# Patient Record
Sex: FEMALE | Race: BLACK OR AFRICAN AMERICAN | NOT HISPANIC OR LATINO | Employment: FULL TIME | URBAN - METROPOLITAN AREA
[De-identification: names, ages, dates, MRNs, and addresses within clinical notes are randomized per-mention and may not be internally consistent; named-entity substitution may affect disease eponyms.]

---

## 2018-03-20 ENCOUNTER — HOSPITAL ENCOUNTER (OUTPATIENT)
Facility: CLINIC | Age: 58
Discharge: HOME | End: 2018-03-20
Attending: FAMILY MEDICINE
Payer: MEDICARE

## 2018-03-20 VITALS
OXYGEN SATURATION: 98 % | DIASTOLIC BLOOD PRESSURE: 76 MMHG | TEMPERATURE: 98.1 F | SYSTOLIC BLOOD PRESSURE: 130 MMHG | RESPIRATION RATE: 18 BRPM | HEART RATE: 85 BPM

## 2018-03-20 DIAGNOSIS — J02.9 ACUTE PHARYNGITIS, UNSPECIFIED ETIOLOGY: Primary | ICD-10-CM

## 2018-03-20 PROCEDURE — 99213 OFFICE O/P EST LOW 20 MIN: CPT | Performed by: FAMILY MEDICINE

## 2018-03-20 RX ORDER — LEVOTHYROXINE SODIUM 100 UG/1
100 TABLET ORAL DAILY
COMMUNITY
End: 2018-03-20

## 2018-03-20 RX ORDER — AZITHROMYCIN 250 MG/1
TABLET, FILM COATED ORAL
Qty: 6 TABLET | Refills: 0 | Status: SHIPPED | OUTPATIENT
Start: 2018-03-20 | End: 2020-02-17

## 2018-03-20 RX ORDER — LEVOTHYROXINE SODIUM 100 UG/1
100 TABLET ORAL DAILY
Qty: 30 TABLET | Refills: 0 | Status: SHIPPED | OUTPATIENT
Start: 2018-03-20 | End: 2019-02-12 | Stop reason: DRUGHIGH

## 2018-03-20 NOTE — ED PROVIDER NOTES
History  Chief Complaint   Patient presents with   • Sore Throat   • Sinusitis     4-5 days of sore throat, sinus congestion, some cough. Having some chills, no known fevers. States has been prone to strep ever since having chemo.             Past Medical History:   Diagnosis Date   • Breast cancer (CMS/HCC) (HCC)    • H/O total hysterectomy 03/2000       No past surgical history on file.    No family history on file.    Social History   Substance Use Topics   • Smoking status: Never Smoker   • Smokeless tobacco: Never Used   • Alcohol use No       Review of Systems    Physical Exam  ED Triage Vitals [03/20/18 1129]   Temp Heart Rate Resp BP SpO2   36.7 °C (98.1 °F) 85 18 130/76 98 %      Temp Source Heart Rate Source Patient Position BP Location FiO2 (%) (Set)   Oral Monitor Sitting Right upper arm --       Physical Exam      Procedures  Procedures    UC Course  ED Course          Clinical Impressions as of Mar 20 1148   Acute pharyngitis, unspecified etiology       [facility]               Clotilde Aranda MD  03/20/18 1148

## 2018-03-20 NOTE — DISCHARGE INSTRUCTIONS
1. Antibiotic as directed.  2. Ibuprofen (motrin or advil) and/or acetaminophen (tylenol) for pain / fever if needed.  3. Salt water gargles, over the counter cepacol or cloraseptic sprays or lozenges, honey in tea or with warm water and lemon juice, can all be soothing for symptoms.  4. Follow up with PCP if persistent or worsening.

## 2018-07-10 ENCOUNTER — HOSPITAL ENCOUNTER (EMERGENCY)
Facility: HOSPITAL | Age: 58
Discharge: HOME | End: 2018-07-11
Attending: EMERGENCY MEDICINE
Payer: MEDICARE

## 2018-07-10 VITALS
RESPIRATION RATE: 16 BRPM | SYSTOLIC BLOOD PRESSURE: 157 MMHG | HEIGHT: 67 IN | DIASTOLIC BLOOD PRESSURE: 80 MMHG | TEMPERATURE: 98.4 F | HEART RATE: 84 BPM | OXYGEN SATURATION: 95 % | WEIGHT: 158 LBS | BODY MASS INDEX: 24.8 KG/M2

## 2018-07-10 DIAGNOSIS — S51.832A PUNCTURE WOUND OF LEFT FOREARM, INITIAL ENCOUNTER: Primary | ICD-10-CM

## 2018-07-10 PROCEDURE — 99283 EMERGENCY DEPT VISIT LOW MDM: CPT

## 2018-07-10 PROCEDURE — 3E0134Z INTRODUCTION OF SERUM, TOXOID AND VACCINE INTO SUBCUTANEOUS TISSUE, PERCUTANEOUS APPROACH: ICD-10-PCS | Performed by: EMERGENCY MEDICINE

## 2018-07-10 RX ORDER — CLARITHROMYCIN 500 MG/1
500 TABLET, FILM COATED ORAL 2 TIMES DAILY
COMMUNITY
End: 2019-01-25 | Stop reason: ALTCHOICE

## 2018-07-11 PROCEDURE — 63600000 HC DRUGS/DETAIL CODE: Performed by: PHYSICIAN ASSISTANT

## 2018-07-11 PROCEDURE — 90715 TDAP VACCINE 7 YRS/> IM: CPT | Performed by: PHYSICIAN ASSISTANT

## 2018-07-11 PROCEDURE — 63700000 HC SELF-ADMINISTRABLE DRUG: Performed by: PHYSICIAN ASSISTANT

## 2018-07-11 PROCEDURE — 99283 EMERGENCY DEPT VISIT LOW MDM: CPT | Mod: 25

## 2018-07-11 PROCEDURE — 90471 IMMUNIZATION ADMIN: CPT | Performed by: PHYSICIAN ASSISTANT

## 2018-07-11 RX ORDER — DOXYCYCLINE HYCLATE 100 MG
100 TABLET ORAL ONCE
Status: COMPLETED | OUTPATIENT
Start: 2018-07-11 | End: 2018-07-11

## 2018-07-11 RX ORDER — DOXYCYCLINE 100 MG/1
100 CAPSULE ORAL 2 TIMES DAILY
Qty: 20 CAPSULE | Refills: 0 | Status: SHIPPED | OUTPATIENT
Start: 2018-07-11 | End: 2020-02-17

## 2018-07-11 RX ADMIN — BACITRACIN, NEOMYCIN, POLYMYXIN B 1 PACKET: 400; 3.5; 5 OINTMENT TOPICAL at 00:33

## 2018-07-11 RX ADMIN — TETANUS TOXOID, REDUCED DIPHTHERIA TOXOID AND ACELLULAR PERTUSSIS VACCINE, ADSORBED 0.5 ML: 5; 2.5; 8; 8; 2.5 SUSPENSION INTRAMUSCULAR at 00:31

## 2018-07-11 RX ADMIN — DOXYCYCLINE HYCLATE 100 MG: 100 TABLET, FILM COATED ORAL at 00:33

## 2018-07-11 ASSESSMENT — ENCOUNTER SYMPTOMS
MYALGIAS: 1
WOUND: 1
NEUROLOGICAL NEGATIVE: 1
CARDIOVASCULAR NEGATIVE: 1
RESPIRATORY NEGATIVE: 1
GASTROINTESTINAL NEGATIVE: 1
CONSTITUTIONAL NEGATIVE: 1
PSYCHIATRIC NEGATIVE: 1

## 2018-07-11 NOTE — ED ATTESTATION NOTE
The patient was evaluated and managed by the physician assistant.    Case discussed with PA, I supervised care and agree with plan.     Harrison Groves MD  07/11/18 0039

## 2018-07-11 NOTE — ED PROVIDER NOTES
"HPI     Chief Complaint   Patient presents with   • Upper Extremity Issue         History provided by:  Patient   used: No    Upper Extremity Issue   Location:  Arm  Arm location:  L forearm  Injury: yes    Time since incident:  2 hours  Mechanism of injury comment:  PW from nail, trip into it, nail came out whole  Foreign body present:  No foreign bodies  Tetanus status:  Out of date  Prior injury to area:  No  Ineffective treatments: cleaned with soap and water.  Risk factors comment:  L arm lymphedema       Patient History     Past Medical History:   Diagnosis Date   • Breast cancer (CMS/HCC) (HCC)    • H/O total hysterectomy 03/2000       Past Surgical History:   Procedure Laterality Date   • BREAST LUMPECTOMY         History reviewed. No pertinent family history.    Social History   Substance Use Topics   • Smoking status: Never Smoker   • Smokeless tobacco: Never Used   • Alcohol use No       Systems Reviewed from Nursing Triage:          Review of Systems     Review of Systems   Constitutional: Negative.    HENT: Negative.    Respiratory: Negative.    Cardiovascular: Negative.    Gastrointestinal: Negative.    Genitourinary: Negative.    Musculoskeletal: Positive for myalgias.   Skin: Positive for wound.   Neurological: Negative.    Psychiatric/Behavioral: Negative.         Physical Exam     ED Triage Vitals [07/10/18 2349]   Temp Heart Rate Resp BP SpO2   36.9 °C (98.4 °F) 84 16 (!) 157/80 95 %      Temp Source Heart Rate Source Patient Position BP Location FiO2 (%) (Set)   Oral Monitor Sitting Right upper arm --                     Patient Vitals for the past 24 hrs:   BP Temp Temp src Pulse Resp SpO2 Height Weight   07/10/18 2349 (!) 157/80 36.9 °C (98.4 °F) Oral 84 16 95 % 1.702 m (5' 7\") 71.7 kg (158 lb)           Physical Exam   Constitutional: She appears well-developed and well-nourished.   HENT:   Head: Atraumatic.   Eyes: Conjunctivae are normal.   Neck: Normal range of motion. "   Cardiovascular: Normal rate and regular rhythm.    Musculoskeletal:        Arms:  Skin: Skin is warm and dry.   Psychiatric: She has a normal mood and affect. Her behavior is normal.   Nursing note and vitals reviewed.           Procedures    ED Course & MDM     Labs Reviewed - No data to display    No orders to display           MDM  Number of Diagnoses or Management Options  Puncture wound of left forearm, initial encounter:   Diagnosis management comments: D/w Dr. Groves, will update tetanus and start PO abx, pt to see PCP in 2 days for wound check           ED Course          Clinical Impressions as of Jul 11 0005   Puncture wound of left forearm, initial encounter        MYKE Wolf  07/11/18 0023

## 2019-01-25 ENCOUNTER — OFFICE VISIT (OUTPATIENT)
Dept: PRIMARY CARE | Facility: CLINIC | Age: 59
End: 2019-01-25
Payer: MEDICARE

## 2019-01-25 ENCOUNTER — HOSPITAL ENCOUNTER (OUTPATIENT)
Dept: RADIOLOGY | Facility: HOSPITAL | Age: 59
Discharge: HOME | End: 2019-01-25
Attending: NURSE PRACTITIONER
Payer: MEDICARE

## 2019-01-25 ENCOUNTER — TELEPHONE (OUTPATIENT)
Dept: PRIMARY CARE | Facility: CLINIC | Age: 59
End: 2019-01-25

## 2019-01-25 VITALS
SYSTOLIC BLOOD PRESSURE: 130 MMHG | WEIGHT: 163 LBS | BODY MASS INDEX: 25.58 KG/M2 | HEIGHT: 67 IN | HEART RATE: 112 BPM | DIASTOLIC BLOOD PRESSURE: 70 MMHG | RESPIRATION RATE: 18 BRPM | OXYGEN SATURATION: 93 % | TEMPERATURE: 99.2 F

## 2019-01-25 DIAGNOSIS — F41.1 ANXIETY STATE: ICD-10-CM

## 2019-01-25 DIAGNOSIS — J06.9 UPPER RESPIRATORY TRACT INFECTION, UNSPECIFIED TYPE: ICD-10-CM

## 2019-01-25 DIAGNOSIS — E03.9 ACQUIRED HYPOTHYROIDISM: ICD-10-CM

## 2019-01-25 DIAGNOSIS — R06.02 SOB (SHORTNESS OF BREATH): ICD-10-CM

## 2019-01-25 DIAGNOSIS — C50.912 MALIGNANT NEOPLASM OF LEFT FEMALE BREAST, UNSPECIFIED ESTROGEN RECEPTOR STATUS, UNSPECIFIED SITE OF BREAST (CMS/HCC): ICD-10-CM

## 2019-01-25 DIAGNOSIS — J06.9 UPPER RESPIRATORY TRACT INFECTION, UNSPECIFIED TYPE: Primary | ICD-10-CM

## 2019-01-25 PROCEDURE — 71046 X-RAY EXAM CHEST 2 VIEWS: CPT

## 2019-01-25 PROCEDURE — 99214 OFFICE O/P EST MOD 30 MIN: CPT | Performed by: NURSE PRACTITIONER

## 2019-01-25 RX ORDER — CYCLOBENZAPRINE HCL 5 MG
TABLET ORAL
COMMUNITY
Start: 2018-12-19 | End: 2020-01-26

## 2019-01-25 RX ORDER — FLUTICASONE PROPIONATE 50 MCG
SPRAY, SUSPENSION (ML) NASAL
COMMUNITY
Start: 2018-12-19 | End: 2020-01-28 | Stop reason: SDUPTHER

## 2019-01-25 RX ORDER — AZITHROMYCIN 250 MG/1
TABLET, FILM COATED ORAL
Qty: 6 TABLET | Refills: 0 | Status: SHIPPED | OUTPATIENT
Start: 2019-01-25 | End: 2020-02-17

## 2019-01-25 RX ORDER — IBUPROFEN 800 MG/1
TABLET ORAL
COMMUNITY
Start: 2018-12-19 | End: 2020-01-26

## 2019-01-25 RX ORDER — ALBUTEROL SULFATE 90 UG/1
2 INHALANT RESPIRATORY (INHALATION) EVERY 6 HOURS PRN
Qty: 1 INHALER | Refills: 1 | Status: SHIPPED | OUTPATIENT
Start: 2019-01-25 | End: 2019-02-08

## 2019-01-25 RX ORDER — BENZONATATE 100 MG/1
100 CAPSULE ORAL 3 TIMES DAILY PRN
Qty: 30 CAPSULE | Refills: 0 | Status: SHIPPED | OUTPATIENT
Start: 2019-01-25 | End: 2020-02-17

## 2019-01-25 ASSESSMENT — ENCOUNTER SYMPTOMS
COUGH: 1
SHORTNESS OF BREATH: 0
CARDIOVASCULAR NEGATIVE: 1
SINUS PAIN: 1
CHILLS: 0
HEADACHES: 1
SORE THROAT: 1
SINUS PRESSURE: 1
PSYCHIATRIC NEGATIVE: 1
FEVER: 0

## 2019-01-25 NOTE — ASSESSMENT & PLAN NOTE
Pt prescribed azithromycin and albuterol inhaler. Advised to continue taking mucinex/ tessalon perles prescribed for cough/ chest congestion. Use flonase nasal spray/ nasal saline for nasal congestion. Gargle w/ warm salt water/ drink hot tea w/ lemon for sore throat.   CXR ordered for further evaluation due to chest congestion/cough/SOB.

## 2019-01-25 NOTE — ASSESSMENT & PLAN NOTE
Uncontrolled, mostly leading to sleep disturbance. Pt seeking therapy. Pt provided referral to [facility]. Discussed relaxation techniques.

## 2019-01-25 NOTE — PROGRESS NOTES
"Subjective      Patient ID: Alecia Cruz is a 58 y.o. female.  1960      Pt presents w/ c/o URI symptoms x 4-5 days. Temp today was 102, productive cough, sinus pain/ pressure, sore throat, left earache, PND. Pt has taken ibuprofen, and mucinex-DM, w/ some relief.   Pt states she took doxycycline earlier this week, took 3 pills. Had some relief, then symptoms worsened.   Pt is moving from [place] and wants to reestablish care, was a former pt of Dr. Zapata.           The following have been reviewed and updated as appropriate in this visit:  Tobacco  Allergies  Meds  Med Hx  Surg Hx  Fam Hx  Soc Hx      Review of Systems   Constitutional: Negative for chills and fever.   HENT: Positive for congestion, ear discharge, postnasal drip, sinus pain, sinus pressure and sore throat.    Respiratory: Positive for cough. Negative for shortness of breath.    Cardiovascular: Negative.    Neurological: Positive for headaches.   Psychiatric/Behavioral: Negative.        Objective     Vitals:    01/25/19 1443 01/25/19 1532   BP: 130/70    BP Location: Right upper arm    Patient Position: Sitting    Pulse: (!) 117 (!) 112   Resp: 18    Temp: 37.3 °C (99.2 °F)    SpO2: 93%    Weight: 73.9 kg (163 lb)    Height: 1.702 m (5' 7\")      Body mass index is 25.53 kg/m².    Physical Exam   Constitutional: She is oriented to person, place, and time. She appears well-developed and well-nourished.   HR elevated    HENT:   Head: Normocephalic and atraumatic.   Right Ear: Hearing, tympanic membrane, external ear and ear canal normal.   Left Ear: Hearing, tympanic membrane, external ear and ear canal normal.   Nose: Mucosal edema present. Right sinus exhibits frontal sinus tenderness. Right sinus exhibits no maxillary sinus tenderness. Left sinus exhibits frontal sinus tenderness. Left sinus exhibits no maxillary sinus tenderness.   Mouth/Throat: Uvula is midline, oropharynx is clear and moist and mucous membranes are normal. "   Cardiovascular: Normal rate, S1 normal, S2 normal and normal heart sounds.    Pulmonary/Chest: Effort normal and breath sounds normal.   Neurological: She is alert and oriented to person, place, and time.   Skin: Skin is warm and dry.   Left arm-lymphedema    Psychiatric: She has a normal mood and affect. Her behavior is normal.   Vitals reviewed.      Assessment/Plan   Problem List Items Addressed This Visit     Anxiety state     Uncontrolled, mostly leading to sleep disturbance. Pt seeking therapy. Pt provided referral to [facility]. Discussed relaxation techniques.          Malignant neoplasm of breast (CMS/HCC) (HCC)     Pt referred to Dr. Merino to reestablish care. Last received chemo in 2009.         Relevant Orders    Ambulatory referral to Breast Surgery    Acquired hypothyroidism     Stable, continue taking Synthroid. Will provide medical records.          Upper respiratory tract infection - Primary     Pt prescribed azithromycin and albuterol inhaler. Advised to continue taking mucinex/ tessalon perles prescribed for cough/ chest congestion. Use flonase nasal spray/ nasal saline for nasal congestion. Gargle w/ warm salt water/ drink hot tea w/ lemon for sore throat.   CXR ordered for further evaluation due to chest congestion/cough/SOB.          Relevant Medications    azithromycin (ZITHROMAX) 250 mg tablet    Other Relevant Orders    X-RAY CHEST 2 VIEWS      Other Visit Diagnoses     SOB (shortness of breath)        Relevant Orders    X-RAY CHEST 2 VIEWS

## 2019-01-28 ENCOUNTER — TELEPHONE (OUTPATIENT)
Dept: PRIMARY CARE | Facility: CLINIC | Age: 59
End: 2019-01-28

## 2019-01-28 NOTE — TELEPHONE ENCOUNTER
I spoke with Alecia informing her of normal CXR.  She verbalized understanding.    ----- Message from MEE Soto sent at 1/28/2019  8:35 AM EST -----  CXR: negative. Impression: No acute cardiopulmonary process.

## 2019-02-08 ENCOUNTER — OFFICE VISIT (OUTPATIENT)
Dept: PRIMARY CARE | Facility: CLINIC | Age: 59
End: 2019-02-08
Payer: MEDICARE

## 2019-02-08 VITALS
TEMPERATURE: 97.9 F | RESPIRATION RATE: 17 BRPM | BODY MASS INDEX: 25.58 KG/M2 | DIASTOLIC BLOOD PRESSURE: 80 MMHG | SYSTOLIC BLOOD PRESSURE: 140 MMHG | WEIGHT: 163 LBS | HEART RATE: 75 BPM | HEIGHT: 67 IN | OXYGEN SATURATION: 98 %

## 2019-02-08 DIAGNOSIS — E55.9 VITAMIN D DEFICIENCY: ICD-10-CM

## 2019-02-08 DIAGNOSIS — F51.01 PRIMARY INSOMNIA: ICD-10-CM

## 2019-02-08 DIAGNOSIS — C50.012 MALIGNANT NEOPLASM OF NIPPLE OR AREOLA OF LEFT FEMALE BREAST: ICD-10-CM

## 2019-02-08 DIAGNOSIS — E03.9 ACQUIRED HYPOTHYROIDISM: ICD-10-CM

## 2019-02-08 DIAGNOSIS — J06.9 UPPER RESPIRATORY TRACT INFECTION, UNSPECIFIED TYPE: Primary | ICD-10-CM

## 2019-02-08 DIAGNOSIS — R79.89 LOW VITAMIN D LEVEL: ICD-10-CM

## 2019-02-08 DIAGNOSIS — I97.2 POSTMASTECTOMY LYMPHEDEMA SYNDROME: ICD-10-CM

## 2019-02-08 PROCEDURE — 99214 OFFICE O/P EST MOD 30 MIN: CPT | Performed by: INTERNAL MEDICINE

## 2019-02-08 RX ORDER — LORATADINE 10 MG/1
10 TABLET ORAL DAILY
COMMUNITY
End: 2020-01-28 | Stop reason: SDUPTHER

## 2019-02-08 RX ORDER — ZOLPIDEM TARTRATE 5 MG/1
5 TABLET ORAL NIGHTLY PRN
Qty: 30 TABLET | Refills: 5 | Status: SHIPPED | OUTPATIENT
Start: 2019-02-08 | End: 2020-04-29

## 2019-02-08 RX ORDER — OMEPRAZOLE 20 MG/1
20 CAPSULE, DELAYED RELEASE ORAL
COMMUNITY
End: 2020-01-28 | Stop reason: SDUPTHER

## 2019-02-08 RX ORDER — METOPROLOL SUCCINATE 25 MG/1
25 TABLET, EXTENDED RELEASE ORAL DAILY
COMMUNITY
End: 2020-01-28 | Stop reason: SDUPTHER

## 2019-02-08 ASSESSMENT — ENCOUNTER SYMPTOMS
LIGHT-HEADEDNESS: 0
CONSTIPATION: 0
DYSURIA: 0
SHORTNESS OF BREATH: 0
EYE PAIN: 0
VOMITING: 0
FEVER: 0
DIZZINESS: 0
TREMORS: 0
ABDOMINAL PAIN: 0
WEAKNESS: 0
HEADACHES: 0
NAUSEA: 0
BACK PAIN: 0
FATIGUE: 0
NUMBNESS: 0
CONFUSION: 0
COUGH: 1
DIARRHEA: 0
SORE THROAT: 0
CHILLS: 0

## 2019-02-08 NOTE — PROGRESS NOTES
"  Subjective     Patient ID: Alecia Cruz is a 58 y.o. female.    URI: for weeks. Nasal congestion.   Rx: OTC  Mild.   Getting better.  Lingering cough. Occas mucus.         Review of Systems   Constitutional: Negative for chills, fatigue and fever.   HENT: Negative for sore throat.    Eyes: Negative for pain.   Respiratory: Positive for cough. Negative for shortness of breath.    Cardiovascular: Negative for chest pain.   Gastrointestinal: Negative for abdominal pain, constipation, diarrhea, nausea and vomiting.   Genitourinary: Negative for dysuria.   Musculoskeletal: Negative for back pain.   Skin: Negative for rash.   Neurological: Negative for dizziness, tremors, weakness, light-headedness, numbness and headaches.   Psychiatric/Behavioral: Negative for confusion.       Objective     Vitals:    02/08/19 1143   BP: 140/80   BP Location: Right upper arm   Patient Position: Sitting   Pulse: 75   Resp: 17   Temp: 36.6 °C (97.9 °F)   SpO2: 98%   Weight: 73.9 kg (163 lb)   Height: 1.702 m (5' 7\")     Body mass index is 25.53 kg/m².    Physical Exam   Constitutional: She is oriented to person, place, and time. She appears well-developed and well-nourished. She is active.   HENT:   Head: Normocephalic and atraumatic.   Right Ear: Tympanic membrane normal.   Left Ear: Tympanic membrane normal.   Nose: Nose normal.   Mouth/Throat: Oropharynx is clear and moist and mucous membranes are normal.   Eyes: EOM are normal. Pupils are equal, round, and reactive to light.   Neck: Trachea normal and normal range of motion. Neck supple. Carotid bruit is not present. No thyroid mass present.   Cardiovascular: Normal rate, regular rhythm, normal heart sounds and intact distal pulses.    Pulmonary/Chest: Effort normal and breath sounds normal. She has no wheezes. She has no rales.   Abdominal: Soft. Bowel sounds are normal. She exhibits no distension. There is no tenderness.   Musculoskeletal: Normal range of motion. She " exhibits no edema, tenderness or deformity.   Neurological: She is alert and oriented to person, place, and time. She displays normal reflexes. No cranial nerve deficit.   Skin: Skin is warm and dry. No rash noted. No erythema.   Psychiatric: She has a normal mood and affect. Her behavior is normal. Judgment and thought content normal.   Nursing note and vitals reviewed.      Assessment/Plan   Problem List Items Addressed This Visit     Malignant neoplasm of nipple or areola of female breast (CMS/HCC) (HCC)     Stable. Cont f/u breast surgeon. Pt notes she just called them to schedule appt.          Postmastectomy lymphedema syndrome     Moderate. Cont lymphedema pump at home. And wrap.          Acquired hypothyroidism     Stable.  Continue current medications.         Relevant Medications    metoprolol succinate XL (TOPROL-XL) 25 mg 24 hr tablet    Other Relevant Orders    TSH w reflex FT4    Upper respiratory tract infection - Primary     Likely viral.  OTCs prn.  Fluids.  Rest.  Call/return to office prn.         Relevant Orders    CBC    Comprehensive metabolic panel    Lipid panel    Hepatitis C antibody    Low vitamin D level     Noted. Check labs. Was on vit D weekly up to last week. Sees a doctor in [place] for same.          Relevant Orders    Vitamin D 1,25-Dihydroxy    Primary insomnia     Noted. Tried melatonin with little relief. Then ambien which worked. Wants refill.           Other Visit Diagnoses     Vitamin D deficiency         Relevant Orders    Vitamin D 1,25-Dihydroxy

## 2019-02-09 ENCOUNTER — APPOINTMENT (OUTPATIENT)
Dept: LAB | Facility: HOSPITAL | Age: 59
End: 2019-02-09
Attending: INTERNAL MEDICINE
Payer: MEDICARE

## 2019-02-09 DIAGNOSIS — J06.9 UPPER RESPIRATORY TRACT INFECTION, UNSPECIFIED TYPE: ICD-10-CM

## 2019-02-09 DIAGNOSIS — E55.9 VITAMIN D DEFICIENCY: ICD-10-CM

## 2019-02-09 DIAGNOSIS — R79.89 LOW VITAMIN D LEVEL: ICD-10-CM

## 2019-02-09 DIAGNOSIS — E03.9 ACQUIRED HYPOTHYROIDISM: ICD-10-CM

## 2019-02-09 LAB
ALBUMIN SERPL-MCNC: 3.6 G/DL (ref 3.4–5)
ALP SERPL-CCNC: 57 IU/L (ref 35–126)
ALT SERPL-CCNC: 30 IU/L (ref 11–54)
ANION GAP SERPL CALC-SCNC: 10 MEQ/L (ref 3–15)
AST SERPL-CCNC: 31 IU/L (ref 15–41)
BILIRUB SERPL-MCNC: 0.5 MG/DL (ref 0.3–1.2)
BUN SERPL-MCNC: 9 MG/DL (ref 8–20)
CALCIUM SERPL-MCNC: 9.2 MG/DL (ref 8.9–10.3)
CHLORIDE SERPL-SCNC: 105 MEQ/L (ref 98–109)
CHOLEST SERPL-MCNC: 204 MG/DL
CO2 SERPL-SCNC: 26 MEQ/L (ref 22–32)
CREAT SERPL-MCNC: 0.9 MG/DL
ERYTHROCYTE [DISTWIDTH] IN BLOOD BY AUTOMATED COUNT: 12.8 % (ref 11.7–14.4)
GFR SERPL CREATININE-BSD FRML MDRD: >60 ML/MIN/1.73M*2
GLUCOSE SERPL-MCNC: 103 MG/DL (ref 70–99)
HCT VFR BLDCO AUTO: 37.3 %
HCV AB SER QL: NONREACTIVE
HCV AB SER-ACNC: NEGATIVE
HDLC SERPL-MCNC: 51 MG/DL
HDLC SERPL: 4 {RATIO}
HGB BLD-MCNC: 12.8 G/DL
LDLC SERPL CALC-MCNC: 140 MG/DL
MCH RBC QN AUTO: 31.4 PG (ref 28–33.2)
MCHC RBC AUTO-ENTMCNC: 34.3 G/DL (ref 32.2–35.5)
MCV RBC AUTO: 91.4 FL (ref 83–98)
NONHDLC SERPL-MCNC: 153 MG/DL
PDW BLD AUTO: 10.3 FL (ref 9.4–12.3)
PLATELET # BLD AUTO: 234 K/UL
POTASSIUM SERPL-SCNC: 4.2 MEQ/L (ref 3.6–5.1)
PROT SERPL-MCNC: 6.4 G/DL (ref 6–8.2)
RBC # BLD AUTO: 4.08 M/UL (ref 3.93–5.22)
SODIUM SERPL-SCNC: 141 MEQ/L (ref 136–144)
T4 FREE SERPL-MCNC: 0.89 NG/DL (ref 0.58–1.64)
TRIGL SERPL-MCNC: 66 MG/DL (ref 30–149)
TSH SERPL DL<=0.05 MIU/L-ACNC: 7.45 MIU/L (ref 0.34–5.6)
WBC # BLD AUTO: 5.3 K/UL

## 2019-02-09 PROCEDURE — 84443 ASSAY THYROID STIM HORMONE: CPT

## 2019-02-09 PROCEDURE — 84439 ASSAY OF FREE THYROXINE: CPT

## 2019-02-09 PROCEDURE — 86803 HEPATITIS C AB TEST: CPT

## 2019-02-09 PROCEDURE — 85027 COMPLETE CBC AUTOMATED: CPT

## 2019-02-09 PROCEDURE — 82652 VIT D 1 25-DIHYDROXY: CPT

## 2019-02-09 PROCEDURE — 36415 COLL VENOUS BLD VENIPUNCTURE: CPT

## 2019-02-09 PROCEDURE — 80053 COMPREHEN METABOLIC PANEL: CPT

## 2019-02-09 PROCEDURE — 80061 LIPID PANEL: CPT

## 2019-02-12 ENCOUNTER — TELEPHONE (OUTPATIENT)
Dept: PRIMARY CARE | Facility: CLINIC | Age: 59
End: 2019-02-12

## 2019-02-12 DIAGNOSIS — E07.9 DISORDER OF THYROID: ICD-10-CM

## 2019-02-12 DIAGNOSIS — R79.89 ELEVATED TSH: Primary | ICD-10-CM

## 2019-02-12 RX ORDER — LEVOTHYROXINE SODIUM 112 UG/1
112 TABLET ORAL
Qty: 90 TABLET | Refills: 3 | Status: SHIPPED | OUTPATIENT
Start: 2019-02-12 | End: 2020-01-28 | Stop reason: SDUPTHER

## 2019-02-12 NOTE — TELEPHONE ENCOUNTER
----- Message from Asher Lee MD sent at 2/11/2019  9:12 AM EST -----  TSH is high. Would increase synthroid a little bit to 112 qday. Recheck TSH/free T4 6-8 weeks.

## 2019-02-12 NOTE — TELEPHONE ENCOUNTER
Left message for pt advising her of elevated TSH and change to medication. Script for levothyroxine 112 mcg sent to pharmacy, lab slip sent to pt.

## 2019-02-14 LAB
1,25(OH)2D SERPL-MCNC: 49 PG/ML (ref 18–72)
1,25(OH)2D2 SERPL-MCNC: <8 PG/ML
1,25(OH)2D3 SERPL-MCNC: 49 PG/ML

## 2019-02-27 NOTE — LETTER
February 27, 2019     Alecia Cruz  [address]  [address]  [place] [address]      Dear Ms. Alexi Cruz:      The test results show that they are within normal limits.     If you have any questions or concerns, please do not hesitate to call.         Sincerely,        Asher Lee MD

## 2019-10-09 ENCOUNTER — TELEPHONE (OUTPATIENT)
Dept: PRIMARY CARE | Facility: CLINIC | Age: 59
End: 2019-10-09

## 2020-01-23 ENCOUNTER — OFFICE VISIT (OUTPATIENT)
Dept: PRIMARY CARE | Facility: CLINIC | Age: 60
End: 2020-01-23
Payer: COMMERCIAL

## 2020-01-23 VITALS
TEMPERATURE: 98.1 F | RESPIRATION RATE: 18 BRPM | BODY MASS INDEX: 25.11 KG/M2 | DIASTOLIC BLOOD PRESSURE: 70 MMHG | HEIGHT: 67 IN | WEIGHT: 160 LBS | OXYGEN SATURATION: 97 % | HEART RATE: 91 BPM | SYSTOLIC BLOOD PRESSURE: 110 MMHG

## 2020-01-23 DIAGNOSIS — C50.012 MALIGNANT NEOPLASM OF NIPPLE OR AREOLA OF LEFT FEMALE BREAST: ICD-10-CM

## 2020-01-23 DIAGNOSIS — I48.0 PAROXYSMAL ATRIAL FIBRILLATION (CMS/HCC): ICD-10-CM

## 2020-01-23 DIAGNOSIS — J06.9 UPPER RESPIRATORY TRACT INFECTION, UNSPECIFIED TYPE: Primary | ICD-10-CM

## 2020-01-23 PROCEDURE — 99214 OFFICE O/P EST MOD 30 MIN: CPT | Performed by: INTERNAL MEDICINE

## 2020-01-23 RX ORDER — FLECAINIDE ACETATE 50 MG/1
50 TABLET ORAL 2 TIMES DAILY
COMMUNITY
End: 2020-01-28 | Stop reason: SDUPTHER

## 2020-01-23 ASSESSMENT — ENCOUNTER SYMPTOMS
RHINORRHEA: 1
SINUS PRESSURE: 1

## 2020-01-23 NOTE — PROGRESS NOTES
"  Subjective     Patient ID: Alecia Cruz is a 59 y.o. female.    Achy. Back is sore.  Grandchild (5 y.o.) has been sick.     Staying with son. He \"has every animal under the sun\".   Went to friends house and didn't have any problems.     URI    This is a recurrent problem. Episode onset: on and off for the past 3 months. The problem has been waxing and waning. There has been no fever. Associated symptoms include congestion and rhinorrhea (sometimes yellow/greenish). She has tried antihistamine and decongestant for the symptoms. The treatment provided moderate relief.       Review of Systems   HENT: Positive for congestion, rhinorrhea (sometimes yellow/greenish) and sinus pressure.        Objective     Vitals:    01/23/20 1105   BP: 110/70   BP Location: Right upper arm   Patient Position: Sitting   Pulse: 91   Resp: 18   Temp: 36.7 °C (98.1 °F)   TempSrc: Oral   SpO2: 97%   Weight: 72.6 kg (160 lb)   Height: 1.702 m (5' 7\")     Body mass index is 25.06 kg/m².    Physical Exam   Constitutional: She is oriented to person, place, and time. She appears well-developed and well-nourished. She is active.   HENT:   Head: Normocephalic and atraumatic.   Nose: Nose normal.   Mouth/Throat: No oropharyngeal exudate.   Eyes: Pupils are equal, round, and reactive to light. EOM are normal. Right eye exhibits no discharge. Left eye exhibits no discharge.   Neck: Neck supple. No neck rigidity. No erythema and normal range of motion present.   Cardiovascular: Normal rate, regular rhythm, normal heart sounds and intact distal pulses.   Pulmonary/Chest: Effort normal and breath sounds normal. No respiratory distress. She has no wheezes. She has no rales.   Abdominal: Soft. Bowel sounds are normal. She exhibits no distension. There is no tenderness.   Lymphadenopathy:     She has no cervical adenopathy.   Neurological: She is alert and oriented to person, place, and time.   Skin: Skin is warm.       Assessment/Plan   Diagnoses " "and all orders for this visit:    Upper respiratory tract infection, unspecified type (Primary)  Assessment & Plan:  URI vs allergies vs other. Seems more likely allergy related. Would avoid allergens. Pt notes she's \"working on it (moving out of living with son)\".  Would take flonase qday.   Can take claritin daily.   T/c CT sinuses if needed.   OTCs prn  Fluids. Rest.   Call/rto prn.       Paroxysmal atrial fibrillation (CMS/HCC)  Assessment & Plan:  Noted. In NSR. Cont meds. Cont f/u cardio, as doing.       Malignant neoplasm of nipple or areola of left female breast (CMS/HCC)  Assessment & Plan:  Noted. Cont rx plan.           "

## 2020-01-23 NOTE — ASSESSMENT & PLAN NOTE
"URI vs allergies vs other. Seems more likely allergy related. Would avoid allergens. Pt notes she's \"working on it (moving out of living with son)\".  Would take flonase qday.   Can take claritin daily.   T/c CT sinuses if needed.   OTCs prn  Fluids. Rest.   Call/rto prn.   "

## 2020-01-26 ENCOUNTER — HOSPITAL ENCOUNTER (EMERGENCY)
Facility: HOSPITAL | Age: 60
Discharge: HOME | End: 2020-01-26
Attending: EMERGENCY MEDICINE
Payer: COMMERCIAL

## 2020-01-26 ENCOUNTER — APPOINTMENT (EMERGENCY)
Dept: RADIOLOGY | Facility: HOSPITAL | Age: 60
End: 2020-01-26
Attending: EMERGENCY MEDICINE
Payer: COMMERCIAL

## 2020-01-26 VITALS
TEMPERATURE: 97.7 F | WEIGHT: 160 LBS | SYSTOLIC BLOOD PRESSURE: 128 MMHG | OXYGEN SATURATION: 100 % | RESPIRATION RATE: 16 BRPM | DIASTOLIC BLOOD PRESSURE: 77 MMHG | BODY MASS INDEX: 25.11 KG/M2 | HEART RATE: 87 BPM | HEIGHT: 67 IN

## 2020-01-26 DIAGNOSIS — R51.9 NONINTRACTABLE HEADACHE, UNSPECIFIED CHRONICITY PATTERN, UNSPECIFIED HEADACHE TYPE: Primary | ICD-10-CM

## 2020-01-26 DIAGNOSIS — R91.1 LUNG NODULE SEEN ON IMAGING STUDY: ICD-10-CM

## 2020-01-26 LAB
ALBUMIN SERPL-MCNC: 3.6 G/DL (ref 3.4–5)
ALP SERPL-CCNC: 50 IU/L (ref 35–126)
ALT SERPL-CCNC: 23 IU/L (ref 11–54)
ANION GAP SERPL CALC-SCNC: 5 MEQ/L (ref 3–15)
AST SERPL-CCNC: 19 IU/L (ref 15–41)
BASOPHILS # BLD: 0.05 K/UL (ref 0.01–0.1)
BASOPHILS NFR BLD: 0.7 %
BILIRUB SERPL-MCNC: 0.3 MG/DL (ref 0.3–1.2)
BUN SERPL-MCNC: 11 MG/DL (ref 8–20)
CALCIUM SERPL-MCNC: 8.6 MG/DL (ref 8.9–10.3)
CHLORIDE SERPL-SCNC: 107 MEQ/L (ref 98–109)
CO2 SERPL-SCNC: 24 MEQ/L (ref 22–32)
CREAT SERPL-MCNC: 0.8 MG/DL
DIFFERENTIAL METHOD BLD: ABNORMAL
EOSINOPHIL # BLD: 0.13 K/UL (ref 0.04–0.36)
EOSINOPHIL NFR BLD: 1.8 %
ERYTHROCYTE [DISTWIDTH] IN BLOOD BY AUTOMATED COUNT: 12.7 % (ref 11.7–14.4)
GFR SERPL CREATININE-BSD FRML MDRD: >60 ML/MIN/1.73M*2
GLUCOSE SERPL-MCNC: 104 MG/DL (ref 70–99)
HCT VFR BLDCO AUTO: 37.3 %
HGB BLD-MCNC: 12.5 G/DL (ref 11.8–15.7)
IMM GRANULOCYTES # BLD AUTO: 0.03 K/UL (ref 0–0.08)
IMM GRANULOCYTES NFR BLD AUTO: 0.4 %
LYMPHOCYTES # BLD: 1.57 K/UL (ref 1.2–3.5)
LYMPHOCYTES NFR BLD: 21.6 %
MCH RBC QN AUTO: 32 PG (ref 28–33.2)
MCHC RBC AUTO-ENTMCNC: 33.5 G/DL (ref 32.2–35.5)
MCV RBC AUTO: 95.4 FL (ref 83–98)
MONOCYTES # BLD: 0.37 K/UL (ref 0.28–0.8)
MONOCYTES NFR BLD: 5.1 %
NEUTROPHILS # BLD: 5.12 K/UL (ref 1.7–7)
NEUTS SEG NFR BLD: 70.4 %
NRBC BLD-RTO: 0 %
PDW BLD AUTO: 10.1 FL (ref 9.4–12.3)
PLATELET # BLD AUTO: 241 K/UL
POTASSIUM SERPL-SCNC: 3.8 MEQ/L (ref 3.6–5.1)
PROT SERPL-MCNC: 6.9 G/DL (ref 6–8.2)
RBC # BLD AUTO: 3.91 M/UL (ref 3.93–5.22)
SODIUM SERPL-SCNC: 136 MEQ/L (ref 136–144)
WBC # BLD AUTO: 7.27 K/UL

## 2020-01-26 PROCEDURE — 70496 CT ANGIOGRAPHY HEAD: CPT

## 2020-01-26 PROCEDURE — 36415 COLL VENOUS BLD VENIPUNCTURE: CPT

## 2020-01-26 PROCEDURE — 3E033GC INTRODUCTION OF OTHER THERAPEUTIC SUBSTANCE INTO PERIPHERAL VEIN, PERCUTANEOUS APPROACH: ICD-10-PCS | Performed by: EMERGENCY MEDICINE

## 2020-01-26 PROCEDURE — 70498 CT ANGIOGRAPHY NECK: CPT

## 2020-01-26 PROCEDURE — 96375 TX/PRO/DX INJ NEW DRUG ADDON: CPT | Mod: 59

## 2020-01-26 PROCEDURE — 96361 HYDRATE IV INFUSION ADD-ON: CPT

## 2020-01-26 PROCEDURE — 63600105 HC IODINE BASED CONTRAST: Performed by: EMERGENCY MEDICINE

## 2020-01-26 PROCEDURE — 63600000 HC DRUGS/DETAIL CODE: Performed by: EMERGENCY MEDICINE

## 2020-01-26 PROCEDURE — 85025 COMPLETE CBC W/AUTO DIFF WBC: CPT | Performed by: EMERGENCY MEDICINE

## 2020-01-26 PROCEDURE — 80053 COMPREHEN METABOLIC PANEL: CPT | Performed by: EMERGENCY MEDICINE

## 2020-01-26 PROCEDURE — 99285 EMERGENCY DEPT VISIT HI MDM: CPT | Mod: 25

## 2020-01-26 PROCEDURE — 96374 THER/PROPH/DIAG INJ IV PUSH: CPT | Mod: 59

## 2020-01-26 PROCEDURE — 3E0337Z INTRODUCTION OF ELECTROLYTIC AND WATER BALANCE SUBSTANCE INTO PERIPHERAL VEIN, PERCUTANEOUS APPROACH: ICD-10-PCS | Performed by: EMERGENCY MEDICINE

## 2020-01-26 PROCEDURE — 25800000 HC PHARMACY IV SOLUTIONS: Performed by: EMERGENCY MEDICINE

## 2020-01-26 RX ORDER — ASPIRIN 81 MG/1
81 TABLET ORAL DAILY
COMMUNITY

## 2020-01-26 RX ORDER — METOCLOPRAMIDE HYDROCHLORIDE 5 MG/ML
10 INJECTION INTRAMUSCULAR; INTRAVENOUS ONCE
Status: COMPLETED | OUTPATIENT
Start: 2020-01-26 | End: 2020-01-26

## 2020-01-26 RX ORDER — DIPHENHYDRAMINE HCL 50 MG/ML
25 VIAL (ML) INJECTION ONCE
Status: COMPLETED | OUTPATIENT
Start: 2020-01-26 | End: 2020-01-26

## 2020-01-26 RX ADMIN — METOCLOPRAMIDE 10 MG: 5 INJECTION, SOLUTION INTRAMUSCULAR; INTRAVENOUS at 02:51

## 2020-01-26 RX ADMIN — DIPHENHYDRAMINE HYDROCHLORIDE 25 MG: 50 INJECTION INTRAMUSCULAR; INTRAVENOUS at 02:51

## 2020-01-26 RX ADMIN — IOHEXOL 75 ML: 350 INJECTION, SOLUTION INTRAVENOUS at 04:13

## 2020-01-26 RX ADMIN — SODIUM CHLORIDE 1000 ML: 900 INJECTION, SOLUTION INTRAVENOUS at 02:49

## 2020-01-26 ASSESSMENT — ENCOUNTER SYMPTOMS
SORE THROAT: 0
NAUSEA: 1
TINGLING: 1
FACIAL ASYMMETRY: 0
SEIZURES: 0
NECK PAIN: 0
FOCAL WEAKNESS: 0
ABDOMINAL PAIN: 0
DIFFICULTY URINATING: 0
NEAR-SYNCOPE: 0
VOMITING: 0
NUMBNESS: 0
BLURRED VISION: 0
LOSS OF BALANCE: 0
DIZZINESS: 0
FEVER: 0
SYNCOPE: 0
PHOTOPHOBIA: 1
VISUAL CHANGE: 1
WEAKNESS: 0
BACK PAIN: 0
HEADACHES: 1
CONFUSION: 0
NECK STIFFNESS: 0
COUGH: 0
EYE PAIN: 0
SINUS PRESSURE: 1
SPEECH DIFFICULTY: 0
PARESTHESIAS: 0

## 2020-01-26 NOTE — DISCHARGE INSTRUCTIONS
Your CT scan showed a nodule in the upper lung.  Have your doctor follow up with the official CT scan results to see if you need to follow up for repeat imaging of the lung to follow the nodule.  It is important that you follow up with this.

## 2020-01-26 NOTE — ED PROVIDER NOTES
HPI     Chief Complaint   Patient presents with   • Headache         History provided by:  Patient  Headache   Pain location:  R temporal  Quality:  Sharp  Radiates to:  Does not radiate  Pain severity now: severe.  Pain scale at highest: severe.  Onset quality: Patient noticed headache all of a sudden around 9pm but pain got gradually worse.  Timing:  Constant  Progression:  Unchanged  Chronicity:  New  Similar to prior headaches: Patient had severe migraines when she was younger and hasn't had a migraine since she had children years ago.  Not sure if it is similar as it was so many years ago.      Context: bright light    Relieved by:  Nothing  Worsened by:  Light  Ineffective treatments:  None tried  Associated symptoms: nausea, photophobia, sinus pressure (  for weeks - may be allergy related), tingling (  In her right nostril area briefly - now resolved) and visual change (  had some squigly lines in her vision earlier which have resolved.  no flashes or floaters.  no eye pain.  No loss of vision.  )    Associated symptoms: no abdominal pain, no back pain, no blurred vision, no cough, no dizziness, no eye pain, no facial pain, no fever, no focal weakness, no loss of balance, no near-syncope, no neck pain, no neck stiffness, no numbness, no paresthesias, no seizures, no sore throat, no syncope, no vomiting and no weakness         Patient History     Past Medical History:   Diagnosis Date   • A-fib (CMS/[facility])    • Breast cancer (CMS/[facility])    • Breast cancer (CMS/[facility])    • H/O total hysterectomy 03/2000   • Hypothyroid        Past Surgical History:   Procedure Laterality Date   • BREAST LUMPECTOMY     • MANDIBLE FRACTURE SURGERY     • PARTIAL HYSTERECTOMY     • ROTATOR CUFF REPAIR Left        Family History   Problem Relation Age of Onset   • Alzheimer's disease Biological Mother    • Arrhythmia Biological Mother    • Diabetes Biological Father    • Hypertension Biological Sister    • COPD Biological Brother    •  "Stomach cancer Paternal Grandmother    • Diabetes Paternal Grandfather    • Prostate cancer Paternal Grandfather    • Sarcoidosis Biological Sister    • Diabetes Biological Sister    • Heart disease Biological Sister    • Hypertension Biological Brother        Social History     Tobacco Use   • Smoking status: Never Smoker   • Smokeless tobacco: Never Used   Substance Use Topics   • Alcohol use: No   • Drug use: No       Systems Reviewed from Nursing Triage:          Review of Systems     Review of Systems   Constitutional: Negative for fever.   HENT: Positive for sinus pressure (  for weeks - may be allergy related). Negative for sore throat.    Eyes: Positive for photophobia. Negative for blurred vision and pain.   Respiratory: Negative for cough.    Cardiovascular: Negative for chest pain, syncope and near-syncope.   Gastrointestinal: Positive for nausea. Negative for abdominal pain and vomiting.   Genitourinary: Negative for difficulty urinating.   Musculoskeletal: Negative for back pain, neck pain and neck stiffness.   Skin: Negative for rash.   Neurological: Positive for headaches. Negative for dizziness, focal weakness, seizures, syncope, facial asymmetry, speech difficulty, weakness, numbness, paresthesias and loss of balance.   Psychiatric/Behavioral: Negative for confusion.   All other systems reviewed and are negative.       Physical Exam     ED Triage Vitals [01/26/20 0105]   Temp Heart Rate Resp BP SpO2   36.5 °C (97.7 °F) 69 16 (!) 146/98 97 %      Temp Source Heart Rate Source Patient Position BP Location FiO2 (%) (Set)   Oral -- Sitting Left upper arm --                     Patient Vitals for the past 24 hrs:   BP Temp Temp src Pulse Resp SpO2 Height Weight   01/26/20 0211 (!) 145/81 -- -- 71 16 100 % -- --   01/26/20 0149 -- -- -- -- -- -- 1.702 m (5' 7\") 72.6 kg (160 lb)   01/26/20 0105 (!) 146/98 36.5 °C (97.7 °F) Oral 69 16 97 % -- --                                       Physical Exam "   Constitutional: She is oriented to person, place, and time. She appears well-developed and well-nourished. No distress.   HENT:   Head: Normocephalic and atraumatic.   Nose: Nose normal.   Mouth/Throat: Oropharynx is clear and moist.   No temporal artery tenderness   Eyes: Pupils are equal, round, and reactive to light. Conjunctivae and EOM are normal.   Neck: Neck supple.   Cardiovascular: Normal rate, regular rhythm and normal heart sounds.   No murmur heard.  Pulmonary/Chest: Effort normal and breath sounds normal.   Abdominal: Soft. There is no tenderness.   Musculoskeletal: She exhibits edema (  LUE edema - chronic from lymphedema post mastectomy per patient.). She exhibits no tenderness.   Neurological: She is alert and oriented to person, place, and time. She has normal strength. No cranial nerve deficit or sensory deficit. Coordination normal.   Skin: Skin is warm and dry. No rash noted. She is not diaphoretic. No erythema. No pallor.   Psychiatric: She has a normal mood and affect.   Nursing note and vitals reviewed.           Procedures    ED Course & MDM     Labs Reviewed   CBC AND DIFF   COMPREHENSIVE METABOLIC PANEL       No orders to display               MDM         ED Course as of Jan 26 0800   Sun Jan 26, 2020   0630 No acute on ct head or CTA head/neck.  No bleed/aneurysm.  I don't suspect SAH, stroke, meningitis, or life threatening process at this time.  Patient's headache went away with reglan/benadryl.  History of severe migraines when she was younger but no recent migraines in many years.  With negative studies I suspect his is likely migraine related.  Will have follow up with pcp and neurology.     [DUNCAN]      ED Course User Index  [DUNCAN] Vidal Delgado MD         Clinical Impressions as of Jan 26 0800   Nonintractable headache, unspecified chronicity pattern, unspecified headache type   Lung nodule seen on imaging study        Vidal Delgado MD  01/26/20 0800

## 2020-01-27 ENCOUNTER — TELEPHONE (OUTPATIENT)
Dept: PRIMARY CARE | Facility: CLINIC | Age: 60
End: 2020-01-27

## 2020-01-27 DIAGNOSIS — R93.0 ABNORMAL CT OF THE HEAD: Primary | ICD-10-CM

## 2020-01-27 NOTE — TELEPHONE ENCOUNTER
Spoke with pt who reports severe headache for which she went to the hospital yeaterday. Advised f/u with PCP at d/c. She is very concerned and upset about report  of increased white matter that showed on CT scan. Is scheduled to see  tomorrow. Per Dr Lee referred pt to neurology and he will discuss further at appt.

## 2020-01-28 ENCOUNTER — OFFICE VISIT (OUTPATIENT)
Dept: PRIMARY CARE | Facility: CLINIC | Age: 60
End: 2020-01-28
Payer: COMMERCIAL

## 2020-01-28 VITALS
WEIGHT: 156.6 LBS | TEMPERATURE: 97.6 F | HEART RATE: 86 BPM | SYSTOLIC BLOOD PRESSURE: 134 MMHG | RESPIRATION RATE: 16 BRPM | DIASTOLIC BLOOD PRESSURE: 82 MMHG | HEIGHT: 67 IN | OXYGEN SATURATION: 96 % | BODY MASS INDEX: 24.58 KG/M2

## 2020-01-28 DIAGNOSIS — E03.9 ACQUIRED HYPOTHYROIDISM: ICD-10-CM

## 2020-01-28 DIAGNOSIS — J98.4 DISEASE OF LUNG: ICD-10-CM

## 2020-01-28 DIAGNOSIS — R51.9 ACUTE NONINTRACTABLE HEADACHE, UNSPECIFIED HEADACHE TYPE: Primary | ICD-10-CM

## 2020-01-28 PROCEDURE — 99214 OFFICE O/P EST MOD 30 MIN: CPT | Performed by: INTERNAL MEDICINE

## 2020-01-28 RX ORDER — LEVOTHYROXINE SODIUM 100 UG/1
100 TABLET ORAL
Qty: 90 TABLET | Refills: 3 | Status: SHIPPED | OUTPATIENT
Start: 2020-01-28 | End: 2020-01-31 | Stop reason: DRUGHIGH

## 2020-01-28 RX ORDER — METOPROLOL SUCCINATE 25 MG/1
25 TABLET, EXTENDED RELEASE ORAL DAILY
Qty: 90 TABLET | Refills: 0 | Status: SHIPPED | OUTPATIENT
Start: 2020-01-28 | End: 2021-09-24 | Stop reason: SDUPTHER

## 2020-01-28 RX ORDER — FLECAINIDE ACETATE 50 MG/1
50 TABLET ORAL 2 TIMES DAILY
Qty: 60 TABLET | Refills: 0 | Status: SHIPPED | OUTPATIENT
Start: 2020-01-28 | End: 2020-12-07

## 2020-01-28 RX ORDER — OMEPRAZOLE 20 MG/1
20 CAPSULE, DELAYED RELEASE ORAL
Qty: 30 CAPSULE | Refills: 0 | Status: SHIPPED | OUTPATIENT
Start: 2020-01-28 | End: 2021-08-03 | Stop reason: ALTCHOICE

## 2020-01-28 RX ORDER — LORATADINE 10 MG/1
10 TABLET ORAL DAILY
Qty: 30 TABLET | Refills: 0 | Status: SHIPPED | OUTPATIENT
Start: 2020-01-28 | End: 2021-08-03

## 2020-01-28 RX ORDER — FLUTICASONE PROPIONATE 50 MCG
2 SPRAY, SUSPENSION (ML) NASAL DAILY PRN
Qty: 1 BOTTLE | Refills: 0 | Status: SHIPPED | OUTPATIENT
Start: 2020-01-28 | End: 2023-03-07 | Stop reason: ALTCHOICE

## 2020-01-28 ASSESSMENT — ENCOUNTER SYMPTOMS
DIZZINESS: 0
CHILLS: 0
NAUSEA: 0
FEVER: 0
DIARRHEA: 0
VOMITING: 0
LIGHT-HEADEDNESS: 0
HEADACHES: 1
FATIGUE: 0
CONSTIPATION: 0
SHORTNESS OF BREATH: 0
ABDOMINAL PAIN: 0

## 2020-01-28 NOTE — PROGRESS NOTES
"  Subjective     Patient ID: Alecia Cruz is a 59 y.o. female.    Headache. Pt went to ER 1/26/20 and stayed in ER until 1/27/20 and sent home. CT head showed: Progressive extensive subcortical white matter change which is nonspecific. Rec'd MRI.  Right sided.   Went away, per pt.   In ER, treated with medication that resolved the HA.     Hypothyroid. Pt had labs done in 8/2019 and had synthroid decreased to 100 (from 112). Done in [place] with doctor there (moved back there). (Now lives here).       Review of Systems   Constitutional: Negative for chills, fatigue and fever.   Respiratory: Negative for shortness of breath.    Cardiovascular: Negative for chest pain.   Gastrointestinal: Negative for abdominal pain, constipation, diarrhea, nausea and vomiting.   Neurological: Positive for headaches (see hpi). Negative for dizziness and light-headedness.       Objective     Vitals:    01/28/20 1148   BP: 134/82   BP Location: Right upper arm   Patient Position: Sitting   Pulse: 86   Resp: 16   Temp: 36.4 °C (97.6 °F)   TempSrc: Oral   SpO2: 96%   Weight: 71 kg (156 lb 9.6 oz)   Height: 1.702 m (5' 7\")     Body mass index is 24.53 kg/m².    Physical Exam   Constitutional: She is oriented to person, place, and time. She appears well-developed and well-nourished. No distress.   HENT:   Head: Normocephalic and atraumatic.   Eyes: Pupils are equal, round, and reactive to light. EOM are normal.   Neck: Normal range of motion. Neck supple.   Cardiovascular: Normal rate, regular rhythm, normal heart sounds and intact distal pulses.   No murmur heard.  Pulmonary/Chest: Effort normal and breath sounds normal. No respiratory distress. She has no wheezes. She has no rales.   Abdominal: Soft. Bowel sounds are normal. She exhibits no distension. There is no tenderness.   Neurological: She is alert and oriented to person, place, and time.   Skin: Skin is warm and dry. She is not diaphoretic.       Assessment/Plan   Diagnoses " and all orders for this visit:    Acute nonintractable headache, unspecified headache type (Primary)  Assessment & Plan:  Noted. Better.  OTCs prn  CT showed: Progressive extensive subcortical white matter change which is nonspecific. Rec'd MRI/MRA. Will send to neuro first for evaluation.     Orders:  -     TSH w reflex FT4; Future  -     Ambulatory referral to Neurology; Future    Disease of lung  Assessment & Plan:  CT showed stable 9mm nodule. Follow.       Acquired hypothyroidism  Assessment & Plan:  On synthroid 100 mcg. Check labs and proceed accordingly.       Other orders  -     levothyroxine (SYNTHROID) 100 mcg tablet; Take 1 tablet (100 mcg total) by mouth daily.  -     flecainide (TAMBOCOR) 50 mg tablet; Take 1 tablet (50 mg total) by mouth 2 (two) times a day.  -     fluticasone propionate (FLONASE) 50 mcg/actuation nasal spray; Administer 2 sprays into each nostril daily as needed for rhinitis.  -     metoprolol succinate XL (TOPROL-XL) 25 mg 24 hr tablet; Take 1 tablet (25 mg total) by mouth daily.  -     loratadine (CLARITIN) 10 mg tablet; Take 1 tablet (10 mg total) by mouth daily.  -     omeprazole (PriLOSEC) 20 mg capsule; Take 1 capsule (20 mg total) by mouth daily before breakfast.

## 2020-01-28 NOTE — ASSESSMENT & PLAN NOTE
Noted. Better.  OTCs prn  CT showed: Progressive extensive subcortical white matter change which is nonspecific. Rec'd MRI/MRA. Will send to neuro first for evaluation.

## 2020-01-29 ENCOUNTER — TELEPHONE (OUTPATIENT)
Dept: PRIMARY CARE | Facility: CLINIC | Age: 60
End: 2020-01-29

## 2020-01-29 DIAGNOSIS — R79.89 ELEVATED TSH: Primary | ICD-10-CM

## 2020-01-31 ENCOUNTER — APPOINTMENT (OUTPATIENT)
Dept: LAB | Facility: HOSPITAL | Age: 60
End: 2020-01-31
Attending: INTERNAL MEDICINE
Payer: COMMERCIAL

## 2020-01-31 DIAGNOSIS — R51.9 ACUTE NONINTRACTABLE HEADACHE, UNSPECIFIED HEADACHE TYPE: ICD-10-CM

## 2020-01-31 LAB
T4 FREE SERPL-MCNC: 0.99 NG/DL (ref 0.58–1.64)
TSH SERPL DL<=0.05 MIU/L-ACNC: 9.15 MIU/L (ref 0.34–5.6)

## 2020-01-31 PROCEDURE — 36415 COLL VENOUS BLD VENIPUNCTURE: CPT

## 2020-01-31 PROCEDURE — 84443 ASSAY THYROID STIM HORMONE: CPT

## 2020-01-31 PROCEDURE — 84439 ASSAY OF FREE THYROXINE: CPT

## 2020-01-31 RX ORDER — LEVOTHYROXINE SODIUM 112 UG/1
112 TABLET ORAL
Qty: 90 TABLET | Refills: 3 | Status: SHIPPED | OUTPATIENT
Start: 2020-01-31 | End: 2020-04-30 | Stop reason: SDUPTHER

## 2020-01-31 NOTE — TELEPHONE ENCOUNTER
Called pt and advised of elevated TSH and dr recommending dose adjustment of levothyroxine to 112mcg daily, recheck lab in 8 weeks. Lab slip sent to pt as reminder. Pt reports she continues to get sharp, intermittent pains to head, similar to when she went to ER. Has f/u appt with neuro on 2/17.

## 2020-01-31 NOTE — TELEPHONE ENCOUNTER
----- Message from Asher Lee MD sent at 1/31/2020 11:08 AM EST -----  TSH is high. Would increase synthroid to 112 mcg daily and recheck 8 weeks.

## 2020-02-13 ENCOUNTER — OFFICE VISIT (OUTPATIENT)
Dept: PRIMARY CARE | Facility: CLINIC | Age: 60
End: 2020-02-13
Payer: COMMERCIAL

## 2020-02-13 ENCOUNTER — TELEPHONE (OUTPATIENT)
Dept: PRIMARY CARE | Facility: CLINIC | Age: 60
End: 2020-02-13

## 2020-02-13 VITALS
HEIGHT: 67 IN | SYSTOLIC BLOOD PRESSURE: 110 MMHG | WEIGHT: 156 LBS | RESPIRATION RATE: 17 BRPM | TEMPERATURE: 98 F | HEART RATE: 96 BPM | OXYGEN SATURATION: 99 % | DIASTOLIC BLOOD PRESSURE: 70 MMHG | BODY MASS INDEX: 24.48 KG/M2

## 2020-02-13 DIAGNOSIS — J06.9 URI, ACUTE: Primary | ICD-10-CM

## 2020-02-13 DIAGNOSIS — M54.6 ACUTE RIGHT-SIDED THORACIC BACK PAIN: ICD-10-CM

## 2020-02-13 DIAGNOSIS — R90.82 WHITE MATTER DISEASE, UNSPECIFIED: ICD-10-CM

## 2020-02-13 PROCEDURE — 99214 OFFICE O/P EST MOD 30 MIN: CPT | Performed by: INTERNAL MEDICINE

## 2020-02-13 ASSESSMENT — ENCOUNTER SYMPTOMS
SORE THROAT: 1
RHINORRHEA: 0
SINUS PRESSURE: 1
COUGH: 1
DIARRHEA: 0
ABDOMINAL PAIN: 0
ADENOPATHY: 0
CONSTIPATION: 0
SHORTNESS OF BREATH: 0
NAUSEA: 0
HEADACHES: 1
DIZZINESS: 0
VOMITING: 0
BACK PAIN: 1
TROUBLE SWALLOWING: 0
FATIGUE: 1
FEVER: 1
CHILLS: 0
UNEXPECTED WEIGHT CHANGE: 0
LIGHT-HEADEDNESS: 0

## 2020-02-13 NOTE — PROGRESS NOTES
"  Subjective     Patient ID: Alecia Cruz is a 59 y.o. female.    URI    This is a new problem. Episode onset: 3 days ago. Maximum temperature: T101.0 yesterday. Associated symptoms include congestion (head), coughing, headaches (for 3 days, tightness, comes and goes) and a sore throat. Pertinent negatives include no abdominal pain, chest pain, diarrhea, ear pain, nausea, rash, rhinorrhea, sneezing or vomiting. She has tried acetaminophen for the symptoms. The treatment provided moderate relief.       Review of Systems   Constitutional: Positive for fatigue and fever. Negative for chills and unexpected weight change.   HENT: Positive for congestion (head), sinus pressure and sore throat. Negative for ear pain, postnasal drip, rhinorrhea, sneezing and trouble swallowing.    Eyes: Negative for visual disturbance.   Respiratory: Positive for cough. Negative for shortness of breath.    Cardiovascular: Negative for chest pain.   Gastrointestinal: Negative for abdominal pain, constipation, diarrhea, nausea and vomiting.   Musculoskeletal: Positive for back pain (a/w coughing. worse with coughing. hurts to touch it. right sided, below scapula).   Skin: Negative for rash.   Neurological: Positive for headaches (for 3 days, tightness, comes and goes). Negative for dizziness and light-headedness.   Hematological: Negative for adenopathy.       Objective     Vitals:    02/13/20 1254   BP: 110/70   Pulse: 96   Resp: 17   Temp: 36.7 °C (98 °F)   TempSrc: Oral   SpO2: 99%   Weight: 70.8 kg (156 lb)   Height: 1.702 m (5' 7\")     Body mass index is 24.43 kg/m².    Physical Exam   Constitutional: She is oriented to person, place, and time. She appears well-developed and well-nourished. She is active.   HENT:   Head: Normocephalic and atraumatic.   Nose: Nose normal.   Mouth/Throat: No oropharyngeal exudate.   Eyes: Pupils are equal, round, and reactive to light. EOM are normal. Right eye exhibits no discharge. Left eye " exhibits no discharge.   Neck: Neck supple. No neck rigidity. No erythema and normal range of motion present.   Cardiovascular: Normal rate, regular rhythm, normal heart sounds and intact distal pulses.   Pulmonary/Chest: Effort normal and breath sounds normal. No respiratory distress. She has no wheezes. She has no rales.   Abdominal: Soft. Bowel sounds are normal. She exhibits no distension. There is no tenderness.   Musculoskeletal: Normal range of motion. She exhibits tenderness (right upper back with palpation. no mass. o/w u/r).   Lymphadenopathy:     She has no cervical adenopathy.   Neurological: She is alert and oriented to person, place, and time.   Skin: Skin is warm.       Assessment/Plan   Diagnoses and all orders for this visit:    URI, acute (Primary)  Assessment & Plan:  Likely viral.  OTCs prn.  Fluids.  Rest.  Call/return to office prn.      White matter disease, unspecified  Assessment & Plan:  CT head noted. Sx a/w this and the ER visit of 1/26/20 have resolved. (see also o.v. 1/28/20 here).   Discussed again at length with pt.   Seeing neurology 2/17/20.  Will likely get MRI of the brain with and without contrast and MRA of the head. Defer to neuro who she is seeing in 4 days. Pt agreeable to same.       Acute right-sided thoracic back pain  Assessment & Plan:  Likely MSK.  Tylenol/NSAIDs prn.  Warmth.  Rest.  Careful with certain exercises. Discussed with patient.  Call/RTO prn.

## 2020-02-13 NOTE — TELEPHONE ENCOUNTER
Pt requested a call to have tier changed for insurance to lower cost of prescriptions.    Insurance wants Dr to call to do a Tier exception for Flecainide 50 mg.  Contact number for insurance is [phone].    Pt would like back as well. Contact number for pt [phone].

## 2020-02-13 NOTE — ASSESSMENT & PLAN NOTE
Likely MSK.  Tylenol/NSAIDs prn.  Warmth.  Rest.  Careful with certain exercises. Discussed with patient.  Call/RTO prn.

## 2020-02-13 NOTE — ASSESSMENT & PLAN NOTE
CT head noted. Sx a/w this and the ER visit of 1/26/20 have resolved. (see also o.v. 1/28/20 here).   Discussed again at length with pt.   Seeing neurology 2/17/20.  Will likely get MRI of the brain with and without contrast and MRA of the head. Defer to neuro who she is seeing in 4 days. Pt agreeable to same.

## 2020-02-14 NOTE — TELEPHONE ENCOUNTER
Called pt and left message advising pt of Dr response for her to f/u with cardiology regarding issue with flecinide

## 2020-02-17 ENCOUNTER — OFFICE VISIT (OUTPATIENT)
Dept: NEUROLOGY | Facility: CLINIC | Age: 60
End: 2020-02-17
Payer: COMMERCIAL

## 2020-02-17 VITALS — HEART RATE: 82 BPM | RESPIRATION RATE: 14 BRPM | DIASTOLIC BLOOD PRESSURE: 78 MMHG | SYSTOLIC BLOOD PRESSURE: 138 MMHG

## 2020-02-17 DIAGNOSIS — R90.89 ABNORMAL CT OF BRAIN: ICD-10-CM

## 2020-02-17 DIAGNOSIS — R51.9 HEADACHE DISORDER: Primary | ICD-10-CM

## 2020-02-17 PROCEDURE — 99205 OFFICE O/P NEW HI 60 MIN: CPT | Performed by: PSYCHIATRY & NEUROLOGY

## 2020-02-17 RX ORDER — MULTIVITAMIN
1 TABLET ORAL DAILY
COMMUNITY

## 2020-02-17 RX ORDER — ASCORBIC ACID 500 MG
500 TABLET ORAL DAILY
COMMUNITY

## 2020-02-17 NOTE — ASSESSMENT & PLAN NOTE
The patient has a history of migraine.  More recently she has had a severe headache with the below noted abnormal CAT scan however a normal CTA of the head and neck.  We will check blood work and an MRI.  I recommended that the patient keep a headache calendar; in the future we may consider preventative and abortive headache medications.

## 2020-02-17 NOTE — PROGRESS NOTES
Alecia Cruz is a 59 y.o. female  2/17/2020  Asher Lee MD    Neurology Consult Note    Subjective     Alecia Cruz is a 59 y.o. female who is being evaluated for an abnormal CAT scan.  The patient reported that she previously suffered from migraines from when she was a child to approximately age 17.  She would have holocephalic headaches with associated nausea, photophobia, phonophobia and motion sickness.  In the past, she has experienced numbness and paresthesias in her face which would come and go.  Back in 2012 she went to the hospital with headache and numbness and was told that she had a TIA.  She was evaluated by a neurologist who however who told her she did not in fact have a TIA.  Perhaps imaging of her brain in the past was abnormal.  She also reported a history of concussion.    The patient reported that towards the end of January she was visiting a friend in [place].  She developed the acute onset of pain in the right side of her forehead which radiated back over the temple and into the occiput.  She reported a sharp stabbing pain with associated wiggly lines in her vision and blurry vision.  She developed photophobia.  She drove home and went to sleep however when she woke up she continued to have similar symptoms however now she had numbness in her face.  She went to the emergency room where imaging was abnormal and she was told to follow-up with a neurologist.  She was given medications which aborted her headaches.    Since her visit the emergency room, the patient has had no severe headaches.  She has had mild headaches which come and go.  She continues to experience numbness and paresthesias in her face which comes and goes.  She has musculoskeletal pain especially in her lower back.  She has chronic tinnitus.  She suffers from insomnia and anxiety.  There were no symptoms to suggest increased intracranial pressure, meningitis or systemic illness.    Review of  Systems  Constitutional: negative  Eyes: negative  Ears, nose, mouth, throat, and face: negative  Respiratory: negative  Cardiovascular: negative  Gastrointestinal: negative  Genitourinary:negative  Integument/breast: negative  Hematologic/lymphatic: negative  Musculoskeletal:negative  Neurological: negative  Behavioral/Psych: negative  Endocrine: negative  Allergic/Immunologic: negative    Allergy:  Allergies   Allergen Reactions   • Penicillin G    • Trazodone-Dietary Supp #8 Other (see comments)   • Amoxicillin Trihydrate Palpitations     AUGMENTIN   • Cephalexin Palpitations   • Ciprofloxacin Palpitations   • Codeine Other (see comments) and Palpitations   • Potassium Clavulanate Palpitations     AUGMENTIN   • Sulfa (Sulfonamide Antibiotics) Palpitations       Current Outpatient Medications   Medication Sig Dispense Refill   • ascorbic acid (VITAMIN C) 500 mg tablet Take 500 mg by mouth daily.     • multivitamin (THERAGRAN) tablet Take 1 tablet by mouth daily.     • aspirin 81 mg enteric coated tablet Take 81 mg by mouth daily.     • flecainide (TAMBOCOR) 50 mg tablet Take 1 tablet (50 mg total) by mouth 2 (two) times a day. 60 tablet 0   • fluticasone propionate (FLONASE) 50 mcg/actuation nasal spray Administer 2 sprays into each nostril daily as needed for rhinitis. 1 Bottle 0   • levothyroxine (SYNTHROID) 112 mcg tablet Take 1 tablet (112 mcg total) by mouth daily. 90 tablet 3   • loratadine (CLARITIN) 10 mg tablet Take 1 tablet (10 mg total) by mouth daily. 30 tablet 0   • metoprolol succinate XL (TOPROL-XL) 25 mg 24 hr tablet Take 1 tablet (25 mg total) by mouth daily. 90 tablet 0   • omeprazole (PriLOSEC) 20 mg capsule Take 1 capsule (20 mg total) by mouth daily before breakfast. 30 capsule 0   • zolpidem (AMBIEN) 5 mg tablet Take 1 tablet (5 mg total) by mouth nightly as needed for sleep. 30 tablet 5     No current facility-administered medications for this visit.        Past Medical History:  Past  Medical History:   Diagnosis Date   • A-fib (CMS/[facility])    • H/O total hysterectomy 03/2000       Past Surgical History:  Past Surgical History:   Procedure Laterality Date   • BREAST LUMPECTOMY     • MANDIBLE FRACTURE SURGERY     • PARTIAL HYSTERECTOMY     • ROTATOR CUFF REPAIR Left        Social History:  Social History     Socioeconomic History   • Marital status:      Spouse name: None   • Number of children: None   • Years of education: None   • Highest education level: None   Occupational History   • None   Social Needs   • Financial resource strain: None   • Food insecurity:     Worry: None     Inability: None   • Transportation needs:     Medical: None     Non-medical: None   Tobacco Use   • Smoking status: Never Smoker   • Smokeless tobacco: Never Used   Substance and Sexual Activity   • Alcohol use: No   • Drug use: No   • Sexual activity: None   Lifestyle   • Physical activity:     Days per week: None     Minutes per session: None   • Stress: None   Relationships   • Social connections:     Talks on phone: None     Gets together: None     Attends Hinduism service: None     Active member of club or organization: None     Attends meetings of clubs or organizations: None     Relationship status: None   • Intimate partner violence:     Fear of current or ex partner: None     Emotionally abused: None     Physically abused: None     Forced sexual activity: None   Other Topics Concern   • None   Social History Narrative    Pt is .        Family History:  Family History   Problem Relation Age of Onset   • Alzheimer's disease Biological Mother    • Arrhythmia Biological Mother    • Diabetes Biological Father    • Hypertension Biological Sister    • COPD Biological Brother    • Stomach cancer Paternal Grandmother    • Diabetes Paternal Grandfather    • Prostate cancer Paternal Grandfather    • Sarcoidosis Biological Sister    • Diabetes Biological Sister    • Heart disease Biological Sister    •  Hypertension Biological Brother        Objective     Physical Exam  Visit Vitals  /78   Pulse 82   Resp 14       General Appearance:  Alert, no distress, appears stated age   Head:  Normocephalic, without obvious abnormality, atraumatic   Eyes:  PERRL, conjunctiva/corneas clear, EOM's intact   Throat:  The tongue and uvula were midline   Neck: Supple, symmetrical, trachea midline, no carotid bruit or JVD   Lungs:  Clear to auscultation bilaterally, respirations unlabored   Chest Wall: No tenderness or deformity   Heart Regular rate and rhythm, S1 and S2 normal, no murmur, rub or gallop   Extremities: Extremities normal, atraumatic, no cyanosis.  There was lymphedema in the left arm   Musculoskeletal: No injury or deformity   Pulses: 2+ and symmetric all extremities   Skin: Skin color, texture, turgor normal, no rashes or lesions   Behavior/Emotional: Appropriate, cooperative   Neurologic Exam:  Alert and oriented. Attention, concentration, memory, language, visual spatial orientation, executive function is normal.    Pupils equal round and reactive to light. Extraocular movement full with normal pursuit + saccades. No nystagmus noted.   Facial strength and sensation is normal. Hearing normal.  The tongue and uvula were midline. No dysarthria or dysphagia.   Strength was 5/5 in bulbar, axial + extremity muscles.There was normal bulk and tone with no abnormal movements.   The sensory examination was normal to touch, temperature and pain, vibration and proprioception. There was no dysmetria or cerebellar signs.   The gait was narrow based. Patient was able to tandem walk. Negative Romberg sign. Reflexes were absent in the left arm and ankles, trace in the right arm and plus at the knees. Meng sign was negative. Plantar responses were flexor.     Data Reviewed:  The patient had a CT angiogram of the head and neck which were normal.  A CT scan of the head showed what appears to be white matter disease.  Blood  work was notable for an elevated TSH.  An MRI of the brain from 2012 was performed for facial numbness which also showed small vessel disease.    Problem List Items Addressed This Visit        Nervous    Headache disorder - Primary    Current Assessment & Plan     The patient has a history of migraine.  More recently she has had a severe headache with the below noted abnormal CAT scan however a normal CTA of the head and neck.  We will check blood work and an MRI.  I recommended that the patient keep a headache calendar; in the future we may consider preventative and abortive headache medications.           Relevant Orders    Angiotensin converting enzyme    Folate    Vitamin B12    ELAINE Screen (Automated)    ANCA-Myeloperoxidase IgG Antibody    HIV 1,2 AB P24 AG    Lyme EIA reflex WB    Rheumatoid factor    RPR    Sedimentation rate, automated    MRI BRAIN WITH AND WITHOUT CONTRAST       Other    Abnormal CT of brain    Current Assessment & Plan     The patient had a CAT scan of the head which shows what appears to be small vessel disease.  This may be chronic given that an MRI of the brain from 2012 showed similar changes.  I recommended that the patient have a repeat MRI of the brain with gadolinium.  She believes that she can secure her MRI from 2012 for comparison.    The MRI of the brain will help differentiate white matter versus gray matter versus both changes.  It sounds as though these findings may be chronic and could be related to her history of migraine, concussion, atrial fibrillation etc.  I did order blood work ruling out systemic causes of her condition as well.  In the future additional diagnostics and treatments will depend on her clinical course and the results of her pending studies.         Relevant Orders    Angiotensin converting enzyme    Folate    Vitamin B12    ELAINE Screen (Automated)    ANCA-Myeloperoxidase IgG Antibody    HIV 1,2 AB P24 AG    Lyme EIA reflex WB    Rheumatoid factor    RPR     Sedimentation rate, automated    MRI BRAIN WITH AND WITHOUT CONTRAST            It was a real pleasure meeting Alecia Cruz today, thank you for allowing me to participate in the medical care. If you have any questions, please call me at any time. Alecia Cruz will follow up with me in the coming weeks to months and keep me updated by telephone. Alecia Cruz knows to notify me immediately if there is any change in the condition or if there are any new symptoms of transient or static neurologic dysfunction.    Cesar Juares MD

## 2020-02-17 NOTE — ASSESSMENT & PLAN NOTE
The patient had a CAT scan of the head which shows what appears to be small vessel disease.  This may be chronic given that an MRI of the brain from 2012 showed similar changes.  I recommended that the patient have a repeat MRI of the brain with gadolinium.  She believes that she can secure her MRI from 2012 for comparison.    The MRI of the brain will help differentiate white matter versus gray matter versus both changes.  It sounds as though these findings may be chronic and could be related to her history of migraine, concussion, atrial fibrillation etc.  I did order blood work ruling out systemic causes of her condition as well.  In the future additional diagnostics and treatments will depend on her clinical course and the results of her pending studies.

## 2020-02-17 NOTE — LETTER
February 17, 2020     Asher Lee MD  [address]  [address], [address]  [facility] [address]    Patient: Alecia Cruz  YOB: 1960  Date of Visit: 2/17/2020      Dear Dr. Lee:    Thank you for referring Alecia Cruz to me for evaluation. Below are my notes for this consultation.    If you have questions, please do not hesitate to call me. I look forward to following your patient along with you.         Sincerely,        Cesar Juares MD        CC: No Recipients  Cesar Juares MD  2/17/2020  8:34 AM  Signed  Alecia Cruz is a 59 y.o. female  2/17/2020  Asher Lee MD    Neurology Consult Note    Subjective     Alecia Cruz is a 59 y.o. female who is being evaluated for an abnormal CAT scan.  The patient reported that she previously suffered from migraines from when she was a child to approximately age 17.  She would have holocephalic headaches with associated nausea, photophobia, phonophobia and motion sickness.  In the past, she has experienced numbness and paresthesias in her face which would come and go.  Back in 2012 she went to the hospital with headache and numbness and was told that she had a TIA.  She was evaluated by a neurologist who however who told her she did not in fact have a TIA.  Perhaps imaging of her brain in the past was abnormal.  She also reported a history of concussion.    The patient reported that towards the end of January she was visiting a friend in [place].  She developed the acute onset of pain in the right side of her forehead which radiated back over the temple and into the occiput.  She reported a sharp stabbing pain with associated wiggly lines in her vision and blurry vision.  She developed photophobia.  She drove home and went to sleep however when she woke up she continued to have similar symptoms however now she had numbness in her face.  She went to the emergency room where imaging was abnormal and she was told to  follow-up with a neurologist.  She was given medications which aborted her headaches.    Since her visit the emergency room, the patient has had no severe headaches.  She has had mild headaches which come and go.  She continues to experience numbness and paresthesias in her face which comes and goes.  She has musculoskeletal pain especially in her lower back.  She has chronic tinnitus.  She suffers from insomnia and anxiety.  There were no symptoms to suggest increased intracranial pressure, meningitis or systemic illness.    Review of Systems  Constitutional: negative  Eyes: negative  Ears, nose, mouth, throat, and face: negative  Respiratory: negative  Cardiovascular: negative  Gastrointestinal: negative  Genitourinary:negative  Integument/breast: negative  Hematologic/lymphatic: negative  Musculoskeletal:negative  Neurological: negative  Behavioral/Psych: negative  Endocrine: negative  Allergic/Immunologic: negative    Allergy:  Allergies   Allergen Reactions   • Penicillin G    • Trazodone-Dietary Supp #8 Other (see comments)   • Amoxicillin Trihydrate Palpitations     AUGMENTIN   • Cephalexin Palpitations   • Ciprofloxacin Palpitations   • Codeine Other (see comments) and Palpitations   • Potassium Clavulanate Palpitations     AUGMENTIN   • Sulfa (Sulfonamide Antibiotics) Palpitations       Current Outpatient Medications   Medication Sig Dispense Refill   • ascorbic acid (VITAMIN C) 500 mg tablet Take 500 mg by mouth daily.     • multivitamin (THERAGRAN) tablet Take 1 tablet by mouth daily.     • aspirin 81 mg enteric coated tablet Take 81 mg by mouth daily.     • flecainide (TAMBOCOR) 50 mg tablet Take 1 tablet (50 mg total) by mouth 2 (two) times a day. 60 tablet 0   • fluticasone propionate (FLONASE) 50 mcg/actuation nasal spray Administer 2 sprays into each nostril daily as needed for rhinitis. 1 Bottle 0   • levothyroxine (SYNTHROID) 112 mcg tablet Take 1 tablet (112 mcg total) by mouth daily. 90 tablet 3    • loratadine (CLARITIN) 10 mg tablet Take 1 tablet (10 mg total) by mouth daily. 30 tablet 0   • metoprolol succinate XL (TOPROL-XL) 25 mg 24 hr tablet Take 1 tablet (25 mg total) by mouth daily. 90 tablet 0   • omeprazole (PriLOSEC) 20 mg capsule Take 1 capsule (20 mg total) by mouth daily before breakfast. 30 capsule 0   • zolpidem (AMBIEN) 5 mg tablet Take 1 tablet (5 mg total) by mouth nightly as needed for sleep. 30 tablet 5     No current facility-administered medications for this visit.        Past Medical History:  Past Medical History:   Diagnosis Date   • A-fib (CMS/[facility])    • H/O total hysterectomy 03/2000       Past Surgical History:  Past Surgical History:   Procedure Laterality Date   • BREAST LUMPECTOMY     • MANDIBLE FRACTURE SURGERY     • PARTIAL HYSTERECTOMY     • ROTATOR CUFF REPAIR Left        Social History:  Social History     Socioeconomic History   • Marital status:      Spouse name: None   • Number of children: None   • Years of education: None   • Highest education level: None   Occupational History   • None   Social Needs   • Financial resource strain: None   • Food insecurity:     Worry: None     Inability: None   • Transportation needs:     Medical: None     Non-medical: None   Tobacco Use   • Smoking status: Never Smoker   • Smokeless tobacco: Never Used   Substance and Sexual Activity   • Alcohol use: No   • Drug use: No   • Sexual activity: None   Lifestyle   • Physical activity:     Days per week: None     Minutes per session: None   • Stress: None   Relationships   • Social connections:     Talks on phone: None     Gets together: None     Attends [employer] service: None     Active member of club or organization: None     Attends meetings of clubs or organizations: None     Relationship status: None   • Intimate partner violence:     Fear of current or ex partner: None     Emotionally abused: None     Physically abused: None     Forced sexual activity: None   Other Topics  Concern   • None   Social History Narrative    Pt is .        Family History:  Family History   Problem Relation Age of Onset   • Alzheimer's disease Biological Mother    • Arrhythmia Biological Mother    • Diabetes Biological Father    • Hypertension Biological Sister    • COPD Biological Brother    • Stomach cancer Paternal Grandmother    • Diabetes Paternal Grandfather    • Prostate cancer Paternal Grandfather    • Sarcoidosis Biological Sister    • Diabetes Biological Sister    • Heart disease Biological Sister    • Hypertension Biological Brother        Objective     Physical Exam  Visit Vitals  /78   Pulse 82   Resp 14       General Appearance:  Alert, no distress, appears stated age   Head:  Normocephalic, without obvious abnormality, atraumatic   Eyes:  PERRL, conjunctiva/corneas clear, EOM's intact   Throat:  The tongue and uvula were midline   Neck: Supple, symmetrical, trachea midline, no carotid bruit or JVD   Lungs:  Clear to auscultation bilaterally, respirations unlabored   Chest Wall: No tenderness or deformity   Heart Regular rate and rhythm, S1 and S2 normal, no murmur, rub or gallop   Extremities: Extremities normal, atraumatic, no cyanosis.  There was lymphedema in the left arm   Musculoskeletal: No injury or deformity   Pulses: 2+ and symmetric all extremities   Skin: Skin color, texture, turgor normal, no rashes or lesions   Behavior/Emotional: Appropriate, cooperative   Neurologic Exam:  Alert and oriented. Attention, concentration, memory, language, visual spatial orientation, executive function is normal.    Pupils equal round and reactive to light. Extraocular movement full with normal pursuit + saccades. No nystagmus noted.   Facial strength and sensation is normal. Hearing normal.  The tongue and uvula were midline. No dysarthria or dysphagia.   Strength was 5/5 in bulbar, axial + extremity muscles.There was normal bulk and tone with no abnormal movements.   The sensory  examination was normal to touch, temperature and pain, vibration and proprioception. There was no dysmetria or cerebellar signs.   The gait was narrow based. Patient was able to tandem walk. Negative Romberg sign. Reflexes were absent in the left arm and ankles, trace in the right arm and plus at the knees. Meng sign was negative. Plantar responses were flexor.     Data Reviewed:  The patient had a CT angiogram of the head and neck which were normal.  A CT scan of the head showed what appears to be white matter disease.  Blood work was notable for an elevated TSH.  An MRI of the brain from 2012 was performed for facial numbness which also showed small vessel disease.    Problem List Items Addressed This Visit        Nervous    Headache disorder - Primary    Current Assessment & Plan     The patient has a history of migraine.  More recently she has had a severe headache with the below noted abnormal CAT scan however a normal CTA of the head and neck.  We will check blood work and an MRI.  I recommended that the patient keep a headache calendar; in the future we may consider preventative and abortive headache medications.           Relevant Orders    Angiotensin converting enzyme    Folate    Vitamin B12    ELAINE Screen (Automated)    ANCA-Myeloperoxidase IgG Antibody    HIV 1,2 AB P24 AG    Lyme EIA reflex WB    Rheumatoid factor    RPR    Sedimentation rate, automated    MRI BRAIN WITH AND WITHOUT CONTRAST       Other    Abnormal CT of brain    Current Assessment & Plan     The patient had a CAT scan of the head which shows what appears to be small vessel disease.  This may be chronic given that an MRI of the brain from 2012 showed similar changes.  I recommended that the patient have a repeat MRI of the brain with gadolinium.  She believes that she can secure her MRI from 2012 for comparison.    The MRI of the brain will help differentiate white matter versus gray matter versus both changes.  It sounds as though  these findings may be chronic and could be related to her history of migraine, concussion, atrial fibrillation etc.  I did order blood work ruling out systemic causes of her condition as well.  In the future additional diagnostics and treatments will depend on her clinical course and the results of her pending studies.         Relevant Orders    Angiotensin converting enzyme    Folate    Vitamin B12    ELAINE Screen (Automated)    ANCA-Myeloperoxidase IgG Antibody    HIV 1,2 AB P24 AG    Lyme EIA reflex WB    Rheumatoid factor    RPR    Sedimentation rate, automated    MRI BRAIN WITH AND WITHOUT CONTRAST            It was a real pleasure meeting Alecia Cruz today, thank you for allowing me to participate in the medical care. If you have any questions, please call me at any time. Alecia Cruz will follow up with me in the coming weeks to months and keep me updated by telephone. Alecia Cruz knows to notify me immediately if there is any change in the condition or if there are any new symptoms of transient or static neurologic dysfunction.    Cesar Juares MD

## 2020-02-18 ENCOUNTER — TELEPHONE (OUTPATIENT)
Dept: NEUROLOGY | Facility: CLINIC | Age: 60
End: 2020-02-18

## 2020-02-18 LAB
FOLATE SERPL-MCNC: 12.2 NG/ML
QUEST (ALWAYS MESSAGE): NORMAL
QUEST QUESTION/PROBLEM:: NORMAL
QUEST RESOLUTION:: NORMAL
QUEST SPECIMEN 1: NORMAL
QUEST: NORMAL
VIT B12 SERPL-MCNC: 919 PG/ML (ref 200–1100)

## 2020-02-20 LAB
ACE SERPL-CCNC: 23 U/L (ref 9–67)
ANA SER QL IF: NEGATIVE
B BURGDOR AB SER IA-ACNC: <0.9 INDEX
ERYTHROCYTE [SEDIMENTATION RATE] IN BLOOD BY WESTERGREN METHOD: 11 MM/H
FOLATE SERPL-MCNC: 13.6 NG/ML
HIV 1+2 AB+HIV1 P24 AG SERPL QL IA: NORMAL
MYELOPEROXIDASE AB SER IA-ACNC: <1 AI
QUEST COMMENT: NORMAL
QUEST CONTACT:: NORMAL
QUEST REPORT ALWAYS MESSAGE DEMOGRAPHICS IN QUESTION: NORMAL
REF LAB TEST NAME: NORMAL
REF LAB TEST: NORMAL
RHEUMATOID FACT SERPL-ACNC: <14 IU/ML
RPR SER QL: NORMAL
VIT B12 SERPL-MCNC: 986 PG/ML (ref 200–1100)

## 2020-02-22 ENCOUNTER — HOSPITAL ENCOUNTER (OUTPATIENT)
Dept: RADIOLOGY | Facility: HOSPITAL | Age: 60
Discharge: HOME | End: 2020-02-22
Attending: PSYCHIATRY & NEUROLOGY
Payer: COMMERCIAL

## 2020-02-22 VITALS — BODY MASS INDEX: 24.28 KG/M2 | WEIGHT: 155 LBS

## 2020-02-22 DIAGNOSIS — R51.9 HEADACHE DISORDER: ICD-10-CM

## 2020-02-22 DIAGNOSIS — R90.89 ABNORMAL CT OF BRAIN: ICD-10-CM

## 2020-02-22 PROCEDURE — A9579 GAD-BASE MR CONTRAST NOS,1ML: HCPCS | Performed by: PSYCHIATRY & NEUROLOGY

## 2020-02-22 PROCEDURE — 70553 MRI BRAIN STEM W/O & W/DYE: CPT

## 2020-02-22 PROCEDURE — A9585 GADOBUTROL INJECTION: HCPCS | Performed by: PSYCHIATRY & NEUROLOGY

## 2020-02-22 RX ORDER — GADOBUTROL 604.72 MG/ML
0.1 INJECTION INTRAVENOUS
Status: COMPLETED | OUTPATIENT
Start: 2020-02-22 | End: 2020-02-22

## 2020-02-22 RX ADMIN — GADOBUTROL 7 ML: 604.72 INJECTION INTRAVENOUS at 10:16

## 2020-02-25 ENCOUNTER — TELEPHONE (OUTPATIENT)
Dept: PRIMARY CARE | Facility: CLINIC | Age: 60
End: 2020-02-25

## 2020-02-25 RX ORDER — AZITHROMYCIN 250 MG/1
TABLET, FILM COATED ORAL
Qty: 6 TABLET | Refills: 0 | Status: CANCELLED | OUTPATIENT
Start: 2020-02-25 | End: 2020-03-01

## 2020-02-25 RX ORDER — AMOXICILLIN 875 MG/1
875 TABLET, FILM COATED ORAL 2 TIMES DAILY
Qty: 14 TABLET | Refills: 0 | Status: SHIPPED | OUTPATIENT
Start: 2020-02-25 | End: 2020-05-19

## 2020-02-25 NOTE — TELEPHONE ENCOUNTER
WHO: PT called    WHAT: Results and sinus congestion     WHEN: N/A    WHERE: N/A    WHY: PT would like to discuss recent results and sinus congestion, pls call and advise

## 2020-02-25 NOTE — TELEPHONE ENCOUNTER
Spoke w/ pt alert w/ Zpak and noemi.   Pt states she is able to take amoxicillin but not Augmentin.   Amox rx sent.

## 2020-02-25 NOTE — TELEPHONE ENCOUNTER
Returned pt call, was seen 2/13 for URI,  felt it was viral; sx have progressed and now with sinus pressure and pain, swollen lymph nodes in neck- left > right; Per Esperanza Meza sent to pharmacy.

## 2020-02-27 ENCOUNTER — APPOINTMENT (RX ONLY)
Dept: URBAN - METROPOLITAN AREA CLINIC 28 | Facility: CLINIC | Age: 60
Setting detail: DERMATOLOGY
End: 2020-02-27

## 2020-02-27 ENCOUNTER — TELEPHONE (OUTPATIENT)
Dept: NEUROLOGY | Facility: CLINIC | Age: 60
End: 2020-02-27

## 2020-02-27 DIAGNOSIS — L91.0 HYPERTROPHIC SCAR: ICD-10-CM

## 2020-02-27 DIAGNOSIS — L82.0 INFLAMED SEBORRHEIC KERATOSIS: ICD-10-CM

## 2020-02-27 DIAGNOSIS — L64.8 OTHER ANDROGENIC ALOPECIA: ICD-10-CM

## 2020-02-27 DIAGNOSIS — D22 MELANOCYTIC NEVI: ICD-10-CM

## 2020-02-27 PROBLEM — D22.112 MELANOCYTIC NEVI OF RIGHT LOWER EYELID, INCLUDING CANTHUS: Status: ACTIVE | Noted: 2020-02-27

## 2020-02-27 PROCEDURE — ? DEFER

## 2020-02-27 PROCEDURE — ? PRESCRIPTION MEDICATION MANAGEMENT

## 2020-02-27 PROCEDURE — 11900 INJECT SKIN LESIONS </W 7: CPT | Mod: 59

## 2020-02-27 PROCEDURE — ? COUNSELING

## 2020-02-27 PROCEDURE — ? SHAVE REMOVAL (NO PATHOLOGY)

## 2020-02-27 PROCEDURE — 11310 SHAVE SKIN LESION 0.5 CM/<: CPT

## 2020-02-27 PROCEDURE — 99213 OFFICE O/P EST LOW 20 MIN: CPT | Mod: 25

## 2020-02-27 PROCEDURE — ? INTRALESIONAL KENALOG

## 2020-02-27 ASSESSMENT — LOCATION SIMPLE DESCRIPTION DERM
LOCATION SIMPLE: RIGHT UPPER BACK
LOCATION SIMPLE: RIGHT CLAVICULAR SKIN
LOCATION SIMPLE: RIGHT SCALP
LOCATION SIMPLE: POSTERIOR SCALP
LOCATION SIMPLE: CHEST
LOCATION SIMPLE: LEFT ANTERIOR NECK
LOCATION SIMPLE: ABDOMEN
LOCATION SIMPLE: RIGHT EYELID

## 2020-02-27 ASSESSMENT — LOCATION ZONE DERM
LOCATION ZONE: TRUNK
LOCATION ZONE: EYELID
LOCATION ZONE: NECK
LOCATION ZONE: SCALP

## 2020-02-27 ASSESSMENT — LOCATION DETAILED DESCRIPTION DERM
LOCATION DETAILED: RIGHT LATERAL SUPERIOR CHEST
LOCATION DETAILED: RIGHT SUPERIOR MEDIAL UPPER BACK
LOCATION DETAILED: UPPER STERNUM
LOCATION DETAILED: RIGHT LATERAL CANTHUS
LOCATION DETAILED: RIGHT CLAVICULAR SKIN
LOCATION DETAILED: LEFT INFERIOR ANTERIOR NECK
LOCATION DETAILED: RIGHT LATERAL ABDOMEN
LOCATION DETAILED: RIGHT MEDIAL FRONTAL SCALP
LOCATION DETAILED: POSTERIOR MID-PARIETAL SCALP

## 2020-02-27 NOTE — PROCEDURE: SHAVE REMOVAL (NO PATHOLOGY)
Medical Necessity Information: It is in your best interest to select a reason for this procedure from the list below. All of these items fulfill various CMS LCD requirements except the new and changing color options.
Include Z78.9 (Other Specified Conditions Influencing Health Status) As An Associated Diagnosis?: No
Medical Necessity Clause: This procedure was medically necessary because the lesion that was treated was:
Detail Level: Detailed
Size Of Lesion In Cm: 0.4
X Size Of Lesion In Cm (Optional): 0
Anesthesia Type: 1% lidocaine with epinephrine
Hemostasis: Drysol
Wound Care: Petrolatum
Path Notes (To The Dermatopathologist): Please check margins.
Consent was obtained from the patient. The risks and benefits to therapy were discussed in detail. Specifically, the risks of infection, scarring, bleeding, prolonged wound healing, incomplete removal, allergy to anesthesia, nerve injury and recurrence were addressed. Prior to the procedure, the treatment site was clearly identified and confirmed by the patient. All components of Universal Protocol/PAUSE Rule completed.
Post-Care Instructions: I reviewed with the patient in detail post-care instructions. Patient is to keep the biopsy site dry overnight, and then apply bacitracin twice daily until healed. Patient may apply hydrogen peroxide soaks to remove any crusting.

## 2020-02-27 NOTE — PROCEDURE: DEFER
Instructions (Optional): Will do ILK if ok with pt’s neurologist
Procedure To Be Performed At Next Visit: Intralesional Kenalog
Detail Level: Detailed
Introduction Text (Please End With A Colon): The following procedure was deferred:

## 2020-02-27 NOTE — PROCEDURE: INTRALESIONAL KENALOG
Include Z78.9 (Other Specified Conditions Influencing Health Status) As An Associated Diagnosis?: No
Treatment Number (Optional): 1
Consent: The risks of atrophy were reviewed with the patient.
Concentration Of Solution Injected (Mg/Ml): 5.0
Kenalog Preparation: Kenalog
Detail Level: Detailed
Administered By (Optional): jaspal
Medical Necessity Clause: This procedure was medically necessary because the lesions that were treated were:
X Size Of Lesion In Cm (Optional): 0

## 2020-02-27 NOTE — PROCEDURE: PRESCRIPTION MEDICATION MANAGEMENT
Render In Strict Bullet Format?: No
Initiate Treatment: betamethasone dipropionate 0.05 % lotion: Apply to AA of scalp QHS (if ok with neurologist)
Detail Level: Zone

## 2020-03-16 ENCOUNTER — TELEPHONE (OUTPATIENT)
Dept: NEUROLOGY | Facility: CLINIC | Age: 60
End: 2020-03-16

## 2020-03-16 NOTE — TELEPHONE ENCOUNTER
Alecia called to discuss the MRI results with you. She did get your voicemail, but would like to speak with you. Also, she was prescribed 2 medications by her dermatologist, but asked her to run them by you before starting them to make sure it it ok with you. They are 2 topicals to stimulate hair growth after chemo: betamthasone 0.05% solution and clobetasol 0.05%.

## 2020-03-23 ENCOUNTER — TELEPHONE (OUTPATIENT)
Dept: PRIMARY CARE | Facility: CLINIC | Age: 60
End: 2020-03-23

## 2020-03-23 RX ORDER — SIMETHICONE 125 MG
125 CAPSULE ORAL 4 TIMES DAILY PRN
Qty: 28 EACH | Refills: 3 | Status: SHIPPED | OUTPATIENT
Start: 2020-03-23 | End: 2020-04-29

## 2020-03-23 NOTE — TELEPHONE ENCOUNTER
Called pt- states she has a low temp 99.1, sinus congestion, PND, occasional dry cough, hot flashes.   Denies SOB.   Advised to use flonase and nasal saline.     Also dealing w/ a lot of gas/ bloating. Irregular BM's- relief w/ fleet enemas. States BM's are not hard.     Family Hx of colon cancer/ last colonoscopy 16/17- + polyps, screening every 3 years.   Advised to call GI for telephone consult.   Prescribed simethicone and discussed diet.

## 2020-03-23 NOTE — TELEPHONE ENCOUNTER
WHO: PT called    WHAT: Report of sx    WHEN: N/A    WHERE: N/A    WHY: PT has a report of a low fever, sinus infection in which she received a antibiotic for but the sinus infection is returning    Excess gas and stomach turning

## 2020-04-02 ENCOUNTER — TELEMEDICINE (OUTPATIENT)
Dept: PRIMARY CARE | Facility: CLINIC | Age: 60
End: 2020-04-02
Payer: COMMERCIAL

## 2020-04-02 DIAGNOSIS — J06.9 URI, ACUTE: Primary | ICD-10-CM

## 2020-04-02 PROCEDURE — 99442 PR PHYS/QHP TELEPHONE EVALUATION 11-20 MIN: CPT | Performed by: INTERNAL MEDICINE

## 2020-04-02 RX ORDER — AMOXICILLIN 500 MG/1
500 TABLET, FILM COATED ORAL 3 TIMES DAILY
Qty: 21 TABLET | Refills: 0 | Status: SHIPPED | OUTPATIENT
Start: 2020-04-02 | End: 2020-05-19

## 2020-04-02 NOTE — PROGRESS NOTES
"    Request for Consent:  You and I are about to have a telemedicine check-in or visit. This is allowed because you are already my patient, and you have requested it.  This telemedicine visit will be billed to your health insurance or you, if you are self-insured.  You understand you will be responsible for any copayments or coinsurances that apply to your telemedicine visit.  Before starting our telemedicine visit, I am required to get your consent for this virtual check-in or visit by telemedicine. Do you consent?      Patient Response to Request for Consent: Yes    The following have been reviewed and updated as appropriate in this visit:         Visit Documentation:  Pt c/o sore throat and HA and right ear pain.   For one day.   Denies fevers/chills. Took temp 99.1.  Yellow mucus from throat. No cough.   Stuffy nose.  Rx: nothing.    A/P: URI.   OTCs prn.  Fluids.  Rest.   Pt wants Abx. Amox 500 TID x 7 days. Pt notes she CAN take Amox. She notes she doesn't get palps with Amoxicillin. \"No. Not at all\". R/b/o.         Time Spent in Medical Discussion During This Encounter:  15 minutes  "

## 2020-04-20 ENCOUNTER — TELEMEDICINE (OUTPATIENT)
Dept: PRIMARY CARE | Facility: CLINIC | Age: 60
End: 2020-04-20
Payer: COMMERCIAL

## 2020-04-20 ENCOUNTER — TELEPHONE (OUTPATIENT)
Dept: PRIMARY CARE | Facility: CLINIC | Age: 60
End: 2020-04-20

## 2020-04-20 DIAGNOSIS — B37.31 YEAST INFECTION OF THE VAGINA: Primary | ICD-10-CM

## 2020-04-20 PROCEDURE — 99442 PR PHYS/QHP TELEPHONE EVALUATION 11-20 MIN: CPT | Performed by: INTERNAL MEDICINE

## 2020-04-20 RX ORDER — FLUCONAZOLE 150 MG/1
150 TABLET ORAL ONCE
Qty: 1 TABLET | Refills: 0 | Status: SHIPPED | OUTPATIENT
Start: 2020-04-20 | End: 2020-04-20

## 2020-04-20 NOTE — TELEPHONE ENCOUNTER
Yina from [facility] called regarding script  they just received for Fluconazole. Their records show patient is allergic to this class of drugs. Please call Many back at [phone].

## 2020-04-20 NOTE — PROGRESS NOTES
Request for Consent:  You and I are about to have a telemedicine check-in or visit. This is allowed because you are already my patient, and you have requested it.  This telemedicine visit will be billed to your health insurance or you, if you are self-insured.  You understand you will be responsible for any copayments or coinsurances that apply to your telemedicine visit.  Before starting our telemedicine visit, I am required to get your consent for this virtual check-in or visit by telemedicine. Do you consent?      Patient Response to Request for Consent: Yes    The following have been reviewed and updated as appropriate in this visit:         Visit Documentation:    Pt has vaginal sx.   Finished ABx a week ago.   Nocturia x 3-4.   No real dysuria.   Denies hematuria.   Has rash, discomfort when vipes vagina/urethra.    No f/c.   Pt notes h/o UTI's and fungal infections.   Has taken fluconazole in past for same.     Likely yeast infection  Diflucan. R/b/o.   Soap and water.   Avoid abx in future if not needed.  Call/rto prn.          Time Spent in Medical Discussion During This Encounter:  15 minutes

## 2020-04-29 ENCOUNTER — APPOINTMENT (EMERGENCY)
Dept: RADIOLOGY | Facility: HOSPITAL | Age: 60
End: 2020-04-29
Payer: COMMERCIAL

## 2020-04-29 ENCOUNTER — HOSPITAL ENCOUNTER (EMERGENCY)
Facility: HOSPITAL | Age: 60
Discharge: HOME | End: 2020-04-30
Attending: EMERGENCY MEDICINE
Payer: COMMERCIAL

## 2020-04-29 DIAGNOSIS — R00.2 PALPITATIONS: ICD-10-CM

## 2020-04-29 DIAGNOSIS — R07.9 CHEST PAIN, UNSPECIFIED TYPE: Primary | ICD-10-CM

## 2020-04-29 DIAGNOSIS — R06.02 SHORTNESS OF BREATH: ICD-10-CM

## 2020-04-29 LAB
ALBUMIN SERPL-MCNC: 3.7 G/DL (ref 3.4–5)
ALP SERPL-CCNC: 62 IU/L (ref 35–126)
ALT SERPL-CCNC: 51 IU/L (ref 11–54)
ANION GAP SERPL CALC-SCNC: 10 MEQ/L (ref 3–15)
AST SERPL-CCNC: 30 IU/L (ref 15–41)
BASOPHILS # BLD: 0.04 K/UL (ref 0.01–0.1)
BASOPHILS NFR BLD: 0.5 %
BILIRUB SERPL-MCNC: 0.3 MG/DL (ref 0.3–1.2)
BUN SERPL-MCNC: 11 MG/DL (ref 8–20)
CALCIUM SERPL-MCNC: 9.6 MG/DL (ref 8.9–10.3)
CHLORIDE SERPL-SCNC: 104 MEQ/L (ref 98–109)
CO2 SERPL-SCNC: 24 MEQ/L (ref 22–32)
CREAT SERPL-MCNC: 0.7 MG/DL (ref 0.6–1.1)
DIFFERENTIAL METHOD BLD: NORMAL
EOSINOPHIL # BLD: 0.12 K/UL (ref 0.04–0.36)
EOSINOPHIL NFR BLD: 1.6 %
ERYTHROCYTE [DISTWIDTH] IN BLOOD BY AUTOMATED COUNT: 12.2 % (ref 11.7–14.4)
GFR SERPL CREATININE-BSD FRML MDRD: >60 ML/MIN/1.73M*2
GLUCOSE SERPL-MCNC: 88 MG/DL (ref 70–99)
HCT VFR BLDCO AUTO: 39.6 % (ref 35–45)
HGB BLD-MCNC: 13.3 G/DL (ref 11.8–15.7)
IMM GRANULOCYTES # BLD AUTO: 0.02 K/UL (ref 0–0.08)
IMM GRANULOCYTES NFR BLD AUTO: 0.3 %
LYMPHOCYTES # BLD: 2.62 K/UL (ref 1.2–3.5)
LYMPHOCYTES NFR BLD: 34.1 %
MCH RBC QN AUTO: 30.9 PG (ref 28–33.2)
MCHC RBC AUTO-ENTMCNC: 33.6 G/DL (ref 32.2–35.5)
MCV RBC AUTO: 91.9 FL (ref 83–98)
MONOCYTES # BLD: 0.54 K/UL (ref 0.28–0.8)
MONOCYTES NFR BLD: 7 %
NEUTROPHILS # BLD: 4.34 K/UL (ref 1.7–7)
NEUTS SEG NFR BLD: 56.5 %
NRBC BLD-RTO: 0 %
PDW BLD AUTO: 10.1 FL (ref 9.4–12.3)
PLATELET # BLD AUTO: 243 K/UL (ref 150–369)
POTASSIUM SERPL-SCNC: 4.1 MEQ/L (ref 3.6–5.1)
PROT SERPL-MCNC: 7.3 G/DL (ref 6–8.2)
RBC # BLD AUTO: 4.31 M/UL (ref 3.93–5.22)
SODIUM SERPL-SCNC: 138 MEQ/L (ref 136–144)
T4 FREE SERPL-MCNC: 1.34 NG/DL (ref 0.58–1.64)
TROPONIN I SERPL-MCNC: <0.03 NG/ML
TSH SERPL DL<=0.05 MIU/L-ACNC: 0.01 MIU/L (ref 0.34–5.6)
WBC # BLD AUTO: 7.68 K/UL (ref 3.8–10.5)

## 2020-04-29 PROCEDURE — 99283 EMERGENCY DEPT VISIT LOW MDM: CPT | Mod: 25

## 2020-04-29 PROCEDURE — 84439 ASSAY OF FREE THYROXINE: CPT | Performed by: PHYSICIAN ASSISTANT

## 2020-04-29 PROCEDURE — 84443 ASSAY THYROID STIM HORMONE: CPT | Performed by: PHYSICIAN ASSISTANT

## 2020-04-29 PROCEDURE — 80053 COMPREHEN METABOLIC PANEL: CPT | Performed by: EMERGENCY MEDICINE

## 2020-04-29 PROCEDURE — 36415 COLL VENOUS BLD VENIPUNCTURE: CPT

## 2020-04-29 PROCEDURE — 85025 COMPLETE CBC W/AUTO DIFF WBC: CPT

## 2020-04-29 PROCEDURE — 71045 X-RAY EXAM CHEST 1 VIEW: CPT

## 2020-04-29 PROCEDURE — 93005 ELECTROCARDIOGRAM TRACING: CPT | Performed by: EMERGENCY MEDICINE

## 2020-04-29 PROCEDURE — 84484 ASSAY OF TROPONIN QUANT: CPT

## 2020-04-29 PROCEDURE — 93005 ELECTROCARDIOGRAM TRACING: CPT

## 2020-04-29 PROCEDURE — 93005 ELECTROCARDIOGRAM TRACING: CPT | Performed by: PHYSICIAN ASSISTANT

## 2020-04-29 PROCEDURE — 85025 COMPLETE CBC W/AUTO DIFF WBC: CPT | Performed by: EMERGENCY MEDICINE

## 2020-04-29 PROCEDURE — 84484 ASSAY OF TROPONIN QUANT: CPT | Performed by: EMERGENCY MEDICINE

## 2020-04-29 ASSESSMENT — ENCOUNTER SYMPTOMS
RHINORRHEA: 0
COUGH: 0
ABDOMINAL PAIN: 0
TROUBLE SWALLOWING: 0
WHEEZING: 0
SHORTNESS OF BREATH: 1
MYALGIAS: 0
AGITATION: 0
NAUSEA: 0
DIZZINESS: 0
VOMITING: 0
CONSTIPATION: 0
FEVER: 0
PALPITATIONS: 1
DIFFICULTY URINATING: 0
CHILLS: 0
LIGHT-HEADEDNESS: 1
DIAPHORESIS: 0
SINUS PRESSURE: 0
BACK PAIN: 0
SORE THROAT: 0
DIARRHEA: 0
CHEST TIGHTNESS: 1
WEAKNESS: 0

## 2020-04-29 ASSESSMENT — HEART SCORE
ECG: NORMAL
AGE: 45-64
HISTORY: MODERATELY SUSPICIOUS
HEART SCORE: 2
TROPONIN: LESS THAN OR EQUAL TO NORMAL LIMIT
RISK FACTORS: NO KNOWN RISK FACTORS

## 2020-04-30 ENCOUNTER — TELEMEDICINE (OUTPATIENT)
Dept: PRIMARY CARE | Facility: CLINIC | Age: 60
End: 2020-04-30
Payer: COMMERCIAL

## 2020-04-30 ENCOUNTER — TELEPHONE (OUTPATIENT)
Dept: PRIMARY CARE | Facility: CLINIC | Age: 60
End: 2020-04-30

## 2020-04-30 VITALS
BODY MASS INDEX: 23.86 KG/M2 | SYSTOLIC BLOOD PRESSURE: 118 MMHG | HEART RATE: 80 BPM | RESPIRATION RATE: 18 BRPM | OXYGEN SATURATION: 100 % | WEIGHT: 152 LBS | HEIGHT: 67 IN | DIASTOLIC BLOOD PRESSURE: 71 MMHG | TEMPERATURE: 98.3 F

## 2020-04-30 DIAGNOSIS — E03.9 ACQUIRED HYPOTHYROIDISM: Primary | ICD-10-CM

## 2020-04-30 DIAGNOSIS — R00.2 PALPITATIONS: ICD-10-CM

## 2020-04-30 LAB
ATRIAL RATE: 73
ATRIAL RATE: 95
P AXIS: -2
P AXIS: 71
PR INTERVAL: 134
PR INTERVAL: 148
QRS DURATION: 82
QRS DURATION: 84
QT INTERVAL: 380
QT INTERVAL: 384
QTC CALCULATION(BAZETT): 423
QTC CALCULATION(BAZETT): 477
R AXIS: -3
R AXIS: 24
T WAVE AXIS: -64
T WAVE AXIS: 16
VENTRICULAR RATE: 73
VENTRICULAR RATE: 95

## 2020-04-30 PROCEDURE — 93010 ELECTROCARDIOGRAM REPORT: CPT | Performed by: INTERNAL MEDICINE

## 2020-04-30 PROCEDURE — 99442 PR PHYS/QHP TELEPHONE EVALUATION 11-20 MIN: CPT | Performed by: INTERNAL MEDICINE

## 2020-04-30 RX ORDER — LEVOTHYROXINE SODIUM 88 UG/1
88 TABLET ORAL
Qty: 90 TABLET | Refills: 3 | Status: SHIPPED | OUTPATIENT
Start: 2020-04-30 | End: 2020-12-08 | Stop reason: SDUPTHER

## 2020-04-30 NOTE — PROGRESS NOTES
Verification of Patient Location:  The patient affirms they are currently located in the following state:[place]     Request for Consent:  You and I are about to have a telemedicine check-in or visit. This is allowed because you are already my patient, and you have requested it.  This telemedicine visit will be billed to your health insurance or you, if you are self-insured.  You understand you will be responsible for any copayments or coinsurances that apply to your telemedicine visit.  Before starting our telemedicine visit, I am required to get your consent for this virtual check-in or visit by telemedicine. Do you consent?      Patient Response to Request for Consent: Yes    The following have been reviewed and updated as appropriate in this visit:         Visit Documentation:    Palpitations. Increased in the past 3 days. Called cardiologist. Sent to ER. Told TSH is abnormal and to call her PCP. TSH 0.01. Pt on synthroid 112 mcg. Compliant. Not doubling up.     A/P: hypothyroid. TSH is low. Will decrease synthroid to 88mcg qday. Recheck TSH in 6-8 weeks.     Palps: could certainly be due to TSH of 0.01. Plan as above. Follow clinically.         Time Spent in Medical Discussion During This Encounter:  15 minutes

## 2020-04-30 NOTE — ED ATTESTATION NOTE
The patient was evaluated and managed by the physician assistant under my supervision.   I agree with the PA's assessment and plan.      Hx afib, breast ca, hypothyroid with palpitations for 10 days with sob and lightheadedness.  Symptoms constant for the past 2 days.  Feels her heart palpitating and a slight tightness in the chest.  Had symptoms in triage but states her symptoms are gone now.  Similar symptoms with thyroid issues in the past.   No cough or fever.  No syncope.  Not exertional.  No hx CAD.      Exam:  NAD  RRR without murmur  Lungs clear bilaterally  LE - no edema or tenderness    A/p:  Patient was concerned she could be in afib.  No afib on ekgs or monitor.  No hx cad.  Feels mostly palpitation feeling and sob.  Feels a little tight in the chest.  Symptoms for 2 days straight but now resolved while in the room.  Negative troponin.  I don't suspect ACS, MI, PE, life threatening arrhythmia, or life threatening process.  Patient had called her cardiologist who wanted her to come in to get checked out.  Will have her follow up with her pcp and cardiologist.  Given strict return instructions.      Dx:  Palpitations          SOB          Chest tightness         Vidal Delgado MD  04/30/20 0131

## 2020-04-30 NOTE — ED PROVIDER NOTES
HPI     Chief Complaint   Patient presents with   • Chest Pain   • Shortness of Breath   • Dizziness       Patient is a 59-year-old female with past medical history of PAF not on a blood thinner, hypothyroidism status post thyroidectomy, breast cancer and postmastectomy lymphedema syndrome (2009), whom presents to the ED complaining of palpitations x10 days associated with shortness of breath, chest tightness, and lightheadedness during episodes.  She says the palpitations are lasting seconds and then subsiding initially, however over the past 3 days they have been constant.  The patient says she has had this in the past and it was due to her thyroid.  She had a thyroidectomy and as result is hypothyroid and takes Synthroid every day.  She is on flecainide, but says she occasionally misses a dose over this past week.  She mention that she had acute sinusitis a couple weeks ago, but has completed the amoxicillin and is feeling better.  The patient also mentions that she has a tightness on the left side of her neck that is painful to the touch.  The patient denied any fever/chills, diaphoretic episodes, cough/URI symptoms, chest pain, pleuritic pain, leg swelling, calf pain, history of DVT/PE, abdominal pain, nausea/vomiting.           Patient History     Past Medical History:   Diagnosis Date   • A-fib (CMS/HCC)    • H/O total hysterectomy 03/2000       Past Surgical History:   Procedure Laterality Date   • BREAST LUMPECTOMY     • MANDIBLE FRACTURE SURGERY     • PARTIAL HYSTERECTOMY     • ROTATOR CUFF REPAIR Left        Family History   Problem Relation Age of Onset   • Alzheimer's disease Biological Mother    • Arrhythmia Biological Mother    • Diabetes Biological Father    • Hypertension Biological Sister    • COPD Biological Brother    • Stomach cancer Paternal Grandmother    • Diabetes Paternal Grandfather    • Prostate cancer Paternal Grandfather    • Sarcoidosis Biological Sister    • Diabetes Biological Sister  "   • Heart disease Biological Sister    • Hypertension Biological Brother        Social History     Tobacco Use   • Smoking status: Never Smoker   • Smokeless tobacco: Never Used   Substance Use Topics   • Alcohol use: No   • Drug use: No       Systems Reviewed from Nursing Triage:          Review of Systems     Review of Systems   Constitutional: Negative for chills, diaphoresis and fever.   HENT: Negative for congestion, ear pain, rhinorrhea, sinus pressure, sore throat and trouble swallowing.    Respiratory: Positive for chest tightness and shortness of breath. Negative for cough and wheezing.    Cardiovascular: Positive for palpitations. Negative for chest pain and leg swelling.   Gastrointestinal: Negative for abdominal pain, constipation, diarrhea, nausea and vomiting.   Genitourinary: Negative for difficulty urinating.   Musculoskeletal: Negative for back pain and myalgias.   Neurological: Positive for light-headedness. Negative for dizziness, syncope and weakness.   Psychiatric/Behavioral: Negative for agitation and behavioral problems.        Physical Exam     ED Triage Vitals   Temp Heart Rate Resp BP SpO2   04/29/20 1905 04/29/20 1905 04/29/20 1905 04/29/20 1905 04/29/20 1905   36.8 °C (98.3 °F) 95 20 118/85 98 %      Temp Source Heart Rate Source Patient Position BP Location FiO2 (%) (Set)   04/29/20 1905 04/29/20 2030 04/29/20 1905 04/29/20 1905 --   Oral Monitor Sitting Left upper arm        Pulse Ox %: 98 % (04/29/20 2242)  Pulse Ox Interpretation: Normal (04/29/20 2242)  Heart Rate: 73 (04/29/20 2242)  Rhythm Strip Interpretation: Normal Sinus Rhythm (04/29/20 2242)    Patient Vitals for the past 24 hrs:   BP Temp Temp src Pulse Resp SpO2 Height Weight   04/29/20 2127 133/75 -- -- 78 18 98 % 1.702 m (5' 7\") 68.9 kg (152 lb)   04/29/20 2030 137/82 -- -- 82 14 98 % -- --   04/29/20 1905 118/85 36.8 °C (98.3 °F) Oral 95 20 98 % -- --                       HEART Score: 2                    Physical Exam "   Constitutional: She is oriented to person, place, and time. She appears well-developed and well-nourished.   HENT:   Head: Normocephalic and atraumatic.   Eyes: Conjunctivae are normal.   Neck: Normal range of motion.   Muscle tightness appreciated over patient's left neck.  No mass or palpable lymph node appreciated on exam.   Cardiovascular: Normal rate, regular rhythm, normal heart sounds, intact distal pulses and normal pulses.   No murmur heard.  Pulmonary/Chest: Effort normal and breath sounds normal. No accessory muscle usage. No tachypnea. No respiratory distress. She has no decreased breath sounds. She has no wheezes. She has no rhonchi. She has no rales.   Abdominal: Soft. Bowel sounds are normal. She exhibits no distension and no mass. There is no tenderness. There is no guarding.   Musculoskeletal: Normal range of motion. She exhibits no edema or tenderness.        Right lower leg: Normal. She exhibits no tenderness and no edema.        Left lower leg: Normal. She exhibits no tenderness and no edema.   Lymphedema of left arm appreciated, patient says this is consistent with her usual.   Neurological: She is alert and oriented to person, place, and time.   Skin: Skin is warm and dry. She is not diaphoretic.   Psychiatric: She has a normal mood and affect. Her behavior is normal.            Procedures    ED Course & MDM     Labs Reviewed   COMPREHENSIVE METABOLIC PANEL - Normal       Result Value    Sodium 138      Potassium 4.1      Chloride 104      CO2 24      BUN 11      Creatinine 0.7      Glucose 88      Calcium 9.6      AST (SGOT) 30      ALT (SGPT) 51      Alkaline Phosphatase 62      Total Protein 7.3      Albumin 3.7      Bilirubin, Total 0.3      eGFR >60.0      Anion Gap 10     TROPONIN I - Normal    Troponin I <0.03     CBC AND DIFF    WBC 7.68      RBC 4.31      Hemoglobin 13.3      Hematocrit 39.6      MCV 91.9      MCH 30.9      MCHC 33.6      RDW 12.2      Platelets 243      MPV 10.1       Differential Type Auto      nRBC 0.0      Immature Granulocytes 0.3      Neutrophils 56.5      Lymphocytes 34.1      Monocytes 7.0      Eosinophils 1.6      Basophils 0.5      Immature Granulocytes, Absolute 0.02      Neutrophils, Absolute 4.34      Lymphocytes, Absolute 2.62      Monocytes, Absolute 0.54      Eosinophils, Absolute 0.12      Basophils, Absolute 0.04     TSH 3RD GENERATION W/REFLEX FT4   T4, FREE       ECG 12 lead         X-RAY CHEST 1 VIEW   Final Result   IMPRESSION: No acute cardiopulmonary process.      ECG 12 lead                     MDM  Number of Diagnoses or Management Options  Chest pain, unspecified type:   Palpitations:   Shortness of breath:   Diagnosis management comments: Patient is a 59-year-old female with past medical history of PAF not on a blood thinner, hypothyroidism status post thyroidectomy, breast cancer and postmastectomy lymphedema syndrome (2009), whom presents to the ED complaining of palpitations x10 days associated with shortness of breath, chest tightness, and lightheadedness during episodes. DDx includes ACS/MI, PE, PTX, Aneurysm, pericarditis, pneumonia, cardiac arrhythmia, electrolyte abnormality.  Labs within normal limits, pending thyroid function studies.  EKG demonstrates normal sinus rhythm at a rate of 73 bpm, chest x-ray demonstrates no acute disease, troponin negative.  Patient's heart score is 2, low risk.  Patient does not report any palpitations currently.  Signout was provided to Dr. Delgado who will follow thyroid studies with plan to discharge patient for outpatient follow-up with her PCP given low heart score and normal labs.    HEART Score for Major Cardiac Events from [email or web address]  on 4/29/2020  ** All calculations should be rechecked by clinician prior to use **    RESULT SUMMARY:  2 points  Low Score (0-3 points)    Risk of MACE of 0.9-1.7%.  INPUTS:  History -> 1 = Moderately suspicious  EKG -> 0 = Normal  Age -> 1 = 45-64  Risk factors -> 0 = No  known risk factors  Initial troponin -> 0 = =normal limit         Amount and/or Complexity of Data Reviewed  Clinical lab tests: reviewed  Tests in the radiology section of CPT®: reviewed             ED Course as of Apr 29 2309 Wed Apr 29, 2020   2243 EKG NSR 95bpm nonspecific T changes, significant artifact on baseline, normal qrs/axis.  No STEMI.  Will repeat with baseline artifact.      [DUNCAN]   2255 Heart score of 2, low risk.  EKG consistent with normal sinus rhythm at a rate of 73 bpm without evidence of ischemia.    [BF]   2256 Repeat ekg NSR 73bpm nonspecific T changes, normal qrs/axis/st.  No STEMI.  Artifact no longer present.      [DUNCAN]   2306 Signout was provided to Dr. Delgado who will follow thyroid studies with plan to discharge patient for outpatient follow-up with her PCP given low heart score and normal labs.    [BF]      ED Course User Index  [BF] Jia Witt PA C  [DUNCAN] Vidal Delgado MD         Clinical Impressions as of Apr 29 2309   Chest pain, unspecified type   Palpitations   Shortness of breath        Jia Witt PA C  04/29/20 2309

## 2020-05-19 ENCOUNTER — TELEMEDICINE (OUTPATIENT)
Dept: NEUROLOGY | Facility: CLINIC | Age: 60
End: 2020-05-19
Payer: COMMERCIAL

## 2020-05-19 DIAGNOSIS — R52 PAIN: ICD-10-CM

## 2020-05-19 DIAGNOSIS — R90.82 WHITE MATTER DISEASE, UNSPECIFIED: ICD-10-CM

## 2020-05-19 DIAGNOSIS — R51.9 ACUTE NONINTRACTABLE HEADACHE, UNSPECIFIED HEADACHE TYPE: Primary | ICD-10-CM

## 2020-05-19 DIAGNOSIS — G47.9 SLEEP DISORDER: ICD-10-CM

## 2020-05-19 PROCEDURE — 99214 OFFICE O/P EST MOD 30 MIN: CPT | Mod: 95 | Performed by: PSYCHIATRY & NEUROLOGY

## 2020-05-19 NOTE — ASSESSMENT & PLAN NOTE
The patient reported back pain radiating into the right leg with sporadic numbness and weakness.  The patient will return for an EMG looking for objective evidence of a neuromuscular disorder.  In the future we may consider an MRI of the lumbosacral spine depending on the patient's clinical history and EMG results.

## 2020-05-19 NOTE — ASSESSMENT & PLAN NOTE
The patient had a CAT scan of the head as well as an MRI of the brain which shows what appears to be small vessel disease.  This is likely chronic given that an MRI of the brain from 2012 showed similar changes.  Her findings are likely related to a combination of migraine, concussion, atrial fibrillation etc.  In the future additional diagnostics and treatments will depend on her clinical course and the results of surveillance studies.

## 2020-05-19 NOTE — ASSESSMENT & PLAN NOTE
The patient reported severe insomnia possibly related to stress and anxiety.  This may be causing general fatigue, prior headaches, mild cognitive dysfunction etc.  In the future the patient may benefit from evaluation and treatment with a psychiatrist, psychologist, sleep expert etc.

## 2020-05-19 NOTE — ASSESSMENT & PLAN NOTE
Fortunately the patient's headaches have resolved.  Diagnostics were unremarkable.  For now we will take a wait and see approach.

## 2020-05-19 NOTE — PROGRESS NOTES
Verification of Patient Location:  The patient affirms they are currently located in the following state: [place]    Request for Consent:   Video Encounter   Krish, my name is Cesar Juares MD.  Before we proceed, can you please verify your identification by telling me your full name and date of birth?  Can you tell me who is in the room with you?    You and I are about to have a telemedicine check-in or visit because you have requested it.  This is a live video-conference.  I am a real person, speaking to you in real time.  There is no one else with me on the video-conference.  However, when we use ([place], [employer], etc) it is important for you to know that the video-conference may not be secure or private.  I am not recording this conversation and I am asking you not to record it.  This telemedicine visit will be billed to your health insurance or you, if you are self-insured.  You understand you will be responsible for any copayments or coinsurances that apply to your telemedicine visit.  Before starting our telemedicine visit, I am required to get your consent for this virtual check-in or visit by telemedicine. Do you consent?    Patient Response to Request for Consent:  Yes      Visit Documentation:  Subjective    This virtual telemedicine visit was performed through [email or web address]    Patient ID: Alecia Cruz is a 59 y.o. female.  1960      HPI   I previously saw the patient several months ago.  At that time she reported headaches.  A CAT scan was reportedly abnormal and I recommended an MRI of the brain and blood work.    Since her last visit, overall the patient has actually been doing exceptionally well.  After I saw her, her headaches completely resolved.  At this point she is headache free and is not taking any preventative or abortive headache medications.  The patient did report that she is having difficulty sleeping.  She has insomnia and frequently wakes up from sleep being unable to  fall back to sleep.  She estimates that she only sleeps between 2 to 4 hours per night and some nights she does not get any sleep.  She wonders whether this may be related to stress and anxiety.    The patient also reported a history of lower back pain radiating into the right leg.  At times she has numbness and weakness in the leg.  Sometimes when she walks she stumbles or feels off balance.  For the most part her left leg is unaffected.  She has had no bowel or bladder dysfunction.  She has had no autonomic symptoms.  Review of systems was notable for dyspnea on exertion and mild motion sickness.  There were no symptoms to suggest increased intracranial pressure, meningitis or systemic illness.    The following have been reviewed and updated as appropriate in this visit:  Allergies  Meds  Problems       Review of Systems  A comprehensive review of systems was unremarkable    An MRI of the brain showed mild chronic small vessel disease.  Blood work was notable for a normal CBC, CMP, B12, folate, TSH, ESR, hepatitis C, ELAINE, ACE, ANCA, RF, RPR, HIV and Lyme.    Assessment/Plan   Diagnoses and all orders for this visit:    Acute nonintractable headache, unspecified headache type (Primary)  Assessment & Plan:  Fortunately the patient's headaches have resolved.  Diagnostics were unremarkable.  For now we will take a wait and see approach.      White matter disease, unspecified  Assessment & Plan:  The patient had a CAT scan of the head as well as an MRI of the brain which shows what appears to be small vessel disease.  This is likely chronic given that an MRI of the brain from 2012 showed similar changes.  Her findings are likely related to a combination of migraine, concussion, atrial fibrillation etc.  In the future additional diagnostics and treatments will depend on her clinical course and the results of surveillance studies.      Sleep disorder  Assessment & Plan:  The patient reported severe insomnia possibly  related to stress and anxiety.  This may be causing general fatigue, prior headaches, mild cognitive dysfunction etc.  In the future the patient may benefit from evaluation and treatment with a psychiatrist, psychologist, sleep expert etc.      Pain  Assessment & Plan:  The patient reported back pain radiating into the right leg with sporadic numbness and weakness.  The patient will return for an EMG looking for objective evidence of a neuromuscular disorder.  In the future we may consider an MRI of the lumbosacral spine depending on the patient's clinical history and EMG results.    Orders:  -     EMG; Future  -     Nerve conduction test; Future      Time Spent in Medical Discussion During This Encounter:      Total encounter time, with >50 percent spent counseling/coordinatin minutes

## 2020-05-27 ENCOUNTER — OFFICE VISIT (OUTPATIENT)
Dept: NEUROLOGY | Facility: CLINIC | Age: 60
End: 2020-05-27
Payer: COMMERCIAL

## 2020-05-27 ENCOUNTER — TELEPHONE (OUTPATIENT)
Dept: NEUROLOGY | Facility: CLINIC | Age: 60
End: 2020-05-27

## 2020-05-27 VITALS — SYSTOLIC BLOOD PRESSURE: 118 MMHG | HEART RATE: 80 BPM | RESPIRATION RATE: 18 BRPM | DIASTOLIC BLOOD PRESSURE: 71 MMHG

## 2020-05-27 DIAGNOSIS — R53.1 WEAKNESS: ICD-10-CM

## 2020-05-27 DIAGNOSIS — R52 PAIN: ICD-10-CM

## 2020-05-27 DIAGNOSIS — M54.42 BILATERAL LOW BACK PAIN WITH BILATERAL SCIATICA, UNSPECIFIED CHRONICITY: Primary | ICD-10-CM

## 2020-05-27 DIAGNOSIS — R29.898 WEAKNESS OF BOTH LOWER LIMBS: ICD-10-CM

## 2020-05-27 DIAGNOSIS — R20.0 NUMBNESS: ICD-10-CM

## 2020-05-27 DIAGNOSIS — R52 PAIN: Primary | ICD-10-CM

## 2020-05-27 DIAGNOSIS — M54.41 BILATERAL LOW BACK PAIN WITH BILATERAL SCIATICA, UNSPECIFIED CHRONICITY: Primary | ICD-10-CM

## 2020-05-27 PROCEDURE — 95910 NRV CNDJ TEST 7-8 STUDIES: CPT | Performed by: PSYCHIATRY & NEUROLOGY

## 2020-05-27 PROCEDURE — 95886 MUSC TEST DONE W/N TEST COMP: CPT | Performed by: PSYCHIATRY & NEUROLOGY

## 2020-05-27 PROCEDURE — 99999 PR OFFICE/OUTPT VISIT,PROCEDURE ONLY: CPT | Performed by: PSYCHIATRY & NEUROLOGY

## 2020-05-27 NOTE — PROCEDURES
Alecia Cruz  1960  5/27/2020  Asher Lee MD      Clinical History:  Alecia Cruz is a 59-year-old who has had episodic lower back pain for 15 years.  In the past she has had sciatica requiring diagnostics and treatments.  More recently she has developed lower back pain radiating into the right leg.  She reports numbness and weakness in the right leg.  The left leg is unaffected.    Physical Examination:  She was a pleasant appearing woman in no acute distress.  There was normal bulk and tone with no abnormal movements.  Strength was 5/5.  The sensory examination was normal.  Reflexes were plus plus at the knees and absent at the ankles.    Nerve Conduction Studies:  Bilateral sural and superficial peroneal sensory studies were normal.    Bilateral peroneal and tibial motor studies were normal.    Needle EMG:  Needle EMG of selected muscles of the right L2-S1 myotomes was performed.  All muscles studied were normal; there was no evidence of acute or chronic denervation.    Impression:  This was a normal study of the legs.  There was no electrophysiologic evidence of a lumbosacral radiculopathy or plexopathy, focal neuropathy, polyneuropathy or other neuromuscular disorder at this time.    Clinical Note:  This was a normal study and there was no electrophysiologic evidence of a neuromuscular disorder.  The patient likely has mild sciatica.  For now we decided to hold off on an MRI and attempt conservative measures with over-the-counter anti-inflammatory medications, analgesics and physical therapy.  In the future, additional diagnostics and treatments will depend on the patient's clinical course.  Thank you, and please call with any questions.      Sincerely,  Cesar Juares M.D.

## 2020-05-27 NOTE — LETTER
May 27, 2020     Asher Lee MD  [address]  [place]  [facility] [address]    Patient: Alecia Cruz  YOB: 1960  Date of Visit: 5/27/2020      Dear Dr. Lee:    Thank you for referring Alecia Cruz to me for evaluation. Below are my notes for this consultation.    If you have questions, please do not hesitate to call me. I look forward to following your patient along with you.         Sincerely,        Cesar Juares MD        CC: No Recipients        Cesar Juares MD  5/27/2020  8:02 AM  Signed  Alecia Cruz  1960  5/27/2020  Asher Lee MD      Clinical History:  Alecia Cruz is a 59-year-old who has had episodic lower back pain for 15 years.  In the past she has had sciatica requiring diagnostics and treatments.  More recently she has developed lower back pain radiating into the right leg.  She reports numbness and weakness in the right leg.  The left leg is unaffected.    Physical Examination:  She was a pleasant appearing woman in no acute distress.  There was normal bulk and tone with no abnormal movements.  Strength was 5/5.  The sensory examination was normal.  Reflexes were plus plus at the knees and absent at the ankles.    Nerve Conduction Studies:  Bilateral sural and superficial peroneal sensory studies were normal.    Bilateral peroneal and tibial motor studies were normal.    Needle EMG:  Needle EMG of selected muscles of the right L2-S1 myotomes was performed.  All muscles studied were normal; there was no evidence of acute or chronic denervation.    Impression:  This was a normal study of the legs.  There was no electrophysiologic evidence of a lumbosacral radiculopathy or plexopathy, focal neuropathy, polyneuropathy or other neuromuscular disorder at this time.    Clinical Note:  This was a normal study and there was no electrophysiologic evidence of a neuromuscular disorder.  The patient likely has mild sciatica.  For now  we decided to hold off on an MRI and attempt conservative measures with over-the-counter anti-inflammatory medications, analgesics and physical therapy.  In the future, additional diagnostics and treatments will depend on the patient's clinical course.  Thank you, and please call with any questions.      Sincerely,  Cesar Juares M.D.

## 2020-05-27 NOTE — TELEPHONE ENCOUNTER
Alecia was turned away from [facility] when she tried to schedule, because the order was too vague. Is it ok for me to re write a more specific order to send to [facility]?

## 2020-05-27 NOTE — TELEPHONE ENCOUNTER
[facility] called and again stating that the PT RX was still too vague so AYR I resubmitted it again with more specific diagnosis codes.

## 2020-06-02 ENCOUNTER — HOSPITAL ENCOUNTER (OUTPATIENT)
Dept: RADIOLOGY | Facility: HOSPITAL | Age: 60
Discharge: HOME | End: 2020-06-02
Attending: PHYSICAL MEDICINE & REHABILITATION
Payer: COMMERCIAL

## 2020-06-02 ENCOUNTER — TRANSCRIBE ORDERS (OUTPATIENT)
Dept: RADIOLOGY | Facility: HOSPITAL | Age: 60
End: 2020-06-02

## 2020-06-02 DIAGNOSIS — M54.12 RADICULOPATHY, CERVICAL REGION: Primary | ICD-10-CM

## 2020-06-02 DIAGNOSIS — M54.12 RADICULOPATHY, CERVICAL REGION: ICD-10-CM

## 2020-06-02 PROCEDURE — 72040 X-RAY EXAM NECK SPINE 2-3 VW: CPT

## 2020-06-03 ENCOUNTER — TRANSCRIBE ORDERS (OUTPATIENT)
Dept: SCHEDULING | Facility: REHABILITATION | Age: 60
End: 2020-06-03

## 2020-06-03 DIAGNOSIS — M54.12 RADICULOPATHY, CERVICAL REGION: ICD-10-CM
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
## 2020-06-03 DIAGNOSIS — I89.0 LYMPHEDEMA DUE TO AND NOT CONCURRENT WITH IONIZING RADIOTHERAPY: Primary | ICD-10-CM

## 2020-06-08 ENCOUNTER — TRANSCRIBE ORDERS (OUTPATIENT)
Dept: PHYSICAL THERAPY | Facility: HOSPITAL | Age: 60
End: 2020-06-08

## 2020-06-08 ENCOUNTER — HOSPITAL ENCOUNTER (OUTPATIENT)
Dept: PHYSICAL THERAPY | Facility: HOSPITAL | Age: 60
Setting detail: THERAPIES SERIES
Discharge: HOME | End: 2020-06-08
Attending: PHYSICAL MEDICINE & REHABILITATION
Payer: COMMERCIAL

## 2020-06-08 DIAGNOSIS — R29.898 WEAKNESS OF BOTH LOWER LIMBS: Primary | ICD-10-CM

## 2020-06-08 DIAGNOSIS — I89.0 LYMPHEDEMA DUE TO AND NOT CONCURRENT WITH IONIZING RADIOTHERAPY: ICD-10-CM

## 2020-06-08 DIAGNOSIS — M54.9 BACK PAIN: Primary | ICD-10-CM

## 2020-06-08 DIAGNOSIS — M54.2 NECK PAIN: ICD-10-CM

## 2020-06-08 DIAGNOSIS — M54.9 BACK PAIN: ICD-10-CM

## 2020-06-08 DIAGNOSIS — R20.0 NUMBNESS: ICD-10-CM
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
## 2020-06-08 PROCEDURE — 97110 THERAPEUTIC EXERCISES: CPT | Mod: GP

## 2020-06-08 PROCEDURE — 97162 PT EVAL MOD COMPLEX 30 MIN: CPT | Mod: GP

## 2020-06-08 PROCEDURE — 97530 THERAPEUTIC ACTIVITIES: CPT | Mod: GP

## 2020-06-08 NOTE — PROGRESS NOTES
Referring Provider: By co-signing this Plan of Care (POC), you agree with the planned services and interventions recommended by the therapist.       NAME: __________________________________ DATE: ___________________        [facility] Fax: [phone]        PT EVALUATION FOR OUTPATIENT THERAPY    Patient: Alecia Cruz    MRN: [id number]  : 1960 59 y.o.   Referring Physician: Yudi Long MD  Date of Visit: 2020      Certification Dates:   20 through 20    Recommended Frequency & Duration:  1 time/week for up to 8 weeks     Diagnosis:   1. Lymphedema due to and not concurrent with ionizing radiotherapy        Chief Complaints:   Chief Complaint   Patient presents with   • Pain   • Dec ROM   • Dec Strength   • Other     swelling left arm/hand; lateral trunk       Precautions: limb restrictions    Past Medical History:   Past Medical History:   Diagnosis Date   • A-fib (CMS/[facility])    • Breast cancer (CMS/[facility])    • H/O total hysterectomy 2000   • Hypothyroidism        Past Surgical History:   Past Surgical History:   Procedure Laterality Date   • BREAST LUMPECTOMY     • MANDIBLE FRACTURE SURGERY     • PARTIAL HYSTERECTOMY     • ROTATOR CUFF REPAIR Left          LEARNING ASSESSMENT    Assessment completed: Yes    Learner name:  Alecia Cruz    Relationship: Patient    Learning Barriers:  Learning barriers: No Barriers    Preferred Language: English     Needed: No    Learning New Concepts: Listening, Reading, Demonstration and Pictures/Video      CO-LEARNER ASSESSMENT:    Completed: No            OBJECTIVE MEASUREMENTS/DATA:    Eval Assessment    Evaluation Assessment and Plan - 20 6490        Evaluation Assessment and Plan    Plan of Care reviewed and patient/family in agreement  Yes     System Pathology/Pathophysiology Noted  other (see comments);musculoskeletal    lymphatic    Functional Limitations in Following Categories (PT Eval)   self-care;home management;community/leisure     Rehab Potential/Prognosis  good, to achieve stated therapy goals     Problem List  pain;decreased flexibility;decreased ROM;other (see comments)    swelling left arm/hand    Clinical Assessment  Patient is a 59 year old female who presents today with secondary lymphedema LUE as well as chronic neck pain. She is currently not managing her swelling and has a pump at home. She will benefit from PT weekly for approx 6-8 weeks to reduce swelling, apply compression, and educate patient in home program. I am recommending weekly visits 6-8 eeks to attain goals.      Planned Services  CPT 68793 Neuromuscular Reeducation;CPT 27640 Therapeutic exercises;CPT 46723 Manual therapy;CPT 03370 Therapeutic activities         General Info   General Information - 06/08/20 0912        Session Details    Document Type  initial evaluation     Mode of Treatment  physical therapy;individual therapy     OP Specialty  Lymphedema        Time Calculation    Start Time  0912     Stop Time  1012     Time Calculation (min)  60 min        General Information    Patient Profile Reviewed?  yes     Referring Physician  Yudi Stokes MD     Pertinent History of Current Functional Problem  Patient arrived and states she has had lymphedema since 2008 secondary to left breast cancer. She had a lumpectomy with SLND (15 LNN removed with 7 +); she had chemotherapy, herceptin, and XRT. She states she was having treatment through the [facility]. She also had therapy at [place] [facility]. At that time, she used the pump and had bandages. She states when she does too much or when it is humid, her sxs get worse. She has also been seeing a neurologist for low back and right leg pain. She states she is also having some left-sided neck pain. Her histor includes RTC repair in 2010. She is referred to PT for both left upper extremity lymphedema and low back pain.      Existing Precautions/Restrictions  limb  restrictions         Pain and Vitals   Pain/Vitals - 06/08/20 1642        Pain Assessment    Currently in pain  Yes     Preferred Pain Scale  number (Numeric Rating Pain Scale)     Pain: Body location  Neck;Arm     Pain Rating (0-10): Pre Activity  2     Pain Rating (0-10): Activity  2     Pain Rating (0-10): Post Activity  2     Frequency  intermittent     Quality  aching        Pain Intervention    Intervention   eval; skin care/precautions     Post Intervention Comments  no inc pain         Falls Assessment    Falls - 06/08/20 0929        Initial Falls Assessment    One or more falls in the last year  No         Living Environment    Living Environment - 06/08/20 0926        Living Environment    Lives With  child(abhi), adult;grandchild(abhi)     Living Arrangements  house         PLOF:   Prior Level of Function - 06/08/20 0926        OTHER    Previous level of function  Was able to do laundry, lift, walk normally, stairclimb, housecleaning, gardening, writing and sitting at computer, making the bed, and stand for prolonged periods. She currently is unable to perform these activities.          Type and Frequency:   PT - 06/08/20 1644        Physical Therapy    Physical Therapy  Lymphedema        PT Plan    Frequency of treatment  1 time/week     PT Duration  8 weeks     PT Cert From  06/08/20     PT Cert To  08/07/20     Date PT POC was sent to provider  06/08/20     Signed PT Plan of Care received?   No         ROM   Range of Motion - 06/08/20 0900        LEFT: Upper Extremity AROM Assessment    Shoulder Flexion Deficit  130 degrees     Shoulder Abduction Deficit  70 degrees     Shoulder Internal Rotation Deficit  --    PSIS    Shoulder External Rotation Deficit  50 degrees        RIGHT: Upper Extremity AROM Assessment    Shoulder Flexion Deficit  160 degrees     Shoulder Abduction Deficit  160 degrees     Shoulder Internal Rotation Deficit  --    T12    Shoulder External Rotation Deficit  90 degrees         Cervical AROM    Cervical Flexion  23    + pain    Cervical Extension  5    + pain    Cervical Left SB  10     Cervical Right SB  5    + pain    Cervical Left Rotation  35    + pain    Cervical Right Rotation  50    + pain        MMT   Manual Muscle Tests - 06/08/20 0946        RIGHT: Upper Extremity Manual Muscle Test Assessment    Shoulder Flexion gross movement  (3/5) fair     Shoulder Abduction gross movement  (3+/5) fair plus     Elbow Flex gross movement  (3+/5) fair plus     Elbow Extension gross movement  (3+/5) fair plus        LEFT: Upper Extremity Manual Muscle Test Assessment    Shoulder Flexion gross movement  (3/5) fair     Shoulder Abduction gross movement  (3/5) fair     Elbow Flexion gross movement  (3+/5) fair plus     Elbow Extension gross movement  (3+/5) fair plus         Balance/Posture   Balance and Posture - 06/08/20 1648        Postural Deviations    Postural Deviations  shoulder;head and neck;upper back     Head and Neck  forward head     Shoulder  right shoulder medial rotation;left shoulder medial rotation        Posture    Posture  postural deviations         Lymphedema    Lymphedema Assessment - 06/08/20 0948        Palpation    UE Palpation   + stemmer sign Left; moderate induration left anterior forearm/posterior elbow; upper left arm soft and pliable     Trunk Palpation   mild fullness left lateral trunk        Outcome Measures    LLIS results/comments  LLIS = 63% impairment (score = 43)        Extremity Measurements    Volume Measurements  UE        Affected UE Measurments    Affected limb laterality  Left     MCP  19.3 cm     -4 cm  20.5 cm     0 cm  19.3 cm     4 cm  21.2 cm     8 cm  22.6 cm     12 cm  24.8 cm     16 cm  27.3 cm     20 cm  29.5 cm     24 cm  28.5 cm     28 cm  30 cm     32 cm  31.5 cm     36 cm  33 cm     40 cm  32.5 cm     Affected UE Total Volume (ml)  08294.62 ml        Unaffected UE Measurements    Unaffected limb laterality  Right     MCP  19.5 cm     -4 cm   20 cm     0 cm  17 cm     4 cm  18 cm     8 cm  20.3 cm     12 cm  22.9 cm     16 cm  25.3 cm     20 cm  26.5 cm     24 cm  26 cm     28 cm  27 cm     32 cm  29 cm     36 cm  30 cm     40 cm  31 cm     Unaffected UE Total Volume (ml)  11038.95 ml        UE Volume Difference    Difference (ml)  5711.67 ml     Difference (%)  18.37 %        Interventions    Intervention Type  Garments;Manual;Pumps        Garments    Day use  juzo dynamic varin after reduction     Night use  compreflex armsleeve        Pumps    Pumps comments  patient has a pump at home        Manual Interventions    Manual technique #1  manual lymphatic drainage     Manual technique #2  soft tissue mobilization             Goals        Patient Stated    • patient goals (pt-stated)      To reduce swelling in left arm/hand and decrease neck pain      • STG/LTG (pt-stated)       Short term goals   Short Term Goals Time Frame Result Comment/Progress   Pt will increase left shoulder MMT into flexion, extension, abduction >/= 1/2 grade 4 weeks       Pt will increase left shoulder ROM >/= 5 degrees into flexion, extension, abduction  4 weeks       Pt will report decreased pain at worst </=2/10 4 weeks       Pt will demonstrate w/ supervision HEP/compression/self MLD techniques 4 weeks       Pt will decrease volume to </= 10% volume 4 weeks       Pt will verbalize independently proper skin care  4 weeks                       Long term goals   LongTerm Goals Time Frame Result Comment/Progress   Pt will increase left shoulder MMT into flexion, extension, abduction >/= 1 grade 8 weeks       Pt will increase left shoulder ROM >/= 10 degrees into flexion, extension, abduction  8 weeks       Pt will report decreased pain at worst 0/10 8 weeks       Pt will demonstrate w/ (i) HEP/compression/self MLD techniques 8 weeks         Pt will decrease volume to </= 8% volume 8 weeks       By therapist touch, pt will demonstrate dec fibrosis as evidenced by softer  subcutaneous tissues in identified areas 8 weeks     Pt will improve LLIS score to </= 60% impairment  8 weeks                            TREATMENT PLAN:      LYMPHEDEMA PT FLOWSHEET    PT Lymph Exercises Current Session Time   THER ACT TOTAL TIME FOR SESSION 8-22 Minutes   2nd visit Self MLD           Patient Education Risk factors/precautions; use of compression   THER EX TOTAL TIME FOR SESSION Not performed                               NEURO RE-ED TOTAL TIME FOR SESSION Not performed   KINESIOTAPE    MANUAL  TOTAL TIME FOR SESSION Not performed   Stretching    Mobilization    Massage- Deep Tissue, Scar, Transverse Friction    Manual Lymph Drainage    Skin Care- Lotion/Wrapping    Measurement/Fitting    Skin Stretching/Mobilization for Cording                    ASSESSMENT:    This 59 y.o. year old female presents to PT with above stated diagnosis. Physical Therapy evaluation reveals pain, decreased flexibility, decreased ROM, other (see comments)(swelling left arm/hand) resulting in self-care, home management, community/leisure limitations. Alecia Cruz will benefit from skilled PT services to address limitation, work towards rehab and patient goals and maximize PLOF of chosen ADLs.     Planned Services: The patient's treatment will include CPT 15560 Neuromuscular Reeducation, CPT 71791 Therapeutic exercises, CPT 38067 Manual therapy, CPT 30188 Therapeutic activities, .

## 2020-06-08 NOTE — OP PT TREATMENT LOG
LYMPHEDEMA PT FLOWSHEET    PT Lymph Exercises Current Session Time   THER ACT TOTAL TIME FOR SESSION 8-22 Minutes   2nd visit Self MLD           Patient Education Risk factors/precautions; use of compression   THER EX TOTAL TIME FOR SESSION Not performed                               NEURO RE-ED TOTAL TIME FOR SESSION Not performed   KINESIOTAPE    MANUAL  TOTAL TIME FOR SESSION Not performed   Stretching    Mobilization    Massage- Deep Tissue, Scar, Transverse Friction    Manual Lymph Drainage    Skin Care- Lotion/Wrapping    Measurement/Fitting    Skin Stretching/Mobilization for Cording
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
## 2020-06-11 ENCOUNTER — HOSPITAL ENCOUNTER (OUTPATIENT)
Dept: RADIOLOGY | Facility: HOSPITAL | Age: 60
Discharge: HOME | End: 2020-06-11
Attending: PHYSICAL MEDICINE & REHABILITATION
Payer: COMMERCIAL

## 2020-06-11 ENCOUNTER — TELEPHONE (OUTPATIENT)
Dept: SOCIAL WORK | Facility: REHABILITATION | Age: 60
End: 2020-06-11

## 2020-06-11 DIAGNOSIS — M54.12 RADICULOPATHY, CERVICAL REGION: ICD-10-CM

## 2020-06-11 PROCEDURE — 72141 MRI NECK SPINE W/O DYE: CPT

## 2020-06-15 ENCOUNTER — HOSPITAL ENCOUNTER (OUTPATIENT)
Dept: PHYSICAL THERAPY | Facility: HOSPITAL | Age: 60
Setting detail: THERAPIES SERIES
Discharge: HOME | End: 2020-06-15
Attending: PHYSICAL MEDICINE & REHABILITATION
Payer: COMMERCIAL

## 2020-06-15 DIAGNOSIS — I89.0 LYMPHEDEMA DUE TO AND NOT CONCURRENT WITH IONIZING RADIOTHERAPY: Primary | ICD-10-CM
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
## 2020-06-15 PROCEDURE — 97140 MANUAL THERAPY 1/> REGIONS: CPT | Mod: GP

## 2020-06-15 NOTE — PROGRESS NOTES
PT DAILY NOTE FOR OUTPATIENT THERAPY    Patient: Alecia Cruz   MRN: [id number]  : 1960 59 y.o.  Referring Physician: Yudi Stokes MD  Date of Visit: 6/15/2020    Certification Dates: 20 through 20    Diagnosis:   1. Lymphedema due to and not concurrent with ionizing radiotherapy        Chief Complaints:  swelling left arm; neck pain    Precautions:   Existing Precautions/Restrictions: limb restrictions     TODAY'S VISIT    History/Vitals/Pain/Encounter Info - 06/15/20 0811        Injury History/Precautions/Daily Required Info    Primary Therapist  Carolyn Hall, [facility]     Chief Complaint/Reason for Visit   swelling left arm; neck pain     Referring Physician  Yudi Stokes MD     Existing Precautions/Restrictions  limb restrictions     OP Specialty  Lymphedema     Document Type  daily treatment     Patient/Family/Caregiver Comments/Observations  Patient arrived and was somewhat stressed secondary to personal events at home. No pain in left arm reported upon arrival.      Start Time  0811     Stop Time  0905     Time Calculation (min)  54 min     Patient reported fall since last visit  No        Pain Assessment    Currently in pain  Yes     Preferred Pain Scale  number (Numeric Rating Pain Scale)     Pain: Body location  Neck;Arm     Pain Rating (0-10): Pre Activity  2     Pain Rating (0-10): Activity  2     Pain Rating (0-10): Post Activity  2        Pain Intervention    Intervention   MLD; SOR; STM to B UT; gentle manual cervical traction     Post Intervention Comments  no inc pain         Daily Treatment Assessment and Plan - 06/15/20 1102        Daily Treatment Assessment and Plan    Progress toward goals  Progressing     Daily Outcome Summary  Initiated MLD to trunk f/b left arm sequence; discussed use of compression wrap for home to manage left arm swelling; she will be communicating with our  today regarding financial support; SO release today for neck  discomfort and gentle manual traction.      Plan and Recommendations  Educate patient in self MLD; cervical exercises; order juzo compression wrap and handpiece if indicated; compression glove           OBJECTIVE DATA TAKEN TODAY:    None taken    Today's Treatment:      LYMPHEDEMA PT FLOWSHEET    PT Lymph Exercises Current Session Time   THER ACT TOTAL TIME FOR SESSION Not performed   2nd visit Self MLD           Patient Education Risk factors/precautions; use of compression   THER EX TOTAL TIME FOR SESSION Not performed                               NEURO RE-ED TOTAL TIME FOR SESSION Not performed   KINESIOTAPE    MANUAL  TOTAL TIME FOR SESSION 53-60min   Stretching Left arm dynamic stretch   Mobilization SO release cervical area; gentle manual cervical traction; UT stretch (passive)   Massage- Deep Tissue, Scar, Transverse Friction    Manual Lymph Drainage Short neck; trunk sequence; left arm sequence   Skin Care- Lotion/Wrapping    Measurement/Fitting    Skin Stretching/Mobilization for Cording

## 2020-06-24 ENCOUNTER — DOCUMENTATION (OUTPATIENT)
Dept: SOCIAL WORK | Facility: REHABILITATION | Age: 60
End: 2020-06-24

## 2020-06-24 ENCOUNTER — HOSPITAL ENCOUNTER (OUTPATIENT)
Dept: PHYSICAL THERAPY | Facility: HOSPITAL | Age: 60
Setting detail: THERAPIES SERIES
Discharge: HOME | End: 2020-06-24
Attending: PHYSICAL MEDICINE & REHABILITATION
Payer: COMMERCIAL

## 2020-06-24 DIAGNOSIS — I89.0 LYMPHEDEMA DUE TO AND NOT CONCURRENT WITH IONIZING RADIOTHERAPY: Primary | ICD-10-CM
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
## 2020-06-24 PROCEDURE — 97140 MANUAL THERAPY 1/> REGIONS: CPT | Mod: GP

## 2020-06-24 PROCEDURE — 97530 THERAPEUTIC ACTIVITIES: CPT | Mod: GP,59

## 2020-06-24 NOTE — PROGRESS NOTES
PT DAILY NOTE FOR OUTPATIENT THERAPY    Patient: Alecia Cruz   MRN: [id number]  : 1960 59 y.o.  Referring Physician: Yudi Stokes MD  Date of Visit: 2020    Certification Dates: 20 through 20    Diagnosis:   1. Lymphedema due to and not concurrent with ionizing radiotherapy        Chief Complaints:  left arm swelling appears exacerbated    Precautions:   Existing Precautions/Restrictions: limb restrictions     TODAY'S VISIT    History/Vitals/Pain/Encounter Info - 20 1518        Injury History/Precautions/Daily Required Info    Primary Therapist  Carolyn Hall, SALMA     Chief Complaint/Reason for Visit   left arm swelling appears exacerbated     Referring Physician  Yudi Stokes MD     Existing Precautions/Restrictions  limb restrictions     OP Specialty  Lymphedema     Document Type  daily treatment     Patient/Family/Caregiver Comments/Observations  Patient arrived and states her left arm is exacerbated. Discomfort in left hand and in shoulder area. Pain level is 4/10     Start Time  1519     Stop Time  1558     Time Calculation (min)  39 min     Patient reported fall since last visit  No        Pain Assessment    Currently in pain  Yes     Preferred Pain Scale  number (Numeric Rating Pain Scale)     Pain: Body location  Arm;Hand     Pain Rating (0-10): Pre Activity  4     Pain Rating (0-10): Activity  4     Pain Rating (0-10): Post Activity  4        Pain Intervention    Intervention   MLD; application of tubigrip to left arm/hand; decongestive exercise     Post Intervention Comments  no inc pain         Daily Treatment Assessment and Plan - 20 1600        Daily Treatment Assessment and Plan    Progress toward goals  Progressing     Daily Outcome Summary  More swelling noted in left forearm with induration noted anteriorly; patient unable to locate her bandages and her garment at home; application of tubigrip to assist with mild compression; patient will  complete the application for financial assistance to obtain inelastic and elastic garments for lymphedema management.      Plan and Recommendations  Continue with POC; address neck dysfunction; banage if patient finds her supplies at home; order garments when patient hears about requested financial assistance.            OBJECTIVE DATA TAKEN TODAY:    None taken    Today's Treatment:      LYMPHEDEMA PT FLOWSHEET    PT Lymph Exercises Current Session Time   THER ACT TOTAL TIME FOR SESSION 8-22min   3rd visit Self MLD   Decongestive ex Instructed patient and issued written instructions (6/24/20)       Patient Education Risk factors/precautions; use of compression   THER EX TOTAL TIME FOR SESSION Not performed                               NEURO RE-ED TOTAL TIME FOR SESSION Not performed   KINESIOTAPE    MANUAL  TOTAL TIME FOR SESSION 23-37min   Stretching Left arm dynamic stretch   Mobilization    Massage- Deep Tissue, Scar, Transverse Friction    Manual Lymph Drainage Short neck; trunk sequence; left arm sequence   Skin Care- Lotion/Wrapping Application of tubigrip for left arm/hand   Measurement/Fitting    Skin Stretching/Mobilization for Cording

## 2020-06-24 NOTE — PROGRESS NOTES
CM met with patient to provide [facility] paperwork to complete for submission to [facility] [employer] Patient [employer]. The [facility] application is for lymphedema garment coverage.

## 2020-06-24 NOTE — OP PT TREATMENT LOG
LYMPHEDEMA PT FLOWSHEET    PT Lymph Exercises Current Session Time   THER ACT TOTAL TIME FOR SESSION 8-22min   3rd visit Self MLD   Decongestive ex Instructed patient and issued written instructions (6/24/20)       Patient Education Risk factors/precautions; use of compression   THER EX TOTAL TIME FOR SESSION Not performed                               NEURO RE-ED TOTAL TIME FOR SESSION Not performed   KINESIOTAPE    MANUAL  TOTAL TIME FOR SESSION 23-37min   Stretching Left arm dynamic stretch   Mobilization    Massage- Deep Tissue, Scar, Transverse Friction    Manual Lymph Drainage Short neck; trunk sequence; left arm sequence   Skin Care- Lotion/Wrapping Application of tubigrip for left arm/hand   Measurement/Fitting    Skin Stretching/Mobilization for Cording

## 2020-07-06 ENCOUNTER — TELEPHONE (OUTPATIENT)
Dept: SOCIAL WORK | Facility: REHABILITATION | Age: 60
End: 2020-07-06

## 2020-07-06 ENCOUNTER — HOSPITAL ENCOUNTER (OUTPATIENT)
Dept: PHYSICAL THERAPY | Facility: HOSPITAL | Age: 60
Setting detail: THERAPIES SERIES
Discharge: HOME | End: 2020-07-06
Attending: PHYSICAL MEDICINE & REHABILITATION
Payer: COMMERCIAL

## 2020-07-06 DIAGNOSIS — I89.0 LYMPHEDEMA DUE TO AND NOT CONCURRENT WITH IONIZING RADIOTHERAPY: Primary | ICD-10-CM

## 2020-07-06 DIAGNOSIS — R29.898 WEAKNESS OF BOTH LOWER LIMBS: Primary | ICD-10-CM

## 2020-07-06 DIAGNOSIS — M54.50 RIGHT LOW BACK PAIN, UNSPECIFIED CHRONICITY, UNSPECIFIED WHETHER SCIATICA PRESENT: ICD-10-CM
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
## 2020-07-06 DIAGNOSIS — R20.0 NUMBNESS: ICD-10-CM

## 2020-07-06 PROCEDURE — 97110 THERAPEUTIC EXERCISES: CPT | Mod: GP

## 2020-07-06 PROCEDURE — 97140 MANUAL THERAPY 1/> REGIONS: CPT | Mod: GP

## 2020-07-06 PROCEDURE — 97530 THERAPEUTIC ACTIVITIES: CPT | Mod: GP,59

## 2020-07-06 PROCEDURE — 97010 HOT OR COLD PACKS THERAPY: CPT | Mod: GP

## 2020-07-06 NOTE — PROGRESS NOTES
PT PROGRESS NOTE FOR OUTPATIENT THERAPY    Patient: Alecia Cruz   MRN: [id number]  : 1960 59 y.o.  Referring Physician: Yudi Long MD  Date of Visit: 2020      Certification Dates: 20 through 20    Recommended Frequency & Duration:  1 time/week for up to 8 weeks     Diagnosis:   1. Lymphedema due to and not concurrent with ionizing radiotherapy        Chief Complaints:  Chief Complaint   Patient presents with   • Dec ROM   • Other     swelling left arm/hand       Precautions:   Existing Precautions/Restrictions: limb restrictions     TODAY'S VISIT:    General Information - 20 0936        Session Details    Document Type  progress note        General Information    Patient Profile Reviewed?  yes              Daily Treatment Assessment and Plan - 20 1051        Daily Treatment Assessment and Plan    Progress toward goals  Progressing     Daily Outcome Summary  Patient has shown reduction in edema L arm/hand since initiation of treatment. Will benefit from continued treatment to attain appropriate compression and educate  patient in self care.      Plan and Recommendations  Continue with MLD; STM. Fit garments upon arrival.            OBJECTIVE MEASUREMENTS/DATA:    ROM   Range of Motion - 20 0900        LEFT: Upper Extremity AROM Assessment    Shoulder Flexion Deficit  160 degrees     Shoulder Abduction Deficit  115 degrees     Shoulder Internal Rotation Deficit  --    L4    Shoulder External Rotation Deficit  70 degrees         Lymphedema    Lymphedema Assessment - 20 0900        Skin    Skin Condition  Intact        Palpation    UE Palpation   + stemmer sign L hand; neg pitting thoughout L arm/hand; induration noted anterior forearm L      Trunk Palpation   mild fullness left lateral trunk        Outcome Measures    LLIS results/comments  LLIS = 47% impairment (score = 32)        Extremity Measurements    Volume Measurements  UE        Affected UE  Measurments    Affected limb laterality  Left     MCP  18.5 cm     -4 cm  19.5 cm     0 cm  19.2 cm     4 cm  20.8 cm     8 cm  22.6 cm     12 cm  25 cm     16 cm  27 cm     20 cm  28 cm     24 cm  27.6 cm     28 cm  28.7 cm     32 cm  30.8 cm     36 cm  31.3 cm     40 cm  31.5 cm     Affected UE Total Volume (ml)  93948.07 ml        Unaffected UE Measurements    Unaffected limb laterality  Right     MCP  19.3 cm     -4 cm  20.4 cm     0 cm  17 cm     4 cm  17.6 cm     8 cm  20.3 cm     12 cm  23 cm     16 cm  25 cm     20 cm  26 cm     24 cm  25.7 cm     28 cm  26.8 cm     32 cm  29 cm     36 cm  30 cm     40 cm  31.5 cm     Unaffected UE Total Volume (ml)  44540.16 ml        UE Volume Difference    Difference (ml)  3862.91 ml     Difference (%)  12.5 %        Interventions    Intervention Type  Garments;Manual        Garments    Day use  juzo dynamic armsleeve 20-30mmHg size III long black; juzo seamless glove size M 20-30mmHg black     Night use  juzo compression wrap size M long; hand compression wrap size M        Pumps    Pumps comments  pump at  home        Manual Interventions    Manual technique #1  manual lymphatic drainage     Manual technique #2  soft tissue mobilization           Today's Treatment::      LYMPHEDEMA PT FLOWSHEET    PT Lymph Exercises Current Session Time   THER ACT TOTAL TIME FOR SESSION 8-22min   Progress Note 7/6/20     Self MLD   Decongestive ex Instructed patient and issued written instructions (6/24/20)       Patient Education Risk factors/precautions; use of compression   THER EX TOTAL TIME FOR SESSION Not performed                               NEURO RE-ED TOTAL TIME FOR SESSION Not performed   KINESIOTAPE    MANUAL  TOTAL TIME FOR SESSION 38-52min   Stretching Left arm dynamic stretch   Mobilization STM L UT   Massage- Deep Tissue, Scar, Transverse Friction    Manual Lymph Drainage Short neck; trunk sequence; left arm sequence   Skin Care- Lotion/Wrapping    Measurement/Fitting  Measure for garments: juzo dynamic armsleeve: black 20-30mmhg size M long; juzo seamless glove size M black 20-30mmHg; juzo compression wrap size M long; juzo compression hand wrap size M.    Skin Stretching/Mobilization for Cording                  Goals Addressed                 This Visit's Progress       Patient Stated    • patient goals (pt-stated)        To reduce swelling in left arm/hand and decrease neck pain IMPROVING 7/6/20      • STG/LTG (pt-stated)         Short term goals   Short Term Goals Time Frame Result Comment/Progress   Pt will increase left shoulder MMT into flexion, extension, abduction >/= 1/2 grade 4 weeks  IMPROVING  7/6/20     Pt will increase left shoulder ROM >/= 5 degrees into flexion, extension, abduction  4 weeks  MET 7/6/20     Pt will report decreased pain at worst </=2/10 4 weeks MET 7/6/20     Pt will demonstrate w/ supervision HEP/compression/self MLD techniques 4 weeks  IMPROVING 7/6/20     Pt will decrease volume to </= 10% volume 4 weeks  IMPROVING 7/6/20     Pt will verbalize independently proper skin care  4 weeks MET 7/6/20                      Long term goals   LongTerm Goals Time Frame Result Comment/Progress   Pt will increase left shoulder MMT into flexion, extension, abduction >/= 1 grade 8 weeks  IMPROVING 7/6/20     Pt will increase left shoulder ROM >/= 10 degrees into flexion, extension, abduction  8 weeks  MET 7/6/20     Pt will report decreased pain at worst 0/10 8 weeks  IMPROVING 7/6/20     Pt will demonstrate w/ (i) HEP/compression/self MLD techniques 8 weeks    IMPROVING 7/6/20     Pt will decrease volume to </= 8% volume 8 weeks  IMPROVING 7/6/20     By therapist touch, pt will demonstrate dec fibrosis as evidenced by softer subcutaneous tissues in identified areas 8 weeks IMPROVING 7/6/20    Pt will improve LLIS score to </= 60% impairment  8 weeks MET 7/6/20  NEW </= 40% IMPAIRMENT                        Education provided this session. See the Patient  Education summary report for full details.      Clinical Impression: Patient has been receiving PT for lymphedema left arm/hand weekly for the last month and has demonstrated a reduction in pain and swelling. She was measured for day and night compression and will wait for the arrival to fit and educate patient in use. I am recommending she continue weekly visits for another month to attain goals.

## 2020-07-06 NOTE — OP PT TREATMENT LOG
ORTHO PT FLOWSHEET     Exercises Current Session Time   MODALITIES- HEAT/ICE TOTAL TIME FOR SESSION 8-22 min   heat seated MHP Lsp prior to exer 8 min   Ice prn post Rx  - declined   THER EX TOTAL TIME FOR SESSION 23-37 Minutes   pt ed  Pt educated in HEP per written materials  6/8: TAC  repeated standing ext  seated sciatic N glides  7/6: prone pressup, prone femoral nerve slides, clamshell YTB, pallof press YTB        seated pball rollouts x10  seated sciatic N flossers x5 ea    Standing ther exer:  Repeated extension standing 2x10  Lateral shifting to neutral posture x8  standing at ballet bar reaching with LSB x3 ea UE    YTB palloff press x10 ea      MAT ther-ex  SL clamshell YTBx10 ea       mat  Prone lying on  1 pillow  Prone on elbows - repeated modified press up x10     prone knee flex x10 ea  prone hip ext x10 ea       Nustep  8 min level 1 seat 7 arms 9   NEURO RE-ED TOTAL TIME FOR SESSION Not performed   POSTURAL RE-ED         MANUAL TOTAL TIME FOR SESSION 8-22 min   Mobilization  Prone lumbar mobs for mobility L2-5 PA gr I II    - painful  Joint mobs Inf sacral mob   - painful                                   Manual

## 2020-07-06 NOTE — PROGRESS NOTES
PT DAILY NOTE FOR OUTPATIENT THERAPY    Patient: Alecia Cruz   MRN: [id number]  : 1960 59 y.o.  Referring Physician: Yudi Long MD  Date of Visit: 2020    Certification Dates: 20 through 20    Diagnosis:   1. Weakness of both lower limbs    2. Numbness    3. Right low back pain, unspecified chronicity, unspecified whether sciatica present        Chief Complaints:  pain over weekend with incr sx in RLE feeling a cramp or catching feeling    Precautions:   Existing Precautions/Restrictions: limb restrictions     TODAY'S VISIT    History/Vitals/Pain/Encounter Info - 20 0838        Injury History/Precautions/Daily Required Info    Primary Therapist  Sonja Herring PT     Chief Complaint/Reason for Visit   pain over weekend with incr sx in RLE feeling a cramp or catching feeling     Referring Physician  Yudi Long MD     Existing Precautions/Restrictions  limb restrictions     Document Type  daily treatment     Start Time  0835     Stop Time  0930     Time Calculation (min)  55 min     Patient reported fall since last visit  No        Pain Assessment    Currently in pain  Yes     Preferred Pain Scale  number (Numeric Rating Pain Scale)     Pain: Body location  Back;Leg     Pain Rating (0-10): Pre Activity  1    back    Pain Rating (0-10): Activity  1    RLE post LE       Pain Intervention    Intervention   modalities, Manual, TE     Post Intervention Comments  no incr sx         Daily Treatment Assessment and Plan - 20 1414        Daily Treatment Assessment and Plan    Progress toward goals  Progressing     Daily Outcome Summary  see below     pt reports had episodes of RLE sx with pain and feeling like it would not support weight, no falls but felt R leg was buckling  feels a bit better this am but still dull pain in LB and post RLE noted with activity  initiated with MHP Lsp  progressive ther exer instructed as per flow sheet with cues for pacing and positioning  with exercises  notes RLE post sx with sciatic N   decr in sx with repeated ext standing with cues to focus on UE overpressure on bottom of ribcage in standing  pt reports decr in pain and stiffness at end of session  upgraded HEP per written materials, educated pt on gentle progression of activity as able  Pt will benefit from continued skilled Physical Therapy visits to work toward established treatment goals. Focus on sx mgmt, flexibility, core/postural strengthening for improvement of functional mobility.      Plan and Recommendations  Cont POC with progression of exercise program as able           OBJECTIVE DATA TAKEN TODAY:    None taken    Today's Treatment:      ORTHO PT FLOWSHEET     Exercises Current Session Time   MODALITIES- HEAT/ICE TOTAL TIME FOR SESSION 8-22 min   heat seated MHP Lsp prior to exer 8 min   Ice prn post Rx   - declined   THER EX TOTAL TIME FOR SESSION 23-37 Minutes   pt ed  Pt educated in HEP per written materials  6/8: TAC  repeated standing ext  seated sciatic N glides  7/6: prone pressup, prone femoral nerve slides, clamshell YTB, pallof press YTB        seated pball rollouts x10  seated sciatic N flossers x5 ea    Standing ther exer:  Repeated extension standing 2x10  Lateral shifting to neutral posture x8  standing at ballet bar reaching with LSB x3 ea UE    YTB palloff press x10 ea      MAT ther-ex  SL clamshell YTBx10 ea       mat  Prone lying on  1 pillow  Prone on elbows - repeated modified press up x10     prone knee flex x10 ea  prone hip ext x10 ea       Nustep  8 min level 1 seat 7 arms 9   NEURO RE-ED TOTAL TIME FOR SESSION Not performed   POSTURAL RE-ED         MANUAL TOTAL TIME FOR SESSION 8-22 min   Mobilization  Prone lumbar mobs for mobility L2-5 PA gr I II     - painful  Joint mobs Inf sacral mob    - painful                                   Manual

## 2020-07-06 NOTE — OP PT TREATMENT LOG
LYMPHEDEMA PT FLOWSHEET    PT Lymph Exercises Current Session Time   THER ACT TOTAL TIME FOR SESSION 8-22min   Progress Note 7/6/20     Self MLD   Decongestive ex Instructed patient and issued written instructions (6/24/20)       Patient Education Risk factors/precautions; use of compression   THER EX TOTAL TIME FOR SESSION Not performed                               NEURO RE-ED TOTAL TIME FOR SESSION Not performed   KINESIOTAPE    MANUAL  TOTAL TIME FOR SESSION 38-52min   Stretching Left arm dynamic stretch   Mobilization STM L UT   Massage- Deep Tissue, Scar, Transverse Friction    Manual Lymph Drainage Short neck; trunk sequence; left arm sequence   Skin Care- Lotion/Wrapping    Measurement/Fitting Measure for garments: juzo dynamic armsleeve: black 20-30mmhg size M long; juzo seamless glove size M black 20-30mmHg; juzo compression wrap size M long; juzo compression hand wrap size M.    Skin Stretching/Mobilization for Cording

## 2020-07-13 ENCOUNTER — HOSPITAL ENCOUNTER (OUTPATIENT)
Dept: PHYSICAL THERAPY | Facility: HOSPITAL | Age: 60
Setting detail: THERAPIES SERIES
Discharge: HOME | End: 2020-07-13
Attending: PHYSICAL MEDICINE & REHABILITATION
Payer: COMMERCIAL

## 2020-07-13 DIAGNOSIS — I89.0 LYMPHEDEMA DUE TO AND NOT CONCURRENT WITH IONIZING RADIOTHERAPY: Primary | ICD-10-CM

## 2020-07-13 DIAGNOSIS — M54.50 RIGHT LOW BACK PAIN, UNSPECIFIED CHRONICITY, UNSPECIFIED WHETHER SCIATICA PRESENT: ICD-10-CM

## 2020-07-13 DIAGNOSIS — R20.0 NUMBNESS: ICD-10-CM

## 2020-07-13 DIAGNOSIS — R29.898 WEAKNESS OF BOTH LOWER LIMBS: Primary | ICD-10-CM
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
## 2020-07-13 PROCEDURE — 97010 HOT OR COLD PACKS THERAPY: CPT | Mod: GP

## 2020-07-13 PROCEDURE — 97140 MANUAL THERAPY 1/> REGIONS: CPT | Mod: GP

## 2020-07-13 PROCEDURE — 97110 THERAPEUTIC EXERCISES: CPT | Mod: GP

## 2020-07-13 NOTE — PROGRESS NOTES
PT DAILY NOTE FOR OUTPATIENT THERAPY    Patient: Alecia Cruz   MRN: [id number]  : 1960 59 y.o.  Referring Physician: Yudi Long MD  Date of Visit: 2020    Certification Dates: 20 through 20    Diagnosis:   1. Weakness of both lower limbs    2. Numbness    3. Right low back pain, unspecified chronicity, unspecified whether sciatica present        Chief Complaints:  low back pain    Precautions:   Existing Precautions/Restrictions: limb restrictions     TODAY'S VISIT    History/Vitals/Pain/Encounter Info - 20 0842        Injury History/Precautions/Daily Required Info    Primary Therapist  Sonja Herring PT     Chief Complaint/Reason for Visit   low back pain     Referring Physician  Yudi Long MD     Existing Precautions/Restrictions  limb restrictions     OP Specialty  Orthopedics     Document Type  daily treatment     Patient/Family/Caregiver Comments/Observations  pt reports ongoing pain in low back and intermittent pain in the leg; leg pain has improved with lifestyle modifications and HEP     Start Time  0836     Stop Time  0936     Time Calculation (min)  60 min     Patient reported fall since last visit  No        Pain Assessment    Currently in pain  Yes     Preferred Pain Scale  number (Numeric Rating Pain Scale)     Pain: Body location  Back;Leg     Pain Rating (0-10): Pre Activity  2    stiffness       Pain Intervention    Intervention   modalities, manual,  TE     Post Intervention Comments  no incr pain         Daily Treatment Assessment and Plan - 20 0946        Daily Treatment Assessment and Plan    Progress toward goals  Progressing     Daily Outcome Summary  see below     pt reports pain LB with some stiffness, reports intermittent decr in LE pain intensity and frequency  initiated with MHP seated  manual work as noted with incr tolerance to prone lying and PA glide overpressure  performed ther exer as noted with cues for pacing and  positioning  pt demo incr tolerance with exer this session, reports decr stiffness at end of session  Pt will benefit from continued skilled Physical Therapy visits to work toward established treatment goals. Focus on sx mgmt, progressive core strengthening program.      Plan and Recommendations  Cont POC with progression of exercise program as able           OBJECTIVE DATA TAKEN TODAY:    None taken    Today's Treatment:      ORTHO PT FLOWSHEET     Exercises Current Session Time   MODALITIES- HEAT/ICE TOTAL TIME FOR SESSION 8-22 min   heat seated MHP Lsp prior to exer 8 min   Ice prn post Rx  - declined   THER EX TOTAL TIME FOR SESSION 23-37 Minutes   pt ed  Pt educated in HEP per written materials  6/8: TAC  repeated standing ext  seated sciatic N glides  7/6: prone pressup, prone femoral nerve slides, clamshell YTB, pallof press YTB       seated pball rollouts x10  seated sciatic N flossers x5 ea    Standing ther exer:  Repeated extension standing 2x10  Lateral shifting to neutral posture x8  standing at ballet bar reaching with LSB x3 ea UE    YTB palloff press 2x10 ea      MAT ther-ex  SL clamshell YTBx10 ea held       mat Prone lying on  2 pillow  Prone on elbows - repeated modified press up x10     prone knee flex x10 ea  prone hip ext x10 ea       Nustep  11 min level 1 seat 8 arms 11   NEURO RE-ED TOTAL TIME FOR SESSION Not performed   POSTURAL RE-ED         MANUAL TOTAL TIME FOR SESSION 8-22 min   Mobilization  Prone lumbar mobs for mobility L2-5 PA gr I II      Joint mobs Inf sacral mob   - painful                                   Manual

## 2020-07-13 NOTE — PROGRESS NOTES
PT DAILY NOTE FOR OUTPATIENT THERAPY    Patient: Alecia Cruz   MRN: [id number]  : 1960 59 y.o.  Referring Physician: Yudi Stokes MD  Date of Visit: 2020    Certification Dates: 20 through 20    Diagnosis:   1. Lymphedema due to and not concurrent with ionizing radiotherapy        Chief Complaints:  lymphedema left arm    Precautions:   Existing Precautions/Restrictions: limb restrictions     TODAY'S VISIT    History/Vitals/Pain/Encounter Info - 20 0943        Injury History/Precautions/Daily Required Info    Primary Therapist  Carolyn Hall PT     Chief Complaint/Reason for Visit   lymphedema left arm     Referring Physician  Yudi Stokes MD     Existing Precautions/Restrictions  limb restrictions     OP Specialty  Lymphedema     Document Type  daily treatment     Patient/Family/Caregiver Comments/Observations  Patient reports left arm feels better.      Start Time  0943     Stop Time  1031     Time Calculation (min)  48 min     Patient reported fall since last visit  No        Pain Assessment    Currently in pain  Yes     Preferred Pain Scale  number (Numeric Rating Pain Scale)     Pain: Body location  Neck    left    Pain Rating (0-10): Pre Activity  7     Pain Rating (0-10): Activity  7     Pain Rating (0-10): Post Activity  3        Pain Intervention    Intervention   MLD; STM; gentle L shl stretching     Post Intervention Comments  -3/10         Daily Treatment Assessment and Plan - 20 1033        Daily Treatment Assessment and Plan    Progress toward goals  Progressing     Daily Outcome Summary  Patient demonstrate moderate restriction on L UT/upper scap; Patient notes decreased pain post session; emailed vendor to inquire about garment order     Plan and Recommendations  Continue POC; consider strengthening and flexibility exercise for L shoulder and neck           OBJECTIVE DATA TAKEN TODAY:    None taken    Today's Treatment:      LYMPHEDEMA PT  FLOWSHEET    PT Lymph Exercises Current Session Time   THER ACT TOTAL TIME FOR SESSION Not performed   Progress Note 7/6/20     Self MLD   Decongestive ex Instructed patient and issued written instructions (6/24/20)       Patient Education Risk factors/precautions; use of compression   THER EX TOTAL TIME FOR SESSION Not performed                               NEURO RE-ED TOTAL TIME FOR SESSION Not performed   KINESIOTAPE    MANUAL  TOTAL TIME FOR SESSION 38-52min   Stretching Left arm dynamic stretch   Mobilization STM L UT; gr II L scap mobs; SO release; LUT stretch   Massage- Deep Tissue, Scar, Transverse Friction    Manual Lymph Drainage Short neck; trunk sequence; left arm sequence   Skin Care- Lotion/Wrapping    Measurement/Fitting 7/6:Measure for garments: juzo dynamic armsleeve: black 20-30mmhg size M long; juzo seamless glove size M black 20-30mmHg; juzo compression wrap size M long; juzo compression hand wrap size M.    Skin Stretching/Mobilization for Cording

## 2020-07-13 NOTE — OP PT TREATMENT LOG
LYMPHEDEMA PT FLOWSHEET    PT Lymph Exercises Current Session Time   THER ACT TOTAL TIME FOR SESSION Not performed   Progress Note 7/6/20     Self MLD   Decongestive ex Instructed patient and issued written instructions (6/24/20)       Patient Education Risk factors/precautions; use of compression   THER EX TOTAL TIME FOR SESSION Not performed                               NEURO RE-ED TOTAL TIME FOR SESSION Not performed   KINESIOTAPE    MANUAL  TOTAL TIME FOR SESSION 38-52min   Stretching Left arm dynamic stretch   Mobilization STM L UT; gr II L scap mobs; SO release; LUT stretch   Massage- Deep Tissue, Scar, Transverse Friction    Manual Lymph Drainage Short neck; trunk sequence; left arm sequence   Skin Care- Lotion/Wrapping    Measurement/Fitting 7/6:Measure for garments: juzo dynamic armsleeve: black 20-30mmhg size M long; juzo seamless glove size M black 20-30mmHg; juzo compression wrap size M long; juzo compression hand wrap size M.    Skin Stretching/Mobilization for Cording

## 2020-07-13 NOTE — OP PT TREATMENT LOG
ORTHO PT FLOWSHEET     Exercises Current Session Time   MODALITIES- HEAT/ICE TOTAL TIME FOR SESSION 8-22 min   heat seated MHP Lsp prior to exer 8 min   Ice prn post Rx  - declined   THER EX TOTAL TIME FOR SESSION 23-37 Minutes   pt ed  Pt educated in HEP per written materials  6/8: TAC  repeated standing ext  seated sciatic N glides  7/6: prone pressup, prone femoral nerve slides, clamshell YTB, pallof press YTB       seated pball rollouts x10  seated sciatic N flossers x5 ea    Standing ther exer:  Repeated extension standing 2x10  Lateral shifting to neutral posture x8  standing at ballet bar reaching with LSB x3 ea UE    YTB palloff press 2x10 ea      MAT ther-ex  SL clamshell YTBx10 ea held       mat Prone lying on  2 pillow  Prone on elbows - repeated modified press up x10     prone knee flex x10 ea  prone hip ext x10 ea       Nustep  11 min level 1 seat 8 arms 11   NEURO RE-ED TOTAL TIME FOR SESSION Not performed   POSTURAL RE-ED         MANUAL TOTAL TIME FOR SESSION 8-22 min   Mobilization  Prone lumbar mobs for mobility L2-5 PA gr I II      Joint mobs Inf sacral mob   - painful                                   Manual

## 2020-07-22 ENCOUNTER — HOSPITAL ENCOUNTER (OUTPATIENT)
Dept: PHYSICAL THERAPY | Facility: HOSPITAL | Age: 60
Setting detail: THERAPIES SERIES
Discharge: HOME | End: 2020-07-22
Attending: PHYSICAL MEDICINE & REHABILITATION
Payer: COMMERCIAL

## 2020-07-22 ENCOUNTER — TELEPHONE (OUTPATIENT)
Dept: SOCIAL WORK | Facility: REHABILITATION | Age: 60
End: 2020-07-22
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
## 2020-07-22 DIAGNOSIS — R29.898 WEAKNESS OF BOTH LOWER LIMBS: Primary | ICD-10-CM

## 2020-07-22 DIAGNOSIS — R20.0 NUMBNESS: ICD-10-CM

## 2020-07-22 DIAGNOSIS — I89.0 LYMPHEDEMA DUE TO AND NOT CONCURRENT WITH IONIZING RADIOTHERAPY: Primary | ICD-10-CM

## 2020-07-22 DIAGNOSIS — M54.50 RIGHT LOW BACK PAIN, UNSPECIFIED CHRONICITY, UNSPECIFIED WHETHER SCIATICA PRESENT: ICD-10-CM

## 2020-07-22 PROCEDURE — 97010 HOT OR COLD PACKS THERAPY: CPT | Mod: GP

## 2020-07-22 PROCEDURE — 97140 MANUAL THERAPY 1/> REGIONS: CPT | Mod: GP

## 2020-07-22 PROCEDURE — 97110 THERAPEUTIC EXERCISES: CPT | Mod: GP

## 2020-07-22 PROCEDURE — 97530 THERAPEUTIC ACTIVITIES: CPT | Mod: GP,59

## 2020-07-22 NOTE — OP PT TREATMENT LOG
LYMPHEDEMA PT FLOWSHEET    PT Lymph Exercises Current Session Time   THER ACT TOTAL TIME FOR SESSION 8-22min   Re-evaluation 7/22/20     Self MLD   Decongestive ex Instructed patient and issued written instructions (6/24/20)       Patient Education Risk factors/precautions; use of compression   THER EX TOTAL TIME FOR SESSION Not performed                               NEURO RE-ED TOTAL TIME FOR SESSION Not performed   KINESIOTAPE    MANUAL  TOTAL TIME FOR SESSION 38-52min   Stretching Left arm dynamic stretch   Mobilization    Massage- Deep Tissue, Scar, Transverse Friction    Manual Lymph Drainage Short neck; trunk sequence; left arm sequence   Skin Care- Lotion/Wrapping    Measurement/Fitting 7/6:Measure for garments: juzo dynamic armsleeve: black 20-30mmhg size M long; juzo seamless glove size M black 20-30mmHg; juzo compression wrap size M long; juzo compression hand wrap size M.    Skin Stretching/Mobilization for Cording

## 2020-07-22 NOTE — OP PT TREATMENT LOG
ORTHO PT FLOWSHEET     Exercises Current Session Time   MODALITIES- HEAT/ICE TOTAL TIME FOR SESSION 8-22 min   heat seated MHP Lsp prior to exer 8 min   Ice prn post Rx  - declined   THER EX TOTAL TIME FOR SESSION 23-37 Minutes   pt ed  Pt educated in HEP per written materials  6/8: TAC  repeated standing ext  seated sciatic N glides  7/6: prone pressup, prone femoral nerve slides, clamshell YTB, pallof press YTB       seated pball rollouts x10  seated sciatic N flossers x5 ea    Standing ther exer:  Repeated extension standing 2x10  Lateral shifting to neutral posture x8  standing at [employer] bar rockerboard taps and holdsx3 cycles    RTB palloff press 2x10 ea  RTB rows 2x10      MAT ther-ex  SL clamshell YTBx10 ea held       mat Prone lying on  2 pillow  Prone on elbows - repeated modified press up x10     prone knee flex x10 ea  prone hip ext x10 ea       Nustep  11 min level 1 seat 8 arms 11   MANUAL TOTAL TIME FOR SESSION 8-22 min   Mobilization  Prone lumbar mobs for mobility L2-5 PA gr I II      Joint mobs Inf sacral mob   - painful                                   Manual                                                  pt reports decr in pain freq and intensity  RLE post thigh pain does not extend past mid thigh   initiated with MHP LB seated  manual work as noted  pt performed ther exer as noted with cues for pacing and positioning  denies pain and RLE radic sx at end of session  Patient has been instructed to continue with HEP   Pt demo improvement in LBP, decr RLE radic sx intensity and freq, compliance with HEP will benefit from continued skilled Physical Therapy visits to work toward established treatment goals. Focus on sx mgmt, ROM, core strengthen/stabilization, for less pain to improve functional mobility.

## 2020-07-22 NOTE — PROGRESS NOTES
Referring Provider: By co-signing this Plan of Care (POC), you agree with the planned services and interventions recommended by the therapist.       NAME: _____________________________ DATE: _______________      [facility] Fax: [phone]      PT RE-EVALUATION FOR OUTPATIENT THERAPY    Patient: Alecia Cruz   MRN: [id number]  : 1960 59 y.o.  Referring Physician: Yudi Long MD  Date of Visit: 2020      New Certification Dates: 20 through 20    Recommended Frequency & Duration:  1 time/week for up to 6 weeks     Diagnosis:   1. Lymphedema due to and not concurrent with ionizing radiotherapy        Chief Complaints:  No chief complaint on file.      Precautions:   Existing Precautions/Restrictions: limb restrictions    TODAY'S VISIT:          Daily Treatment Assessment and Plan - 20 1030        Daily Treatment Assessment and Plan    Progress toward goals  Progressing     Daily Outcome Summary  Patient has shown a slight increase in her swelling left arm with the humidity. Waiting for the garments to get approval and arrive for fitting. Patient will benefit from continued PT to fit garments and assist patient in managing her condition     Plan and Recommendations  Progress with MLD and garment fitting to assist patient in managing her condition. I am recommending weekly visits for approx 4-6 weeks to attain goals.            OBJECTIVE MEASUREMENTS/DATA:    ROM   Range of Motion - 20 0900        LEFT: Upper Extremity AROM Assessment    Shoulder Flexion Deficit  155 degrees     Shoulder Abduction Deficit  125 degrees     Shoulder Internal Rotation Deficit  --    T12    Shoulder External Rotation Deficit  65 degrees         MMT   Manual Muscle Tests - 20 0941        RIGHT: Upper Extremity Manual Muscle Test Assessment    Shoulder Flexion gross movement  (4+/5) good plus     Shoulder Abduction gross movement  (4+/5) good plus     Elbow Flex gross  movement  (4+/5) good plus     Elbow Extension gross movement  (4+/5) good plus        LEFT: Upper Extremity Manual Muscle Test Assessment    Shoulder Flexion gross movement  (4/5) good     Shoulder Abduction gross movement  (4/5) good     Elbow Flexion gross movement  (4/5) good     Elbow Extension gross movement  (4/5) good         Lymphedema    Lymphedema Assessment - 07/22/20 0900        Skin    Skin Condition  Intact        Palpation    UE Palpation   + stemmer sign left hand; moderate induration left anterior forearm        Outcome Measures    LLIS results/comments  LLIS = 54% impairment (score = 37)        Extremity Measurements    Volume Measurements  UE        Affected UE Measurments    Affected limb laterality  Left     MCP  19 cm     -4 cm  20 cm     0 cm  18.5 cm     4 cm  20.4 cm     8 cm  22 cm     12 cm  24.3 cm     16 cm  26.2 cm     20 cm  28 cm     24 cm  26.8 cm     28 cm  28.3 cm     32 cm  30 cm     36 cm  31 cm     40 cm  31.3 cm     Affected UE Total Volume (ml)  41804.21 ml        Unaffected UE Measurements    Unaffected limb laterality  Right     MCP  19 cm     -4 cm  19.5 cm     0 cm  16.5 cm     4 cm  17.3 cm     8 cm  19.2 cm     12 cm  20.6 cm     16 cm  24 cm     20 cm  25.5 cm     24 cm  25.3 cm     28 cm  25.5 cm     32 cm  28.5 cm     36 cm  29 cm     40 cm  30 cm     Unaffected UE Total Volume (ml)  38153.79 ml        UE Volume Difference    Difference (ml)  5158.42 ml     Difference (%)  18.02 %        Interventions    Intervention Type  Garments;Pumps;Manual        Garments    Day use  standard juzo dynamic sleeve and glove     Night use  juzo compression wrap for arm and hand        Pumps    Pumps comments  pump at home        Manual Interventions    Manual technique #1  manual lymphatic drainage     Manual technique #2  soft tissue mobilization           LEARNING ASSESSMENT    Assessment completed: Yes    Learner name:  Alecia Cruz    Relationship: Patient    Learning  Barriers:  Learning barriers: No Barriers    Preferred Language: English     Needed: No    Learning New Concepts: Listening, Reading, Demonstration and Pictures/Video      CO-LEARNER ASSESSMENT:    Completed: No          Today's Treatment::      LYMPHEDEMA PT FLOWSHEET    PT Lymph Exercises Current Session Time   THER ACT TOTAL TIME FOR SESSION 8-22min   Re-evaluation 7/22/20     Self MLD   Decongestive ex Instructed patient and issued written instructions (6/24/20)       Patient Education Risk factors/precautions; use of compression   THER EX TOTAL TIME FOR SESSION Not performed                               NEURO RE-ED TOTAL TIME FOR SESSION Not performed   KINESIOTAPE    MANUAL  TOTAL TIME FOR SESSION 38-52min   Stretching Left arm dynamic stretch   Mobilization    Massage- Deep Tissue, Scar, Transverse Friction    Manual Lymph Drainage Short neck; trunk sequence; left arm sequence   Skin Care- Lotion/Wrapping    Measurement/Fitting 7/6:Measure for garments: juzo dynamic armsleeve: black 20-30mmhg size M long; juzo seamless glove size M black 20-30mmHg; juzo compression wrap size M long; juzo compression hand wrap size M.    Skin Stretching/Mobilization for Cording                  Goals:  Goals        Patient Stated    • patient goals (pt-stated)      To reduce swelling in left arm/hand and decrease neck pain IMPROVING 7/22/20      • STG/LTG (pt-stated)       Short term goals   Short Term Goals Time Frame Result Comment/Progress   Pt will increase left shoulder MMT into flexion, extension, abduction >/= 1/2 grade 4 weeks  IMPROVING  7/22/20     Pt will increase left shoulder ROM >/= 5 degrees into flexion, extension, abduction  4 weeks  MET 7/6/20     Pt will report decreased pain at worst </=2/10 4 weeks MET 7/6/20     Pt will demonstrate w/ supervision HEP/compression/self MLD techniques 4 weeks  IMPROVING 7/22/20     Pt will decrease volume to </= 10% volume 4 weeks  IMPROVING 7/22/20     Pt will  verbalize independently proper skin care  4 weeks MET 7/6/20                      Long term goals   LongTerm Goals Time Frame Result Comment/Progress   Pt will increase left shoulder MMT into flexion, extension, abduction >/= 1 grade 8 weeks  IMPROVING 7/22/20     Pt will increase left shoulder ROM >/= 10 degrees into flexion, extension, abduction  8 weeks  MET 7/6/20     Pt will report decreased pain at worst 0/10 8 weeks  IMPROVING 7/22/20     Pt will demonstrate w/ (i) HEP/compression/self MLD techniques 8 weeks    IMPROVING 7/22/20     Pt will decrease volume to </= 8% volume 8 weeks  IMPROVING 7/6/20     By therapist touch, pt will demonstrate dec fibrosis as evidenced by softer subcutaneous tissues in identified areas 8 weeks IMPROVING 7/22/20    Pt will improve LLIS score to </= 60% impairment  8 weeks   NEW </= 40% IMPAIRMENT  ONGOING 7/22/20                        This 59 y.o. year old female presents to PT with above stated diagnosis. Physical Therapy evaluation reveals decreased ROM, other (see comments)(swelling left arm/hand) resulting in self-care, home management, work, community/leisure limitations. Alecia Cruz will benefit from skilled PT services to address limitation, work towards rehab and patient goals and maximize PLOF of chosen ADLs.     Planned Services: The patient's treatment will include CPT 50132 Neuromuscular Reeducation, CPT 54473 Therapeutic exercises, CPT 16465 Manual therapy, CPT 74822 Therapeutic activities, .

## 2020-08-10 ENCOUNTER — HOSPITAL ENCOUNTER (OUTPATIENT)
Dept: PHYSICAL THERAPY | Facility: HOSPITAL | Age: 60
Setting detail: THERAPIES SERIES
Discharge: HOME | End: 2020-08-10
Attending: PHYSICAL MEDICINE & REHABILITATION
Payer: COMMERCIAL
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
## 2020-08-10 DIAGNOSIS — I89.0 LYMPHEDEMA DUE TO AND NOT CONCURRENT WITH IONIZING RADIOTHERAPY: Primary | ICD-10-CM

## 2020-08-10 PROCEDURE — 97140 MANUAL THERAPY 1/> REGIONS: CPT | Mod: GP

## 2020-08-10 NOTE — OP PT TREATMENT LOG
PT Lymph Exercises Current Session Time   THER ACT TOTAL TIME FOR SESSION Not performed   Re-evaluation 7/22/20     Self MLD   Decongestive ex Instructed patient and issued written instructions (6/24/20)         Patient Education Risk factors/precautions; use of compression   THER EX TOTAL TIME FOR SESSION Not performed                                             NEURO RE-ED TOTAL TIME FOR SESSION Not performed   KINESIOTAPE     MANUAL  TOTAL TIME FOR SESSION 38-52min   Stretching Left arm dynamic stretch   Mobilization     Massage- Deep Tissue, Scar, Transverse Friction     Manual Lymph Drainage Short neck; trunk sequence; left arm sequence   Skin Care- Lotion/Wrapping     Measurement/Fitting 7/6:Measure for garments: juzo dynamic armsleeve: black 20-30mmhg size M long; juzo seamless glove size M black 20-30mmHg; juzo compression wrap size M long; juzo compression hand wrap size M.    Skin Stretching/Mobilization for Cording

## 2020-08-10 NOTE — PROGRESS NOTES
PT DAILY NOTE FOR OUTPATIENT THERAPY    Patient: Alecia Cruz   MRN: [id number]  : 1960 59 y.o.  Referring Physician: Yudi Long MD  Date of Visit: 8/10/2020    Certification Dates: 20 through 20    Diagnosis:   1. Lymphedema due to and not concurrent with ionizing radiotherapy        Chief Complaints:  LUE lymphedema    Precautions:   Existing Precautions/Restrictions: limb restrictions     TODAY'S VISIT    History/Vitals/Pain/Encounter Info - 08/10/20 0843        Injury History/Precautions/Daily Required Info    Primary Therapist  Carolyn Hall, [facility]     Chief Complaint/Reason for Visit   LUE lymphedema     Referring Physician  Yudi Ewing MD     Existing Precautions/Restrictions  limb restrictions     OP Specialty  Lymphedema     Document Type  daily treatment     Patient/Family/Caregiver Comments/Observations  Patient reports she has used the pump one time since she was last here. She notes her left arm is a little more swollen secondary to humidity. She is also having a greater amount of stressful circumstances in her life which may affect her lymphatic flow. She reports pain in left shoulder/neck. Sitting still no pain, if she touches her upper trap area it is 8-9/10.     Start Time  0843     Stop Time  0929     Time Calculation (min)  46 min     Patient reported fall since last visit  No        Pain Assessment    Currently in pain  Yes     Preferred Pain Scale  number (Numeric Rating Pain Scale)     Pain: Body location  Neck     Pain Rating (0-10): Pre Activity  0     Pain Rating (0-10): Activity  8     Pain Rating (0-10): Post Activity  5        Pain Intervention    Intervention   MLD; stretching; TE     Post Intervention Comments  less discomfort         Daily Treatment Assessment and Plan - 08/10/20 0926        Daily Treatment Assessment and Plan    Progress toward goals  Progressing     Daily Outcome Summary  Patient to call [facility] to discuss payment for garments  that were previously ordered. Continued with MLD and stretching.      Plan and Recommendations  Continue weekly visits to fit garments and assure patient is independent in home program. Consider scapula strengthening with t band.            OBJECTIVE DATA TAKEN TODAY:    None taken    Today's Treatment:      PT Lymph Exercises Current Session Time   THER ACT TOTAL TIME FOR SESSION Not performed   Re-evaluation 7/22/20     Self MLD   Decongestive ex Instructed patient and issued written instructions (6/24/20)         Patient Education Risk factors/precautions; use of compression   THER EX TOTAL TIME FOR SESSION Not performed                                             NEURO RE-ED TOTAL TIME FOR SESSION Not performed   KINESIOTAPE     MANUAL  TOTAL TIME FOR SESSION 38-52min   Stretching Left arm dynamic stretch   Mobilization     Massage- Deep Tissue, Scar, Transverse Friction     Manual Lymph Drainage Short neck; trunk sequence; left arm sequence   Skin Care- Lotion/Wrapping     Measurement/Fitting 7/6:Measure for garments: juzo dynamic armsleeve: black 20-30mmhg size M long; juzo seamless glove size M black 20-30mmHg; juzo compression wrap size M long; juzo compression hand wrap size M.    Skin Stretching/Mobilization for Cording

## 2020-08-17 ENCOUNTER — HOSPITAL ENCOUNTER (OUTPATIENT)
Dept: PHYSICAL THERAPY | Facility: HOSPITAL | Age: 60
Setting detail: THERAPIES SERIES
Discharge: HOME | End: 2020-08-17
Attending: PHYSICAL MEDICINE & REHABILITATION
Payer: COMMERCIAL
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
## 2020-08-17 DIAGNOSIS — I89.0 LYMPHEDEMA DUE TO AND NOT CONCURRENT WITH IONIZING RADIOTHERAPY: Primary | ICD-10-CM

## 2020-08-17 PROCEDURE — 97140 MANUAL THERAPY 1/> REGIONS: CPT | Mod: GP

## 2020-08-17 NOTE — PROGRESS NOTES
PT DAILY NOTE FOR OUTPATIENT THERAPY    Patient: Alecia Cruz   MRN: [id number]  : 1960 59 y.o.  Referring Physician: Yudi Long MD  Date of Visit: 2020    Certification Dates: 20 through 20    Diagnosis:   1. Lymphedema due to and not concurrent with ionizing radiotherapy        Chief Complaints:  LUE lymphedema    Precautions:   Existing Precautions/Restrictions: limb restrictions     TODAY'S VISIT    History/Vitals/Pain/Encounter Info - 20 0943        Injury History/Precautions/Daily Required Info    Primary Therapist  Carolyn Hall PT     Chief Complaint/Reason for Visit   LUE lymphedema     Referring Physician  Yudi Ewing MD     Existing Precautions/Restrictions  limb restrictions     OP Specialty  Lymphedema     Document Type  daily treatment     Patient/Family/Caregiver Comments/Observations  Patient notes she connected with [facility] and ordered her garments. They will be delivered here to the clinic. No pain upon arrival      Start Time  0943     Stop Time  1031     Time Calculation (min)  48 min     Patient reported fall since last visit  No        Pain Assessment    Currently in pain  No/Denies        Pain Intervention    Intervention   MLD; stretching     Post Intervention Comments  na         Daily Treatment Assessment and Plan - 20 1031        Daily Treatment Assessment and Plan    Progress toward goals  Progressing     Daily Outcome Summary  Continued with MLD to trunk supine and S/L f/b L arm sequence. Discussed compression strategies      Plan and Recommendations  Fit garments upon arrival            OBJECTIVE DATA TAKEN TODAY:    None taken    Today's Treatment:      PT Lymph Exercises Current Session Time   THER ACT TOTAL TIME FOR SESSION Not performed   Re-evaluation 20     Self MLD   Decongestive ex Instructed patient and issued written instructions (20)         Patient Education Risk factors/precautions; use of compression    THER EX TOTAL TIME FOR SESSION Not performed                                             NEURO RE-ED TOTAL TIME FOR SESSION Not performed   KINESIOTAPE     MANUAL  TOTAL TIME FOR SESSION 38-52min   Stretching Left arm dynamic stretch   Mobilization     Massage- Deep Tissue, Scar, Transverse Friction     Manual Lymph Drainage Short neck; trunk sequence; left arm sequence   Skin Care- Lotion/Wrapping     Measurement/Fitting 7/6:Measure for garments: juzo dynamic armsleeve: black 20-30mmhg size M long; juzo seamless glove size M black 20-30mmHg; juzo compression wrap size M long; juzo compression hand wrap size M.    Skin Stretching/Mobilization for Cording

## 2020-08-24 ENCOUNTER — HOSPITAL ENCOUNTER (OUTPATIENT)
Dept: PHYSICAL THERAPY | Facility: HOSPITAL | Age: 60
Setting detail: THERAPIES SERIES
Discharge: HOME | End: 2020-08-24
Attending: PHYSICAL MEDICINE & REHABILITATION
Payer: COMMERCIAL

## 2020-08-24 DIAGNOSIS — I89.0 LYMPHEDEMA DUE TO AND NOT CONCURRENT WITH IONIZING RADIOTHERAPY: Primary | ICD-10-CM
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
## 2020-08-24 PROCEDURE — 97140 MANUAL THERAPY 1/> REGIONS: CPT | Mod: GP

## 2020-08-24 NOTE — PROGRESS NOTES
PT DAILY NOTE FOR OUTPATIENT THERAPY    Patient: Alecia Cruz   MRN: [id number]  : 1960 59 y.o.  Referring Physician: Yudi Long MD  Date of Visit: 2020    Certification Dates: 20 through 20    Diagnosis:   1. Lymphedema due to and not concurrent with ionizing radiotherapy        Chief Complaints:  LUE lymphedema    Precautions:   Existing Precautions/Restrictions: limb restrictions     TODAY'S VISIT    History/Vitals/Pain/Encounter Info - 20 0938        Injury History/Precautions/Daily Required Info    Primary Therapist  Carolyn Hall, [facility]     Chief Complaint/Reason for Visit   LUE lymphedema     Referring Physician  Yudi Ewing MD     Existing Precautions/Restrictions  limb restrictions     OP Specialty  Lymphedema     Document Type  daily treatment     Start Time  0938     Stop Time  1025     Time Calculation (min)  47 min     Patient reported fall since last visit  No        Pain Assessment    Currently in pain  No/Denies        Pain Intervention    Intervention   MLD; garment fitting     Post Intervention Comments  na         Daily Treatment Assessment and Plan - 20 1027        Daily Treatment Assessment and Plan    Progress toward goals  Progressing     Daily Outcome Summary  Good fit for compression wrap; juzo seamless glove does not appear to be good containment - sent email to vendor to change order of glove to mediven harmony in higher compression as stated on log sheet. Continue MLD     Plan and Recommendations  Attain new glove to be delivered to patients home.            OBJECTIVE DATA TAKEN TODAY:    None taken    Today's Treatment:      PT Lymph Exercises Current Session Time   THER ACT TOTAL TIME FOR SESSION Not performed   Re-evaluation 20     Self MLD   Decongestive ex Instructed patient and issued written instructions (20)         Patient Education Risk factors/precautions; use of compression   THER EX TOTAL TIME FOR SESSION Not  performed                                             NEURO RE-ED TOTAL TIME FOR SESSION Not performed   KINESIOTAPE     MANUAL  TOTAL TIME FOR SESSION 38-52min   Stretching Left arm dynamic stretch   Mobilization     Massage- Deep Tissue, Scar, Transverse Friction     Manual Lymph Drainage Short neck; trunk sequence; left arm sequence   Skin Care- Lotion/Wrapping     Measurement/Fitting 7/6:Measure for garments: juzo dynamic armsleeve: black 20-30mmhg size M long; juzo seamless glove size M black 20-30mmHg; juzo compression wrap size M long; juzo compression hand wrap size M.   8/24/20: fit garment; glove compression does not seem adequate; return seamless glove; order mediven harmony glove black size 3, 30-40mmHg   Skin Stretching/Mobilization for Cording

## 2020-08-24 NOTE — OP PT TREATMENT LOG
PT Lymph Exercises Current Session Time   THER ACT TOTAL TIME FOR SESSION Not performed   Re-evaluation 7/22/20     Self MLD   Decongestive ex Instructed patient and issued written instructions (6/24/20)         Patient Education Risk factors/precautions; use of compression   THER EX TOTAL TIME FOR SESSION Not performed                                             NEURO RE-ED TOTAL TIME FOR SESSION Not performed   KINESIOTAPE     MANUAL  TOTAL TIME FOR SESSION 38-52min   Stretching Left arm dynamic stretch   Mobilization     Massage- Deep Tissue, Scar, Transverse Friction     Manual Lymph Drainage Short neck; trunk sequence; left arm sequence   Skin Care- Lotion/Wrapping     Measurement/Fitting 7/6:Measure for garments: juzo dynamic armsleeve: black 20-30mmhg size M long; juzo seamless glove size M black 20-30mmHg; juzo compression wrap size M long; juzo compression hand wrap size M.   8/24/20: fit garment; glove compression does not seem adequate; return seamless glove; order mediven harmony glove black size 3, 30-40mmHg   Skin Stretching/Mobilization for Cording

## 2020-08-31 ENCOUNTER — HOSPITAL ENCOUNTER (OUTPATIENT)
Dept: PHYSICAL THERAPY | Facility: HOSPITAL | Age: 60
Setting detail: THERAPIES SERIES
Discharge: HOME | End: 2020-08-31
Attending: PHYSICAL MEDICINE & REHABILITATION
Payer: COMMERCIAL
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
## 2020-08-31 DIAGNOSIS — I89.0 LYMPHEDEMA DUE TO AND NOT CONCURRENT WITH IONIZING RADIOTHERAPY: Primary | ICD-10-CM

## 2020-08-31 PROCEDURE — 97530 THERAPEUTIC ACTIVITIES: CPT | Mod: GP,59

## 2020-08-31 PROCEDURE — 97140 MANUAL THERAPY 1/> REGIONS: CPT | Mod: GP

## 2020-11-18 ENCOUNTER — TELEMEDICINE (OUTPATIENT)
Dept: PRIMARY CARE | Facility: CLINIC | Age: 60
End: 2020-11-18
Payer: COMMERCIAL

## 2020-11-18 DIAGNOSIS — M25.511 ACUTE PAIN OF BOTH SHOULDERS: Primary | ICD-10-CM

## 2020-11-18 DIAGNOSIS — M25.512 ACUTE PAIN OF BOTH SHOULDERS: Primary | ICD-10-CM

## 2020-11-18 PROCEDURE — 99442 PR PHYS/QHP TELEPHONE EVALUATION 11-20 MIN: CPT | Performed by: NURSE PRACTITIONER

## 2020-11-18 RX ORDER — CYCLOBENZAPRINE HCL 5 MG
5 TABLET ORAL 3 TIMES DAILY PRN
Qty: 30 TABLET | Refills: 0 | Status: SHIPPED | OUTPATIENT
Start: 2020-11-18 | End: 2022-12-22

## 2020-11-18 NOTE — PROGRESS NOTES
Verification of Patient Location:  The patient affirms they are currently located in the following state:[place]     Request for Consent:  You and I are about to have a telemedicine check-in or visit. This is allowed because you are already my patient, and you have requested it.  This telemedicine visit will be billed to your health insurance or you, if you are self-insured.  You understand you will be responsible for any copayments or coinsurances that apply to your telemedicine visit.  Before starting our telemedicine visit, I am required to get your consent for this virtual check-in or visit by telemedicine. Do you consent?      Patient Response to Request for Consent: Yes     The following have been reviewed and updated as appropriate in this visit:  Allergies  Meds  Problems         Visit Documentation:  Pt w/ c/o b/l shoulder pain, which radiates to back x 3 days. Affecting b/l shoulder blades. Pain is worse when she takes deep breaths.   Last week lifted heavy bags- and developed pain.  Has taken aleve w/ some relief.  Denies fever, chills, CP, SOB, muscle weakness, parasethesias.    A/P- Shoulder pain-Likely a strain. Prescribed flexeril PRN.Advised to c/w taking aleve PRN. Apply heat. If no relief call the office.               Time Spent in Medical Discussion During This Encounter:    14 minutes

## 2020-12-01 ENCOUNTER — TELEPHONE (OUTPATIENT)
Dept: PRIMARY CARE | Facility: CLINIC | Age: 60
End: 2020-12-01

## 2020-12-02 ENCOUNTER — TELEPHONE (OUTPATIENT)
Dept: PRIMARY CARE | Facility: CLINIC | Age: 60
End: 2020-12-02

## 2020-12-02 DIAGNOSIS — R79.89 LOW VITAMIN D LEVEL: ICD-10-CM

## 2020-12-02 DIAGNOSIS — E03.9 ACQUIRED HYPOTHYROIDISM: Primary | ICD-10-CM

## 2020-12-02 DIAGNOSIS — Z00.00 ROUTINE GENERAL MEDICAL EXAMINATION AT A HEALTH CARE FACILITY: ICD-10-CM

## 2020-12-04 ENCOUNTER — APPOINTMENT (OUTPATIENT)
Dept: LAB | Facility: HOSPITAL | Age: 60
End: 2020-12-04
Attending: INTERNAL MEDICINE
Payer: COMMERCIAL

## 2020-12-04 DIAGNOSIS — R79.89 LOW VITAMIN D LEVEL: ICD-10-CM

## 2020-12-04 DIAGNOSIS — Z00.00 ROUTINE GENERAL MEDICAL EXAMINATION AT A HEALTH CARE FACILITY: ICD-10-CM

## 2020-12-04 DIAGNOSIS — E03.9 ACQUIRED HYPOTHYROIDISM: ICD-10-CM

## 2020-12-04 LAB
25(OH)D3 SERPL-MCNC: 34 NG/ML (ref 30–100)
ALBUMIN SERPL-MCNC: 3.7 G/DL (ref 3.4–5)
ALP SERPL-CCNC: 55 IU/L (ref 35–126)
ALT SERPL-CCNC: 26 IU/L (ref 11–54)
ANION GAP SERPL CALC-SCNC: 10 MEQ/L (ref 3–15)
AST SERPL-CCNC: 20 IU/L (ref 15–41)
BASOPHILS # BLD: 0.06 K/UL (ref 0.01–0.1)
BASOPHILS NFR BLD: 1 %
BILIRUB SERPL-MCNC: 0.5 MG/DL (ref 0.3–1.2)
BUN SERPL-MCNC: 10 MG/DL (ref 8–20)
CALCIUM SERPL-MCNC: 9.5 MG/DL (ref 8.9–10.3)
CHLORIDE SERPL-SCNC: 102 MEQ/L (ref 98–109)
CHOLEST SERPL-MCNC: 215 MG/DL
CO2 SERPL-SCNC: 26 MEQ/L (ref 22–32)
CREAT SERPL-MCNC: 0.8 MG/DL (ref 0.6–1.1)
DIFFERENTIAL METHOD BLD: ABNORMAL
EOSINOPHIL # BLD: 0.17 K/UL (ref 0.04–0.36)
EOSINOPHIL NFR BLD: 2.9 %
ERYTHROCYTE [DISTWIDTH] IN BLOOD BY AUTOMATED COUNT: 12.4 % (ref 11.7–14.4)
GFR SERPL CREATININE-BSD FRML MDRD: >60 ML/MIN/1.73M*2
GLUCOSE SERPL-MCNC: 100 MG/DL (ref 70–99)
HCT VFR BLDCO AUTO: 40.6 % (ref 35–45)
HDLC SERPL-MCNC: 59 MG/DL
HDLC SERPL: 3.6 {RATIO}
HGB BLD-MCNC: 13.4 G/DL (ref 11.8–15.7)
IMM GRANULOCYTES # BLD AUTO: 0.01 K/UL (ref 0–0.08)
IMM GRANULOCYTES NFR BLD AUTO: 0.2 %
LDLC SERPL CALC-MCNC: 145 MG/DL
LYMPHOCYTES # BLD: 2.21 K/UL (ref 1.2–3.5)
LYMPHOCYTES NFR BLD: 38.1 %
MCH RBC QN AUTO: 30.7 PG (ref 28–33.2)
MCHC RBC AUTO-ENTMCNC: 33 G/DL (ref 32.2–35.5)
MCV RBC AUTO: 93.1 FL (ref 83–98)
MONOCYTES # BLD: 0.27 K/UL (ref 0.28–0.8)
MONOCYTES NFR BLD: 4.7 %
NEUTROPHILS # BLD: 3.08 K/UL (ref 1.7–7)
NEUTS SEG NFR BLD: 53.1 %
NONHDLC SERPL-MCNC: 156 MG/DL
NRBC BLD-RTO: 0 %
PDW BLD AUTO: 9.7 FL (ref 9.4–12.3)
PLATELET # BLD AUTO: 238 K/UL (ref 150–369)
POTASSIUM SERPL-SCNC: 4 MEQ/L (ref 3.6–5.1)
PROT SERPL-MCNC: 6.5 G/DL (ref 6–8.2)
RBC # BLD AUTO: 4.36 M/UL (ref 3.93–5.22)
SODIUM SERPL-SCNC: 138 MEQ/L (ref 136–144)
TRIGL SERPL-MCNC: 53 MG/DL (ref 30–149)
TSH SERPL DL<=0.05 MIU/L-ACNC: 1.23 MIU/L (ref 0.34–5.6)
WBC # BLD AUTO: 5.8 K/UL (ref 3.8–10.5)

## 2020-12-04 PROCEDURE — 84443 ASSAY THYROID STIM HORMONE: CPT

## 2020-12-04 PROCEDURE — 36415 COLL VENOUS BLD VENIPUNCTURE: CPT

## 2020-12-04 PROCEDURE — 82306 VITAMIN D 25 HYDROXY: CPT

## 2020-12-04 PROCEDURE — 85025 COMPLETE CBC W/AUTO DIFF WBC: CPT

## 2020-12-04 PROCEDURE — 80061 LIPID PANEL: CPT

## 2020-12-04 PROCEDURE — 80053 COMPREHEN METABOLIC PANEL: CPT

## 2020-12-07 RX ORDER — FLECAINIDE ACETATE 50 MG/1
TABLET ORAL
Qty: 60 TABLET | Refills: 0 | Status: SHIPPED | OUTPATIENT
Start: 2020-12-07 | End: 2020-12-08 | Stop reason: SDUPTHER

## 2020-12-07 RX ORDER — OMEPRAZOLE 20 MG/1
CAPSULE, DELAYED RELEASE ORAL DAILY PRN
COMMUNITY
End: 2021-08-03 | Stop reason: SDUPTHER

## 2020-12-07 RX ORDER — LEVOTHYROXINE SODIUM 88 UG/1
TABLET ORAL
COMMUNITY
End: 2020-12-08

## 2020-12-07 RX ORDER — FLUTICASONE PROPIONATE 50 UG/1
POWDER, METERED RESPIRATORY (INHALATION) DAILY PRN
COMMUNITY
End: 2021-08-03

## 2020-12-07 RX ORDER — BETAMETHASONE DIPROPIONATE 0.5 MG/G
LOTION TOPICAL
COMMUNITY
End: 2021-08-03

## 2020-12-07 NOTE — TELEPHONE ENCOUNTER
Medicine Refill Request    Last Office Visit: 2/13/2020  Last Telemedicine Visit: 11/18/2020 Susana Najera CRNP    Next Office Visit: 12/8/2020  Next Telemedicine Visit: Visit date not found         Current Outpatient Medications:   •  ascorbic acid (VITAMIN C) 500 mg tablet, Take 500 mg by mouth daily., Disp: , Rfl:   •  aspirin 81 mg enteric coated tablet, Take 81 mg by mouth daily., Disp: , Rfl:   •  betamethasone dipropionate (DIPROSONE) 0.05 % lotion, , Disp: , Rfl:   •  clobetasoL (TEMOVATE) 0.05 % external solution, , Disp: , Rfl:   •  cyclobenzaprine (FLEXERIL) 5 mg tablet, Take 1 tablet (5 mg total) by mouth 3 (three) times a day as needed for muscle spasms for up to 14 days., Disp: 30 tablet, Rfl: 0  •  doxycycline hyclate (VIBRAMYCIN) 100 mg capsule, Take 100 mg by mouth as needed., Disp: , Rfl:   •  flecainide (TAMBOCOR) 50 mg tablet, Take 1 tablet (50 mg total) by mouth 2 (two) times a day., Disp: 60 tablet, Rfl: 0  •  fluticasone propionate (FLONASE) 50 mcg/actuation nasal spray, Administer 2 sprays into each nostril daily as needed for rhinitis., Disp: 1 Bottle, Rfl: 0  •  levothyroxine (SYNTHROID) 88 mcg tablet, Take 1 tablet (88 mcg total) by mouth daily., Disp: 90 tablet, Rfl: 3  •  loratadine (CLARITIN) 10 mg tablet, Take 1 tablet (10 mg total) by mouth daily., Disp: 30 tablet, Rfl: 0  •  metoprolol succinate XL (TOPROL-XL) 25 mg 24 hr tablet, Take 1 tablet (25 mg total) by mouth daily., Disp: 90 tablet, Rfl: 0  •  multivitamin (THERAGRAN) tablet, Take 1 tablet by mouth daily., Disp: , Rfl:   •  omeprazole (PriLOSEC) 20 mg capsule, Take 1 capsule (20 mg total) by mouth daily before breakfast., Disp: 30 capsule, Rfl: 0      BP Readings from Last 3 Encounters:   05/27/20 118/71   04/30/20 118/71   02/17/20 138/78       Recent Lab results:  Lab Results   Component Value Date    CHOL 215 (H) 12/04/2020   ,   Lab Results   Component Value Date    HDL 59 12/04/2020   ,   Lab Results    Component Value Date    LDLCALC 145 (H) 12/04/2020   ,   Lab Results   Component Value Date    TRIG 53 12/04/2020        Lab Results   Component Value Date    GLUCOSE 100 (H) 12/04/2020   , No results found for: HGBA1C      Lab Results   Component Value Date    CREATININE 0.8 12/04/2020       Lab Results   Component Value Date    TSH 1.23 12/04/2020

## 2020-12-08 ENCOUNTER — OFFICE VISIT (OUTPATIENT)
Dept: PRIMARY CARE | Facility: CLINIC | Age: 60
End: 2020-12-08
Payer: COMMERCIAL

## 2020-12-08 VITALS
HEIGHT: 67 IN | TEMPERATURE: 97.7 F | WEIGHT: 156 LBS | DIASTOLIC BLOOD PRESSURE: 70 MMHG | RESPIRATION RATE: 18 BRPM | BODY MASS INDEX: 24.48 KG/M2 | OXYGEN SATURATION: 96 % | HEART RATE: 81 BPM | SYSTOLIC BLOOD PRESSURE: 140 MMHG

## 2020-12-08 DIAGNOSIS — M54.2 NECK PAIN ON LEFT SIDE: ICD-10-CM

## 2020-12-08 DIAGNOSIS — M54.6 CHRONIC BILATERAL THORACIC BACK PAIN: Primary | ICD-10-CM

## 2020-12-08 DIAGNOSIS — E03.9 ACQUIRED HYPOTHYROIDISM: ICD-10-CM

## 2020-12-08 DIAGNOSIS — G89.29 CHRONIC BILATERAL THORACIC BACK PAIN: Primary | ICD-10-CM

## 2020-12-08 DIAGNOSIS — I48.0 PAROXYSMAL ATRIAL FIBRILLATION (CMS/HCC): ICD-10-CM

## 2020-12-08 PROBLEM — I89.0 LYMPHEDEMA: Status: ACTIVE | Noted: 2020-12-08

## 2020-12-08 PROBLEM — M76.60 ACHILLES BURSITIS: Status: ACTIVE | Noted: 2020-12-08

## 2020-12-08 PROBLEM — M12.812 ROTATOR CUFF ARTHROPATHY OF LEFT SHOULDER: Status: ACTIVE | Noted: 2020-12-08

## 2020-12-08 PROBLEM — M46.1 SACROILIITIS, NOT ELSEWHERE CLASSIFIED (CMS/HCC): Status: ACTIVE | Noted: 2020-12-08

## 2020-12-08 PROBLEM — M54.12 CERVICAL RADICULOPATHY: Status: ACTIVE | Noted: 2020-12-08

## 2020-12-08 PROBLEM — M72.2 PLANTAR FASCIITIS OF RIGHT FOOT: Status: ACTIVE | Noted: 2020-12-08

## 2020-12-08 PROBLEM — M46.1 SACROILIITIS, NOT ELSEWHERE CLASSIFIED (CMS/HCC): Status: RESOLVED | Noted: 2020-12-08 | Resolved: 2020-12-08

## 2020-12-08 PROCEDURE — 99214 OFFICE O/P EST MOD 30 MIN: CPT | Performed by: INTERNAL MEDICINE

## 2020-12-08 RX ORDER — FLECAINIDE ACETATE 50 MG/1
50 TABLET ORAL
Qty: 60 TABLET | Refills: 0 | Status: SHIPPED | OUTPATIENT
Start: 2020-12-08 | End: 2021-09-24 | Stop reason: SDUPTHER

## 2020-12-08 RX ORDER — LEVOTHYROXINE SODIUM 88 UG/1
88 TABLET ORAL
Qty: 90 TABLET | Refills: 3 | Status: SHIPPED | OUTPATIENT
Start: 2020-12-08 | End: 2022-12-19

## 2020-12-08 ASSESSMENT — PATIENT HEALTH QUESTIONNAIRE - PHQ9: SUM OF ALL RESPONSES TO PHQ9 QUESTIONS 1 & 2: 0

## 2020-12-08 ASSESSMENT — ENCOUNTER SYMPTOMS
BACK PAIN: 1
HEADACHES: 0
ABDOMINAL PAIN: 0
CONSTIPATION: 0
NUMBNESS: 0
DIZZINESS: 0
SHORTNESS OF BREATH: 0
NAUSEA: 0
SORE THROAT: 0
DIARRHEA: 0
CHILLS: 0
LIGHT-HEADEDNESS: 0
DIFFICULTY URINATING: 0
FATIGUE: 0
FEVER: 0
WEAKNESS: 0
VOMITING: 0
NECK PAIN: 0

## 2020-12-08 NOTE — PROGRESS NOTES
"  Subjective     Patient ID: Alecia Cruz is a 60 y.o. female.    Upper middle back pain.   Sharp. Tear-like.  Rads down left arm. \"just pain\". No N/T/Weakness.   First episode was late summer. Thinks she may have picked something up.   Happening more lately.   \"Could be stress\".   Rx: alleve. Little relief.   Comes and goes away.   Lasts minutes \"at the most\" and it goes away on it's own.       \"Nodule\" inferior to left angle of jaw. Painful to touch.   For 6 months.   Unchanged  Only has pain when she touches it.   Rx: none. \"Because it only hurts when I touch it\".       Review of Systems   Constitutional: Negative for chills, fatigue and fever.   HENT: Negative for sore throat.    Respiratory: Negative for shortness of breath.    Cardiovascular: Negative for chest pain.   Gastrointestinal: Negative for abdominal pain, constipation, diarrhea, nausea and vomiting.   Genitourinary: Negative for difficulty urinating.   Musculoskeletal: Positive for back pain. Negative for neck pain.   Skin: Negative for rash.   Neurological: Negative for dizziness, weakness, light-headedness, numbness and headaches.       Objective     Vitals:    12/08/20 0934   BP: 140/70   BP Location: Right upper arm   Patient Position: Sitting   Pulse: 81   Resp: 18   Temp: 36.5 °C (97.7 °F)   SpO2: 96%   Weight: 70.8 kg (156 lb)   Height: 1.702 m (5' 7\")     Body mass index is 24.43 kg/m².    Physical Exam  Constitutional:       General: She is not in acute distress.     Appearance: She is well-developed. She is not diaphoretic.   Neck:      Musculoskeletal: Normal range of motion and neck supple.   Cardiovascular:      Rate and Rhythm: Normal rate and regular rhythm.      Heart sounds: Normal heart sounds. No murmur.   Pulmonary:      Effort: Pulmonary effort is normal. No respiratory distress.      Breath sounds: Normal breath sounds. No wheezing or rales.   Abdominal:      General: Bowel sounds are normal. There is no distension.      " Palpations: Abdomen is soft.      Tenderness: There is no abdominal tenderness.   Musculoskeletal: Normal range of motion.         General: No tenderness.   Skin:     General: Skin is warm and dry.      Findings: No rash.   Neurological:      Mental Status: She is alert and oriented to person, place, and time.      Sensory: No sensory deficit.      Motor: No abnormal muscle tone.      Deep Tendon Reflexes: Reflexes normal.         Assessment/Plan   Diagnoses and all orders for this visit:    Chronic bilateral thoracic back pain (Primary)  Assessment & Plan:  Likely MSK.  Exam wnl.   Tylenol/NSAIDs prn.  Warmth.  Rest.  Careful with certain exercises. Discussed with patient.  Call/RTO prn.      Neck pain on left side  Assessment & Plan:  Exam u/r. Pt concerned b/c h/o breast cancer.   Check CT neck.   Can follow up with breast specialist. Pt understands.     Orders:  -     CT SOFT TISSUE NECK WITH IV CONTRAST; Future    Paroxysmal atrial fibrillation (CMS/HCC)  Assessment & Plan:  Ran out of flecainide. Needs refill. Gets infrequent sx.       Acquired hypothyroidism  Assessment & Plan:  Stable.  Continue current medications.  Labs reviewed with patient.      Other orders  -     flecainide (TAMBOCOR) 50 mg tablet; Take 1 tablet (50 mg total) by mouth 2 (two) times a day.  -     levothyroxine (SYNTHROID) 88 mcg tablet; Take 1 tablet (88 mcg total) by mouth daily.

## 2020-12-08 NOTE — ASSESSMENT & PLAN NOTE
Likely MSK.  Exam wnl.   Tylenol/NSAIDs prn.  Warmth.  Rest.  Careful with certain exercises. Discussed with patient.  Call/RTO prn.

## 2020-12-15 NOTE — TELEPHONE ENCOUNTER
This patient has an upcoming appointment with your office and would like to have their lab work completed prior to the visit.      Patient is requesting thyroid test also be included in her lab slip.       Patient Preferred Laboratory: [facility]    Patient Primary Insurance in Epic:  N/A         or [place] Mail?: e-script  
----- Message from Asher Lee MD sent at 12/7/2020 12:08 PM EST -----  Labs OK.  TSH wnl  LDL a little high. Diet and exercise.  
Call to [facility] ([phone]). Spoke with [facility] nurse care rep    CT Neck approved   Auth # [id number]  12/15/2020-6/13/2021  To be done at [facility]     Please call pt and relay above msg. Thank you         
Pt requesting prior auth for CT Scan    Appt 12/18    
Spoke to pt, discussed pt lab results.  
details…

## 2020-12-28 ENCOUNTER — HOSPITAL ENCOUNTER (OUTPATIENT)
Dept: RADIOLOGY | Facility: HOSPITAL | Age: 60
Discharge: HOME | End: 2020-12-28
Attending: INTERNAL MEDICINE
Payer: COMMERCIAL

## 2020-12-28 DIAGNOSIS — M54.2 NECK PAIN ON LEFT SIDE: ICD-10-CM

## 2020-12-28 PROCEDURE — 63600105 HC IODINE BASED CONTRAST: Mod: JW | Performed by: INTERNAL MEDICINE

## 2020-12-28 PROCEDURE — 70491 CT SOFT TISSUE NECK W/DYE: CPT | Mod: ME

## 2020-12-28 RX ADMIN — IOHEXOL 80 ML: 350 INJECTION, SOLUTION INTRAVENOUS at 15:15

## 2020-12-29 ENCOUNTER — EVALUATION (OUTPATIENT)
Dept: PHYSICAL THERAPY | Facility: CLINIC | Age: 60
End: 2020-12-29
Payer: COMMERCIAL

## 2020-12-29 ENCOUNTER — TRANSCRIBE ORDERS (OUTPATIENT)
Dept: PHYSICAL THERAPY | Facility: CLINIC | Age: 60
End: 2020-12-29

## 2020-12-29 DIAGNOSIS — M54.16 LUMBAR RADICULOPATHY, RIGHT: Primary | ICD-10-CM

## 2020-12-29 DIAGNOSIS — M54.16 RADICULOPATHY, LUMBAR REGION: Primary | ICD-10-CM

## 2020-12-29 DIAGNOSIS — M54.6 CHRONIC MIDLINE THORACIC BACK PAIN: ICD-10-CM

## 2020-12-29 DIAGNOSIS — G89.29 CHRONIC MIDLINE THORACIC BACK PAIN: ICD-10-CM

## 2020-12-29 DIAGNOSIS — M54.12 LEFT CERVICAL RADICULOPATHY: ICD-10-CM

## 2020-12-29 PROCEDURE — 97140 MANUAL THERAPY 1/> REGIONS: CPT | Performed by: PHYSICAL THERAPIST

## 2020-12-29 PROCEDURE — 97535 SELF CARE MNGMENT TRAINING: CPT | Performed by: PHYSICAL THERAPIST

## 2020-12-29 PROCEDURE — 97110 THERAPEUTIC EXERCISES: CPT | Performed by: PHYSICAL THERAPIST

## 2020-12-29 PROCEDURE — 97161 PT EVAL LOW COMPLEX 20 MIN: CPT | Performed by: PHYSICAL THERAPIST

## 2020-12-29 NOTE — RESULT ENCOUNTER NOTE
IMPRESSION:  1.  No evidence of neck mass or adenopathy by size criteria.  2.  Stable 9 mm left apical lung nodule.    Will follow clinically. No new recommendations at this time.

## 2021-01-18 ENCOUNTER — OFFICE VISIT (OUTPATIENT)
Dept: URGENT CARE | Facility: CLINIC | Age: 61
End: 2021-01-18
Payer: COMMERCIAL

## 2021-01-18 ENCOUNTER — NURSE TRIAGE (OUTPATIENT)
Dept: OTHER | Facility: OTHER | Age: 61
End: 2021-01-18

## 2021-01-18 VITALS
RESPIRATION RATE: 20 BRPM | BODY MASS INDEX: 23.54 KG/M2 | HEIGHT: 67 IN | OXYGEN SATURATION: 98 % | TEMPERATURE: 97.8 F | HEART RATE: 84 BPM | WEIGHT: 150 LBS

## 2021-01-18 DIAGNOSIS — R50.9 FEVER, UNSPECIFIED FEVER CAUSE: Primary | ICD-10-CM

## 2021-01-18 DIAGNOSIS — Z20.822 EXPOSURE TO COVID-19 VIRUS: ICD-10-CM

## 2021-01-18 DIAGNOSIS — I48.91 ATRIAL FIBRILLATION, UNSPECIFIED TYPE (HCC): ICD-10-CM

## 2021-01-18 DIAGNOSIS — R43.0 LOSS OF SMELL: ICD-10-CM

## 2021-01-18 PROCEDURE — 99212 OFFICE O/P EST SF 10 MIN: CPT | Performed by: PHYSICIAN ASSISTANT

## 2021-01-18 PROCEDURE — U0003 INFECTIOUS AGENT DETECTION BY NUCLEIC ACID (DNA OR RNA); SEVERE ACUTE RESPIRATORY SYNDROME CORONAVIRUS 2 (SARS-COV-2) (CORONAVIRUS DISEASE [COVID-19]), AMPLIFIED PROBE TECHNIQUE, MAKING USE OF HIGH THROUGHPUT TECHNOLOGIES AS DESCRIBED BY CMS-2020-01-R: HCPCS | Performed by: PHYSICIAN ASSISTANT

## 2021-01-18 PROCEDURE — U0005 INFEC AGEN DETEC AMPLI PROBE: HCPCS | Performed by: PHYSICIAN ASSISTANT

## 2021-01-18 PROCEDURE — S9083 URGENT CARE CENTER GLOBAL: HCPCS | Performed by: PHYSICIAN ASSISTANT

## 2021-01-18 RX ORDER — FLECAINIDE ACETATE 50 MG/1
TABLET ORAL
COMMUNITY
End: 2021-01-18 | Stop reason: SDUPTHER

## 2021-01-18 RX ORDER — FLECAINIDE ACETATE 50 MG/1
50 TABLET ORAL 2 TIMES DAILY
Qty: 60 TABLET | Refills: 0 | Status: SHIPPED | OUTPATIENT
Start: 2021-01-18 | End: 2021-06-15 | Stop reason: SDUPTHER

## 2021-01-18 NOTE — TELEPHONE ENCOUNTER
Regarding: COVID missed call from nurses  ----- Message from [facility] sent at 1/18/2021 10:14 AM EST -----  Missed call from nurses

## 2021-01-18 NOTE — TELEPHONE ENCOUNTER
Regarding: COVID exposure, symptomatic 2 of 2  ----- Message from [facility] sent at 1/18/2021  9:21 AM EST -----  Sore throat, body aches, can't smell anything  Exposed to people that live with her that were postiive

## 2021-01-18 NOTE — TELEPHONE ENCOUNTER
Reason for Disposition   MILD difficulty breathing (e g , minimal/no SOB at rest, SOB with walking, pulse <100)    Additional Information   [1] Symptoms of COVID-19 (e g , cough, fever, SOB, or others) AND [2] within 14 days of EXPOSURE (close contact) with diagnosed or suspected COVID-19 patient    Answer Assessment - Initial Assessment Questions  1  COVID-19 CLOSE CONTACT: "Who is the person with the confirmed or suspected COVID-19 infection that you were exposed to?"      Grandson and son  2  PLACE of CONTACT: "Where were you when you were exposed to COVID-19?" (e g , home, school, medical waiting room; which city?)      home  3  TYPE of CONTACT: "How much contact was there?" (e g , sitting next to, live in same house, work in same office, same building)      Living at home with them  4  DURATION of CONTACT: "How long were you in contact with the COVID-19 patient?" (e g , a few seconds, passed by person, a few minutes, 15 minutes or longer, live with the patient)      Living with them  5  MASK: "Were you wearing a mask?" "Was the other person wearing a mask?" Note: wearing a mask reduces the risk of an otherwise close contact  yes  6  DATE of CONTACT: "When did you have contact with a COVID-19 patient?" (e g , how many days ago)      Current daily contact  7  COMMUNITY SPREAD: "Are there lots of cases of COVID-19 (community spread) where you live?" (See public health department website, if unsure)        widespread  8  SYMPTOMS: "Do you have any symptoms?" (e g , fever, cough, breathing difficulty, loss of taste or smell)      Loss of taste and smell, backache, fatigue, cough, feeling feverish 99 3 temp, SOB when up and moving  10  HIGH RISK: "Do you have any heart or lung problems? Do you have a weak immune system?" (e g , heart failure, COPD, asthma, HIV positive, chemotherapy, renal failure, diabetes mellitus, sickle cell anemia, obesity)        Afib, lymphedema, renal concerns, cancer survivor  11  TRAVEL: "Have you traveled out of the country recently?" If so, "When and where?" Also ask about out-of-state travel, since the CDC has identified some high-risk cities for community spread in the [address]  Note: Travel becomes less relevant if there is widespread community transmission where the patient lives  denies    Protocols used: CORONAVIRUS (COVID-19)  DIAGNOSED OR SUSPECTED-ADULT-[place], CORONAVIRUS (JUELD-33 ) EXPOSURE-ADULT-[place]      Patient spoke to nurse with daughter in law also on the line  Patient confirmed nurse could continue with daughter in law on the line  Patient complaining of SOB more than usual  She has afib as well  Advised that she needs to be seen by a physician  Encouraged urgent care for her SOB  Patient verbalized understanding stated she would go to urgent care and call once arrives for check in

## 2021-01-18 NOTE — PATIENT INSTRUCTIONS

## 2021-01-18 NOTE — PROGRESS NOTES
[address]        NAME: Kisha Garibay is a 56 y o  female  : 1960    MRN: [id number]  DATE: 2021  TIME: 1:33 PM    Assessment and Plan   Fever, unspecified fever cause [R50 9]  1  Fever, unspecified fever cause  Novel Coronavirus (Covid-19),PCR [facility] - Collected at [facility]    Novel Coronavirus (Covid-19),PCR [facility] - Collected at [facility] 8   2  Loss of smell  Novel Coronavirus (Covid-19),PCR [facility] - Collected at [facility]    Novel Coronavirus (Covid-19),PCR [facility] - Collected at [facility] 8   3  Exposure to COVID-19 virus  Novel Coronavirus (Covid-19),PCR [facility] - Collected at [facility]    Novel Coronavirus (Covid-19),PCR [facility] - Collected at [facility] 8   4  Atrial fibrillation, unspecified type (HCC)  flecainide (TAMBOCOR) 50 mg tablet         Patient Instructions       Call and set up PCP appt  Proceed to  ER if symptoms worsen  Chief Complaint     Chief Complaint   Patient presents with    COVID-19     family members  positive for covid     Fever     started yesterday    loss of smell for one week         History of Present Illness       Several family members tested positive for COVID-19 earlier this month  Patient tested negative however now she is having symptoms such as loss of smell low-grade fevers slight cough shortness of breath diarrhea body aches and headaches  Patient recently moved here from [place] and is in need of a refill on her flecainide which treats her atrial fibrillation  She is in the process of finding a PCP, cardiologist and oncologist       Review of Systems   Review of Systems   Constitutional: Positive for chills, fatigue and fever  Respiratory: Positive for cough and shortness of breath  Gastrointestinal: Positive for diarrhea  Musculoskeletal: Positive for myalgias  Neurological: Positive for headaches           Current Medications       Current Outpatient Medications:     flecainide (TAMBOCOR) 50 mg tablet, Take 1 tablet (50 mg total) by mouth 2 (two) times a day, Disp: 60 tablet, Rfl: 0    Levothyroxine Sodium 88 MCG/ML SOLN, , Disp: , Rfl:     Current Allergies     Allergies as of 01/18/2021 - Reviewed 01/18/2021   Allergen Reaction Noted    Ciprofloxacin Tachycardia 01/18/2021            The following portions of the patient's history were reviewed and updated as appropriate: allergies, current medications, past family history, past medical history, past social history, past surgical history and problem list      Past Medical History:   Diagnosis Date    [facility]     Cancer ([facility] )     Disease of thyroid gland        No past surgical history on file  No family history on file  Medications have been verified  Objective   Pulse 84   Temp 97 8 °F (36 6 °C)   Resp 20   Ht 5' 7" (1 702 m)   Wt 68 kg (150 lb)   SpO2 98%   BMI 23 49 kg/m²   No LMP recorded  Physical Exam     Physical Exam  Vitals signs and nursing note reviewed  Constitutional:       Appearance: Normal appearance  HENT:      Head: Normocephalic and atraumatic  Cardiovascular:      Rate and Rhythm: Normal rate and regular rhythm  Heart sounds: Normal heart sounds  Pulmonary:      Effort: Pulmonary effort is normal    Skin:     General: Skin is warm  Neurological:      Mental Status: She is alert

## 2021-01-20 ENCOUNTER — TELEMEDICINE (OUTPATIENT)
Dept: FAMILY MEDICINE CLINIC | Facility: CLINIC | Age: 61
End: 2021-01-20
Payer: COMMERCIAL

## 2021-01-20 VITALS — HEIGHT: 67 IN | WEIGHT: 150 LBS | BODY MASS INDEX: 23.54 KG/M2 | TEMPERATURE: 99.3 F

## 2021-01-20 DIAGNOSIS — U07.1 COVID-19: Primary | ICD-10-CM

## 2021-01-20 LAB — SARS-COV-2 N GENE RESP QL NAA+PROBE: POSITIVE

## 2021-01-20 PROCEDURE — 99214 OFFICE O/P EST MOD 30 MIN: CPT | Performed by: PHYSICIAN ASSISTANT

## 2021-01-20 RX ORDER — ASPIRIN 81 MG/1
81 TABLET ORAL DAILY
COMMUNITY

## 2021-01-20 RX ORDER — OMEPRAZOLE 20 MG/1
CAPSULE, DELAYED RELEASE ORAL
COMMUNITY

## 2021-01-20 RX ORDER — DIPHENOXYLATE HYDROCHLORIDE AND ATROPINE SULFATE 2.5; .025 MG/1; MG/1
1 TABLET ORAL DAILY
COMMUNITY

## 2021-01-20 RX ORDER — CYCLOBENZAPRINE HCL 5 MG
5 TABLET ORAL 3 TIMES DAILY PRN
COMMUNITY
Start: 2020-11-18

## 2021-01-20 RX ORDER — CLINDAMYCIN HYDROCHLORIDE 150 MG/1
150 CAPSULE ORAL
COMMUNITY
End: 2021-01-22 | Stop reason: ALTCHOICE

## 2021-01-20 RX ORDER — PSYLLIUM HUSK 0.4 G
4000 CAPSULE ORAL DAILY
COMMUNITY

## 2021-01-20 RX ORDER — LORATADINE 10 MG/1
10 TABLET ORAL
COMMUNITY

## 2021-01-20 RX ORDER — COVID-19 ANTIGEN TEST
KIT MISCELLANEOUS
COMMUNITY

## 2021-01-20 RX ORDER — DOXYCYCLINE HYCLATE 100 MG/1
100 CAPSULE ORAL
COMMUNITY
End: 2021-03-05 | Stop reason: ALTCHOICE

## 2021-01-20 RX ORDER — METOPROLOL SUCCINATE 50 MG/1
50 TABLET, EXTENDED RELEASE ORAL DAILY
COMMUNITY
Start: 2020-12-09 | End: 2021-05-13

## 2021-01-20 RX ORDER — BETAMETHASONE DIPROPIONATE 0.5 MG/G
LOTION TOPICAL
COMMUNITY

## 2021-01-20 RX ORDER — ZOLPIDEM TARTRATE 5 MG/1
5 TABLET ORAL
COMMUNITY

## 2021-01-20 NOTE — PROGRESS NOTES
COVID-19 Virtual Visit     Assessment/Plan:    Problem List Items Addressed This Visit     None      Visit Diagnoses     COVID-19    -  Primary         Disposition:     I recommended continued isolation until at least 24 hours have passed since recovery defined as resolution of fever without the use of fever-reducing medications AND improvement in COVID symptoms AND 10 days have passed since onset of symptoms (or 10 days have passed since date of first positive viral diagnostic test for asymptomatic patients)  Patient is stable  I recommended that she continue symptomatic care  She should push fluids and let us know of any new or worsening symptoms  I explained if she has any difficulty breathing, she should go to the ER to be evaluated  I explained that after she is finished isolation for COVID, she should come in to our office to have an in-office establish care visit so that we can get all proper referrals placed and review her chronic medical conditions in detail  She was agreeable  I will follow-up with her again on Friday  I have spent 15 minutes directly with the patient  Encounter provider Sergio Wilhelm PA-C    Provider located at [address] [id number]    Recent Visits  No visits were found meeting these conditions  Showing recent visits within past 7 days and meeting all other requirements     Today's Visits  Date Type Provider Dept   01/20/21 Telemedicine [facility]   Showing today's visits and meeting all other requirements     Future Appointments  No visits were found meeting these conditions  Showing future appointments within next 150 days and meeting all other requirements      This virtual check-in was done via [employer] and patient was informed that this is not a secure, HIPAA-compliant platform  She agrees to proceed      Patient agrees to participate in a virtual check in via telephone or video visit instead of presenting to the office to address urgent/immediate medical needs  Patient is aware this is a billable service  After connecting through [facility], the patient was identified by name and date of birth  Marcelo Diop was informed that this was a telemedicine visit and that the exam was being conducted confidentially over secure lines  My office door was closed  No one else was in the room  Marcelo Diop acknowledged consent and understanding of privacy and security of the telemedicine visit  I informed the patient that I have reviewed her record in Epic and presented the opportunity for her to ask any questions regarding the visit today  The patient agreed to participate  Subjective:   Marcelo Diop is a 56 y o  female who has been screened for COVID-19  Patient's symptoms include fever, chills, fatigue, nasal congestion, rhinorrhea, anosmia, loss of taste, cough, shortness of breath, chest tightness, myalgias and headache  Patient denies sore throat, abdominal pain, nausea, vomiting and diarrhea  Luis Nielsen has been staying home and has isolated themselves in her home  She is taking care to not share personal items and is cleaning all surfaces that are touched often, like counters, tabletops, and doorknobs using household cleaning sprays or wipes  She is wearing a mask when she leaves her room  Date of positive COVID-19 PCR: 1/18/2021    Luis Nielsen is a 56year old female who is here today to get established after testing positive for COVID  She has not been feeling well since before [place]  Her two grandchildren that live with her tested positive on 1/2/21  She tested positive on 1/18/21  Over the past 3 days she did have a fever  Yesterday, her temperature peaked at 101 0  Her temperature has returned to normal today   She admits to chills, body aches, runny nose, nasal congestion, shortness of breath, chest tightness, headache, loss of taste, loss of smell, and extreme fatigue  She has been taking Aleve and Tylenol as needed for the headaches and body aches  She is a breast cancer survivor  She suffers from lymphedema  She also has a history of atrial fibrillation  She just moved here and is looking to get established with all of the correct specialists  Lab Results   Component Value Date    SARSCOV2 Positive (A) 01/18/2021    [address] NOT DETECTED 01/06/2021     Past Medical History:   Diagnosis Date    [facility]     [facility]     Disease of thyroid gland      Past Surgical History:   Procedure Laterality Date    BREAST SURGERY      LYMPH NODE DISSECTION      MANDIBLE FRACTURE SURGERY      PARTIAL HYSTERECTOMY      ROTATOR CUFF REPAIR       Current Outpatient Medications   Medication Sig Dispense Refill    Alpha-Lipoic Acid (LIPOIC ACID PO) Take by mouth      Ascorbic Acid (vitamin C) 500 MG/5ML syrup Take 500 mg by mouth daily      aspirin (ECOTRIN LOW STRENGTH) 81 mg EC tablet Take 81 mg by mouth daily      ASTAXANTHIN PO Take by mouth      betamethasone dipropionate 0 05 % lotion       Cholecalciferol (Vitamin D-[address]) 25 MCG (1000 UT) tablet Take 4,000 Units by mouth daily      clindamycin (CLEOCIN) 150 mg capsule Take 150 mg by mouth      Coenzyme Q10 (CO Q 10 PO) Take by mouth      cyclobenzaprine (FLEXERIL) 5 mg tablet Take 5 mg by mouth Three times daily as needed      doxycycline hyclate (VIBRAMYCIN) 100 mg capsule Take 100 mg by mouth      flecainide (TAMBOCOR) 50 mg tablet Take 1 tablet (50 mg total) by mouth 2 (two) times a day 60 tablet 0    fluticasone (FLOVENT DISKUS) 50 MCG/BLIST diskus inhaler daily as needed        Levothyroxine Sodium 88 MCG/ML SOLN       loratadine (CLARITIN) 10 mg tablet Take 10 mg by mouth      metoprolol succinate (TOPROL-XL) 50 mg 24 hr tablet Take 50 mg by mouth daily As needed if bp is over 135      multivitamin (THERAGRAN) TABS Take 1 tablet by mouth daily      Naproxen Sodium 220 MG CAPS Take by mouth      zolpidem (AMBIEN) 5 mg tablet Take 5 mg by mouth      omeprazole (PriLOSEC) 20 mg delayed release capsule daily as needed  No current facility-administered medications for this visit  Allergies   Allergen Reactions    Penicillin G Other (See Comments)    Trazodone Headache     Headache and blurred vision    Amoxicillin Palpitations     AUGMENTIN  AUGMENTIN      Cephalexin Palpitations    Ciprofloxacin Tachycardia and Palpitations     Other reaction(s): Other (see comments)    Clavulanic Acid Palpitations     AUGMENTIN  AUGMENTIN      Codeine Other (See Comments), Palpitations and Tachycardia     unspecified  nausea      Sulfa Antibiotics Other (See Comments), Palpitations and Tachycardia       Review of Systems   Constitutional: Positive for chills, fatigue and fever  Negative for diaphoresis  HENT: Positive for congestion and rhinorrhea  Negative for ear pain, postnasal drip, sneezing, sore throat and trouble swallowing  Eyes: Negative for pain and visual disturbance  Respiratory: Positive for cough, chest tightness and shortness of breath  Negative for apnea and wheezing  Cardiovascular: Negative for chest pain and palpitations  Gastrointestinal: Negative for abdominal pain, constipation, diarrhea, nausea and vomiting  Genitourinary: Negative for dysuria and hematuria  Musculoskeletal: Positive for myalgias  Negative for arthralgias and gait problem  Neurological: Positive for headaches  Negative for dizziness, syncope, weakness, light-headedness and numbness  Psychiatric/Behavioral: Negative for suicidal ideas  The patient is not nervous/anxious  Objective:    Vitals:    01/20/21 1135   Temp: 99 3 °F (37 4 °C)   Weight: 68 kg (150 lb)   Height: 5' 7" (1 702 m)       Physical Exam  Vitals signs and nursing note reviewed  Constitutional:       General: She is not in acute distress  Appearance: She is well-developed   She is not ill-appearing, toxic-appearing or diaphoretic  HENT:      Head: Normocephalic and atraumatic  Pulmonary:      Effort: Pulmonary effort is normal  No respiratory distress (Patient is speaking in full, fluent sentences  She does not appear to be in any acute distress  She does display a dry, hacking cough)  Neurological:      Mental Status: She is alert  Psychiatric:         Behavior: Behavior normal  Behavior is cooperative  Thought Content: Thought content normal          Judgment: Judgment normal        VIRTUAL VISIT DISCLAIMER    Shilpa Stevens acknowledges that she has consented to an online visit or consultation  She understands that the online visit is based solely on information provided by her, and that, in the absence of a face-to-face physical evaluation by the physician, the diagnosis she receives is both limited and provisional in terms of accuracy and completeness  This is not intended to replace a full medical face-to-face evaluation by the physician  Shilpa Stevens understands and accepts these terms

## 2021-01-22 ENCOUNTER — TELEMEDICINE (OUTPATIENT)
Dept: FAMILY MEDICINE CLINIC | Facility: CLINIC | Age: 61
End: 2021-01-22
Payer: COMMERCIAL

## 2021-01-22 VITALS — TEMPERATURE: 98.7 F | BODY MASS INDEX: 23.54 KG/M2 | HEIGHT: 67 IN | WEIGHT: 150 LBS

## 2021-01-22 DIAGNOSIS — U07.1 COVID-19: Primary | ICD-10-CM

## 2021-01-22 PROCEDURE — 99213 OFFICE O/P EST LOW 20 MIN: CPT | Performed by: PHYSICIAN ASSISTANT

## 2021-01-22 NOTE — PROGRESS NOTES
COVID-19 Virtual Visit     Assessment/Plan:    Problem List Items Addressed This Visit     None      Visit Diagnoses     COVID-19    -  Primary         Disposition:     I recommended continued isolation until at least 24 hours have passed since recovery defined as resolution of fever without the use of fever-reducing medications AND improvement in COVID symptoms AND 10 days have passed since onset of symptoms (or 10 days have passed since date of first positive viral diagnostic test for asymptomatic patients)  Patient is doing well  I recommended that she continue symptomatic relief  She will notify us of any new or worsening symptoms  I will check in with her again on Monday  I have spent 10 minutes directly with the patient  Encounter provider Constantine Knox PA-C    Provider located at [address] [address]    Recent Visits  Date Type Provider Dept   01/20/21 [facility]   Showing recent visits within past 7 days and meeting all other requirements     Today's Visits  Date Type Provider Dept   01/22/21 Telemedicine SUSAN Mishra [facility]   Showing today's visits and meeting all other requirements     Future Appointments  No visits were found meeting these conditions  Showing future appointments within next 150 days and meeting all other requirements      This virtual check-in was done via [employer] Du[employer] and patient was informed that this is not a secure, HIPAA-compliant platform  She agrees to proceed  Patient agrees to participate in a virtual check in via telephone or video visit instead of presenting to the office to address urgent/immediate medical needs  Patient is aware this is a billable service  After connecting through [facility], the patient was identified by name and date of birth   Diasharath Livia was informed that this was a telemedicine visit and that the exam was being conducted confidentially over secure lines  My office door was closed  No one else was in the room  Jessica Bowden acknowledged consent and understanding of privacy and security of the telemedicine visit  I informed the patient that I have reviewed her record in Epic and presented the opportunity for her to ask any questions regarding the visit today  The patient agreed to participate  Subjective:   Jessica Bowden is a 56 y o  female who has been screened for COVID-19  Symptom change since last report: improving  Patient's symptoms include fever, fatigue, nasal congestion, anosmia, loss of taste, cough, shortness of breath and chest tightness  Patient denies chills, rhinorrhea, sore throat, abdominal pain, nausea, vomiting, diarrhea, myalgias and headaches  Ronnie Damon has been staying home and has isolated themselves in her home  She is taking care to not share personal items and is cleaning all surfaces that are touched often, like counters, tabletops, and doorknobs using household cleaning sprays or wipes  She is wearing a mask when she leaves her room  Date of positive COVID-19 PCR: 1/18/2021    Ronnie Damon is here today for a follow-up for COVID  She admits that she is starting to feel a little better  She does get winded if she tries doing too much  She admits to a cough, but it seems to be improving  The highest her temperature was 99 last night  She has been taking tylenol and aleve as needed  The headache and body aches have improved  She denies any rhinorrhea, sore throat, diarrhea, nausea, vomiting, or abdominal pain  Her taste and smell have not returned to normal, but she is starting to be able to taste and smell certain things       Lab Results   Component Value Date    SARSCOV2 Positive (A) 01/18/2021    [address] [facility] NOT DETECTED 01/06/2021     Past Medical History:   Diagnosis Date    [facility] [address]     Cancer [facility]     Disease of thyroid gland      Past Surgical History: Procedure Laterality Date    BREAST SURGERY      LYMPH NODE DISSECTION      MANDIBLE FRACTURE SURGERY      PARTIAL HYSTERECTOMY      ROTATOR CUFF REPAIR       Current Outpatient Medications   Medication Sig Dispense Refill    Alpha-Lipoic Acid (LIPOIC ACID PO) Take by mouth      Ascorbic Acid (vitamin C) 500 MG/5ML syrup Take 500 mg by mouth daily      aspirin (ECOTRIN LOW STRENGTH) 81 mg EC tablet Take 81 mg by mouth daily      ASTAXANTHIN PO Take by mouth      betamethasone dipropionate 0 05 % lotion       Cholecalciferol (Vitamin D-1000 Max St) 25 MCG (1000 UT) tablet Take 4,000 Units by mouth daily      Coenzyme Q10 (CO Q 10 PO) Take by mouth      cyclobenzaprine (FLEXERIL) 5 mg tablet Take 5 mg by mouth Three times daily as needed      flecainide (TAMBOCOR) 50 mg tablet Take 1 tablet (50 mg total) by mouth 2 (two) times a day 60 tablet 0    fluticasone (FLOVENT DISKUS) 50 MCG/BLIST diskus inhaler daily as needed   Levothyroxine Sodium 88 MCG/ML SOLN       loratadine (CLARITIN) 10 mg tablet Take 10 mg by mouth      metoprolol succinate (TOPROL-XL) 50 mg 24 hr tablet Take 50 mg by mouth daily As needed if bp is over 135      multivitamin (THERAGRAN) TABS Take 1 tablet by mouth daily      Naproxen Sodium 220 MG CAPS Take by mouth      omeprazole (PriLOSEC) 20 mg delayed release capsule daily as needed   zolpidem (AMBIEN) 5 mg tablet Take 5 mg by mouth      doxycycline hyclate (VIBRAMYCIN) 100 mg capsule Take 100 mg by mouth       No current facility-administered medications for this visit  Allergies   Allergen Reactions    Penicillin G Other (See Comments)    Trazodone Headache     Headache and blurred vision    Amoxicillin Palpitations     AUGMENTIN  AUGMENTIN      Cephalexin Palpitations    Ciprofloxacin Tachycardia and Palpitations     Other reaction(s):  Other (see comments)    Clavulanic Acid Palpitations     AUGMENTIN  AUGMENTIN      Codeine Other (See Comments), Palpitations and Tachycardia     unspecified  nausea      Sulfa Antibiotics Other (See Comments), Palpitations and Tachycardia       Review of Systems   Constitutional: Positive for fatigue and fever  Negative for chills and diaphoresis  HENT: Positive for congestion  Negative for ear pain, postnasal drip, rhinorrhea, sneezing, sore throat and trouble swallowing  Eyes: Negative for pain and visual disturbance  Respiratory: Positive for cough, chest tightness and shortness of breath  Negative for apnea and wheezing  Cardiovascular: Negative for chest pain and palpitations  Gastrointestinal: Negative for abdominal pain, constipation, diarrhea, nausea and vomiting  Genitourinary: Negative for dysuria and hematuria  Musculoskeletal: Negative for arthralgias, gait problem and myalgias  Neurological: Negative for dizziness, syncope, weakness, light-headedness and headaches  Psychiatric/Behavioral: Negative for suicidal ideas  The patient is not nervous/anxious  Objective:    Vitals:    01/22/21 0750   Temp: 98 7 °F (37 1 °C)   Weight: 68 kg (150 lb)   Height: 5' 7" (1 702 m)       Physical Exam  Vitals signs and nursing note reviewed  Constitutional:       General: She is not in acute distress  Appearance: She is well-developed  She is not ill-appearing, toxic-appearing or diaphoretic  HENT:      Head: Normocephalic and atraumatic  Pulmonary:      Effort: Pulmonary effort is normal  No respiratory distress (Patient is speaking in full, fluent sentences  She does display a dry cough  She does not appear in any acute respiratory distress)  Neurological:      Mental Status: She is alert  Psychiatric:         Behavior: Behavior normal  Behavior is cooperative  Thought Content: Thought content normal          Judgment: Judgment normal        VIRTUAL VISIT DISCLAIMER    Alberto Lawton acknowledges that she has consented to an online visit or consultation   She understands that the online visit is based solely on information provided by her, and that, in the absence of a face-to-face physical evaluation by the physician, the diagnosis she receives is both limited and provisional in terms of accuracy and completeness  This is not intended to replace a full medical face-to-face evaluation by the physician  Zana Avalos understands and accepts these terms

## 2021-01-24 ENCOUNTER — NURSE TRIAGE (OUTPATIENT)
Dept: OTHER | Facility: OTHER | Age: 61
End: 2021-01-24

## 2021-01-25 ENCOUNTER — TELEMEDICINE (OUTPATIENT)
Dept: FAMILY MEDICINE CLINIC | Facility: CLINIC | Age: 61
End: 2021-01-25
Payer: COMMERCIAL

## 2021-01-25 VITALS — WEIGHT: 150 LBS | TEMPERATURE: 97.6 F | BODY MASS INDEX: 23.54 KG/M2 | HEIGHT: 67 IN

## 2021-01-25 DIAGNOSIS — U07.1 COVID-19: Primary | ICD-10-CM

## 2021-01-25 PROCEDURE — 99213 OFFICE O/P EST LOW 20 MIN: CPT | Performed by: PHYSICIAN ASSISTANT

## 2021-01-25 PROCEDURE — 3725F SCREEN DEPRESSION PERFORMED: CPT | Performed by: PHYSICIAN ASSISTANT

## 2021-01-25 NOTE — PROGRESS NOTES
COVID-19 Virtual Visit     Assessment/Plan:    Problem List Items Addressed This Visit     None      Visit Diagnoses     COVID-19    -  Primary         Disposition:     I recommended continued isolation until at least 24 hours have passed since recovery defined as resolution of fever without the use of fever-reducing medications AND improvement in COVID symptoms AND 10 days have passed since onset of symptoms (or 10 days have passed since date of first positive viral diagnostic test for asymptomatic patients)  Osiris Ferrer is doing significantly better  I recommended that she push fluids to help with her dark urine, constipation, and headaches  She may continue tylenol or aleve as needed  She should continue to isolate  Wednesday, 1/27/21 will be her last day of isolation  I will check up with her then to ensure complete recovery  Suggested trying a heating pad on her neck and back to help with her chronic neck and back pain  She will notify us sooner of any concerns or problems  I have spent 15 minutes directly with the patient  Encounter provider Issac Pizarro PA-C    Provider located at [address] [address]    Recent Visits  Date Type Provider Dept   01/22/21 [facility]   01/20/21 Telemedicine SUSAN Nunez Pg   Showing recent visits within past 7 days and meeting all other requirements     Today's Visits  Date Type Provider Dept   01/25/21 Telemedicine S[facility]AN Nunez [facility]   Showing today's visits and meeting all other requirements     Future Appointments  No visits were found meeting these conditions  Showing future appointments within next 150 days and meeting all other requirements      This virtual check-in was done via [employer] Duo and patient was informed that this is not a secure, HIPAA-compliant platform  She agrees to proceed      Patient agrees to participate in a virtual check in via telephone or video visit instead of presenting to the office to address urgent/immediate medical needs  Patient is aware this is a billable service  After connecting through [facility], the patient was identified by name and date of birth  Jessica Bowden was informed that this was a telemedicine visit and that the exam was being conducted confidentially over secure lines  My office door was closed  No one else was in the room  Jessica Bowden acknowledged consent and understanding of privacy and security of the telemedicine visit  I informed the patient that I have reviewed her record in Epic and presented the opportunity for her to ask any questions regarding the visit today  The patient agreed to participate  Subjective:   Jessica Bowden is a 56 y o  female who has been screened for COVID-19  Symptom change since last report: improving  Patient's symptoms include chills, fatigue, nasal congestion, loss of taste and headache  Patient denies fever, rhinorrhea, sore throat, anosmia, cough, shortness of breath, chest tightness, abdominal pain, nausea, vomiting, diarrhea and myalgias  Ronnie Damon has been staying home and has isolated themselves in her home  She is taking care to not share personal items and is cleaning all surfaces that are touched often, like counters, tabletops, and doorknobs using household cleaning sprays or wipes  She is wearing a mask when she leaves her room  Date of positive COVID-19 PCR: 1/18/2021    Ronnie Damon is here today for a follow-up for COVID  She admits that she is feeling significantly better  She still has some chills and nasal congestion  Her smell has returned, but her taste is not completely back to normal  She is eating and drinking without difficulty  She did develop a headache yesterday on the right side of her head  She did have some floaters associated with the headache  She also had photophobia and phonophobia   She called the answering service who recommended that she follow-up with us today and take an Aleve  She admits that today she is tired, but the headache has dulled  She is no longer having any visual disturbances  She has a history of migraines, and this felt similar  She used to get floaters prior to her migraines  She admits that she had some dark urine yesterday, but denies any blood or dysuria  She started drinking more fluids, and her urine became clear again  She did have some blood in her stool 2 days ago  She has a history of hemorrhoids  She had a normal bowel movement yesterday  She has been constipated over the past few days  She denies any fevers, shortness of breath, chest tightness, sore throat, diarrhea, nausea, vomiting, abdominal pain, fevers, or generalized body aches  She does have some neck and back pain, but this is her baseline  She has been taking tylenol or aleve as needed       Lab Results   Component Value Date    SARSCOV2 Positive (A) 01/18/2021    [address] [facility] NOT DETECTED 01/06/2021     Past Medical History:   Diagnosis Date    [facility] fibrillation [facility] [address] [facility]     Cancer [facility] [address] [facility]     Disease of thyroid gland      Past Surgical History:   Procedure Laterality Date    BREAST SURGERY      LYMPH NODE DISSECTION      MANDIBLE FRACTURE SURGERY      PARTIAL HYSTERECTOMY      ROTATOR CUFF REPAIR       Current Outpatient Medications   Medication Sig Dispense Refill    Alpha-Lipoic Acid (LIPOIC ACID PO) Take by mouth      Ascorbic Acid (vitamin C) 500 MG/5ML syrup Take 500 mg by mouth daily      aspirin (ECOTRIN LOW STRENGTH) 81 mg EC tablet Take 81 mg by mouth daily      ASTAXANTHIN PO Take by mouth      betamethasone dipropionate 0 05 % lotion       Cholecalciferol (Vitamin D-1000 Max St) 25 MCG (1000 UT) tablet Take 4,000 Units by mouth daily      Coenzyme Q10 (CO Q 10 PO) Take by mouth      cyclobenzaprine (FLEXERIL) 5 mg tablet Take 5 mg by mouth Three times daily as needed      doxycycline hyclate (VIBRAMYCIN) 100 mg capsule Take 100 mg by mouth      flecainide (TAMBOCOR) 50 mg tablet Take 1 tablet (50 mg total) by mouth 2 (two) times a day 60 tablet 0    fluticasone (FLOVENT DISKUS) 50 MCG/BLIST diskus inhaler daily as needed   Levothyroxine Sodium 88 MCG/ML SOLN       loratadine (CLARITIN) 10 mg tablet Take 10 mg by mouth      metoprolol succinate (TOPROL-XL) 50 mg 24 hr tablet Take 50 mg by mouth daily As needed if bp is over 135      multivitamin (THERAGRAN) TABS Take 1 tablet by mouth daily      Naproxen Sodium 220 MG CAPS Take by mouth      omeprazole (PriLOSEC) 20 mg delayed release capsule daily as needed   zolpidem (AMBIEN) 5 mg tablet Take 5 mg by mouth       No current facility-administered medications for this visit  Allergies   Allergen Reactions    Penicillin G Other (See Comments)    Trazodone Headache     Headache and blurred vision    Amoxicillin Palpitations     AUGMENTIN  AUGMENTIN      Cephalexin Palpitations    Ciprofloxacin Tachycardia and Palpitations     Other reaction(s): Other (see comments)    Clavulanic Acid Palpitations     AUGMENTIN  AUGMENTIN      Codeine Other (See Comments), Palpitations and Tachycardia     unspecified  nausea      Sulfa Antibiotics Other (See Comments), Palpitations and Tachycardia       Review of Systems   Constitutional: Positive for chills and fatigue  Negative for diaphoresis and fever  HENT: Positive for congestion  Negative for ear pain, postnasal drip, rhinorrhea, sneezing, sore throat and trouble swallowing  Eyes: Positive for visual disturbance (floaters in right eye- resolved)  Negative for pain  Respiratory: Negative for apnea, cough, chest tightness, shortness of breath and wheezing  Cardiovascular: Negative for chest pain and palpitations  Gastrointestinal: Positive for blood in stool (one episode- resolved)  Negative for abdominal pain, constipation, diarrhea, nausea and vomiting     Genitourinary: Negative for difficulty urinating, dysuria, frequency and hematuria  Musculoskeletal: Positive for back pain (chronic) and neck pain (chronic)  Negative for arthralgias, gait problem and myalgias  Neurological: Positive for headaches  Negative for dizziness, syncope, weakness, light-headedness and numbness  Psychiatric/Behavioral: Negative for suicidal ideas  The patient is not nervous/anxious  Objective:    Vitals:    01/25/21 0755   Temp: 97 6 °F (36 4 °C)   Weight: 68 kg (150 lb)   Height: 5' 7" (1 702 m)       Physical Exam  Vitals signs and nursing note reviewed  Constitutional:       General: She is not in acute distress  Appearance: She is well-developed  She is not ill-appearing, toxic-appearing or diaphoretic  HENT:      Head: Normocephalic and atraumatic  Pulmonary:      Effort: Pulmonary effort is normal  No respiratory distress (Patient is speaking in full, fluent sentences  She does not appear in any acute distress)  Neurological:      Mental Status: She is alert  Psychiatric:         Behavior: Behavior normal  Behavior is cooperative  Thought Content: Thought content normal          Judgment: Judgment normal        VIRTUAL VISIT DISCLAIMER    Zhou Noel acknowledges that she has consented to an online visit or consultation  She understands that the online visit is based solely on information provided by her, and that, in the absence of a face-to-face physical evaluation by the physician, the diagnosis she receives is both limited and provisional in terms of accuracy and completeness  This is not intended to replace a full medical face-to-face evaluation by the physician  Zhou Noel understands and accepts these terms

## 2021-01-25 NOTE — TELEPHONE ENCOUNTER
[id number] y/o - pain above eye/blurry spots  ----- Message from Mik Broussard sent at 2021  7:57 PM EST -----  I tested positive for Covid and I went outside today and now I have pain above my eye and blurry spots

## 2021-01-25 NOTE — TELEPHONE ENCOUNTER
Reason for Disposition   [1] COVID-19 infection suspected by caller or triager AND [2] mild symptoms (cough, fever, or others) AND [9] no complications or SOB   [0] Headache AND [2] part of viral illness AND [3] present < 72 hours    Answer Assessment - Initial Assessment Questions  1  COVID-19 DIAGNOSIS: "Who made your Coronavirus (COVID-19) diagnosis?" "Was it confirmed by a positive lab test?" If not diagnosed by a HCP, ask "Are there lots of cases (community spread) where you live?" (See public health department website, if unsure)      Covid + swab on 1/22    4  WORST SYMPTOM: "What is your worst symptom?" (e g , cough, fever, shortness of breath, muscle aches)      Headache    6  FEVER: "Do you have a fever?" If so, ask: "What is your temperature, how was it measured, and when did it start?"      97 2 (oral)   7  RESPIRATORY STATUS: "Describe your breathing?" (e g , shortness of breath, wheezing, unable to speak)       Unremarkable - able to converse with ease  8  BETTER-SAME-WORSE: "Are you getting better, staying the same or getting worse compared to yesterday?"  If getting worse, ask, "In what way?"      Worse  9  HIGH RISK DISEASE: "Do you have any chronic medical problems?" (e g , asthma, heart or lung disease, weak immune system, obesity, etc )      Denies  11  OTHER SYMPTOMS: "Do you have any other symptoms?"  (e g , chills, fatigue, headache, loss of smell or taste, muscle pain, sore throat; new loss of smell or taste especially support the diagnosis of COVID-19)          Headache causing pain above right eye, floaters present in right eye vision  Pt admits to dehydration, urine is dark yellow in color  Chills earlier today  Hx of migraines when younger      Protocols used: CORONAVIRUS (COVID-19) DIAGNOSED OR SUSPECTED-ADULT-AH, HEADACHE-ADULT-AH

## 2021-01-27 ENCOUNTER — TELEMEDICINE (OUTPATIENT)
Dept: FAMILY MEDICINE CLINIC | Facility: CLINIC | Age: 61
End: 2021-01-27
Payer: COMMERCIAL

## 2021-01-27 VITALS
SYSTOLIC BLOOD PRESSURE: 130 MMHG | HEIGHT: 67 IN | WEIGHT: 150 LBS | DIASTOLIC BLOOD PRESSURE: 81 MMHG | TEMPERATURE: 98.2 F | BODY MASS INDEX: 23.54 KG/M2

## 2021-01-27 DIAGNOSIS — Z12.11 SCREENING FOR COLON CANCER: ICD-10-CM

## 2021-01-27 DIAGNOSIS — Z12.39 ENCOUNTER FOR SCREENING FOR MALIGNANT NEOPLASM OF BREAST, UNSPECIFIED SCREENING MODALITY: Primary | ICD-10-CM

## 2021-01-27 PROCEDURE — 99212 OFFICE O/P EST SF 10 MIN: CPT | Performed by: PHYSICIAN ASSISTANT

## 2021-01-27 PROCEDURE — 1036F TOBACCO NON-USER: CPT | Performed by: PHYSICIAN ASSISTANT

## 2021-01-27 PROCEDURE — 3008F BODY MASS INDEX DOCD: CPT | Performed by: PHYSICIAN ASSISTANT

## 2021-01-27 NOTE — PROGRESS NOTES
COVID-19 Virtual Visit     Assessment/Plan:    Problem List Items Addressed This Visit     None      Visit Diagnoses     Encounter for screening for malignant neoplasm of breast, unspecified screening modality    -  Primary    Relevant Orders    Mammo screening bilateral w 3d & cad    Screening for colon cancer        Relevant Orders    Ambulatory referral to Gastroenterology         Disposition:     I recommended continued isolation until at least 24 hours have passed since recovery defined as resolution of fever without the use of fever-reducing medications AND improvement in COVID symptoms AND 10 days have passed since onset of symptoms (or 10 days have passed since date of first positive viral diagnostic test for asymptomatic patients)  Domonique Morrison is doing well  Recommended that she continue with symptomatic relief  Today is her last day of isolation  With regards to her many chronic conditions, I advised her to schedule an appointment within the next 2-3 weeks  Since she is a new patient, I explained that virtual visits were just for concerns about COVID  She will need an in person appointment to address all other problems  This will allow us to review all of her concerns, get her established with proper specialists in the area, and order any additional studies or tests  She was agreeable and will schedule  I have spent 15 minutes directly with the patient          Encounter provider Steve Tripp PA-C    Provider located at [address] [id number]    Recent Visits  Date Type Provider Dept   01/25/21 Ruby Bolaños PA-C [facility]   01/22/21 Telemedicine Steve Tripp PA-C [facility]   01/20/21 Telemedicine Steve Tripp PA-C [facility]   Showing recent visits within past 7 days and meeting all other requirements     Today's Visits  Date Type Provider Dept   01/27/21 Telemedicine Steve Tripp PA-C [facility] Showing today's visits and meeting all other requirements     Future Appointments  No visits were found meeting these conditions  Showing future appointments within next 150 days and meeting all other requirements      This virtual check-in was done via [employer] and patient was informed that this is not a secure, HIPAA-compliant platform  She agrees to proceed  Patient agrees to participate in a virtual check in via telephone or video visit instead of presenting to the office to address urgent/immediate medical needs  Patient is aware this is a billable service  After connecting through [facility], the patient was identified by name and date of birth  Marcelo Diop was informed that this was a telemedicine visit and that the exam was being conducted confidentially over secure lines  My office door was closed  No one else was in the room  Marcelo Diop acknowledged consent and understanding of privacy and security of the telemedicine visit  I informed the patient that I have reviewed her record in Epic and presented the opportunity for her to ask any questions regarding the visit today  The patient agreed to participate  Subjective:   Marcelo Diop is a 56 y o  female who has been screened for COVID-19  Symptom change since last report: improving  Patient's symptoms include fatigue (improving), nasal congestion, anosmia (improving), loss of taste (improving), cough and headache  Patient denies fever, chills, rhinorrhea, sore throat, shortness of breath, chest tightness, abdominal pain, nausea, vomiting, diarrhea and myalgias  Luis Nielsen has been staying home and has isolated themselves in her home  She is taking care to not share personal items and is cleaning all surfaces that are touched often, like counters, tabletops, and doorknobs using household cleaning sprays or wipes  She is wearing a mask when she leaves her room       Date of positive COVID-19 PCR: 1/18/2021    Luis Nielsen is here today for a follow-up for COVID  She admits that she has had some improvement in her symptoms  Her taste and smell are beginning to return  She still has a slight cough, but denies any chest tightness of shortness of breath  She does have some nasal congestion and headaches  She has a history of migraines and the headaches have been similar to those  She has not taken any OTC medications today  She is still fatigued, but that is also improving  Today is her last day of isolation  She has been eating and drinking without difficulty  Her bowels have returned to normal  She also is concerned because she has a lump under her left jaw  This has been there for a few months  It is tender if she touches it by accident  She denies any change in size or shape  Occasionally it will send a sharp pain into her left ear  She had a CT scan done by her previous doctor in [place], and they never discovered what the cause was       Lab Results   Component Value Date    SARSCOV2 Positive (A) 01/18/2021    [facility] NOT DETECTED 01/06/2021     Past Medical History:   Diagnosis Date    Atrial fibrillation ([facility] [address] )     Cancer ([facility] [address] )     Disease of thyroid gland      Past Surgical History:   Procedure Laterality Date    BREAST SURGERY      LYMPH NODE DISSECTION      MANDIBLE FRACTURE SURGERY      PARTIAL HYSTERECTOMY      ROTATOR CUFF REPAIR       Current Outpatient Medications   Medication Sig Dispense Refill    Alpha-Lipoic Acid (LIPOIC ACID PO) Take by mouth      Ascorbic Acid (vitamin C) 500 MG/5ML syrup Take 500 mg by mouth daily      aspirin (ECOTRIN LOW STRENGTH) 81 mg EC tablet Take 81 mg by mouth daily      ASTAXANTHIN PO Take by mouth      betamethasone dipropionate 0 05 % lotion       Cholecalciferol (Vitamin D-1000 [address]) 25 MCG (1000 UT) tablet Take 4,000 Units by mouth daily      Coenzyme Q10 (CO Q 10 PO) Take by mouth      cyclobenzaprine (FLEXERIL) 5 mg tablet Take 5 mg by mouth Three times daily as needed  doxycycline hyclate (VIBRAMYCIN) 100 mg capsule Take 100 mg by mouth      flecainide (TAMBOCOR) 50 mg tablet Take 1 tablet (50 mg total) by mouth 2 (two) times a day 60 tablet 0    fluticasone (FLOVENT DISKUS) 50 MCG/BLIST diskus inhaler daily as needed   Levothyroxine Sodium 88 MCG/ML SOLN       loratadine (CLARITIN) 10 mg tablet Take 10 mg by mouth      metoprolol succinate (TOPROL-XL) 50 mg 24 hr tablet Take 50 mg by mouth daily As needed if bp is over 135      multivitamin (THERAGRAN) TABS Take 1 tablet by mouth daily      Naproxen Sodium 220 MG CAPS Take by mouth      omeprazole (PriLOSEC) 20 mg delayed release capsule daily as needed   zolpidem (AMBIEN) 5 mg tablet Take 5 mg by mouth       No current facility-administered medications for this visit  Allergies   Allergen Reactions    Penicillin G Other (See Comments)    Trazodone Headache     Headache and blurred vision    Amoxicillin Palpitations     AUGMENTIN  AUGMENTIN      Cephalexin Palpitations    Ciprofloxacin Tachycardia and Palpitations     Other reaction(s): Other (see comments)    Clavulanic Acid Palpitations     AUGMENTIN  AUGMENTIN      Codeine Other (See Comments), Palpitations and Tachycardia     unspecified  nausea      Sulfa Antibiotics Other (See Comments), Palpitations and Tachycardia       Review of Systems   Constitutional: Positive for fatigue (improving)  Negative for chills, diaphoresis and fever  HENT: Positive for congestion  Negative for ear pain, postnasal drip, rhinorrhea, sneezing, sore throat and trouble swallowing  Eyes: Negative for pain and visual disturbance  Respiratory: Positive for cough  Negative for apnea, chest tightness, shortness of breath and wheezing  Cardiovascular: Negative for chest pain and palpitations  Gastrointestinal: Negative for abdominal pain, constipation, diarrhea, nausea and vomiting  Genitourinary: Negative for dysuria     Musculoskeletal: Negative for arthralgias, gait problem and myalgias  Neurological: Positive for headaches  Negative for dizziness, syncope, weakness, light-headedness and numbness  Hematological: Positive for adenopathy (left submandibular)  Psychiatric/Behavioral: Negative for suicidal ideas  The patient is not nervous/anxious  Objective:    Vitals:    01/27/21 1051   BP: 130/81   Temp: 98 2 °F (36 8 °C)   Weight: 68 kg (150 lb)   Height: 5' 7" (1 702 m)       Physical Exam  Vitals signs and nursing note reviewed  Constitutional:       General: She is not in acute distress  Appearance: She is well-developed  She is not ill-appearing, toxic-appearing or diaphoretic  HENT:      Head: Normocephalic and atraumatic  Pulmonary:      Effort: Pulmonary effort is normal  No respiratory distress (Patient is speaking in full, fluent sentences  She does not appear in any acute distress  She does display an occasional cough  )  Lymphadenopathy:      Comments: Unable to properly visualize lymphadenopathy on video  No visible redness or swelling  Neurological:      Mental Status: She is alert  Psychiatric:         Behavior: Behavior normal  Behavior is cooperative  Thought Content: Thought content normal          Judgment: Judgment normal        VIRTUAL VISIT DISCLAIMER    Kanwal Curry acknowledges that she has consented to an online visit or consultation  She understands that the online visit is based solely on information provided by her, and that, in the absence of a face-to-face physical evaluation by the physician, the diagnosis she receives is both limited and provisional in terms of accuracy and completeness  This is not intended to replace a full medical face-to-face evaluation by the physician  Kanwal Curry understands and accepts these terms

## 2021-02-04 ENCOUNTER — TELEPHONE (OUTPATIENT)
Dept: GASTROENTEROLOGY | Facility: CLINIC | Age: 61
End: 2021-02-04

## 2021-02-04 NOTE — TELEPHONE ENCOUNTER
OA colon scheduled for 03/05/21 with Dr Edu Bains @[facility]   Miralax/dulcolax instructions given verbally/mailed to patient

## 2021-02-05 ENCOUNTER — APPOINTMENT (EMERGENCY)
Dept: CT IMAGING | Facility: HOSPITAL | Age: 61
End: 2021-02-05
Payer: COMMERCIAL

## 2021-02-05 ENCOUNTER — APPOINTMENT (EMERGENCY)
Dept: RADIOLOGY | Facility: HOSPITAL | Age: 61
End: 2021-02-05
Payer: COMMERCIAL

## 2021-02-05 ENCOUNTER — HOSPITAL ENCOUNTER (EMERGENCY)
Facility: HOSPITAL | Age: 61
Discharge: HOME | End: 2021-02-06
Attending: EMERGENCY MEDICINE | Admitting: EMERGENCY MEDICINE
Payer: COMMERCIAL

## 2021-02-05 ENCOUNTER — NURSE TRIAGE (OUTPATIENT)
Dept: OTHER | Facility: OTHER | Age: 61
End: 2021-02-05

## 2021-02-05 DIAGNOSIS — R91.8 PULMONARY NODULES: ICD-10-CM

## 2021-02-05 DIAGNOSIS — R00.2 PALPITATIONS: Primary | ICD-10-CM

## 2021-02-05 DIAGNOSIS — R07.9 INTERMITTENT CHEST PAIN: ICD-10-CM

## 2021-02-05 LAB
ALBUMIN SERPL BCP-MCNC: 3.3 G/DL (ref 3.5–5)
ALP SERPL-CCNC: 64 U/L (ref 46–116)
ALT SERPL W P-5'-P-CCNC: 32 U/L (ref 12–78)
ANION GAP SERPL CALCULATED.3IONS-SCNC: 8 MMOL/L (ref 4–13)
AST SERPL W P-5'-P-CCNC: 11 U/L (ref 5–45)
BASOPHILS # BLD AUTO: 0.04 THOUSANDS/ΜL (ref 0–0.1)
BASOPHILS NFR BLD AUTO: 1 % (ref 0–1)
BILIRUB SERPL-MCNC: 0.2 MG/DL (ref 0.2–1)
BUN SERPL-MCNC: 14 MG/DL (ref 5–25)
CALCIUM ALBUM COR SERPL-MCNC: 9.4 MG/DL (ref 8.3–10.1)
CALCIUM SERPL-MCNC: 8.8 MG/DL (ref 8.3–10.1)
CHLORIDE SERPL-SCNC: 108 MMOL/L (ref 100–108)
CO2 SERPL-SCNC: 26 MMOL/L (ref 21–32)
CREAT SERPL-MCNC: 0.85 MG/DL (ref 0.6–1.3)
D DIMER PPP FEU-MCNC: 0.41 UG/ML FEU
EOSINOPHIL # BLD AUTO: 0.09 THOUSAND/ΜL (ref 0–0.61)
EOSINOPHIL NFR BLD AUTO: 2 % (ref 0–6)
ERYTHROCYTE [DISTWIDTH] IN BLOOD BY AUTOMATED COUNT: 13 % (ref 11.6–15.1)
GFR SERPL CREATININE-BSD FRML MDRD: 85 ML/MIN/1.73SQ M
GLUCOSE SERPL-MCNC: 99 MG/DL (ref 65–140)
HCT VFR BLD AUTO: 37 % (ref 34.8–46.1)
HGB BLD-MCNC: 12.3 G/DL (ref 11.5–15.4)
IMM GRANULOCYTES # BLD AUTO: 0.01 THOUSAND/UL (ref 0–0.2)
IMM GRANULOCYTES NFR BLD AUTO: 0 % (ref 0–2)
LYMPHOCYTES # BLD AUTO: 2.01 THOUSANDS/ΜL (ref 0.6–4.47)
LYMPHOCYTES NFR BLD AUTO: 38 % (ref 14–44)
MAGNESIUM SERPL-MCNC: 1.8 MG/DL (ref 1.6–2.6)
MCH RBC QN AUTO: 30.9 PG (ref 26.8–34.3)
MCHC RBC AUTO-ENTMCNC: 33.2 G/DL (ref 31.4–37.4)
MCV RBC AUTO: 93 FL (ref 82–98)
MONOCYTES # BLD AUTO: 0.47 THOUSAND/ΜL (ref 0.17–1.22)
MONOCYTES NFR BLD AUTO: 9 % (ref 4–12)
NEUTROPHILS # BLD AUTO: 2.72 THOUSANDS/ΜL (ref 1.85–7.62)
NEUTS SEG NFR BLD AUTO: 50 % (ref 43–75)
NRBC BLD AUTO-RTO: 0 /100 WBCS
PLATELET # BLD AUTO: 284 THOUSANDS/UL (ref 149–390)
PMV BLD AUTO: 9.3 FL (ref 8.9–12.7)
POTASSIUM SERPL-SCNC: 3.8 MMOL/L (ref 3.5–5.3)
PROT SERPL-MCNC: 6.9 G/DL (ref 6.4–8.2)
RBC # BLD AUTO: 3.98 MILLION/UL (ref 3.81–5.12)
SODIUM SERPL-SCNC: 142 MMOL/L (ref 136–145)
TROPONIN I SERPL-MCNC: <0.02 NG/ML
TSH SERPL DL<=0.05 MIU/L-ACNC: 0.08 UIU/ML (ref 0.36–3.74)
WBC # BLD AUTO: 5.34 THOUSAND/UL (ref 4.31–10.16)

## 2021-02-05 PROCEDURE — 83735 ASSAY OF MAGNESIUM: CPT | Performed by: EMERGENCY MEDICINE

## 2021-02-05 PROCEDURE — 84439 ASSAY OF FREE THYROXINE: CPT | Performed by: EMERGENCY MEDICINE

## 2021-02-05 PROCEDURE — 80053 COMPREHEN METABOLIC PANEL: CPT | Performed by: EMERGENCY MEDICINE

## 2021-02-05 PROCEDURE — G1004 CDSM NDSC: HCPCS

## 2021-02-05 PROCEDURE — 71250 CT THORAX DX C-: CPT

## 2021-02-05 PROCEDURE — 84484 ASSAY OF TROPONIN QUANT: CPT | Performed by: EMERGENCY MEDICINE

## 2021-02-05 PROCEDURE — 93005 ELECTROCARDIOGRAM TRACING: CPT

## 2021-02-05 PROCEDURE — 99284 EMERGENCY DEPT VISIT MOD MDM: CPT | Performed by: EMERGENCY MEDICINE

## 2021-02-05 PROCEDURE — 84443 ASSAY THYROID STIM HORMONE: CPT | Performed by: EMERGENCY MEDICINE

## 2021-02-05 PROCEDURE — 99285 EMERGENCY DEPT VISIT HI MDM: CPT

## 2021-02-05 PROCEDURE — 85025 COMPLETE CBC W/AUTO DIFF WBC: CPT | Performed by: EMERGENCY MEDICINE

## 2021-02-05 PROCEDURE — 85379 FIBRIN DEGRADATION QUANT: CPT | Performed by: EMERGENCY MEDICINE

## 2021-02-05 PROCEDURE — 36415 COLL VENOUS BLD VENIPUNCTURE: CPT | Performed by: EMERGENCY MEDICINE

## 2021-02-05 NOTE — TELEPHONE ENCOUNTER
Regarding: Heart arrhythmia  ----- Message from Vishal Alves, [address] sent at 2/5/2021  5:04 PM EST -----  "After I started to wash dishes I started to feel heart arrhythmia, I'm currently taking flecainide and would like to know if I can increase the dose "

## 2021-02-05 NOTE — TELEPHONE ENCOUNTER
Reason for Disposition   New or worsened shortness of breath with activity (dyspnea on exertion)    Answer Assessment - Initial Assessment Questions  1  DESCRIPTION: "Please describe your heart rate or heart beat that you are having" (e g , fast/slow, regular/irregular, skipped or extra beats, "palpitations")      Irregular, patient has hx of arrhythmia   2  ONSET: "When did it start?" (Minutes, hours or days)       Patient has a hx of arrhythmia, today patient feels that her arrhythmia is worse when she moves  Patient stated that her heart rate is back to normal when she is resting   3  DURATION: "How long does it last" (e g , seconds, minutes, hours)      Patient has arrhythmia when she physically moving    4  PATTERN "Does it come and go, or has it been constant since it started?"  "Does it get worse with exertion?"   "Are you feeling it now?"      On and off   5  TAP: "Using your hand, can you tap out what you are feeling on a chair or table in front of you, so that I can hear?" (Note: not all patients can do this)         Patient stated that her heart rate right now is back to regular rhythm as she is talking to the nurse now  6  HEART RATE: "Can you tell me your heart rate?" "How many beats in 15 seconds?"  (Note: not all patients can do this)        120 beats per minute   7  RECURRENT SYMPTOM: "Have you ever had this before?" If so, ask: "When was the last time?" and "What happened that time?"       Patient is dx with afib   8  CAUSE: "What do you think is causing the palpitations?"      New onset of atrial fibrillation   9  CARDIAC HISTORY: "Do you have any history of heart disease?" (e g , heart attack, angina, bypass surgery, angioplasty, arrhythmia)       Arrhythmia   10  OTHER SYMPTOMS: "Do you have any other symptoms?" (e g , dizziness, chest pain, sweating, difficulty breathing)         Patient stated that she felt discomfort in left side of the chest early in a morning today but it went away  Patient felt episode of hot flashes earlier in afternoon today  Patient stated that she feels some mild pressure in her chest right now     11  PREGNANCY: "Is there any chance you are pregnant?" "When was your last menstrual period?"        No    Protocols used: HEART RATE AND HEARTBEAT QUESTIONS-ADULT-AH

## 2021-02-05 NOTE — TELEPHONE ENCOUNTER
Dr Tea Capone on call was made aware of patient's symptoms  Per Dr Tea Capone, patient to go to ER  Patient agreeable, confirmed that she would go to [facility] ER now

## 2021-02-06 VITALS
TEMPERATURE: 98.6 F | SYSTOLIC BLOOD PRESSURE: 126 MMHG | HEART RATE: 75 BPM | OXYGEN SATURATION: 96 % | HEIGHT: 67 IN | DIASTOLIC BLOOD PRESSURE: 72 MMHG | BODY MASS INDEX: 23.54 KG/M2 | RESPIRATION RATE: 16 BRPM | WEIGHT: 150 LBS

## 2021-02-06 LAB
T4 FREE SERPL-MCNC: 1.33 NG/DL (ref 0.76–1.46)
TROPONIN I SERPL-MCNC: <0.02 NG/ML

## 2021-02-06 PROCEDURE — 36415 COLL VENOUS BLD VENIPUNCTURE: CPT | Performed by: EMERGENCY MEDICINE

## 2021-02-06 PROCEDURE — 84484 ASSAY OF TROPONIN QUANT: CPT | Performed by: EMERGENCY MEDICINE

## 2021-02-06 NOTE — ED NOTES
Patient had a period of chest tightness and pain  Repeat EKG was done  Dr Ramona Damon aware        Kevin Beltran RN  02/05/21 [id number]

## 2021-02-06 NOTE — DISCHARGE INSTRUCTIONS
CT chest without contrast: Atelectasis within the lung bases  , Multiple pulmonary nodules measuring up to 7 mm  Based on current Fleischner Society 2017 Guidelines on incidental pulmonary nodule, followup non-contrast CT is recommended at 6-12 months from the initial examination and, if stable at that time, an , additional followup is recommended for 18-24 months from the initial examination  ,

## 2021-02-06 NOTE — ED PROVIDER NOTES
History  Chief Complaint   Patient presents with    Palpitations     pt reports having irregular heart beat and palpitations that occur when she moves around, does have hx of afib     This 78-year-old woman with the noted past medical history presents to the emergency dept stating that she has been having palpitations intermittently throughout the day today concerning to her for recurrence of her atrial fibrillation  States that she is having intermittent palpitations worse when she is performing physical activity  When these are present, she reports some degree of dyspnea as well as a "tight" sensation in her head  She states that she was having intermittent pain in the left chest when she was turning her head to the right side  She did contact the on-call nursing triage for her primary physician today regarding these sx: she measured her HR at 120 bpm at that point, which did decrease to 96 bpm     She has been treated for paroxysmal atrial fibrillation since age 32; she was treated for a long period of time with metoprolol which was ineffective in establishing rhythm control--had multiple breakthrough PAF episodes  Three years ago, she was placed on flecainide by her cardiologist and has had excellent control of her atrial fibrillation since that time  She was diagnosed with COVID-19 around 17 January 21; she was treated by her primary physician and released from quarantine for this on 27 January  She states that the symptoms variously included cough/fever/loose stools/dysgeusia/migraine headache  She has had resolution of all of these sx currently  Prior history of left-sided breast cancer 2009 treated with lumpectomy and extensive left upper extremity lymph node dissection  She has no history of VTE  She does not take any AC/AP medications  A/p:  Paroxysmal atrial fibrillation by hx with recurrent palpitations  Patient is in normal sinus rhythm now  Check CBC/electrolytes/troponin   Recent covid-19 diagnosis: consideration for VTE d/t pro-inflammatory state  However low risk for PE by Wells score  Chest imaging depending upon D-dimer result  History provided by:  Patient and medical records  Palpitations  Associated symptoms: shortness of breath    Associated symptoms: no chest pain, no cough, no dizziness, no numbness and no weakness        Prior to Admission Medications   Prescriptions Last Dose Informant Patient Reported? Taking? ASTAXANTHIN PO 2/5/2021 at Unknown time Self Yes Yes   Sig: Take by mouth   Alpha-Lipoic Acid (LIPOIC ACID PO) 2/5/2021 at Unknown time Self Yes Yes   Sig: Take by mouth   Ascorbic Acid (vitamin C) 500 MG/5ML syrup 2/5/2021 at Unknown time  Yes Yes   Sig: Take 500 mg by mouth daily   Cholecalciferol (Vitamin D-1000 Max St) 25 MCG (1000 UT) tablet 2/5/2021 at Unknown time Self Yes Yes   Sig: Take 4,000 Units by mouth daily   Coenzyme Q10 (CO Q 10 PO) 2/5/2021 at Unknown time Self Yes Yes   Sig: Take by mouth   Levothyroxine Sodium 88 MCG/ML SOLN 2/5/2021 at Unknown time  Yes Yes   Naproxen Sodium 220 MG CAPS Past Week at Unknown time  Yes Yes   Sig: Take by mouth   aspirin (ECOTRIN LOW STRENGTH) 81 mg EC tablet 2/4/2021 at Unknown time  Yes Yes   Sig: Take 81 mg by mouth daily   betamethasone dipropionate 0 05 % lotion   Yes No   cyclobenzaprine (FLEXERIL) 5 mg tablet Past Week at Unknown time  Yes Yes   Sig: Take 5 mg by mouth Three times daily as needed   doxycycline hyclate (VIBRAMYCIN) 100 mg capsule Not Taking at Unknown time  Yes No   Sig: Take 100 mg by mouth   flecainide (TAMBOCOR) 50 mg tablet 2/5/2021 at Unknown time  No Yes   Sig: Take 1 tablet (50 mg total) by mouth 2 (two) times a day   fluticasone (FLOVENT DISKUS) 50 MCG/BLIST diskus inhaler More than a month at Unknown time  Yes No   Sig: daily as needed     loratadine (CLARITIN) 10 mg tablet More than a month at Unknown time  Yes No   Sig: Take 10 mg by mouth   metoprolol succinate (TOPROL-XL) 50 mg 24 hr tablet Unknown at Unknown time  Yes No   Sig: Take 50 mg by mouth daily As needed if bp is over 135   multivitamin (THERAGRAN) TABS 2/5/2021 at Unknown time  Yes Yes   Sig: Take 1 tablet by mouth daily   omeprazole (PriLOSEC) 20 mg delayed release capsule Not Taking at Unknown time  Yes No   Sig: daily as needed  zolpidem (AMBIEN) 5 mg tablet Unknown at Unknown time  Yes No   Sig: Take 5 mg by mouth      Facility-Administered Medications: None       Past Medical History:   Diagnosis Date    Atrial fibrillation (HCC)     Cancer (HCC)     Disease of thyroid gland        Past Surgical History:   Procedure Laterality Date    BREAST SURGERY      LYMPH NODE DISSECTION      MANDIBLE FRACTURE SURGERY      PARTIAL HYSTERECTOMY      ROTATOR CUFF REPAIR         Family History   Problem Relation Age of Onset    Alzheimer's disease Mother     Angina Mother     Diabetes Father     Heart disease Sister     Heart disease Brother     Prostate cancer Brother      I have reviewed and agree with the history as documented  E-Cigarette/Vaping     E-Cigarette/Vaping Substances     Social History     Tobacco Use    Smoking status: Never Smoker    Smokeless tobacco: Never Used   Substance Use Topics    Alcohol use: Never     Frequency: Never    Drug use: Not on file       Review of Systems   Constitutional: Negative for chills and fever  Respiratory: Positive for shortness of breath  Negative for cough and chest tightness  Cardiovascular: Positive for palpitations  Negative for chest pain and leg swelling  Gastrointestinal: Negative for diarrhea  Neurological: Positive for headaches  Negative for dizziness, weakness, light-headedness and numbness  All other systems reviewed and are negative  Physical Exam  Physical Exam  Vitals signs and nursing note reviewed  Constitutional:       General: She is awake  She is not in acute distress  Appearance: She is well-developed     HENT:      Head: Normocephalic and atraumatic  Right Ear: Hearing and external ear normal       Left Ear: Hearing and external ear normal    Neck:      Trachea: Trachea and phonation normal    Cardiovascular:      Rate and Rhythm: Normal rate and regular rhythm  Pulses:           Radial pulses are 2+ on the right side and 2+ on the left side  Dorsalis pedis pulses are 2+ on the right side and 2+ on the left side  Posterior tibial pulses are 2+ on the right side and 2+ on the left side  Heart sounds: Normal heart sounds, S1 normal and S2 normal  No murmur  No friction rub  No gallop  Pulmonary:      Effort: Pulmonary effort is normal  No respiratory distress  Breath sounds: Normal breath sounds  No stridor  No decreased breath sounds, wheezing, rhonchi or rales  Chest:      Chest wall: No tenderness  Abdominal:      General: There is no distension  Palpations: Abdomen is soft  Abdomen is not rigid  There is no mass  Tenderness: There is no abdominal tenderness  There is no guarding or rebound  Musculoskeletal:         General: No tenderness or deformity  Skin:     General: Skin is warm and dry  Comments: Lymphedema of LUE   Neurological:      Mental Status: She is alert and oriented to person, place, and time  GCS: GCS eye subscore is 4  GCS verbal subscore is 5  GCS motor subscore is 6  Cranial Nerves: No cranial nerve deficit  Sensory: No sensory deficit  Motor: No abnormal muscle tone  Comments: PERRLA; EOMI  Sensation intact to light touch over face in V1-V3 distribution bilaterally  Facial expressions symmetric  Tongue/uvula midline  Shoulder shrug equal bilaterally  Strength 5/5 in UE/LE bilaterally  Sensation intact to light touch in UE/LE bilaterally           Vital Signs  ED Triage Vitals   Temperature Pulse Respirations Blood Pressure SpO2   02/05/21 2011 02/05/21 2011 02/05/21 2011 02/05/21 2013 02/05/21 2011   98 6 °F (37 °C) 83 18 135/87 98 %      Temp Source Heart Rate Source Patient Position - Orthostatic VS BP Location FiO2 (%)   02/05/21 2011 02/05/21 2011 02/05/21 2011 02/05/21 2011 --   Temporal Monitor Sitting Right arm       Pain Score       --                  Vitals:    02/05/21 2315 02/05/21 2345 02/06/21 0015 02/06/21 0045   BP: 139/70 128/68 117/77 126/72   Pulse: 58 64 77 75   Patient Position - Orthostatic VS: Sitting Sitting Sitting Sitting         Visual Acuity      ED Medications  Medications - No data to display    Diagnostic Studies  Results Reviewed     Procedure Component Value Units Date/Time    Troponin I [[id number]]  (Normal) Collected: 02/06/21 0015    Lab Status: Final result Specimen: Blood from Arm, Right Updated: 02/06/21 0038     Troponin I <0 02 ng/mL     Magnesium [[id number]]  (Normal) Collected: 02/05/21 2047    Lab Status: Final result Specimen: Blood Updated: 02/05/21 2224     Magnesium 1 8 mg/dL     T4, free [[id number]] Collected: 02/05/21 2047    Lab Status: In process Specimen: Blood Updated: 02/05/21 2216    TSH [[id number]]  (Abnormal) Collected: 02/05/21 2047    Lab Status: Final result Specimen: Blood from Hand, Right Updated: 02/05/21 2154     TSH 3RD GENERATON 0 083 uIU/mL     Narrative:      Patients undergoing fluorescein dye angiography may retain small amounts of fluorescein in the body for 48-72 hours post procedure  Samples containing fluorescein can produce falsely depressed TSH values  If the patient had this procedure,a specimen should be resubmitted post fluorescein clearance        D-dimer, quantitative [[id number]]  (Normal) Collected: 02/05/21 2047    Lab Status: Final result Specimen: Blood from Hand, Right Updated: 02/05/21 2141     D-Dimer, Quant 0 41 ug/ml FEU     Troponin I [[id number]]  (Normal) Collected: 02/05/21 2047    Lab Status: Final result Specimen: Blood from Hand, Right Updated: 02/05/21 2110     Troponin I <0 02 ng/mL     Comprehensive metabolic panel [[id number]]  (Abnormal) Collected: 02/05/21 2047    Lab Status: Final result Specimen: Blood from Hand, Right Updated: 02/05/21 2108     Sodium 142 mmol/L      Potassium 3 8 mmol/L      Chloride 108 mmol/L      CO2 26 mmol/L      ANION GAP 8 mmol/L      BUN 14 mg/dL      Creatinine 0 85 mg/dL      Glucose 99 mg/dL      Calcium 8 8 mg/dL      Corrected Calcium 9 4 mg/dL      AST 11 U/L      ALT 32 U/L      Alkaline Phosphatase 64 U/L      Total Protein 6 9 g/dL      Albumin 3 3 g/dL      Total Bilirubin 0 20 mg/dL      eGFR 85 ml/min/1 73sq m     Narrative:      [place] guidelines for Chronic Kidney Disease (CKD):     Stage 1 with normal or high GFR (GFR > 90 mL/min/1 73 square meters)    Stage 2 Mild CKD (GFR = 60-89 mL/min/1 73 square meters)    Stage 3A Moderate CKD (GFR = 45-59 mL/min/1 73 square meters)    Stage 3B Moderate CKD (GFR = 30-44 mL/min/1 73 square meters)    Stage 4 Severe CKD (GFR = 15-29 mL/min/1 73 square meters)    Stage 5 End Stage CKD (GFR <15 mL/min/1 73 square meters)  Note: GFR calculation is accurate only with a steady state creatinine    CBC and differential [[id number]] Collected: 02/05/21 2047    Lab Status: Final result Specimen: Blood from Hand, Right Updated: 02/05/21 2053     WBC 5 34 Thousand/uL      RBC 3 98 Million/uL      Hemoglobin 12 3 g/dL      Hematocrit 37 0 %      MCV 93 fL      MCH 30 9 pg      MCHC 33 2 g/dL      RDW 13 0 %      MPV 9 3 fL      Platelets 263 Thousands/uL      nRBC 0 /100 WBCs      Neutrophils Relative 50 %      Immat GRANS % 0 %      Lymphocytes Relative 38 %      Monocytes Relative 9 %      Eosinophils Relative 2 %      Basophils Relative 1 %      Neutrophils Absolute 2 72 Thousands/µL      Immature Grans Absolute 0 01 Thousand/uL      Lymphocytes Absolute 2 01 Thousands/µL      Monocytes Absolute 0 47 Thousand/µL      Eosinophils Absolute 0 09 Thousand/µL      Basophils Absolute 0 04 Thousands/µL                  CT chest without contrast Final Result by Piper Connolly DO (02/05 2312)      Atelectasis within the lung bases  Multiple pulmonary nodules measuring up to 7 mm  Based on current Fleischner Society 2017 Guidelines on incidental pulmonary nodule, followup non-contrast CT is recommended at 6-12 months from the initial examination and, if stable at that time, an    additional followup is recommended for 18-24 months from the initial examination  The study was marked in [facility] for immediate notification  Workstation performed: [id number]                    Procedures  Procedures         ED Course  ED Course as of Feb 06 0146   Fri Feb 05, 2021 2021 ECG NSR 72 bpm  Normal axis  No ectopy  ST depression III/aVF; no FRANCISCO   QRS 80 Qtc 396  Interpreted by me  No prior for comparison  2023 ECG NSR 70 bpm  Normal axis   QRS 86 Qtc 416  No ectopy  ST depression III/AVF similar to prior  Interpreted by me  2150 1  WBC wnl   2  Hg/Hct wnl   3  Plt wnl   4  Electrolytes wnl   5  Troponin negative  6  D-dimer wnl  7  Transaminases wnl  8  TSH is below reference range; no prior for comparison  Will send free T4        2200 ECG NSR 79 bpm  Normal axis  ST depression III/AVF   QRS 86 Qtc 44  Interpreted by me  No changes from prior  12 CT completed and awaiting interpretation      2302 Patient reported episode of retrosternal chest tightness and palpitations that resolved spontaneously  States that it began when she turned her head--felt onset of palpitations and pain at that point, both of which resolved quickly and were absent at time of my reevaluation  Repeat ECG was obtained as below  Repeat troponin ordered for 2345  ECG 80 bpm  Normal axis   QRS 92 Qtc 449  ST depression II/III  No FRANCISCO  No ectopy  No changes from prior  2322 IMPRESSION:     Atelectasis within the lung bases      Multiple pulmonary nodules measuring up to 7 mm  Based on current Fleischner Society 2017 Guidelines on incidental pulmonary nodule, followup non-contrast CT is recommended at 6-12 months from the initial examination and, if stable at that time, an   additional followup is recommended for 18-24 months from the initial examination      The study was marked in [facility] for immediate notification  CT chest without contrast   Sat Feb 06, 2021   0039 Repeat troponin is negative  ECGs showed no progression  Troponin negative x2  No cardiac arrhythmias while in the emergency department  No evidence of VTE  CT chest demonstrated pulmonary nodules and atelectasis but no other abnormalities  Patient does have an appointment scheduled with her primary physician on Wednesday 17 February which I encouraged her to keep  She will need follow-up with a local cardiologist which her primary physician can arrange  She will also need repeat CT of the chest in 6-12 months to assess for any changes in pulmonary nodules which can also be arranged by her primary physician  Would not recommend any changes in her medications currently  No indication for any additional doses of or changes in her flecainide dosing currently  All questions were answered prior to discharge  The patient expressed understanding and agreed to plan  SBIRT 20yo+      Most Recent Value   SBIRT (22 yo +)   In order to provide better care to our patients, we are screening all of our patients for alcohol and drug use  Would it be okay to ask you these screening questions? Yes Filed at: 02/05/2021 2013   Initial Alcohol Screen: US AUDIT-C    1  How often do you have a drink containing alcohol?  0 Filed at: 02/05/2021 2013   2  How many drinks containing alcohol do you have on a typical day you are drinking? 0 Filed at: 02/05/2021 2013   3a  Male UNDER 65: How often do you have five or more drinks on one occasion? 0 Filed at: 02/05/2021 2013   3b  FEMALE Any Age, or MALE 65+:  How often do you have 4 or more drinks on one occassion?  0 Filed at: 02/05/2021 2013   Audit-C Score  0 Filed at: 02/05/2021 2013   MARICARMEN: How many times in the past year have you    Used an illegal drug or used a prescription medication for non-medical reasons? Never Filed at: 02/05/2021 2013          Wells' Criteria for PE      Most Recent Value   Wells' Criteria for PE   Clinical signs and symptoms of DVT  0 Filed at: 02/05/2021 2229   PE is primary diagnosis or equally likely  0 Filed at: 02/05/2021 2229   HR >100  0 Filed at: 02/05/2021 2229   Immobilization at least 3 days or Surgery in the previous 4 weeks  0 Filed at: 02/05/2021 2229   Previous, objectively diagnosed PE or DVT  0 Filed at: 02/05/2021 2229   Hemoptysis  0 Filed at: 02/05/2021 2229   Malignancy with treatment within 6 months or palliative  0 Filed at: 02/05/2021 2229   Fred' Criteria Total  0 Filed at: 02/05/2021 2229                MDM    Disposition  Final diagnoses:   Palpitations   Intermittent chest pain   Pulmonary nodules     Time reflects when diagnosis was documented in both MDM as applicable and the Disposition within this note     Time User Action Codes Description Comment    2/6/2021 12:54 AM Burlene Landaverde Add [R00 2] Palpitations     2/6/2021 12:54 AM Burlene Landaverde Add [R07 9] Intermittent chest pain     2/6/2021 12:56 AM Burlene Landaverde Add [R91 8] Pulmonary nodules       ED Disposition     ED Disposition Condition Date/Time Comment    Discharge Stable Sat Feb 6, 2021 12:55 AM Nora Cee discharge to home/self care  Follow-up Information     Follow up With Specialties Details Why Contact Info Additional [address], PA-C Family Medicine, Physician Assistant Go to  On February 17th as scheduled for your appointment  You will need follow-up with a local cardiologist, which your primary doctor can arrange, and a repeat CT scan of your chest in 6-12 months   [address] [facility] Emergency Medicine Go to  If you become very short of breath, pass out, or develop continuous chest pain that spreads to your shoulders/back or is worse with physical activity Lizzy  [id number] [facility] Emergency Department, [address], [address]          Discharge Medication List as of 2/6/2021 12:57 AM      CONTINUE these medications which have NOT CHANGED    Details   Alpha-Lipoic Acid (LIPOIC ACID PO) Take by mouth, Historical Med      Ascorbic Acid (vitamin C) 500 MG/5ML syrup Take 500 mg by mouth daily, Historical Med      aspirin (ECOTRIN LOW STRENGTH) 81 mg EC tablet Take 81 mg by mouth daily, Historical Med      ASTAXANTHIN PO Take by mouth, Historical Med      Cholecalciferol (Vitamin D-1000 Max St) 25 MCG (1000 UT) tablet Take 4,000 Units by mouth daily, Historical Med      Coenzyme Q10 (CO Q 10 PO) Take by mouth, Historical Med      cyclobenzaprine (FLEXERIL) 5 mg tablet Take 5 mg by mouth Three times daily as needed, Starting Wed 11/18/2020, Historical Med      flecainide (TAMBOCOR) 50 mg tablet Take 1 tablet (50 mg total) by mouth 2 (two) times a day, Starting Mon 1/18/2021, Normal      Levothyroxine Sodium 88 MCG/ML SOLN Historical Med      multivitamin (THERAGRAN) TABS Take 1 tablet by mouth daily, Historical Med      Naproxen Sodium 220 MG CAPS Take by mouth, Historical Med      betamethasone dipropionate 0 05 % lotion Historical Med      doxycycline hyclate (VIBRAMYCIN) 100 mg capsule Take 100 mg by mouth, Historical Med      fluticasone (FLOVENT DISKUS) 50 MCG/BLIST diskus inhaler daily as needed , Historical Med      loratadine (CLARITIN) 10 mg tablet Take 10 mg by mouth, Historical Med      metoprolol succinate (TOPROL-XL) 50 mg 24 hr tablet Take 50 mg by mouth daily As needed if bp is over 135, Starting Wed 12/9/2020, Historical Med      omeprazole (PriLOSEC) 20 mg delayed release capsule daily as needed , Historical Med      zolpidem (AMBIEN) 5 mg tablet Take 5 mg by mouth, Historical Med           No discharge procedures on file      PDMP Review     None          ED Provider  Electronically Signed by           Archana Milton DO  02/06/21 0147

## 2021-02-08 LAB
ATRIAL RATE: 70 BPM
ATRIAL RATE: 79 BPM
ATRIAL RATE: 80 BPM
P AXIS: 68 DEGREES
P AXIS: 73 DEGREES
P AXIS: 73 DEGREES
PR INTERVAL: 146 MS
PR INTERVAL: 148 MS
PR INTERVAL: 152 MS
QRS AXIS: 15 DEGREES
QRS AXIS: 24 DEGREES
QRS AXIS: 9 DEGREES
QRSD INTERVAL: 86 MS
QRSD INTERVAL: 86 MS
QRSD INTERVAL: 92 MS
QT INTERVAL: 386 MS
QT INTERVAL: 388 MS
QT INTERVAL: 390 MS
QTC INTERVAL: 416 MS
QTC INTERVAL: 444 MS
QTC INTERVAL: 449 MS
T WAVE AXIS: -7 DEGREES
T WAVE AXIS: 10 DEGREES
T WAVE AXIS: 14 DEGREES
VENTRICULAR RATE: 70 BPM
VENTRICULAR RATE: 79 BPM
VENTRICULAR RATE: 80 BPM

## 2021-02-08 PROCEDURE — 93010 ELECTROCARDIOGRAM REPORT: CPT | Performed by: INTERNAL MEDICINE

## 2021-02-12 ENCOUNTER — APPOINTMENT (OUTPATIENT)
Dept: LAB | Facility: MEDICAL CENTER | Age: 61
End: 2021-02-12
Payer: COMMERCIAL

## 2021-02-12 ENCOUNTER — OFFICE VISIT (OUTPATIENT)
Dept: FAMILY MEDICINE CLINIC | Facility: CLINIC | Age: 61
End: 2021-02-12
Payer: COMMERCIAL

## 2021-02-12 VITALS
SYSTOLIC BLOOD PRESSURE: 134 MMHG | HEART RATE: 82 BPM | HEIGHT: 67 IN | DIASTOLIC BLOOD PRESSURE: 84 MMHG | WEIGHT: 150.2 LBS | OXYGEN SATURATION: 97 % | TEMPERATURE: 97.1 F | BODY MASS INDEX: 23.57 KG/M2

## 2021-02-12 DIAGNOSIS — E03.9 HYPOTHYROIDISM, UNSPECIFIED TYPE: ICD-10-CM

## 2021-02-12 DIAGNOSIS — I97.2 POSTMASTECTOMY LYMPHEDEMA SYNDROME: ICD-10-CM

## 2021-02-12 DIAGNOSIS — E07.9 THYROID DISORDER: ICD-10-CM

## 2021-02-12 DIAGNOSIS — Z85.3 HX: BREAST CANCER: ICD-10-CM

## 2021-02-12 DIAGNOSIS — I48.0 PAROXYSMAL ATRIAL FIBRILLATION (HCC): Primary | ICD-10-CM

## 2021-02-12 DIAGNOSIS — M54.6 ACUTE BILATERAL THORACIC BACK PAIN: ICD-10-CM

## 2021-02-12 DIAGNOSIS — M54.2 NECK PAIN: ICD-10-CM

## 2021-02-12 DIAGNOSIS — R91.8 PULMONARY NODULES/LESIONS, MULTIPLE: ICD-10-CM

## 2021-02-12 DIAGNOSIS — M54.41 ACUTE BILATERAL LOW BACK PAIN WITH RIGHT-SIDED SCIATICA: ICD-10-CM

## 2021-02-12 PROBLEM — R51.9 HEADACHE DISORDER: Status: ACTIVE | Noted: 2020-02-17

## 2021-02-12 PROBLEM — F51.01 PRIMARY INSOMNIA: Status: ACTIVE | Noted: 2019-02-08
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
## 2021-02-12 PROBLEM — G89.29 CHRONIC BILATERAL THORACIC BACK PAIN: Status: ACTIVE | Noted: 2020-12-08

## 2021-02-12 PROBLEM — I89.0: Status: ACTIVE | Noted: 2020-06-15

## 2021-02-12 LAB
T4 FREE SERPL-MCNC: 1.39 NG/DL (ref 0.76–1.46)
TSH SERPL DL<=0.05 MIU/L-ACNC: 0.04 UIU/ML (ref 0.36–3.74)

## 2021-02-12 PROCEDURE — 36415 COLL VENOUS BLD VENIPUNCTURE: CPT

## 2021-02-12 PROCEDURE — 84439 ASSAY OF FREE THYROXINE: CPT

## 2021-02-12 PROCEDURE — 99214 OFFICE O/P EST MOD 30 MIN: CPT | Performed by: PHYSICIAN ASSISTANT

## 2021-02-12 PROCEDURE — 84443 ASSAY THYROID STIM HORMONE: CPT

## 2021-02-12 NOTE — PROGRESS NOTES
Assessment/Plan:    Problem List Items Addressed This Visit        Endocrine    Acquired hypothyroidism       Cardiovascular and Mediastinum    Paroxysmal atrial fibrillation ([facility] ) - Primary    Relevant Orders    Ambulatory referral to Cardiology       Other    HX: breast cancer    Relevant Orders    Ambulatory referral to Hematology / Oncology    Postmastectomy lymphedema syndrome      Other Visit Diagnoses     Pulmonary nodules/lesions, multiple        Relevant Orders    CT chest wo contrast    Thyroid disorder        Relevant Orders    TSH, 3rd generation with Free T4 reflex    Ambulatory referral to Endocrinology    Neck pain        Relevant Orders    Ambulatory referral to Physical Therapy    Acute bilateral thoracic back pain        Relevant Orders    Ambulatory referral to Physical Therapy    Acute bilateral low back pain with right-sided sciatica        Relevant Orders    Ambulatory referral to Physical Therapy           Diagnoses and all orders for this visit:    Paroxysmal atrial fibrillation ([facility] )  -     Ambulatory referral to Cardiology; Future    HX: breast cancer  -     Ambulatory referral to Hematology / Oncology; Future    Postmastectomy lymphedema syndrome    Hypothyroidism, unspecified type  -     TSH, 3rd generation with Free T4 reflex; Future  -     Ambulatory referral to Endocrinology; Future    Pulmonary nodules/lesions, multiple  -     CT chest wo contrast; Future    Thyroid disorder  -     TSH, 3rd generation with Free T4 reflex; Future  -     Ambulatory referral to Endocrinology; Future    Neck pain  -     Ambulatory referral to Physical Therapy; Future    Acute bilateral thoracic back pain  -     Ambulatory referral to Physical Therapy; Future    Acute bilateral low back pain with right-sided sciatica  -     Ambulatory referral to Physical Therapy;  Future        Referrals placed to cardiology, endocrinology, oncology, & physical therapy  Mammogram ordered  Will recheck a TSH level  Will repeat a CT scan in 6 months to recheck pulmonary nodules  She will follow-up in 2-3 months or sooner if needed    Subjective:      Patient ID: Chrissie Woodall is a 56 y o  female  Marlene Lawton is a 56year old female who is here today to establish care in person after being treated for COVID  She recently moved here and needs to get her care transferred  She has a history of paroxysmal atrial fibrillation  She has been on flecainide for about 2 years  She was seen in the ER last week because she felt like she was in an arrhythmia  She had a workup, which was negative for a-fib or any acute conditions  It was found that she had 3 pulmonary nodules  She admits that she was told she had a pulmonary nodule in the past  She has a history of breast cancer  She would like to get established with a local oncologist  She also has a history of hyperthyroidism that was treated with radioactive iodine  She now has hypothyroidism  She is concerned that her dose may be too high  She would like to see a local endocrinologist as well  She has also been having neck and back pain  She was going to physical therapy in [place], but it got put on hold when she developed COVID  She would like to restart because she was getting relief  She denies any other concerns at this time  The following portions of the patient's history were reviewed and updated as appropriate:   She has a past medical history of Atrial fibrillation ([facility] [address] ), Cancer ([facility] [place] [facility] ), and Disease of thyroid gland  ,  does not have any pertinent problems on file  ,   has a past surgical history that includes Breast surgery; Lymph node dissection; Mandible fracture surgery; Rotator cuff repair; and Partial hysterectomy  ,  family history includes Alzheimer's disease in her mother; Angina in her mother; Diabetes in her father; Heart disease in her brother and sister; Prostate cancer in her brother  ,   reports that she has never smoked   She has never used smokeless tobacco  She reports that she does not drink alcohol  No history on file for drug ,  is allergic to penicillin g; trazodone; augmentin [amoxicillin-pot clavulanate]; cephalexin; ciprofloxacin; codeine; and sulfa antibiotics     Current Outpatient Medications   Medication Sig Dispense Refill    Alpha-Lipoic Acid (LIPOIC ACID PO) Take by mouth      Ascorbic Acid (vitamin C) 500 MG/5ML syrup Take 500 mg by mouth daily      aspirin (ECOTRIN LOW STRENGTH) 81 mg EC tablet Take 81 mg by mouth daily      betamethasone dipropionate 0 05 % lotion       Cholecalciferol (Vitamin D-1000 Max St) 25 MCG (1000 UT) tablet Take 4,000 Units by mouth daily      Coenzyme Q10 (CO Q 10 PO) Take by mouth      cyclobenzaprine (FLEXERIL) 5 mg tablet Take 5 mg by mouth Three times daily as needed      flecainide (TAMBOCOR) 50 mg tablet Take 1 tablet (50 mg total) by mouth 2 (two) times a day 60 tablet 0    fluticasone (FLOVENT DISKUS) 50 MCG/BLIST diskus inhaler daily as needed   Levothyroxine Sodium 88 MCG/ML SOLN       loratadine (CLARITIN) 10 mg tablet Take 10 mg by mouth      metoprolol succinate (TOPROL-XL) 50 mg 24 hr tablet Take 50 mg by mouth daily As needed if bp is over 135      multivitamin (THERAGRAN) TABS Take 1 tablet by mouth daily      Naproxen Sodium 220 MG CAPS Take by mouth      omeprazole (PriLOSEC) 20 mg delayed release capsule daily as needed   zolpidem (AMBIEN) 5 mg tablet Take 5 mg by mouth      ASTAXANTHIN PO Take by mouth      doxycycline hyclate (VIBRAMYCIN) 100 mg capsule Take 100 mg by mouth       No current facility-administered medications for this visit  Review of Systems   Constitutional: Negative for chills, diaphoresis, fatigue and fever  HENT: Negative for congestion, ear pain, postnasal drip, rhinorrhea, sneezing, sore throat and trouble swallowing  Eyes: Negative for pain and visual disturbance  Respiratory: Negative for apnea, cough, shortness of breath and wheezing  Cardiovascular: Positive for palpitations (intermittent)  Negative for chest pain  Gastrointestinal: Negative for abdominal pain, constipation, diarrhea, nausea and vomiting  Genitourinary: Negative for dysuria and hematuria  Musculoskeletal: Positive for back pain, myalgias and neck pain  Negative for arthralgias and gait problem  Neurological: Negative for dizziness, syncope, weakness, light-headedness, numbness and headaches  Psychiatric/Behavioral: Negative for suicidal ideas  The patient is not nervous/anxious  Objective:  Vitals:    02/12/21 0937   BP: 134/84   Pulse: 82   Temp: (!) 97 1 °F (36 2 °C)   SpO2: 97%   Weight: 68 1 kg (150 lb 3 2 oz)   Height: 5' 7" (1 702 m)     Body mass index is 23 52 kg/m²  Physical Exam  Vitals signs and nursing note reviewed  Constitutional:       Appearance: She is well-developed  HENT:      Head: Normocephalic and atraumatic  Right Ear: Tympanic membrane, ear canal and external ear normal       Left Ear: Tympanic membrane, ear canal and external ear normal       Nose: Nose normal       Mouth/Throat:      Pharynx: No oropharyngeal exudate or posterior oropharyngeal erythema  Eyes:      Pupils: Pupils are equal, round, and reactive to light  Neck:      Musculoskeletal: Normal range of motion and neck supple  Cardiovascular:      Rate and Rhythm: Normal rate and regular rhythm  Heart sounds: Normal heart sounds  No murmur  No friction rub  No gallop  Pulmonary:      Effort: Pulmonary effort is normal  No respiratory distress  Breath sounds: Normal breath sounds  No wheezing or rales  Musculoskeletal: Normal range of motion  Lymphadenopathy:      Cervical: No cervical adenopathy  Skin:     General: Skin is warm and dry  Neurological:      Mental Status: She is alert and oriented to person, place, and time  Psychiatric:         Behavior: Behavior normal          Thought Content:  Thought content normal  Judgment: Judgment normal

## 2021-02-15 ENCOUNTER — TELEPHONE (OUTPATIENT)
Dept: SURGICAL ONCOLOGY | Facility: CLINIC | Age: 61
End: 2021-02-15

## 2021-02-15 ENCOUNTER — TELEPHONE (OUTPATIENT)
Dept: HEMATOLOGY ONCOLOGY | Facility: CLINIC | Age: 61
End: 2021-02-15

## 2021-02-15 DIAGNOSIS — E03.9 HYPOTHYROIDISM, UNSPECIFIED TYPE: Primary | ICD-10-CM

## 2021-02-15 RX ORDER — LEVOTHYROXINE SODIUM 0.07 MG/1
75 TABLET ORAL
Qty: 30 TABLET | Refills: 2 | Status: SHIPPED | OUTPATIENT
Start: 2021-02-15

## 2021-02-15 NOTE — TELEPHONE ENCOUNTER
New Patient Breast Form   Patient Details:     Shawn Scott     1960     [id number]     Background Information:   [address] starts by opening a telephone encounter and gathering the following information   Who is calling to schedule and relationship?  self   Referring Provider na   To which speciality is the referral? Medical Oncology   Reason for Visit?  history of   Tumor Type?  breast- lumpectomy   2009   Is there a confimed diagnosis from biopsy/tissue reviewed by Pathology? Yes   Date of Tissue Diagnosis (If done outside of [facility] please obtain report and slides) na   Is patient aware of diagnosis, and who made them aware? na   (If no tissue diagnosis, please stop and discuss with Navigator prior to scheduling)  na   When was the diagnosis made? 2008   Were outside slides requested (If biopsy done eternally, obtain reports and slides for internal review)  No   Have you had any imaging or labs done? No   If YES, when and  where was the blood work done? na    (If outside of [facility] obtain records)  na   Was the patient told to bring a disk? No   Are records in EPIC? NA   Is there a personal history of cancer? No    (If YES please list type and YR diagnosed)  No   If patient has a prior history of breast cancer were old records obtained? NA   Is there a family history of cancer? No    (If YES please list type)  No   Does the patient smoke or Vape? no    If yes, how many packs or cartridges per day? na   Scheduling Information:   Preferred [facility]  Any   Are there any days the patient cannot be seen? na   Miscellaneous: [facility] cancer center pt was diagnosised  Call was lost, unable to continue with scheduling  After completing the above information, please route to finance, nurse navigation and clinical trials for review

## 2021-02-15 NOTE — TELEPHONE ENCOUNTER
New Patient Breast Form   Patient Details:     Tiff Pitts     1960     [id number]     Background Information:   [address] starts by opening a telephone encounter and gathering the following information   Who is calling to schedule and relationship? self   Referring Provider Steve Tripp PA-C   To which speciality is the referral? Medical Oncology   Reason for Visit? History of    Tumor Type? Breast Ca   Is there a confimed diagnosis from biopsy/tissue reviewed by Pathology? Yes   Date of Tissue Diagnosis (If done outside of [facility] please obtain report and slides) 2/2008   Is patient aware of diagnosis, and who made them aware? yes   (If no tissue diagnosis, please stop and discuss with Navigator prior to scheduling)     When was the diagnosis made? 2/2008   Were outside slides requested (If biopsy done eternally, obtain reports and slides for internal review)  Yes - will request once bx faxed   Have you had any imaging or labs done? Yes   If YES, when and  where was the blood work done? [facility]    (If outside of [facility] obtain records)     Was the patient told to bring a disk? Yes   Are records in EPIC? NA -- records being faxed   Is there a personal history of cancer? Yes    (If YES please list type and YR diagnosed)  2008   If patient has a prior history of breast cancer were old records obtained? Yes-- being faxed   Is there a family history of cancer? No    (If YES please list type)  na   Does the patient smoke or Vape? no    If yes, how many packs or cartridges per day? Scheduling Information:   [facility]   Are there any days the patient cannot be seen? Miscellaneous: Lumpectomy 2008   After completing the above information, please route to finance, nurse navigation and clinical trials for review

## 2021-02-17 ENCOUNTER — OFFICE VISIT (OUTPATIENT)
Dept: FAMILY MEDICINE CLINIC | Facility: CLINIC | Age: 61
End: 2021-02-17
Payer: COMMERCIAL

## 2021-02-17 VITALS
SYSTOLIC BLOOD PRESSURE: 148 MMHG | HEART RATE: 85 BPM | DIASTOLIC BLOOD PRESSURE: 82 MMHG | HEIGHT: 67 IN | OXYGEN SATURATION: 98 % | WEIGHT: 152 LBS | TEMPERATURE: 97.5 F | BODY MASS INDEX: 23.86 KG/M2

## 2021-02-17 DIAGNOSIS — S76.011A HIP STRAIN, RIGHT, INITIAL ENCOUNTER: Primary | ICD-10-CM

## 2021-02-17 DIAGNOSIS — R68.84 JAW PAIN: ICD-10-CM

## 2021-02-17 PROCEDURE — 99214 OFFICE O/P EST MOD 30 MIN: CPT | Performed by: PHYSICIAN ASSISTANT

## 2021-02-17 NOTE — PROGRESS NOTES
Assessment/Plan:    Problem List Items Addressed This Visit     None      Visit Diagnoses     Hip strain, right, initial encounter    -  Primary    Jaw pain               Diagnoses and all orders for this visit:    Hip strain, right, initial encounter    Jaw pain        No palpable lumps or lymphadenopathy on left side of neck  I provided reassurance  She will continue to monitor    Recommended that she use ice, rest, stretching, and Aleve as needed to help with hip strain  She will notify us if symptoms do not improve or worsen  Subjective:      Patient ID: Chrissie Woodall is a 56 y o  female  Marlene Lawton is here today complaining of a lump on the left side of her jaw  She noticed it in August  It has not changed is shape or size  It is painful if you touch it  It does not get worse with chewing  It only hurts if she presses on it  She does grind her teeth at night  She is due to see the dentist  She denies any ear pain, sore throat, sinus pain, sinus pressure, fevers, or chills  She is also complaining of right hip pain  She feel on the ice a few weeks ago and landed on her right side  She is not having any trouble ambulating  She denies any pain radiating into her groin, numbness, or tingling  She has been massaging, stretching, icing, and taking Aleve as needed  It seems to be getting better everyday, but she wanted to get it checked out  The following portions of the patient's history were reviewed and updated as appropriate:   She has a past medical history of Atrial fibrillation ([facility] ), Cancer ([facility] ), and Disease of thyroid gland  ,  does not have any pertinent problems on file  ,   has a past surgical history that includes Breast surgery; Lymph node dissection; Mandible fracture surgery; Rotator cuff repair; and Partial hysterectomy  ,  family history includes Alzheimer's disease in her mother;  Angina in her mother; Diabetes in her father; Heart disease in her brother and sister; Prostate cancer in her brother  ,   reports that she has never smoked  She has never used smokeless tobacco  She reports that she does not drink alcohol  No history on file for drug ,  is allergic to penicillin g; trazodone; augmentin [amoxicillin-pot clavulanate]; cephalexin; ciprofloxacin; codeine; and sulfa antibiotics     Current Outpatient Medications   Medication Sig Dispense Refill    Alpha-Lipoic Acid (LIPOIC ACID PO) Take by mouth      Ascorbic Acid (vitamin C) 500 MG/5ML syrup Take 500 mg by mouth daily      aspirin (ECOTRIN LOW STRENGTH) 81 mg EC tablet Take 81 mg by mouth daily      ASTAXANTHIN PO Take by mouth      betamethasone dipropionate 0 05 % lotion       Cholecalciferol (Vitamin D-1000 Max St) 25 MCG (1000 UT) tablet Take 4,000 Units by mouth daily      Coenzyme Q10 (CO Q 10 PO) Take by mouth      cyclobenzaprine (FLEXERIL) 5 mg tablet Take 5 mg by mouth Three times daily as needed      doxycycline hyclate (VIBRAMYCIN) 100 mg capsule Take 100 mg by mouth      flecainide (TAMBOCOR) 50 mg tablet Take 1 tablet (50 mg total) by mouth 2 (two) times a day 60 tablet 0    fluticasone (FLOVENT DISKUS) 50 MCG/BLIST diskus inhaler daily as needed   levothyroxine 75 mcg tablet Take 1 tablet (75 mcg total) by mouth daily in the early morning 30 tablet 2    loratadine (CLARITIN) 10 mg tablet Take 10 mg by mouth      metoprolol succinate (TOPROL-XL) 50 mg 24 hr tablet Take 50 mg by mouth daily As needed if bp is over 135      multivitamin (THERAGRAN) TABS Take 1 tablet by mouth daily      Naproxen Sodium 220 MG CAPS Take by mouth      omeprazole (PriLOSEC) 20 mg delayed release capsule daily as needed   zolpidem (AMBIEN) 5 mg tablet Take 5 mg by mouth       No current facility-administered medications for this visit  Review of Systems   Constitutional: Negative for chills, diaphoresis, fatigue and fever     HENT: Negative for congestion, ear pain, postnasal drip, rhinorrhea, sneezing, sore throat and trouble swallowing  Palpable lump behind left jaw   Eyes: Negative for pain and visual disturbance  Respiratory: Negative for apnea, cough, shortness of breath and wheezing  Cardiovascular: Negative for chest pain  Gastrointestinal: Negative for abdominal pain, constipation, diarrhea, nausea and vomiting  Genitourinary: Negative for dysuria and hematuria  Musculoskeletal: Positive for arthralgias (right hip pain)  Negative for gait problem and myalgias  Neurological: Negative for dizziness, syncope, weakness, light-headedness, numbness and headaches  Psychiatric/Behavioral: Negative for suicidal ideas  The patient is not nervous/anxious  Objective:  Vitals:    02/17/21 1430   BP: 148/82   Pulse: 85   Temp: 97 5 °F (36 4 °C)   SpO2: 98%   Weight: 68 9 kg (152 lb)   Height: 5' 7" (1 702 m)     Body mass index is 23 81 kg/m²  Physical Exam  Vitals signs and nursing note reviewed  Constitutional:       Appearance: She is well-developed  HENT:      Head: Normocephalic and atraumatic  No masses  Comments: No palpable lump or lymphadenopathy in area of concern     Right Ear: Tympanic membrane, ear canal and external ear normal       Left Ear: Tympanic membrane, ear canal and external ear normal       Nose: Nose normal       Mouth/Throat:      Pharynx: No oropharyngeal exudate or posterior oropharyngeal erythema  Eyes:      Pupils: Pupils are equal, round, and reactive to light  Neck:      Musculoskeletal: Normal range of motion and neck supple  Cardiovascular:      Rate and Rhythm: Normal rate and regular rhythm  Heart sounds: Normal heart sounds  No murmur  No friction rub  No gallop  Pulmonary:      Effort: Pulmonary effort is normal  No respiratory distress  Breath sounds: Normal breath sounds  No wheezing or rales  Musculoskeletal: Normal range of motion  Right hip: She exhibits tenderness (lateral hip)   She exhibits normal range of motion, normal strength, no bony tenderness, no swelling, no crepitus, no deformity and no laceration  Lymphadenopathy:      Head:      Right side of head: No submandibular, tonsillar, preauricular or posterior auricular adenopathy  Left side of head: No submandibular, tonsillar, preauricular or posterior auricular adenopathy  Cervical: No cervical adenopathy  Right cervical: No superficial, deep or posterior cervical adenopathy  Left cervical: No superficial, deep or posterior cervical adenopathy  Skin:     General: Skin is warm and dry  Neurological:      Mental Status: She is alert and oriented to person, place, and time  Psychiatric:         Behavior: Behavior normal          Thought Content:  Thought content normal          Judgment: Judgment normal

## 2021-02-18 ENCOUNTER — CONSULT (OUTPATIENT)
Dept: CARDIOLOGY CLINIC | Facility: CLINIC | Age: 61
End: 2021-02-18
Payer: COMMERCIAL

## 2021-02-18 VITALS
DIASTOLIC BLOOD PRESSURE: 89 MMHG | HEART RATE: 80 BPM | BODY MASS INDEX: 23.86 KG/M2 | HEIGHT: 67 IN | WEIGHT: 152 LBS | SYSTOLIC BLOOD PRESSURE: 129 MMHG

## 2021-02-18 DIAGNOSIS — I48.0 PAROXYSMAL ATRIAL FIBRILLATION (HCC): ICD-10-CM

## 2021-02-18 DIAGNOSIS — R07.9 CHEST PAIN, UNSPECIFIED TYPE: Primary | ICD-10-CM

## 2021-02-18 PROCEDURE — 99204 OFFICE O/P NEW MOD 45 MIN: CPT | Performed by: PHYSICIAN ASSISTANT

## 2021-02-18 NOTE — PATIENT INSTRUCTIONS
Metoprolol 25 mg as need for racing heart     Monitor will be mailed out to be worn for two weeks   Nuclear stress test     And return in one month

## 2021-02-18 NOTE — ASSESSMENT & PLAN NOTE
Patient has a long standing history fo PAF    On Flecainide and is mostly in sinus rhythm    Has been having breakthrough   Advised to take metoprolol 25 mg for breakthrough      TSH is low and levothyroxine has recently been adjusted   She also is in a Post-COVID state which could also contribute to having breakthroughs of afib with RVR  Will assess rate control with 2 week zio patch

## 2021-02-18 NOTE — ASSESSMENT & PLAN NOTE
Likely due to demand mismatch from rapid heart rate  Will assess for ischemia with nuclear non-walking stress test

## 2021-02-18 NOTE — PROGRESS NOTES
Virtual Regular Visit      Assessment/Plan:  Paroxysmal atrial fibrillation (HCC)  Patient has a long standing history fo PAF    On Flecainide and is mostly in sinus rhythm    Has been having breakthrough   Advised to take metoprolol 25 mg for breakthrough      TSH is low and levothyroxine has recently been adjusted   Will assess rate control with 2 week zio patch  Acquired hypothyroidism  Recent medication adjustment   Will continue to monitor rate and PAF breakthrough   See comments     Chest pain  Likely due to demand mismatch from rapid heart rate  Will assess for ischemia with nuclear non-walking stress test      Reason for visit is   Chief Complaint   Patient presents with    Atrial Fibrillation    Virtual Regular Visit        Encounter provider Yamilet Herrera PA-C    Provider located at [address] [address]      Recent Visits  Date Type Provider Dept   02/17/21 Office Visit [facility]   02/12/21 Office Visit [facility]   Showing recent visits within past 7 days and meeting all other requirements     Future Appointments  No visits were found meeting these conditions  Showing future appointments within next 150 days and meeting all other requirements        The patient was identified by name and date of birth  Rosanna Peres was informed that this is a telemedicine visit and that the visit is being conducted through [facility] and patient was informed that this is a secure, HIPAA-compliant platform  She agrees to proceed     My office door was closed  No one else was in the room  She acknowledged consent and understanding of privacy and security of the video platform  The patient has agreed to participate and understands they can discontinue the visit at any time  Patient is aware this is a billable service       Kacey Ramirez Georgette Gtz is a 56 y o  female with PAF   The patient is seen regarding a recent trip to the ED due to palpations with rapid rate  That patient has a longstanding history of PAF and has been on flecainide  She sensed that she "went back into afib with a fast and irregular rate  She became SOB and had some chest tightness  She went to the ED, by the time an EKG was obtained she was back in sinus rhythm   She was recently diagnosed with COVID and was off quarantine on the 27th of January  In addition she recently had blood work that showed she was hyperthyroid  Both conditions could contribute to her having breakthroughs in afib with RVR  Her chest tightness only occurs when she has breakthroughs and does not occur with other times of exertion  She is otherwise healthy and very active  She has had no syncope  She has had no TIA or CVA symptoms  She has had no edema orhtopnea or PND            Past Medical History:   Diagnosis Date    [facility] [facility]     Cancer [facility] [address] [facility]     Disease of thyroid gland        Past Surgical History:   Procedure Laterality Date    BREAST SURGERY      LYMPH NODE DISSECTION      MANDIBLE FRACTURE SURGERY      PARTIAL HYSTERECTOMY      ROTATOR CUFF REPAIR         Current Outpatient Medications   Medication Sig Dispense Refill    Alpha-Lipoic Acid (LIPOIC ACID PO) Take by mouth      Ascorbic Acid (vitamin C) 500 MG/5ML syrup Take 500 mg by mouth daily      aspirin (ECOTRIN LOW STRENGTH) 81 mg EC tablet Take 81 mg by mouth daily      ASTAXANTHIN PO Take by mouth      betamethasone dipropionate 0 05 % lotion       Cholecalciferol (Vitamin D-1000 Max St) 25 MCG (1000 UT) tablet Take 4,000 Units by mouth daily      Coenzyme Q10 (CO Q 10 PO) Take by mouth      cyclobenzaprine (FLEXERIL) 5 mg tablet Take 5 mg by mouth Three times daily as needed      flecainide (TAMBOCOR) 50 mg tablet Take 1 tablet (50 mg total) by mouth 2 (two) times a day 60 tablet 0    fluticasone (FLOVENT DISKUS) 50 MCG/BLIST diskus inhaler daily as needed   levothyroxine 75 mcg tablet Take 1 tablet (75 mcg total) by mouth daily in the early morning 30 tablet 2    loratadine (CLARITIN) 10 mg tablet Take 10 mg by mouth      metoprolol succinate (TOPROL-XL) 50 mg 24 hr tablet Take 50 mg by mouth daily As needed if bp is over 135      multivitamin (THERAGRAN) TABS Take 1 tablet by mouth daily      zolpidem (AMBIEN) 5 mg tablet Take 5 mg by mouth daily at bedtime as needed       doxycycline hyclate (VIBRAMYCIN) 100 mg capsule Take 100 mg by mouth      metoprolol tartrate (LOPRESSOR) 25 mg tablet Take 1 tablet (25 mg total) by mouth as needed (PAF) 30 tablet 3    Naproxen Sodium 220 MG CAPS Take by mouth      omeprazole (PriLOSEC) 20 mg delayed release capsule daily as needed  No current facility-administered medications for this visit  Allergies   Allergen Reactions    Penicillin G Other (See Comments)    Trazodone Headache     Headache and blurred vision    Augmentin [Amoxicillin-Pot Clavulanate] Palpitations    Cephalexin Palpitations    Ciprofloxacin Tachycardia and Palpitations     Other reaction(s): Other (see comments)    Codeine Other (See Comments), Palpitations and Tachycardia     unspecified  nausea      Sulfa Antibiotics Other (See Comments), Palpitations and Tachycardia       Review of Systems   Constitutional: Negative  Negative for fatigue and fever  HENT: Negative  Negative for nosebleeds  Eyes: Negative  Negative for visual disturbance  Respiratory: Positive for shortness of breath  Negative for chest tightness and wheezing  Cardiovascular: Positive for chest pain and palpitations  Negative for leg swelling  Gastrointestinal: Negative  Negative for abdominal pain, nausea and vomiting  Genitourinary: Negative  Negative for hematuria  Musculoskeletal: Negative  Skin: Negative  Negative for pallor and rash     Neurological: Negative  Negative for dizziness, syncope and light-headedness  Hematological: Negative  Does not bruise/bleed easily  All other systems reviewed and are negative  Video Exam    Vitals:    02/18/21 1041   BP: 129/89   Pulse: 80   Weight: 68 9 kg (152 lb)   Height: 5' 7" (1 702 m)       Physical Exam  Constitutional:       General: She is not in acute distress  Appearance: She is well-developed  She is not diaphoretic  HENT:      Head: Normocephalic and atraumatic  Right Ear: External ear normal       Left Ear: External ear normal       Nose: Nose normal    Eyes:      General: No scleral icterus  Right eye: No discharge  Left eye: No discharge  Conjunctiva/sclera: Conjunctivae normal       Pupils: Pupils are equal, round, and reactive to light  Neck:      Musculoskeletal: Normal range of motion and neck supple  Thyroid: No thyromegaly  Vascular: No JVD  Trachea: No tracheal deviation  Cardiovascular:      Rate and Rhythm: Normal rate  Pulmonary:      Effort: No respiratory distress  Chest:      Chest wall: No tenderness  Abdominal:      Palpations: Abdomen is soft  Musculoskeletal: Normal range of motion  General: No tenderness or deformity  Lymphadenopathy:      Cervical: No cervical adenopathy  Skin:     General: Skin is warm and dry  Coloration: Skin is not pale  Findings: No erythema or rash  Neurological:      Mental Status: She is alert and oriented to person, place, and time  Psychiatric:         Behavior: Behavior normal          Thought Content: Thought content normal          Judgment: Judgment normal           I spent 25 minutes directly with the patient during this visit      VIRTUAL VISIT DISCLAIMER    Cher Thomson acknowledges that she has consented to an online visit or consultation   She understands that the online visit is based solely on information provided by her, and that, in the absence of a face-to-face physical evaluation by the physician, the diagnosis she receives is both limited and provisional in terms of accuracy and completeness  This is not intended to replace a full medical face-to-face evaluation by the physician  Meri Plata understands and accepts these terms

## 2021-02-22 ENCOUNTER — TELEMEDICINE (OUTPATIENT)
Dept: FAMILY MEDICINE CLINIC | Facility: CLINIC | Age: 61
End: 2021-02-22
Payer: COMMERCIAL

## 2021-02-22 VITALS
SYSTOLIC BLOOD PRESSURE: 130 MMHG | HEIGHT: 67 IN | DIASTOLIC BLOOD PRESSURE: 87 MMHG | BODY MASS INDEX: 23.86 KG/M2 | TEMPERATURE: 97.2 F | WEIGHT: 152 LBS

## 2021-02-22 DIAGNOSIS — J06.9 UPPER RESPIRATORY TRACT INFECTION, UNSPECIFIED TYPE: Primary | ICD-10-CM

## 2021-02-22 PROCEDURE — 1036F TOBACCO NON-USER: CPT | Performed by: PHYSICIAN ASSISTANT

## 2021-02-22 PROCEDURE — 3008F BODY MASS INDEX DOCD: CPT | Performed by: PHYSICIAN ASSISTANT

## 2021-02-22 PROCEDURE — 99213 OFFICE O/P EST LOW 20 MIN: CPT | Performed by: PHYSICIAN ASSISTANT

## 2021-02-22 RX ORDER — AZITHROMYCIN 250 MG/1
TABLET, FILM COATED ORAL
Qty: 6 TABLET | Refills: 0 | Status: SHIPPED | OUTPATIENT
Start: 2021-02-22 | End: 2021-02-26

## 2021-02-22 NOTE — PROGRESS NOTES
Virtual Regular Visit      Assessment/Plan:    Problem List Items Addressed This Visit     None      Visit Diagnoses     Upper respiratory tract infection, unspecified type    -  Primary    Relevant Medications    azithromycin (ZITHROMAX) 250 mg tablet        Script for zithromax  Recommended that she continue symptomatic relief  She will notify us of any new or worsening symptoms  Reason for visit is   Chief Complaint   Patient presents with    Cough     Started Thursday with bad cough     Virtual Regular Visit        Encounter provider Fortunato Jewell PA-C    Provider located at [address] [address]      Recent Visits  Date Type Provider Dept   02/17/21 Office Visit Fortun[facility] Jewell [facility]   Showing recent visits within past 7 days and meeting all other requirements     Today's Visits  Date Type Provider Dept   02/22/21 Telemedicine Fortunato Jewell PA-C [facility]   Showing today's visits and meeting all other requirements     Future Appointments  No visits were found meeting these conditions  Showing future appointments within next 150 days and meeting all other requirements        The patient was identified by name and date of birth  Ernestina Whelan was informed that this is a telemedicine visit and that the visit is being conducted through [facility] and patient was informed that this is not a secure, HIPAA-compliant platform  She agrees to proceed     My office door was closed  No one else was in the room  She acknowledged consent and understanding of privacy and security of the video platform  The patient has agreed to participate and understands they can discontinue the visit at any time  Patient is aware this is a billable service  Subjective  Ernestina Whelan is a 56 y o  female  Sofie Morel is here today complaining of dry cough, sinus pain, sinus pressure, headache, and post nasal drainage since Thursday   She has not tried anything OTC to help with her symptoms  She denies fevers, chills, runny nose, sore throat, sneezing, body aches, nausea, vomiting, or diarrhea  She recently had COVID approximately one month ago and made a full recovery  Past Medical History:   Diagnosis Date    [facility]     Cancer [facility]     Disease of thyroid gland        Past Surgical History:   Procedure Laterality Date    BREAST SURGERY      LYMPH NODE DISSECTION      MANDIBLE FRACTURE SURGERY      PARTIAL HYSTERECTOMY      ROTATOR CUFF REPAIR         Current Outpatient Medications   Medication Sig Dispense Refill    Alpha-Lipoic Acid (LIPOIC ACID PO) Take by mouth      Ascorbic Acid (vitamin C) 500 MG/5ML syrup Take 500 mg by mouth daily      aspirin (ECOTRIN LOW STRENGTH) 81 mg EC tablet Take 81 mg by mouth daily      ASTAXANTHIN PO Take by mouth      betamethasone dipropionate 0 05 % lotion       Cholecalciferol (Vitamin D-1000 Max St) 25 MCG (1000 UT) tablet Take 4,000 Units by mouth daily      Coenzyme Q10 (CO Q 10 PO) Take by mouth      cyclobenzaprine (FLEXERIL) 5 mg tablet Take 5 mg by mouth Three times daily as needed      doxycycline hyclate (VIBRAMYCIN) 100 mg capsule Take 100 mg by mouth      flecainide (TAMBOCOR) 50 mg tablet Take 1 tablet (50 mg total) by mouth 2 (two) times a day 60 tablet 0    fluticasone (FLOVENT DISKUS) 50 MCG/BLIST diskus inhaler daily as needed        levothyroxine 75 mcg tablet Take 1 tablet (75 mcg total) by mouth daily in the early morning 30 tablet 2    loratadine (CLARITIN) 10 mg tablet Take 10 mg by mouth      metoprolol succinate (TOPROL-XL) 50 mg 24 hr tablet Take 50 mg by mouth daily As needed if bp is over 135      metoprolol tartrate (LOPRESSOR) 25 mg tablet Take 1 tablet (25 mg total) by mouth as needed (PAF) 30 tablet 3    multivitamin (THERAGRAN) TABS Take 1 tablet by mouth daily      Naproxen Sodium 220 MG CAPS Take by mouth      omeprazole (PriLOSEC) 20 mg delayed release capsule daily as needed   zolpidem (AMBIEN) 5 mg tablet Take 5 mg by mouth daily at bedtime as needed       azithromycin (ZITHROMAX) 250 mg tablet Take 2 tablets today then 1 tablet daily x 4 days 6 tablet 0     No current facility-administered medications for this visit  Allergies   Allergen Reactions    Penicillin G Other (See Comments)    Trazodone Headache     Headache and blurred vision    Augmentin [Amoxicillin-Pot Clavulanate] Palpitations    Cephalexin Palpitations    Ciprofloxacin Tachycardia and Palpitations     Other reaction(s): Other (see comments)    Codeine Other (See Comments), Palpitations and Tachycardia     unspecified  nausea      Sulfa Antibiotics Other (See Comments), Palpitations and Tachycardia       Review of Systems   Constitutional: Negative for chills, diaphoresis, fatigue and fever  HENT: Positive for congestion, postnasal drip, sinus pressure and sinus pain  Negative for ear pain, rhinorrhea, sneezing, sore throat and trouble swallowing  Eyes: Negative for pain and visual disturbance  Respiratory: Positive for cough  Negative for apnea, shortness of breath and wheezing  Cardiovascular: Negative for chest pain and palpitations  Gastrointestinal: Negative for abdominal pain, constipation, diarrhea, nausea and vomiting  Genitourinary: Negative for dysuria and hematuria  Musculoskeletal: Negative for arthralgias, gait problem and myalgias  Neurological: Positive for headaches  Negative for dizziness, syncope, weakness, light-headedness and numbness  Psychiatric/Behavioral: Negative for suicidal ideas  The patient is not nervous/anxious  Video Exam    Vitals:    02/22/21 1401   BP: 130/87   Temp: (!) 97 2 °F (36 2 °C)   Weight: 68 9 kg (152 lb)   Height: 5' 7" (1 702 m)       Physical Exam  Vitals signs and nursing note reviewed  Constitutional:       General: She is not in acute distress       Appearance: She is well-developed  She is not ill-appearing, toxic-appearing or diaphoretic  HENT:      Head: Normocephalic and atraumatic  Pulmonary:      Effort: Pulmonary effort is normal  No respiratory distress (Patient is speaking in full, fluent sentences  She does not appear in any acute distress)  Neurological:      Mental Status: She is alert and oriented to person, place, and time  Psychiatric:         Behavior: Behavior normal  Behavior is cooperative  Thought Content: Thought content normal          Judgment: Judgment normal           I spent 10 minutes directly with the patient during this visit      VIRTUAL VISIT DISCLAIMER    Antonio Hooper acknowledges that she has consented to an online visit or consultation  She understands that the online visit is based solely on information provided by her, and that, in the absence of a face-to-face physical evaluation by the physician, the diagnosis she receives is both limited and provisional in terms of accuracy and completeness  This is not intended to replace a full medical face-to-face evaluation by the physician  Antonio Hooper understands and accepts these terms

## 2021-02-25 ENCOUNTER — HOSPITAL ENCOUNTER (OUTPATIENT)
Dept: NUCLEAR MEDICINE | Facility: HOSPITAL | Age: 61
Discharge: HOME | End: 2021-02-25
Payer: COMMERCIAL

## 2021-02-25 DIAGNOSIS — R07.9 CHEST PAIN, UNSPECIFIED TYPE: ICD-10-CM

## 2021-02-25 DIAGNOSIS — I48.0 PAROXYSMAL ATRIAL FIBRILLATION (HCC): ICD-10-CM

## 2021-02-25 PROCEDURE — 93018 CV STRESS TEST I&R ONLY: CPT | Performed by: INTERNAL MEDICINE

## 2021-02-25 PROCEDURE — G1004 CDSM NDSC: HCPCS

## 2021-02-25 PROCEDURE — 78452 HT MUSCLE IMAGE SPECT MULT: CPT

## 2021-02-25 PROCEDURE — A9502 TC99M TETROFOSMIN: HCPCS

## 2021-02-25 PROCEDURE — 78452 HT MUSCLE IMAGE SPECT MULT: CPT | Performed by: INTERNAL MEDICINE

## 2021-02-25 PROCEDURE — 93016 CV STRESS TEST SUPVJ ONLY: CPT | Performed by: INTERNAL MEDICINE

## 2021-02-25 PROCEDURE — 93017 CV STRESS TEST TRACING ONLY: CPT

## 2021-02-25 RX ADMIN — REGADENOSON 0.4 MG: 0.08 INJECTION, SOLUTION INTRAVENOUS at 09:35

## 2021-03-01 ENCOUNTER — EVALUATION (OUTPATIENT)
Dept: PHYSICAL THERAPY | Facility: CLINIC | Age: 61
End: 2021-03-01
Payer: COMMERCIAL

## 2021-03-01 ENCOUNTER — PROCEDURE VISIT (OUTPATIENT)
Dept: CARDIOLOGY CLINIC | Facility: CLINIC | Age: 61
End: 2021-03-01
Payer: COMMERCIAL

## 2021-03-01 DIAGNOSIS — M54.41 ACUTE BILATERAL LOW BACK PAIN WITH RIGHT-SIDED SCIATICA: ICD-10-CM

## 2021-03-01 DIAGNOSIS — I48.0 PAROXYSMAL ATRIAL FIBRILLATION (HCC): ICD-10-CM

## 2021-03-01 DIAGNOSIS — M54.2 NECK PAIN: Primary | ICD-10-CM

## 2021-03-01 DIAGNOSIS — M54.6 ACUTE BILATERAL THORACIC BACK PAIN: ICD-10-CM

## 2021-03-01 PROCEDURE — 97164 PT RE-EVAL EST PLAN CARE: CPT | Performed by: PHYSICAL THERAPIST

## 2021-03-01 PROCEDURE — 97535 SELF CARE MNGMENT TRAINING: CPT | Performed by: PHYSICAL THERAPIST

## 2021-03-01 PROCEDURE — 97110 THERAPEUTIC EXERCISES: CPT | Performed by: PHYSICAL THERAPIST

## 2021-03-01 PROCEDURE — 97140 MANUAL THERAPY 1/> REGIONS: CPT | Performed by: PHYSICAL THERAPIST

## 2021-03-01 PROCEDURE — 93246 EXT ECG>7D<15D RECORDING: CPT | Performed by: INTERNAL MEDICINE

## 2021-03-01 NOTE — PROGRESS NOTES
PT Evaluation     Today's date: 3/1/2021  Patient name: Kyler Saleh  : 1960  MRN: [id number]  Referring provider: Aamir Macdonald  Dx:   Encounter Diagnosis     ICD-10-CM    1  Neck pain  M54 2 Ambulatory referral to Physical Therapy   2  Acute bilateral thoracic back pain  M54 6 Ambulatory referral to Physical Therapy   3  Acute bilateral low back pain with right-sided sciatica  M54 41 Ambulatory referral to Physical Therapy                  Assessment  Understanding of Dx/Px/POC: good   Prognosis: good    Goals  STGs: To be complete within 4 weeks  - Decrease/centralize pain to < 2/10 at worst  - Increase cervical, lumbar, and hip ROM to WNL  - postural, cervical, and core strength to > 4+/5  - Improve postural awareness capacity to > 60min before deficit    LTGs: To be complete within 6 weeks  - Able to sit for any extended amount of time without pain or limitation for increased safety and functional capacity with ADLs and work-related duty  - Able to read in a cervical flexed position for any extended amount of time without pain or limitation for increased safety and functional capacity with ADLs and and work-related duty  - Able to complete all lifting/carrying activity without pain or limitation for increased safety and functional capacity with ADLs and work-related duty  - Able to complete transfers repetitively without pain or limitation for increased safety and functional capacity with ADLs and work-related duty    Plan  Planned therapy interventions: manual therapy, neuromuscular re-education, patient education, self care, therapeutic activities and therapeutic exercise  Frequency: 2x week  Duration in weeks: 4       Pt is a very pleasant 56 y o  female with chronic L side cervical pain and radicular sx, as well as R side Lumbar pain and radicular sx, who presents with functional deficits including decreased capacity with sitting, lifting/carrying, transfers, and reading   Upon completion of today's initial evaluation, Juju's sx remain consistent with L side post/lat cervical derangement and R side post/lat lumbar derangement secondary to postural dysfunction and improper functional movement mechanics  Pt attempted physical therapy 3 months ago but was unable to continue due to familiy obligations  Pt will benefit from skilled physical therapy to address current deficits  Subjective Evaluation    Pain  Current pain ratin  At best pain ratin  At worst pain ratin  Location: Cervical, Thoracic, Lumbar    Patient Goals  Patient goals for therapy: increased strength, decreased pain and increased motion         Pt reports chronic neck and back for many years  Pt reports her sx have worsened over the past year to R LE radicular sx, as well as L UE radicular sx  Pt reports her sx have continued to worsen to point where it is negatively effecting her overall safety and functional capacity with ADLs and work-related duties  Objective Pain level ranges 2-8/10  AROM: Lumbar extension 50%; Bilateral Hip extension and IR 50%;  Cervical Extension and L Rotation 50%  Strength: Cervical and postural strength 3+/5; Core stability and post/lat LE chain strength 3+/5  Postural awareness: Poor (rounded shoulders, Ant pelvic tilt, flexed trunk)  Repeated movement testing: (+) repeated movement testing for L side post/lat cervical derangement; (+) repeated movements testing for R side post/lat Lumbar derangement  FOTO: 53; GOAL 66  Unable sit without pain and limitation  Unable to complete end range neck movements without pain and limitation  Unable to complete lifting/carrying activity without pain and limitation  Unable to look downward to read without pain and limitation  Unable to sit at a computer reading for > 15min             Precautions: Ry principle for L side post/lat Cervical derangement, and R side post/lat lumbar derangement    Daily Treatment Diary    HEP: Handout provided and discussed      Manuals 3/1/21            ART x15'            PROM/Stretch             IASTM             STM/Triggerpoint             JM                          Neuro Re-Ed             CS Retraction 2x10            UT Stretching             Levator Stretch             Pec doorway stretch             AROM No $  Next session           No $ into [employer]             PPU 2x10                         Ther Ex             CS Ext with towel 2x10            C Rot with Towel L Only 2x10            Scap Retract  Next session           Scap Depression             TB No $             TB Row  Next session           T-Band LTP  Next session           Prone R Hip extension 2x10 R Leg only            Prone Hip IR 3x10 L2            Prone Hip ER 3x10                                                   Ther Activity             UBE: Retro                          Gait Training                                       Modalities             CP No cold            MHP x10 Prone lumbar and cervical x10'            US/Stim

## 2021-03-05 ENCOUNTER — ANESTHESIA (OUTPATIENT)
Dept: PERIOP | Facility: HOSPITAL | Age: 61
End: 2021-03-05

## 2021-03-05 ENCOUNTER — HOSPITAL ENCOUNTER (OUTPATIENT)
Dept: PERIOP | Facility: HOSPITAL | Age: 61
Setting detail: OUTPATIENT SURGERY
Discharge: HOME | End: 2021-03-05
Attending: INTERNAL MEDICINE | Admitting: INTERNAL MEDICINE
Payer: COMMERCIAL

## 2021-03-05 ENCOUNTER — ANESTHESIA EVENT (OUTPATIENT)
Dept: PERIOP | Facility: HOSPITAL | Age: 61
End: 2021-03-05

## 2021-03-05 VITALS
HEIGHT: 67 IN | BODY MASS INDEX: 23.54 KG/M2 | HEART RATE: 75 BPM | RESPIRATION RATE: 20 BRPM | OXYGEN SATURATION: 99 % | DIASTOLIC BLOOD PRESSURE: 74 MMHG | SYSTOLIC BLOOD PRESSURE: 134 MMHG | TEMPERATURE: 98.1 F | WEIGHT: 150 LBS

## 2021-03-05 DIAGNOSIS — Z12.11 SCREEN FOR COLON CANCER: ICD-10-CM

## 2021-03-05 PROCEDURE — 45380 COLONOSCOPY AND BIOPSY: CPT | Performed by: INTERNAL MEDICINE

## 2021-03-05 PROCEDURE — 88305 TISSUE EXAM BY PATHOLOGIST: CPT | Performed by: PATHOLOGY

## 2021-03-05 RX ORDER — LIDOCAINE HYDROCHLORIDE 10 MG/ML
0.5 INJECTION, SOLUTION EPIDURAL; INFILTRATION; INTRACAUDAL; PERINEURAL ONCE AS NEEDED
Status: DISCONTINUED | OUTPATIENT
Start: 2021-03-05 | End: 2021-03-09 | Stop reason: HOSPADM

## 2021-03-05 RX ORDER — LIDOCAINE HYDROCHLORIDE 20 MG/ML
INJECTION, SOLUTION EPIDURAL; INFILTRATION; INTRACAUDAL; PERINEURAL AS NEEDED
Status: DISCONTINUED | OUTPATIENT
Start: 2021-03-05 | End: 2021-03-05

## 2021-03-05 RX ORDER — SODIUM CHLORIDE, SODIUM LACTATE, POTASSIUM CHLORIDE, CALCIUM CHLORIDE 600; 310; 30; 20 MG/100ML; MG/100ML; MG/100ML; MG/100ML
50 INJECTION, SOLUTION INTRAVENOUS CONTINUOUS
Status: DISCONTINUED | OUTPATIENT
Start: 2021-03-05 | End: 2021-03-09 | Stop reason: HOSPADM

## 2021-03-05 RX ORDER — PROPOFOL 10 MG/ML
INJECTION, EMULSION INTRAVENOUS CONTINUOUS PRN
Status: DISCONTINUED | OUTPATIENT
Start: 2021-03-05 | End: 2021-03-05

## 2021-03-05 RX ORDER — SODIUM CHLORIDE, SODIUM LACTATE, POTASSIUM CHLORIDE, CALCIUM CHLORIDE 600; 310; 30; 20 MG/100ML; MG/100ML; MG/100ML; MG/100ML
INJECTION, SOLUTION INTRAVENOUS CONTINUOUS PRN
Status: DISCONTINUED | OUTPATIENT
Start: 2021-03-05 | End: 2021-03-05

## 2021-03-05 RX ORDER — PROPOFOL 10 MG/ML
INJECTION, EMULSION INTRAVENOUS AS NEEDED
Status: DISCONTINUED | OUTPATIENT
Start: 2021-03-05 | End: 2021-03-05

## 2021-03-05 RX ADMIN — PROPOFOL 140 MCG/KG/MIN: 10 INJECTION, EMULSION INTRAVENOUS at 09:52

## 2021-03-05 RX ADMIN — PROPOFOL 100 MG: 10 INJECTION, EMULSION INTRAVENOUS at 09:52

## 2021-03-05 RX ADMIN — SODIUM CHLORIDE, SODIUM LACTATE, POTASSIUM CHLORIDE, AND CALCIUM CHLORIDE: .6; .31; .03; .02 INJECTION, SOLUTION INTRAVENOUS at 09:31

## 2021-03-05 RX ADMIN — SODIUM CHLORIDE, SODIUM LACTATE, POTASSIUM CHLORIDE, AND CALCIUM CHLORIDE 50 ML/HR: .6; .31; .03; .02 INJECTION, SOLUTION INTRAVENOUS at 09:23

## 2021-03-05 RX ADMIN — LIDOCAINE HYDROCHLORIDE 50 MG: 20 INJECTION, SOLUTION EPIDURAL; INFILTRATION; INTRACAUDAL; PERINEURAL at 09:52

## 2021-03-05 NOTE — H&P
History and Physical -  [facility]  Laith Shin 56 y o  female MRN: [id number]                  HPI: Laith Shin is a 56y o  year old female who presents for CRC screening  REVIEW OF SYSTEMS: Per the HPI, and otherwise unremarkable  Historical Information   Past Medical History:   Diagnosis Date    [facility] [address] [facility]     Cancer [facility] [address] [facility]     Disease of thyroid gland      Past Surgical History:   Procedure Laterality Date    BREAST SURGERY      HYSTERECTOMY      LYMPH NODE DISSECTION      MANDIBLE FRACTURE SURGERY      PARTIAL HYSTERECTOMY      ROTATOR CUFF REPAIR       Social History   Social History     Substance and Sexual Activity   Alcohol Use Never    Frequency: Never     Social History     Substance and Sexual Activity   Drug Use Not on file     Social History     Tobacco Use   Smoking Status Never Smoker   Smokeless Tobacco Never Used     Family History   Problem Relation Age of Onset    Alzheimer's disease Mother     Angina Mother     Diabetes Father     Heart disease Sister     Heart disease Brother     Prostate cancer Brother        Meds/Allergies     (Not in a hospital admission)      Allergies   Allergen Reactions    Penicillin G Other (See Comments)    Trazodone Headache     Headache and blurred vision    Augmentin [Amoxicillin-Pot Clavulanate] Palpitations    Cephalexin Palpitations    Ciprofloxacin Tachycardia and Palpitations     Other reaction(s): Other (see comments)    Codeine Other (See Comments), Palpitations and Tachycardia     unspecified  nausea      Sulfa Antibiotics Other (See Comments), Palpitations and Tachycardia       Objective     There were no vitals taken for this visit  PHYSICAL EXAMINATION:    General Appearance:   Alert, cooperative, no distress   HEENT:  Normocephalic, atraumatic, anicteric  Neck supple, symmetrical, trachea midline     Lungs:   Equal chest rise and unlabored breathing, normal effort, no coughing  Cardiovascular:   No visualized JVD  Abdomen:   No abdominal distension  Skin:   No jaundice, rashes, or lesions  Musculoskeletal:   Normal range of motion visualized  Psych:  Normal affect and normal insight  Neuro:  Alert and appropriate  ASSESSMENT/PLAN:  This is a 56y o  year old female here for colonoscopy, and she is stable and optimized for her procedure

## 2021-03-05 NOTE — ANESTHESIA PREPROCEDURE EVALUATION
Procedure:  COLONOSCOPY    Relevant Problems   ANESTHESIA (within normal limits)      CARDIO   (+) Paroxysmal atrial fibrillation (HCC)      ENDO   (+) Acquired hypothyroidism      MUSCULOSKELETAL   (+) Chronic bilateral thoracic back pain      NEURO/PSYCH   (+) Chronic bilateral thoracic back pain   (+) HX: breast cancer      Other   (+) Headache disorder   (+) Postmastectomy lymphedema syndrome        Physical Exam    Airway    Mallampati score: II  TM Distance: >3 FB  Neck ROM: full     Dental   lower dentures,     Cardiovascular  Cardiovascular exam normal    Pulmonary  Pulmonary exam normal     Other Findings        Anesthesia Plan  ASA Score- 3     Anesthesia Type- IV sedation with anesthesia with ASA Monitors  Additional Monitors:   Airway Plan:     Comment: I, Dami Fermin MD, discussed risks (reviewed with patient on the anesthesia consent form), benefits and alternatives of monitored sedation including the possibility under sedation to have recall or mild discomfort          Plan Factors-    Chart reviewed  EKG reviewed  Patient summary reviewed  Induction- intravenous  Postoperative Plan-     Informed Consent- Anesthetic plan and risks discussed with patient  I personally reviewed this patient with the CRNA  Discussed and agreed on the Anesthesia Plan with the CRNA  Demond Pineda

## 2021-03-05 NOTE — ANESTHESIA POSTPROCEDURE EVALUATION
Post-Op Assessment Note    CV Status:  Stable  Pain Score: 0    Pain management: adequate     Mental Status:  Arousable   Hydration Status:  Stable   PONV Controlled:  None   Airway Patency:  Patent      Post Op Vitals Reviewed: Yes      Staff: CRNA         No complications documented      BP   103/59   Temp  98 5   Pulse  79   Resp   16   SpO2   100

## 2021-03-08 ENCOUNTER — APPOINTMENT (OUTPATIENT)
Dept: PHYSICAL THERAPY | Facility: CLINIC | Age: 61
End: 2021-03-08
Payer: COMMERCIAL

## 2021-03-12 ENCOUNTER — DOCUMENTATION (OUTPATIENT)
Dept: HEMATOLOGY ONCOLOGY | Facility: CLINIC | Age: 61
End: 2021-03-12

## 2021-03-12 NOTE — PROGRESS NOTES
saw that the pts consult was cancelled I bob pt to find out is she is still planing on comeing to [facility] & she sd taht she is ut she has to get records before she can come so as soon as she gets them she ill call to make an appt  she did say that she in transitioning

## 2021-03-15 ENCOUNTER — APPOINTMENT (OUTPATIENT)
Dept: PHYSICAL THERAPY | Facility: CLINIC | Age: 61
End: 2021-03-15
Payer: COMMERCIAL

## 2021-03-18 ENCOUNTER — APPOINTMENT (OUTPATIENT)
Dept: PHYSICAL THERAPY | Facility: CLINIC | Age: 61
End: 2021-03-18
Payer: COMMERCIAL

## 2021-03-18 DIAGNOSIS — I48.91 ATRIAL FIBRILLATION, UNSPECIFIED TYPE (HCC): ICD-10-CM

## 2021-03-19 ENCOUNTER — APPOINTMENT (OUTPATIENT)
Dept: PHYSICAL THERAPY | Facility: CLINIC | Age: 61
End: 2021-03-19
Payer: COMMERCIAL

## 2021-03-19 RX ORDER — FLECAINIDE ACETATE 50 MG/1
TABLET ORAL
Qty: 60 TABLET | Refills: 0 | OUTPATIENT
Start: 2021-03-19

## 2021-03-22 ENCOUNTER — TELEPHONE (OUTPATIENT)
Dept: FAMILY MEDICINE CLINIC | Facility: CLINIC | Age: 61
End: 2021-03-22

## 2021-03-22 DIAGNOSIS — J06.9 UPPER RESPIRATORY TRACT INFECTION, UNSPECIFIED TYPE: Primary | ICD-10-CM

## 2021-03-22 RX ORDER — AZITHROMYCIN 250 MG/1
TABLET, FILM COATED ORAL
Qty: 6 TABLET | Refills: 0 | Status: SHIPPED | OUTPATIENT
Start: 2021-03-22 | End: 2021-03-26

## 2021-03-22 NOTE — TELEPHONE ENCOUNTER
Patient has sinus infection and losing voice, ears, congestion and headache  Asking for Z-lety to be called in since that worked last time

## 2021-03-22 NOTE — TELEPHONE ENCOUNTER
Script for zithromax sent to pharmacy  Make sure that she is taking her allergy medication  It appears Claritin is on her list  She can also start Flonase  Drink plenty of fluids

## 2021-04-06 ENCOUNTER — RA CDI HCC (OUTPATIENT)
Dept: OTHER | Facility: HOSPITAL | Age: 61
End: 2021-04-06

## 2021-04-06 NOTE — PROGRESS NOTES
[facility]  coding oppertunities          Chart reviewed, no opportunity found: CHART REVIEWED, NO OPPORTUNITY FOUND

## 2021-04-07 NOTE — PROGRESS NOTES
PT Discharge      Today's date: 2021  Patient name: Kanwal Curry  : 1960  MRN: [id number]  Referring provider: Mandeep Garcia  Dx:         Encounter Diagnosis       ICD-10-CM     1  Neck pain  M54 2 Ambulatory referral to Physical Therapy   2  Acute bilateral thoracic back pain  M54 6 Ambulatory referral to Physical Therapy   3  Acute bilateral low back pain with right-sided sciatica  M54 41 Ambulatory referral to Physical Therapy            Assessment  Understanding of Dx/Px/POC: good   Prognosis: good   Goals  STGs: To be complete within 4 weeks (Not Met)  - Decrease/centralize pain to < 2/10 at worst  - Increase cervical, lumbar, and hip ROM to WNL  - postural, cervical, and core strength to > 4+/5  - Improve postural awareness capacity to > 60min before deficit    LTGs: To be complete within 6 weeks (Not Met)  - Able to sit for any extended amount of time without pain or limitation for increased safety and functional capacity with ADLs and work-related duty  - Able to read in a cervical flexed position for any extended amount of time without pain or limitation for increased safety and functional capacity with ADLs and and work-related duty  - Able to complete all lifting/carrying activity without pain or limitation for increased safety and functional capacity with ADLs and work-related duty  - Able to complete transfers repetitively without pain or limitation for increased safety and functional capacity with ADLs and work-related duty       Plan     Pt will be D/C from physical therapy, as she has not reached out to schedule since 3/1/21  Goals not met          Subjective Evaluation     Pain  At best pain ratin  At worst pain ratin  Location: Cervical, Thoracic, Lumbar          Objective Pain level ranges 2-8/10  AROM: Lumbar extension 50%; Bilateral Hip extension and IR 50%;  Cervical Extension and L Rotation 50%  Strength: Cervical and postural strength 3+/5; Core stability and post/lat LE chain strength 3+/5  Postural awareness: Poor (rounded shoulders, Ant pelvic tilt, flexed trunk)  Repeated movement testing: (+) repeated movement testing for L side post/lat cervical derangement; (+) repeated movements testing for R side post/lat Lumbar derangement  FOTO: 53; GOAL 66  Unable sit without pain and limitation  Unable to complete end range neck movements without pain and limitation  Unable to complete lifting/carrying activity without pain and limitation  Unable to look downward to read without pain and limitation  Unable to sit at a computer reading for > 15min

## 2021-04-13 ENCOUNTER — IMMUNIZATIONS (OUTPATIENT)
Dept: FAMILY MEDICINE CLINIC | Facility: HOSPITAL | Age: 61
End: 2021-04-13

## 2021-04-13 ENCOUNTER — TELEPHONE (OUTPATIENT)
Dept: FAMILY MEDICINE CLINIC | Facility: CLINIC | Age: 61
End: 2021-04-13

## 2021-04-13 DIAGNOSIS — Z23 ENCOUNTER FOR IMMUNIZATION: Primary | ICD-10-CM

## 2021-04-13 PROCEDURE — 91301 SARS-COV-2 / COVID-19 MRNA VACCINE (MODERNA) 100 MCG: CPT

## 2021-04-13 PROCEDURE — 0011A SARS-COV-2 / COVID-19 MRNA VACCINE (MODERNA) 100 MCG: CPT

## 2021-04-26 ENCOUNTER — CLINICAL SUPPORT (OUTPATIENT)
Dept: CARDIOLOGY CLINIC | Facility: CLINIC | Age: 61
End: 2021-04-26
Payer: COMMERCIAL

## 2021-04-26 DIAGNOSIS — I48.0 PAROXYSMAL ATRIAL FIBRILLATION (HCC): ICD-10-CM

## 2021-04-26 PROCEDURE — 93244 EXT ECG>48HR<7D REV&INTERPJ: CPT | Performed by: INTERNAL MEDICINE

## 2021-04-27 NOTE — RESULT ENCOUNTER NOTE
Sinus rhythm throughout with a min HR of 48 bpm, max HR of 141 bpm, and avg HR of 81  bpm     Supraventricular as well as ventricular ectopic beats were uncommon and there were no runs  No pauses were present      Wayne Merino MD

## 2021-05-03 ENCOUNTER — TELEPHONE (OUTPATIENT)
Dept: PRIMARY CARE | Facility: CLINIC | Age: 61
End: 2021-05-03

## 2021-05-03 RX ORDER — AZITHROMYCIN 250 MG/1
TABLET, FILM COATED ORAL
Qty: 6 TABLET | Refills: 0 | Status: SHIPPED | OUTPATIENT
Start: 2021-05-03 | End: 2021-05-08

## 2021-05-03 NOTE — TELEPHONE ENCOUNTER
"Spoke with Mrs. Cruz today she informed me that she lives with Brother who recently has strep throat  and now she sounds very hoarse and stated feeling warm at night although did not take tempeture, she states \"my throat feels raw, very red and soar I have white specks in the back of my throat\".  She is asking if Dr. Lee will send a prescription  to her pharmacy or does she need to make an appointment. I said maybe both. Additionally, I informed her I will send Dr. Lee for instructions and get back to her.      14:10 Spoke with Mrs Cruz and let her know  wants her to start a z-pack. Alecia said she has taken this antibiotic before and asked if I would send it (just this one med) to the [facility] on the corner of  [address]. I stated yes.  "

## 2021-05-03 NOTE — TELEPHONE ENCOUNTER
Pt called stating that she was in contact with her brother who lives with her, and he has strep throat, pt is now experiencing some of the symptoms, pt said that her throat is soar and hard to swallow,this started yesterday.    Please contact pt and advise [phone]

## 2021-05-13 ENCOUNTER — OFFICE VISIT (OUTPATIENT)
Dept: CARDIOLOGY CLINIC | Facility: CLINIC | Age: 61
End: 2021-05-13
Payer: COMMERCIAL

## 2021-05-13 ENCOUNTER — IMMUNIZATIONS (OUTPATIENT)
Dept: FAMILY MEDICINE CLINIC | Facility: HOSPITAL | Age: 61
End: 2021-05-13

## 2021-05-13 VITALS
DIASTOLIC BLOOD PRESSURE: 80 MMHG | HEART RATE: 84 BPM | HEIGHT: 67 IN | WEIGHT: 145 LBS | BODY MASS INDEX: 22.76 KG/M2 | SYSTOLIC BLOOD PRESSURE: 130 MMHG

## 2021-05-13 DIAGNOSIS — G89.29 CHRONIC BILATERAL THORACIC BACK PAIN: ICD-10-CM

## 2021-05-13 DIAGNOSIS — M54.6 CHRONIC BILATERAL THORACIC BACK PAIN: ICD-10-CM

## 2021-05-13 DIAGNOSIS — I48.0 PAROXYSMAL ATRIAL FIBRILLATION (HCC): Primary | ICD-10-CM

## 2021-05-13 DIAGNOSIS — Z23 ENCOUNTER FOR IMMUNIZATION: Primary | ICD-10-CM

## 2021-05-13 PROCEDURE — 91301 SARS-COV-2 / COVID-19 MRNA VACCINE (MODERNA) 100 MCG: CPT

## 2021-05-13 PROCEDURE — 0012A SARS-COV-2 / COVID-19 MRNA VACCINE (MODERNA) 100 MCG: CPT

## 2021-05-13 PROCEDURE — 1036F TOBACCO NON-USER: CPT | Performed by: INTERNAL MEDICINE

## 2021-05-13 PROCEDURE — 3008F BODY MASS INDEX DOCD: CPT | Performed by: INTERNAL MEDICINE

## 2021-05-13 PROCEDURE — 99213 OFFICE O/P EST LOW 20 MIN: CPT | Performed by: INTERNAL MEDICINE

## 2021-05-13 NOTE — PATIENT INSTRUCTIONS
A-fib (Atrial Fibrillation)   AMBULATORY CARE:   Atrial fibrillation (A-fib)  is an irregular heartbeat  It reduces your heart's ability to pump blood through your body  A-fib may come and go, or it may be a long-term condition  A-fib can cause life-threatening blood clots, stroke, or heart failure  It is important to treat and manage A-fib to help prevent these problems  Common signs and symptoms include the following:   · A heartbeat that races, pounds, or flutters    · Weakness, severe tiredness, or confusion    · Feeling lightheaded, sweaty, dizzy, or faint    · Shortness of breath or anxiety    · Chest pain or pressure    Call your local emergency number (911 in the [address] [facility]) or have someone call if:   · You have any of the following signs of a heart attack:      ? Squeezing, pressure, or pain in your chest    ? You may  also have any of the following:     § Discomfort or pain in your back, neck, jaw, stomach, or arm    § Shortness of breath    § Nausea or vomiting    § Lightheadedness or a sudden cold sweat    · You have any of the following signs of a stroke:      ? Numbness or drooping on one side of your face     ? Weakness in an arm or leg    ? Confusion or difficulty speaking    ? Dizziness, a severe headache, or vision loss    Call your doctor or cardiologist if:   · Your arm or leg feels warm, tender, and painful  It may look swollen and red  · Your heart rate is more than 110 beats per minute  · You have new or worsening swelling in your legs, feet, ankles, or abdomen  · You are short of breath, even at rest     · You have questions or concerns about your condition or care  Treatment for A-fib:  Conditions that cause A-fib, such as thyroid disease, will be treated  You may also need any of the following:  · Heart medicines  help control your heart rate or rhythm  You may need more than one medicine to treat your symptoms      · Antiplatelet or blood thinner medicines  help prevent blood clots and stroke  · Cardioversion  is a procedure to return your heart rate and rhythm to normal  It can be done using medicines or electric shock  · A-fib ablation  is a procedure that uses energy to burn a small area of heart tissue  This creates scar tissue and prevents electrical signals that cause A-fib  You may need this procedure more than once  Ask for more information on A-fib ablation  · A pacemaker  may be inserted into your heart  A pacemaker is a device that controls your heartbeat  A pacemaker may be inserted during an ablation procedure or surgery  Ask your healthcare provider for more information on pacemakers  · Surgery  may be needed if other procedures do not work  During surgery your healthcare provider will make cuts in the upper part of your heart  The provider will stitch the cuts together to create scar tissue  The scar tissue will prevent electrical signals that cause A-fib  Manage A-fib:   · Know your target heart rate  Learn how to check your pulse and monitor your heart rate  · Know the risks if you choose to drink alcohol  Alcohol can increase your risk for A-fib or make A-fib harder to manage  Ask your healthcare provider if it is okay for you to drink any alcohol  He or she can help you set limits for the number of drinks you have in 24 hours and in a week  A drink of alcohol is 12 ounces of beer, 5 ounces of wine, or 1½ ounces of liquor  · Do not smoke  Nicotine can cause heart damage and make it more difficult to manage your A-fib  Do not use e-cigarettes or smokeless tobacco in place of cigarettes or to help you quit  They still contain nicotine  Ask your healthcare provider for information if you currently smoke and need help quitting  · Eat heart-healthy foods  Heart healthy foods will help keep your cholesterol low  These include fruits, vegetables, whole-grain breads, low-fat dairy products, beans, lean meats, and fish   Replace butter and margarine with heart-healthy oils such as olive oil and canola oil  · Maintain a healthy weight  Ask your healthcare provider what a healthy weight is for you  Ask him or her to help you create a safe weight loss plan if you are overweight  Even a small goal of a 10% weight loss can improve your heart health  · Get regular physical activity  Physical activity helps improve your heart health  Get at least 150 minutes of moderate aerobic physical activity each week  Your healthcare provider can help you create an activity plan  · Manage other health conditions  This includes high blood pressure or cholesterol, sleep apnea, diabetes, and other heart conditions  Take medicine as directed and follow your treatment plan  Your healthcare provider may need to change a medicine you are taking if it is causing your A-fib  Do not  stop taking any medicine unless directed by your provider  Follow up with your doctor or cardiologist as directed: You will need regular blood tests and monitoring  Write down your questions so you remember to ask them during your visits  © Copyright [employer] 2021 Information is for End User's use only and may not be sold, redistributed or otherwise used for commercial purposes  All illustrations and images included in CareNotes® are the copyrighted property of A D A M , Inc  or [address] [place], [place] [address]   The above information is an  only  It is not intended as medical advice for individual conditions or treatments  Talk to your doctor, nurse or pharmacist before following any medical regimen to see if it is safe and effective for you

## 2021-05-13 NOTE — PROGRESS NOTES
Subjective:        Patient ID: Yuliya Nuñez is a 56 y o  female  Chief Complaint:  Alberto Scott is here for atrial fibrillation follow-up  Reviewed her nuclear stress test revealed no ischemia, showed normal LV function  Looked at her February CT that also showed no evidence for aortic aneurysm or pericardial effusion  Reviewed her extended ambulatory Holter that revealed no significant bradycardia nor tachycardia in just a few PACs and PVCs nothing sustained  No AFib  She only feels occasional palpitations with stress, they go away quickly with Valsalva maneuver, we also discussed how coughing could help as well  No presyncope or syncope  No exertionally mediated chest pains that she lives on the 3rd floor and goes up 3 flights of stairs without anginal symptoms  No edema orthopnea  She has never smoked  She occasionally gets, with emotional not physical distress, shoulder blade her back between the shoulder blade discomfort-discussed how this can occasionally be a Hallmark of gallbladder disease  If this worsens she knows to bring it to your my attention    The following portions of the patient's history were reviewed and updated as appropriate: allergies, current medications, past family history, past medical history, past social history, past surgical history and problem list   Review of Systems   Constitution: Negative for chills, diaphoresis, malaise/fatigue and weight gain  HENT: Negative for nosebleeds and stridor  Eyes: Negative for double vision, vision loss in left eye, vision loss in right eye and visual disturbance  Cardiovascular: Positive for palpitations (Rare emotionally mediated skips or misses no sustained tachy palpitations)  Negative for chest pain, claudication, cyanosis, dyspnea on exertion, irregular heartbeat, leg swelling, near-syncope, orthopnea, paroxysmal nocturnal dyspnea and syncope     Respiratory: Negative for cough, shortness of breath, snoring and wheezing  Endocrine: Negative for polydipsia, polyphagia and polyuria  Hematologic/Lymphatic: Negative for bleeding problem  Does not bruise/bleed easily  Skin: Negative for flushing and rash  Musculoskeletal: Positive for back pain  Negative for falls and myalgias  Gastrointestinal: Negative for abdominal pain, heartburn, hematemesis, hematochezia, melena and nausea  Genitourinary: Negative for hematuria  Neurological: Negative for brief paralysis, dizziness, focal weakness, headaches, light-headedness, loss of balance and vertigo  Psychiatric/Behavioral: Negative for altered mental status and substance abuse  Allergic/Immunologic: Negative for hives  Objective:      /80   Pulse 84   Ht 5' 7" (1 702 m)   Wt 65 8 kg (145 lb)   BMI 22 71 kg/m²   Physical Exam   Constitutional: She is oriented to person, place, and time  She appears well-developed and well-nourished  HENT:   Head: Normocephalic and atraumatic  Eyes: Pupils are equal, round, and reactive to light  EOM are normal    Neck: Normal range of motion  Neck supple  No JVD present  No thyromegaly present  Cardiovascular: Normal rate, regular rhythm, normal heart sounds and intact distal pulses  Exam reveals no gallop and no friction rub  No murmur heard  Pulmonary/Chest: Effort normal and breath sounds normal  No stridor  No respiratory distress  She has no wheezes  She has no rales  Abdominal: Soft  Bowel sounds are normal  She exhibits no mass  There is no abdominal tenderness  Musculoskeletal: Normal range of motion  General: No edema  Lymphadenopathy:     She has no cervical adenopathy  Neurological: She is alert and oriented to person, place, and time  Skin: Skin is warm and dry  No rash noted  No pallor  Psychiatric: She has a normal mood and affect  Her behavior is normal  Judgment and thought content normal    Nursing note and vitals reviewed        Lab Review:   No visits with results within 2 Month(s) from this visit  Latest known visit with results is:   Hospital Outpatient Visit on 03/05/2021   Component Date Value    Case Report 03/05/2021                      Value:Surgical Pathology Report                         Case: [id number]                                   Authorizing Provider:  Pierre Smart MD    Collected:           03/05/2021 0955              Ordering Location:     [facility] Received:            03/05/2021 1040                                     Operating Room                                                               Pathologist:           Kusum Jones MD                                                         Specimens:   A) - Colon, random colon biopsy indication diarrhea                                                 B) - Large Intestine, Right/Ascending Colon, ascending colon polyp cold forcep             Final Diagnosis 03/05/2021                      Value: This result contains rich text formatting which cannot be displayed here   Additional Information 03/05/2021                      Value: This result contains rich text formatting which cannot be displayed here   Synoptic Checklist 03/05/2021                      Value:  (COLON/RECTUM POLYP FORM - GI - B)                                                                                                                 : Adenoma(s)     Sachi Stephani Description 03/05/2021                      Value: This result contains rich text formatting which cannot be displayed here  No results found  Assessment:       1  Paroxysmal atrial fibrillation (HCC)     2  Chronic bilateral thoracic back pain          Plan:       Maintaining sinus rhythm, continue low-dose flecainide, ischemic heart disease is ruled out with reasonable certainty with recent nonischemic stress testing  Okay to take metoprolol tartrate 25 mg p o  P r n  Breakthrough palpitations daily      Low CHADS2 stroke risk score, continue baby dose aspirin therapy  See HPI, follow-up with you regarding more than likely noncardiac back pain, there is in reviewing her history some chronicity here  Recent stress test nonischemic and CT scanning also unremarkable, her pain hears also nonexertional so not likely cardiac in etiology      I will see her back in 6 months, asked her to give me a call sooner with any concerning palpitations, progressive palpitations or of course should she develop any exertionally mediated chest pains or progressive dyspnea

## 2021-06-15 DIAGNOSIS — I48.91 ATRIAL FIBRILLATION, UNSPECIFIED TYPE (HCC): ICD-10-CM

## 2021-06-15 RX ORDER — FLECAINIDE ACETATE 50 MG/1
50 TABLET ORAL 2 TIMES DAILY
Qty: 60 TABLET | Refills: 5 | Status: SHIPPED | OUTPATIENT
Start: 2021-06-15

## 2021-06-15 NOTE — TELEPHONE ENCOUNTER
Sherron PATRICIO  [facility]  Flecainide   50 mg  1 tab BID  60 with 5 refills     Raegan Basurto     [facility] in [place]

## 2021-07-13 ENCOUNTER — TELEPHONE (OUTPATIENT)
Dept: FAMILY MEDICINE CLINIC | Facility: CLINIC | Age: 61
End: 2021-07-13

## 2021-07-13 DIAGNOSIS — B37.3 VAGINAL YEAST INFECTION: Primary | ICD-10-CM

## 2021-07-13 RX ORDER — FLUCONAZOLE 150 MG/1
150 TABLET ORAL ONCE
Qty: 1 TABLET | Refills: 0 | Status: SHIPPED | OUTPATIENT
Start: 2021-07-13 | End: 2021-07-13

## 2021-07-13 NOTE — TELEPHONE ENCOUNTER
Patient on vacation and having vaginal itching  Asking if you can call something in for her since she is allergic to OTC products  Thinks its due to swimming

## 2021-07-13 NOTE — TELEPHONE ENCOUNTER
I updated the pharmacy to the [address] on [facility]

## 2021-07-13 NOTE — TELEPHONE ENCOUNTER
I will send diflucan to pharmacy   If not better after taking medication she needs to make an appointment with her gynecologist  What pharmacy should I send it to?

## 2021-07-27 ENCOUNTER — TELEPHONE (OUTPATIENT)
Dept: PRIMARY CARE | Facility: CLINIC | Age: 61
End: 2021-07-27

## 2021-07-27 NOTE — TELEPHONE ENCOUNTER
Audio Only [phone] Patient stated she would like to speak to the . Patient expressed disatisfaction with pcp. Patient is requesting a call back. Please return call to patient.

## 2021-08-03 ENCOUNTER — OFFICE VISIT (OUTPATIENT)
Dept: PRIMARY CARE | Facility: CLINIC | Age: 61
End: 2021-08-03
Payer: COMMERCIAL

## 2021-08-03 VITALS
OXYGEN SATURATION: 97 % | DIASTOLIC BLOOD PRESSURE: 70 MMHG | HEIGHT: 67 IN | SYSTOLIC BLOOD PRESSURE: 130 MMHG | TEMPERATURE: 98 F | BODY MASS INDEX: 23.07 KG/M2 | RESPIRATION RATE: 17 BRPM | HEART RATE: 79 BPM | WEIGHT: 147 LBS

## 2021-08-03 DIAGNOSIS — Z85.3 HISTORY OF LEFT BREAST CANCER: ICD-10-CM

## 2021-08-03 DIAGNOSIS — R22.0 LEFT FACIAL SWELLING: ICD-10-CM

## 2021-08-03 DIAGNOSIS — I48.0 PAROXYSMAL ATRIAL FIBRILLATION (CMS/HCC): ICD-10-CM

## 2021-08-03 DIAGNOSIS — N30.00 ACUTE CYSTITIS WITHOUT HEMATURIA: Primary | ICD-10-CM

## 2021-08-03 PROCEDURE — 3008F BODY MASS INDEX DOCD: CPT | Performed by: STUDENT IN AN ORGANIZED HEALTH CARE EDUCATION/TRAINING PROGRAM

## 2021-08-03 PROCEDURE — 99214 OFFICE O/P EST MOD 30 MIN: CPT | Performed by: STUDENT IN AN ORGANIZED HEALTH CARE EDUCATION/TRAINING PROGRAM

## 2021-08-03 RX ORDER — UBIDECARENONE 60 MG
60 CAPSULE ORAL DAILY
COMMUNITY
End: 2023-03-11 | Stop reason: HOSPADM

## 2021-08-03 RX ORDER — CHOLECALCIFEROL (VITAMIN D3) 25 MCG
1000 TABLET ORAL DAILY
COMMUNITY

## 2021-08-03 RX ORDER — FLUCONAZOLE 150 MG/1
TABLET ORAL
COMMUNITY
Start: 2021-07-14 | End: 2021-08-03

## 2021-08-03 RX ORDER — NITROFURANTOIN 25; 75 MG/1; MG/1
100 CAPSULE ORAL 2 TIMES DAILY
Qty: 10 CAPSULE | Refills: 0 | Status: SHIPPED | OUTPATIENT
Start: 2021-08-03 | End: 2021-08-08

## 2021-08-03 RX ORDER — UBIDECARENONE 60 MG
1 CAPSULE ORAL DAILY
COMMUNITY
End: 2021-08-03

## 2021-08-03 RX ORDER — ALPHA LIPOIC ACID
1 POWDER (GRAM) MISCELLANEOUS DAILY
COMMUNITY
End: 2023-03-11 | Stop reason: HOSPADM

## 2021-08-03 RX ORDER — METOPROLOL TARTRATE 25 MG/1
25 TABLET, FILM COATED ORAL
COMMUNITY
Start: 2021-02-18 | End: 2021-08-03 | Stop reason: SDUPTHER

## 2021-08-03 RX ORDER — CETIRIZINE HYDROCHLORIDE 10 MG/1
10 TABLET ORAL AS NEEDED
COMMUNITY

## 2021-08-03 NOTE — PROGRESS NOTES
[facility]   Progress Note       ASSESSMENT AND PLAN     Paroxysmal atrial fibrillation (CMS/HCC)  -She would like to follow-up with an electrophysiologist here at [facility].  She previously was seeing someone at [facility].  -Getting frequent symptoms.  -Takes metoprolol succinate 25 mg daily.  Also is on flecainide.  -[id number] of 1 for female. On daily aspirin.     Acute cystitis without hematuria  -Provided prescription for nitrofurantoin for 5 days.  As she does not get very many of these I will not do a urine culture.  -Deferred urinalysis is her symptoms are standard for her.    History of left breast cancer  -She is due for her mammogram.  Prescription written.    Left facial swelling  -There is further wide differential diagnosis of this.  She previously had CT scan and I do not want to expose her to further radiation and iodine dye unless needed.  -my #1 suspicion at this point is trigeminal neuralgia given the sharp stabbing nature in the intermittent onset.  Also in the differential is a neuroma of one of the peripheral pain.  Nurse given the palpable collection that is very very painful.  I do not think that this is a parotid infection nor do I think that this is a tumor.  This could be related to a stone within the parotid gland though does not seem characteristic.  -I would like ENT to weigh in on this and if they agree that this is most likely trigeminal neuralgia we can start carbamazepine as a trial to see if it helps.    Reviewed her care everywhere notes extensively as well as notes from family practice, cardiology, gastroenterology.  Extensively updated her healthcare maintenance based on the results of these note.  Reviewed outside record of her colonoscopy results and pathology.  Reviewed her CT scan and MRI results from outside hospital.    Health care maintenance: She is due at this time for the Prevnar and pneumococcal vaccines.  She is also due for the Shingrix vaccine.   She will need an influenza vaccine in the fall.    Return in about 1 month (around 9/3/2021) for physical..  At next visit: Will need to give her the prescription for screening of her lung nodule which has been noted previously.    Mahendra Begum D.O.  Office: [phone]  Pager: [phone]     SUBJECTIVE     Alecia Cruz is a 60 y.o. year-old female, primary patient of Dr. Begum with a past medical history of paroxysmal atrial fibrillation, hypothyroidism, allergic rhinitis, vitamin D deficiency, and urinary tract infection who presents for pain and swelling of the left external cheek.    Interval History:     Feels that the left parotid gland is painful and swollen. It hurts to touch now. First noticed about 1 year ago and was last seen in November 2020. She qualifies this as a sharp pain that comes on intermittently. No eliciting factors other than palpation. No relieving factors. Doesn't wake her from sleep. Rates this a 8/10 and 0/10 at best. It startles her when it occurs.  He does not appear to be triggered by self palpation of the skin or by waiting blood on her face.  It appears that it comes out of nowhere or with deeper palpation of the subcutaneous tissue.  Is not elicited by opening or closing her jaw nor by turning in one direction on the other.  This was seen while she was in the office and appears to be a stabbing sensation.    UTI: she has been prone to urinary tract infection. She went swimming this past summer and she developed vaginal yeast infection which was successfully treated with a single dose of fluconazole.  At the current time, she has had pain for about the last 1 week with urination without flank or suprapubic tenderness.  Does not have fever or chills.  This is consistent with her previous urinary tract infections..She has had uti in the past and had nitrofurantoin at home to take prn for pain. Last uti was probably 2020. She had a urinalysis in past and does not have history of  "multiple drug-resistant organisms.     Sinusitis:she is having a sharp left sided pain, lots of mucous, and headache. Sinus congestion is present but has not been taking intranasal corticosteroids.     Breast cancer left 2009: chemotherapy, radiation, and lumpectomy.  Previously followed with Dr. Hernandez.  Does not appear to have been on hormonal therapy.  She is due for her screening mammogram.    Atrial fibrillation: She has not had any issues recently with this.  She used to follow with a cardiologist at [facility].  She would like a referral to a cardiologist that is closer to home here at [facility].      PHYSICAL EXAMINATION     Visit Vitals  /70 (BP Location: Right upper arm, Patient Position: Sitting)   Pulse 79   Temp 36.7 °C (98 °F)   Resp 17   Ht 1.702 m (5' 7\")   Wt 66.7 kg (147 lb)   SpO2 97%   BMI 23.02 kg/m²       Physical Exam  Vitals reviewed.   Constitutional:       Appearance: Normal appearance. She is normal weight.   HENT:      Head: Normocephalic and atraumatic.      Nose: Nose normal.      Mouth/Throat:      Mouth: Mucous membranes are moist.      Pharynx: No oropharyngeal exudate or posterior oropharyngeal erythema.      Comments: She indeed does have a lesion over the left external cheek around the area of the parotid gland.  The parotid gland is overall not enlarged and is not firm nor edematous.  She has a small approximately 1.5 to 2 cm area that is rubbery and palpable.  It is very painful on palpation.  She has to turn her head and she can the left and down to allow the skin to be relaxed so that it can be palpated.  It is very painful on palpation.  There is no fluctuance.  There is no overlying skin changes.  It is seemingly not fixed in the subcutaneous tissue.  Eyes:      General: No scleral icterus.     Conjunctiva/sclera: Conjunctivae normal.      Pupils: Pupils are equal, round, and reactive to light.   Cardiovascular:      Rate and Rhythm: Normal rate and regular rhythm.      " Pulses: Normal pulses.      Heart sounds: Normal heart sounds. No murmur heard.   No friction rub. No gallop.    Pulmonary:      Effort: Pulmonary effort is normal. No respiratory distress.      Breath sounds: Normal breath sounds. No stridor. No wheezing, rhonchi or rales.   Abdominal:      General: Abdomen is flat. Bowel sounds are normal. There is no distension.      Palpations: Abdomen is soft. There is no mass.      Tenderness: There is no abdominal tenderness.      Hernia: No hernia is present.   Musculoskeletal:         General: Normal range of motion.      Cervical back: No rigidity or tenderness.      Right lower leg: No edema.      Left lower leg: No edema.      Comments: Chronic left upper extremity swelling due to lymphedema   Lymphadenopathy:      Cervical: No cervical adenopathy.   Skin:     General: Skin is warm and dry.      Capillary Refill: Capillary refill takes less than 2 seconds.      Coloration: Skin is not jaundiced or pale.      Findings: No erythema.   Neurological:      General: No focal deficit present.      Mental Status: She is alert and oriented to person, place, and time. Mental status is at baseline.   Psychiatric:         Mood and Affect: Mood normal.         Behavior: Behavior normal.         Thought Content: Thought content normal.         Judgment: Judgment normal.             LABS / IMAGING / STUDIES        Labs Reviewed: Reviewed the previous CBC and comprehensive metabolic panel from [facility].  No acute concerns are revealed there.  Her TSH was low though her T4 was within the normal range.    Studies/Imaging Reviewed: Reviewed the CT soft tissue scan of the neck from last year Which does not show any mass or collection.      MEDICATIONS      Current Outpatient Medications   Medication Instructions   • alpha lipoic acid, bulk, powder oral   • ascorbic acid (VITAMIN C) 500 mg, oral, Daily   • aspirin 81 mg, oral, Daily   • betamethasone dipropionate (DIPROSONE) 0.05  % lotion No dose, route, or frequency recorded.   • cetirizine (ZYRTEC) 10 mg, oral, Daily   • cholecalciferol (vitamin D3) 1,000 Units, oral, Daily   • clobetasoL (TEMOVATE) 0.05 % external solution No dose, route, or frequency recorded.   • coenzyme Q10 (COQ10) 60 mg capsule oral   • cyclobenzaprine (FLEXERIL) 5 mg, oral, 3 times daily PRN   • flecainide (TAMBOCOR) 50 mg, oral, 2 times daily (6a, 6p)   • fluticasone propionate (FLONASE) 50 mcg/actuation nasal spray 2 sprays, Each Nostril, Daily PRN   • levothyroxine (SYNTHROID) 88 mcg, oral, Daily (6:30a)   • metoprolol succinate 25 mg capsule,sprinkle,ER 24hr No dose, route, or frequency recorded.   • metoprolol succinate XL (TOPROL-XL) 25 mg, oral, Daily   • multivitamin (THERAGRAN) tablet 1 tablet, oral, Daily   • naproxen sodium 220 mg capsule oral, Daily PRN   • nitrofurantoin, macrocrystal-monohydrate, (MACROBID) 100 mg capsule 100 mg, oral, 2 times daily

## 2021-08-03 NOTE — PATIENT INSTRUCTIONS
-Please make an appointment to see us for a physical in one month.    -Please make an appointment with the ear nose and throat doctor.     -Please get your mammogram done.

## 2021-08-04 ENCOUNTER — TELEPHONE (OUTPATIENT)
Dept: SCHEDULING | Facility: CLINIC | Age: 61
End: 2021-08-04

## 2021-08-04 NOTE — TELEPHONE ENCOUNTER
New Patient Appointment Made    Name of caller: Alecia    Diagnosis: AFib    Referred by: PCP-Dr Mahendra Begum    Previous Cardiologist name and phone number: Dr Goldberg [phone](had full wrk-up)  [facility]  P# [phone]  Dr Femi Rosales -[facility] [phone]  Main # P# [phone]      Best contact number: [phone]  Additional notes:   Pt has fax # and will have Dr Romero records faxed over

## 2021-08-06 PROBLEM — R90.89 ABNORMAL CT OF BRAIN: Status: RESOLVED | Noted: 2020-02-17 | Resolved: 2021-08-06

## 2021-08-06 PROBLEM — R22.0 LEFT FACIAL SWELLING: Status: ACTIVE | Noted: 2021-08-06

## 2021-08-06 PROBLEM — M12.812 ROTATOR CUFF ARTHROPATHY OF LEFT SHOULDER: Status: RESOLVED | Noted: 2020-12-08 | Resolved: 2021-08-06

## 2021-08-06 PROBLEM — Z85.3 HISTORY OF LEFT BREAST CANCER: Status: ACTIVE | Noted: 2021-08-06

## 2021-08-06 PROBLEM — M76.60 ACHILLES BURSITIS: Status: RESOLVED | Noted: 2020-12-08 | Resolved: 2021-08-06

## 2021-08-06 PROBLEM — R20.0 NUMBNESS: Status: RESOLVED | Noted: 2020-05-27 | Resolved: 2021-08-06

## 2021-08-06 PROBLEM — R51.9 HEADACHE: Status: RESOLVED | Noted: 2020-01-28 | Resolved: 2021-08-06

## 2021-08-06 PROBLEM — R51.9 HEADACHE DISORDER: Status: RESOLVED | Noted: 2020-02-17 | Resolved: 2021-08-06

## 2021-08-06 PROBLEM — R53.1 WEAKNESS: Status: RESOLVED | Noted: 2020-05-27 | Resolved: 2021-08-06

## 2021-08-06 PROBLEM — I89.0 LYMPHEDEMA: Status: RESOLVED | Noted: 2020-12-08 | Resolved: 2021-08-06

## 2021-08-06 PROBLEM — M72.2 PLANTAR FASCIITIS OF RIGHT FOOT: Status: RESOLVED | Noted: 2020-12-08 | Resolved: 2021-08-06

## 2021-08-06 PROBLEM — N30.00 ACUTE CYSTITIS WITHOUT HEMATURIA: Status: ACTIVE | Noted: 2021-08-06

## 2021-08-06 PROBLEM — R52 PAIN: Status: RESOLVED | Noted: 2020-05-19 | Resolved: 2021-08-06

## 2021-08-06 PROBLEM — J06.9 UPPER RESPIRATORY TRACT INFECTION: Status: RESOLVED | Noted: 2019-01-25 | Resolved: 2021-08-06

## 2021-08-06 PROBLEM — M54.6 ACUTE RIGHT-SIDED THORACIC BACK PAIN: Status: RESOLVED | Noted: 2020-02-13 | Resolved: 2021-08-06

## 2021-08-06 PROBLEM — R91.1 INCIDENTAL LUNG NODULE, GREATER THAN OR EQUAL TO 8MM: Status: ACTIVE | Noted: 2021-08-06

## 2021-08-06 PROBLEM — J06.9 URI, ACUTE: Status: RESOLVED | Noted: 2020-02-13 | Resolved: 2021-08-06

## 2021-08-06 PROBLEM — M54.2 NECK PAIN ON LEFT SIDE: Status: RESOLVED | Noted: 2020-12-08 | Resolved: 2021-08-06

## 2021-08-06 NOTE — ASSESSMENT & PLAN NOTE
-Provided prescription for nitrofurantoin for 5 days.  As she does not get very many of these I will not do a urine culture.  -Deferred urinalysis is her symptoms are standard for her.

## 2021-08-06 NOTE — ASSESSMENT & PLAN NOTE
-She would like to follow-up with an electrophysiologist here at [facility].  She previously was seeing someone at [facility].  -Getting frequent symptoms.  -Takes metoprolol succinate 25 mg daily.  Also is on flecainide.  -[id number] of 1 for female. On daily aspirin.

## 2021-08-06 NOTE — ASSESSMENT & PLAN NOTE
-There is further wide differential diagnosis of this.  She previously had CT scan and I do not want to expose her to further radiation and iodine dye unless needed.  -my #1 suspicion at this point is trigeminal neuralgia given the sharp stabbing nature in the intermittent onset.  Also in the differential is a neuroma of one of the peripheral pain.  Nurse given the palpable collection that is very very painful.  I do not think that this is a parotid infection nor do I think that this is a tumor.  This could be related to a stone within the parotid gland though does not seem characteristic.  -I would like ENT to weigh in on this and if they agree that this is most likely trigeminal neuralgia we can start carbamazepine as a trial to see if it helps.

## 2021-08-18 ENCOUNTER — TELEPHONE (OUTPATIENT)
Dept: PRIMARY CARE | Facility: CLINIC | Age: 61
End: 2021-08-18

## 2021-08-19 ENCOUNTER — APPOINTMENT (OUTPATIENT)
Dept: LAB | Facility: HOSPITAL | Age: 61
End: 2021-08-19
Attending: INTERNAL MEDICINE
Payer: COMMERCIAL

## 2021-08-19 DIAGNOSIS — E89.0 POSTPROCEDURAL HYPOTHYROIDISM: ICD-10-CM

## 2021-08-19 PROCEDURE — 30099997 SPECIMEN PROCESSING

## 2021-08-20 ENCOUNTER — TELEPHONE (OUTPATIENT)
Dept: PRIMARY CARE | Facility: CLINIC | Age: 61
End: 2021-08-20

## 2021-08-20 DIAGNOSIS — Z85.3 HISTORY OF LEFT BREAST CANCER: Primary | ICD-10-CM

## 2021-08-20 NOTE — TELEPHONE ENCOUNTER
Pt requesting new script for Diagnostic mammogram. Pt would like to have it completed before 8/31/21.    Please call pt back when it is ordered.

## 2021-08-23 ENCOUNTER — TRANSCRIBE ORDERS (OUTPATIENT)
Dept: SCHEDULING | Age: 61
End: 2021-08-23

## 2021-08-23 DIAGNOSIS — Z85.3 HISTORY OF LEFT BREAST CANCER: Primary | ICD-10-CM

## 2021-08-23 DIAGNOSIS — H93.13 TINNITUS, BILATERAL: Primary | ICD-10-CM

## 2021-09-01 ENCOUNTER — HOSPITAL ENCOUNTER (OUTPATIENT)
Dept: RADIOLOGY | Facility: HOSPITAL | Age: 61
Discharge: HOME | End: 2021-09-01
Attending: OTOLARYNGOLOGY
Payer: COMMERCIAL

## 2021-09-01 DIAGNOSIS — H93.13 TINNITUS, BILATERAL: ICD-10-CM

## 2021-09-01 PROCEDURE — 70543 MRI ORBT/FAC/NCK W/O &W/DYE: CPT

## 2021-09-01 PROCEDURE — A9585 GADOBUTROL INJECTION: HCPCS | Mod: JW | Performed by: OTOLARYNGOLOGY

## 2021-09-01 RX ORDER — GADOBUTROL 604.72 MG/ML
6 INJECTION INTRAVENOUS ONCE
Status: COMPLETED | OUTPATIENT
Start: 2021-09-01 | End: 2021-09-01

## 2021-09-01 RX ADMIN — GADOBUTROL 6 MMOL: 604.72 INJECTION INTRAVENOUS at 17:32

## 2021-09-02 ENCOUNTER — TELEPHONE (OUTPATIENT)
Dept: ADMINISTRATIVE | Facility: OTHER | Age: 61
End: 2021-09-02

## 2021-09-02 NOTE — TELEPHONE ENCOUNTER
Upon review of the In Basket request we were able to locate, review, and update the patient chart as requested for Hepatitis C   Any additional questions or concerns should be emailed to the Practice Liaisons via [email or web address] email, please do not reply via In Basket      Thank you  Linda Calvillo

## 2021-09-02 NOTE — TELEPHONE ENCOUNTER
----- Message from Yulia Manuel MA sent at 9/1/2021 12:10 PM EDT -----  Regarding: hep c-[facility]  09/01/21 12:11 PM    Hello, our patient Latoya Miles has had Hepatitis C completed/performed  Please assist in updating the patient chart by pulling the Care Everywhere (CE) document  The date of service is 02/19/2019       Thank you,  Yulia Manuel MA   [facility]

## 2021-09-07 ENCOUNTER — HOSPITAL ENCOUNTER (OUTPATIENT)
Dept: RADIOLOGY | Facility: HOSPITAL | Age: 61
Discharge: HOME | End: 2021-09-07
Attending: STUDENT IN AN ORGANIZED HEALTH CARE EDUCATION/TRAINING PROGRAM
Payer: COMMERCIAL

## 2021-09-07 DIAGNOSIS — Z85.3 HISTORY OF LEFT BREAST CANCER: ICD-10-CM

## 2021-09-07 PROCEDURE — 77066 DX MAMMO INCL CAD BI: CPT

## 2021-09-08 ENCOUNTER — PATIENT OUTREACH (OUTPATIENT)
Dept: RADIOLOGY | Facility: HOSPITAL | Age: 61
End: 2021-09-08

## 2021-09-08 NOTE — PROGRESS NOTES
Received request from Dr Merino to assist in scheduling R breast stereo after seeing Dr Merino in consult on Friday morning. Scheduled pt for 9/10 at 0900. Spoke with pt. Reviewed meds and allergies. Pt verbalized understanding of plans.

## 2021-09-10 ENCOUNTER — HOSPITAL ENCOUNTER (OUTPATIENT)
Dept: RADIOLOGY | Facility: HOSPITAL | Age: 61
Discharge: HOME | End: 2021-09-10
Attending: PHYSICIAN ASSISTANT
Payer: COMMERCIAL

## 2021-09-10 ENCOUNTER — HOSPITAL ENCOUNTER (OUTPATIENT)
Dept: RADIOLOGY | Facility: HOSPITAL | Age: 61
Discharge: HOME | End: 2021-09-10
Attending: SURGERY
Payer: COMMERCIAL

## 2021-09-10 ENCOUNTER — TRANSCRIBE ORDERS (OUTPATIENT)
Dept: SCHEDULING | Age: 61
End: 2021-09-10
Payer: COMMERCIAL

## 2021-09-10 ENCOUNTER — OFFICE VISIT (OUTPATIENT)
Dept: SURGERY | Facility: CLINIC | Age: 61
End: 2021-09-10
Payer: COMMERCIAL

## 2021-09-10 ENCOUNTER — DOCUMENTATION (OUTPATIENT)
Dept: RADIOLOGY | Facility: HOSPITAL | Age: 61
End: 2021-09-10

## 2021-09-10 VITALS
SYSTOLIC BLOOD PRESSURE: 146 MMHG | DIASTOLIC BLOOD PRESSURE: 78 MMHG | HEIGHT: 67 IN | BODY MASS INDEX: 23.54 KG/M2 | WEIGHT: 150 LBS | HEART RATE: 82 BPM

## 2021-09-10 DIAGNOSIS — Z13.71 BRCA NEGATIVE: ICD-10-CM

## 2021-09-10 DIAGNOSIS — R92.8 OTHER ABNORMAL AND INCONCLUSIVE FINDINGS ON DIAGNOSTIC IMAGING OF BREAST: Primary | ICD-10-CM

## 2021-09-10 DIAGNOSIS — R92.1 BREAST CALCIFICATION SEEN ON MAMMOGRAM: ICD-10-CM

## 2021-09-10 DIAGNOSIS — C50.412 MALIGNANT NEOPLASM OF UPPER-OUTER QUADRANT OF LEFT BREAST IN FEMALE, ESTROGEN RECEPTOR NEGATIVE (CMS/HCC): ICD-10-CM

## 2021-09-10 DIAGNOSIS — Z17.1 MALIGNANT NEOPLASM OF UPPER-OUTER QUADRANT OF LEFT BREAST IN FEMALE, ESTROGEN RECEPTOR NEGATIVE (CMS/HCC): ICD-10-CM

## 2021-09-10 DIAGNOSIS — R92.1 BREAST CALCIFICATION SEEN ON MAMMOGRAM: Primary | ICD-10-CM

## 2021-09-10 DIAGNOSIS — I97.2 POSTMASTECTOMY LYMPHEDEMA SYNDROME: ICD-10-CM

## 2021-09-10 DIAGNOSIS — I89.0 LYMPHEDEMA OF LEFT ARM: ICD-10-CM

## 2021-09-10 PROBLEM — N30.00 ACUTE CYSTITIS WITHOUT HEMATURIA: Status: RESOLVED | Noted: 2021-08-06 | Resolved: 2021-09-10

## 2021-09-10 PROBLEM — R22.0 LEFT FACIAL SWELLING: Status: RESOLVED | Noted: 2021-08-06 | Resolved: 2021-09-10

## 2021-09-10 PROCEDURE — 3008F BODY MASS INDEX DOCD: CPT | Performed by: SURGERY

## 2021-09-10 PROCEDURE — 88305 TISSUE EXAM BY PATHOLOGIST: CPT | Performed by: PHYSICIAN ASSISTANT

## 2021-09-10 PROCEDURE — 77065 DX MAMMO INCL CAD UNI: CPT | Mod: RT

## 2021-09-10 PROCEDURE — 99204 OFFICE O/P NEW MOD 45 MIN: CPT | Performed by: SURGERY

## 2021-09-10 ASSESSMENT — ENCOUNTER SYMPTOMS
GASTROINTESTINAL NEGATIVE: 1
HEMATOLOGIC/LYMPHATIC NEGATIVE: 1
CONSTITUTIONAL NEGATIVE: 1
NEUROLOGICAL NEGATIVE: 1
EYES NEGATIVE: 1
PSYCHIATRIC NEGATIVE: 1
RESPIRATORY NEGATIVE: 1
ALLERGIC/IMMUNOLOGIC NEGATIVE: 1
CARDIOVASCULAR NEGATIVE: 1
ENDOCRINE NEGATIVE: 1
MUSCULOSKELETAL NEGATIVE: 1

## 2021-09-10 NOTE — PROGRESS NOTES
Met with patient pre and post biopsy /75 , dressing complete, written and verbal post biopsy care given and patient verbalized an understanding of.

## 2021-09-10 NOTE — PROGRESS NOTES
Breast Surgical Consultation        Patient: Alecia Cruz                                : 1960    Date of consultation: 10/12/2021    Referring Provider: Mahendra Begum DO  PCP: Mahendra Begum DO  GYN: No care team member to display      Chief Complaint: Right breast microcalcifications     Alecia Cruz is a 60 y.o. old female who has had past history of left partial mastectomy axillary dissection, whole breast radiation and chemotherapy in  for a 2-1/2 cm invasive ductal carcinoma that was ER/WI negative and HER-2/alyson positive with 7-15 positive lymph nodes.  Patient is doing well and recently presented for routine mammogram which revealed a new subcentimeter focus of microcalcifications in the right lower inner breast.There were no other abnormal mammographic findings and she presents today to consider a breast biopsy. .     Breast Imaging: See above.  All films were reviewed in the office.    Breast History: Left breast carcinoma T2 N2 aM0 ER/WI negative and HER-2/alyson positive treated with partial mastectomy and axillary dissection as well as whole breast radiation and chemotherapy..    OB/GYN Status    Currently Pregnant:  N/A   Last Menstrual Period:  N/A   Menstrual Status:   Hysterectomy   LMP Comment:   N/A   Menarche Age:     Breastfeeding?  N/A   Lactation Comment:   N/A     OB History    No obstetric history on file.              Medications: has a current medication list which includes the following prescription(s): alpha lipoic acid (bulk), ascorbic acid, aspirin, betamethasone dipropionate, cetirizine, cholecalciferol (vitamin d3), coenzyme q10, cyclobenzaprine, diphenhydramine, levothyroxine, multivitamin, naproxen sodium, omega-3-dha-epa-dpa-fish oil, vitamin e, zolpidem, apixaban, flecainide, fluticasone propionate, and metoprolol succinate xl.    Family History: family history includes Alzheimer's disease in her biological mother; Arrhythmia in her biological mother;  "COPD in her biological brother; Diabetes in her biological father, biological sister, and paternal grandfather; Heart disease in her biological sister; Hypertension in her biological brother and biological sister; Prostate cancer in her paternal grandfather; Sarcoidosis in her biological sister; Stomach cancer in her paternal grandmother.  Past Medical History:  has a past medical history of A-fib (CMS/[facility]), Breast cancer (CMS/[facility]), H/O total hysterectomy (03/2000), Hypothyroidism, PAF (paroxysmal atrial fibrillation) (CMS/[facility]), and PVC (premature ventricular contraction).  Past Surgical History:  has a past surgical history that includes Breast lumpectomy; Partial hysterectomy; Rotator cuff repair (Left); and Mandible fracture surgery.  Social History:  reports that she has never smoked. She has never used smokeless tobacco. She reports that she does not drink alcohol and does not use drugs.   Sexual History:  has no history on file for sexual activity.  Allergies: is allergic to trazodone-dietary supp no.8, amoxicillin trihydrate, cephalexin, ciprofloxacin, codeine, potassium clavulanate, and sulfa (sulfonamide antibiotics).     Review of Systems   Constitutional: Negative.    HENT: Negative.    Eyes: Negative.    Respiratory: Negative.    Cardiovascular: Negative.    Gastrointestinal: Negative.    Endocrine: Negative.    Genitourinary: Negative.    Musculoskeletal: Negative.    Allergic/Immunologic: Negative.    Neurological: Negative.    Hematological: Negative.    Psychiatric/Behavioral: Negative.        Allergies  Meds  Problems         Objective   Vitals:   Visit Vitals  BP (!) 146/78   Pulse 82   Ht 1.702 m (5' 7\")   Wt 68 kg (150 lb)   BMI 23.49 kg/m²     Physical Exam  Chest:      Breasts:         Right: Normal.            Comments: The left breast is approximately 20% smaller than the right due to radiation fibrosis.           Assessment/Plan   Problem List Items Addressed This Visit        " Musculoskeletal    Lymphedema of left arm    RESOLVED: Postmastectomy lymphedema syndrome       Other    Breast calcification seen on mammogram - Primary     There is an indeterminate cluster of microcalcifications in the right lower inner quadrant.  We will proceed with stereotactic biopsy.         Relevant Orders    BI STEREOTACTIC BREAST BIOPSY RIGHT    BI DIAGNOSTIC MAMMOGRAM RIGHT (TOMOSYNTHESIS)    Malignant neoplasm of upper-outer quadrant of left breast in female, estrogen receptor negative (CMS/HCC)     She is now 13 years status post left partial mastectomy, S axillary dissection, whole breast radiation and chemotherapy for a T2 N2 aM0 carcinoma that.  She has no evidence of any recurrent disease and will continue annual mammographic surveillance.         BRCA negative     She cannot remember the last time she had any genetic testing done but I suspect this was 2008 at the time of her cancer.  We can certainly update this and I will have the genetics department contact her.               Leslie Merino MD

## 2021-09-10 NOTE — ASSESSMENT & PLAN NOTE
There is an indeterminate cluster of microcalcifications in the right lower inner quadrant.  We will proceed with stereotactic biopsy.

## 2021-09-10 NOTE — Clinical Note
Lorenza-can you please check her previous genetic testing?  I think this was last done in 2008.  I suspect her testing could be updated.  If so, can you please give her a call?

## 2021-09-10 NOTE — ASSESSMENT & PLAN NOTE
She cannot remember the last time she had any genetic testing done but I suspect this was 2008 at the time of her cancer.  We can certainly update this and I will have the genetics department contact her.

## 2021-09-10 NOTE — ASSESSMENT & PLAN NOTE
She is now 13 years status post left partial mastectomy, S axillary dissection, whole breast radiation and chemotherapy for a T2 N2 aM0 carcinoma that.  She has no evidence of any recurrent disease and will continue annual mammographic surveillance.

## 2021-09-13 ENCOUNTER — TELEPHONE (OUTPATIENT)
Dept: GENETICS | Facility: HOSPITAL | Age: 61
End: 2021-09-13

## 2021-09-13 NOTE — TELEPHONE ENCOUNTER
10/28/2021  I called Alecia Cruz to remind her 9:00 appointment tomorrow at [facility]. I provided directions and Alecia Cruz confirmed that she would be present.    10/1/2021  I called Alecia Cruz back  regarding her message. She was wanting to set up an appointment. Alecia Cruz accepted an appointment on 10/29 at 9:00 at [facility].    9/16/2021  I answered a call from Alecia Cruz regarding the referral. Alecia Cruz was unsure if she ever had genetic testing done and we do not have a record with [facility]. Alecia Cruz does not think that she would have had it done elsewhere. Alecia Cruz was interested in having testing done and was transferred to general scheduling with a preference for [facility].    9/13/2021  I left a message for Alecia Cruz per a referral from Dr. Merino for updated genetic testing. Per her note,  Alecia Cruz had testing done in 2008. I did not find records of testing with [facility]. I am inquiring if patient has a copy of records and to set up an appointment if needed. I provided the number to my direct line.

## 2021-09-15 LAB
CASE RPRT: NORMAL
CLINICAL INFO: NORMAL
PATH REPORT.FINAL DX SPEC: NORMAL
PATH REPORT.FINAL DX SPEC: NORMAL
PATH REPORT.GROSS SPEC: NORMAL

## 2021-09-17 ENCOUNTER — TELEPHONE (OUTPATIENT)
Dept: SURGERY | Facility: CLINIC | Age: 61
End: 2021-09-17

## 2021-09-17 NOTE — TELEPHONE ENCOUNTER
Alecia is aware that the pathology from her biopsy is benign.  She is also going to follow-up with the genetic testing.

## 2021-09-21 ENCOUNTER — CLINICAL SUPPORT (OUTPATIENT)
Dept: PRIMARY CARE | Facility: CLINIC | Age: 61
End: 2021-09-21
Payer: COMMERCIAL

## 2021-09-21 DIAGNOSIS — Z23 NEED FOR VACCINATION: Primary | ICD-10-CM

## 2021-09-21 DIAGNOSIS — Z23 NEED FOR INFLUENZA VACCINATION: ICD-10-CM

## 2021-09-21 PROCEDURE — 90471 IMMUNIZATION ADMIN: CPT | Performed by: STUDENT IN AN ORGANIZED HEALTH CARE EDUCATION/TRAINING PROGRAM

## 2021-09-21 PROCEDURE — 90732 PPSV23 VACC 2 YRS+ SUBQ/IM: CPT | Performed by: STUDENT IN AN ORGANIZED HEALTH CARE EDUCATION/TRAINING PROGRAM

## 2021-09-24 ENCOUNTER — OFFICE VISIT (OUTPATIENT)
Dept: CARDIOLOGY | Facility: CLINIC | Age: 61
End: 2021-09-24
Payer: COMMERCIAL

## 2021-09-24 VITALS
DIASTOLIC BLOOD PRESSURE: 76 MMHG | HEIGHT: 67 IN | RESPIRATION RATE: 16 BRPM | SYSTOLIC BLOOD PRESSURE: 114 MMHG | BODY MASS INDEX: 23.23 KG/M2 | WEIGHT: 148 LBS | HEART RATE: 80 BPM

## 2021-09-24 DIAGNOSIS — E03.9 HYPOTHYROIDISM, UNSPECIFIED TYPE: ICD-10-CM

## 2021-09-24 DIAGNOSIS — I48.0 PAROXYSMAL ATRIAL FIBRILLATION (CMS/HCC): Primary | ICD-10-CM

## 2021-09-24 DIAGNOSIS — I49.3 PVC (PREMATURE VENTRICULAR CONTRACTION): ICD-10-CM

## 2021-09-24 PROCEDURE — 93000 ELECTROCARDIOGRAM COMPLETE: CPT | Performed by: INTERNAL MEDICINE

## 2021-09-24 PROCEDURE — 3008F BODY MASS INDEX DOCD: CPT | Performed by: INTERNAL MEDICINE

## 2021-09-24 PROCEDURE — 99204 OFFICE O/P NEW MOD 45 MIN: CPT | Performed by: INTERNAL MEDICINE

## 2021-09-24 RX ORDER — FLECAINIDE ACETATE 50 MG/1
50 TABLET ORAL
Qty: 180 TABLET | Refills: 1 | Status: SHIPPED | OUTPATIENT
Start: 2021-09-24 | End: 2022-05-31

## 2021-09-24 RX ORDER — METOPROLOL SUCCINATE 25 MG/1
25 TABLET, EXTENDED RELEASE ORAL NIGHTLY
Qty: 90 TABLET | Refills: 3 | Status: SHIPPED | OUTPATIENT
Start: 2021-09-24 | End: 2022-11-07 | Stop reason: SDUPTHER

## 2021-09-24 ASSESSMENT — CHADS2 SCORE
CHF: NO
HYPERTENSION: NO
DIABETES: NO
SEX: FEMALE (+1PT.)
CHADS2 SCORE: 1
PRIOR STROKE OR TIA OR THROMBOEMBOLISM: NO
VASCULAR DISEASE: NO
AGE: <65

## 2021-09-24 NOTE — PROGRESS NOTES
Electrophysiology Office  Consultation       Reason for visit:   Chief Complaint   Patient presents with   • Paroxysmal atrial fibrillation      HPI   Alecia Cruz is a 60 y.o. female who presents for evaluation of atrial fibrillation.  She reports she was diagnosed with atrial fibrillation in the 90s.  She was treated with verapamil, digoxin and Coumadin.  She subsequently moved to [place] and followed with cardiology there.  She had no episodes of atrial fibrillation for over 5 years and was weaned off her medications.  [place] and was [place] to [facility] in 2019 with atrial fibrillation.  She underwent SANDRA in anticipation of cardioversion but converted to sinus rhythm during transesophageal echocardiogram.  She was subsequently started on flecainide 50 mg twice daily.  Given her [id number] score of 1 (female), she is not chronically anticoagulated.  More recently, she began experiencing breakthrough palpitations.  She was seen in [facility], underwent 24-hour Holter monitor which showed PVCs but no recurrent atrial fibrillation.  She was prescribed metoprolol succinate to be taken as needed for breakthrough episodes or for symptomatic PVCs.  She denies chest pain, dyspnea or syncope.  She is under a lot of stress with her family and has difficulty sleeping at night.  She does not consume caffeine, alcohol or use tobacco products.  She has been exercising less frequently due to back pain.      Past Medical History:   Diagnosis Date   • A-fib (CMS/HCC)    • Breast cancer (CMS/HCC)     left partial mastectomy axillary dissection, whole breast radiation and chemotherapy in 2008 for a 2-1/2 cm invasive ductal carcinoma that was ER/LA negative and HER-2/alyson positive with 7-15 positive lymph nodes   • H/O total hysterectomy 03/2000   • Hypothyroidism    • PAF (paroxysmal atrial fibrillation) (CMS/HCC)    • PVC (premature ventricular contraction)        Past Surgical  History:   Procedure Laterality Date   • BREAST LUMPECTOMY     • MANDIBLE FRACTURE SURGERY     • PARTIAL HYSTERECTOMY     • ROTATOR CUFF REPAIR Left          Social history, family history and allergies were reviewed and updated in EMR.      Current Outpatient Medications   Medication Sig Dispense Refill   • ascorbic acid (VITAMIN C) 500 mg tablet Take 500 mg by mouth daily.     • aspirin 81 mg enteric coated tablet Take 81 mg by mouth daily.     • betamethasone dipropionate (DIPROSONE) 0.05 % lotion once as needed.       • cetirizine (ZyrTEC) 10 mg tablet Take 10 mg by mouth daily.     • cholecalciferol, vitamin D3, 1,000 unit (25 mcg) tablet Take 1,000 Units by mouth daily.     • coenzyme Q10 (COQ10) 60 mg capsule Take by mouth.     • cyclobenzaprine (FLEXERIL) 5 mg tablet Take 1 tablet (5 mg total) by mouth 3 (three) times a day as needed for muscle spasms for up to 14 days. 30 tablet 0   • diphenhydrAMINE (BENADRYL ALLERGY) 25 mg tablet daily.     • flecainide (TAMBOCOR) 50 mg tablet Take 1 tablet (50 mg total) by mouth 2 (two) times a day. 180 tablet 1   • fluticasone propionate (FLONASE) 50 mcg/actuation nasal spray Administer 2 sprays into each nostril daily as needed for rhinitis. 1 Bottle 0   • levothyroxine (SYNTHROID) 88 mcg tablet Take 1 tablet (88 mcg total) by mouth daily. 90 tablet 3   • multivitamin (THERAGRAN) tablet Take 1 tablet by mouth daily.     • naproxen sodium 220 mg capsule Take by mouth daily as needed.     • omega-3-dha-epa-dpa-fish oil 1,050 mg(300 mg -675 mg-75 mg) capsule      • vitamin E 100 unit capsule      • zolpidem (AMBIEN) 10 mg tablet      • alpha lipoic acid, bulk, powder Take by mouth.     • apixaban (ELIQUIS) 5 mg tablet Take 1 tablet (5 mg total) by mouth 2 (two) times a day. 60 tablet 2   • metoprolol succinate XL (TOPROL-XL) 25 mg 24 hr tablet Take 1 tablet (25 mg total) by mouth nightly. 90 tablet 3     No current facility-administered medications for this visit.           ROS  As per the HPI.  Otherwise comprehensive 10 point review of systems was reviewed and is negative.        Vitals:    09/24/21 1405   BP: 114/76   Pulse: 80   Resp: 16     Body mass index is 23.18 kg/m².  Physical Exam  General: No acute distress.  HEENT: Anicteric.  Moist mucous membranes.  Neck: Supple, no JVD.  Lungs: Clear to auscultation bilaterally.  Cardiac: Regular.  No murmurs, rubs or gallops.  Abdomen: Soft, nontender.  Extremities: No lower extremity edema.  Skin: Warm, dry.  Neurologic: Grossly intact.  Psychiatric: Behavior is appropriate and cooperative.       Lab Results   Component Value Date    WBC 5.80 12/04/2020    HGB 13.4 12/04/2020     12/04/2020    CHOL 215 (H) 12/04/2020    TRIG 53 12/04/2020    HDL 59 12/04/2020    ALT 26 12/04/2020    AST 20 12/04/2020     12/04/2020    K 4.0 12/04/2020    CREATININE 0.8 12/04/2020    TSH 1.23 12/04/2020   Labs 8/20/2021 are reviewed    EKG   Sinus rhythm, nonspecific ST-T wave abnormality    Regadenoson nuclear stress test 2/25/2021 ([facility])  No chest pain during stress.  EKG nondiagnostic due to resting ST-T wave abnormality.  No perfusion defects.  EF 68%.  No regional abnormalities.    Zio patch 3/15/2021 ([facility])  Sinus rhythm, average heart rate 81 bpm, minimum heart rate 48 bpm, maximum heart rate 141 bpm.  Isolated SVE (less than 1%) couplets less than 1%, no SVE triplets.  Isolated VE (less than 1%) couplets (less than 1%) and no VE triplets were present.  Ventricular bigeminy and trigeminy were present.    Transesophageal echocardiogram 4/5/2019 ([facility])  Normal LVEF, 55%.  No regional wall motion abnormalities.  No thrombus in the left atrial appendage.  Limited study as the patient converted to sinus rhythm following probe insertion.    Transthoracic echocardiogram 4/5/2019  Normal LVEF, 55-60%.  No regional wall motion abnormalities.  Normal RV size with mild  systolic dysfunction.  Mild mitral regurgitation.  Trace tricuspid regurgitation with normal RVSP.    EKG 4/4/2019  Atrial fibrillation with rapid ventricular response, nonspecific ST-T wave abnormality             Assessment and Plan:    Paroxysmal atrial fibrillation (CMS/HCC)  She has a history of paroxysmal atrial fibrillation dating back to the 90s per her report.  She was treated with verapamil, digoxin and Coumadin back then.  These medications were discontinued sometime over the last few decades.  She had recurrent atrial fibrillation in 2019, underwent SANDRA in anticipation of cardioversion but converted to sinus rhythm spontaneously during the echocardiogram.  She was subsequently started on flecainide 50 mg twice daily.  Since then, she has had had rate through episodes lasting minutes to hours but has not required a cardioversion.  She takes metoprolol only as needed but given more frequent breakthrough episodes, she will start taking Toprol-XL 25 mg nightly.  If symptoms persist, we will uptitrate flecainide.  Her CQY1YT2-VUTf score is 1 (female) and she is on aspirin 81 mg daily.  I did provide samples of Eliquis to be taken as needed in place of aspirin for episodes of atrial fibrillation lasting greater than 24 hours.    PVC (premature ventricular contraction)  She has normal LV function and a negative nuclear stress test earlier this year.  She is occasionally symptomatic with PVCs.  She will continue flecainide and start low-dose Toprol as above.    Hypothyroidism  TSH was normal on most recent labs.  She will continue Synthroid and follow with her PCP.    History of left breast cancer  She underwent left partial mastectomy with whole breast radiation and chemotherapy in 2008.  She recently had a biopsy and pathology was benign.  She will continue to follow with Dr. Merino.      Thank you for allowing me to participate in the care of your patient, please feel free to contact me with any questions or  concerns.     Yris Alonso MD  9/24/2021

## 2021-09-24 NOTE — ASSESSMENT & PLAN NOTE
She underwent left partial mastectomy with whole breast radiation and chemotherapy in 2008.  She recently had a biopsy and pathology was benign.  She will continue to follow with Dr. Merino.

## 2021-09-24 NOTE — ASSESSMENT & PLAN NOTE
She has a history of paroxysmal atrial fibrillation dating back to the 90s per her report.  She was treated with verapamil, digoxin and Coumadin back then.  These medications were discontinued sometime over the last few decades.  She had recurrent atrial fibrillation in 2019, underwent SANDRA in anticipation of cardioversion but converted to sinus rhythm spontaneously during the echocardiogram.  She was subsequently started on flecainide 50 mg twice daily.  Since then, she has had had rate through episodes lasting minutes to hours but has not required a cardioversion.  She takes metoprolol only as needed but given more frequent breakthrough episodes, she will start taking Toprol-XL 25 mg nightly.  If symptoms persist, we will uptitrate flecainide.  Her [id number] score is 1 (female) and she is on aspirin 81 mg daily.  I did provide samples of Eliquis to be taken as needed in place of aspirin for episodes of atrial fibrillation lasting greater than 24 hours.

## 2021-09-24 NOTE — PATIENT INSTRUCTIONS
Keep Eliquis (blood thinner) samples to take AS NEEDED in place of aspirin for episodes of atrial fibrillation lasting >24 hours    --> 5 mg twice a day

## 2021-09-24 NOTE — ASSESSMENT & PLAN NOTE
She has normal LV function and a negative nuclear stress test earlier this year.  She is occasionally symptomatic with PVCs.  She will continue flecainide and start low-dose Toprol as above.

## 2021-09-28 ENCOUNTER — DOCUMENTATION (OUTPATIENT)
Dept: CARDIOLOGY | Facility: CLINIC | Age: 61
End: 2021-09-28

## 2021-10-21 ENCOUNTER — TELEPHONE (OUTPATIENT)
Dept: SURGERY | Facility: CLINIC | Age: 61
End: 2021-10-21

## 2021-10-21 NOTE — TELEPHONE ENCOUNTER
Pt had a stereotactic bx1 month ago, she now says she is experiencing sharp pains and lump in the same area . Pt is very concerned. Please call and advise

## 2021-10-25 ENCOUNTER — TELEPHONE (OUTPATIENT)
Dept: SURGERY | Facility: CLINIC | Age: 61
End: 2021-10-25

## 2021-10-25 NOTE — TELEPHONE ENCOUNTER
I spoke with Alecia regarding the intermittent sharp sensation she feels when moving, status post stereotactic breast biopsy.  She also reports a hard lump in that area.  She denies fever, chills, redness or warmth at the site.  She is going to observe this area for the next 2 weeks to see if this area worsens or improves.  I have asked her to give the office a call in 2 weeks so that we can reevaluate and possibly bring her into the office for clinical exam if she is not getting relief in the interim.  She is agreeable to this plan and will also use ice intermittently to the site of discomfort as needed.

## 2021-10-29 ENCOUNTER — TELEPHONE (OUTPATIENT)
Dept: GENETICS | Facility: HOSPITAL | Age: 61
End: 2021-10-29

## 2021-10-29 NOTE — TELEPHONE ENCOUNTER
03/21/2022  I spoke with Alecia Cruz and detailed how to find our genetic reports in [employer]. I stated that they are available under the letters tab.    11/1/2021  I called Alecia Cruz to verify if she is coming to the appointment. Alecia Cruz confirmed that she is on her way and I provided directions.    10/29/2021  Alecia Cruz opted to reschedule for 11/1 Monday at 8:45 at [facility]. Alecia Cruz chose 8:45 as she will be able to make the appointment and still be able to  her sister after her treatment. Alecia Cruz will call to confirm.    10/29/2021  I spoke to Alecia Cruz who was calling that she would be late for her 9:00 appointment. Alecia Cruz was dropping off her niece in [place] which is not usually a problem but her car got stuck in a jam for 30 minutes. Alecia Cruz was wondering if she could either come late or switch to telemed. I confirmed that if she was in the [facility] we could have the appointment through zoom. Alecia Cruz had her nieces phone but her phone was located 10 minutes away and she thought that would be easier to use her own phone. I sent an email with the [employer] link for her to join. Alecia Cruz has the number to my direct line if there is any difficulty accessing the meeting.

## 2021-11-01 ENCOUNTER — CLINICAL SUPPORT (OUTPATIENT)
Dept: GENETICS | Facility: HOSPITAL | Age: 61
End: 2021-11-01
Attending: INTERNAL MEDICINE
Payer: COMMERCIAL

## 2021-11-01 DIAGNOSIS — Z71.83 ENCOUNTER FOR NONPROCREATIVE GENETIC COUNSELING: Primary | ICD-10-CM

## 2021-11-01 DIAGNOSIS — Z85.3 HISTORY OF BREAST CANCER: ICD-10-CM

## 2021-11-01 DIAGNOSIS — Z80.3 FHX: BREAST CANCER: ICD-10-CM

## 2021-11-01 DIAGNOSIS — Z80.0 FHX: CANCER OF GI TRACT: ICD-10-CM

## 2021-11-01 DIAGNOSIS — Z80.41 FHX: OVARIAN CANCER: ICD-10-CM

## 2021-11-01 PROCEDURE — 96040 HC GENETICS COUNSELING SESSIONS: CPT | Performed by: GENETIC COUNSELOR, MS

## 2021-11-01 PROCEDURE — 36415 COLL VENOUS BLD VENIPUNCTURE: CPT | Performed by: GENETIC COUNSELOR, MS

## 2021-11-01 NOTE — PROGRESS NOTES
Patient Name:  Alecia Cruz  : 1960       Indication for Appointment:  Alecia Cruz presented for genetic counseling and cancer risk assessment at [facility] due to a personal history of breast cancer and family history of breast, ovarian, prostate, melanoma, gastric, and lung cancer. Alecia Cruz was referred by Leslie Merino MD, and presented to the session alone.    Personal History:   Alecia Cruz is 60 y.o. female of  and  descent with primary visit diagnosis of Encounter for nonprocreative genetic counseling [Z71.83].    Alecia Cruz reports that in , at age 47, she was diagnosed with estrogen and progesterone receptor negative, and Xrd3gee positive (+2) invasive ductal carcinoma in her left breast (pathology from biopsy unavailable). Alecia Cruz is status post left lumpectomy and sentinel lymph node dissection which identified metastatic carcinoma in 7/15 lymph nodes ([facility] specimen #[id number]). Alecia Cruz also underwent adjuvant chemotherapy and radiation treatments.     Alecia Cruz reports that she is not sure if she had genetic testing in 2008 for BRCA1 and BRCA2. Alecia Cruz cannot remember if she went to her genetics appointment or not and does not remember receiving test results.    Past Medical History:   Diagnosis Date   • A-fib (CMS/HCC)    • Breast cancer (CMS/HCC)     left partial mastectomy axillary dissection, whole breast radiation and chemotherapy in  for a 2-1/2 cm invasive ductal carcinoma that was ER/WI negative and HER-2/alyson positive with 7-15 positive lymph nodes   • Colon polyp     couple on each colonoscopy per patient report   • Fibrocystic breast    • Fibroid    • History of partial hysterectomy 2000    c/o fibroids   • History of radiation therapy    • Hx antineoplastic chemo    • Hx of lipoma     resected from shoulder   • Hypothyroidism    •  "PAF (paroxysmal atrial fibrillation) (CMS/HCC)    • PVC (premature ventricular contraction)    • Screening for breast cancer       Past Medical History Pertinent Negatives:   Denies History Of: Date Noted   • Hormone replacement therapy 11/01/2021   • Melanoma (CMS/HCC) 11/01/2021     Past Surgical History:   Procedure Laterality Date   • Breast biopsy Left 2008   • Breast biopsy Right 2021    benign ([facility] specimen [id number])   • Breast lumpectomy Left 2008    [facility] specimen [id number]   • Dental surgery     • Hysterectomy      partial   • Lumbar puncture     • Mandible fracture surgery     • Partial hysterectomy     • Rotator cuff repair Left    • Sinus surgery       Past Surgical History Pertinent Negatives:   Denies History Of: Date Noted   • Oophorectomy 11/01/2021        Height/Weight: (previously recorded in physician's office)  Height: 1.702 m (5' 7\")  Weight: 67.1 kg (148 lb)    Gynecologic History:  Menarche Age: 12 years  Age at first live birth: 16  Menopause Status: Post-Menopause  Age at menopause: unknown  Use of hormonal contraceptives: Yes (3yrs)  Use of fertility medications: No  Use of hormone replacement therapy: No  Use of Tamoxifen/Evista: No    Social History     Tobacco Use   • Smoking status: Never Smoker   • Smokeless tobacco: Never Used   Substance Use Topics   • Alcohol use: No   • Drug use: No       Family History:  See completed family history in pedigree below.    Genetic Education/Risk Assessment/Counseling:  Information was provided about the relationship between genes and cancer.  The concept of hereditary cancer was defined.  Natural history, risks and inheritance patterns of  cancer-associated genes were reviewed, as related to Alecia Cruz’s personal and/or family history.  Related psychosocial aspects were discussed.    Discussion of Genetic Testing:  The pros, cons, and limitations of testing for genetic susceptibility were discussed, including but not limited to test " options, possible results, potential impact on management, and psychosocial aspects.  There may be limited data on the degree of cancer risk and/or no defined management guidelines associated with some genes.  If applicable, risk assessment models and/or published tables were used to provide a mutation probability estimate. Limitations of assessment were reviewed.    Given the reported personal and/or family history, genetic testing was offered and accepted. The following testing was ordered:    [facility]  [facility] Hereditary Cancer Panel      Plan:  Alecia Cruz was confirmed to have understood the aforementioned information and was assisted with decision making as needed.  Informational and supportive resources were provided. Consent was obtained to share chart note(s) with physicians. Alecia Cruz is encouraged to contact the program with personal/family history updates. Alecia Cruz will be contacted via telephone when genetic test results are available.     A total of 30 minutes was spent providing genetic counseling to Alecia Cruz.

## 2021-11-01 NOTE — LETTER
11/01/21    Alecia Cruz  [address]  [address]  [place] [address]    Dear Ms. Alexi Cruz,    Thank you for participating in the [facility].  It was a pleasure working with you. Attached is documentation from our discussion(s). It will also be sent to any physicians you indicated.      You may also choose to share this documentation with your family members. Because cancer risk is based on both personal and both maternal and paternal family history factors, as well as mutation status, your relatives are recommended to review their risks and genetic testing options (if applicable) with their own healthcare providers to derive a risk-appropriate, individualized plan.      If you have any questions, concerns, or updates to your personal/family history, please contact the [facility] at [phone] to further review your case.    Please see below for your encounter report.    Sincerely,         Earl Fuller, [facility]        Encounter Note    Indication for Appointment:  Alecia Cruz presented for genetic counseling and cancer risk assessment at [facility] due to a personal history of breast cancer and family history of breast, ovarian, prostate, melanoma, gastric, and lung cancer. Alecia Cruz was referred by Leslie Merino MD, and presented to the session alone.    Personal History:   Alecia Cruz is 60 y.o. female of  and  descent with primary visit diagnosis of Encounter for nonprocreative genetic counseling [Z71.83].    Alecia Cruz reports that in 2008, at age 47, she was diagnosed with estrogen and progesterone receptor negative, and Ujh3dvx positive (+2) invasive ductal carcinoma in her left breast (pathology from biopsy unavailable). Alecia Cruz is status post left lumpectomy and sentinel lymph node dissection which identified  "metastatic carcinoma in 7/15 lymph nodes ([facility] specimen #[id number]). Alecia Cruz also underwent adjuvant chemotherapy and radiation treatments.     Alecia Cruz reports that she is not sure if she had genetic testing in 2008 for BRCA1 and BRCA2. Alecia Cruz cannot remember if she went to her genetics appointment or not and does not remember receiving test results.    Past Medical History:   Diagnosis Date   • A-fib (CMS/HCC)    • Breast cancer (CMS/[facility]) 2008    left partial mastectomy axillary dissection, whole breast radiation and chemotherapy in 2008 for a 2-1/2 cm invasive ductal carcinoma that was ER/GA negative and HER-2/alyson positive with 7-15 positive lymph nodes   • Colon polyp     couple on each colonoscopy per patient report   • Fibrocystic breast    • Fibroid    • History of partial hysterectomy 03/2000    c/o fibroids   • History of radiation therapy    • Hx antineoplastic chemo    • Hx of lipoma 2008    resected from shoulder   • Hypothyroidism    • PAF (paroxysmal atrial fibrillation) (CMS/HCC)    • PVC (premature ventricular contraction)    • Screening for breast cancer       Past Medical History Pertinent Negatives:   Denies History Of: Date Noted   • Hormone replacement therapy 11/01/2021   • Melanoma (CMS/HCC) 11/01/2021     Past Surgical History:   Procedure Laterality Date   • Breast biopsy Left 2008   • Breast biopsy Right 2021    benign ([facility] specimen [id number])   • Breast lumpectomy Left 2008    [facility] specimen [id number]   • Dental surgery     • Hysterectomy      partial   • Lumbar puncture     • Mandible fracture surgery     • Partial hysterectomy     • Rotator cuff repair Left    • Sinus surgery       Past Surgical History Pertinent Negatives:   Denies History Of: Date Noted   • Oophorectomy 11/01/2021        Height/Weight: (previously recorded in physician's office)  Height: 1.702 m (5' 7\")  Weight: 67.1 kg (148 lb)    Gynecologic History:  Menarche Age: 12 years  Age at " first live birth: 16  Menopause Status: Post-Menopause  Age at menopause: unknown  Use of hormonal contraceptives: Yes (3yrs)  Use of fertility medications: No  Use of hormone replacement therapy: No  Use of Tamoxifen/Evista: No    Social History     Tobacco Use   • Smoking status: Never Smoker   • Smokeless tobacco: Never Used   Substance Use Topics   • Alcohol use: No   • Drug use: No       Family History:  See completed family history in pedigree below.    Genetic Education/Risk Assessment/Counseling:  Information was provided about the relationship between genes and cancer.  The concept of hereditary cancer was defined.  Natural history, risks and inheritance patterns of  cancer-associated genes were reviewed, as related to Alecia Cruz’s personal and/or family history.  Related psychosocial aspects were discussed.    Discussion of Genetic Testing:  The pros, cons, and limitations of testing for genetic susceptibility were discussed, including but not limited to test options, possible results, potential impact on management, and psychosocial aspects.  There may be limited data on the degree of cancer risk and/or no defined management guidelines associated with some genes.  If applicable, risk assessment models and/or published tables were used to provide a mutation probability estimate. Limitations of assessment were reviewed.    Given the reported personal and/or family history, genetic testing was offered and accepted. The following testing was ordered:    [facility]  [facility] Hereditary Cancer Panel      Plan:  Alecia Cruz was confirmed to have understood the aforementioned information and was assisted with decision making as needed.  Informational and supportive resources were provided. Consent was obtained to share chart note(s) with physicians. Alecia Cruz is encouraged to contact the program with personal/family history updates. Alecia Cruz will be contacted  via telephone when genetic test results are available.     A total of 30 minutes was spent providing genetic counseling to Alecia Cruz.

## 2021-11-01 NOTE — LETTER
11/01/21    Mahendra Begum, DO  [address]  [address]  [place] [address]      Dear Dr. Begum,    I am writing to confirm that your patient, Alecia Cruz, received care through my office on 11/01/21. I have enclosed a summary of the care provided to Alecia for your reference.    Please contact me with any questions you may have regarding the visit.    Sincerely,           HERBER Hodges      CC: No Recipients

## 2021-11-01 NOTE — LETTER
11/01/21    Leslie Merino MD  [address]  [address]  [place] [address]      Dear Dr. Merino,    Thank you for referring your patient, Alecia Cruz, to receive care through my office. I have enclosed a summary of the care provided to Alecia on 11/01/21.    Please contact me with any questions you may have regarding the visit.    Sincerely,             Earl Fuller, [facility]  [address]  [address] [place] [place] [address]  [phone]    CC: No Recipients

## 2021-11-02 ENCOUNTER — OFFICE VISIT (OUTPATIENT)
Dept: PRIMARY CARE | Facility: CLINIC | Age: 61
End: 2021-11-02
Payer: COMMERCIAL

## 2021-11-02 VITALS
HEART RATE: 87 BPM | TEMPERATURE: 97.9 F | BODY MASS INDEX: 24.01 KG/M2 | WEIGHT: 153 LBS | OXYGEN SATURATION: 98 % | SYSTOLIC BLOOD PRESSURE: 112 MMHG | RESPIRATION RATE: 17 BRPM | HEIGHT: 67 IN | DIASTOLIC BLOOD PRESSURE: 60 MMHG

## 2021-11-02 DIAGNOSIS — M54.81 OCCIPITAL NEURALGIA OF LEFT SIDE: ICD-10-CM

## 2021-11-02 DIAGNOSIS — G47.9 SLEEP DISORDER: ICD-10-CM

## 2021-11-02 DIAGNOSIS — G50.0 TRIGEMINAL NEURALGIA OF LEFT SIDE OF FACE: Primary | ICD-10-CM

## 2021-11-02 DIAGNOSIS — E03.9 HYPOTHYROIDISM, UNSPECIFIED TYPE: ICD-10-CM

## 2021-11-02 PROCEDURE — 3008F BODY MASS INDEX DOCD: CPT | Performed by: STUDENT IN AN ORGANIZED HEALTH CARE EDUCATION/TRAINING PROGRAM

## 2021-11-02 PROCEDURE — 99213 OFFICE O/P EST LOW 20 MIN: CPT | Performed by: STUDENT IN AN ORGANIZED HEALTH CARE EDUCATION/TRAINING PROGRAM

## 2021-11-02 RX ORDER — ZOSTER VACCINE RECOMBINANT, ADJUVANTED 50 MCG/0.5
50 KIT INTRAMUSCULAR ONCE
Qty: 0.5 ML | Refills: 0 | Status: SHIPPED | OUTPATIENT
Start: 2021-11-02 | End: 2021-11-02

## 2021-11-02 RX ORDER — AMOXICILLIN 500 MG/1
CAPSULE ORAL EVERY 8 HOURS
COMMUNITY
Start: 2021-09-27 | End: 2021-11-02

## 2021-11-02 NOTE — PATIENT INSTRUCTIONS
-For your neurology, you can see Dr. Sparkle Reynoso or Dr. Georgette Rowe.   -Please come back to see me in 3 months and we will do the CT scan of your lungs.

## 2021-11-02 NOTE — PROGRESS NOTES
[facility]   Progress Note       ASSESSMENT AND PLAN     Occipital neuralgia of left side  -I suspect that this is the diagnosis for the shooting pains that she is having up into her head however I am not entirely sure and given that there may be an alternate diagnosis/workup I would like her to see a neurologist for further evaluation  -this added to the trigeminal neuralgia makes me think that she would benefit from a specialist and she may be a candidate for botox injections.     Trigeminal neuralgia of left side of face  -she gives a pretty classic description and with the negative mri of the face, i'm contemplating giving her oxcarbazepine but I would like to have her diagnosis confirmed before starting the medication.   -referral made to neurology.     Hypothyroidism  -she continues to follow with an endocrinologist who is following her syntrhoid it looks like.     Sleep disorder  -she is having a lot of issues with sleep after she had a financial/business issue with her son and there is a lot of stress on her now regarding financial issues  -discussed methods of non-pharmacologic sleep therapy and the value of talk therapy in the future if needed.       Health care maintenance: needs the shingles vaccine    Return in about 3 months (around 2/2/2022) for Recheck.  At next visit: follow-up input from the neurologist.     Mahendra Begum D.O.  Office: [phone]  Pager: [phone]     SUBJECTIVE     Alecia Cruz is a 60 y.o. year-old female, primary patient of Dr. Begum with a past medical history of pAF, hypothyroidism, breast ca, and headaches presents for follow-up of left sided sharp headaches.     Interval History: We had referred her to see an ent for further evaluation of these sharp shooting pain of the face. MRI was done and negative for large mass lesion or nerve entrapment based on imaging. A small adenoma looking lesion was identified which will need to be followed up on.     She  "continues to have these painful lesions and no other diagnosis was suggested. These sharp shooting pains of the face and the occiptal region happen unilaterally and last only a few moments. She is open to seeing a neurologist and I had previously suggested that perhaps occiptal or trigeminal neuralgia was to blame.    We discussed her lung nodule lesion which was first seen in 2009 and has apparently been stable. She is open to getting a complete chest CT at her next visit to fully evaluate her lung parenchyma and confirm stability given that she is a high risk individual.     PHYSICAL EXAMINATION     Visit Vitals  /60 (BP Location: Right upper arm, Patient Position: Sitting)   Pulse 87   Temp 36.6 °C (97.9 °F)   Resp 17   Ht 1.702 m (5' 7\")   Wt 69.4 kg (153 lb)   SpO2 98%   BMI 23.96 kg/m²       Physical Exam  Vitals reviewed.   Constitutional:       General: She is not in acute distress.     Appearance: Normal appearance. She is normal weight. She is not diaphoretic.   Cardiovascular:      Rate and Rhythm: Normal rate and regular rhythm.      Pulses: Normal pulses.      Heart sounds: Normal heart sounds. No murmur heard.  No friction rub.   Pulmonary:      Effort: Pulmonary effort is normal. No respiratory distress.      Breath sounds: Normal breath sounds. No stridor. No wheezing or rhonchi.   Neurological:      General: No focal deficit present.      Mental Status: She is alert and oriented to person, place, and time.      Gait: Gait normal.   Psychiatric:         Mood and Affect: Mood normal.         Behavior: Behavior normal.         Thought Content: Thought content normal.         Judgment: Judgment normal.             LABS / IMAGING / STUDIES        Labs Reviewed: no new labs to review    Studies/Imaging Reviewed: reviewed the results of her breast biopsy and mri.       MEDICATIONS      Current Outpatient Medications   Medication Instructions   • alpha lipoic acid, bulk, powder oral   • apixaban " (ELIQUIS) 5 mg, oral, 2 times daily   • ascorbic acid (VITAMIN C) 500 mg, oral, Daily   • aspirin 81 mg, oral, Daily   • betamethasone dipropionate (DIPROSONE) 0.05 % lotion Once as needed   • cetirizine (ZYRTEC) 10 mg, oral, Daily   • cholecalciferol (vitamin D3) 1,000 Units, oral, Daily   • coenzyme Q10 (COQ10) 60 mg capsule oral   • cyclobenzaprine (FLEXERIL) 5 mg, oral, 3 times daily PRN   • diphenhydrAMINE (BENADRYL ALLERGY) 25 mg tablet Every 24 hours interval   • flecainide (TAMBOCOR) 50 mg, oral, 2 times daily (6a, 6p)   • fluticasone propionate (FLONASE) 50 mcg/actuation nasal spray 2 sprays, Each Nostril, Daily PRN   • levothyroxine (SYNTHROID) 88 mcg, oral, Daily (6:30a)   • metoprolol succinate XL (TOPROL-XL) 25 mg, oral, Nightly   • multivitamin (THERAGRAN) tablet 1 tablet, oral, Daily   • naproxen sodium 220 mg capsule oral, Daily PRN   • omega-3-dha-epa-dpa-fish oil 1,050 mg(300 mg -675 mg-75 mg) capsule No dose, route, or frequency recorded.   • vitamin E 100 unit capsule No dose, route, or frequency recorded.   • zolpidem (AMBIEN) 10 mg tablet No dose, route, or frequency recorded.

## 2021-11-04 PROBLEM — C50.412 MALIGNANT NEOPLASM OF UPPER-OUTER QUADRANT OF LEFT BREAST IN FEMALE, ESTROGEN RECEPTOR NEGATIVE (CMS/HCC): Status: RESOLVED | Noted: 2021-09-10 | Resolved: 2021-11-04

## 2021-11-04 PROBLEM — Z17.1 MALIGNANT NEOPLASM OF UPPER-OUTER QUADRANT OF LEFT BREAST IN FEMALE, ESTROGEN RECEPTOR NEGATIVE (CMS/HCC): Status: RESOLVED | Noted: 2021-09-10 | Resolved: 2021-11-04

## 2021-11-04 PROBLEM — M54.81 OCCIPITAL NEURALGIA OF LEFT SIDE: Status: ACTIVE | Noted: 2021-11-04

## 2021-11-04 PROBLEM — F51.01 PRIMARY INSOMNIA: Status: RESOLVED | Noted: 2019-02-08 | Resolved: 2021-11-04

## 2021-11-04 PROBLEM — G50.0 TRIGEMINAL NEURALGIA OF LEFT SIDE OF FACE: Status: ACTIVE | Noted: 2021-11-04

## 2021-11-04 NOTE — ASSESSMENT & PLAN NOTE
-I suspect that this is the diagnosis for the shooting pains that she is having up into her head however I am not entirely sure and given that there may be an alternate diagnosis/workup I would like her to see a neurologist for further evaluation  -this added to the trigeminal neuralgia makes me think that she would benefit from a specialist and she may be a candidate for botox injections.

## 2021-11-04 NOTE — ASSESSMENT & PLAN NOTE
-she is having a lot of issues with sleep after she had a financial/business issue with her son and there is a lot of stress on her now regarding financial issues  -discussed methods of non-pharmacologic sleep therapy and the value of talk therapy in the future if needed.

## 2021-11-04 NOTE — ASSESSMENT & PLAN NOTE
-she gives a pretty classic description and with the negative mri of the face, i'm contemplating giving her oxcarbazepine but I would like to have her diagnosis confirmed before starting the medication.   -referral made to neurology.

## 2021-11-08 ENCOUNTER — TELEPHONE (OUTPATIENT)
Dept: PRIMARY CARE | Facility: CLINIC | Age: 61
End: 2021-11-08

## 2021-11-08 NOTE — TELEPHONE ENCOUNTER
Pt called stating that she test positive Strep throat at , and they prescribed zpak for her, she finished her zpak, and she now feels like her sx are returning    Pt would like to know if another zpak can re prescribed for her    Any question please contact pt [phone]

## 2021-11-09 ENCOUNTER — TELEPHONE (OUTPATIENT)
Dept: PRIMARY CARE | Facility: CLINIC | Age: 61
End: 2021-11-09

## 2021-11-09 NOTE — TELEPHONE ENCOUNTER
I would want to see her for a visit before giving a zpak. Strep throat doesn't come with cough so I suspect viral source. Actually having a cough makes strep throat less likely.      Can we confirm if she was tested for covid? There are a lot of other respiratory illnesses out there the last 3-4 weeks so I suspect that this is viral.    Abhi

## 2021-11-09 NOTE — TELEPHONE ENCOUNTER
Called to f/u on zpak request pt has a very bad cough, didn't want to bring her into the office, no telemed available,     Please contact pt and advise if zpak can be called in for her    [phone]

## 2021-11-10 ENCOUNTER — OFFICE VISIT (OUTPATIENT)
Dept: PRIMARY CARE | Facility: CLINIC | Age: 61
End: 2021-11-10
Payer: COMMERCIAL

## 2021-11-10 VITALS
OXYGEN SATURATION: 96 % | TEMPERATURE: 98.5 F | BODY MASS INDEX: 23.86 KG/M2 | SYSTOLIC BLOOD PRESSURE: 122 MMHG | HEIGHT: 67 IN | RESPIRATION RATE: 17 BRPM | DIASTOLIC BLOOD PRESSURE: 78 MMHG | HEART RATE: 87 BPM | WEIGHT: 152 LBS

## 2021-11-10 DIAGNOSIS — J06.9 VIRAL UPPER RESPIRATORY TRACT INFECTION: Primary | ICD-10-CM

## 2021-11-10 PROCEDURE — 99213 OFFICE O/P EST LOW 20 MIN: CPT | Performed by: STUDENT IN AN ORGANIZED HEALTH CARE EDUCATION/TRAINING PROGRAM

## 2021-11-10 PROCEDURE — 3008F BODY MASS INDEX DOCD: CPT | Performed by: STUDENT IN AN ORGANIZED HEALTH CARE EDUCATION/TRAINING PROGRAM

## 2021-11-10 RX ORDER — BENZONATATE 100 MG/1
100 CAPSULE ORAL 3 TIMES DAILY PRN
Qty: 30 CAPSULE | Refills: 0 | Status: SHIPPED | OUTPATIENT
Start: 2021-11-10 | End: 2021-11-20

## 2021-11-10 RX ORDER — AZITHROMYCIN 250 MG/1
TABLET, FILM COATED ORAL
COMMUNITY
Start: 2021-11-04 | End: 2021-11-16

## 2021-11-10 RX ORDER — GUAIFENESIN 600 MG/1
1200 TABLET, EXTENDED RELEASE ORAL 2 TIMES DAILY
Qty: 40 TABLET | Refills: 0 | Status: SHIPPED | OUTPATIENT
Start: 2021-11-10 | End: 2021-11-20

## 2021-11-10 NOTE — PATIENT INSTRUCTIONS
You had an appointment at [facility]. Today we discussed the followin) Start mucinex and use tessalon perles for cough  - If symptoms no better in next 7-10 days or improve then worsen again, notify office       It was a pleasure taking care of you. If you have any questions, please call us at [phone].    Sincerely,    [facility]

## 2021-11-10 NOTE — PROGRESS NOTES
"     [facility]   Progress Note       ASSESSMENT AND PLAN     Viral upper respiratory tract infection  Sinus congestion with cough for past week. S/p 5 days of azithromycin    - Suspect this is viral +/- allergy related and do not think she requires another course of antibiotics. Supportive care with mucinex, tessalon perles, antihistamines and nasal spray          Return in about 3 months (around 2/10/2022).         SUBJECTIVE     Alecia Cruz is a 60 y.o. year-old female with a past medical history of pAF, hypothyroidism, breast ca, and headaches who presents for sick visit.     Had sore throat starting 11/3. Her niece also had similar symptoms and tested postive for strep throat. The patient was subsequently tested as well and was also positive so was given 5 day course of azithromycin. COVID test was negative. Sore throat has improved but now has sinus congestion and cough. No fevers, chills, SOB.     PHYSICAL EXAMINATION     Visit Vitals  /78   Pulse 87   Temp 36.9 °C (98.5 °F) (Oral)   Resp 17   Ht 1.702 m (5' 7\")   Wt 68.9 kg (152 lb)   SpO2 96%   Breastfeeding No   BMI 23.81 kg/m²       Physical Exam  Constitutional:       Appearance: She is well-developed.   HENT:      Head: Normocephalic and atraumatic.      Nose: Congestion present.      Mouth/Throat:      Mouth: Mucous membranes are moist.      Pharynx: No oropharyngeal exudate or posterior oropharyngeal erythema.   Eyes:      Conjunctiva/sclera: Conjunctivae normal.      Pupils: Pupils are equal, round, and reactive to light.   Cardiovascular:      Rate and Rhythm: Normal rate and regular rhythm.      Heart sounds: Normal heart sounds. No murmur heard.  No friction rub.   Pulmonary:      Effort: Pulmonary effort is normal. No respiratory distress.      Breath sounds: Normal breath sounds.   Abdominal:      General: Bowel sounds are normal. There is no distension.      Palpations: Abdomen is soft.      Tenderness: There is no " abdominal tenderness.   Lymphadenopathy:      Cervical: No cervical adenopathy.   Skin:     General: Skin is warm and dry.   Neurological:      Mental Status: She is alert and oriented to person, place, and time.   Psychiatric:         Behavior: Behavior normal.             LABS / IMAGING / STUDIES        Labs Reviewed.    Studies/Imaging Reviewed.       MEDICATIONS      Current Outpatient Medications   Medication Instructions   • alpha lipoic acid, bulk, powder oral   • apixaban (ELIQUIS) 5 mg, oral, 2 times daily   • ascorbic acid (VITAMIN C) 500 mg, oral, Daily   • aspirin 81 mg, oral, Daily   • azithromycin 250 mg tablet No dose, route, or frequency recorded.   • benzonatate (TESSALON PERLES) 100 mg, oral, 3 times daily PRN   • betamethasone dipropionate (DIPROSONE) 0.05 % lotion Once as needed   • cetirizine (ZYRTEC) 10 mg, oral, Daily   • cholecalciferol (vitamin D3) 1,000 Units, oral, Daily   • coenzyme Q10 (COQ10) 60 mg capsule oral   • cyclobenzaprine (FLEXERIL) 5 mg, oral, 3 times daily PRN   • diphenhydrAMINE (BENADRYL ALLERGY) 25 mg tablet Every 24 hours interval   • flecainide (TAMBOCOR) 50 mg, oral, 2 times daily (6a, 6p)   • fluticasone propionate (FLONASE) 50 mcg/actuation nasal spray 2 sprays, Each Nostril, Daily PRN   • guaiFENesin (MUCINEX) 1,200 mg, oral, 2 times daily   • levothyroxine (SYNTHROID) 88 mcg, oral, Daily (6:30a)   • metoprolol succinate XL (TOPROL-XL) 25 mg, oral, Nightly   • multivitamin (THERAGRAN) tablet 1 tablet, oral, Daily   • naproxen sodium 220 mg capsule oral, Daily PRN   • omega-3-dha-epa-dpa-fish oil 1,050 mg(300 mg -675 mg-75 mg) capsule No dose, route, or frequency recorded.   • vitamin E 100 unit capsule No dose, route, or frequency recorded.   • zolpidem (AMBIEN) 10 mg tablet No dose, route, or frequency recorded.

## 2021-11-11 NOTE — ASSESSMENT & PLAN NOTE
Sinus congestion with cough for past week. S/p 5 days of azithromycin    - Suspect this is viral +/- allergy related and do not think she requires another course of antibiotics. Supportive care with mucinex, tessalon perles, antihistamines and nasal spray

## 2021-11-16 ENCOUNTER — TELEPHONE (OUTPATIENT)
Dept: PRIMARY CARE | Facility: CLINIC | Age: 61
End: 2021-11-16

## 2021-11-16 RX ORDER — AMOXICILLIN AND CLAVULANATE POTASSIUM 875; 125 MG/1; MG/1
1 TABLET, FILM COATED ORAL 2 TIMES DAILY
Qty: 14 TABLET | Refills: 0 | Status: SHIPPED | OUTPATIENT
Start: 2021-11-16 | End: 2021-11-23

## 2021-11-16 NOTE — TELEPHONE ENCOUNTER
Spoke to patient. Having worsening sinus congestion with ongoing cough, headaches and sinus pain. Continues to take mucinex, allergy medications and nasal sprays without improvement. Given symptoms have been going on for 2 weeks will treat with course of Augmentin. Palpitations reported in past but doesn't have actual allergy.    Update: spoke to pharmacist regarding allergy. Patient reportedly can't tolerate Augmetin but has tolerated amoxicillin as recently as last year so adjusted prescription to amoxicillin alone.

## 2021-12-03 ENCOUNTER — TELEPHONE (OUTPATIENT)
Dept: PRIMARY CARE | Facility: CLINIC | Age: 61
End: 2021-12-03
Payer: COMMERCIAL

## 2021-12-03 NOTE — TELEPHONE ENCOUNTER
Pt called stating that her headaches are worse, not able to see the Neurologist till 2/1,    Pt said that she that she is not sleeping and would something before then    Please contact pt and advise [phone]

## 2021-12-06 NOTE — TELEPHONE ENCOUNTER
Called patient and discussed with her. I think she has occipital neuralgia and also trigeminal neuralgia. We are going to see her Thursday and give script for PT and also discuss medication start.

## 2021-12-06 NOTE — TELEPHONE ENCOUNTER
Pt called stating that she is scheduled for a procedure tomorrow 12/7 pt would like to speak with pcp before procedure    Please contact asap

## 2021-12-06 NOTE — TELEPHONE ENCOUNTER
"spoke with Ms. Cruz today she had numerous concerns about left side facial, forehead, and cervical pain.  Additionally he is having low back pain and is scheduled for \"muscle injections\" tomorrow in her low back\"she says they are not epidurals they were prescribed by her neurologist.  She states the pain is keeping her up at night although when she takes a Tylenol PM she is able to sleep.I recommended she keep using the Tylenol PM.she is going to follow-up with Dr. Begum 12/9/2021.  "

## 2021-12-09 ENCOUNTER — OFFICE VISIT (OUTPATIENT)
Dept: PRIMARY CARE | Facility: CLINIC | Age: 61
End: 2021-12-09
Payer: COMMERCIAL

## 2021-12-09 ENCOUNTER — TELEPHONE (OUTPATIENT)
Dept: GENETICS | Facility: HOSPITAL | Age: 61
End: 2021-12-09
Payer: COMMERCIAL

## 2021-12-09 VITALS
SYSTOLIC BLOOD PRESSURE: 100 MMHG | OXYGEN SATURATION: 98 % | DIASTOLIC BLOOD PRESSURE: 60 MMHG | TEMPERATURE: 98.8 F | HEIGHT: 67 IN | BODY MASS INDEX: 23.86 KG/M2 | HEART RATE: 89 BPM | RESPIRATION RATE: 17 BRPM | WEIGHT: 152 LBS

## 2021-12-09 DIAGNOSIS — M54.81 OCCIPITAL NEURALGIA OF LEFT SIDE: Primary | ICD-10-CM

## 2021-12-09 DIAGNOSIS — G50.0 TRIGEMINAL NEURALGIA OF LEFT SIDE OF FACE: ICD-10-CM

## 2021-12-09 PROCEDURE — 99213 OFFICE O/P EST LOW 20 MIN: CPT | Performed by: STUDENT IN AN ORGANIZED HEALTH CARE EDUCATION/TRAINING PROGRAM

## 2021-12-09 PROCEDURE — 3008F BODY MASS INDEX DOCD: CPT | Performed by: STUDENT IN AN ORGANIZED HEALTH CARE EDUCATION/TRAINING PROGRAM

## 2021-12-09 RX ORDER — AMOXICILLIN 875 MG/1
TABLET, FILM COATED ORAL
COMMUNITY
Start: 2021-11-16 | End: 2021-12-09

## 2021-12-09 NOTE — LETTER
12/15/21    Leslie Merino MD  [address] [facility]  [address]  [place] [address]      Dear Dr. Merino,    Thank you for referring your patient, Alecia Cruz, to receive care through my office. I have enclosed a summary of the care provided to Alecia on 12/15/21.    Please contact me with any questions you may have regarding the visit.    Sincerely,             Earl Fuller, [facility]  [address]  [address] [facility] [[email or web address]] [place] [address]  [phone]    CC: No Recipients

## 2021-12-09 NOTE — PATIENT INSTRUCTIONS
-Please start physical therapy for the neck pain/occipital neuralgia.    -Discuss with Dr. Reynoso if she thinks this could be trigeminal neuralgia causing your symptoms.

## 2021-12-09 NOTE — TELEPHONE ENCOUNTER
Alecia Cruz was contacted regarding the results of her genetic testing. The patient did not answer the phone. I left a message asking to be called back at their earliest convenience.

## 2021-12-09 NOTE — PROGRESS NOTES
[facility]   Progress Note       ASSESSMENT AND PLAN     Occipital neuralgia of left side  -I suspect that this is all related to muscular impingement of the occiptal nerve as well as hypertonicity of the muscles of the left side of the neck that are causing her symptoms,  -I think that she will be well benefited by undergoing physical therapy at this time  -this could also be related to C2/C3 cervical disease but prior to reimaging I think that PT is a good first goal  -she had a mri of the c-spine in 2020 which showed mild disease in the c-spine at the higher levels.     Trigeminal neuralgia of left side of face  -this is my suspected diagnosis  -I agree with the patient to await to see neurology for their input on starting intervention for this.     Health care maintenance: Reasonably up-to-date, needs the zoster vaccine as well as documentation of her Covid booster.    Return in about 3 months (around 3/9/2022) for Next scheduled follow-up.  At next visit: Follow-up on input from neurology as well as improvement with physical therapy.    Mahendra Begum D.O.  Office: [phone]  Pager: [phone] [id number]     Alecia Cruz is a 61 y.o. year-old female, primary patient of Dr. Begum with a past medical history of atrial fibrillation, neck pain, vitamin D deficiency, and breast cancer who presents for follow-up regarding her neck pain and shooting facial pain.    Interval History: Since last time that she was seen by me she had contacted the office as she had continued neck pain which she reached out to us concerned about because she also has known back pain for which she is undergoing injections.  She is concerned that the 2 could be related.  Additionally, she became concerned that she could have a brain mass that was a source of her problems.  I previously sent her to ear nose and throat for evaluation of a shooting facial pain for which she underwent MRI of the face.  No diagnosis was found  "and she was referred back to me.  My concern most recently has been that she is experiencing trigeminal neuralgia symptoms and I discussed with neurology about the potential of starting lamotrigine for her symptoms given the relatively beneficial side effect profile.  At the current time, she has a follow-up visit set up to see a migraine specialist which I have agreed with her.  She would like to defer addition of medication and treatment until she sees the thyroid specialist.  She has not had any focal neurologic differences, seizures, or other concerning neurologic findings to make me think that I need to complete an MRI of the brain at this moment.  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  She has been having occipital neuralgia symptoms which shooting painsfrom the left side occipital base of her on the skin of the head.  She has not tried physical therapy for this.    She is otherwise doing well.    PHYSICAL EXAMINATION     Visit Vitals  /60 (BP Location: Right upper arm, Patient Position: Sitting)   Pulse 89   Temp 37.1 °C (98.8 °F)   Resp 17   Ht 1.702 m (5' 7\")   Wt 68.9 kg (152 lb)   SpO2 98%   BMI 23.81 kg/m²       Physical Exam  Vitals reviewed.   Constitutional:       General: She is not in acute distress.     Appearance: Normal appearance. She is normal weight. She is not toxic-appearing or diaphoretic.   Cardiovascular:      Rate and Rhythm: Normal rate and regular rhythm.      Pulses: Normal pulses.      Heart sounds: Normal heart sounds. No murmur heard.    No friction rub. No gallop.   Pulmonary:      Effort: Pulmonary effort is normal. No respiratory distress.      Breath sounds: Normal breath sounds. No stridor.   Abdominal:      General: Abdomen is flat. Bowel sounds are normal. There is no distension.      Palpations: Abdomen is soft. There is no mass.      Tenderness: There is no abdominal tenderness.      Hernia: No " hernia is present.   Neurological:      General: No focal deficit present.      Mental Status: She is alert and oriented to person, place, and time. Mental status is at baseline.      Cranial Nerves: No cranial nerve deficit.      Motor: No weakness.      Gait: Gait normal.   Psychiatric:         Mood and Affect: Mood normal.         Behavior: Behavior normal.             LABS / IMAGING / STUDIES        Labs Reviewed: No new labs to review.     Studies/Imaging Reviewed: no new imaging to review.       MEDICATIONS      Current Outpatient Medications   Medication Instructions   • alpha lipoic acid, bulk, powder oral   • apixaban (ELIQUIS) 5 mg, oral, 2 times daily   • ascorbic acid (VITAMIN C) 500 mg, oral, Daily   • aspirin 81 mg, oral, Daily   • betamethasone dipropionate (DIPROSONE) 0.05 % lotion Once as needed   • cetirizine (ZYRTEC) 10 mg, oral, Daily   • cholecalciferol (vitamin D3) 1,000 Units, oral, Daily   • coenzyme Q10 (COQ10) 60 mg capsule oral   • cyclobenzaprine (FLEXERIL) 5 mg, oral, 3 times daily PRN   • diphenhydrAMINE (BENADRYL ALLERGY) 25 mg tablet Every 24 hours interval   • flecainide (TAMBOCOR) 50 mg, oral, 2 times daily (6a, 6p)   • fluticasone propionate (FLONASE) 50 mcg/actuation nasal spray 2 sprays, Each Nostril, Daily PRN   • levothyroxine (SYNTHROID) 88 mcg, oral, Daily (6:30a)   • metoprolol succinate XL (TOPROL-XL) 25 mg, oral, Nightly   • multivitamin (THERAGRAN) tablet 1 tablet, oral, Daily   • naproxen sodium 220 mg capsule oral, Daily PRN   • omega-3-dha-epa-dpa-fish oil 1,050 mg(300 mg -675 mg-75 mg) capsule No dose, route, or frequency recorded.   • vitamin E 100 unit capsule No dose, route, or frequency recorded.   • zolpidem (AMBIEN) 10 mg tablet No dose, route, or frequency recorded.

## 2021-12-09 NOTE — LETTER
12/15/21    Alecia Cruz  [address]  [address]  [facility] [address]    Dear Ms. Alexi Cruz,    Thank you for participating in the [facility].  It was a pleasure working with you. Attached is documentation from our discussion(s). It will also be sent to any physicians you indicated.      You may also choose to share this documentation with your family members. Because cancer risk is based on both personal and both maternal and paternal family history factors, as well as mutation status, your relatives are recommended to review their risks and genetic testing options (if applicable) with their own healthcare providers to derive a risk-appropriate, individualized plan.      If you have any questions, concerns, or updates to your personal/family history, please contact the [facility] at [phone] to further review your case.    Please see below for your encounter report.    Sincerely,         Earl Fuller formerly [facility]        Encounter Note       Indication for Appointment:  Alecia Cruz was seen by the Cancer Risk Assessment and Genetics Program at [facility] due to a personal history of breast cancer and family history of breast, ovarian, prostate, melanoma, gastric, and lung cancer. Genetic testing was performed.    Alecia Cruz was contacted by telephone today to discuss genetic test results, risk-based management guidelines and any potential additional test options. Follow up appointments to discuss the results in more detail with our medical director may be scheduled by contacting the Cancer Risk Assessment and Genetics Program.    Genetic Test Results:    RESULT:    Negative - No Clinically Significant Mutation Identified  Variant of Unknown Significance in the GALNT12 Gene Called c.212C>T ([id number])      LAB/TEST:  [facility]  [facility] [[email or web address]] Hereditary Cancer Panel    Genes  Analyzed: Unless otherwise noted sequencing and large rearrangement analyses were performed on the following genes: APC, MARY, AXIN2, BARD1, BMPR1A, BRCA1, BRCA2, BRIP1, CDH1, CDK4, CDKN2A, CHEK2, EPCAM (large rearrangement only), HOXB13 (sequencing only), GALNT12, MLH1, MSH2, MSH3 (excluding repetitive portions of exon 1), MSH6, MUTYH, NBN, NTHL1, PALB2, PMS2, PTEN, RAD51C, RAD51D, RNF43, RPS20, SMAD4, STK11, TP53. Sequencing was performed for select regions of POLE and POLD1, and large rearrangement analysis was performed for select regions of GREM1 (see technical specifications).    After receiving consent, the following result(s) were disclosed to Alecia Cruz:   - A genetic variant of uncertain significance (as classified by this laboratory) was identified as noted above. Of note, there may be discordance among laboratories regarding variant classification.  The variant(s) identified were confirmed in the ClinVar database when possible.    All individuals have variations in their DNA and most of these variants do not increase risks for cancer or other conditions.  A variant of uncertain significance means that, at the time of testing, the laboratory is not able to determine if the variant identified impacts health.  The laboratory studies variants in an effort to confirm if they are pathogenic or benign.  Should the Cancer Risk Assessment and Genetics Program be notified by the testing laboratory of a reclassification of the variant(s) identified, Alecia Cruz will be notified, if possible.  It is not recommended to test unaffected relatives for variants of uncertain significance outside of the research setting.    No reportable variants were identified in the other gene(s) tested.  It was explained to Alecia Cruz that with respect to the variant identified, this result is considered inconclusive.  Furthermore, a pathogenic mutation could be present in the gene(s) tested that cannot be  detected by the current tests performed or in a gene not tested.  Additionally, biological family members may have a mutation in any of the genes tested Alecia Cruz does not given the negative result.  Alecia Cruz may have the option of participating in laboratory studies, registries and/or clinical trials, if available, regarding genetic variants and is encouraged to contact the program and/or testing laboratory for more information if interested.      Personal History:   Alecia Cruz is 60 y.o. female of  and  descent with primary visit diagnosis of Encounter for nonprocreative genetic counseling [Z71.83].     Alecia Cruz reports that in 2008, at age 47, she was diagnosed with estrogen and progesterone receptor negative, and Kde7qcg positive (+2) invasive ductal carcinoma in her left breast (pathology from biopsy unavailable). Alecia Cruz is status post left lumpectomy and sentinel lymph node dissection which identified metastatic carcinoma in 7/15 lymph nodes ([facility] specimen #[id number]). Alecia Cruz also underwent adjuvant chemotherapy and radiation treatments.      Alecia Cruz reports that she is not sure if she had genetic testing in 2008 for BRCA1 and BRCA2. Alecia Cruz cannot remember if she went to her genetics appointment or not and does not remember receiving test results.    Past Medical History:   Diagnosis Date   • A-fib (CMS/HCC)    • Breast cancer (CMS/[facility]) 2008    left partial mastectomy axillary dissection, whole breast radiation and chemotherapy in 2008 for a 2-1/2 cm invasive ductal carcinoma that was ER/NE negative and HER-2/alyson positive with 7-15 positive lymph nodes   • Colon polyp     couple on each colonoscopy per patient report   • Fibrocystic breast    • Fibroid    • History of partial hysterectomy 03/2000    c/o fibroids   • History of radiation therapy    • Hx antineoplastic chemo    • Hx of lipoma  2008    resected from shoulder   • Hypothyroidism    • Malignant neoplasm of upper-outer quadrant of left breast in female, estrogen receptor negative (CMS/HCC) 9/10/2021    2008-2.5 cm cancer with 7 of 15+ nodes treated with partial mastectomy, axillary dissection, chemotherapy and whole breast radiation.   • PAF (paroxysmal atrial fibrillation) (CMS/HCC)    • PVC (premature ventricular contraction)    • Screening for breast cancer      Past Medical History Pertinent Negatives:   Denies History Of: Date Noted   • Hormone replacement therapy 11/01/2021   • Melanoma (CMS/HCC) 11/01/2021     Past Surgical History:   Procedure Laterality Date   • Breast biopsy Left 2008   • Breast biopsy Right 2021    benign ([facility] specimen [id number])   • Breast lumpectomy Left 2008    [facility] specimen [id number]   • Dental surgery     • Hysterectomy      partial   • Lumbar puncture     • Mandible fracture surgery     • Partial hysterectomy     • Rotator cuff repair Left    • Sinus surgery       Past Surgical History Pertinent Negatives:   Denies History Of: Date Noted   • Oophorectomy 11/01/2021       Gynecologic History:  Menarche Age: 12 years  Age at first live birth: 16  Menopause Status: Post-Menopause  Age at menopause: unknown  Use of hormonal contraceptives: Yes (3yrs)  Use of fertility medications: No  Use of hormone replacement therapy: No  Use of Tamoxifen/Evista: No      Social History     Tobacco Use   • Smoking status: Never Smoker   • Smokeless tobacco: Never Used   Substance Use Topics   • Alcohol use: No   • Drug use: No       Family History:  See completed family history in pedigree below.        Risk Assessment and Management:     As a clinically actionable mutation was not identified by the current test method(s), the cancer risks and guidelines reviewed are based on the personal and/or family history provided. As guidelines continually change and the efficacy of screening for some cancers remains under investigation,  Alecia Cruz is encouraged to review personal and family history with managing physician(s) regularly.    Site of Surveillence Coordinating Provider Recommendation Status   Breast Oncology, Surgery, Primary Care or Gynecology · Alecia Cruz is encouraged to continue with breast cancer treatment and/or surveillance as directed by treating physicians.    · Breast cancer surveillance post lumpectomy typically includes:  · Monthly self breast examination  · Clinical breast examination by a physician every 3 to 12 months  · Annual mammogram (if applicable) and may be modified on an individual basis  . Alecia Cruz is status post lumpectomy.   Gynecologic Gynecology · Given the family history of ovarian cancer, Alecia Cruz’s lifetime risk to develop ovarian cancer may be increased over that of the general population. Since there is a paucity of guidelines for ovarian cancer prevention for females with a family history of ovarian cancer the following are recommended:  · Pelvic exam and pap test annually or as directed by physician  · Review family history of ovarian cancer with managing gynecologist.      Gastrointestinal PCP  Gastroenterology · Alecia Cruz is advised to continue with colorectal cancer screening as directed by physician, or sooner if symptoms or changes in history warrant sooner evaluation.   · Based on the reported family history of colorectal cancer, Alecia Cruz’s lifetime risk of developing colorectal cancer is increased 1.5 fold over that of the general population.  General population risk is approximately 5-6%; thus, Alecia Cruz’s risk is 7.5-9%. This may be an underestimation of risk as it does not take into account the Alecia Cruz's personal history of colorectal polyps. The patient was encouraged to review this history and risk with managing gastroenterologist to determine frequency of colonoscopy.    · Alecia Cruz is also  encouraged to review the family history of gastric/small bowel cancer with treating gastroenterologist.   Alecia Cruz reports her most recent colonoscopy was in 2019.    Skin PCP  Dermatology · Alecia Cruz is encouraged to practice skin protective behaviors, such as:  · Limit direct sun exposure  · Use sunscreen  · Pursue annual skin screening by a physician         General Management PCP · Annual physical examination is encouraged.    · Adherence to a healthy lifestyle, including body mass index (BMI) <25 obtained through balanced diet and exercise,   · Limit intake of alcoholic beverages to less than 1 drink per day (serving equals 1 ounce of liquor, 6 ounces of wine or 8 ounces of beer)  · Not smoking.  · Of note, Alecia Cruz is encouraged to discuss the potential side effects of the treatment rendered for curative intent of cancer including but not limited to psychosocial and physical effects, second cancer risk, other health concerns.        Plan:  Cancer risks are based on personal history, as well as on maternal and paternal family histories, mutation status, and other factors.  Relatives are encouraged to consider risk assessment and/or genetic evaluation to derive a risk-appropriate, individualized plan.  Should family member(s) be interested in genetic evaluation, the Cancer Risk Assessment and Genetics Program can provide consultation or help to find a genetic counselor in their area.    The information provided reflects current practice guidelines and may change with new medical discoveries/technology/updated personal or family history information.  Alecia Cruz was confirmed to have understood the aforementioned information and was assisted with decision making as needed.  Informational and supportive resources were provided.  Potential psychosocial ramifications related to test results were reviewed.  Consent was obtained to share chart note(s) with physicians.  Alecia  Alexi Cruz plans to discuss the above information with physicians to determine an optimal risk management plan.    Alecia Cruz should contact the program with personal/family history updates as this could alter the guidelines provided and/or available test options and/or to inquire about new information specific to this case.   As stated, there may be other genes associated with cancer risk for which Alecia Cruz was not tested.  Alecia Cruz was encouraged to contact the genetics program at [phone] with any questions or concerns and/or to periodically review test options and related insurance coverage.

## 2021-12-09 NOTE — LETTER
12/15/21    Mahendra Begum, DO  [address]  [place]  [facility] [address]      Dear Dr. Begum,    I am writing to confirm that your patient, Alecia Cruz, received care through my office on 12/15/21. I have enclosed a summary of the care provided to Alecia for your reference.    Please contact me with any questions you may have regarding the visit.    Sincerely,           HERBER Hodges      CC: No Recipients

## 2021-12-14 NOTE — ASSESSMENT & PLAN NOTE
-this is my suspected diagnosis  -I agree with the patient to await to see neurology for their input on starting intervention for this.

## 2021-12-14 NOTE — ASSESSMENT & PLAN NOTE
-I suspect that this is all related to muscular impingement of the occiptal nerve as well as hypertonicity of the muscles of the left side of the neck that are causing her symptoms,  -I think that she will be well benefited by undergoing physical therapy at this time  -this could also be related to C2/C3 cervical disease but prior to reimaging I think that PT is a good first goal  -she had a mri of the c-spine in 2020 which showed mild disease in the c-spine at the higher levels.

## 2021-12-15 NOTE — TELEPHONE ENCOUNTER
Patient Name:  Alecia Cruz  : 1960       Indication for Appointment:  Alecia Cruz was seen by the Cancer Risk Assessment and Genetics Program at [facility] due to a personal history of breast cancer and family history of breast, ovarian, prostate, melanoma, gastric, and lung cancer. Genetic testing was performed.    Alecia Cruz was contacted by telephone today to discuss genetic test results, risk-based management guidelines and any potential additional test options. Follow up appointments to discuss the results in more detail with our medical director may be scheduled by contacting the Cancer Risk Assessment and Genetics Program.    Genetic Test Results:    RESULT:    Negative - No Clinically Significant Mutation Identified  Variant of Unknown Significance in the GALNT12 Gene Called c.212C>T ([id number])      LAB/TEST:  [facility]  [facility] Hereditary Cancer Panel    Genes Analyzed: Unless otherwise noted sequencing and large rearrangement analyses were performed on the following genes: APC, MARY, AXIN2, BARD1, BMPR1A, BRCA1, BRCA2, BRIP1, CDH1, CDK4, CDKN2A, CHEK2, EPCAM (large rearrangement only), HOXB13 (sequencing only), GALNT12, MLH1, MSH2, MSH3 (excluding repetitive portions of exon 1), MSH6, MUTYH, NBN, NTHL1, PALB2, PMS2, PTEN, RAD51C, RAD51D, RNF43, RPS20, SMAD4, STK11, TP53. Sequencing was performed for select regions of POLE and POLD1, and large rearrangement analysis was performed for select regions of GREM1 (see technical specifications).    After receiving consent, the following result(s) were disclosed to Alecia Cruz:   - A genetic variant of uncertain significance (as classified by this laboratory) was identified as noted above. Of note, there may be discordance among laboratories regarding variant classification.  The variant(s) identified were confirmed in the ClinVar database when possible.    All individuals have variations in  their DNA and most of these variants do not increase risks for cancer or other conditions.  A variant of uncertain significance means that, at the time of testing, the laboratory is not able to determine if the variant identified impacts health.  The laboratory studies variants in an effort to confirm if they are pathogenic or benign.  Should the Cancer Risk Assessment and Genetics Program be notified by the testing laboratory of a reclassification of the variant(s) identified, Alecia Cruz will be notified, if possible.  It is not recommended to test unaffected relatives for variants of uncertain significance outside of the research setting.    No reportable variants were identified in the other gene(s) tested.  It was explained to Alecia Cruz that with respect to the variant identified, this result is considered inconclusive.  Furthermore, a pathogenic mutation could be present in the gene(s) tested that cannot be detected by the current tests performed or in a gene not tested.  Additionally, biological family members may have a mutation in any of the genes tested Alecia Cruz does not given the negative result.  Alecia Cruz may have the option of participating in laboratory studies, registries and/or clinical trials, if available, regarding genetic variants and is encouraged to contact the program and/or testing laboratory for more information if interested.      Personal History:   Alecia Cruz is 60 y.o. female of  and  descent with primary visit diagnosis of Encounter for nonprocreative genetic counseling [Z71.83].     Alecia Cruz reports that in 2008, at age 47, she was diagnosed with estrogen and progesterone receptor negative, and Eih0qev positive (+2) invasive ductal carcinoma in her left breast (pathology from biopsy unavailable). Alecia Cruz is status post left lumpectomy and sentinel lymph node dissection which  identified metastatic carcinoma in 7/15 lymph nodes ([facility] specimen #[id number]). Alecia Cruz also underwent adjuvant chemotherapy and radiation treatments.      Alecia Cruz reports that she is not sure if she had genetic testing in 2008 for BRCA1 and BRCA2. Alecia Cruz cannot remember if she went to her genetics appointment or not and does not remember receiving test results.    Past Medical History:   Diagnosis Date   • A-fib (CMS/HCC)    • Breast cancer (CMS/HCC) 2008    left partial mastectomy axillary dissection, whole breast radiation and chemotherapy in 2008 for a 2-1/2 cm invasive ductal carcinoma that was ER/ND negative and HER-2/alyson positive with 7-15 positive lymph nodes   • Colon polyp     couple on each colonoscopy per patient report   • Fibrocystic breast    • Fibroid    • History of partial hysterectomy 03/2000    c/o fibroids   • History of radiation therapy    • Hx antineoplastic chemo    • Hx of lipoma 2008    resected from shoulder   • Hypothyroidism    • Malignant neoplasm of upper-outer quadrant of left breast in female, estrogen receptor negative (CMS/HCC) 9/10/2021    2008-2.5 cm cancer with 7 of 15+ nodes treated with partial mastectomy, axillary dissection, chemotherapy and whole breast radiation.   • PAF (paroxysmal atrial fibrillation) (CMS/HCC)    • PVC (premature ventricular contraction)    • Screening for breast cancer      Past Medical History Pertinent Negatives:   Denies History Of: Date Noted   • Hormone replacement therapy 11/01/2021   • Melanoma (CMS/HCC) 11/01/2021     Past Surgical History:   Procedure Laterality Date   • Breast biopsy Left 2008   • Breast biopsy Right 2021    benign ([facility] specimen [id number])   • Breast lumpectomy Left 2008    [facility] specimen [id number]   • Dental surgery     • Hysterectomy      partial   • Lumbar puncture     • Mandible fracture surgery     • Partial hysterectomy     • Rotator cuff repair Left    • Sinus surgery       Past  Surgical History Pertinent Negatives:   Denies History Of: Date Noted   • Oophorectomy 11/01/2021       Gynecologic History:  Menarche Age: 12 years  Age at first live birth: 16  Menopause Status: Post-Menopause  Age at menopause: unknown  Use of hormonal contraceptives: Yes (3yrs)  Use of fertility medications: No  Use of hormone replacement therapy: No  Use of Tamoxifen/Evista: No      Social History     Tobacco Use   • Smoking status: Never Smoker   • Smokeless tobacco: Never Used   Substance Use Topics   • Alcohol use: No   • Drug use: No       Family History:  See completed family history in pedigree below.        Risk Assessment and Management:     As a clinically actionable mutation was not identified by the current test method(s), the cancer risks and guidelines reviewed are based on the personal and/or family history provided. As guidelines continually change and the efficacy of screening for some cancers remains under investigation, Alecia Cruz is encouraged to review personal and family history with managing physician(s) regularly.    Site of Surveillence Coordinating Provider Recommendation Status   Breast Oncology, Surgery, Primary Care or Gynecology · Alecia Cruz is encouraged to continue with breast cancer treatment and/or surveillance as directed by treating physicians.    · Breast cancer surveillance post lumpectomy typically includes:  · Monthly self breast examination  · Clinical breast examination by a physician every 3 to 12 months  · Annual mammogram (if applicable) and may be modified on an individual basis  . Alecia Cruz is status post lumpectomy.   Gynecologic Gynecology · Given the family history of ovarian cancer, Alecia Cruz’s lifetime risk to develop ovarian cancer may be increased over that of the general population. Since there is a paucity of guidelines for ovarian cancer prevention for females with a family history of ovarian cancer the following are  recommended:  · Pelvic exam and pap test annually or as directed by physician  · Review family history of ovarian cancer with managing gynecologist.      Gastrointestinal PCP  Gastroenterology · Alecia Cruz is advised to continue with colorectal cancer screening as directed by physician, or sooner if symptoms or changes in history warrant sooner evaluation.   · Based on the reported family history of colorectal cancer, Alecia Cruz’s lifetime risk of developing colorectal cancer is increased 1.5 fold over that of the general population.  General population risk is approximately 5-6%; thus, Alecia Cruz’s risk is 7.5-9%. This may be an underestimation of risk as it does not take into account the Alecia Cruz's personal history of colorectal polyps. The patient was encouraged to review this history and risk with managing gastroenterologist to determine frequency of colonoscopy.    · Alecia Cruz is also encouraged to review the family history of gastric/small bowel cancer with treating gastroenterologist.   Alecia Cruz reports her most recent colonoscopy was in 2019.    Skin PCP  Dermatology · Alecia Cruz is encouraged to practice skin protective behaviors, such as:  · Limit direct sun exposure  · Use sunscreen  · Pursue annual skin screening by a physician         General Management PCP · Annual physical examination is encouraged.    · Adherence to a healthy lifestyle, including body mass index (BMI) <25 obtained through balanced diet and exercise,   · Limit intake of alcoholic beverages to less than 1 drink per day (serving equals 1 ounce of liquor, 6 ounces of wine or 8 ounces of beer)  · Not smoking.  · Of note, Alecia Cruz is encouraged to discuss the potential side effects of the treatment rendered for curative intent of cancer including but not limited to psychosocial and physical effects, second cancer risk, other health concerns.        Plan:  Cancer  risks are based on personal history, as well as on maternal and paternal family histories, mutation status, and other factors.  Relatives are encouraged to consider risk assessment and/or genetic evaluation to derive a risk-appropriate, individualized plan.  Should family member(s) be interested in genetic evaluation, the Cancer Risk Assessment and Genetics Program can provide consultation or help to find a genetic counselor in their area.    The information provided reflects current practice guidelines and may change with new medical discoveries/technology/updated personal or family history information.  Alecia Cruz was confirmed to have understood the aforementioned information and was assisted with decision making as needed.  Informational and supportive resources were provided.  Potential psychosocial ramifications related to test results were reviewed.  Consent was obtained to share chart note(s) with physicians.  Alecia Cruz plans to discuss the above information with physicians to determine an optimal risk management plan.    Alecia Cruz should contact the program with personal/family history updates as this could alter the guidelines provided and/or available test options and/or to inquire about new information specific to this case.   As stated, there may be other genes associated with cancer risk for which Alecia Cruz was not tested.  Alecia Cruz was encouraged to contact the genetics program at [phone] with any questions or concerns and/or to periodically review test options and related insurance coverage.

## 2022-01-06 ENCOUNTER — TELEMEDICINE (OUTPATIENT)
Dept: PRIMARY CARE | Facility: CLINIC | Age: 62
End: 2022-01-06
Payer: COMMERCIAL

## 2022-01-06 DIAGNOSIS — K11.9 LESION OF PAROTID GLAND: ICD-10-CM

## 2022-01-06 DIAGNOSIS — T30.0 BURN OF SKIN: Primary | ICD-10-CM

## 2022-01-06 PROBLEM — N39.0 URINARY TRACT INFECTION: Status: ACTIVE | Noted: 2022-01-06

## 2022-01-06 PROCEDURE — 99213 OFFICE O/P EST LOW 20 MIN: CPT | Mod: 95 | Performed by: STUDENT IN AN ORGANIZED HEALTH CARE EDUCATION/TRAINING PROGRAM

## 2022-01-06 RX ORDER — NITROFURANTOIN 25; 75 MG/1; MG/1
100 CAPSULE ORAL 2 TIMES DAILY
Qty: 10 CAPSULE | Refills: 0 | Status: SHIPPED | OUTPATIENT
Start: 2022-01-06 | End: 2022-01-11

## 2022-01-06 NOTE — ASSESSMENT & PLAN NOTE
-She is providing it local wound care and she is keeping the skin clean and dry.  I cautioned to watch out for erythema or worsening of the pain which could be an indication of infection.  She will notify me if anything changes.

## 2022-01-06 NOTE — ASSESSMENT & PLAN NOTE
-She is concerned that this lesion is getting more painful and also growing.  I am not sure if it truly is or it could be related to the pain that we have been following her for a long versus anxiety.  -She was not thrilled with Dr. Virk in his opinion and so I recommended a second opinion perhaps at [facility] be helpful as they have a number of ENT doctors and they can discuss further.  -I did caution her that removal of lesions from the parotid gland can be quite invasive as well as there are a lot of side effects that can occur.  I really would like her to get treated for her headaches first and foremost we can go from there.

## 2022-01-06 NOTE — ASSESSMENT & PLAN NOTE
-Send a prescription for nitrofurantoin to the patient's pharmacy.  We will complete 5 days of antibiotic therapy.  -We will treat her empirically without a urinalysis as she is giving me pretty classic symptoms.  -If her symptoms do not improve we will need to get a urinalysis with urine culture as the organism could be resistant to Macrobid.

## 2022-01-06 NOTE — PROGRESS NOTES
Verification of Patient Location:  The patient affirms they are currently located in the following state: [place]    Request for Consent:    Audio and Video Encounter   Krish, my name is Mahendra Begum DO.  Before we proceed, can you please verify your identification by telling me your full name and date of birth?  Can you tell me who is in the room with you?    You and I are about to have a telemedicine check-in or visit because you have requested it.  This is a live video-conference.  I am a real person, speaking to you in real time.  There is no one else with me on the video-conference.  However, when we use ([employer], [employer], etc) it is important for you to know that the video-conference may not be secure or private.  I am not recording this conversation and I am asking you not to record it.  This telemedicine visit will be billed to your health insurance or you, if you are self-insured.  You understand you will be responsible for any copayments or coinsurances that apply to your telemedicine visit.  Communication platform used for this encounter:  [employer]     Before starting our telemedicine visit, I am required to get your consent for this virtual check-in or visit by telemedicine. Do you consent?      Patient Response to Request for Consent:  Yes      Visit Documentation:  Subjective     Patient ID: Alecia Cruz is a 61 y.o. female.  1960      HPI  Six 28-year-old female with a past medical history of breast cancer and neck pain and headaches presents today for concern of slight increase in the swelling of the left parotid gland lesion.  She has been very concerned about this lesion and the fact that the MRI showed that she had a parotid lesion that could perhaps be an adenoma.  It was never biopsied.  She is concerned that it could be infected or that it is growing.  She had spoken to a ear nose and throat doctor who had told her that it was going to be a very invasive thing to remove and she  was quite concerned by it.  She has been reading about other doctors online and perhaps could do it less invasively.  She wondered if a another opinion would be beneficial.    She also reports that she has been having some burning at end of urination.  She does not have any hematuria.  She does note that she has been having cloudy urine.  She is also having some suprapubic tenderness with some radiation up to the flank.  She not having any fevers or chills.  No nausea or vomiting reported.  She is wondering if she has a urinary tract infection because she has had this in the past and was treated with antibiotics.    She also notes that she has been having neck pain and headaches related to her neck pain.  She was going to be getting a injection in her cervical spine but instead it has been recommended that she try physical therapy instead.  I recommended previously that she try physical therapy for her neck I think that she likely is getting tension headaches related to muscle spasms in her neck which are causing occipital neuralgia.  I have also recommended her to see a migraine specialist who I think she would benefit from seeing for her migraines as I suspected that she has both occipital neuralgia as well as trigeminal neuralgia.  She may benefit from Botox injections in her neck.  The following have been reviewed and updated as appropriate in this visit:   Allergies  Meds  Problems       Review of Systems  A 10 point review of systems was asked and negative unless otherwise stated.    Assessment/Plan   Diagnoses and all orders for this visit:    Burn of skin (Primary)  Assessment & Plan:  -She is providing it local wound care and she is keeping the skin clean and dry.  I cautioned to watch out for erythema or worsening of the pain which could be an indication of infection.  She will notify me if anything changes.      Lesion of parotid gland  Assessment & Plan:  -She is concerned that this lesion is getting  more painful and also growing.  I am not sure if it truly is or it could be related to the pain that we have been following her for a long versus anxiety.  -She was not thrilled with Dr. Virk in his opinion and so I recommended a second opinion perhaps at [facility] be helpful as they have a number of ENT doctors and they can discuss further.  -I did caution her that removal of lesions from the parotid gland can be quite invasive as well as there are a lot of side effects that can occur.  I really would like her to get treated for her headaches first and foremost we can go from there.      Other orders  -     nitrofurantoin, macrocrystal-monohydrate, 100 mg capsule; Take 1 capsule (100 mg total) by mouth 2 (two) times a day for 5 days.      Time Spent:  I spent 30 minutes on this date of service performing the following activities: obtaining history, documenting, obtaining / reviewing records, providing counseling and education, independently reviewing study/studies, communicating results and coordinating care.

## 2022-01-14 ENCOUNTER — TELEPHONE (OUTPATIENT)
Dept: PRIMARY CARE | Facility: CLINIC | Age: 62
End: 2022-01-14
Payer: COMMERCIAL

## 2022-01-14 ENCOUNTER — HOSPITAL ENCOUNTER (OUTPATIENT)
Dept: PHYSICAL THERAPY | Facility: HOSPITAL | Age: 62
Setting detail: THERAPIES SERIES
Discharge: HOME | End: 2022-01-14
Attending: STUDENT IN AN ORGANIZED HEALTH CARE EDUCATION/TRAINING PROGRAM
Payer: COMMERCIAL

## 2022-01-14 DIAGNOSIS — M54.9 BACK PAIN, UNSPECIFIED BACK LOCATION, UNSPECIFIED BACK PAIN LATERALITY, UNSPECIFIED CHRONICITY: Primary | ICD-10-CM

## 2022-01-14 DIAGNOSIS — I89.0 LYMPHEDEMA OF LEFT ARM: Primary | ICD-10-CM

## 2022-01-14 DIAGNOSIS — M54.50 RIGHT LOW BACK PAIN, UNSPECIFIED CHRONICITY, UNSPECIFIED WHETHER SCIATICA PRESENT: ICD-10-CM

## 2022-01-14 DIAGNOSIS — I89.0 LYMPHEDEMA DUE TO AND NOT CONCURRENT WITH IONIZING RADIOTHERAPY: ICD-10-CM
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
## 2022-01-14 DIAGNOSIS — M54.81 OCCIPITAL NEURALGIA OF LEFT SIDE: Primary | ICD-10-CM

## 2022-01-14 PROCEDURE — 97162 PT EVAL MOD COMPLEX 30 MIN: CPT | Mod: GP

## 2022-01-14 PROCEDURE — 97530 THERAPEUTIC ACTIVITIES: CPT | Mod: GP

## 2022-01-14 NOTE — PROGRESS NOTES
Referring Provider: By co-signing this Plan of Care (POC) either electronically or physically you agree to the following:    I have reviewed the the Plan of Care established by the therapist within this document and certify that the services are skilled and medically necessary. I have reviewed the plan and recommend that these services continue to meet the goals stated in this document.       EXTERNAL PROVIDER FAXING BACK:    PHYSICIAN SIGNATURE: __________________________________     DATE: ___________________  TIME: _____________    IMPORTANT:  If returning this Plan of Care by fax, please fax back ONLY the signature page.   _________________________________________________________________________      [facility] Fax: [phone]        PT EVALUATION FOR OUTPATIENT THERAPY    Patient: Alecia Cruz    MRN: [id number]  : 1960 61 y.o.   Referring Physician: Mahendra Begum DO  Date of Visit: 2022      Certification Dates:  22 through 22         Recommended Frequency & Duration:  2 times/week for up to 3 months     Diagnosis:   1. Occipital neuralgia of left side    2. Right low back pain, unspecified chronicity, unspecified whether sciatica present    3. Lymphedema due to and not concurrent with ionizing radiotherapy        Chief Complaints:   Chief Complaint   Patient presents with   • Dizziness   • Pain   • Dec ROM   • Dec Strength   • Decreased Endurance   • Decreased recreational/play activity       Precautions: limb restrictions (Hx L breast CA)    Past Medical History:   Past Medical History:   Diagnosis Date   • A-fib (CMS/[facility])    • Breast cancer (CMS/[facility])     left partial mastectomy axillary dissection, whole breast radiation and chemotherapy in  for a 2-1/2 cm invasive ductal carcinoma that was ER/MS negative and HER-2/alyson positive with 7-15 positive lymph nodes   • Colon polyp     couple on each colonoscopy per patient report   • Fibrocystic breast    •  Fibroid    • History of partial hysterectomy 03/2000    c/o fibroids   • History of radiation therapy    • Hx antineoplastic chemo    • Hx of lipoma 2008    resected from shoulder   • Hypothyroidism    • Malignant neoplasm of upper-outer quadrant of left breast in female, estrogen receptor negative (CMS/HCC) 9/10/2021    2008-2.5 cm cancer with 7 of 15+ nodes treated with partial mastectomy, axillary dissection, chemotherapy and whole breast radiation.   • PAF (paroxysmal atrial fibrillation) (CMS/HCC)    • PVC (premature ventricular contraction)    • Screening for breast cancer        Past Surgical History:   Past Surgical History:   Procedure Laterality Date   • BREAST BIOPSY Left 2008   • BREAST BIOPSY Right 2021    benign ([facility] specimen [id number])   • BREAST LUMPECTOMY Left 2008    [facility] specimen [id number]   • DENTAL SURGERY     • HYSTERECTOMY      partial   • LUMBAR PUNCTURE     • MANDIBLE FRACTURE SURGERY     • PARTIAL HYSTERECTOMY     • ROTATOR CUFF REPAIR Left    • SINUS SURGERY           LEARNING ASSESSMENT    Assessment completed:  Yes    Learner name:  Alecia    Relationship: Patient    Learning Barriers:  Learning barriers: No Barriers    Preferred Language: English     Needed: No    Learning New Concepts: Listening, Reading, Demonstration and Pictures/Video    Education Provided: Role of PT, plan of care, cervical spine anatomy      CO-LEARNER ASSESSMENT:    Completed: No            OBJECTIVE MEASUREMENTS/DATA:     Assessment and Plan - 01/14/22 8918        Assessment    Plan of Care reviewed and patient/family in agreement Yes     System Pathology/Pathophysiology Noted musculoskeletal;other (see comments)   lymphatic    Functional Limitations in Following Categories (PT Eval) self-care;home management;community/leisure     Rehab Potential/Prognosis re-evaluate goals as necessary     Problem List decreased endurance;decreased flexibility;decreased ROM;decreased strength;pain;impaired sensation      Demonstrates Need for Referral to Another Service other (see comments)   Lymphedema management    Clinical Assessment Alecia presents with worsening migraine, mid to low back pain spasms that have been worsening over the last three months which has been impacting sleep and iADL performance. Neck Disability index is 54% and Oswestry low back pain disability questionnaire is 64%. Objective exam is limited due to spasm and pain increase with all range of motion assessments, which peripheralizes symptoms in the upper extremity. Through observation and discussion of patient's past medical history, patient has observed increased LUE lymphedema, for which she had been receiving treatment. With noted sensation impairments and swelling, patient is agreeable to sessions with CLT as well as orthopedic PT to address impairments. Additional sessions will include lumbar assessment. She will benefit from skilled PT services to address impairments.     Plan and Recommendations Assess lumbar spine including neural tension. Initiate central desensitization techniques including gentle nerve glides and STM to decrease muscle guarding.     Planned Services CPT 44491 Gait training;CPT 55214 Manual therapy;CPT 88045 Neuromuscular Reeducation;CPT 10411 Self-care/Home management training;CPT 35830 Therapeutic activities;CPT 49826 Therapeutic exercises;CPT 36574 Therapeutic Massage;CPT 18007 Hot/Cold Packs therapy                General Information - 01/14/22 0829        Session Details    Document Type initial evaluation     Mode of Treatment physical therapy     [facility] Orthoped[facility]        Time Calculation    Start Time 0840     Stop Time 0940     Time Calculation (min) 60 min        General Information    Referring Physician Dr. Mahendra Begum     Pertinent History of Current Functional Problem Patient arrives with rx for occipital neuralgia from her PCP. She also notes that she has been having neck pain and headaches related to her neck  pain.  She was going to be getting a injection in her cervical spine but instead it has been recommended that she try physical therapy instead.  It was recommended previously that she try physical therapy for her neck as her PCP thinks that she likely is getting tension headaches related to muscle spasms in her neck which are causing occipital neuralgia. Patient has also been experiencing left sided facial numbness and it is unclear if related to parotid gland mass. Migraines are reported every night once she lays down to bed but alleviate in sitting position. She is scheduled to see a neurologist 2/4/2022 for cluster headaches and migraine.  In addition, patient has been experiencing low back and mid back pain/spasms worsening since a fall 2 years ago during which she injured her shoulder. After this incident she has been experiencing debilitating spasms across midback. Alecia takes tylenol PM and aleve that does not help with pain levels. Patient also reports worsening constipation with low back pain as well as right > left flank pain. Symptoms in her low back are worse generally on the right side and occasionally leads to shooting pains and LE buckling. Patient with history of LUE lymphedema and normally improved symptoms following pumping but over the last three months has noted a steady volume gain that never fully abolishes.     Existing Precautions/Restrictions limb restrictions   Hx L breast CA               Pain/Vitals - 01/14/22 0829        Pain Assessment    Currently in pain Yes     Preferred Pain Scale number (Numeric Rating Pain Scale)     Pain Side/Orientation bilateral     Pain: Body location Neck;Back;Head     Pain Rating (0-10): Pre Activity 6     Pain Radiation to jaw, left;arm, left;neck, left;leg, right;leg, left     Pain Description constant;sharp;cramping;woke from sleep        Pain Intervention    Intervention  IE     Post Intervention Comments IE                Falls - 01/14/22 0829         Initial Falls Assessment    One or more falls in the last year No                PT - 01/14/22 0829        Physical Therapy    Physical Therapy Ortho;Lymphedema        PT Plan    Frequency of treatment 2 times/week     PT Duration 3 months     PT Cert From 01/14/22     PT Cert To 04/14/22     Date PT POC was sent to provider 01/14/22     Signed PT Plan of Care received?  No                   Living Environment    Living Environment - 01/14/22 0829        Living Environment    People in Home child(abhi), adult     Living Arrangements apartment     Transportation Concerns car, none               PLOF:    Prior Level of Function - 01/14/22 0829        OTHER    Previous level of function Independent without device; Used to model, enjoys reading, writing               Outcome Measures     Special Tests - 01/14/22 0829        Outcome measures tracking    Outcome measure used: PHILLIP: 64%, NDI: 54%               Myotomes and Dermatomes    Myotomes/Dermatomes - 01/14/22 0829        Myotome/Dermatome    Myotome/Dermatome Testing Dermatome Testing (Group)        Dermatome Testing    Left C3 Dermatome impaired light touch sensation     Right C3 Dermatome intact light touch sensation     Left C4 Dermatome impaired light touch sensation     Right C4 Dermatome intact light touch sensation     Left C5 Dermatome impaired light touch sensation     Right C5 Dermatome intact light touch sensation     Left C6 Dermatome impaired light touch sensation     Right C6 Dermatome intact light touch sensation     Left C7 Dermatome impaired light touch sensation     Right C7 Dermatome intact light touch sensation     Left C8 Dermatome impaired light touch sensation     Right C8 Dermatome intact light touch sensation     Left T1 Dermatome intact light touch sensation     Right T1 Dermatome intact light touch sensation               Skin    Skin Assessment - 01/14/22 0829        Skin    Skin Condition Intact     Wound Care Follow-Up Needed? No         Girth Measurements    Other: Girth Measurement/Comments Observable LUE lymphedema with decreased sensation. To be further assessed by CLT in coming sessions.               ROM     MMT    Manual Muscle Tests - 01/14/22 0829        RIGHT: Upper Extremity Manual Muscle Test Assessment    Shoulder Flexion gross movement (4/5) good     Shoulder Extension gross movement (4-/5) good minus     Shoulder Adduction gross movement (4-/5) good minus     Shoulder Abduction gross movement (3+/5) fair plus     Shoulder Internal Rotation gross movement (3+/5) fair plus     Shoulder External Rotation gross movement (3+/5) fair plus     Elbow Flex gross movement (4/5) good     Elbow Extension gross movement (4/5) good        LEFT: Upper Extremity Manual Muscle Test Assessment    Shoulder Flexion gross movement (3+/5) fair plus     Shoulder Extension gross movement (3/5) fair     Shoulder Adduction gross movement (3/5) fair     Shoulder Abduction gross movement (3+/5) fair plus     Shoulder Internal Rotation gross movement (3/5) fair   painful    Shoulder External Rotation gross movement (3/5) fair   painful    Elbow Flexion gross movement (3+/5) fair plus     Elbow Extension gross movement (3+/5) fair plus     Wrist Flexion gross movement --     Wrist Extension gross movement --               Palpation    Palpation - 01/14/22 0829        Palpation    Neck Palpation  TTP cervical paraspinals C3-7, thoracic paraspinals bilaterally and bilateral inferior angles     Back Palpation  TTP PSIS, QL               Ortho Special Tests    Orthopedic Special Tests - 01/14/22 0900        Hip/Pelvis Special Tests    NACHO Right - Positive;Left - Positive     SI Gap Right - Positive;Left - Positive     SI Approx Right - Positive;Left - Positive     Thigh Thrust Right - Positive;Left - Positive        Cervical/Thoracic    Cervical Compression Right - Positive;Left - Positive     Cervical Distraction Right - Positive;Left - Positive     Spurling's  Right - Positive;Left - Positive     Bakody Right - Positive;Left - Positive        Shoulder    Apply Scratch Right - Negative;Left - Negative     Empty Can Left - Positive;Right - Negative     Painful Arc Left - Positive;Right - Negative        Neurodynamic     ULTT Median Right - Positive;Left - Positive     Slump Test Right - Positive;Left - Positive                 Goals        Patient Stated    •  patient goals (pt-stated)       Short Term Goals: 6 weeks  Increase Cervical ROM 10degrees  in all directions  NDI improve by 12%   Pain at worst 5/10  BUE Strength 4/5   Independent HEP  Decrease reported migraine incidence to 4days/week  Sitting Tolerance >30 minutes     Long Term Goals: 12 weeks  Pain free cervical ROM WFL in all directions  NDI improve by 20%   Pain at worst 2/10  -Bakody, Maximino  Sitting tolerance > 60 minutes        •  STG/LTG (pt-stated)        Short term goals   Short Term Goals Time Frame Result Comment/Progress   Pt will increase left shoulder MMT into flexion, extension, abduction >/= 1/2 grade 4 weeks      Pt will increase left shoulder ROM >/= 5 degrees into flexion, extension, abduction  4 weeks      Pt will report decreased pain at worst </=2/10 4 weeks      Pt will demonstrate w/ supervision HEP/compression/self MLD techniques 4 weeks      Pt will decrease volume to </= 10% volume 4 weeks      Pt will verbalize independently proper skin care  4 weeks                       Long term goals   LongTerm Goals Time Frame Result Comment/Progress   Pt will increase left shoulder MMT into flexion, extension, abduction >/= 1 grade 8 weeks     Pt will increase left shoulder ROM >/= 10 degrees into flexion, extension, abduction  8 weeks     Pt will report decreased pain at worst 0/10 8 weeks     Pt will demonstrate w/ (i) HEP/compression/self MLD techniques 8 weeks     Pt will decrease volume to </= 8% volume 8 weeks     By therapist touch, pt will demonstrate dec fibrosis as evidenced by softer  subcutaneous tissues in identified areas 8 weeks     Pt will improve LLIS score to </= 60% impairment  8 weeks                            TREATMENT PLAN:    DOS    IE 1/14/22   30 Day 2/14/22   60 Day 3/14/22   90 Day 4/14/22   Precautions LUE lymphedema s/p lumpectomy and radiation   Insurance Auth    Treatment  Current Session Time   Modalities Total Time for Session 8-22 Minutes   Heat/Ice  CPT 39793 To cervical, lumbar spine x 10 minutes       Manual   CPT 28074 Total Time for Session Not performed   STM/MFR/TPR To bilateral paraspinals, R>L periscapular area    Instrument Assisted STM     Mobilizations SOR, gentle distraction   ROM/Flexibility Upper trap, LS stretching   Myofascial Decompression    Ther. Exercise  CPT 81433                   Group Total Time for Session Not performed     Sets Reps Load Comment            LTR        Sciatic nn. glides                PPT        PPT hip abd        PPT hip aDd                Cervical retraction        scap retraction        Median, ulnar nn. jondes                       Neuro Re-Ed  CPT 45980   Group Total Time for Session Not performed     Sets Reps Load Comment                                     Gait  CPT 20971 Group Total Time for Session Not performed                  Ther. Activity  CPT 60644 Group Total Time for Session Not performed             Group  CPT 98373 Total Time for Session Not performed                 ASSESSMENT:    This 61 y.o. year old female presents to PT with above stated diagnosis. Physical Therapy evaluation reveals decreased endurance,decreased flexibility,decreased ROM,decreased strength,pain,impaired sensation resulting in self-care,home management,community/leisure limitations. Alecia Cruz will benefit from skilled PT services to address limitation, work towards rehab and patient goals and maximize PLOF of chosen ADLs.     Planned Services: The patient's treatment will include CPT 73586 Gait training,CPT 10389 Manual  therapy,CPT 98726 Neuromuscular Reeducation,CPT 02370 Self-care/Home management training,CPT 90784 Therapeutic activities,CPT 81404 Therapeutic exercises,CPT 01390 Therapeutic Massage,CPT 41309 Hot/Cold Packs therapy, .           Andra Arnold, PT  1/14/2022  10:26 AM

## 2022-01-14 NOTE — TELEPHONE ENCOUNTER
Pt called to update PCP. She went to [facility] today and an assessment was done. Pt needs to be treated for lympodema and will need a referral / script for lymphatic therapy. There is blockage in the back area of her arm which causes the swelling.

## 2022-01-14 NOTE — OP PT TREATMENT LOG
DOS    IE 1/14/22   30 Day 2/14/22   60 Day 3/14/22   90 Day 4/14/22   Precautions LUE lymphedema s/p lumpectomy and radiation   Insurance Auth    Treatment  Current Session Time   Modalities Total Time for Session 8-22 Minutes   Heat/Ice  CPT 71206 To cervical, lumbar spine x 10 minutes       Manual   CPT 34250 Total Time for Session Not performed   STM/MFR/TPR To bilateral paraspinals, R>L periscapular area    Instrument Assisted STM     Mobilizations SOR, gentle distraction   ROM/Flexibility Upper trap, LS stretching   Myofascial Decompression    Ther. Exercise  CPT 80554                   Group Total Time for Session Not performed     Sets Reps Load Comment            LTR        Sciatic nn. jondes                PPT        PPT hip abd        PPT hip aDd                Cervical retraction        scap retraction        Median, ulnar nn. harshad                       Neuro Re-Ed  CPT 69908   Group Total Time for Session Not performed     Sets Reps Load Comment                                     Gait  CPT 96644 Group Total Time for Session Not performed                  Ther. Activity  CPT 49078 Group Total Time for Session Not performed             Group  CPT 02642 Total Time for Session Not performed

## 2022-01-17 NOTE — TELEPHONE ENCOUNTER
Spoke to patient. She needs a PT referral for lymphedema treatment of her LUE. Script faxed as requested to [facility] outpatient [facility] in [place].

## 2022-01-17 NOTE — TELEPHONE ENCOUNTER
Pt called to see if there was an updated P/T referral done and faxed to P/T facility/  Contact number [phone]

## 2022-01-18 ENCOUNTER — HOSPITAL ENCOUNTER (OUTPATIENT)
Dept: PHYSICAL THERAPY | Facility: HOSPITAL | Age: 62
Setting detail: THERAPIES SERIES
Discharge: HOME | End: 2022-01-18
Attending: STUDENT IN AN ORGANIZED HEALTH CARE EDUCATION/TRAINING PROGRAM
Payer: COMMERCIAL

## 2022-01-18 DIAGNOSIS — I89.0 LYMPHEDEMA DUE TO AND NOT CONCURRENT WITH IONIZING RADIOTHERAPY: ICD-10-CM

## 2022-01-18 DIAGNOSIS — I89.0 LYMPHEDEMA DUE TO AND NOT CONCURRENT WITH IONIZING RADIOTHERAPY: Primary | ICD-10-CM
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
## 2022-01-18 DIAGNOSIS — M54.81 OCCIPITAL NEURALGIA OF LEFT SIDE: Primary | ICD-10-CM

## 2022-01-18 DIAGNOSIS — M54.9 BACK PAIN, UNSPECIFIED BACK LOCATION, UNSPECIFIED BACK PAIN LATERALITY, UNSPECIFIED CHRONICITY: ICD-10-CM
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
## 2022-01-18 DIAGNOSIS — M54.50 RIGHT LOW BACK PAIN, UNSPECIFIED CHRONICITY, UNSPECIFIED WHETHER SCIATICA PRESENT: ICD-10-CM

## 2022-01-18 PROCEDURE — 97140 MANUAL THERAPY 1/> REGIONS: CPT | Mod: GP

## 2022-01-18 PROCEDURE — 97110 THERAPEUTIC EXERCISES: CPT | Mod: GP

## 2022-01-18 PROCEDURE — 97163 PT EVAL HIGH COMPLEX 45 MIN: CPT

## 2022-01-18 PROCEDURE — 97010 HOT OR COLD PACKS THERAPY: CPT | Mod: GP

## 2022-01-18 NOTE — OP PT TREATMENT LOG
DOS    IE 1/14/22   30 Day 2/14/22   60 Day 3/14/22   90 Day 4/14/22   Precautions LUE lymphedema s/p lumpectomy and radiation   Insurance Auth    Treatment  Current Session Time   Modalities Total Time for Session 8-22 Minutes   Heat/Ice  CPT 97249 To cervical, lumbar spine x 10 minutes       Manual   CPT 69073 Total Time for Session 8-22 Minutes   STM/MFR/TPR To bilateral paraspinals, R>L periscapular area    Instrument Assisted STM     Mobilizations SOR, gentle distraction   ROM/Flexibility Upper trap, LS stretching   Myofascial Decompression    Ther. Exercise  CPT 58477                   Group Total Time for Session 23-37 Minutes     Sets Reps Load Comment            LTR     seated   Sciatic nn. glides NV               PPT        PPT hip abd        PPT hip aDd                Cervical retraction        scap retraction        Median, ulnar nn. glides  3 10  m--Finger aBduction in scap retraction/dep                    Neuro Re-Ed  CPT 53304   Group Total Time for Session Not performed     Sets Reps Load Comment                                     Gait  CPT 78167 Group Total Time for Session Not performed                  Ther. Activity  CPT 81388 Group Total Time for Session Not performed             Group  CPT 80039 Total Time for Session Not performed

## 2022-01-18 NOTE — OP PT TREATMENT LOG
Exercises Current Session Performed   Yes/No Time   NEURO RE-ED  CPT 42408     Not performed   Postural re-ed         Diaphragmatic Breathing         KTaping                  THER ACT  CPT 15265     0-7 Minutes   Anatomy and physiology of lymphatic system, POC and Goal Review Initial examination 1/18/22  B/L UE Girth measurements Yes  NV    HEP/Education Handouts:  What is Lymphedema  Healthy Tips  Self-MLD    NV  NV  NV    Postural education                  ADL Simulation                   THER EX  CPT 89316     Not performed   HEP      SUPINE THER-EX AAROM Flex/Abd/ER  Serratus Punches  TAC x3 sec hold   TAC + chin tuck x 3 sec hold  TAC + chin tuck + PA x 3 sec hold  TAC + chin tuck + Diag PA x 3 sec hold  SL ER  ER iso with flexion      Prone Y's, T's, I's, Rows    Push-pull  90/90 hold  TAC + alt UE ext  Dying bug   TAC + AB with rev fly  TAC + AB with OH flexion                  SEATED THER-EX Shoulder rolls  Scap squeezes  No monies  Pulleys           STANDING THER-EX AAROM Ext  Wall slides  Shoulder Isos    Scaptions  Wall ABC  KB Wall Slide  Rows, Ext, IR, ER  Pallof press           STRETCHING B UT  B Levator  Pec stretch  Open books  Pball Roll out                GAIT TRAINING  CPT 91528     Not performed             MANUAL  CPT 10086     23-37 Minutes   Joint mobilization         DTM/MFR/TrP release         Strain-counterstrain         Scar/cord mobilization         SSCB 1. Lotion L UE  2. Tubigrip Size D  3. Finger bandages digits 1-4 Yes to all    MLD Positioning: supine, elevated head, x3 pillows, bolster under knees    1. Full neck sequence  2. Diaphragmatic breathing (abdominal sequence superficial next visit.  3. Anterior IAA, and L AIA  4. Full breast and L UE sequence Yes      Yes to all    MODALITIES     Not performed   Lymphatouch                           PRODUCTS  PROGRESS

## 2022-01-18 NOTE — PROGRESS NOTES
PT DAILY NOTE FOR OUTPATIENT THERAPY    Patient: Alecia Cruz   MRN: [id number]  : 1960 61 y.o.  Referring Physician: Mahendra Begum DO  Date of Visit: 2022    Certification Dates: 22 through 22    Diagnosis:   1. Occipital neuralgia of left side    2. Right low back pain, unspecified chronicity, unspecified whether sciatica present        Chief Complaints:       Precautions:   Existing Precautions/Restrictions: limb restrictions (Hx L breast CA)     TODAY'S VISIT     History/Vitals/Pain/Encounter Info - 22 1039        Injury History/Precautions/Daily Required Info    Referring Physician Dr. Mahendra Begum     Existing Precautions/Restrictions limb restrictions   Hx L breast CA    Pertinent History of Current Functional Problem Patient arrives with rx for occipital neuralgia from her PCP. She also notes that she has been having neck pain and headaches related to her neck pain.  She was going to be getting a injection in her cervical spine but instead it has been recommended that she try physical therapy instead.  It was recommended previously that she try physical therapy for her neck as her PCP thinks that she likely is getting tension headaches related to muscle spasms in her neck which are causing occipital neuralgia. Patient has also been experiencing left sided facial numbness and it is unclear if related to parotid gland mass. Migraines are reported every night once she lays down to bed but alleviate in sitting position. She is scheduled to see a neurologist 2022 for cluster headaches and migraine.  In addition, patient has been experiencing low back and mid back pain/spasms worsening since a fall 2 years ago during which she injured her shoulder. After this incident she has been experiencing debilitating spasms across midback. Alecia takes tylenol PM and aleve that does not help with pain levels. Patient also reports worsening constipation with low back pain as well as  right > left flank pain. Symptoms in her low back are worse generally on the right side and occasionally leads to shooting pains and LE buckling. Patient with history of LUE lymphedema and normally improved symptoms following pumping but over the last three months has noted a steady volume gain that never fully abolishes.     [facility] Orth[facility]     Document Type daily treatment     Patient/Family/Caregiver Comments/Observations Patient arrives with reports of worsening headache and neck pain this date     Start Time 1035     Stop Time 1130     Time Calculation (min) 55 min     Patient reported fall since last visit No        Pain Assessment    Currently in pain Yes     Preferred Pain Scale number (Numeric Rating Pain Scale)     Pain: Body location Neck;Back     Pain Rating (0-10): Pre Activity 9        Pain Intervention    Intervention  MHP, seated STM pre session     Post Intervention Comments see assessment                Daily Treatment Assessment and Plan - 01/18/22 1039        Daily Treatment Assessment and Plan    Progress toward goals Slower than expected     Daily Outcome Summary Patient begins with central desensitization with use of MHP and STM to cervical spine with limitations to left shoulder and paraspinals per CLT request. Patient completes seated nerve glides with limitations in L shoulder this date reporting heaviness but noted increase in cervical spine rotation R>L. Unable to tolerate seated lumbar flexion with physioball due to thoracic paraspinal spasm as well as increase in reported scapular discomfort. Patient tolerates trunk rotation and median nerve glides at distal end with finger abduction that resolves neck strain.     Plan and Recommendations Assess lumbar spine including neural tension. Initiate central desensitization techniques including gentle nerve glides and STM to decrease muscle guarding.                     OBJECTIVE DATA TAKEN TODAY:    None taken    Today's  Treatment:    DOS    IE 1/14/22   30 Day 2/14/22   60 Day 3/14/22   90 Day 4/14/22   Precautions LUE lymphedema s/p lumpectomy and radiation   Insurance Auth    Treatment  Current Session Time   Modalities Total Time for Session 8-22 Minutes   Heat/Ice  CPT 00818 To cervical, lumbar spine x 10 minutes       Manual   CPT 60982 Total Time for Session 8-22 Minutes   STM/MFR/TPR To bilateral paraspinals, R>L periscapular area    Instrument Assisted STM     Mobilizations SOR, gentle distraction   ROM/Flexibility Upper trap, LS stretching   Myofascial Decompression    Ther. Exercise  CPT 73911                   Group Total Time for Session 23-37 Minutes     Sets Reps Load Comment            LTR     seated   Sciatic nn. glides NV               PPT        PPT hip abd        PPT hip aDd                Cervical retraction        scap retraction        Median, ulnar nn. glides  3 10  m--Finger aBduction in scap retraction/dep                    Neuro Re-Ed  CPT 09529   Group Total Time for Session Not performed     Sets Reps Load Comment                                     Gait  CPT 35402 Group Total Time for Session Not performed                  Ther. Activity  CPT 62989 Group Total Time for Session Not performed             Group  CPT 26536 Total Time for Session Not performed
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
## 2022-01-25 ENCOUNTER — TELEPHONE (OUTPATIENT)
Dept: PRIMARY CARE | Facility: CLINIC | Age: 62
End: 2022-01-25
Payer: COMMERCIAL

## 2022-01-25 ENCOUNTER — HOSPITAL ENCOUNTER (OUTPATIENT)
Dept: PHYSICAL THERAPY | Facility: HOSPITAL | Age: 62
Setting detail: THERAPIES SERIES
Discharge: HOME | End: 2022-01-25
Attending: STUDENT IN AN ORGANIZED HEALTH CARE EDUCATION/TRAINING PROGRAM
Payer: COMMERCIAL

## 2022-01-25 DIAGNOSIS — I89.0 LYMPHEDEMA DUE TO AND NOT CONCURRENT WITH IONIZING RADIOTHERAPY: Primary | ICD-10-CM
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
## 2022-01-25 PROCEDURE — 97140 MANUAL THERAPY 1/> REGIONS: CPT | Mod: GP

## 2022-01-25 PROCEDURE — 97530 THERAPEUTIC ACTIVITIES: CPT | Mod: GP

## 2022-01-25 NOTE — TELEPHONE ENCOUNTER
Pt said that she was given medication for UTI however she is still experiencing some lower pain in the area.  Please advise

## 2022-01-25 NOTE — OP PT TREATMENT LOG
Exercises Current Session Performed   Yes/No Time   NEURO RE-ED  CPT 21253     Not performed   Postural re-ed         Diaphragmatic Breathing         KTaping                  THER ACT  CPT 94121     23-37 Minutes   Anatomy and physiology of lymphatic system, POC and Goal Review Initial examination 1/18/22 1/25/22: B/L UE Girth measurements  LUE: 51,414.56 ml   RUE: 44,333.64 ml   Limb volume difference: 15.97%    Yes     HEP/Education Handouts:  What is Lymphedema  Healthy Tips  Self-MLD        NV     Postural education         LLIS   1/25/22: 38/68= 55.88% impairment   Yes      ADL Simulation                   THER EX  CPT 88304     Not performed   HEP      SUPINE THER-EX AAROM Flex/Abd/ER  Serratus Punches  TAC x3 sec hold   TAC + chin tuck x 3 sec hold  TAC + chin tuck + PA x 3 sec hold  TAC + chin tuck + Diag PA x 3 sec hold  SL ER  ER iso with flexion      Prone Y's, T's, I's, Rows    Push-pull  90/90 hold  TAC + alt UE ext  Dying bug   TAC + AB with rev fly  TAC + AB with OH flexion                  SEATED THER-EX Shoulder rolls  Scap squeezes  No monies  Pulleys           STANDING THER-EX AAROM Ext  Wall slides  Shoulder Isos    Scaptions  Wall ABC  KB Wall Slide  Rows, Ext, IR, ER  Pallof press           STRETCHING B UT  B Levator  Pec stretch  Open books  Pball Roll out                GAIT TRAINING  CPT 92659     Not performed             MANUAL  CPT 65665     23-37 Minutes   Joint mobilization         DTM/MFR/TrP release         Strain-counterstrain         Scar/cord mobilization         SSCB 1. Lotion L UE  2. Tubigrip Size D  3. Finger bandages digits 1-5 Yes to all    MLD Positioning: supine, elevated head, x3 pillows, bolster under knees    1. Full neck sequence  2. Diaphragmatic breathing (abdominal sequence superficial next visit.  3. Anterior and Posterior IAA  4. Full breast and L UE sequence Yes      Yes to all    MODALITIES     Not performed   Lymphatouch                           PRODUCTS   PROGRESS

## 2022-01-26 NOTE — TELEPHONE ENCOUNTER
Spoke with Ms. Anthony she states having some discomfort after urinating describes it as a slight burning and itching irritation.  She completed her Macrobid although she did state this discomfort is different. will send to Dr. Begum on how to proceed moving forward.
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
## 2022-01-26 NOTE — TELEPHONE ENCOUNTER
Spoke with Ms. Cruz she verbalized understanding of Dr. Begum latest note and recommendations to have her gynecologist evaluate her.  She stated she does not have a gynecologist and she is not sexually active.  I recommended she choose one with the [facility] and get the first available appointment she said thank you

## 2022-01-26 NOTE — TELEPHONE ENCOUNTER
This sounds like she may not be now having a urinary tract infection she could have developed a yeast infection.  Does she have a gynecologist that she sees?  I would recommend that she go see her gynecologist because this could the other be a local irritation versus the yeast infection.  Let her know that I would treat it myself necessarily as much as I would like her to call her gynecologist.

## 2022-01-28 ENCOUNTER — HOSPITAL ENCOUNTER (OUTPATIENT)
Dept: PHYSICAL THERAPY | Facility: HOSPITAL | Age: 62
Setting detail: THERAPIES SERIES
Discharge: HOME | End: 2022-01-28
Attending: STUDENT IN AN ORGANIZED HEALTH CARE EDUCATION/TRAINING PROGRAM
Payer: COMMERCIAL

## 2022-01-28 DIAGNOSIS — M54.50 RIGHT LOW BACK PAIN, UNSPECIFIED CHRONICITY, UNSPECIFIED WHETHER SCIATICA PRESENT: ICD-10-CM

## 2022-01-28 DIAGNOSIS — M54.9 BACK PAIN, UNSPECIFIED BACK LOCATION, UNSPECIFIED BACK PAIN LATERALITY, UNSPECIFIED CHRONICITY: Primary | ICD-10-CM

## 2022-01-28 DIAGNOSIS — M54.81 OCCIPITAL NEURALGIA OF LEFT SIDE: ICD-10-CM

## 2022-01-28 PROCEDURE — 97140 MANUAL THERAPY 1/> REGIONS: CPT | Mod: GP | Performed by: PHYSICAL THERAPIST

## 2022-01-28 PROCEDURE — 97010 HOT OR COLD PACKS THERAPY: CPT | Mod: GP | Performed by: PHYSICAL THERAPIST

## 2022-01-28 PROCEDURE — 97110 THERAPEUTIC EXERCISES: CPT | Mod: GP | Performed by: PHYSICAL THERAPIST

## 2022-01-28 NOTE — OP PT TREATMENT LOG
DOS    IE 1/14/22   30 Day 2/14/22   60 Day 3/14/22   90 Day 4/14/22   Precautions LUE lymphedema s/p lumpectomy and radiation   Insurance Auth    Treatment  Current Session Time   Modalities Total Time for Session 8-22 Minutes   Heat/Ice  CPT 08689 Heat To cervical, lumbar spine x 10 minutes       Manual   CPT 79229 Total Time for Session 23-37 Minutes   STM/MFR/TPR To bilateral paraspinals, R>L periscapular area  -yes   Instrument Assisted STM     Mobilizations SOR, gentle distraction yes   ROM/Flexibility Upper trap, LS stretching -yes   Myofascial Decompression    Ther. Exercise  CPT 03147                   Group Total Time for Session 8-22 Minutes     Sets Reps Load Comment            LTR     seated   Sciatic nn. glides NV               PPT        PPT hip abd        PPT hip aDd                Cervical retraction y 1 10     scap retraction y       Median, ulnar nn. glides  3 10  m--Finger aBduction in scap retraction/dep     Thoracic rotation y 1 10             Neuro Re-Ed  CPT 44800   Group Total Time for Session Not performed     Sets Reps Load Comment                                     Gait  CPT 42602 Group Total Time for Session Not performed                  Ther. Activity  CPT 02217 Group Total Time for Session Not performed             Group  CPT 05677 Total Time for Session Not performed

## 2022-01-28 NOTE — PROGRESS NOTES
PT DAILY NOTE FOR OUTPATIENT THERAPY    Patient: Alecia Cruz   MRN: [id number]  : 1960 61 y.o.  Referring Physician: Mahendra Begum DO  Date of Visit: 2022    Certification Dates: 22 through 22    Diagnosis:   1. Lymphedema due to and not concurrent with ionizing radiotherapy    2. Back pain, unspecified back location, unspecified back pain laterality, unspecified chronicity    3. Occipital neuralgia of left side    4. Right low back pain, unspecified chronicity, unspecified whether sciatica present        Chief Complaints:  LUE lymphedema    Precautions:   Precautions comments: L UE and Breast Lymphedema, Hx of XRT  Existing Precautions/Restrictions: limb restrictions     TODAY'S VISIT    Time In Session:  Start Time: 1505  Stop Time: 1558  Time Calculation (min): 53 min   History/Vitals/Pain/Encounter Info - 22 1503        Injury History/Precautions/Daily Required Info    Document Type daily treatment     Chief Complaint/Reason for Visit  LUE lymphedema     Referring Physician Dr. Mahendra Begum DO     Existing Precautions/Restrictions limb restrictions     Precautions comments L UE and Breast Lymphedema, Hx of XRT     Pertinent History of Current Functional Problem L UE and Breast Lymphedema, Hx of XRT     OP Specialty Orthopedics     Patient/Family/Caregiver Comments/Observations Pt states she has decreased headaches able to lie down. Will be out of town next week for a      Patient reported fall since last visit No     Start Time 1505     Stop Time 1558     Time Calculation (min) 53 min        Pain Assessment    Currently in pain Yes     Preferred Pain Scale number (Numeric Rating Pain Scale)     Pain Side/Orientation left     Pain Rating (0-10): Pre Activity 6     Pain Rating (0-10): Activity 6        Pain Intervention    Intervention  MT, TE     Post Intervention Comments no pain post interventions                       OBJECTIVE DATA TAKEN TODAY:    None  taken    Today's Treatment:    DOS    IE 1/14/22   30 Day 2/14/22   60 Day 3/14/22   90 Day 4/14/22   Precautions LUE lymphedema s/p lumpectomy and radiation   Insurance Auth    Treatment  Current Session Time   Modalities Total Time for Session 8-22 Minutes   Heat/Ice  CPT 79521 Heat To cervical, lumbar spine x 10 minutes       Manual   CPT 07897 Total Time for Session 23-37 Minutes   STM/MFR/TPR To bilateral paraspinals, R>L periscapular area  -yes   Instrument Assisted STM     Mobilizations SOR, gentle distraction yes   ROM/Flexibility Upper trap, LS stretching -yes   Myofascial Decompression    Ther. Exercise  CPT 21178                   Group Total Time for Session 8-22 Minutes     Sets Reps Load Comment            LTR     seated   Sciatic nn. glides NV               PPT        PPT hip abd        PPT hip aDd                Cervical retraction y 1 10     scap retraction y       Median, ulnar nn. glides  3 10  m--Finger aBduction in scap retraction/dep     Thoracic rotation y 1 10             Neuro Re-Ed  CPT 42062   Group Total Time for Session Not performed     Sets Reps Load Comment                                     Gait  CPT 58186 Group Total Time for Session Not performed                  Ther. Activity  CPT 22718 Group Total Time for Session Not performed             Group  CPT 64674 Total Time for Session Not performed

## 2022-01-31 ENCOUNTER — HOSPITAL ENCOUNTER (OUTPATIENT)
Dept: PHYSICAL THERAPY | Facility: HOSPITAL | Age: 62
Setting detail: THERAPIES SERIES
Discharge: HOME | End: 2022-01-31
Attending: STUDENT IN AN ORGANIZED HEALTH CARE EDUCATION/TRAINING PROGRAM
Payer: COMMERCIAL

## 2022-01-31 DIAGNOSIS — M54.81 OCCIPITAL NEURALGIA OF LEFT SIDE: ICD-10-CM

## 2022-01-31 DIAGNOSIS — M54.9 BACK PAIN, UNSPECIFIED BACK LOCATION, UNSPECIFIED BACK PAIN LATERALITY, UNSPECIFIED CHRONICITY: Primary | ICD-10-CM

## 2022-01-31 DIAGNOSIS — M54.50 RIGHT LOW BACK PAIN, UNSPECIFIED CHRONICITY, UNSPECIFIED WHETHER SCIATICA PRESENT: ICD-10-CM

## 2022-01-31 PROCEDURE — 97140 MANUAL THERAPY 1/> REGIONS: CPT | Mod: GP | Performed by: PHYSICAL THERAPIST

## 2022-01-31 PROCEDURE — 97110 THERAPEUTIC EXERCISES: CPT | Mod: GP | Performed by: PHYSICAL THERAPIST

## 2022-01-31 PROCEDURE — 97010 HOT OR COLD PACKS THERAPY: CPT | Mod: GP | Performed by: PHYSICAL THERAPIST

## 2022-01-31 NOTE — OP PT TREATMENT LOG
DOS    IE 1/14/22   30 Day 2/14/22   60 Day 3/14/22   90 Day 4/14/22   Precautions LUE lymphedema s/p lumpectomy and radiation   Insurance Auth    Treatment  Current Session Time   Modalities Total Time for Session 8-22 Minutes   Heat/Ice  CPT 64788 Heat To cervical, lumbar spine x 10 minutes       Manual   CPT 43523 Total Time for Session 8-22 Minutes   STM/MFR/TPR To bilateral paraspinals, R>L periscapular area  -yes   Instrument Assisted STM     Mobilizations SOR, gentle distraction yes   ROM/Flexibility Upper trap, LS stretching -yes   Myofascial Decompression    Ther. Exercise  CPT 60866                   Group Total Time for Session 8-22 Minutes     Sets Reps Load Comment            LTR     seated   Sciatic nn. glides     -yes supine HEP   Hamstring stretch  3 30  -yes HEP   Calf stretch  3 30  -yes HEP   Pirformis stretch  3 30  -yes HEP                   PPT        PPT hip abd        PPT hip aDd                Cervical retraction  1 10  Mid rang -yes   scap retraction        Median, ulnar nn. glides  3 10  m--Finger aBduction in scap retraction/dep     Thoracic rotation  1 10             Neuro Re-Ed  CPT 95560   Group Total Time for Session Not performed     Sets Reps Load Comment                                     Gait  CPT 25632 Group Total Time for Session Not performed                  Ther. Activity  CPT 01899 Group Total Time for Session Not performed             Group  CPT 44776 Total Time for Session Not performed

## 2022-01-31 NOTE — PROGRESS NOTES
PT DAILY NOTE FOR OUTPATIENT THERAPY    Patient: Alecia Cruz   MRN: [id number]  : 1960 61 y.o.  Referring Physician: Mahendra Begum DO  Date of Visit: 2022    Certification Dates: 22 through 22    Diagnosis:   1. Lymphedema due to and not concurrent with ionizing radiotherapy    2. Back pain, unspecified back location, unspecified back pain laterality, unspecified chronicity    3. Occipital neuralgia of left side    4. Right low back pain, unspecified chronicity, unspecified whether sciatica present        Chief Complaints:  LUE lymphedema    Precautions:   Precautions comments: L UE and Breast Lymphedema, Hx of XRT  Existing Precautions/Restrictions: limb restrictions     TODAY'S VISIT    Time In Session:  Start Time: 808  Stop Time: 900  Time Calculation (min): 52 min   History/Vitals/Pain/Encounter Info - 22 0815        Injury History/Precautions/Daily Required Info    Document Type daily treatment     Chief Complaint/Reason for Visit  LUE lymphedema     Referring Physician Dr. Mahendra Begum DO     Existing Precautions/Restrictions limb restrictions     Precautions comments L UE and Breast Lymphedema, Hx of XRT     Pertinent History of Current Functional Problem L UE and Breast Lymphedema, Hx of XRT     OP Specialty Orthopedics     Patient/Family/Caregiver Comments/Observations Pt states she felt she was doing too much on  was able to do some stretches and it decreased the pain     Patient reported fall since last visit No     Start Time 0808        Pain Assessment    Currently in pain Yes     Preferred Pain Scale number (Numeric Rating Pain Scale)     Pain Side/Orientation bilateral     Pain: Body location Back     Pain Rating (0-10): Pre Activity 8        Pain Intervention    Intervention  MT, TE     Post Intervention Comments no pain post intervention                Daily Treatment Assessment and Plan - 22 0815        Daily Treatment Assessment and Plan     Progress toward goals Progressing     Daily Outcome Summary Pt c/o mostly lower thoracic and lumbar spine. Radiating to the bilateral hips R>L. Pt states that three months ago she did something and felt a sharp pain in her thoracic spine and since then she has consistent pain there. Educated her that due to cancer history would benefit from further imaging as she says flexion is her only relieving activity. Pt tolerated posterior chain stretching today. Will trial flexion based low back stretching next session.     Plan and Recommendations Progress flexion seated stretching                     OBJECTIVE DATA TAKEN TODAY:    None taken    Today's Treatment:    DOS    IE 1/14/22   30 Day 2/14/22   60 Day 3/14/22   90 Day 4/14/22   Precautions LUE lymphedema s/p lumpectomy and radiation   Insurance Auth    Treatment  Current Session Time   Modalities Total Time for Session 8-22 Minutes   Heat/Ice  CPT 56128 Heat To cervical, lumbar spine x 10 minutes       Manual   CPT 76606 Total Time for Session 8-22 Minutes   STM/MFR/TPR To bilateral paraspinals, R>L periscapular area  -yes   Instrument Assisted STM     Mobilizations SOR, gentle distraction yes   ROM/Flexibility Upper trap, LS stretching -yes   Myofascial Decompression    Ther. Exercise  CPT 10344                   Group Total Time for Session 8-22 Minutes     Sets Reps Load Comment            LTR     seated   Sciatic nn. glides     -yes supine HEP   Hamstring stretch  3 30  -yes HEP   Calf stretch  3 30  -yes HEP   Pirformis stretch  3 30  -yes HEP                   PPT        PPT hip abd        PPT hip aDd                Cervical retraction  1 10  Mid rang -yes   scap retraction        Median, ulnar nn. glides  3 10  m--Finger aBduction in scap retraction/dep     Thoracic rotation  1 10             Neuro Re-Ed  CPT 24657   Group Total Time for Session Not performed     Sets Reps Load Comment                                     Gait  CPT 79675 Group Total Time for  Session Not performed                  Ther. Activity  CPT 48355 Group Total Time for Session Not performed             Group  CPT 10292 Total Time for Session Not performed

## 2022-02-04 ENCOUNTER — TELEPHONE (OUTPATIENT)
Dept: PHYSICAL THERAPY | Facility: HOSPITAL | Age: 62
End: 2022-02-04
Payer: COMMERCIAL

## 2022-02-04 NOTE — TELEPHONE ENCOUNTER
Pt reports being out of town because of a death in the family. Pt confirms next appointment date and time.

## 2022-02-10 ENCOUNTER — OFFICE VISIT (OUTPATIENT)
Dept: PRIMARY CARE | Facility: CLINIC | Age: 62
End: 2022-02-10
Payer: COMMERCIAL

## 2022-02-10 VITALS
WEIGHT: 162 LBS | DIASTOLIC BLOOD PRESSURE: 80 MMHG | BODY MASS INDEX: 25.43 KG/M2 | HEIGHT: 67 IN | SYSTOLIC BLOOD PRESSURE: 120 MMHG | TEMPERATURE: 99.2 F | RESPIRATION RATE: 17 BRPM | OXYGEN SATURATION: 98 % | HEART RATE: 91 BPM

## 2022-02-10 DIAGNOSIS — E03.9 HYPOTHYROIDISM, UNSPECIFIED TYPE: ICD-10-CM

## 2022-02-10 DIAGNOSIS — R30.0 DYSURIA: ICD-10-CM

## 2022-02-10 DIAGNOSIS — E78.00 HYPERCHOLESTEROLEMIA: ICD-10-CM

## 2022-02-10 DIAGNOSIS — Z12.11 ENCOUNTER FOR SCREENING COLONOSCOPY: ICD-10-CM

## 2022-02-10 DIAGNOSIS — N28.1 RENAL CYST: Primary | ICD-10-CM

## 2022-02-10 DIAGNOSIS — I48.0 PAROXYSMAL ATRIAL FIBRILLATION (CMS/HCC): ICD-10-CM

## 2022-02-10 PROCEDURE — 99213 OFFICE O/P EST LOW 20 MIN: CPT | Performed by: STUDENT IN AN ORGANIZED HEALTH CARE EDUCATION/TRAINING PROGRAM

## 2022-02-10 PROCEDURE — 3008F BODY MASS INDEX DOCD: CPT | Performed by: STUDENT IN AN ORGANIZED HEALTH CARE EDUCATION/TRAINING PROGRAM

## 2022-02-11 ENCOUNTER — HOSPITAL ENCOUNTER (OUTPATIENT)
Dept: PHYSICAL THERAPY | Facility: HOSPITAL | Age: 62
Setting detail: THERAPIES SERIES
Discharge: HOME | End: 2022-02-11
Attending: STUDENT IN AN ORGANIZED HEALTH CARE EDUCATION/TRAINING PROGRAM
Payer: COMMERCIAL

## 2022-02-11 ENCOUNTER — TELEPHONE (OUTPATIENT)
Dept: PHYSICAL THERAPY | Facility: HOSPITAL | Age: 62
End: 2022-02-11
Payer: COMMERCIAL

## 2022-02-11 DIAGNOSIS — I89.0 LYMPHEDEMA DUE TO AND NOT CONCURRENT WITH IONIZING RADIOTHERAPY: Primary | ICD-10-CM
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
## 2022-02-11 PROCEDURE — 97530 THERAPEUTIC ACTIVITIES: CPT | Mod: GP

## 2022-02-11 PROCEDURE — 97140 MANUAL THERAPY 1/> REGIONS: CPT | Mod: GP

## 2022-02-11 NOTE — OP PT TREATMENT LOG
Exercises Current Session Performed   Yes/No Time   NEURO RE-ED  CPT 49485     Not performed   Postural re-ed         Diaphragmatic Breathing         KTaping                             THER ACT  CPT 49879     8-22 minutes    Anatomy and physiology of lymphatic system, POC and Goal Review Initial examination 1/18/22 1/25/22: B/L UE Girth measurements  LUE: 51,414.56 ml   RUE: 44,333.64 ml   Limb volume difference: 15.97%          HEP/Education Handouts:  What is Lymphedema  Healthy Tips  Self-MLD- 2/11/22 2/11/22: Pt instructed to be more consistent with wearing LUE compression garments in order to continue to reduce swelling and avoid refill. Pt verbalizes understanding          Yes     Yes      Postural education         LLIS   1/25/22: 38/68= 55.88% impairment       ADL Simulation          BUE AROM assessment   see treatment note   Yes      THER EX  CPT 09374     Not performed   HEP         SUPINE THER-EX AAROM Flex/Abd/ER  Serratus Punches  TAC x3 sec hold   TAC + chin tuck x 3 sec hold  TAC + chin tuck + PA x 3 sec hold  TAC + chin tuck + Diag PA x 3 sec hold  SL ER  ER iso with flexion        Prone Y's, T's, I's, Rows     Push-pull  90/90 hold  TAC + alt UE ext  Dying bug   TAC + AB with rev fly  TAC + AB with OH flexion                       SEATED THER-EX Shoulder rolls  Scap squeezes  No monies  Pulleys                 STANDING THER-EX AAROM Ext  Wall slides  Shoulder Isos     Scaptions  Wall ABC  KB Wall Slide  Rows, Ext, IR, ER  Pallof press                 STRETCHING B UT  B Levator  Pec stretch  Open books  Pball Roll out                 GAIT TRAINING  CPT 39386     Not performed             MANUAL  CPT 06537     38-52 minutes    Joint mobilization         DTM/MFR/TrP release         Strain-counterstrain         Scar/cord mobilization         SSCB 1. Lotion L UE  2. Tubigrip Size D  3. Finger bandages digits 1-5    2/11/22: New Tubigrip size D administered and donned this date. Pt did not bring  finger bandages to session and reports she will daphne finger bandages when she gets home.          Yes     MLD Positioning: supine, elevated head, x3 pillows, bolster under knees     1. Full neck sequence  2. Diaphragmatic breathing (abdominal sequence superficial next visit.  3. Anterior and Posterior IAA  4. Full breast and L UE sequence Yes        Yes to all     MODALITIES     Not performed   Lymphatouch                             PRODUCTS   PROGRESS

## 2022-02-11 NOTE — TELEPHONE ENCOUNTER
Patient was phoned regarding missed PT appointment and states that there was a miscommunication regarding her appointment time. She thought her appointment was scheduled for 9:30 when it in fact was scheduled for 8:30. She was informed of her 9:30 appointment with lymphedema therapist for which she acknowledged understanding. Next scheduled date/time were discussed.
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
## 2022-02-15 ENCOUNTER — HOSPITAL ENCOUNTER (OUTPATIENT)
Dept: PHYSICAL THERAPY | Facility: HOSPITAL | Age: 62
Setting detail: THERAPIES SERIES
Discharge: HOME | End: 2022-02-15
Attending: STUDENT IN AN ORGANIZED HEALTH CARE EDUCATION/TRAINING PROGRAM
Payer: COMMERCIAL

## 2022-02-15 DIAGNOSIS — M54.81 OCCIPITAL NEURALGIA OF LEFT SIDE: Primary | ICD-10-CM

## 2022-02-15 DIAGNOSIS — M54.12 CERVICAL RADICULOPATHY: ICD-10-CM

## 2022-02-15 PROCEDURE — 97140 MANUAL THERAPY 1/> REGIONS: CPT | Mod: GP | Performed by: PHYSICAL THERAPIST

## 2022-02-15 PROCEDURE — 97010 HOT OR COLD PACKS THERAPY: CPT | Mod: GP | Performed by: PHYSICAL THERAPIST

## 2022-02-15 PROCEDURE — 97110 THERAPEUTIC EXERCISES: CPT | Mod: GP | Performed by: PHYSICAL THERAPIST

## 2022-02-15 NOTE — ADDENDUM NOTE
Encounter addended by: Elizabeth Moran, PT on: 2/15/2022 2:52 PM   Actions taken: Visit diagnoses modified

## 2022-02-15 NOTE — OP PT TREATMENT LOG
DOS     IE  1/14/22   30 Day  2/14/22   60 Day  3/14/22   90 Day  4/14/22   Precautions  LUE lymphedema s/p lumpectomy and radiation   Insurance Auth     Treatment   Current Session Time   Modalities  Total Time for Session 8-22 Minutes   Heat/Ice  CPT 29341 y MHP to the c/s in sitting  x 10 minutes        Manual   CPT 90638 Y/N Total Time for Session 23-37 Minutes   STM/MFR/TPR y To bilateral paraspinals, R>L periscapular area     Instrument Assisted STM      Mobilizations y SOR, gentle distraction    ROM/Flexibility y B UT/LS and pec stretch    Myofascial Decompression y Gentle R arm pull   Ther. Exercise  CPT 42666                   Group  Total Time for Session 8-22 Minutes     Y/N Sets Reps Load Comment             LTR      seated   Sciatic nn. glides  n     supine HEP   Hamstring stretch  n 3 30  HEP   Calf stretch  n 3 30  HEP   Pirformis stretch  n 3 30  HEP                     PPT         PPT hip abd         PPT hip aDd                  Cervical retraction  n 1 10  Mid rang -yes   scap retraction         Median, ulnar nn. glides  y 3 10  supine     Thoracic rotation  n 1 10   Trunk stretch  y Small ROM seated with blue PB 2x5         Neuro Re-Ed  CPT 52506   Group  Total Time for Session Not performed      Sets Reps Load Comment                                          Gait  CPT 92879 Group  Total Time for Session Not performed                     Ther. Activity  CPT 02305 Group  Total Time for Session Not performed               Group  CPT 15965  Total Time for Session Not performed

## 2022-02-15 NOTE — ADDENDUM NOTE
Encounter addended by: Elizabeth Moran, PT on: 2/15/2022 2:50 PM   Actions taken: Visit diagnoses modified

## 2022-02-15 NOTE — PROGRESS NOTES
PT DAILY NOTE FOR OUTPATIENT THERAPY    Patient: Alecia Cruz   MRN: [id number]  : 1960 61 y.o.  Referring Physician: Mahendra Begum DO  Date of Visit: 2/15/2022    Certification Dates: 22 through 22    Diagnosis:   1. Occipital neuralgia of left side    2. Cervical radiculopathy        Chief Complaints:  Neck pain    Precautions:   Precautions comments: L UE and Breast Lymphedema, Hx of XRT  Existing Precautions/Restrictions: limb restrictions     TODAY'S VISIT    Time In Session:  Start Time: 937 (Pt arrived late to session)  Stop Time: 1030  Time Calculation (min): 53 min   History/Vitals/Pain/Encounter Info - 02/15/22 0938        Injury History/Precautions/Daily Required Info    Document Type daily treatment     Primary Therapist Andra Arnold, PT     Chief Complaint/Reason for Visit  Neck pain     Referring Physician Dr. Mahendra Begum,      Existing Precautions/Restrictions limb restrictions     Precautions comments L UE and Breast Lymphedema, Hx of XRT     Pertinent History of Current Functional Problem Patient arrives with rx for occipital neuralgia from her PCP. She also notes that she has been having neck pain and headaches related to her neck pain.  She was going to be getting a injection in her cervical spine but instead it has been recommended that she try physical therapy instead.  It was recommended previously that she try physical therapy for her neck as her PCP thinks that she likely is getting tension headaches related to muscle spasms in her neck which are causing occipital neuralgia. Patient has also been experiencing left sided facial numbness and it is unclear if related to parotid gland mass. Migraines are reported every night once she lays down to bed but alleviate in sitting position. She is scheduled to see a neurologist 2022 for cluster headaches and migraine.  In addition, patient has been experiencing low back and mid back pain/spasms worsening since a  fall 2 years ago during which she injured her shoulder. After this incident she has been experiencing debilitating spasms across midback. Alecia takes tylenol PM and aleve that does not help with pain levels. Patient also reports worsening constipation with low back pain as well as right > left flank pain. Symptoms in her low back are worse generally on the right side and occasionally leads to shooting pains and LE buckling. Patient with history of LUE lymphedema and normally improved symptoms following pumping but over the last three months has noted a steady volume gain that never fully abolishes.     OP Specialty Orthopedics     Patient/Family/Caregiver Comments/Observations Pt reports since fallingon black ice around Feb 5, she has noticed more neck spams and migraines.     Patient reported fall since last visit No     Start Time 0937   Pt arrived late to session    Stop Time 1030     Time Calculation (min) 53 min        Pain Assessment    Currently in pain Yes     Preferred Pain Scale number (Numeric Rating Pain Scale)     Pain Side/Orientation left     Pain: Body location Neck     Pain Rating (0-10): Pre Activity 10     Pain Rating (0-10): Activity 10     Pain Rating (0-10): Post Activity 10        Pain Intervention    Intervention  MHP, MT, TE     Post Intervention Comments No change                Daily Treatment Assessment and Plan - 02/15/22 0938        Daily Treatment Assessment and Plan    Progress toward goals Slower than expected     Daily Outcome Summary Pt arrived with 10/10 neck and mid to low back pain, requesting MHP to the neck and low back.  Given that lumbar spine has not been fully assessed, MHP to the lumbar spine was held today. Pt with decreased tolerance for manual therapy, noting spasms in the c/s paraspinals, and pinpoint pain in the mid thoracic spine.  Pt notes she was supposed to get an MRI of the spine, however with going out of town, she was unable to attend her appointment.  PT  encouraged pt to reschedule MRI and to discuss with PCP regarding new pains from recent fall. Pt. reports no change to symptoms post session.     Plan and Recommendations Re-assess C/s, assess L/s, and update POC                     OBJECTIVE DATA TAKEN TODAY:    None taken    Today's Treatment:    DOS     IE  1/14/22   30 Day  2/14/22   60 Day  3/14/22   90 Day  4/14/22   Precautions  LUE lymphedema s/p lumpectomy and radiation   Insurance Auth     Treatment   Current Session Time   Modalities  Total Time for Session 8-22 Minutes   Heat/Ice  CPT 11495 y MHP to the c/s in sitting  x 10 minutes        Manual   CPT 25021 Y/N Total Time for Session 23-37 Minutes   STM/MFR/TPR y To bilateral paraspinals, R>L periscapular area     Instrument Assisted STM      Mobilizations y SOR, gentle distraction    ROM/Flexibility y B UT/LS and pec stretch    Myofascial Decompression y Gentle R arm pull   Ther. Exercise  CPT 49163                   Group  Total Time for Session 8-22 Minutes     Y/N Sets Reps Load Comment             LTR      seated   Sciatic nn. glides  n     supine HEP   Hamstring stretch  n 3 30  HEP   Calf stretch  n 3 30  HEP   Pirformis stretch  n 3 30  HEP                     PPT         PPT hip abd         PPT hip aDd                  Cervical retraction  n 1 10  Mid rang -yes   scap retraction         Median, ulnar nn. glides  y 3 10  supine     Thoracic rotation  n 1 10   Trunk stretch  y Small ROM seated with blue PB 2x5         Neuro Re-Ed  CPT 78749   Group  Total Time for Session Not performed      Sets Reps Load Comment                                          Gait  CPT 83923 Group  Total Time for Session Not performed                     Ther. Activity  CPT 02235 Group  Total Time for Session Not performed               Group  CPT 45490  Total Time for Session Not performed

## 2022-02-17 ENCOUNTER — HOSPITAL ENCOUNTER (OUTPATIENT)
Dept: PHYSICAL THERAPY | Facility: HOSPITAL | Age: 62
Setting detail: THERAPIES SERIES
Discharge: HOME | End: 2022-02-17
Attending: STUDENT IN AN ORGANIZED HEALTH CARE EDUCATION/TRAINING PROGRAM
Payer: COMMERCIAL

## 2022-02-17 DIAGNOSIS — M54.9 BACK PAIN, UNSPECIFIED BACK LOCATION, UNSPECIFIED BACK PAIN LATERALITY, UNSPECIFIED CHRONICITY: ICD-10-CM

## 2022-02-17 DIAGNOSIS — M54.12 CERVICAL RADICULOPATHY: ICD-10-CM

## 2022-02-17 DIAGNOSIS — M54.50 RIGHT LOW BACK PAIN, UNSPECIFIED CHRONICITY, UNSPECIFIED WHETHER SCIATICA PRESENT: ICD-10-CM

## 2022-02-17 DIAGNOSIS — M54.81 OCCIPITAL NEURALGIA OF LEFT SIDE: Primary | ICD-10-CM

## 2022-02-17 PROBLEM — N28.1 RENAL CYST: Status: ACTIVE | Noted: 2022-02-17

## 2022-02-17 PROBLEM — Z12.11 ENCOUNTER FOR SCREENING COLONOSCOPY: Status: ACTIVE | Noted: 2022-02-17

## 2022-02-17 PROBLEM — N39.0 URINARY TRACT INFECTION: Status: RESOLVED | Noted: 2022-01-06 | Resolved: 2022-02-17

## 2022-02-17 PROBLEM — E78.00 HYPERCHOLESTEROLEMIA: Status: ACTIVE | Noted: 2022-02-17

## 2022-02-17 PROBLEM — G47.9 SLEEP DISORDER: Status: RESOLVED | Noted: 2020-05-19 | Resolved: 2022-02-17

## 2022-02-17 PROBLEM — R30.0 DYSURIA: Status: ACTIVE | Noted: 2022-02-17

## 2022-02-17 PROBLEM — T30.0 BURN OF SKIN: Status: RESOLVED | Noted: 2022-01-06 | Resolved: 2022-02-17

## 2022-02-17 PROCEDURE — 97140 MANUAL THERAPY 1/> REGIONS: CPT | Mod: GP

## 2022-02-17 PROCEDURE — 97530 THERAPEUTIC ACTIVITIES: CPT | Mod: GP

## 2022-02-17 PROCEDURE — 97110 THERAPEUTIC EXERCISES: CPT | Mod: GP

## 2022-02-17 NOTE — OP PT TREATMENT LOG
DOS     IE  1/14/22   30 Day  2/14/22   60 Day  3/14/22   90 Day  4/14/22   Precautions  LUE lymphedema s/p lumpectomy and radiation   Insurance Auth     Treatment   Current Session Time   Modalities  Total Time for Session 8-22 Minutes   Heat/Ice  CPT 02784 y MHP to the c/s in sitting  x 10 minutes        Manual   CPT 55238 Y/N Total Time for Session 23-37 Minutes   STM/MFR/TPR yes To bilateral paraspinals, R>L periscapular area    Agonist/antagonist with gaze   Instrument Assisted STM      Mobilizations  SOR, gentle distraction    ROM/Flexibility yes B UT/LS and pec stretch    Myofascial Decompression  Gentle R arm pull   Ther. Exercise  CPT 46914                   Group  Total Time for Session 8-22 Minutes     Y/N Sets Reps Load Comment             LTR      seated   Sciatic nn. glides  n     supine HEP   Hamstring stretch  n 3 30  HEP   Calf stretch  n 3 30  HEP   Pirformis stretch  n 3 30  HEP                     PPT         PPT hip abd         PPT hip aDd                  Cervical retraction  n 1 10  Mid rang -yes   scap retraction         Median, ulnar nn. glides  y 3 10       Thoracic rotation  n 1 10   Trunk stretch  y Small ROM seated with blue PB 2x5   GH ER      Neuro Re-Ed  CPT 16185   Group  Total Time for Session Not performed      Sets Reps Load Comment                                          Gait  CPT 04507 Group  Total Time for Session Not performed                     Ther. Activity  CPT 10402 Group  Total Time for Session Not performed               Group  CPT 82869  Total Time for Session Not performed

## 2022-02-17 NOTE — ASSESSMENT & PLAN NOTE
-Repeat TSH and T4 ordered  -Patient would like to continue following with endocrinology for her hypothyroidism.  She is on a stable dose of levothyroxine.  She previously was following with an endocrinologist at her previous hospital.

## 2022-02-17 NOTE — ASSESSMENT & PLAN NOTE
-Following with electrophysiology.  She is on anticoagulation and compliant.  She is in normal sinus rhythm.

## 2022-02-17 NOTE — PROGRESS NOTES
[facility]   Progress Note       ASSESSMENT AND PLAN     Dysuria  -dysuria improved but now having different symptoms which I think best addressed by a gynecologist.     Occipital neuralgia of left side  -Improved with physical therapy.    Renal cyst  -Ordered renal ultrasound to follow-up.    Hypercholesterolemia  -Repeat lipid panel ordered.    Hypothyroidism  -Repeat TSH and T4 ordered    Paroxysmal atrial fibrillation (CMS/HCC)  -Following with electrophysiology.  She is on anticoagulation and compliant.  She is in normal sinus rhythm.    Encounter for screening colonoscopy  -Referral made to local gastroenterologist for screening colonoscopy.      Health care maintenance: Patient is due for colonoscopy as well as her Shingrix vaccine.    No follow-ups on file.  At next visit: Follow-up on colonoscopy and lab work.    Mahendra Begum D.O.  Office: [phone]  Pager: [phone] [id number]     Alecia Cruz is a 61 y.o. year-old female, primary patient of Dr. Begum with a past medical history of headaches, insomnia, atrial fibrillation, hypothyroidism, and neck/back pain who presents for routine follow-up of her chronic medical problems.    Interval History: She is starting physical therapy and she has had a dramatic improvement in her neck pain as well as her headaches.  She is also had a dramatic improvement in her facial pain.  She has been doing well with regard to her chronic medical issues.  She is compliant with her medications.    Since last time she saw me she also was treated for dysuria with Macrobid but subsequently developed burning of the urethra on the outside.  I had recommended that she see a GYN doctor for a pelvic examination as this could be a yeast infection or vaginal dryness.    She is due at this time to get some repeat blood work done for her chronic medical issues including her hypercholesterolemia and her hypothyroidism.  She also has a history of renal cyst that she  "was told that she needed to follow-up on and she would like to get an ultrasound done to confirm that the renal cyst are stable.  She is also due for colonoscopy.    PHYSICAL EXAMINATION     Visit Vitals  /80 (BP Location: Right upper arm, Patient Position: Sitting)   Pulse 91   Temp 37.3 °C (99.2 °F)   Resp 17   Ht 1.702 m (5' 7\")   Wt 73.5 kg (162 lb)   SpO2 98%   BMI 25.37 kg/m²       Physical Exam  Constitutional:       General: She is not in acute distress.     Appearance: She is well-developed. She is not ill-appearing, toxic-appearing or diaphoretic.   HENT:      Head: Normocephalic.      Mouth/Throat:      Mouth: Mucous membranes are moist.   Eyes:      Conjunctiva/sclera: Conjunctivae normal.      Pupils: Pupils are equal, round, and reactive to light.   Cardiovascular:      Rate and Rhythm: Normal rate and regular rhythm.      Heart sounds: Normal heart sounds. No murmur heard.    No friction rub. No gallop.   Pulmonary:      Effort: Pulmonary effort is normal.      Breath sounds: Normal breath sounds. No wheezing or rales.   Abdominal:      General: Bowel sounds are normal. There is no distension.      Palpations: Abdomen is soft. There is no mass.      Tenderness: There is no abdominal tenderness. There is no guarding.      Hernia: No hernia is present.   Musculoskeletal:      Comments: Chronic lymphedema of the left arm noted   Skin:     General: Skin is warm and dry.   Neurological:      General: No focal deficit present.      Mental Status: She is alert and oriented to person, place, and time. Mental status is at baseline.   Psychiatric:         Mood and Affect: Mood normal.         Behavior: Behavior normal.         Thought Content: Thought content normal.         Judgment: Judgment normal.           LABS / IMAGING / STUDIES        Labs Reviewed: No new labs to review    Studies/Imaging Reviewed: No new studies or imaging to review.      MEDICATIONS      Current Outpatient Medications "   Medication Instructions   • alpha lipoic acid, bulk, powder oral   • apixaban (ELIQUIS) 5 mg, oral, 2 times daily   • ascorbic acid (VITAMIN C) 500 mg, oral, Daily   • aspirin 81 mg, oral, Daily   • betamethasone dipropionate (DIPROSONE) 0.05 % lotion Once as needed   • cetirizine (ZYRTEC) 10 mg, oral, Daily   • cholecalciferol (vitamin D3) 1,000 Units, oral, Daily   • coenzyme Q10 (COQ10) 60 mg capsule oral   • cyclobenzaprine (FLEXERIL) 5 mg, oral, 3 times daily PRN   • diphenhydrAMINE (BENADRYL ALLERGY) 25 mg tablet Every 24 hours interval   • flecainide (TAMBOCOR) 50 mg, oral, 2 times daily (6a, 6p)   • fluticasone propionate (FLONASE) 50 mcg/actuation nasal spray 2 sprays, Each Nostril, Daily PRN   • levothyroxine (SYNTHROID) 88 mcg, oral, Daily (6:30a)   • metoprolol succinate XL (TOPROL-XL) 25 mg, oral, Nightly   • multivitamin (THERAGRAN) tablet 1 tablet, oral, Daily   • naproxen sodium 220 mg capsule oral, Daily PRN   • omega-3-dha-epa-dpa-fish oil 1,050 mg(300 mg -675 mg-75 mg) capsule No dose, route, or frequency recorded.   • vitamin E 100 unit capsule No dose, route, or frequency recorded.

## 2022-02-17 NOTE — ASSESSMENT & PLAN NOTE
-dysuria improved but now having different symptoms which I think best addressed by a gynecologist.

## 2022-02-17 NOTE — PROGRESS NOTES
PT PROGRESS NOTE FOR OUTPATIENT THERAPY    Patient: Alecia Cruz   MRN: [id number]  : 1960 61 y.o.  Referring Physician: Mahendra Begum DO  Date of Visit: 2022      Certification Dates: 22 through 22    Recommended Frequency & Duration:  2 times/week for up to 3 months          Diagnosis:   1. Occipital neuralgia of left side    2. Cervical radiculopathy    3. Back pain, unspecified back location, unspecified back pain laterality, unspecified chronicity    4. Right low back pain, unspecified chronicity, unspecified whether sciatica present        Chief Complaints:  Chief Complaint   Patient presents with   • Pain   • Dec ROM       Precautions:   Precautions comments: L UE and Breast Lymphedema, Hx of XRT  Existing Precautions/Restrictions: limb restrictions     TODAY'S VISIT:    Time In Session:  Start Time:   Stop Time: 183  Time Calculation (min): 60 min     22   Session Details   Document Type progress note   Mode of Treatment physical therapy   Patient/Family/Caregiver Comments/Observations Patient arrives with reports of mildly improving headaches and neck pain however she did experience a fall in [place] and the symptoms have returned. Has additional imaging to get scheduled for her mid/low back spot.   [facility]   General Information   Referring Physician Dr. Mahendra Begum DO   Pertinent History of Current Functional Problem Patient arrives with rx for occipital neuralgia from her PCP. She also notes that she has been having neck pain and headaches related to her neck pain.  She was going to be getting a injection in her cervical spine but instead it has been recommended that she try physical therapy instead.  It was recommended previously that she try physical therapy for her neck as her PCP thinks that she likely is getting tension headaches related to muscle spasms in her neck which are causing occipital neuralgia. Patient has also been  experiencing left sided facial numbness and it is unclear if related to parotid gland mass. Migraines are reported every night once she lays down to bed but alleviate in sitting position. She is scheduled to see a neurologist 2/4/2022 for cluster headaches and migraine.  In addition, patient has been experiencing low back and mid back pain/spasms worsening since a fall 2 years ago during which she injured her shoulder. After this incident she has been experiencing debilitating spasms across midback. Alecia takes tylenol PM and aleve that does not help with pain levels. Patient also reports worsening constipation with low back pain as well as right > left flank pain. Symptoms in her low back are worse generally on the right side and occasionally leads to shooting pains and LE buckling. Patient with history of LUE lymphedema and normally improved symptoms following pumping but over the last three months has noted a steady volume gain that never fully abolishes.   Existing Precautions/Restrictions limb restrictions   Precautions comments L UE and Breast Lymphedema, Hx of XRT   PT Time Calculation   Start Time 1730   Stop Time 1830   Time Calculation (min) 60 min      Pain/Vitals - 02/17/22 1732        Pain Assessment    Currently in pain Yes     Preferred Pain Scale number (Numeric Rating Pain Scale)     Pain Side/Orientation left     Pain: Body location Neck     Pain Rating (0-10): Pre Activity 0        Pain Intervention    Intervention  denies pain at present     Post Intervention Comments see assessment                PT - 02/17/22 7104        Physical Therapy    Physical Therapy Ortho        PT Plan    Frequency of treatment 2 times/week     PT Duration 3 months     PT Cert From 01/14/22     PT Cert To 04/14/22     Date PT POC was sent to provider 01/14/22     Signed PT Plan of Care received?  Yes                   OBJECTIVE MEASUREMENTS/DATA:    Outcome Measures     Special Tests - 02/17/22 1200        Outcome  measures tracking    Outcome measure used: PHILLIP: 56% NDI: 52%               ROM    Range of Motion - 02/17/22 1800        Lumbar AROM    Lumbar Flexion --   <25    Lumbar Extension --   0    Lumbar Left SB --   <25    Lumbar Right SB --   <25    Lumbar Left Rotation --   not tested 2/2 pain    Lumbar Right Rotation --   not tested 2/2 pain                Today's Treatment::    DOS     IE  1/14/22   30 Day  2/14/22   60 Day  3/14/22   90 Day  4/14/22   Precautions  LUE lymphedema s/p lumpectomy and radiation   Insurance Auth     Treatment   Current Session Time   Modalities  Total Time for Session 8-22 Minutes   Heat/Ice  CPT 06805 y MHP to the c/s in sitting  x 10 minutes        Manual   CPT 23545 Y/N Total Time for Session 23-37 Minutes   STM/MFR/TPR yes To bilateral paraspinals, R>L periscapular area    Agonist/antagonist with gaze   Instrument Assisted STM      Mobilizations  SOR, gentle distraction    ROM/Flexibility yes B UT/LS and pec stretch    Myofascial Decompression  Gentle R arm pull   Ther. Exercise  CPT 39689                   Group  Total Time for Session 8-22 Minutes     Y/N Sets Reps Load Comment             LTR      seated   Sciatic nn. glides  n     supine HEP   Hamstring stretch  n 3 30  HEP   Calf stretch  n 3 30  HEP   Pirformis stretch  n 3 30  HEP                     PPT         PPT hip abd         PPT hip aDd                  Cervical retraction  n 1 10  Mid rang -yes   scap retraction         Median, ulnar nn. glides  y 3 10       Thoracic rotation  n 1 10   Trunk stretch  y Small ROM seated with blue PB 2x5   GH ER      Neuro Re-Ed  CPT 52407   Group  Total Time for Session Not performed      Sets Reps Load Comment                                          Gait  CPT 57999 Group  Total Time for Session Not performed                     Ther. Activity  CPT 93782 Group  Total Time for Session Not performed               Group  CPT 85472  Total Time for Session Not performed           Goals  Addressed                 This Visit's Progress    • PT GOALS        pt stated goals:  no pain for standing to do ADLs ongoing  be able to put on shoes without pain ongoing  Be able to walk > 1 block ongoing      SHORT TERM GOALS:  Pt reports pain levels < 2-3 /10 to perform ADLs. ongoing  Pt will improve  Lumbar Spine  AROM to flex reach to knees, ext 5'  sb>8' bilat rot >25% bilat to improve functional mobility. In 3-4 weeks.ongoing  Pt will demonstrate increase  core TAC and BLE strength > 4 /5 to improve functional mobility. In 3-4 weeks.ongoing  Pt reports centralization of RLE symptoms to buttock. In 3-4 weeks.ongoing  Pt demonstrates  I with HEP as instructed. In 3-4 weeks.ongoing  Pt OSWESTRY  score < 60%. In 3-4 weeks.ongoing    LONG TERM GOALS:  Pt reports pain levels < 1-2 /10 to perform ADLs.  Pt will improve   Lumbar Spine  AROM to WFL to improve functional mobility. In 8 weeks.  Pt will demonstrate increase  core and BLE strength > 4 /5 to improve functional mobility. In 8 weeks.  Pt ambulates  I with normalized gait pattern.  In 8 weeks.  Pt performs stair ascend/ descend  with recip pattern I with handrail/AD. In  8  weeks.  Pt demonstrates  I with HEP as instructed. In 8 weeks.  Pt OSWESTRY score < 40%. In 8 weeks.  Pt reports resolution of R LE radicular sx.  In 8 weeks.   Pt tolerates > 15 min of CV activity. In 8 weeks.                Clinical Impression: Patient has attended 6 visits at outpatient physical therapy to address cervical and lumbar spine range of motion limitations as well as pain levels which  Have been significantly impacting functional mobility tolerance. Patient had been reporting improvements overall however experienced a fall on 2/5 while out of town and states that her symptoms have returned to baseline levels. Upon assessment of subjective measures, Neck and Low back Disability index have improved from initial evaluation from 54% to 52%, and 64% to 56% respectively. Cervical  range of motion has improved and lumbar range of motion was assessed this date. Alecia will continue to benefit from skilled PT services to address impairments.

## 2022-02-18 ENCOUNTER — HOSPITAL ENCOUNTER (OUTPATIENT)
Dept: RADIOLOGY | Facility: HOSPITAL | Age: 62
Discharge: HOME | End: 2022-02-18
Attending: STUDENT IN AN ORGANIZED HEALTH CARE EDUCATION/TRAINING PROGRAM
Payer: COMMERCIAL

## 2022-02-18 ENCOUNTER — HOSPITAL ENCOUNTER (OUTPATIENT)
Dept: PHYSICAL THERAPY | Facility: HOSPITAL | Age: 62
Setting detail: THERAPIES SERIES
Discharge: HOME | End: 2022-02-18
Attending: STUDENT IN AN ORGANIZED HEALTH CARE EDUCATION/TRAINING PROGRAM
Payer: COMMERCIAL
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
## 2022-02-18 DIAGNOSIS — I89.0 LYMPHEDEMA DUE TO AND NOT CONCURRENT WITH IONIZING RADIOTHERAPY: Primary | ICD-10-CM
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
## 2022-02-18 DIAGNOSIS — M54.2 CERVICAL PAIN: ICD-10-CM
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
## 2022-02-18 DIAGNOSIS — M25.522 ELBOW PAIN, LEFT: ICD-10-CM

## 2022-02-18 DIAGNOSIS — M54.6 THORACIC SPINE PAIN: ICD-10-CM

## 2022-02-18 DIAGNOSIS — M25.512 LEFT SHOULDER PAIN, UNSPECIFIED CHRONICITY: ICD-10-CM

## 2022-02-18 PROCEDURE — 73080 X-RAY EXAM OF ELBOW: CPT | Mod: LT

## 2022-02-18 PROCEDURE — 73030 X-RAY EXAM OF SHOULDER: CPT | Mod: LT

## 2022-02-18 PROCEDURE — 97140 MANUAL THERAPY 1/> REGIONS: CPT | Mod: GP

## 2022-02-18 PROCEDURE — 97110 THERAPEUTIC EXERCISES: CPT | Mod: GP

## 2022-02-18 PROCEDURE — 72040 X-RAY EXAM NECK SPINE 2-3 VW: CPT

## 2022-02-18 PROCEDURE — 72074 X-RAY EXAM THORAC SPINE4/>VW: CPT

## 2022-02-18 NOTE — OP PT TREATMENT LOG
Exercises Current Session Performed   Yes/No Time   NEURO RE-ED  CPT 95397     Not performed   Postural re-ed         Diaphragmatic Breathing         KTaping                             THER ACT  CPT 59360     Not performed    Anatomy and physiology of lymphatic system, POC and Goal Review Initial examination 1/18/22 1/25/22: B/L UE Girth measurements  LUE: 51,414.56 ml   RUE: 44,333.64 ml   Limb volume difference: 15.97%          HEP/Education Handouts:  What is Lymphedema  Healthy Tips  Self-MLD- 2/11/22 2/11/22: Pt instructed to be more consistent with wearing LUE compression garments in order to continue to reduce swelling and avoid refill. Pt verbalizes understanding     2/18/22: Same as above                        Yes     Postural education         LLIS   1/25/22: 38/68= 55.88% impairment       ADL Simulation          BUE AROM assessment   see treatment note       THER EX  CPT 56564     8-22 minutes    HEP  Progress remedial TE   NV      SUPINE THER-EX AAROM Flex/Abd/ER  Serratus Punches  TAC x3 sec hold   TAC + chin tuck x 3 sec hold  TAC + chin tuck + PA x 3 sec hold  TAC + chin tuck + Diag PA x 3 sec hold  SL ER  ER iso with flexion        Prone Y's, T's, I's, Rows     Push-pull  90/90 hold  TAC + alt UE ext  Dying bug   TAC + AB with rev fly  TAC + AB with OH flexion                       SEATED THER-EX Shoulder rolls  Scap squeezes  No monies  Pulleys                 STANDING THER-EX with  AAROM Ext  Wall slides  Shoulder Isos      Finger flexion and extension AROM x 15   Wrist flexion and extension AROM x 15   Elbow flexion and extension AROM x 15   Posterior shoulder rolls x 15               Yes  Yes  Yes  Yes                          STRETCHING B UT  B Levator  Pec stretch  Open books  Pball Roll out                 GAIT TRAINING  CPT 92430     Not performed             MANUAL  CPT 55478     38-52 minutes    Joint mobilization         DTM/MFR/TrP release         Strain-counterstrain          Scar/cord mobilization         SSCB 1. Lotion L UE  2. Tubigrip Size D  3. Finger bandages digits 1-5    2/11/22: New Tubigrip size D administered and donned this date. Pt did not bring finger bandages to session and reports she will daphne finger bandages when she gets home.     2/18/22: Finger bandages and Tubigrip size D donned this date of L UE.                     Yes     MLD Positioning: supine, elevated head, x3 pillows, bolster under knees     1. Full neck sequence  2. Diaphragmatic breathing (abdominal sequence superficial next visit.  3. Anterior and Posterior IAA  4. Full breast and L UE sequence Yes        Yes to all     MODALITIES     Not performed   Lymphatouch                             PRODUCTS   PROGRESS

## 2022-02-18 NOTE — PROGRESS NOTES
Ordered x-rays of the cervical, thoracic, left shoulder, and left elbow after her fall.  She fell a few days prior to her last visit but did not seem to be having very much pain during that visit.  Contacted by her physical therapist who felt like her pain is still pretty severe and her physical therapy progression has stalled.  Spoke to patient and I agree with physical therapy we can get x-rays to confirm no fractures.
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
## 2022-02-23 ENCOUNTER — TELEPHONE (OUTPATIENT)
Dept: PRIMARY CARE | Facility: CLINIC | Age: 62
End: 2022-02-23
Payer: COMMERCIAL

## 2022-02-23 NOTE — TELEPHONE ENCOUNTER
Pt wants to know if you want to send labs for blood work.  The other doctor did not submit requests for blood work.  Do you want her to have kidney analysis done since her sister has kidney cancer.  Pt had Xray done and would like to discuss results.  Please call back with advice

## 2022-02-24 ENCOUNTER — TELEPHONE (OUTPATIENT)
Dept: PRIMARY CARE | Facility: CLINIC | Age: 62
End: 2022-02-24
Payer: COMMERCIAL

## 2022-02-24 ENCOUNTER — HOSPITAL ENCOUNTER (OUTPATIENT)
Dept: PHYSICAL THERAPY | Facility: HOSPITAL | Age: 62
Setting detail: THERAPIES SERIES
Discharge: HOME | End: 2022-02-24
Attending: STUDENT IN AN ORGANIZED HEALTH CARE EDUCATION/TRAINING PROGRAM
Payer: COMMERCIAL

## 2022-02-24 ENCOUNTER — TRANSCRIBE ORDERS (OUTPATIENT)
Dept: SCHEDULING | Age: 62
End: 2022-02-24

## 2022-02-24 ENCOUNTER — APPOINTMENT (OUTPATIENT)
Dept: LAB | Facility: HOSPITAL | Age: 62
End: 2022-02-24
Attending: STUDENT IN AN ORGANIZED HEALTH CARE EDUCATION/TRAINING PROGRAM
Payer: COMMERCIAL

## 2022-02-24 DIAGNOSIS — E78.00 PURE HYPERCHOLESTEROLEMIA, UNSPECIFIED: ICD-10-CM

## 2022-02-24 DIAGNOSIS — I89.0 LYMPHEDEMA DUE TO AND NOT CONCURRENT WITH IONIZING RADIOTHERAPY: Primary | ICD-10-CM

## 2022-02-24 DIAGNOSIS — K11.8 OTHER DISEASES OF SALIVARY GLANDS: Primary | ICD-10-CM

## 2022-02-24 DIAGNOSIS — M54.2 CERVICAL PAIN: ICD-10-CM

## 2022-02-24 DIAGNOSIS — E03.9 HYPOTHYROIDISM, UNSPECIFIED: ICD-10-CM

## 2022-02-24 DIAGNOSIS — R30.0 DYSURIA: ICD-10-CM

## 2022-02-24 DIAGNOSIS — M25.522 ELBOW PAIN, LEFT: ICD-10-CM
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
## 2022-02-24 DIAGNOSIS — M25.512 LEFT SHOULDER PAIN, UNSPECIFIED CHRONICITY: Primary | ICD-10-CM

## 2022-02-24 DIAGNOSIS — M54.6 THORACIC SPINE PAIN: ICD-10-CM

## 2022-02-24 DIAGNOSIS — E78.00 HYPERCHOLESTEROLEMIA: ICD-10-CM

## 2022-02-24 DIAGNOSIS — E03.9 HYPOTHYROIDISM, UNSPECIFIED TYPE: Primary | ICD-10-CM

## 2022-02-24 PROCEDURE — 97140 MANUAL THERAPY 1/> REGIONS: CPT | Mod: GP

## 2022-02-24 PROCEDURE — 97530 THERAPEUTIC ACTIVITIES: CPT | Mod: GP

## 2022-02-24 PROCEDURE — 30099997 SPECIMEN PROCESSING
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
## 2022-02-24 NOTE — TELEPHONE ENCOUNTER
Spoke with Zeenat from patient access [facility] I will reenter patient's x-rays with appropriate coding and fax them to [phone] as requested

## 2022-02-24 NOTE — TELEPHONE ENCOUNTER
Dr. Begum.  Pt called stating she spoke with you and wanted to remind you to submit orders for blood work and to include urinalysis.

## 2022-02-24 NOTE — TELEPHONE ENCOUNTER
Orders created for patient. Spoke to her last evening by phone. Her sister is going through a lot right now and was diagnosed apparently with a lesion on her kidney and what may be a brain aneurysm. We are ordering surveillance labs as her primary endocrinologist hadn't orered prior and she hasn't have lab work since 8/19/21.

## 2022-02-24 NOTE — OP PT TREATMENT LOG
Exercises Current Session Performed   Yes/No Time   NEURO RE-ED  CPT 30269     Not performed   Postural re-ed         Diaphragmatic Breathing         KTaping                             THER ACT  CPT 41707     23-37 minutes    Anatomy and physiology of lymphatic system, POC and Goal Review Initial examination 1/18/22 1/25/22: B/L UE Girth measurements  LUE: 51,414.56 ml   RUE: 44,333.64 ml   Limb volume difference: 15.97%     2/24/22:   LUE: 49,639.16 ml   RUE: 44,333.64 ml   Limb volume difference: 11.97%               Yes     HEP/Education Handouts:  What is Lymphedema  Healthy Tips  Self-MLD- 2/11/22 2/11/22: Pt instructed to be more consistent with wearing LUE compression garments in order to continue to reduce swelling and avoid refill. Pt verbalizes understanding     2/18/22: Same as above     2/24/22: Results of progress note discussed. Pt encouraged to continue to be consistent with compression program in order to continue to see results in swelling reduction.                            Yes     Postural education         LLIS   1/25/22: 38/68= 55.88% impairment   3/4/22: TBD       Progress note   Reviewed subjective hx and goals, BUE girth measurements, palpation/skin check   Yes       BUE AROM assessment   see treatment note      THER EX  CPT 19418     Not performed    HEP  Progress remedial TE   NV      SUPINE THER-EX AAROM Flex/Abd/ER  Serratus Punches  TAC x3 sec hold   TAC + chin tuck x 3 sec hold  TAC + chin tuck + PA x 3 sec hold  TAC + chin tuck + Diag PA x 3 sec hold  SL ER  ER iso with flexion        Prone Y's, T's, I's, Rows     Push-pull  90/90 hold  TAC + alt UE ext  Dying bug   TAC + AB with rev fly  TAC + AB with OH flexion                       SEATED THER-EX Shoulder rolls  Scap squeezes  No monies  Pulleys                 STANDING THER-EX with  AAROM Ext  Wall slides  Shoulder Isos      Finger flexion and extension AROM x 15   Wrist flexion and extension AROM x 15   Elbow flexion and  extension AROM x 15   Posterior shoulder rolls x 15                                        STRETCHING B UT  B Levator  Pec stretch  Open books  Pball Roll out                 GAIT TRAINING  CPT 84953     Not performed             MANUAL  CPT 38099     23-37 minutes    Joint mobilization         DTM/MFR/TrP release         Strain-counterstrain         Scar/cord mobilization         SSCB 1. Lotion L UE  2. Tubigrip Size D  3. Finger bandages digits 1-5    2/11/22: New Tubigrip size D administered and donned this date. Pt did not bring finger bandages to session and reports she will daphne finger bandages when she gets home.     2/18/22: Finger bandages and Tubigrip size D donned this date of L UE.   Yes 1-3                      MLD Positioning: supine, elevated head, x3 pillows, bolster under knees     1. Full neck sequence  2. Diaphragmatic breathing (abdominal sequence superficial next visit.  3. Anterior and Posterior IAA  4. Full breast and L UE sequence Yes       Modified this date 1,4      MODALITIES     Not performed   Lymphatouch                             PRODUCTS   PROGRESS

## 2022-02-25 LAB
ALBUMIN SERPL-MCNC: 4.2 G/DL (ref 3.6–5.1)
ALBUMIN/GLOB SERPL: 1.4 (CALC) (ref 1–2.5)
ALP SERPL-CCNC: 59 U/L (ref 37–153)
ALT SERPL-CCNC: 13 U/L (ref 6–29)
APPEARANCE UR: CLEAR
AST SERPL-CCNC: 14 U/L (ref 10–35)
BACTERIA #/AREA URNS HPF: ABNORMAL /HPF
BILIRUB SERPL-MCNC: 0.6 MG/DL (ref 0.2–1.2)
BILIRUB UR QL STRIP: NEGATIVE
BUN SERPL-MCNC: 10 MG/DL (ref 7–25)
BUN/CREAT SERPL: NORMAL (CALC) (ref 6–22)
CALCIUM SERPL-MCNC: 9.4 MG/DL (ref 8.6–10.4)
CHLORIDE SERPL-SCNC: 105 MMOL/L (ref 98–110)
CHOLEST SERPL-MCNC: 208 MG/DL
CHOLEST/HDLC SERPL: 3.9 (CALC)
CO2 SERPL-SCNC: 27 MMOL/L (ref 20–32)
COLOR UR: YELLOW
CREAT SERPL-MCNC: 0.77 MG/DL (ref 0.5–0.99)
GLOBULIN SER CALC-MCNC: 3 G/DL (CALC) (ref 1.9–3.7)
GLUCOSE SERPL-MCNC: 82 MG/DL (ref 65–99)
GLUCOSE UR QL STRIP: NEGATIVE
HDLC SERPL-MCNC: 53 MG/DL
HGB UR QL STRIP: NEGATIVE
HYALINE CASTS #/AREA URNS LPF: ABNORMAL /LPF
KETONES UR QL STRIP: NEGATIVE
LDLC SERPL CALC-MCNC: 133 MG/DL (CALC)
LEUKOCYTE ESTERASE UR QL STRIP: NEGATIVE
NITRITE UR QL STRIP: NEGATIVE
NONHDLC SERPL-MCNC: 155 MG/DL (CALC)
PH UR STRIP: 6.5 [PH] (ref 5–8)
POTASSIUM SERPL-SCNC: 4.2 MMOL/L (ref 3.5–5.3)
PROT SERPL-MCNC: 7.2 G/DL (ref 6.1–8.1)
PROT UR QL STRIP: NEGATIVE
QUEST EGFR AFRICAN AMERICAN: 95 ML/MIN/1.73M2
QUEST EGFR NON-AFR. AMERICAN: 82 ML/MIN/1.73M2
RBC #/AREA URNS HPF: ABNORMAL /HPF
SODIUM SERPL-SCNC: 141 MMOL/L (ref 135–146)
SP GR UR STRIP: 1.02 (ref 1–1.03)
SQUAMOUS #/AREA URNS HPF: ABNORMAL /HPF
TRIGL SERPL-MCNC: 117 MG/DL
TSH SERPL-ACNC: 3.13 MIU/L (ref 0.4–4.5)
WBC #/AREA URNS HPF: ABNORMAL /HPF

## 2022-03-01 ENCOUNTER — HOSPITAL ENCOUNTER (OUTPATIENT)
Dept: PHYSICAL THERAPY | Facility: HOSPITAL | Age: 62
Setting detail: THERAPIES SERIES
Discharge: HOME | End: 2022-03-01
Attending: STUDENT IN AN ORGANIZED HEALTH CARE EDUCATION/TRAINING PROGRAM
Payer: COMMERCIAL

## 2022-03-01 DIAGNOSIS — M54.12 CERVICAL RADICULOPATHY: ICD-10-CM

## 2022-03-01 DIAGNOSIS — M54.50 RIGHT LOW BACK PAIN, UNSPECIFIED CHRONICITY, UNSPECIFIED WHETHER SCIATICA PRESENT: ICD-10-CM

## 2022-03-01 DIAGNOSIS — M54.9 BACK PAIN, UNSPECIFIED BACK LOCATION, UNSPECIFIED BACK PAIN LATERALITY, UNSPECIFIED CHRONICITY: ICD-10-CM

## 2022-03-01 DIAGNOSIS — R29.898 WEAKNESS OF BOTH LOWER LIMBS: ICD-10-CM

## 2022-03-01 DIAGNOSIS — M54.81 OCCIPITAL NEURALGIA OF LEFT SIDE: Primary | ICD-10-CM

## 2022-03-01 DIAGNOSIS — R20.0 NUMBNESS: ICD-10-CM

## 2022-03-01 PROCEDURE — 97010 HOT OR COLD PACKS THERAPY: CPT | Mod: GP

## 2022-03-01 PROCEDURE — 97110 THERAPEUTIC EXERCISES: CPT | Mod: GP

## 2022-03-01 PROCEDURE — 97140 MANUAL THERAPY 1/> REGIONS: CPT | Mod: GP

## 2022-03-01 NOTE — OP PT TREATMENT LOG
DOS     IE  1/14/22   30 Day  2/14/22   60 Day  3/14/22   90 Day  4/14/22   Precautions  LUE lymphedema s/p lumpectomy and radiation   Insurance Auth     Treatment   Current Session Time   Modalities  Total Time for Session 8-22 Minutes   Heat/Ice  CPT 04878 y MHP to the c/s in sitting  x 8 minutes        Manual   CPT 11752 Y/N Total Time for Session 8-22 Minutes   STM/MFR/TPR yes To bilateral paraspinals, R>L periscapular area    Agonist/antagonist with gaze   Instrument Assisted STM      Mobilizations yes SOR, gentle distraction    ROM/Flexibility yes B UT/LS and pec stretch    Myofascial Decompression  Gentle R arm pull   Ther. Exercise  CPT 29684                   Group  Total Time for Session 23-37 Minutes     Y/N Sets Reps Load Comment             LTR      seated   Sciatic nn. glides  n     supine HEP   Hamstring stretch  n 3 30  HEP   Calf stretch  n 3 30  HEP   Pirformis stretch  n 3 30  HEP                     PPT         PPT hip abd         PPT hip aDd                  Cervical retraction  y 2  10  Mid rang -yes   scap retraction  y 2 10   3 sec hold- mid rang    Median, ulnar nn. glides  y 2 10       Thoracic rotation  n 1 10   Trunk stretch  n Small ROM seated with blue PB 2x5   shld ext  y  1 x 10 with YTB    No monies   y  1 x 10 with YTB    Neuro Re-Ed  CPT 45979   Group  Total Time for Session Not performed      Sets Reps Load Comment                                          Gait  CPT 17334 Group  Total Time for Session Not performed                     Ther. Activity  CPT 78750 Group  Total Time for Session 0-7 Minutes   HEP   y 3/1/22: see below treatment log for progressions. H/o and YTB administered          Group  CPT 23837  Total Time for Session Not performed     3/1/22:   Shld ext 2 x 10, hold for 3 sec with YTB  No monies 2 x 10, hold for 3 sec with YTB

## 2022-03-03 NOTE — PROGRESS NOTES
PT DAILY NOTE FOR OUTPATIENT THERAPY    Patient: Alecia Cruz   MRN: [id number]  : 1960 61 y.o.  Referring Physician: Mahendra Begum DO  Date of Visit: 3/1/2022    Certification Dates: 22 through 22    Diagnosis:   1. Occipital neuralgia of left side    2. Cervical radiculopathy    3. Back pain, unspecified back location, unspecified back pain laterality, unspecified chronicity    4. Right low back pain, unspecified chronicity, unspecified whether sciatica present    5. Weakness of both lower limbs    6. Numbness        Chief Complaints:       Precautions:   Precautions comments: L UE and Breast Lymphedema, Hx of XRT  Existing Precautions/Restrictions: limb restrictions     TODAY'S VISIT    Time In Session:  Start Time: 937  Stop Time: 1030  Time Calculation (min): 53 min   History/Vitals/Pain/Encounter Info - 22 0939        Injury History/Precautions/Daily Required Info    Document Type daily treatment     Primary Therapist Andra Arnold, PT     Referring Physician Dr. Mahendra Begum,      Existing Precautions/Restrictions limb restrictions     Precautions comments L UE and Breast Lymphedema, Hx of XRT     History of present illness/functional impairment Patient arrives with rx for occipital neuralgia from her PCP. She also notes that she has been having neck pain and headaches related to her neck pain.  She was going to be getting a injection in her cervical spine but instead it has been recommended that she try physical therapy instead.  It was recommended previously that she try physical therapy for her neck as her PCP thinks that she likely is getting tension headaches related to muscle spasms in her neck which are causing occipital neuralgia. Patient has also been experiencing left sided facial numbness and it is unclear if related to parotid gland mass. Migraines are reported every night once she lays down to bed but alleviate in sitting position. She is scheduled to see a  neurologist 2/4/2022 for cluster headaches and migraine.  In addition, patient has been experiencing low back and mid back pain/spasms worsening since a fall 2 years ago during which she injured her shoulder. After this incident she has been experiencing debilitating spasms across midback. Alecia takes tylenol PM and aleve that does not help with pain levels. Patient also reports worsening constipation with low back pain as well as right > left flank pain. Symptoms in her low back are worse generally on the right side and occasionally leads to shooting pains and LE buckling. Patient with history of LUE lymphedema and normally improved symptoms following pumping but over the last three months has noted a steady volume gain that never fully abolishes.     OP Specialty Orthopedics     Patient/Family/Caregiver Comments/Observations Pt reports improvements in cervical AROM since SOC.  Pt denies N/T into L UE this date; however, notes radicular pain in L shoulder. Pt presents to session with L UE compression wrap donned for lymphedema management     Patient reported fall since last visit No     Start Time 0937     Stop Time 1030     Time Calculation (min) 53 min        Pain Assessment    Currently in pain Yes     Preferred Pain Scale number (Numeric Rating Pain Scale)     Pain Side/Orientation bilateral     Pain: Body location Neck     Pain Rating (0-10): Pre Activity 0     Pain Rating (0-10): Activity 8     Pain Rating (0-10): Post Activity 2        Pain Intervention    Intervention  MT, TE, MHP     Post Intervention Comments see assessment                Daily Treatment Assessment and Plan - 03/02/22 1936        Daily Treatment Assessment and Plan    Progress toward goals Progressing     Daily Outcome Summary Pt returns to orthopedic PT for first session after x-rays performed and no fracture of L UE or spine noted. Pt presents to today's session wearing L UE compression wrap for lymphedema management. Initiated session  with MHP and f/u with periscapular STM and TrP release of B/L UT/LS. Pt demonstrates increased TrP along L UT > R. Continued with gentle cervical PROM and manual stretch of UT/LS. In addition, SOR performed this date and pt notes increased head tension and pain with release. Continued with TE and pt was able to progress postural strengthening exercises this date. Pt required minimal VC t/o exercises to reduce ROM in order to perform exercise in pain free range. Pt able to perform shld ext and no monies with YTB without exacerbation of sx. HEP progressed and h/o given.     Plan and Recommendations Continue to progress manual therapy and TE per pt tolerance. F/u with HEP                     OBJECTIVE DATA TAKEN TODAY:    None taken    Today's Treatment:    DOS     IE  1/14/22   30 Day  2/14/22   60 Day  3/14/22   90 Day  4/14/22   Precautions  LUE lymphedema s/p lumpectomy and radiation   Insurance Auth     Treatment   Current Session Time   Modalities  Total Time for Session 8-22 Minutes   Heat/Ice  CPT 40174 y MHP to the c/s in sitting  x 8 minutes        Manual   CPT 09776 Y/N Total Time for Session 8-22 Minutes   STM/MFR/TPR yes To bilateral paraspinals, R>L periscapular area    Agonist/antagonist with gaze   Instrument Assisted STM      Mobilizations yes SOR, gentle distraction    ROM/Flexibility yes B UT/LS and pec stretch    Myofascial Decompression  Gentle R arm pull   Ther. Exercise  CPT 34019                   Group  Total Time for Session 23-37 Minutes     Y/N Sets Reps Load Comment             LTR      seated   Sciatic nn. glides  n     supine HEP   Hamstring stretch  n 3 30  HEP   Calf stretch  n 3 30  HEP   Pirformis stretch  n 3 30  HEP                     PPT         PPT hip abd         PPT hip aDd                  Cervical retraction  y 2  10  Mid rang -yes   scap retraction  y 2 10   3 sec hold- mid rang    Median, ulnar nn. glides  y 2 10       Thoracic rotation  n 1 10   Trunk stretch  n Small ROM  seated with blue PB 2x5   shld ext  y  1 x 10 with YTB    No monies   y  1 x 10 with YTB    Neuro Re-Ed  CPT 41946   Group  Total Time for Session Not performed      Sets Reps Load Comment                                          Gait  CPT 04006 Group  Total Time for Session Not performed                     Ther. Activity  CPT 30119 Group  Total Time for Session 0-7 Minutes   HEP   y 3/1/22: see below treatment log for progressions. H/o and YTB administered          Group  CPT 68204  Total Time for Session Not performed     3/1/22:   Shld ext 2 x 10, hold for 3 sec with YTB  No monies 2 x 10, hold for 3 sec with YTB

## 2022-03-04 ENCOUNTER — HOSPITAL ENCOUNTER (OUTPATIENT)
Dept: PHYSICAL THERAPY | Facility: HOSPITAL | Age: 62
Setting detail: THERAPIES SERIES
Discharge: HOME | End: 2022-03-04
Attending: STUDENT IN AN ORGANIZED HEALTH CARE EDUCATION/TRAINING PROGRAM
Payer: COMMERCIAL

## 2022-03-04 ENCOUNTER — HOSPITAL ENCOUNTER (OUTPATIENT)
Dept: RADIOLOGY | Age: 62
Discharge: HOME | End: 2022-03-04
Attending: OTOLARYNGOLOGY
Payer: COMMERCIAL
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
## 2022-03-04 DIAGNOSIS — I89.0 LYMPHEDEMA DUE TO AND NOT CONCURRENT WITH IONIZING RADIOTHERAPY: Primary | ICD-10-CM

## 2022-03-04 DIAGNOSIS — K11.8 OTHER DISEASES OF SALIVARY GLANDS: ICD-10-CM

## 2022-03-04 PROCEDURE — 97530 THERAPEUTIC ACTIVITIES: CPT | Mod: GP

## 2022-03-04 PROCEDURE — 97140 MANUAL THERAPY 1/> REGIONS: CPT | Mod: GP

## 2022-03-04 RX ORDER — GADOBUTROL 604.72 MG/ML
7.3 INJECTION INTRAVENOUS ONCE
Status: COMPLETED | OUTPATIENT
Start: 2022-03-04 | End: 2022-03-04

## 2022-03-04 RX ADMIN — GADOBUTROL 7.3 ML: 604.72 INJECTION INTRAVENOUS at 12:59

## 2022-03-04 NOTE — OP PT TREATMENT LOG
Exercises Current Session Performed   Yes/No Time   NEURO RE-ED  CPT 16301     Not performed   Postural re-ed         Diaphragmatic Breathing         KTaping                             THER ACT  CPT 85272     8-22 minutes    Anatomy and physiology of lymphatic system, POC and Goal Review Initial examination 1/18/22 1/25/22: B/L UE Girth measurements  LUE: 51,414.56 ml   RUE: 44,333.64 ml   Limb volume difference: 15.97%     2/24/22:   LUE: 49,639.16 ml   RUE: 44,333.64 ml   Limb volume difference: 11.97%                      HEP/Education Handouts:  What is Lymphedema  Healthy Tips  Self-MLD- 2/11/22 2/11/22: Pt instructed to be more consistent with wearing LUE compression garments in order to continue to reduce swelling and avoid refill. Pt verbalizes understanding     2/18/22: Same as above     2/24/22: Results of progress note discussed. Pt encouraged to continue to be consistent with compression program in order to continue to see results in swelling reduction.     3/4/22: Results of L AROM assessment and LLIS discussed with pt. Pt encouraged to continue current compression program and HEP                                      Yes     Postural education         LLIS   1/25/22: 38/68= 55.88% impairment   3/4/22: 28/68= 36.76% impairment    Yes      Progress note   Reviewed subjective hx and goals, BUE girth measurements, palpation/skin check        BUE AROM assessment   see treatment note   3/4/22: L UE AROM measurements   Yes     THER EX  CPT 84187     Not performed    HEP  Progress remedial TE   NV      SUPINE THER-EX AAROM Flex/Abd/ER  Serratus Punches  TAC x3 sec hold   TAC + chin tuck x 3 sec hold  TAC + chin tuck + PA x 3 sec hold  TAC + chin tuck + Diag PA x 3 sec hold  SL ER  ER iso with flexion        Prone Y's, T's, I's, Rows     Push-pull  90/90 hold  TAC + alt UE ext  Dying bug   TAC + AB with rev fly  TAC + AB with OH flexion                       SEATED THER-EX Shoulder rolls  Scap squeezes  No  velevt [place]                 STANDING THER-EX with  AAROM Ext  Wall slides  Shoulder Isos      Finger flexion and extension AROM x 15   Wrist flexion and extension AROM x 15   Elbow flexion and extension AROM x 15   Posterior shoulder rolls x 15                                        STRETCHING B UT  B Levator  Pec stretch  Open books  Pball Roll out                 GAIT TRAINING  CPT 38542     Not performed             MANUAL  CPT 25118     23-37 minutes    Joint mobilization         DTM/MFR/TrP release         Strain-counterstrain         Scar/cord mobilization         SSCB 1. Lotion L UE  2. Tubigrip Size D  3. Finger bandages digits 1-5    2/11/22: New Tubigrip size D administered and donned this date. Pt did not bring finger bandages to session and reports she will daphne finger bandages when she gets home.     2/18/22: Finger bandages and Tubigrip size D donned this date of L UE.      3/4/22: L glove and compression arm sleeve donned and L UE compression wrap with hand piece donned on top of sleeve and glove.                          Yes      MLD Positioning: supine, elevated head, x3 pillows, bolster under knees     1. Full neck sequence  2. Diaphragmatic breathing (abdominal sequence superficial next visit.  3. Anterior and Posterior IAA  4. Full breast and L UE sequence Yes       Modified this date due to time constraints 1-4.      MODALITIES     Not performed   Lymphatouch                             PRODUCTS   PROGRESS
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
## 2022-03-08 ENCOUNTER — HOSPITAL ENCOUNTER (OUTPATIENT)
Dept: RADIOLOGY | Facility: HOSPITAL | Age: 62
Discharge: HOME | End: 2022-03-08
Attending: STUDENT IN AN ORGANIZED HEALTH CARE EDUCATION/TRAINING PROGRAM
Payer: COMMERCIAL

## 2022-03-08 DIAGNOSIS — N28.1 RENAL CYST: ICD-10-CM

## 2022-03-08 PROCEDURE — 76775 US EXAM ABDO BACK WALL LIM: CPT

## 2022-03-10 ENCOUNTER — HOSPITAL ENCOUNTER (OUTPATIENT)
Dept: PHYSICAL THERAPY | Facility: HOSPITAL | Age: 62
Setting detail: THERAPIES SERIES
Discharge: HOME | End: 2022-03-10
Attending: STUDENT IN AN ORGANIZED HEALTH CARE EDUCATION/TRAINING PROGRAM
Payer: COMMERCIAL

## 2022-03-10 DIAGNOSIS — M54.12 CERVICAL RADICULOPATHY: ICD-10-CM

## 2022-03-10 DIAGNOSIS — M54.81 OCCIPITAL NEURALGIA OF LEFT SIDE: Primary | ICD-10-CM

## 2022-03-10 DIAGNOSIS — M54.50 RIGHT LOW BACK PAIN, UNSPECIFIED CHRONICITY, UNSPECIFIED WHETHER SCIATICA PRESENT: ICD-10-CM

## 2022-03-10 PROCEDURE — 97110 THERAPEUTIC EXERCISES: CPT | Mod: GP

## 2022-03-10 PROCEDURE — 97010 HOT OR COLD PACKS THERAPY: CPT | Mod: GP

## 2022-03-10 PROCEDURE — 97140 MANUAL THERAPY 1/> REGIONS: CPT | Mod: GP

## 2022-03-10 NOTE — PROGRESS NOTES
PT DAILY NOTE FOR OUTPATIENT THERAPY    Patient: Alecia Cruz   MRN: [id number]  : 1960 61 y.o.  Referring Physician: Mahendra Begum DO  Date of Visit: 3/10/2022    Certification Dates: 22 through 22    Diagnosis:   1. Occipital neuralgia of left side    2. Cervical radiculopathy    3. Right low back pain, unspecified chronicity, unspecified whether sciatica present        Chief Complaints:  L UE and breast lymphedema    Precautions:   Precautions comments: L UE and Breast Lymphedema, Hx of XRT  Existing Precautions/Restrictions: limb restrictions     TODAY'S VISIT    Time In Session:  Start Time: 1330  Stop Time: 1430  Time Calculation (min): 60 min   History/Vitals/Pain/Encounter Info - 03/10/22 1331        Injury History/Precautions/Daily Required Info    Document Type daily treatment     Primary Therapist Andra Arnold, PT     Chief Complaint/Reason for Visit  L UE and breast lymphedema     Referring Physician Dr. Mahendra Begum,      Existing Precautions/Restrictions limb restrictions     Precautions comments L UE and Breast Lymphedema, Hx of XRT     History of present illness/functional impairment Patient arrives with rx for occipital neuralgia from her PCP. She also notes that she has been having neck pain and headaches related to her neck pain.  She was going to be getting a injection in her cervical spine but instead it has been recommended that she try physical therapy instead.  It was recommended previously that she try physical therapy for her neck as her PCP thinks that she likely is getting tension headaches related to muscle spasms in her neck which are causing occipital neuralgia. Patient has also been experiencing left sided facial numbness and it is unclear if related to parotid gland mass. Migraines are reported every night once she lays down to bed but alleviate in sitting position. She is scheduled to see a neurologist 2022 for cluster headaches and migraine.  In  addition, patient has been experiencing low back and mid back pain/spasms worsening since a fall 2 years ago during which she injured her shoulder. After this incident she has been experiencing debilitating spasms across midback. Alecia takes tylenol PM and aleve that does not help with pain levels. Patient also reports worsening constipation with low back pain as well as right > left flank pain. Symptoms in her low back are worse generally on the right side and occasionally leads to shooting pains and LE buckling. Patient with history of LUE lymphedema and normally improved symptoms following pumping but over the last three months has noted a steady volume gain that never fully abolishes.     [facility]     Patient/Family/Caregiver Comments/Observations Patient arrives with reports of elevated BP lately as well as GI distress that her medical team hasn't quite figured out.     Patient reported fall since last visit No     Start Time 1330     Stop Time 1430     Time Calculation (min) 60 min        Pain Assessment    Currently in pain Yes     Pain: Body location Back     Pain Rating (0-10): Pre Activity 0     Pain Rating (0-10): Activity 6     Pain Rating (0-10): Post Activity 2        Pain Intervention    Intervention  MHP to low back pre-session     Post Intervention Comments denies pain                Daily Treatment Assessment and Plan - 03/10/22 1331        Daily Treatment Assessment and Plan    Progress toward goals Progressing     Daily Outcome Summary Patient demonstrates improvements in cervical and lumbar range of motion with gentle stabilization techniques and focus on short range isometrics with good outcome. Patient continues to experience spasm with sudden change of position requiring verbal cues throughout. Cervical range of motion observed following manuals. Issued HEP to include TAC and low ROM bridges for which patient acknowledges understanding.     Plan and Recommendations Progress  lumbar and cervical stabilization as tolerated.                     OBJECTIVE DATA TAKEN TODAY:    Living Environment        Today's Treatment:    DOS     IE  1/14/22   30 Day  2/14/22   60 Day  3/14/22   90 Day  4/14/22   Precautions  LUE lymphedema s/p lumpectomy and radiation   Insurance Auth     Treatment   Current Session Time   Modalities  Total Time for Session 8-22 Minutes   Heat/Ice  CPT 30281 y MHP to the c/s in sitting  x 8 minutes        Manual   CPT 77628 Y/N Total Time for Session 8-22 Minutes   STM/MFR/TPR yes To bilateral paraspinals, R>L periscapular area    Agonist/antagonist with gaze   Instrument Assisted STM      Mobilizations yes SOR, gentle distraction    ROM/Flexibility yes B UT/LS and pec stretch    Myofascial Decompression  Gentle R arm pull   Ther. Exercise  CPT 26642                   Group  Total Time for Session 23-37 Minutes     Y/N Sets Reps Load Comment             LTR      seated   Sciatic nn. glides  yes     supine HEP   Hamstring stretch  yes 3 30  HEP   Calf stretch  n 3 30  HEP   Pirformis stretch  n 3 30  HEP                     TAC  Yes 1 10 3 sec    TAC hip abd  yes 1 10 RTB    TAC bridge  yes 2 5              Upper cervical nod  yes 3s 10     Cervical retraction  yes 2  10  Mid rang -yes   scap retraction  yes 2 10   3 sec hold- mid rang    Median, ulnar nn. glides  yes 2 10       Thoracic rotation  n 1 10   Trunk stretch  n Small ROM seated with blue PB 2x5   shld ext  y  1 x 10 with YTB    No monies   y  1 x 10 with YTB    Neuro Re-Ed  CPT 15200   Group  Total Time for Session Not performed      Sets Reps Load Comment                                          Gait  CPT 00783 Group  Total Time for Session Not performed                     Ther. Activity  CPT 85742 Group  Total Time for Session 0-7 Minutes   HEP   y 3/1/22: see below treatment log for progressions. H/o and YTB administered          Group  CPT 99656  Total Time for Session Not performed     3/1/22:   Shld ext  2 x 10, hold for 3 sec with YTB  No monies 2 x 10, hold for 3 sec with YTB

## 2022-03-10 NOTE — OP PT TREATMENT LOG
DOS     IE  1/14/22   30 Day  2/14/22   60 Day  3/14/22   90 Day  4/14/22   Precautions  LUE lymphedema s/p lumpectomy and radiation   Insurance Auth     Treatment   Current Session Time   Modalities  Total Time for Session 8-22 Minutes   Heat/Ice  CPT 78839 y MHP to the c/s in sitting  x 8 minutes        Manual   CPT 42728 Y/N Total Time for Session 8-22 Minutes   STM/MFR/TPR yes To bilateral paraspinals, R>L periscapular area    Agonist/antagonist with gaze   Instrument Assisted STM      Mobilizations yes SOR, gentle distraction    ROM/Flexibility yes B UT/LS and pec stretch    Myofascial Decompression  Gentle R arm pull   Ther. Exercise  CPT 60353                   Group  Total Time for Session 23-37 Minutes     Y/N Sets Reps Load Comment             LTR      seated   Sciatic nn. glides  yes     supine HEP   Hamstring stretch  yes 3 30  HEP   Calf stretch  n 3 30  HEP   Pirformis stretch  n 3 30  HEP                     TAC  Yes 1 10 3 sec    TAC hip abd  yes 1 10 RTB    TAC bridge  yes 2 5              Upper cervical nod  yes 3s 10     Cervical retraction  yes 2  10  Mid rang -yes   scap retraction  yes 2 10   3 sec hold- mid rang    Median, ulnar nn. glides  yes 2 10       Thoracic rotation  n 1 10   Trunk stretch  n Small ROM seated with blue PB 2x5   shld ext  y  1 x 10 with YTB    No monies   y  1 x 10 with YTB    Neuro Re-Ed  CPT 55364   Group  Total Time for Session Not performed      Sets Reps Load Comment                                          Gait  CPT 84823 Group  Total Time for Session Not performed                     Ther. Activity  CPT 05396 Group  Total Time for Session 0-7 Minutes   HEP   y 3/1/22: see below treatment log for progressions. H/o and YTB administered          Group  CPT 77639  Total Time for Session Not performed     3/1/22:   Shld ext 2 x 10, hold for 3 sec with YTB  No monies 2 x 10, hold for 3 sec with YTB

## 2022-03-11 ENCOUNTER — HOSPITAL ENCOUNTER (OUTPATIENT)
Dept: PHYSICAL THERAPY | Facility: HOSPITAL | Age: 62
Setting detail: THERAPIES SERIES
Discharge: HOME | End: 2022-03-11
Attending: STUDENT IN AN ORGANIZED HEALTH CARE EDUCATION/TRAINING PROGRAM
Payer: COMMERCIAL

## 2022-03-11 DIAGNOSIS — I89.0 LYMPHEDEMA DUE TO AND NOT CONCURRENT WITH IONIZING RADIOTHERAPY: Primary | ICD-10-CM
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
## 2022-03-11 PROCEDURE — 97140 MANUAL THERAPY 1/> REGIONS: CPT | Mod: GP

## 2022-03-11 RX ORDER — RIFAXIMIN 550 MG/1
TABLET ORAL
COMMUNITY
Start: 2022-03-04 | End: 2023-03-07 | Stop reason: ALTCHOICE
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
## 2022-03-13 NOTE — OP PT TREATMENT LOG
Exercises Current Session Performed   Yes/No Time   NEURO RE-ED  CPT 08361     Not performed   Postural re-ed         Diaphragmatic Breathing         KTaping                             THER ACT  CPT 89320     Not performed   Anatomy and physiology of lymphatic system, POC and Goal Review Initial examination 1/18/22 1/25/22: B/L UE Girth measurements  LUE: 51,414.56 ml   RUE: 44,333.64 ml   Limb volume difference: 15.97%     2/24/22:   LUE: 49,639.16 ml   RUE: 44,333.64 ml   Limb volume difference: 11.97%                      HEP/Education Handouts:  What is Lymphedema  Healthy Tips  Self-MLD- 2/11/22 2/11/22: Pt instructed to be more consistent with wearing LUE compression garments in order to continue to reduce swelling and avoid refill. Pt verbalizes understanding     2/18/22: Same as above     2/24/22: Results of progress note discussed. Pt encouraged to continue to be consistent with compression program in order to continue to see results in swelling reduction.     3/4/22: Results of L AROM assessment and LLIS discussed with pt. Pt encouraged to continue current compression program and HEP                                      Yes     Postural education         LLIS   1/25/22: 38/68= 55.88% impairment   3/4/22: 28/68= 36.76% impairment    Yes      Progress note   Reviewed subjective hx and goals, BUE girth measurements, palpation/skin check        BUE AROM assessment   see treatment note   3/4/22: L UE AROM measurements   Yes     THER EX  CPT 06674     Not performed    HEP  Progress remedial TE   NV      SUPINE THER-EX AAROM Flex/Abd/ER  Serratus Punches  TAC x3 sec hold   TAC + chin tuck x 3 sec hold  TAC + chin tuck + PA x 3 sec hold  TAC + chin tuck + Diag PA x 3 sec hold  SL ER  ER iso with flexion        Prone Y's, T's, I's, Rows     Push-pull  90/90 hold  TAC + alt UE ext  Dying bug   TAC + AB with rev fly  TAC + AB with OH flexion                       SEATED THER-EX Shoulder rolls  Scap squeezes  No  velvet  Pulleys                 STANDING THER-EX with  AAROM Ext  Wall slides  Shoulder Isos      Finger flexion and extension AROM x 15   Wrist flexion and extension AROM x 15   Elbow flexion and extension AROM x 15   Posterior shoulder rolls x 15                                        STRETCHING B UT  B Levator  Pec stretch  Open books  Pball Roll out                 GAIT TRAINING  CPT 39047     Not performed             MANUAL  CPT 41567     53-62 Minutes    Joint mobilization 1st rib oscillation Yes     DTM/MFR/TrP release        Strain-counterstrain L scalenes and SCM Yes     Scar/cord mobilization         SSCB 1. Lotion L UE  2. Tubigrip Size D  3. Finger bandages digits 1-5    2/11/22: New Tubigrip size D administered and donned this date. Pt did not bring finger bandages to session and reports she will daphne finger bandages when she gets home.     2/18/22: Finger bandages and Tubigrip size D donned this date of L UE.      3/4/22, 3/11/22: L glove and compression arm sleeve donned and L UE compression wrap with hand piece donned on top of sleeve and glove.                          Yes      MLD Positioning: supine, elevated head, x3 pillows, bolster under knees     1. Full Neck Sequence  2. Held abdominal sequence.  3. Anterior IAA/L AIA  4. Full L UE sequence  5. Full Neck Sequence  Yes      Yes 1-5         MODALITIES     Not performed   Lymphatouch                             PRODUCTS   PROGRESS

## 2022-03-18 ENCOUNTER — HOSPITAL ENCOUNTER (OUTPATIENT)
Dept: PHYSICAL THERAPY | Facility: HOSPITAL | Age: 62
Setting detail: THERAPIES SERIES
Discharge: HOME | End: 2022-03-18
Attending: STUDENT IN AN ORGANIZED HEALTH CARE EDUCATION/TRAINING PROGRAM
Payer: COMMERCIAL

## 2022-03-18 DIAGNOSIS — I89.0 LYMPHEDEMA DUE TO AND NOT CONCURRENT WITH IONIZING RADIOTHERAPY: Primary | ICD-10-CM

## 2022-03-18 DIAGNOSIS — M54.12 CERVICAL RADICULOPATHY: ICD-10-CM
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
## 2022-03-18 DIAGNOSIS — M54.81 OCCIPITAL NEURALGIA OF LEFT SIDE: Primary | ICD-10-CM

## 2022-03-18 PROCEDURE — 97140 MANUAL THERAPY 1/> REGIONS: CPT | Mod: GP

## 2022-03-18 PROCEDURE — 97110 THERAPEUTIC EXERCISES: CPT | Mod: GP

## 2022-03-18 PROCEDURE — 97530 THERAPEUTIC ACTIVITIES: CPT | Mod: GP

## 2022-03-18 PROCEDURE — 97010 HOT OR COLD PACKS THERAPY: CPT | Mod: GP

## 2022-03-18 NOTE — OP PT TREATMENT LOG
DOS     IE  1/14/22   30 Day  2/14/22   60 Day  3/14/22   90 Day  4/14/22   Precautions  LUE lymphedema s/p lumpectomy and radiation   Insurance Auth     Treatment   Current Session Time   Modalities  Total Time for Session 0-7 Minutes   Heat/Ice  CPT 77664 y MHP to the lumbar spine in hooklying   (2 towels)        Manual   CPT 38308 Y/N Total Time for Session 8-22 Minutes   STM/MFR/TPR yes To bilateral paraspinals, R>L periscapular area    Agonist/antagonist with gaze   Instrument Assisted STM      Mobilizations yes SOR, gentle distraction   Agonist/antagonist with gaze   ROM/Flexibility  B UT/LS and pec stretch    Myofascial Decompression     Ther. Exercise  CPT 12629                   Group  Total Time for Session 23-37 Minutes     Y/N Sets Reps Load Comment             LTR      seated   Sciatic nn. glides  yes     supine HEP   Hamstring stretch  yes 3 30  HEP   Calf stretch  n 3 30  HEP   Pirformis stretch  n 3 30  HEP                     TAC  Yes 1 10 3 sec    TAC hip abd  yes 1 10 RTB    TAC bridge  yes 1 10              Upper cervical nod  yes 3s 10     Cervical retraction  yes 2  10  Mid rang -yes   scap retraction  yes 2 10   3 sec hold- mid rang    Median, ulnar nn. glides  yes 2 10       Thoracic rotation  n 1 10   Trunk stretch  n Small ROM seated with blue PB 2x5   shld ext  y  1 x 10 with YTB    No monies   y  1 x 10 with YTB    Neuro Re-Ed  CPT 38295   Group  Total Time for Session Not performed      Sets Reps Load Comment                                          Gait  CPT 07657 Group  Total Time for Session Not performed                     Ther. Activity  CPT 71753 Group  Total Time for Session 0-7 Minutes   HEP   y 3/1/22: see below treatment log for progressions. H/o and YTB administered          Group  CPT 36816  Total Time for Session Not performed     3/1/22:   Shld ext 2 x 10, hold for 3 sec with YTB  No monies 2 x 10, hold for 3 sec with YTB

## 2022-03-18 NOTE — OP PT TREATMENT LOG
Exercises Current Session Performed   Yes/No Time   NEURO RE-ED  CPT 46384     Not performed   Postural re-ed         Diaphragmatic Breathing         KTaping                             THER ACT  CPT 06781     8-22 Minutes   Anatomy and physiology of lymphatic system, POC and Goal Review Initial examination 1/18/22 1/25/22: B/L UE Girth measurements  LUE: 51,414.56 ml   RUE: 44,333.64 ml   Limb volume difference: 15.97%     2/24/22:   LUE: 49,639.16 ml   RUE: 44,333.64 ml   Limb volume difference: 11.97%                      HEP/Education Handouts:  What is Lymphedema  Healthy Tips  Self-MLD- 2/11/22 2/11/22: Pt instructed to be more consistent with wearing LUE compression garments in order to continue to reduce swelling and avoid refill. Pt verbalizes understanding     2/18/22: Same as above     2/24/22: Results of progress note discussed. Pt encouraged to continue to be consistent with compression program in order to continue to see results in swelling reduction.     3/4/22: Results of L AROM assessment and LLIS discussed with pt. Pt encouraged to continue current compression program and HEP.    3/18/22. Pt and PT agree on extending POC for 1x/wk for 5 weeks. Coordinating with ortho PT.                                               Yes     Postural education         LLIS   1/25/22: 38/68= 55.88% impairment   3/4/22: 28/68= 36.76% impairment   3/18/22: Not assessed due to time constraint.     Next visit     Progress note   Reviewed subjective hx and goals, BUE girth measurements, palpation/skin check       Re-evaluation  3/18/22: Reviewed subjective hx and goals, L UE ROM, palpation/skin check Yes     BUE AROM assessment   see treatment note   3/4/22: L UE AROM measurements       THER EX  CPT 96363     Not performed    HEP  Progress remedial TE   NV      SUPINE THER-EX AAROM Flex/Abd/ER  Serratus Punches  TAC x3 sec hold   TAC + chin tuck x 3 sec hold  TAC + chin tuck + PA x 3 sec hold  TAC + chin tuck + Diag  PA x 3 sec hold  SL ER  ER iso with flexion        Prone Y's, T's, I's, Rows     Push-pull  90/90 hold  TAC + alt UE ext  Dying bug   TAC + AB with rev fly  TAC + AB with OH flexion                       SEATED THER-EX Shoulder rolls  Scap squeezes  No monies  Pulleys                 STANDING THER-EX with  AAROM Ext  Wall slides  Shoulder Isos      Finger flexion and extension AROM x 15   Wrist flexion and extension AROM x 15   Elbow flexion and extension AROM x 15   Posterior shoulder rolls x 15                                        STRETCHING B UT  B Levator  Pec stretch  Open books  Pball Roll out                 GAIT TRAINING  CPT 33521     Not performed             MANUAL  CPT 26097     37-52 Minutes    Joint mobilization 1st rib oscillation      DTM/MFR/TrP release        Strain-counterstrain L scalenes and SCM      Scar/cord mobilization         SSCB 1. Lotion L UE  2. Tubigrip Size D  3. Finger bandages digits 1-5    2/11/22: New Tubigrip size D administered and donned this date. Pt did not bring finger bandages to session and reports she will daphne finger bandages when she gets home.     2/18/22: Finger bandages and Tubigrip size D donned this date of L UE.      3/4/22, 3/11/22, 3/18/22: L glove and compression arm sleeve donned and L UE compression wrap with hand piece donned on top of sleeve and glove.  Yes  Yes  Yes                    Yes      MLD Positioning: supine, elevated head, x3 pillows, bolster under knees     1. Full Neck Sequence  2. Held abdominal sequence.  3. Anterior interaxillary anastamoses, L axillary-inguinal anastamosis  4. Full L UE sequence with breast (medial/inferior)  5. Full Neck Sequence  Yes      Yes 1-4         MODALITIES     Not performed   Lymphatouch                             PRODUCTS   PROGRESS

## 2022-03-18 NOTE — PROGRESS NOTES
PT DAILY NOTE FOR OUTPATIENT THERAPY    Patient: Alecia Cruz   MRN: [id number]  : 1960 61 y.o.  Referring Physician: Mahendra Begum DO  Date of Visit: 3/18/2022    Certification Dates: 22 through 22    Diagnosis:   1. Occipital neuralgia of left side    2. Cervical radiculopathy        Chief Complaints:       Precautions:   Precautions comments: L UE and Breast Lymphedema, Hx of XRT  Existing Precautions/Restrictions: limb restrictions     TODAY'S VISIT    Time In Session:  Start Time: 740  Stop Time: 830  Time Calculation (min): 50 min   History/Vitals/Pain/Encounter Info - 22 0743        Injury History/Precautions/Daily Required Info    Document Type daily treatment     Primary Therapist Andra Arnold, PT     Referring Physician Dr. Mahendra Begum,      Existing Precautions/Restrictions limb restrictions     Precautions comments L UE and Breast Lymphedema, Hx of XRT     History of present illness/functional impairment Patient arrives with rx for occipital neuralgia from her PCP. She also notes that she has been having neck pain and headaches related to her neck pain.  She was going to be getting a injection in her cervical spine but instead it has been recommended that she try physical therapy instead.  It was recommended previously that she try physical therapy for her neck as her PCP thinks that she likely is getting tension headaches related to muscle spasms in her neck which are causing occipital neuralgia. Patient has also been experiencing left sided facial numbness and it is unclear if related to parotid gland mass. Migraines are reported every night once she lays down to bed but alleviate in sitting position. She is scheduled to see a neurologist 2022 for cluster headaches and migraine.  In addition, patient has been experiencing low back and mid back pain/spasms worsening since a fall 2 years ago during which she injured her shoulder. After this incident she has  been experiencing debilitating spasms across midback. Alecia takes tylenol PM and aleve that does not help with pain levels. Patient also reports worsening constipation with low back pain as well as right > left flank pain. Symptoms in her low back are worse generally on the right side and occasionally leads to shooting pains and LE buckling. Patient with history of LUE lymphedema and normally improved symptoms following pumping but over the last three months has noted a steady volume gain that never fully abolishes.     OP Specialty Orthopedics     Patient/Family/Caregiver Comments/Observations Patient arrives 10 minutes late to session. States that she has recently started on new medication for GI distress. Reports increased back spasm and pain in the last few days, has been having more trouble sleeping due to back pain vs. neck pain lately.     Patient reported fall since last visit No     Start Time 0740     Stop Time 0830     Time Calculation (min) 50 min        Pain Assessment    Currently in pain Yes     Preferred Pain Scale number (Numeric Rating Pain Scale)     Pain: Body location Back     Pain Rating (0-10): Pre Activity 8        Pain Intervention    Intervention  MHP in hook lying     Post Intervention Comments see assessment                Daily Treatment Assessment and Plan - 03/18/22 0743        Daily Treatment Assessment and Plan    Progress toward goals Progressing     Daily Outcome Summary Patient continues to demonstrate improved tolerance to TE with observed increased cervical and lumbar rotation with LTR that initially requires tactile assistance but then able to complete independently. Patient with improved tolerance to trunk rotations as well as bridges with inclusion of TAC to decrease strain with lumbar rotation/extension. Continues to benefit from agonist/antagonist STM with ocular mobility to increase cervical range of motion L>R Patient tolerates Nustep x 10 minutes post session     Plan and  Recommendations improve tolerance to transitional activities with lumbar stabilization including sit to stand as well as side stepping, bed mobility.                     OBJECTIVE DATA TAKEN TODAY:    None taken    Today's Treatment:    DOS     IE  1/14/22   30 Day  2/14/22   60 Day  3/14/22   90 Day  4/14/22   Precautions  LUE lymphedema s/p lumpectomy and radiation   Insurance Auth     Treatment   Current Session Time   Modalities  Total Time for Session 0-7 Minutes   Heat/Ice  CPT 64706 y MHP to the lumbar spine in hooklying   (2 towels)        Manual   CPT 39694 Y/N Total Time for Session 8-22 Minutes   STM/MFR/TPR yes To bilateral paraspinals, R>L periscapular area    Agonist/antagonist with gaze   Instrument Assisted STM      Mobilizations yes SOR, gentle distraction   Agonist/antagonist with gaze   ROM/Flexibility  B UT/LS and pec stretch    Myofascial Decompression     Ther. Exercise  CPT 07191                   Group  Total Time for Session 23-37 Minutes     Y/N Sets Reps Load Comment             LTR      seated   Sciatic nn. glides  yes     supine HEP   Hamstring stretch  yes 3 30  HEP   Calf stretch  n 3 30  HEP   Pirformis stretch  n 3 30  HEP                     TAC  Yes 1 10 3 sec    TAC hip abd  yes 1 10 RTB    TAC bridge  yes 1 10              Upper cervical nod  yes 3s 10     Cervical retraction  yes 2  10  Mid rang -yes   scap retraction  yes 2 10   3 sec hold- mid rang    Median, ulnar nn. glides  yes 2 10       Thoracic rotation  n 1 10   Trunk stretch  n Small ROM seated with blue PB 2x5   shld ext  y  1 x 10 with YTB    No monies   y  1 x 10 with YTB    Neuro Re-Ed  CPT 95152   Group  Total Time for Session Not performed      Sets Reps Load Comment                                          Gait  CPT 10986 Group  Total Time for Session Not performed                     Ther. Activity  CPT 56666 Group  Total Time for Session 0-7 Minutes   HEP   y 3/1/22: see below treatment log for progressions.  H/o and YTB administered          Group  CPT 86838  Total Time for Session Not performed     3/1/22:   Shld ext 2 x 10, hold for 3 sec with YTB  No monies 2 x 10, hold for 3 sec with YTB
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
## 2022-03-24 ENCOUNTER — TELEPHONE (OUTPATIENT)
Dept: PHYSICAL THERAPY | Facility: HOSPITAL | Age: 62
End: 2022-03-24
Payer: COMMERCIAL

## 2022-03-24 NOTE — TELEPHONE ENCOUNTER
Spoke with patient in regards to 2 sessions tomorrow with coverage for only one therapy per day. Discussed with clinical team who recommended that she attend lymphedema therapy and discuss plan of care moving forwards for remaining 6 sessions.  Patient in agreement with plan and will discuss patient goals tomorrow in session.

## 2022-03-25 ENCOUNTER — HOSPITAL ENCOUNTER (OUTPATIENT)
Dept: PHYSICAL THERAPY | Facility: HOSPITAL | Age: 62
Setting detail: THERAPIES SERIES
Discharge: HOME | End: 2022-03-25
Attending: STUDENT IN AN ORGANIZED HEALTH CARE EDUCATION/TRAINING PROGRAM
Payer: COMMERCIAL
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
## 2022-03-25 DIAGNOSIS — I89.0 LYMPHEDEMA DUE TO AND NOT CONCURRENT WITH IONIZING RADIOTHERAPY: Primary | ICD-10-CM

## 2022-03-25 PROCEDURE — 97140 MANUAL THERAPY 1/> REGIONS: CPT | Mod: GP

## 2022-03-25 PROCEDURE — 97530 THERAPEUTIC ACTIVITIES: CPT | Mod: GP

## 2022-03-25 NOTE — OP PT TREATMENT LOG
Exercises Current Session Performed   Yes/No Time   NEURO RE-ED  CPT 65174     Not performed   Postural re-ed         Diaphragmatic Breathing         KTaping                             THER ACT  CPT 79695     8-22 Minutes   Anatomy and physiology of lymphatic system, POC and Goal Review Initial examination 1/18/22 1/25/22: B/L UE Girth measurements  LUE: 51,414.56 ml   RUE: 44,333.64 ml   Limb volume difference: 15.97%     2/24/22:   LUE: 49,639.16 ml   RUE: 44,333.64 ml   Limb volume difference: 11.97%                      HEP/Education Handouts:  What is Lymphedema  Healthy Tips  Self-MLD- 2/11/22 2/11/22: Pt instructed to be more consistent with wearing LUE compression garments in order to continue to reduce swelling and avoid refill. Pt verbalizes understanding     2/18/22: Same as above     2/24/22: Results of progress note discussed. Pt encouraged to continue to be consistent with compression program in order to continue to see results in swelling reduction.     3/4/22: Results of L AROM assessment and LLIS discussed with pt. Pt encouraged to continue current compression program and HEP.    3/18/22. Pt and PT agree on extending POC for 1x/wk for 5 weeks. Coordinating with ortho PT.    3/25/22: See assessment for details.                                                        Yes     Postural education         LLIS   1/25/22: 38/68= 55.88% impairment   3/4/22: 28/68= 36.76% impairment   3/18/22: Not assessed due to time constraint.  3/25/22: 37/68 = 54% impairment.       Yes     Progress note   Reviewed subjective hx and goals, BUE girth measurements, palpation/skin check       Re-evaluation  3/18/22: Reviewed subjective hx and goals, L UE ROM, palpation/skin check      BUE AROM assessment   see treatment note   3/4/22: L UE AROM measurements       THER EX  CPT 42341     Not performed    HEP  Progress remedial TE   NV      SUPINE THER-EX AAROM Flex/Abd/ER  Serratus Punches  TAC x3 sec hold   TAC + chin  tuck x 3 sec hold  TAC + chin tuck + PA x 3 sec hold  TAC + chin tuck + Diag PA x 3 sec hold  SL ER  ER iso with flexion        Prone Y's, T's, I's, Rows     Push-pull  90/90 hold  TAC + alt UE ext  Dying bug   TAC + AB with rev fly  TAC + AB with OH flexion                       SEATED THER-EX Shoulder rolls  Scap squeezes  No monies  Pulleys                 STANDING THER-EX with  AAROM Ext  Wall slides  Shoulder Isos      Finger flexion and extension AROM x 15   Wrist flexion and extension AROM x 15   Elbow flexion and extension AROM x 15   Posterior shoulder rolls x 15                                        STRETCHING B UT  B Levator  Pec stretch  Open books  Pball Roll out                 GAIT TRAINING  CPT 91430     Not performed             MANUAL  CPT 23476     37-52 Minutes    Joint mobilization 1st rib oscillation      DTM/MFR/TrP release        Strain-counterstrain L scalenes and SCM      Scar/cord mobilization         SSCB 1. Lotion L UE  2. Tubigrip Size D  3. Finger bandages digits 1-5    2/11/22: New Tubigrip size D administered and donned this date. Pt did not bring finger bandages to session and reports she will daphne finger bandages when she gets home.     2/18/22: Finger bandages and Tubigrip size D donned this date of L UE.      3/4/22, 3/11/22, 3/18/22: L glove and compression arm sleeve donned and L UE compression wrap with hand piece donned on top of sleeve and glove.  Yes                        Yes      MLD Positioning: supine, elevated head, x3 pillows, bolster under knees     1. Full Neck Sequence  2. Held abdominal sequence.  3. Anterior interaxillary anastamoses, L axillary-inguinal anastamosis  4. Full L UE sequence without breast (medial/inferior)  5. Full Neck Sequence  Yes      Yes 1-5         MODALITIES     Not performed   Lymphatouch                             PRODUCTS   PROGRESS
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
## 2022-03-28 ENCOUNTER — HOSPITAL ENCOUNTER (OUTPATIENT)
Dept: PHYSICAL THERAPY | Facility: HOSPITAL | Age: 62
Setting detail: THERAPIES SERIES
Discharge: HOME | End: 2022-03-28
Attending: STUDENT IN AN ORGANIZED HEALTH CARE EDUCATION/TRAINING PROGRAM
Payer: COMMERCIAL
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
## 2022-03-28 DIAGNOSIS — I89.0 LYMPHEDEMA DUE TO AND NOT CONCURRENT WITH IONIZING RADIOTHERAPY: Primary | ICD-10-CM

## 2022-03-28 PROCEDURE — 97140 MANUAL THERAPY 1/> REGIONS: CPT | Mod: GP

## 2022-03-28 PROCEDURE — 97530 THERAPEUTIC ACTIVITIES: CPT | Mod: GP

## 2022-03-28 NOTE — OP PT TREATMENT LOG
Exercises Current Session Performed   Yes/No Time   NEURO RE-ED  CPT 32869     Not performed   Postural re-ed         Diaphragmatic Breathing         KTaping                             THER ACT  CPT 60590     8-22 Minutes   Anatomy and physiology of lymphatic system, POC and Goal Review Initial examination 1/18/22 1/25/22: B/L UE Girth measurements  LUE: 51,414.56 ml   RUE: 44,333.64 ml   Limb volume difference: 15.97%     2/24/22:   LUE: 49,639.16 ml   RUE: 44,333.64 ml   Limb volume difference: 11.97%                      HEP/Education Handouts:  What is Lymphedema  Healthy Tips  Self-MLD- 2/11/22 2/11/22: Pt instructed to be more consistent with wearing LUE compression garments in order to continue to reduce swelling and avoid refill. Pt verbalizes understanding     2/18/22: Same as above     2/24/22: Results of progress note discussed. Pt encouraged to continue to be consistent with compression program in order to continue to see results in swelling reduction.     3/4/22: Results of L AROM assessment and LLIS discussed with pt. Pt encouraged to continue current compression program and HEP.    3/18/22. Pt and PT agree on extending POC for 1x/wk for 5 weeks. Coordinating with ortho PT.    3/25/22: See assessment for details.     3/28/22: Reviewed neck and head MLD sequence to assist with swelling reduction in L neck region. PT demonstrated sequence and pt able to repeat sequence. PT scheduled pt for 2 more lymphedema therapy appointments.                                                            Yes      Postural education         LLIS   1/25/22: 38/68= 55.88% impairment   3/4/22: 28/68= 36.76% impairment   3/18/22: Not assessed due to time constraint.  3/25/22: 37/68 = 54% impairment.            Progress note   Reviewed subjective hx and goals, BUE girth measurements, palpation/skin check       Re-evaluation  3/18/22: Reviewed subjective hx and goals, L UE ROM, palpation/skin check      BUE AROM assessment    see treatment note   3/4/22: L UE AROM measurements       THER EX  CPT 92908     Not performed    HEP  Progress remedial TE        SUPINE THER-EX AAROM Flex/Abd/ER  Serratus Punches  TAC x3 sec hold   TAC + chin tuck x 3 sec hold  TAC + chin tuck + PA x 3 sec hold  TAC + chin tuck + Diag PA x 3 sec hold  SL ER  ER iso with flexion        Prone Y's, T's, I's, Rows     Push-pull  90/90 hold  TAC + alt UE ext  Dying bug   TAC + AB with rev fly  TAC + AB with OH flexion                       SEATED THER-EX Shoulder rolls  Scap squeezes  No monies  Pulleys                 STANDING THER-EX with  AAROM Ext  Wall slides  Shoulder Isos      Finger flexion and extension AROM x 15   Wrist flexion and extension AROM x 15   Elbow flexion and extension AROM x 15   Posterior shoulder rolls x 15                                        STRETCHING B UT  B Levator  Pec stretch  Open books  Pball Roll out                 GAIT TRAINING  CPT 29893     Not performed             MANUAL  CPT 69662     38-52 minutes    Joint mobilization 1st rib oscillation      DTM/MFR/TrP release        Strain-counterstrain L scalenes and SCM      Scar/cord mobilization         SSCB 1. Lotion L UE  2. Tubigrip Size D  3. Finger bandages digits 1-5    2/11/22: New Tubigrip size D administered and donned this date. Pt did not bring finger bandages to session and reports she will daphne finger bandages when she gets home.     2/18/22: Finger bandages and Tubigrip size D donned this date of L UE.      3/4/22, 3/11/22, 3/18/22, 3/28/22: Lotion L UE donned  L glove and compression arm sleeve donned and L UE compression wrap with hand piece donned on top of sleeve and glove.                          Yes      MLD Positioning: supine, elevated head, x3 pillows, bolster under knees     1. Full Neck Sequence  2. Held abdominal sequence.  3. Anterior interaxillary anastamoses, L axillary-inguinal anastamosis  4. Full L UE sequence without breast (medial/inferior)  5.  Full Neck Sequence        Yes 1-5         MODALITIES     Not performed   Lymphatouch                             PRODUCTS   PROGRESS
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
## 2022-03-31 NOTE — PROGRESS NOTES
15 lymph nodes, 7 active, mass L shoulder (benign), cording  
Erroneous encounter - disregard  
Erroneous encounter - documentation included with PT Eval encounter on 1/18/22. Disregard   
2-3 times/wk

## 2022-04-01 ENCOUNTER — TELEPHONE (OUTPATIENT)
Dept: PRIMARY CARE | Facility: CLINIC | Age: 62
End: 2022-04-01
Payer: COMMERCIAL

## 2022-04-01 ENCOUNTER — HOSPITAL ENCOUNTER (OUTPATIENT)
Dept: PHYSICAL THERAPY | Facility: HOSPITAL | Age: 62
Setting detail: THERAPIES SERIES
Discharge: HOME | End: 2022-04-01
Attending: STUDENT IN AN ORGANIZED HEALTH CARE EDUCATION/TRAINING PROGRAM
Payer: COMMERCIAL

## 2022-04-01 DIAGNOSIS — M54.12 CERVICAL RADICULOPATHY: ICD-10-CM

## 2022-04-01 DIAGNOSIS — M54.81 OCCIPITAL NEURALGIA OF LEFT SIDE: Primary | ICD-10-CM

## 2022-04-01 DIAGNOSIS — M54.9 BACK PAIN, UNSPECIFIED BACK LOCATION, UNSPECIFIED BACK PAIN LATERALITY, UNSPECIFIED CHRONICITY: ICD-10-CM

## 2022-04-01 PROCEDURE — 97530 THERAPEUTIC ACTIVITIES: CPT | Mod: GP

## 2022-04-01 PROCEDURE — 97140 MANUAL THERAPY 1/> REGIONS: CPT | Mod: GP

## 2022-04-01 NOTE — OP PT TREATMENT LOG
DOS     IE  1/14/22   30 Day  2/14/22   60 Day  3/14/22   90 Day  4/14/22   Precautions  LUE lymphedema s/p lumpectomy and radiation   Insurance Auth     Treatment   Current Session Time   Modalities  Total Time for Session 0-7 Minutes   Heat/Ice  CPT 82535 y MHP to the lumbar spine in hooklying   (2 towels)        Manual   CPT 83443 Y/N Total Time for Session 8-22 Minutes   STM/MFR/TPR yes To bilateral paraspinals, R>L periscapular area    Agonist/antagonist with gaze   Instrument Assisted STM      Mobilizations yes SOR, gentle distraction   Agonist/antagonist with gaze   ROM/Flexibility  B UT/LS and pec stretch    Myofascial Decompression     Ther. Exercise  CPT 29684                   Group  Total Time for Session 8-22 Minutes     Y/N Sets Reps Load Comment    Nustep  yes    8 Minutes   LTR      seated   Sciatic nn. glides       supine HEP   Hamstring stretch   3 30  HEP   Calf stretch   3 30  HEP   Pirformis stretch   3 30  HEP                     TAC   1 10 3 sec    TAC hip abd   1 10 RTB    TAC bridge   1 10              Upper cervical nod   3s 10     Cervical retraction   2  10  Mid rang -yes   scap retraction   2 10   3 sec hold- mid rang    Median, ulnar nn. glides   2 10       Thoracic rotation   1 10   Trunk stretch   Small ROM seated with blue PB 2x5   shld ext   1 x 10 with YTB    No monies    1 x 10 with YTB    Neuro Re-Ed  CPT 81029   Group  Total Time for Session Not performed      Sets Reps Load Comment                                          Gait  CPT 66264 Group  Total Time for Session Not performed                     Ther. Activity  CPT 19622 Group  Total Time for Session 23-37 Minutes   HEP   y 4/1/22:    Re-evaluation  Y ROM, PHILLIP, NDI, education   Group  CPT 68808  Total Time for Session Not performed     3/1/22:   Shld ext 2 x 10, hold for 3 sec with YTB  No monies 2 x 10, hold for 3 sec with YTB

## 2022-04-01 NOTE — PROGRESS NOTES
PT DISCHARGE NOTE FOR OUTPATIENT THERAPY    Patient: Alecia Cruz   MRN: [id number]  : 1960 61 y.o.  Referring Physician: Mahendra Begum DO  Date of Visit: 2022      Certification Dates: 22 through 22    Total Visit Count: 10    Chief Complaints:  Chief Complaint   Patient presents with   • Pain   • Dec ROM       Precautions:  Existing Precautions/Restrictions: limb restrictions     TODAY'S VISIT:    Time In Session:  Start Time: 0740  Stop Time: 0830  Time Calculation (min): 50 min   General Information - 22 0744        Session Details    Document Type discharge evaluation     Mode of Treatment physical therapy     Patient/Family/Caregiver Comments/Observations Patient arrives with reports that her neck pain has continued to improve with LUE lymphedema management. States that she experiences soreness but has noticed significant increases in function and range of motion since beginning PT     OP Specialty Orthopedics        General Information    Referring Physician Dr. Mahendra Begum DO     Existing Precautions/Restrictions limb restrictions        PT Time Calculation    Start Time 0740     Stop Time 0830     Time Calculation (min) 50 min                Daily Falls Screen - 22 0744        Daily Falls Assessment    Patient reported fall since last visit No                Pain/Vitals - 22 0744        Pain Assessment    Currently in pain Yes     Pain Side/Orientation left     Pain: Body location Neck;Back     Pain Rating (0-10): Pre Activity 2     Pain Rating (0-10): Activity 8   worst with activity    Pain Rating (0-10): Post Activity 3        Pain Intervention    Intervention  monitored     Post Intervention Comments see assessment                PT - 22 0744        Physical Therapy    Physical Therapy Ortho        PT Plan    Frequency of treatment 2 times/week     PT Duration 3 months     PT Cert From 22     PT Cert To 22     Date PT POC was sent to  provider 01/14/22     Signed PT Plan of Care received?  Yes                Assessment and Plan - 04/01/22 0744        Assessment    Clinical Assessment Alecia has demonstrated significant improvements since the initiation of orthopedic physical therapy regarding neck and low back pain. In 10 sessions her range of motion has improved markedly with the use of soft tissue mobilization, in particular muscle energy techniques with focus on agonist/antagonist relationship with ocular mobility and levator scapulae inhibition in cervical rotation, leading to increased cervical rotation. Neck Disability Index has improved from 52% to 34% impairment and Oswestry Low Back Disability Questionnaire has improved from 56% to 46%. Lumbar spine range of motion has improved minimally, observed functionally, ghowever pain remains. Insurance has currently limited current course of care to focus on lymphedema management and PT has chosen to discharge current course of orthopedic care, with decision to resume when lymphedema management has been concluded. Alecia will be discharged at this time.                 OBJECTIVE MEASUREMENTS/DATA:    Outcome Measures     Special Tests - 04/01/22 0744        Outcome measures tracking    Outcome measure used: PHILLIP: 46% NDI: 34%               ROM    Range of Motion - 04/01/22 0700        Cervical AROM    Cervical Flexion 40     Cervical Extension 24   increased headache    Cervical Left SB 11   increaed spasm into L spasm    Cervical Right SB 19     Cervical Left Rotation 47     Cervical Right Rotation 49        Lumbar AROM    Lumbar Flexion --   <50    Lumbar Extension --   <25    Lumbar Left SB --   <25    Lumbar Right SB --   <25    Lumbar Left Rotation --   not tested 2/2 pain    Lumbar Right Rotation --   not tested 2/2 pain              Ortho Special Tests    Orthopedic Special Tests - 04/01/22 1200        Hip/Pelvis Special Tests    NACHO Right - Positive;Left - Positive     SI Gap Right -  Negative;Left - Negative     SI Approx Right - Negative;Left - Negative        Cervical/Thoracic    Cervical Compression Right - Positive;Left - Positive     Cervical Distraction Right - Positive;Left - Positive     Spurling's Right - Positive;Left - Positive                 Today's Treatment:    DOS     IE  1/14/22   30 Day  2/14/22   60 Day  3/14/22   90 Day  4/14/22   Precautions  LUE lymphedema s/p lumpectomy and radiation   Insurance Auth     Treatment   Current Session Time   Modalities  Total Time for Session 0-7 Minutes   Heat/Ice  CPT 18830 y MHP to the lumbar spine in hooklying   (2 towels)        Manual   CPT 32635 Y/N Total Time for Session 8-22 Minutes   STM/MFR/TPR yes To bilateral paraspinals, R>L periscapular area    Agonist/antagonist with gaze   Instrument Assisted STM      Mobilizations yes SOR, gentle distraction   Agonist/antagonist with gaze   ROM/Flexibility  B UT/LS and pec stretch    Myofascial Decompression     Ther. Exercise  CPT 51820                   Group  Total Time for Session 8-22 Minutes     Y/N Sets Reps Load Comment    Nustep  yes    8 Minutes   LTR      seated   Sciatic nn. glides       supine HEP   Hamstring stretch   3 30  HEP   Calf stretch   3 30  HEP   Pirformis stretch   3 30  HEP                     TAC   1 10 3 sec    TAC hip abd   1 10 RTB    TAC bridge   1 10              Upper cervical nod   3s 10     Cervical retraction   2  10  Mid rang -yes   scap retraction   2 10   3 sec hold- mid rang    Median, ulnar nn. glides   2 10       Thoracic rotation   1 10   Trunk stretch   Small ROM seated with blue PB 2x5   shld ext   1 x 10 with YTB    No monies    1 x 10 with YTB    Neuro Re-Ed  CPT 42798   Group  Total Time for Session Not performed      Sets Reps Load Comment                                          Gait  CPT 59179 Group  Total Time for Session Not performed                     Ther. Activity  CPT 83703 Group  Total Time for Session 23-37 Minutes   HEP   y  4/1/22:    Re-evaluation  Y ROM, PHILLIP, NDI, education   Group  CPT 18792  Total Time for Session Not performed     3/1/22:   Shld ext 2 x 10, hold for 3 sec with YTB  No monies 2 x 10, hold for 3 sec with YTB        Goals Addressed                    This Visit's Progress       Patient Stated    •  patient goals (pt-stated)         Short Term Goals: 6 weeks  Increase Cervical ROM 10degrees  in all directions Met  NDI improve by 12% Met  Pain at worst 5/10 Not Met  BUE Strength 4/5   Independent HEP  Decrease reported migraine incidence to 4days/week  Sitting Tolerance >30 minutes Not Met     Long Term Goals: 12 weeks  Pain free cervical ROM WFL in all directions  NDI improve by 20%   Pain at worst 2/10 Not Met  -Maximino Daniels Not Met  Sitting tolerance > 60 minutes Not Met             Other    •  COMPLETED: PT GOALS         pt stated goals:  no pain for standing to do ADLs ongoing  be able to put on shoes without pain ongoing  Be able to walk > 1 block ongoing      SHORT TERM GOALS:  Pt reports pain levels < 2-3 /10 to perform ADLs. ongoing  Pt will improve  Lumbar Spine  AROM to flex reach to knees, ext 5'  sb>8' bilat rot >25% bilat to improve functional mobility. In 3-4 weeks.ongoing  Pt will demonstrate increase  core TAC and BLE strength > 4 /5 to improve functional mobility. In 3-4 weeks.ongoing  Pt reports centralization of RLE symptoms to buttock. In 3-4 weeks.ongoing  Pt demonstrates  I with HEP as instructed. In 3-4 weeks.ongoing  Pt OSWESTRY  score < 60%. In 3-4 weeks.ongoing    LONG TERM GOALS:  Pt reports pain levels < 1-2 /10 to perform ADLs.  Pt will improve   Lumbar Spine  AROM to WFL to improve functional mobility. In 8 weeks.  Pt will demonstrate increase  core and BLE strength > 4 /5 to improve functional mobility. In 8 weeks.  Pt ambulates  I with normalized gait pattern.  In 8 weeks.  Pt performs stair ascend/ descend  with recip pattern I with handrail/AD. In  8  weeks.  Pt demonstrates  I with  HEP as instructed. In 8 weeks.  Pt OSWESTRY score < 40%. In 8 weeks.  Pt reports resolution of R LE radicular sx.  In 8 weeks.   Pt tolerates > 15 min of CV activity. In 8 weeks.                Discharge information for [facility]:

## 2022-04-01 NOTE — TELEPHONE ENCOUNTER
There are multiple issues going on with the patient and is requesting her PCP.    Pt needs a blood test that will identify her blood type. Information to be given to the gastroenterologists.    Pt is in discomfort.    Pt states her IBS is out of control from the meds.  She has a sinus infection, and headaches. Requesting to speak to DR. Begum.

## 2022-04-05 ENCOUNTER — TELEMEDICINE (OUTPATIENT)
Dept: PRIMARY CARE | Facility: CLINIC | Age: 62
End: 2022-04-05
Payer: COMMERCIAL

## 2022-04-05 DIAGNOSIS — K63.8219 SMALL INTESTINAL BACTERIAL OVERGROWTH (SIBO): ICD-10-CM

## 2022-04-05 DIAGNOSIS — E03.9 HYPOTHYROIDISM, UNSPECIFIED TYPE: ICD-10-CM

## 2022-04-05 DIAGNOSIS — M54.81 OCCIPITAL NEURALGIA OF LEFT SIDE: ICD-10-CM

## 2022-04-05 DIAGNOSIS — E78.00 HYPERCHOLESTEROLEMIA: ICD-10-CM

## 2022-04-05 DIAGNOSIS — K58.9 IRRITABLE BOWEL SYNDROME, UNSPECIFIED TYPE: Primary | ICD-10-CM

## 2022-04-05 DIAGNOSIS — Z12.11 ENCOUNTER FOR SCREENING COLONOSCOPY: ICD-10-CM

## 2022-04-05 PROBLEM — J06.9 VIRAL UPPER RESPIRATORY TRACT INFECTION: Status: RESOLVED | Noted: 2019-01-25 | Resolved: 2022-04-05

## 2022-04-05 PROBLEM — R30.0 DYSURIA: Status: RESOLVED | Noted: 2022-02-17 | Resolved: 2022-04-05

## 2022-04-05 PROCEDURE — 99213 OFFICE O/P EST LOW 20 MIN: CPT | Mod: 95 | Performed by: STUDENT IN AN ORGANIZED HEALTH CARE EDUCATION/TRAINING PROGRAM

## 2022-04-05 NOTE — ASSESSMENT & PLAN NOTE
-ASCVD score of 6.  Recommended nutrition consult for further recommendations.  -She is well aware that fried foods are her weakness.

## 2022-04-05 NOTE — PROGRESS NOTES
Verification of Patient Location:  The patient affirms they are currently located in the following state: [place]    Request for Consent:    Audio and Video Encounter   Krish, my name is Mahendra Begum DO.  Before we proceed, can you please verify your identification by telling me your full name and date of birth?  Can you tell me who is in the room with you?    You and I are about to have a telemedicine check-in or visit because you have requested it.  This is a live video-conference.  I am a real person, speaking to you in real time.  There is no one else with me on the video-conference.  However, when we use ([employer], [employer], etc) it is important for you to know that the video-conference may not be secure or private.  I am not recording this conversation and I am asking you not to record it.  This telemedicine visit will be billed to your health insurance or you, if you are self-insured.  You understand you will be responsible for any copayments or coinsurances that apply to your telemedicine visit.  Communication platform used for this encounter:  [employer] Video Visit (no Zoom)     Before starting our telemedicine visit, I am required to get your consent for this virtual check-in or visit by telemedicine. Do you consent?      Patient Response to Request for Consent:  Yes      Visit Documentation:  Subjective     Patient ID: Alecia Cruz is a 61 y.o. female.  1960      HPI  She is coming to see us today to discuss that she continues to have headaches. She needs to see her headache doctor. She has been doing physical therapy which has helpful for her neck pain. She has also been having lymphedema treatment of her left arm after she had breast cancer and axillary dissection. She has a headache doctor follow-up this coming week.    She reports that she has a spasming pain to go up over her left ear to her face and to her ear.  She has had these in the past.  When she started physical therapy it  definitely helped her.  She continues to feel the spasm.  The knots in her neck feel better.  She also has seen ENT.  We have been less convinced that the lesion inside of her parotid gland is the source of her pain.    She saw GI previously who diagnosed her with SIBO and she was given xifaxan which the copay was $742 which she actually paid for.  Helped considerably.  She completed her therapy and she was placed on a fodmap diet. She would like to see nutrition for modification of her and counseling on lipids.  I think this is is fair.    She is going to need her blood type for an online nutrition program she found though I'm not clear why they want her blood type specifically as this atypical.     The following have been reviewed and updated as appropriate in this visit:   Meds         Review of Systems  A 10 point review of systems was asked and negative unless otherwise stated above.    Assessment/Plan   Diagnoses and all orders for this visit:    Irritable bowel syndrome, unspecified type (Primary)  -     ABO GROUPING; Future  -     Ambulatory referral to [facility]    Small intestinal bacterial overgrowth (SIBO)  Assessment & Plan:  -Improved with Xifaxan.  -Continue to FODMAP diet.  -She would like to see nutrition which I agree with for additional recommendations of what foods she can take as she feels that she is not getting very much nutrition otherwise.  Dietary changes have helped.    Orders:  -     Ambulatory referral to [facility]    Hypercholesterolemia  Assessment & Plan:  -ASCVD score of 6.  Recommended nutrition consult for further recommendations.  -She is well aware that fried foods are her weakness.    Orders:  -     ABO GROUPING; Future  -     Ambulatory referral to [facility]    Hypothyroidism, unspecified type  Assessment & Plan:  -TSH has been stable.  -Continue current levothyroxine dose.      Occipital neuralgia of left side  Assessment & Plan:  -I have wanted her to  see headache specialist-I think that they will be very helpful to her and I think that also she would benefit from Botox injections      Encounter for screening colonoscopy  Assessment & Plan:  -Was seen by gastroenterology down in [place].        Time Spent:  I spent 17 minutes on this date of service performing the following activities: obtaining history, performing examination, documenting, preparing for visit, providing counseling and education, independently reviewing study/studies and communicating results.

## 2022-04-05 NOTE — ASSESSMENT & PLAN NOTE
-Improved with Xifaxan.  -Continue to FODMAP diet.  -She would like to see nutrition which I agree with for additional recommendations of what foods she can take as she feels that she is not getting very much nutrition otherwise.  Dietary changes have helped.

## 2022-04-05 NOTE — ASSESSMENT & PLAN NOTE
-I have wanted her to see headache specialist-I think that they will be very helpful to her and I think that also she would benefit from Botox injections

## 2022-04-07 ENCOUNTER — TELEPHONE (OUTPATIENT)
Dept: FAMILY MEDICINE CLINIC | Facility: CLINIC | Age: 62
End: 2022-04-07

## 2022-04-08 ENCOUNTER — OFFICE VISIT (OUTPATIENT)
Dept: NEUROLOGY | Facility: CLINIC | Age: 62
End: 2022-04-08
Payer: COMMERCIAL

## 2022-04-08 ENCOUNTER — HOSPITAL ENCOUNTER (OUTPATIENT)
Dept: PHYSICAL THERAPY | Facility: HOSPITAL | Age: 62
Setting detail: THERAPIES SERIES
Discharge: HOME | End: 2022-04-08
Attending: STUDENT IN AN ORGANIZED HEALTH CARE EDUCATION/TRAINING PROGRAM
Payer: COMMERCIAL

## 2022-04-08 VITALS
DIASTOLIC BLOOD PRESSURE: 78 MMHG | HEIGHT: 67 IN | BODY MASS INDEX: 24.55 KG/M2 | SYSTOLIC BLOOD PRESSURE: 132 MMHG | OXYGEN SATURATION: 97 % | TEMPERATURE: 97.7 F | WEIGHT: 156.4 LBS | RESPIRATION RATE: 18 BRPM | HEART RATE: 84 BPM
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
## 2022-04-08 DIAGNOSIS — G50.0 TRIGEMINAL NEURALGIA OF LEFT SIDE OF FACE: Primary | ICD-10-CM

## 2022-04-08 DIAGNOSIS — E53.8 VITAMIN B12 DEFICIENCY: ICD-10-CM

## 2022-04-08 DIAGNOSIS — M54.12 CERVICAL RADICULOPATHY: ICD-10-CM

## 2022-04-08 DIAGNOSIS — I89.0 LYMPHEDEMA DUE TO AND NOT CONCURRENT WITH IONIZING RADIOTHERAPY: Primary | ICD-10-CM

## 2022-04-08 DIAGNOSIS — M54.81 OCCIPITAL NEURALGIA OF LEFT SIDE: ICD-10-CM

## 2022-04-08 DIAGNOSIS — E55.9 VITAMIN D DEFICIENCY: ICD-10-CM

## 2022-04-08 DIAGNOSIS — K11.9 LESION OF PAROTID GLAND: ICD-10-CM

## 2022-04-08 PROCEDURE — 97140 MANUAL THERAPY 1/> REGIONS: CPT | Mod: GP

## 2022-04-08 PROCEDURE — 99215 OFFICE O/P EST HI 40 MIN: CPT | Performed by: PSYCHIATRY & NEUROLOGY

## 2022-04-08 PROCEDURE — 3008F BODY MASS INDEX DOCD: CPT | Performed by: PSYCHIATRY & NEUROLOGY

## 2022-04-08 NOTE — PATIENT INSTRUCTIONS
Cognitive behavioral therapy (CBT):  - Is a common type of talk therapy (psychotherapy) were you work with a psychotherapist or therapist . CBT helps you become aware of inaccurate or negative thinking so you can view challenging situations more clearly and respond to them in a more effective way. CBT can be a very helpful tool ? either alone or in combination with other therapies ? in treating mental health disorders and chronic pain. CBT can be an effective tool to help anyone learn how to better manage stressful life situations and pain. In some cases, CBT is most effective when it's combined with other treatments, such as antidepressants or other medications.  You can start yourself by down loading the darion: Curable      Mindfulness/Meditation:  -Many people believe that stress is a major trigger for their pain. This is where mindfulness and meditation can come into play, as they have been known to help reduce migraine severity, duration and acute pain medication use. It may also help to relieve stress and anxiety while improving feelings of well being.    Biofeedback:   - Involves becoming more aware of the changes that occur in the body and learning how to exert control over generally involuntary functions. Biofeedback allows you to see your vitals in real-time and learn how to stabilize them on your own. There is great evidence that biofeedback can reduce the frequency, intensity, and duration of pain.   When you're stressed, you may notice elevated heart rates, tightened muscles, and sweating.  During biofeedback, you can see these changes on a monitor, then a therapist teaches you exercises to help manage these changes.    Yoga/Kristopher Chi:  - The kind of mind/body therapy that yoga can provide may help create relief from pain. Keeping up with yoga consistently can reduce headache frequency, intensity and duration, so it's important to practice regularly if you plan to use it as a complementary migraine  treatment. However, certain types of yoga such as “ hot yoga”   may be uncomfortable for people with migraine. Others, such as “ restorative yoga,” may be tolerable even for a patient with chronic migraine.  Kristopher Chi can also have a similar benefit for patients with migraine. Specifically, it can help improve balance, which can be very useful for those with vestibular symptoms or vestibular migraine.    Acupuncture:  - A traditional Chinese medicine, acupuncture is reported to increase the release of serotonin, dopamine and other chemicals that may help to treat chronic pain, and can be helpful in preventing episodic migraine. There are, however, conflicting results on studies in acupuncture as a treatment for migraine.    Exercising:    - Regular exercise can reduce the frequency and intensity pain. When one exercises, the body releases endorphins, which are the body's natural painkillers. Exercise reduces stress and helps individuals to sleep at night. Exercising at least 30 to 40 minutes 3 times a week is sufficient for most patients.   When exercising, follow this plan:  - First, stay hydrated before, during, and after exercise.    - Second part of the exercise plan is to eat sufficient food about an hour and a half before you exercise. Exercise causes one's blood sugar level to decrease, and it is important to have a source of energy.   - Final part of the exercise plan is to warm-up. Do not jump into sudden, vigorous exercise if that triggers a headache or migraine.       To read more go to https://americanmigrainefoundation.org/resource-library/effects-of-exercise-on-headaches-and-migraines

## 2022-04-08 NOTE — OP PT TREATMENT LOG
Exercises Current Session Performed   Yes/No Time   NEURO RE-ED  CPT 89332     Not performed   Postural re-ed         Diaphragmatic Breathing         KTaping                             THER ACT  CPT 73275     Not performed    Anatomy and physiology of lymphatic system, POC and Goal Review Initial examination 1/18/22 1/25/22: B/L UE Girth measurements  LUE: 51,414.56 ml   RUE: 44,333.64 ml   Limb volume difference: 15.97%     2/24/22:   LUE: 49,639.16 ml   RUE: 44,333.64 ml   Limb volume difference: 11.97%                      HEP/Education Handouts:  What is Lymphedema  Healthy Tips  Self-MLD- 2/11/22 2/11/22: Pt instructed to be more consistent with wearing LUE compression garments in order to continue to reduce swelling and avoid refill. Pt verbalizes understanding     2/18/22: Same as above     2/24/22: Results of progress note discussed. Pt encouraged to continue to be consistent with compression program in order to continue to see results in swelling reduction.     3/4/22: Results of L AROM assessment and LLIS discussed with pt. Pt encouraged to continue current compression program and HEP.    3/18/22. Pt and PT agree on extending POC for 1x/wk for 5 weeks. Coordinating with ortho PT.    3/25/22: See assessment for details.     3/28/22: Reviewed neck and head MLD sequence to assist with swelling reduction in L neck region. PT demonstrated sequence and pt able to repeat sequence. PT scheduled pt for 2 more lymphedema therapy appointments.                                                                 Postural education         LLIS   1/25/22: 38/68= 55.88% impairment   3/4/22: 28/68= 36.76% impairment   3/18/22: Not assessed due to time constraint.  3/25/22: 37/68 = 54% impairment.            Progress note   Reviewed subjective hx and goals, BUE girth measurements, palpation/skin check       Re-evaluation  3/18/22: Reviewed subjective hx and goals, L UE ROM, palpation/skin check      BUE AROM assessment    see treatment note   3/4/22: L UE AROM measurements       THER EX  CPT 37716     Not performed    HEP  Progress remedial TE        SUPINE THER-EX AAROM Flex/Abd/ER  Serratus Punches  TAC x3 sec hold   TAC + chin tuck x 3 sec hold  TAC + chin tuck + PA x 3 sec hold  TAC + chin tuck + Diag PA x 3 sec hold  SL ER  ER iso with flexion        Prone Y's, T's, I's, Rows     Push-pull  90/90 hold  TAC + alt UE ext  Dying bug   TAC + AB with rev fly  TAC + AB with OH flexion                       SEATED THER-EX Shoulder rolls  Scap squeezes  No monies  Pulleys                 STANDING THER-EX with  AAROM Ext  Wall slides  Shoulder Isos      Finger flexion and extension AROM x 15   Wrist flexion and extension AROM x 15   Elbow flexion and extension AROM x 15   Posterior shoulder rolls x 15                                        STRETCHING B UT  B Levator  Pec stretch  Open books  Pball Roll out                 GAIT TRAINING  CPT 69029     Not performed             MANUAL  CPT 73530     53-67 minutes    Joint mobilization 1st rib oscillation      DTM/MFR/TrP release Gentle FA fibrosis massage   Yes      Strain-counterstrain L scalenes and SCM      Scar/cord mobilization  L axillary pseudocording mobilization performed  Yes      SSCB   2/11/22: New Tubigrip size D administered and donned this date. Pt did not bring finger bandages to session and reports she will daphne finger bandages when she gets home.     2/18/22: Finger bandages and Tubigrip size D donned this date of L UE.      3/4/22, 3/11/22, 3/18/22, 3/28/22: Lotion L UE donned  L glove and compression arm sleeve donned and L UE compression wrap with hand piece donned on top of sleeve and glove.     4/8/22: Lotion L UE, donned L finger bandages,  compression arm sleeve, and L UE compression wrap with hand piece                                Yes      MLD Positioning: supine, elevated head, x3 pillows, bolster under knees     1. Full Neck Sequence  2. Held abdominal  sequence.  3. Anterior interaxillary anastamoses, L axillary-inguinal anastamosis  4. Full L UE sequence without breast (medial/inferior)  5. Full Neck Sequence        Yes 1-5         MODALITIES     Not performed   Lymphatouch                             PRODUCTS   PROGRESS

## 2022-04-08 NOTE — PROGRESS NOTES
[facility]  Sparkle Reynoso MD  [address] ([address])  [place] [address]       Patient ID: Alecia Cruz    : 1960  MRN: [id number]                                            Visit Date: 2022  Encounter Provider: Sparkle Reynoso  Referring Provider: Mahendra Begum DO           Assessment/Plan   Problem List Items Addressed This Visit        Nervous    Cervical radiculopathy    Relevant Orders    MRI CERVICAL SPINE WITHOUT CONTRAST    Occipital neuralgia of left side    Relevant Orders    MRI BRAIN WITH AND WITHOUT CONTRAST    BUN    Creatinine, serum    Trigeminal neuralgia of left side of face - Primary    Relevant Orders    MRI BRAIN WITH AND WITHOUT CONTRAST       Digestive    Lesion of parotid gland    Relevant Orders    Ambulatory referral to ENT    MRI BRAIN WITH AND WITHOUT CONTRAST      Other Visit Diagnoses     Vitamin D deficiency        Relevant Orders    Vitamin D 25 hydroxy    Vitamin B12 deficiency        Relevant Orders    Vitamin B12          Please message us via My Chart with any questions or concerns    Cervicalgia  Basic neck exercises for daily use:    - Neck pathology and poor posture, with straightening of the normal cervical lordosis, can cause headaches.  Tightening of the neck muscles can irritate the nerves in the occipital region of the head and cause or worsen head pain. Thus neck strengthening and relaxation exercises, can help improve this particular pain. It is importance to have good posture for improving shoulder, neck, and head pain.    - Here are some exercises which should take 5 minutes:     1. Standing, drop your head to one side while continuing to look ahead. Hold for 10 seconds then swap sides. Repeat twice more each side. To increase the stretch, drop the opposite shoulder.    2. Standing again, lower your chin to your chest, hold for 10 and then look up to the ceiling and hold for 10. Repeat twice  more.     3. Next, standing straight again, look over your right shoulder and hold firm for 10 seconds, then over your left shoulder for 10. Repeat this 3 times.     4. Finally, while sitting upright, bring your head forward and hold for 10, then all the way back and hold for 10.    If this simple exercise does not help improve the posture, we will consider formal physical therapy in the future.       Left occipital neuralgia-could be secondary to lymphadenopathy with edema.  We will look for any other underlying pathology as well.  MRI of the cervical spine ordered.  Trigeminal neuralgia on the left side suspect could be secondary to the parotid gland mass noted-we will refer to ENT for evaluation.    Return in about 6 weeks (around 5/20/2022).         _________________________________________________________________      Chief Complaint: Trigeminal Neuralgia (Trigeminal neuralgia, occipital neuralgia. PCP Dr. Begum. Few months ago, severe migraines, cant sleep. When she moves it is all over the head, especially the L side. Now its the R side, back of neck as well, front of head area. Did MRI's and other imaging. Parotid glad abnormality. Thinking cluster headaches? PT helped for a little bit. Turning  head is painful, shoots through the ear area.  )      Subjective     We had the pleasure of evaluating Alecia Cruz in neurological consultation today. As you know she  is a 61 y.o.  years old  right handed female.  she is here today for evaluation of trigeminal neuralgia.  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    Personal history: lives with her sister currently.    Medical history review:   QTC: 4/29/2020-   Tobacco use: never used  Vaping: never used  Alcohol use: does not use  Illicit drug use: never used  Daily Caffeine intake? Coffee - none    , Tea -  none    , Soda - none  Daily water intake?  30 Oz  History of breast cancer (BRCA - neg) - 2007 - got chemo/xrt/hormone  therapy until 2009 - left arm lymphedema  7 mm left parotid lesion, likely small pleomorphic adenoma  Acquired hypothyroidism  Paroxysmal atrial fibrillation  Hypercholesterolemia  Chronic back pain neck pain    Psychiatry history review:   Depression: yes  Anxiety: yes  Seeing a psychiatrist/ How often? no  Seeing at therapist/ how often? no    Headache/pain history:  Any family history of migraines? none  Any family history of aneurysms? none  Family history of any other neurological disease?  Mother with Alzheimer and passed away at age 84    Have you seen someone else for headaches or pain? 2020    Headaches started at what age?  17 years of age    What is your current pain level?   Headache: 0/10  Neck pain: 0/10  Facial pain: 0/10 - but if she pushes on the left side of the face it is 9/10    How often do the headaches/pain occur?   Headaches: daily    Neck pain: associated with headaches  Facial pain: nightly    How often do you take abortive medication for headache in a week?  Over-the-counter medication:now once a week but was using it often in the past  Migraine specific medication:none    Are you ever headache free? Yes she is     Aura/Warning and how long does it last?   Clear/gray floater -   Duration: minutes  Frequency: 5 times a month       What time of the day do headaches/pain start?  Headache: night time now  Neck pain: with headaches   Facial pain: at night time with trigger    How long do the headaches/pain last?   Headache: all night as long as she is lying down - was on both sides but with pt now only on the left side  Neck pain: same as above  Facial pain: as long as she is lying on the left side of her face    Describe your usual headache/pain?   Headache: throbbing, pressure and stabbing  Neck pain:tightness, pressure  Facial pain:numbness stabbing    Where is your headache/pain located?   Headaches: left side of her occipital region and radiates up  Neck pain: left side of her neck and  shoulder  Facial pain: left v 2-3 distribution    What is the intensity of pain?   Headache:   9-10/10  Neck pain:9 to 10/10  Facial pain: 10/10    Associated symptoms with headache or neck pain:   - Photophobia, Phonophobia,   - Insomnia   - Stiff or sore neck  - Dizziness  - Light headed  - Off balance   - Problems with concentration   - Prefer to be in a cool, quiet, dark room     Pain is worse are worse if the patient: neck movement or touching the side of her neck and face    Triggers: touching the left occipital region and left side of her face  What time of the year do headaches occur more frequently? none    What treatment have you had in the past or currently using for headaches/pain/mood?   Trigger point injection/Nerve block performed and how often? NO not for her neck  Epidural injections or trans foraminal injections performed? NO   Alternative therapies? physical therapy  CBD or THC for your headaches and how often? NO   Other headache devices? NO   Botulinum toxin injection performed and how often? NO   Headache infusions:NO   Preventive medication therapy:   -Vitamin C, vitamin D3, coq.10, omega-3,  -  -Ambien 10 mg,  -Metoprolol 25 mg capsule sprinkle/Toprol-XL,  -cyclobenzaprine 5 mg,  -Benadryl 25 mg injection,  Abortive medication Therapy:   -Aspirin 81 mg, paroxetine 2020 mg capsule,  -Reglan 10 mg injection,  -      Have you ever had any Brain imaging?   - I personally reviewed old notes over the last few months   - I personally reviewed images on PACS    2/17/2020-MRI of the brain with and without contrast:  STUDY:  MRI of the Brain without and  with contrast   CLINICAL HISTORY: Headache.  IMPRESSION: No acute intracranial abnormality. No acute infarct, mass effect or  acute intracranial hemorrhage.   COMMENT:  Technique:  The brain was scanned in multiple planes with a variety of pulse  sequences without and  with contrast.  7cc of Gadavist IV contrast were given  without adverse contrast  reaction.   Comparison:  CT 1/26/2020   Findings:  Sulci, ventricles and basal cisterns are within normal limits for  patient's stated age.  Scattered foci of hyperintense signal are seen on  T2-weighted and FLAIR sequences in the subcortical, deep and periventricular  white matter; this is a nonspecific finding, most likely from age advanced  microangiopathic changes. No mass-effect, intracranial hemorrhage, acute  segmental infarct or extra-axial fluid collection is seen. Cerebellar tonsils  are normal in position.  Expected signal voids are seen in the intracranial  vessels at the skull base.  No abnormal enhancement is seen.  The orbits and  sella are unremarkable.   The paranasal sinuses and mastoid air cells are clear.    6/3/2020-MRI cervical spine:  CLINICAL HISTORY: Cervical radiculopathy.   COMMENT: MRI examination of the cervical spine was performed without intravenous  contrast following the [facility] standard protocol.   Comparison: Cervical radiographs 6/2/2020.   Cervicothoracic alignment: Straightening and reversal of the normal cervical  lordosis.  No subluxation.  Prevertebral soft tissues: Normal in thickness.  Vertebral bodies: Normal in height, noting endplate irregularity and  degenerative endplate marrow signal changes ordering the C3-C4, C4-C5, and C6-C7  disc spaces.  Intervertebral discs: Multilevel loss of intervertebral disc height and  signal, appearing moderate at C3-C4 and C4-C5.   Cervical and upper thoracic spinal canal: Mild acquired compromise at C4-C5.  Visualized spinal cord: Normal in caliber and signal.  Visualized contents of the posterior fossa: Normal in appearance.   Axial images:  C2-3: Facet hypertrophy.  Mild left neural foraminal stenosis.  No central  stenosis.  C3-4: Uncovertebral and facet hypertrophy.  Moderate bilateral neural  foraminal stenosis.  No central stenosis.  C4-5: Disc bulge eccentric dorsolaterally to the left.  Uncovertebral and  facet  hypertrophy.  Severe left and moderate right neural foraminal stenosis.  Mild central stenosis.  C5-6: Disc osteophyte complex and predominantly uncovertebral hypertrophy.  Severe right and moderate left neural foraminal stenosis.  No central stenosis.  C6-7: Disc osteophyte complex and predominantly uncovertebral hypertrophy.  Moderate to severe left and moderate right neural foraminal stenosis.  No  central stenosis.  C7-T1: No disc herniation.  No central or neural foraminal stenosis.   Visualized soft tissues: Normal.  IMPRESSION: Multilevel cervical spondylosis, most pronounced at C4-C5 where  there is mild central spinal canal stenosis.  No spinal cord compression or  spinal cord signal abnormality at any level.      Medications:   Current Outpatient Medications:   •  alpha lipoic acid, bulk, powder, Take by mouth., Disp: , Rfl:   •  ascorbic acid (VITAMIN C) 500 mg tablet, Take 500 mg by mouth daily., Disp: , Rfl:   •  aspirin 81 mg enteric coated tablet, Take 81 mg by mouth daily., Disp: , Rfl:   •  cetirizine (ZyrTEC) 10 mg tablet, Take 10 mg by mouth daily., Disp: , Rfl:   •  cholecalciferol, vitamin D3, 1,000 unit (25 mcg) tablet, Take 1,000 Units by mouth daily., Disp: , Rfl:   •  coenzyme Q10 (COQ10) 60 mg capsule, Take by mouth., Disp: , Rfl:   •  cyclobenzaprine (FLEXERIL) 5 mg tablet, Take 1 tablet (5 mg total) by mouth 3 (three) times a day as needed for muscle spasms for up to 14 days., Disp: 30 tablet, Rfl: 0  •  diphenhydrAMINE (SOMINEX) 25 mg tablet, daily., Disp: , Rfl:   •  metoprolol succinate XL (TOPROL-XL) 25 mg 24 hr tablet, Take 1 tablet (25 mg total) by mouth nightly., Disp: 90 tablet, Rfl: 3  •  multivitamin (THERAGRAN) tablet, Take 1 tablet by mouth daily., Disp: , Rfl:   •  naproxen sodium 220 mg capsule, Take by mouth daily as needed., Disp: , Rfl:   •  omega-3-dha-epa-dpa-fish oil 1,050 mg(300 mg -675 mg-75 mg) capsule, , Disp: , Rfl:   •  vitamin E 100 unit capsule, , Disp: , Rfl:    •  betamethasone dipropionate (DIPROSONE) 0.05 % lotion, once as needed.  , Disp: , Rfl:   •  flecainide (TAMBOCOR) 50 mg tablet, Take 1 tablet (50 mg total) by mouth 2 (two) times a day., Disp: 180 tablet, Rfl: 1  •  fluticasone propionate (FLONASE) 50 mcg/actuation nasal spray, Administer 2 sprays into each nostril daily as needed for rhinitis., Disp: 1 Bottle, Rfl: 0  •  levothyroxine (SYNTHROID) 88 mcg tablet, Take 1 tablet (88 mcg total) by mouth daily., Disp: 90 tablet, Rfl: 3  •  XIFAXAN 550 mg tablet, , Disp: , Rfl:     Past Medical History:  has a past medical history of A-fib (CMS/HCC), Breast cancer (CMS/HCC) (2008), Colon polyp, Fibrocystic breast, Fibroid, History of partial hysterectomy (03/2000), History of radiation therapy, antineoplastic chemo, lipoma (2008), Hypothyroidism, Malignant neoplasm of upper-outer quadrant of left breast in female, estrogen receptor negative (CMS/HCC) (9/10/2021), PAF (paroxysmal atrial fibrillation) (CMS/[facility]), PVC (premature ventricular contraction), and Screening for breast cancer.    She has no past medical history of Hormone replacement therapy or Melanoma (CMS/[facility]).    Past Surgical History:  has a past surgical history that includes Partial hysterectomy; Rotator cuff repair (Left); Mandible fracture surgery; Hysterectomy; Breast lumpectomy (Left, 2008); Breast biopsy (Left, 2008); Breast biopsy (Right, 2021); Sinus surgery; Lumbar puncture; and Dental surgery.    Social History:   Social History     Tobacco Use   • Smoking status: Never Smoker   • Smokeless tobacco: Never Used   Substance Use Topics   • Alcohol use: No   • Drug use: No       Family History: family history includes Alzheimer's disease in her biological mother; Arrhythmia in her biological mother; COPD in her biological brother; Diabetes in her biological father, biological sister, and paternal grandfather; Heart disease in her biological sister; Hypertension in her biological brother and  "biological sister; Lung cancer in her father's brother, father's sister, father's sister, and paternal cousin; Melanoma in her biological sister; Ovarian cancer in her paternal cousin; Prostate cancer in her biological brother, biological brother, father's brother, father's brother, father's brother, father's brother, and paternal grandfather; Sarcoidosis in her biological sister; Stomach cancer in her paternal grandmother; Thyroid cancer in her paternal cousin.    Allergies: is allergic to trazodone-dietary supp no.8, amoxicillin trihydrate, cephalexin, ciprofloxacin, codeine, potassium clavulanate, and sulfa (sulfonamide antibiotics).     Review of Systems  All other systems reviewed and negative except as noted in the HPI.      The following have been reviewed and updated as appropriate in this visit:   Allergies  Meds  Problems           Objective   Physical Exam:    Visit Vitals  /78   Pulse 84   Temp 36.5 °C (97.7 °F) (Oral)   Resp 18   Ht 1.702 m (5' 7\")   Wt 70.9 kg (156 lb 6.4 oz)   SpO2 97%   BMI 24.50 kg/m²         Musculoskeletal: - No injury or deformity  - Range of motion-limited to movement especially towards the right.     Behavior/Emotional: Appropriate, cooperative     Neurologic Exam:  Alert and oriented.       CN: intact hyperesthesia with touch on the V2 V3 distribution.    Motor: moving all extremities.    Sensory examination: was normal to  light touch    Coordination: No tremor noted finger-to-nose intact    Reflexes:3/4 throughout.       Gait: Was narrow based.             Sparkle Reynoso MD    I spent 70 minutes on this date of service performing the following activities: obtaining history, performing examination, entering orders, documenting, preparing for visit, obtaining / reviewing records, providing counseling and education and independently reviewing study/studies.   "
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
## 2022-04-11 ENCOUNTER — TELEPHONE (OUTPATIENT)
Dept: NEUROLOGY | Facility: CLINIC | Age: 62
End: 2022-04-11
Payer: COMMERCIAL

## 2022-04-11 ENCOUNTER — HOSPITAL ENCOUNTER (OUTPATIENT)
Dept: PHYSICAL THERAPY | Facility: HOSPITAL | Age: 62
Setting detail: THERAPIES SERIES
Discharge: HOME | End: 2022-04-11
Attending: STUDENT IN AN ORGANIZED HEALTH CARE EDUCATION/TRAINING PROGRAM
Payer: COMMERCIAL

## 2022-04-11 DIAGNOSIS — I89.0 LYMPHEDEMA DUE TO AND NOT CONCURRENT WITH IONIZING RADIOTHERAPY: Primary | ICD-10-CM
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
## 2022-04-11 DIAGNOSIS — E55.9 VITAMIN D DEFICIENCY: ICD-10-CM

## 2022-04-11 DIAGNOSIS — M54.81 OCCIPITAL NEURALGIA OF LEFT SIDE: ICD-10-CM

## 2022-04-11 DIAGNOSIS — M54.12 CERVICAL RADICULOPATHY: ICD-10-CM

## 2022-04-11 DIAGNOSIS — E53.8 VITAMIN B12 DEFICIENCY: ICD-10-CM

## 2022-04-11 DIAGNOSIS — M54.81 OCCIPITAL NEURALGIA OF LEFT SIDE: Primary | ICD-10-CM

## 2022-04-11 DIAGNOSIS — G50.0 TRIGEMINAL NEURALGIA OF LEFT SIDE OF FACE: ICD-10-CM

## 2022-04-11 PROCEDURE — 97140 MANUAL THERAPY 1/> REGIONS: CPT | Mod: GP

## 2022-04-11 NOTE — TELEPHONE ENCOUNTER
----- Message from Marianna Champagne LPN sent at 4/11/2022  3:19 PM EDT -----  Regarding: RE: botox for ICD  Patient called, updated on approval. Will be here 4/14/22 @ 11am thanks      ----- Message -----  From: Lauren Clark  Sent: 4/11/2022   1:52 PM EDT  To: Dacia High MA, Sparkle Reynoso MD, #  Subject: RE: botox for ICD                                Dysport 500 units approved for 4 visits  Auth # [id number] / [id number]  Valid 4/11/2022-4/11/2023  I scanned authorization into media. Approved for buy & bill. Patient can be scheduled whenever if she does not already have an appointment. Thank you!  ----- Message -----  From: Lauren Clark  Sent: 4/11/2022  12:59 PM EDT  To: Dacia High MA, Sparkle Reynoso MD, #  Subject: RE: botox for ICD                                Dysport 500 units pending with [employer]  Pending Authorization Number : [id number]  Once approved, buy & bill is ok  Thank you!  ----- Message -----  From: Sparkle Reynoso MD  Sent: 4/11/2022  12:48 PM EDT  To: Laruen Alexander, #  Subject: RE: botox for ICD                                Lets do Dysport for patient's isolated cervical dystonia.  Thank you  ----- Message -----  From: Lauren Clark  Sent: 4/11/2022  10:35 AM EDT  To: Sparkle Reynoso MD, Darby Negron, #  Subject: RE: botox for ICD                                Dr. Reynoso- patient's insurance has a step-therapy request. They prefer she try Dysport or Xeomin before approving the Botox.   Would you like to change the request to Dysport or Xeomin for her ICD?  Thank you!  ----- Message -----  From: Lauren Clark  Sent: 4/11/2022   8:46 AM EDT  To: Sparkle Reynoso MD, Darby Negron, #  Subject: RE: botox for ICD                                Submitted with [employer]  Pending Authorization Number : [id number]  Once approved- Ed [employer] Prabhjot  Thank you!  ----- Message -----  From: Darby Negron  Sent: 4/8/2022   2:30 PM EDT  To: Sparkle  LUCHO Reynoso MD, Lauren Clark, #  Subject: RE: botox for ICD                                I put her on the scheduled for 4.14.2022 at 11:00 am just in the mean time I explained if we don't get her Botox Approved by then I will reschedule her   ----- Message -----  From: Sparkle Reynoso MD  Sent: 4/8/2022   2:11 PM EDT  To: Darby Negron, Lauren Clark, #  Subject: botox for ICD                                    Need Botox for patient's isolated cervical dystonia.  If we can get this as soon as possible that would be really appreciated.  Thank you

## 2022-04-11 NOTE — TELEPHONE ENCOUNTER
2.  Please provide her MRI Pre-certs by Monday 4.18.22 at noon or her apt on 4.19.22 will be cancelled.
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
## 2022-04-11 NOTE — OP PT TREATMENT LOG
Exercises Current Session Performed   Yes/No Time   NEURO RE-ED  CPT 97183     Not performed   Postural re-ed         Diaphragmatic Breathing         KTaping                             THER ACT  CPT 18242     0-7 minutes    Anatomy and physiology of lymphatic system, POC and Goal Review Initial examination 1/18/22 1/25/22: B/L UE Girth measurements  LUE: 51,414.56 ml   RUE: 44,333.64 ml   Limb volume difference: 15.97%     2/24/22:   LUE: 49,639.16 ml   RUE: 44,333.64 ml   Limb volume difference: 11.97%                      HEP/Education Handouts:  What is Lymphedema  Healthy Tips  Self-MLD- 2/11/22 2/11/22: Pt instructed to be more consistent with wearing LUE compression garments in order to continue to reduce swelling and avoid refill. Pt verbalizes understanding     2/18/22: Same as above     2/24/22: Results of progress note discussed. Pt encouraged to continue to be consistent with compression program in order to continue to see results in swelling reduction.     3/4/22: Results of L AROM assessment and LLIS discussed with pt. Pt encouraged to continue current compression program and HEP.    3/18/22. Pt and PT agree on extending POC for 1x/wk for 5 weeks. Coordinating with ortho PT.    3/25/22: See assessment for details.     3/28/22: Reviewed neck and head MLD sequence to assist with swelling reduction in L neck region. PT demonstrated sequence and pt able to repeat sequence. PT scheduled pt for 2 more lymphedema therapy appointments.     4/11/22: Pt educated on d/c evaluation next visit. Pt verbalizes understanding. Pt educated that HEP will be progressed next session for final HEP. At end of session, pt reports she has misplaced her L compression glove and reports she needs to order a new one. PT to follow up with [facility] and primary PT by next session regarding compression glove sizing.                                                                          Yes      Postural education         LLIS    1/25/22: 38/68= 55.88% impairment   3/4/22: 28/68= 36.76% impairment   3/18/22: Not assessed due to time constraint.  3/25/22: 37/68 = 54% impairment.            Progress note   Reviewed subjective hx and goals, BUE girth measurements, palpation/skin check       Re-evaluation  3/18/22: Reviewed subjective hx and goals, L UE ROM, palpation/skin check      BUE AROM assessment   see treatment note   3/4/22: L UE AROM measurements       THER EX  CPT 93021     Not performed    HEP  Progress remedial TE        SUPINE THER-EX AAROM Flex/Abd/ER  Serratus Punches  TAC x3 sec hold   TAC + chin tuck x 3 sec hold  TAC + chin tuck + PA x 3 sec hold  TAC + chin tuck + Diag PA x 3 sec hold  SL ER  ER iso with flexion        Prone Y's, T's, I's, Rows     Push-pull  90/90 hold  TAC + alt UE ext  Dying bug   TAC + AB with rev fly  TAC + AB with OH flexion                       SEATED THER-EX Shoulder rolls  Scap squeezes  No monies  Pulleys                 STANDING THER-EX with  AAROM Ext  Wall slides  Shoulder Isos      Finger flexion and extension AROM x 15   Wrist flexion and extension AROM x 15   Elbow flexion and extension AROM x 15   Posterior shoulder rolls x 15                                        STRETCHING B UT  B Levator  Pec stretch  Open books  Pball Roll out                 GAIT TRAINING  CPT 91105     Not performed             MANUAL  CPT 92727     38-52 minutes    Joint mobilization 1st rib oscillation      DTM/MFR/TrP release Gentle FA fibrosis massage   Yes      Strain-counterstrain L scalenes and SCM      Scar/cord mobilization  L axillary pseudocording mobilization performed      SSCB   2/11/22: New Tubigrip size D administered and donned this date. Pt did not bring finger bandages to session and reports she will daphne finger bandages when she gets home.     2/18/22: Finger bandages and Tubigrip size D donned this date of L UE.      3/4/22, 3/11/22, 3/18/22, 3/28/22: Lotion L UE donned  L glove and compression  arm sleeve donned and L UE compression wrap with hand piece donned on top of sleeve and glove.     4/8/22: Lotion L UE, donned L finger bandages,  compression arm sleeve, and L UE compression wrap with hand piece     4/11/22: Pt donned L finger bandages independently this date. PT donned compression arm sleeve, L UE compression wrap with hand piece.                                         Yes      MLD Positioning: supine, elevated head, x3 pillows, bolster under knees     1. Full Neck Sequence  2. Held abdominal sequence.  3. Anterior interaxillary anastamoses, L axillary-inguinal anastamosis  4. Full L UE sequence without breast (medial/inferior)  5. Full Neck Sequence        Yes 1-5         MODALITIES     Not performed   Lymphatouch                             PRODUCTS   PROGRESS

## 2022-04-11 NOTE — TELEPHONE ENCOUNTER
Ms Alexi Cruz called with the following clinical requests:  1. Please change her lab requisition form to a [facility] script.  2.  Please provide her MRI Pre-certs by Monday 4.18.22 at noon or her apt on 4.19.22 will be cancelled.  Thank you

## 2022-04-12 LAB
25(OH)D3 SERPL-MCNC: 40 NG/ML (ref 30–100)
ABO GROUP BLD: NORMAL
BUN SERPL-MCNC: 11 MG/DL (ref 7–25)
CREAT SERPL-MCNC: 0.77 MG/DL (ref 0.5–0.99)
QUEST EGFR AFRICAN AMERICAN: 95 ML/MIN/1.73M2
QUEST EGFR NON-AFR. AMERICAN: 82 ML/MIN/1.73M2
VIT B12 SERPL-MCNC: 818 PG/ML (ref 200–1100)

## 2022-04-12 NOTE — TELEPHONE ENCOUNTER
[facility] has a team . If patient is already scheduled those are being pre auth first . They will reach out a few days before to let us /patient know if it was denied .

## 2022-04-12 NOTE — TELEPHONE ENCOUNTER
Daniel zelaya I do not do the pre-cert for MRI's anymore they have a special team that handles it now

## 2022-04-14 ENCOUNTER — OFFICE VISIT (OUTPATIENT)
Dept: NEUROLOGY | Facility: CLINIC | Age: 62
End: 2022-04-14
Payer: COMMERCIAL

## 2022-04-14 ENCOUNTER — HOSPITAL ENCOUNTER (OUTPATIENT)
Dept: PHYSICAL THERAPY | Facility: HOSPITAL | Age: 62
Setting detail: THERAPIES SERIES
Discharge: HOME | End: 2022-04-14
Attending: STUDENT IN AN ORGANIZED HEALTH CARE EDUCATION/TRAINING PROGRAM
Payer: COMMERCIAL

## 2022-04-14 ENCOUNTER — APPOINTMENT (OUTPATIENT)
Dept: RADIOLOGY | Age: 62
End: 2022-04-14
Payer: COMMERCIAL

## 2022-04-14 VITALS
HEART RATE: 80 BPM | DIASTOLIC BLOOD PRESSURE: 78 MMHG | OXYGEN SATURATION: 98 % | RESPIRATION RATE: 20 BRPM | TEMPERATURE: 97.7 F | SYSTOLIC BLOOD PRESSURE: 128 MMHG
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
## 2022-04-14 DIAGNOSIS — G24.3 ISOLATED CERVICAL DYSTONIA: Primary | ICD-10-CM

## 2022-04-14 DIAGNOSIS — I89.0 LYMPHEDEMA DUE TO AND NOT CONCURRENT WITH IONIZING RADIOTHERAPY: Primary | ICD-10-CM

## 2022-04-14 PROCEDURE — 97140 MANUAL THERAPY 1/> REGIONS: CPT | Mod: GP

## 2022-04-14 PROCEDURE — 97530 THERAPEUTIC ACTIVITIES: CPT | Mod: GP

## 2022-04-14 PROCEDURE — 99999 PR OFFICE/OUTPT VISIT,PROCEDURE ONLY: CPT | Performed by: PSYCHIATRY & NEUROLOGY

## 2022-04-14 PROCEDURE — 64616 CHEMODENERV MUSC NECK DYSTON: CPT | Mod: 50 | Performed by: PSYCHIATRY & NEUROLOGY

## 2022-04-14 NOTE — PROGRESS NOTES
Procedures   [facility]  Sparkle Reynoso MD  [address] ([address])  [place] [address]         Indication: Isolated cervical dystonia    Procedure details:     Position:  Upright    Botulinum toxin:     Type:  Type A    Brand: Dysport     Procedures:                 Right  injection amount:  6.25 units      Left  injection amount::  6.25 units      Procerus (midline) injection amount: 12.5 units        Left orbicularis oculi (lateral) injection amount: 12.5 units        Right lateral frontalis injection amount:: 12.5 units      Right medial frontalis injection amount: 12.5 units     Left lateral frontalis injection amount: 12.5 units      Left medial frontalis injection amount:12.5 units        Right temporalis injection amount:  50 units      Left temporalis injection amount: 62.5 units        Left masseter muscle injection amount: 12.5 units  Left zygomatic major muscle injection amount: 12.5 units       Right occipitalis injection amount:  37.5 units      Left occipitalis injection amount:  37.5 units        Right cervical paraspinal injection amount:  25 units      Left cervical paraspinal injection amount:  25 units        Right trapezius injection amount:  37.5 units      Left trapezius injection amount:  37.5 units       Right sternocleidomastoid injection amount: 12.5 units    Left sternocleidomastoid injection amount: 37.5 units      Right lower trapezius injection amount: 12.5  units     Left lower trapezius injection amount: 12.5 units       Total Units:     Total units used: 500 units    Total units discarded: 0 units       Post-procedure details:     Chemodenervation: Isolated cervical dystonia    Patient tolerance of procedure:  Tolerated well, no immediate complications
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
## 2022-04-15 NOTE — OP PT TREATMENT LOG
Exercises Current Session Performed   Yes/No Time   NEURO RE-ED  CPT 93406     Not performed   Postural re-ed         Diaphragmatic Breathing         KTaping                             THER ACT  CPT 63537     23-37 minutes    Anatomy and physiology of lymphatic system, POC and Goal Review Initial examination 1/18/22 1/25/22: B/L UE Girth measurements  LUE: 51,414.56 ml   RUE: 44,333.64 ml   Limb volume difference: 15.97%     2/24/22:   LUE: 49,639.16 ml   RUE: 44,333.64 ml   Limb volume difference: 11.97%     4/14/22: Discharge evaluation   LUE: 48,018.50 ml   RUE: 44,333.64 ml   Limb volume difference: 8.31%                            Yes      HEP/Education Handouts:  What is Lymphedema  Healthy Tips  Self-MLD- 2/11/22 2/11/22: Pt instructed to be more consistent with wearing LUE compression garments in order to continue to reduce swelling and avoid refill. Pt verbalizes understanding     2/18/22: Same as above     2/24/22: Results of progress note discussed. Pt encouraged to continue to be consistent with compression program in order to continue to see results in swelling reduction.     3/4/22: Results of L AROM assessment and LLIS discussed with pt. Pt encouraged to continue current compression program and HEP.    3/18/22. Pt and PT agree on extending POC for 1x/wk for 5 weeks. Coordinating with ortho PT.    3/25/22: See assessment for details.     3/28/22: Reviewed neck and head MLD sequence to assist with swelling reduction in L neck region. PT demonstrated sequence and pt able to repeat sequence. PT scheduled pt for 2 more lymphedema therapy appointments.     4/11/22: Pt educated on d/c evaluation next visit. Pt verbalizes understanding. Pt educated that HEP will be progressed next session for final HEP. At end of session, pt reports she has misplaced her L compression glove and reports she needs to order a new one. PT to follow up with [facility] and primary PT by next session regarding compression glove  sizing.   4/14/22: Pt educated on results of discharge evaluation. Pt sized for L Liz compression glove, 20-30 mmHg. Sizing to be sent to [facility] and benefits analysis check. Pt educated on the importance of remaining consistent with compression therapy program to manage swelling. Pt verbalizes understanding.                                                                                              Yes      Postural education         LLIS   1/25/22: 38/68= 55.88% impairment   3/4/22: 28/68= 36.76% impairment   3/18/22: Not assessed due to time constraint.  3/25/22: 37/68 = 54% impairment.  4/14/22: 29/68= 42.65% impairment          Yes      Progress note   Reviewed subjective hx and goals, BUE girth measurements, palpation/skin check       Re-evaluation  3/18/22: Reviewed subjective hx and goals, L UE ROM, palpation/skin check      BUE AROM assessment   see treatment note   3/4/22: L UE AROM measurements       THER EX  CPT 25692     Not performed    HEP  Progress remedial TE        SUPINE THER-EX AAROM Flex/Abd/ER  Serratus Punches  TAC x3 sec hold   TAC + chin tuck x 3 sec hold  TAC + chin tuck + PA x 3 sec hold  TAC + chin tuck + Diag PA x 3 sec hold  SL ER  ER iso with flexion        Prone Y's, T's, I's, Rows     Push-pull  90/90 hold  TAC + alt UE ext  Dying bug   TAC + AB with rev fly  TAC + AB with OH flexion                       SEATED THER-EX Shoulder rolls  Scap squeezes  No monies  Pulleys                 STANDING THER-EX with  AAROM Ext  Wall slides  Shoulder Isos      Finger flexion and extension AROM x 15   Wrist flexion and extension AROM x 15   Elbow flexion and extension AROM x 15   Posterior shoulder rolls x 15                                        STRETCHING B UT  B Levator  Pec stretch  Open books  Pball Roll out                 GAIT TRAINING  CPT 16452     Not performed             MANUAL  CPT 51450     23-37 minutes    Joint mobilization 1st rib oscillation      DTM/MFR/TrP release Gentle FA  fibrosis massage        Strain-counterstrain L scalenes and SCM      Scar/cord mobilization  L axillary pseudocording mobilization performed      SSCB   2/11/22: New Tubigrip size D administered and donned this date. Pt did not bring finger bandages to session and reports she will daphne finger bandages when she gets home.     2/18/22: Finger bandages and Tubigrip size D donned this date of L UE.      3/4/22, 3/11/22, 3/18/22, 3/28/22: Lotion L UE donned  L glove and compression arm sleeve donned and L UE compression wrap with hand piece donned on top of sleeve and glove.     4/8/22: Lotion L UE, donned L finger bandages,  compression arm sleeve, and L UE compression wrap with hand piece     4/11/22, 4/14/22: Pt donned L finger bandages independently this date. PT donned compression arm sleeve, L UE compression wrap with hand piece.                                         Yes      MLD Positioning: supine, elevated head, x3 pillows, bolster under knees     1. Full Neck Sequence  2. Held abdominal sequence.  3. Anterior interaxillary anastamoses, L axillary-inguinal anastamosis  4. Full L UE sequence without breast (medial/inferior)  5. Full Neck Sequence        Yes 1, 3         MODALITIES     Not performed   Lymphatouch                             PRODUCTS   PROGRESS      L Juzo seamless glove   20-30 mmHg   Size Medium   Black  In progress - 4/15/22

## 2022-04-19 ENCOUNTER — HOSPITAL ENCOUNTER (OUTPATIENT)
Dept: RADIOLOGY | Facility: HOSPITAL | Age: 62
Discharge: HOME | End: 2022-04-19
Attending: PSYCHIATRY & NEUROLOGY
Payer: COMMERCIAL

## 2022-04-19 ENCOUNTER — TELEPHONE (OUTPATIENT)
Dept: NEUROLOGY | Facility: CLINIC | Age: 62
End: 2022-04-19
Payer: COMMERCIAL

## 2022-04-19 DIAGNOSIS — K11.9 LESION OF PAROTID GLAND: ICD-10-CM

## 2022-04-19 DIAGNOSIS — M54.81 OCCIPITAL NEURALGIA OF LEFT SIDE: ICD-10-CM

## 2022-04-19 DIAGNOSIS — M54.12 CERVICAL RADICULOPATHY: ICD-10-CM

## 2022-04-19 DIAGNOSIS — G50.0 TRIGEMINAL NEURALGIA OF LEFT SIDE OF FACE: ICD-10-CM

## 2022-04-19 PROCEDURE — G1004 CDSM NDSC: HCPCS

## 2022-04-19 PROCEDURE — 70553 MRI BRAIN STEM W/O & W/DYE: CPT | Mod: ME

## 2022-04-19 PROCEDURE — A9585 GADOBUTROL INJECTION: HCPCS | Mod: JW | Performed by: PSYCHIATRY & NEUROLOGY

## 2022-04-19 RX ORDER — GADOBUTROL 604.72 MG/ML
0.1 INJECTION INTRAVENOUS
Status: COMPLETED | OUTPATIENT
Start: 2022-04-19 | End: 2022-04-19

## 2022-04-19 RX ADMIN — GADOBUTROL 6.9 MMOL: 604.72 INJECTION INTRAVENOUS at 10:10

## 2022-04-19 NOTE — TELEPHONE ENCOUNTER
Called patient regarding answering service message that was left and she stated that she went to see the ENT that  referred her to see and he is unable to see any images in the system and need them to be faxed over

## 2022-04-21 ENCOUNTER — TRANSCRIBE ORDERS (OUTPATIENT)
Dept: SCHEDULING | Facility: REHABILITATION | Age: 62
End: 2022-04-21

## 2022-04-21 DIAGNOSIS — M26.69 OTHER SPECIFIED DISORDERS OF TEMPOROMANDIBULAR JOINT: Primary | ICD-10-CM

## 2022-04-22 DIAGNOSIS — M54.81 OCCIPITAL NEURALGIA OF LEFT SIDE: ICD-10-CM

## 2022-04-22 DIAGNOSIS — G24.3 ISOLATED CERVICAL DYSTONIA: Primary | ICD-10-CM

## 2022-05-12 LAB
LYMPH SUBSET INTERP BLD FC-IMP: NORMAL
SCAN RESULT: NORMAL

## 2022-05-25 ENCOUNTER — HOSPITAL ENCOUNTER (OUTPATIENT)
Dept: PHYSICAL THERAPY | Facility: HOSPITAL | Age: 62
Setting detail: THERAPIES SERIES
Discharge: HOME | End: 2022-05-25
Attending: OTOLARYNGOLOGY
Payer: COMMERCIAL

## 2022-05-25 DIAGNOSIS — M26.69 OTHER SPECIFIED DISORDERS OF TEMPOROMANDIBULAR JOINT: ICD-10-CM

## 2022-05-25 PROCEDURE — 97530 THERAPEUTIC ACTIVITIES: CPT | Mod: GP

## 2022-05-25 PROCEDURE — 97162 PT EVAL MOD COMPLEX 30 MIN: CPT | Mod: GP

## 2022-05-25 RX ORDER — AZELASTINE 1 MG/ML
2 SPRAY, METERED NASAL 2 TIMES DAILY PRN
COMMUNITY
Start: 2022-04-28 | End: 2024-02-08 | Stop reason: SDUPTHER

## 2022-05-25 RX ORDER — AZITHROMYCIN 250 MG/1
TABLET, FILM COATED ORAL
COMMUNITY
Start: 2022-04-28 | End: 2023-01-25 | Stop reason: ALTCHOICE

## 2022-05-25 RX ORDER — LIDOCAINE HYDROCHLORIDE 20 MG/ML
SOLUTION ORAL; TOPICAL
COMMUNITY
Start: 2022-04-24 | End: 2023-01-25 | Stop reason: ALTCHOICE

## 2022-05-25 NOTE — Clinical Note
[address] [place] [address]  [phone]      Dear DR. Davis,      Thank you for this referral. Please review the attached notes and plan of care for your approval.  Please contact our department with any questions.     Sincerely,     Lindsay Smith, PT        Referring Provider: By co-signing this Plan of Care (POC) either electronically or physically you agree to the following:    I have reviewed the the Plan of Care established by the therapist within this document and certify that the services are skilled and medically necessary. I have reviewed the plan and recommend that these services continue to meet the goals stated in this document.       EXTERNAL PROVIDER FAXING BACK:    PHYSICIAN SIGNATURE: __________________________________     DATE: ___________________  TIME: _____________    IMPORTANT:  If returning this Plan of Care by fax, please fax back ONLY the signature page.   _________________________________________________________________________      [facility] Fax: [phone]        PT EVALUATION FOR OUTPATIENT THERAPY    Patient: Alecia Cruz  MRN: [id number]  : 1960 61 y.o.   Referring Physician: Dean Davis DO  Date of Visit: 2022      Certification Dates:  22 through 22         Recommended Frequency & Duration:  2 times/week for up to 8 weeks     Diagnosis:   1. Other specified disorders of temporomandibular joint        Chief Complaints:   Chief Complaint   Patient presents with   • Dec ROM   • Pain   • Decreased Endurance   • Dec Strength       Precautions: other (see comments)    Past Medical History:   Past Medical History:   Diagnosis Date   • A-fib (CMS/[facility])    • Breast cancer (CMS/[facility])     left partial mastectomy axillary dissection, whole breast radiation and chemotherapy in  for a 2-1/2 cm invasive ductal carcinoma that was ER/SD negative and HER-2/alyson positive with 7-15 positive lymph nodes   • Colon polyp      couple on each colonoscopy per patient report   • Fibrocystic breast    • Fibroid    • History of partial hysterectomy 03/2000    c/o fibroids   • History of radiation therapy    • Hx antineoplastic chemo    • Hx of lipoma 2008    resected from shoulder   • Hypothyroidism    • Malignant neoplasm of upper-outer quadrant of left breast in female, estrogen receptor negative (CMS/HCC) 9/10/2021    2008-2.5 cm cancer with 7 of 15+ nodes treated with partial mastectomy, axillary dissection, chemotherapy and whole breast radiation.   • PAF (paroxysmal atrial fibrillation) (CMS/HCC)    • PVC (premature ventricular contraction)    • Screening for breast cancer        Past Surgical History:   Past Surgical History:   Procedure Laterality Date   • BREAST BIOPSY Left 2008   • BREAST BIOPSY Right 2021    benign ([facility] specimen [id number])   • BREAST LUMPECTOMY Left 2008    [facility] specimen [id number]   • DENTAL SURGERY     • HYSTERECTOMY      partial   • LUMBAR PUNCTURE     • MANDIBLE FRACTURE SURGERY     • PARTIAL HYSTERECTOMY     • ROTATOR CUFF REPAIR Left    • SINUS SURGERY           LEARNING ASSESSMENT    Assessment completed:  Yes    Learner name:  Alecia Cruz    Learner: Patient    Learning Barriers:  Learning barriers: No Barriers    Preferred Language: English     Needed: No    Education Provided:   Method: Discussion, Handout and Demonstration  Readiness: acceptance  Response: Demonstrated understanding and Verbalizes understanding      CO-LEARNER ASSESSMENT:    Completed: No            OBJECTIVE MEASUREMENTS/DATA:    Time In Session:  Start Time: 1707 (pt arrived alte)  Stop Time: 1800  Time Calculation (min): 53 min   Assessment and Plan - 05/25/22 1706        Assessment    Plan of Care reviewed and patient/family in agreement Yes     System Pathology/Pathophysiology Noted musculoskeletal     Functional Limitations in Following Categories (PT Eval) community/leisure;home management     Rehab Potential/Prognosis  good, to achieve stated therapy goals     Problem List decreased flexibility;decreased ROM;decreased strength;pain;decreased endurance     Clinical Assessment Pt is a 61 year old female with hx off A fib, breast cancer with chemo/radiation and lymphadema known to facility referred to PT for TMJ disorder. She presents with limited cervical and UE AROM(L>R), limited mandible AROM, severe TTP in left side of neck, postural deficit and functional limitations reported (45% disability on TMD questionnaire). Pt was edu on prognosis, diagnosis and importance of being compliant with managing her lymphadema. Pt will benefit from skilled PT to improve her flexibiltiy/ROM, strength, posture and function with decreased pain.     Planned Services CPT 23492 Manual therapy;CPT 09185 Neuromuscular Reeducation;CPT 18013 Self-care/Home management training;CPT 40522 Therapeutic activities;CPT 42151 Therapeutic exercises;CPT 91983 Electrical stimulation UNATTENDED;CPT 27979 Hot/Cold Packs therapy                General Information - 05/25/22 8276        Session Details    Document Type initial evaluation     Mode of Treatment individual therapy;physical therapy     Patient/Family/Caregiver Comments/Observations Pt arrived alone without brace or AD.     OP Specialty Orthopedics        General Information    Onset of Illness/Injury or Date of Surgery --   Neck pain (2018); TMJ (~2020)    Referring Physician Dean Davis, DO     History of present illness/functional impairment Chronic neck pain and back pain dating back to slip and fall in 2018. Started to notice pain in left posterior ear region, thought it was parotid gland related but was told it's her TMJ and was referred to PT.  Pt has hx of mandible fracture with surgery when she was 17. Pt describes pain as sudden, while she's talking or sitting still, usually resolve on its own. Hasn't noticed any consistent aggravating patterns or difference between morning and evenings except  lying on left side or applying pressure to that region. Denies numbness/tingling in UE but occasional radiating pain to left proximal arm. Pt was never told she has grinds her teeth, hasn't noticed clicking but referring physician was able to reproduce clicking. Pt was also treated at this facility for neck/UE lymphadema in 2020 and 2022 and neck/back pain this year.  Pt hasn't been fully compliant with garment wear because she is still waiting for the glove, will follow up with lymphadema therapist. Cervical MRI 04/2022 shows 2. Interval development of prevertebral fluid of unclear etiology that extends  from C1-2 through C5-6 and measures up to 6 mm in thickness at mid C4.  3. Unchanged multilevel spondylosis with neural foraminal stenoses as described,  worst at C3-4 on the left, C4-5 bilaterally, C5-6 on the right, and C6-7 on the  left where the exiting left C4, bilateral C5, right C6 and left C7 nerve roots,  respectively, remain impinged/compressed.  4. No disc herniation or high-grade spinal stenosis at any level.     Existing Precautions/Restrictions other (see comments)     Precautions comments hx of A fib, breast cancer with chemo/radiation and lymphadema        PT Time Calculation    Start Time 1707   pt arrived alte    Stop Time 1800     Time Calculation (min) 53 min                Pain/Vitals - 05/25/22 1706        Pain Assessment    Currently in pain No/Denies     Preferred Pain Scale number (Numeric Rating Pain Scale)     Pain Side/Orientation left     Pain: Body location Neck;Mouth (non-dental)     Pain Rating (0-10): Activity 10   sudden pain       Pain Intervention    Intervention  IE     Post Intervention Comments IE                  PT - 05/25/22 1706        Physical Therapy    Physical Therapy Ortho        PT Plan    Frequency of treatment 2 times/week     PT Duration 8 weeks     PT Cert From 05/25/22     PT Cert To 07/22/22     Date PT POC was sent to provider 05/25/22     Signed PT Plan of  "Care received?  No                   Living Environment    Living Environment - 05/25/22 1706        Living Environment    People in Home child(abhi), adult     Living Arrangements apartment               PLOF:     ROM    Range of Motion - 05/25/22 1700        LEFT: Upper Extremity AROM Assessment    Shoulder Flexion   135 degrees   pain    Shoulder Abduction   115 degrees   pain    Shoulder Internal Rotation   --   T6 - pain    Shoulder External Rotation   --   left UT - pain       RIGHT: Upper Extremity AROM Assessment    Shoulder Flexion   160 degrees     Shoulder Abduction   170 degrees     Shoulder Internal Rotation   --   T4    Shoulder External Rotation   --   C7       Cervical AROM    Cervical Flexion 50   \"jerking pain\"    Cervical Extension 40   \"jerking pain\"    Cervical Left SB 10   severe left sided pain and radiating pain to proximal shld    Cervical Right SB 15   pulling and pain    Cervical Left Rotation 50   left sided pain    Cervical Right Rotation 60        Mandible    AROM Mandible: LEFT lateral deviation 8mm   WNL    AROM Mandible: RIGHT lateral deviation 8mm   pain in left TMJ eccentrically    AROM Mandible Depression 40mm   pain in left TMJ    AROM Mandible Protrusion 3mm               MMT    Manual Muscle Tests - 05/25/22 1706        RIGHT: Upper Extremity Manual Muscle Test Assessment    Shoulder Flexion gross movement (4/5) good   pain    Shoulder Abduction gross movement (4/5) good   pain    Shoulder Internal Rotation gross movement (5/5) normal     Shoulder External Rotation gross movement (4+/5) good plus     Elbow Flex gross movement (5/5) normal     Elbow Extension gross movement (4+/5) good plus        LEFT: Upper Extremity Manual Muscle Test Assessment    Shoulder Flexion gross movement (4/5) good   pain    Shoulder Abduction gross movement (4/5) good   pain    Shoulder Internal Rotation gross movement (5/5) normal     Shoulder External Rotation gross movement (5/5) normal     Elbow " Flexion gross movement (5/5) normal     Elbow Extension gross movement (4+/5) good plus               Palpation    Palpation - 22        Palpation    Neck Palpation  TTP left UT, LS, periscapular, TMJ region, masseter               Balance/Posture    Balance and Posture - 22        Postural Deviations    Postural Deviations head and neck;upper back     Head and Neck forward head     Upper Back kyphosis     Comment, Postural Deviations Often leans foward onto legs in sitting        Posture    Posture postural deviations               Outcome Measures    PT Outcome Measures - 22        Other Outcome Measures Used/Comments    Other outcome measure used: TMD: 18/40: 45% disability                  Goals     • Mutually agreed upon pain goal      Mutually agreed upon pain goal: 3/10 with talking/sitting        • Pt TMJ goals        ST. Patient will improve cervical left ROT and SB by 5 degrees in 4 weeks  2. Patient will decrease pain level to 6/10 at worst in 4 weeks.  3. Patient will improve TMD score by 10% in 4 weeks. (45% at IE)  4. Demonstrate good sitting posture without cues in 4 weeks.    LTG  1. Patient will achieve pain free symmetrical cervical ROT/SB AROM in 6-8 weeks  2. Pain free mandible AROM in 4 weeks.  3. Improve UE strength by 1 MMT grade or more in 6-8 weeks.  4. 3/10 pain at worst in sitting and/or while talking.                    TREATMENT PLAN:    DOS    IE 22   30 Day 22   60 Day    90 Day    Precautions hx of A fib, breast cancer with chemo/radiation and lymphadema   Insurance Auth    Treatment   Current Session Time   Modalities Y/N Total Time for Session Not performed   Heat/Ice  CPT 27244  PRN   E. Stim Unattended  CPT 06993     Manual   CPT 68703 Y/N Total Time for Session Not performed   STM/MFR/TPR  Gentle STM to left masseter, UT, LS    Instrument Assisted STM      Mobilizations     ROM/Flexibility  Cervical ROT/SB PROM (focus on left)    Myofascial Decompression     Ther. Exercise  CPT 97288                   Group  Total Time for Session Not performed     Y/N Sets Reps Load Comment    UBE       BWD            Cervical AROM      HEP - Left ROT/SB   Scap retraction      HEP   Cervical retract      As satish            Mandible depression      AROM   Mandible protrusion      AROM   Mandible lateral glide      AROM                     Rows         Ext         No money     YTB             Supine shld flex wand         Supine horizontal abd         Openbook stretch                                                                                             Neuro Re-Ed  CPT 94345   Group  Total Time for Session Not performed     Y/N Sets Reps Load Comment                                          Gait  CPT 75353 Group Y/N Total Time for Session Not performed                                       Ther. Activity  CPT 77797 Group Y/N Total Time for Session 8-22 Minutes   Pt Edu  Y IE: Pt was educated on anatomy of diagnosis, POC, prognosis, activity modification, posture and HEP was reviewed (has copy from previous PT)  No show/cancellation policy reviewed with copy provided.                      Group  CPT 04689  Total Time for Session Not performed                 ASSESSMENT:    This 61 y.o. year old female presents to PT with above stated diagnosis. Physical Therapy evaluation reveals decreased flexibility, decreased ROM, decreased strength, pain, decreased endurance resulting in community/leisure, home management limitations. Alecia Cruz will benefit from skilled PT services to address limitation, work towards rehab and patient goals and maximize PLOF of chosen ADLs.     Planned Services: The patient's treatment will include CPT 58900 Manual therapy, CPT 77508 Neuromuscular Reeducation, CPT 74279 Self-care/Home management training, CPT 21032 Therapeutic activities, CPT 80846 Therapeutic exercises, CPT 35544 Electrical stimulation UNATTENDED, CPT  79957 Hot/Cold Packs therapy, .       Lindsay Smith, PT

## 2022-05-25 NOTE — PROGRESS NOTES
Referring Provider: By co-signing this Plan of Care (POC) either electronically or physically you agree to the following:    I have reviewed the the Plan of Care established by the therapist within this document and certify that the services are skilled and medically necessary. I have reviewed the plan and recommend that these services continue to meet the goals stated in this document.       EXTERNAL PROVIDER FAXING BACK:    PHYSICIAN SIGNATURE: __________________________________     DATE: ___________________  TIME: _____________    IMPORTANT:  If returning this Plan of Care by fax, please fax back ONLY the signature page.   _________________________________________________________________________      [facility] Fax: [phone]        PT EVALUATION FOR OUTPATIENT THERAPY    Patient: Alecia Cruz  MRN: [id number]  : 1960 61 y.o.   Referring Physician: Dean Davis DO  Date of Visit: 2022      Certification Dates:  22 through 22         Recommended Frequency & Duration:  2 times/week for up to 8 weeks     Diagnosis:   1. Other specified disorders of temporomandibular joint        Chief Complaints:   Chief Complaint   Patient presents with   • Dec ROM   • Pain   • Decreased Endurance   • Dec Strength       Precautions: other (see comments)    Past Medical History:   Past Medical History:   Diagnosis Date   • A-fib (CMS/HCC)    • Breast cancer (CMS/HCC)     left partial mastectomy axillary dissection, whole breast radiation and chemotherapy in  for a 2-1/2 cm invasive ductal carcinoma that was ER/IA negative and HER-2/alyson positive with 7-15 positive lymph nodes   • Colon polyp     couple on each colonoscopy per patient report   • Fibrocystic breast    • Fibroid    • History of partial hysterectomy 2000    c/o fibroids   • History of radiation therapy    • Hx antineoplastic chemo    • Hx of lipoma     resected from shoulder   • Hypothyroidism    •  Malignant neoplasm of upper-outer quadrant of left breast in female, estrogen receptor negative (CMS/HCC) 9/10/2021    2008-2.5 cm cancer with 7 of 15+ nodes treated with partial mastectomy, axillary dissection, chemotherapy and whole breast radiation.   • PAF (paroxysmal atrial fibrillation) (CMS/HCC)    • PVC (premature ventricular contraction)    • Screening for breast cancer        Past Surgical History:   Past Surgical History:   Procedure Laterality Date   • BREAST BIOPSY Left 2008   • BREAST BIOPSY Right 2021    benign ([facility] specimen [id number])   • BREAST LUMPECTOMY Left 2008    [facility] specimen [id number]   • DENTAL SURGERY     • HYSTERECTOMY      partial   • LUMBAR PUNCTURE     • MANDIBLE FRACTURE SURGERY     • PARTIAL HYSTERECTOMY     • ROTATOR CUFF REPAIR Left    • SINUS SURGERY           LEARNING ASSESSMENT    Assessment completed:  Yes    Learner name:  Alecia Cruz    Learner: Patient    Learning Barriers:  Learning barriers: No Barriers    Preferred Language: English     Needed: No    Education Provided:   Method: Discussion, Handout and Demonstration  Readiness: acceptance  Response: Demonstrated understanding and Verbalizes understanding      CO-LEARNER ASSESSMENT:    Completed: No            OBJECTIVE MEASUREMENTS/DATA:    Time In Session:  Start Time: 1707 (pt arrived alte)  Stop Time: 1800  Time Calculation (min): 53 min   Assessment and Plan - 05/25/22 1706        Assessment    Plan of Care reviewed and patient/family in agreement Yes     System Pathology/Pathophysiology Noted musculoskeletal     Functional Limitations in Following Categories (PT Eval) community/leisure;home management     Rehab Potential/Prognosis good, to achieve stated therapy goals     Problem List decreased flexibility;decreased ROM;decreased strength;pain;decreased endurance     Clinical Assessment Pt is a 61 year old female with hx off A fib, breast cancer with chemo/radiation and lymphadema known to facility referred  to PT for TMJ disorder. She presents with limited cervical and UE AROM(L>R), limited mandible AROM, severe TTP in left side of neck, postural deficit and functional limitations reported (45% disability on TMD questionnaire). Pt was edu on prognosis, diagnosis and importance of being compliant with managing her lymphadema. Pt will benefit from skilled PT to improve her flexibiltiy/ROM, strength, posture and function with decreased pain.     Planned Services CPT 14890 Manual therapy;CPT 30302 Neuromuscular Reeducation;CPT 46199 Self-care/Home management training;CPT 91304 Therapeutic activities;CPT 92186 Therapeutic exercises;CPT 00882 Electrical stimulation UNATTENDED;CPT 78252 Hot/Cold Packs therapy                General Information - 05/25/22 6420        Session Details    Document Type initial evaluation     Mode of Treatment individual therapy;physical therapy     Patient/Family/Caregiver Comments/Observations Pt arrived alone without brace or AD.     [facility]        General Information    Onset of Illness/Injury or Date of Surgery --   Neck pain (2018); TMJ (~2020)    Referring Physician Dean aDvis,      History of present illness/functional impairment Chronic neck pain and back pain dating back to slip and fall in 2018. Started to notice pain in left posterior ear region, thought it was parotid gland related but was told it's her TMJ and was referred to PT.  Pt has hx of mandible fracture with surgery when she was 17. Pt describes pain as sudden, while she's talking or sitting still, usually resolve on its own. Hasn't noticed any consistent aggravating patterns or difference between morning and evenings except lying on left side or applying pressure to that region. Denies numbness/tingling in UE but occasional radiating pain to left proximal arm. Pt was never told she has grinds her teeth, hasn't noticed clicking but referring physician was able to reproduce clicking. Pt was also  treated at this facility for neck/UE lymphadema in 2020 and 2022 and neck/back pain this year.  Pt hasn't been fully compliant with garment wear because she is still waiting for the glove, will follow up with lymphadema therapist. Cervical MRI 04/2022 shows 2. Interval development of prevertebral fluid of unclear etiology that extends  from C1-2 through C5-6 and measures up to 6 mm in thickness at mid C4.  3. Unchanged multilevel spondylosis with neural foraminal stenoses as described,  worst at C3-4 on the left, C4-5 bilaterally, C5-6 on the right, and C6-7 on the  left where the exiting left C4, bilateral C5, right C6 and left C7 nerve roots,  respectively, remain impinged/compressed.  4. No disc herniation or high-grade spinal stenosis at any level.     Existing Precautions/Restrictions other (see comments)     Precautions comments hx of A fib, breast cancer with chemo/radiation and lymphadema        PT Time Calculation    Start Time 1707   pt arrived alte    Stop Time 1800     Time Calculation (min) 53 min                Pain/Vitals - 05/25/22 1706        Pain Assessment    Currently in pain No/Denies     Preferred Pain Scale number (Numeric Rating Pain Scale)     Pain Side/Orientation left     Pain: Body location Neck;Mouth (non-dental)     Pain Rating (0-10): Activity 10   sudden pain       Pain Intervention    Intervention  IE     Post Intervention Comments IE                  PT - 05/25/22 1706        Physical Therapy    Physical Therapy Ortho        PT Plan    Frequency of treatment 2 times/week     PT Duration 8 weeks     PT Cert From 05/25/22     PT Cert To 07/22/22     Date PT POC was sent to provider 05/25/22     Signed PT Plan of Care received?  No                   Living Environment    Living Environment - 05/25/22 1706        Living Environment    People in Home child(abhi), adult     Living Arrangements apartment               PLOF:     ROM    Range of Motion - 05/25/22 1700        LEFT: Upper  "Extremity AROM Assessment    Shoulder Flexion   135 degrees   pain    Shoulder Abduction   115 degrees   pain    Shoulder Internal Rotation   --   T6 - pain    Shoulder External Rotation   --   left UT - pain       RIGHT: Upper Extremity AROM Assessment    Shoulder Flexion   160 degrees     Shoulder Abduction   170 degrees     Shoulder Internal Rotation   --   T4    Shoulder External Rotation   --   C7       Cervical AROM    Cervical Flexion 50   \"jerking pain\"    Cervical Extension 40   \"jerking pain\"    Cervical Left SB 10   severe left sided pain and radiating pain to proximal shld    Cervical Right SB 15   pulling and pain    Cervical Left Rotation 50   left sided pain    Cervical Right Rotation 60        Mandible    AROM Mandible: LEFT lateral deviation 8mm   WNL    AROM Mandible: RIGHT lateral deviation 8mm   pain in left TMJ eccentrically    AROM Mandible Depression 40mm   pain in left TMJ    AROM Mandible Protrusion 3mm               MMT    Manual Muscle Tests - 05/25/22 1706        RIGHT: Upper Extremity Manual Muscle Test Assessment    Shoulder Flexion gross movement (4/5) good   pain    Shoulder Abduction gross movement (4/5) good   pain    Shoulder Internal Rotation gross movement (5/5) normal     Shoulder External Rotation gross movement (4+/5) good plus     Elbow Flex gross movement (5/5) normal     Elbow Extension gross movement (4+/5) good plus        LEFT: Upper Extremity Manual Muscle Test Assessment    Shoulder Flexion gross movement (4/5) good   pain    Shoulder Abduction gross movement (4/5) good   pain    Shoulder Internal Rotation gross movement (5/5) normal     Shoulder External Rotation gross movement (5/5) normal     Elbow Flexion gross movement (5/5) normal     Elbow Extension gross movement (4+/5) good plus               Palpation    Palpation - 05/25/22 1706        Palpation    Neck Palpation  TTP left UT, LS, periscapular, TMJ region, masseter               Balance/Posture    Balance " and Posture - 22        Postural Deviations    Postural Deviations head and neck;upper back     Head and Neck forward head     Upper Back kyphosis     Comment, Postural Deviations Often leans foward onto legs in sitting        Posture    Posture postural deviations               Outcome Measures    PT Outcome Measures - 22        Other Outcome Measures Used/Comments    Other outcome measure used: TMD: 18/40: 45% disability                  Goals     • Mutually agreed upon pain goal      Mutually agreed upon pain goal: 3/10 with talking/sitting        • Pt TMJ goals        ST. Patient will improve cervical left ROT and SB by 5 degrees in 4 weeks  2. Patient will decrease pain level to 6/10 at worst in 4 weeks.  3. Patient will improve TMD score by 10% in 4 weeks. (45% at IE)  4. Demonstrate good sitting posture without cues in 4 weeks.    LTG  1. Patient will achieve pain free symmetrical cervical ROT/SB AROM in 6-8 weeks  2. Pain free mandible AROM in 4 weeks.  3. Improve UE strength by 1 MMT grade or more in 6-8 weeks.  4. 3/10 pain at worst in sitting and/or while talking.                    TREATMENT PLAN:    DOS    IE 22   30 Day 22   60 Day    90 Day    Precautions hx of A fib, breast cancer with chemo/radiation and lymphadema   Insurance Auth    Treatment   Current Session Time   Modalities Y/N Total Time for Session Not performed   Heat/Ice  CPT 10618  PRN   E. Stim Unattended  CPT 61830     Manual   CPT 71666 Y/N Total Time for Session Not performed   STM/MFR/TPR  Gentle STM to left masseter, UT, LS    Instrument Assisted STM      Mobilizations     ROM/Flexibility  Cervical ROT/SB PROM (focus on left)   Myofascial Decompression     Ther. Exercise  CPT 77030                   Group  Total Time for Session Not performed     Y/N Sets Reps Load Comment    UBE       BWD            Cervical AROM      HEP - Left ROT/SB   Scap retraction      HEP   Cervical retract      As satish             Mandible depression      AROM   Mandible protrusion      AROM   Mandible lateral glide      AROM                     Rows         Ext         No money     YTB             Supine shld flex wand         Supine horizontal abd         Openbook stretch                                                                                             Neuro Re-Ed  CPT 65898   Group  Total Time for Session Not performed     Y/N Sets Reps Load Comment                                          Gait  CPT 72766 Group Y/N Total Time for Session Not performed                                       Ther. Activity  CPT 33940 Group Y/N Total Time for Session 8-22 Minutes   Pt Edu  Y IE: Pt was educated on anatomy of diagnosis, POC, prognosis, activity modification, posture and HEP was reviewed (has copy from previous PT)  No show/cancellation policy reviewed with copy provided.                      Group  CPT 20500  Total Time for Session Not performed                 ASSESSMENT:    This 61 y.o. year old female presents to PT with above stated diagnosis. Physical Therapy evaluation reveals decreased flexibility, decreased ROM, decreased strength, pain, decreased endurance resulting in community/leisure, home management limitations. Alecia Cruz will benefit from skilled PT services to address limitation, work towards rehab and patient goals and maximize PLOF of chosen ADLs.     Planned Services: The patient's treatment will include CPT 86821 Manual therapy, CPT 36319 Neuromuscular Reeducation, CPT 37670 Self-care/Home management training, CPT 53598 Therapeutic activities, CPT 72274 Therapeutic exercises, CPT 99667 Electrical stimulation UNATTENDED, CPT 58584 Hot/Cold Packs therapy, .       Lindsay Smith, PT

## 2022-05-25 NOTE — OP PT TREATMENT LOG
DOS    IE 5/25/22   30 Day 6/25/22   60 Day    90 Day    Precautions hx of A fib, breast cancer with chemo/radiation and lymphadema   Insurance Auth    Treatment   Current Session Time   Modalities Y/N Total Time for Session Not performed   Heat/Ice  CPT 79073  PRN   E. Stim Unattended  CPT 22562     Manual   CPT 09614 Y/N Total Time for Session Not performed   STM/MFR/TPR  Gentle STM to left masseter, UT, LS    Instrument Assisted STM      Mobilizations     ROM/Flexibility  Cervical ROT/SB PROM (focus on left)   Myofascial Decompression     Ther. Exercise  CPT 24879                   Group  Total Time for Session Not performed     Y/N Sets Reps Load Comment    UBE       BWD            Cervical AROM      HEP - Left ROT/SB   Scap retraction      HEP   Cervical retract      As satish            Mandible depression      AROM   Mandible protrusion      AROM   Mandible lateral glide      AROM                     Rows         Ext         No money     YTB             Supine shld flex wand         Supine horizontal abd         Openbook stretch                                                                                             Neuro Re-Ed  CPT 03257   Group  Total Time for Session Not performed     Y/N Sets Reps Load Comment                                          Gait  CPT 21691 Group Y/N Total Time for Session Not performed                                       Ther. Activity  CPT 93780 Group Y/N Total Time for Session 8-22 Minutes   Pt Edu  Y IE: Pt was educated on anatomy of diagnosis, POC, prognosis, activity modification, posture and HEP was reviewed (has copy from previous PT)  No show/cancellation policy reviewed with copy provided.                      Group  CPT 68925  Total Time for Session Not performed

## 2022-05-27 ENCOUNTER — DOCUMENTATION (OUTPATIENT)
Dept: SOCIAL WORK | Facility: REHABILITATION | Age: 62
End: 2022-05-27
Payer: COMMERCIAL

## 2022-05-27 NOTE — PROGRESS NOTES
CM called patient to inform her that she was authorized for 16 PT visits. Patient knows number to call to get visits scheduled.

## 2022-06-01 ENCOUNTER — TELEMEDICINE (OUTPATIENT)
Dept: NEUROLOGY | Facility: CLINIC | Age: 62
End: 2022-06-01
Payer: COMMERCIAL

## 2022-06-01 DIAGNOSIS — M54.12 CERVICAL RADICULOPATHY: Primary | ICD-10-CM

## 2022-06-01 DIAGNOSIS — G50.0 TRIGEMINAL NEURALGIA OF LEFT SIDE OF FACE: ICD-10-CM

## 2022-06-01 DIAGNOSIS — M54.81 OCCIPITAL NEURALGIA OF LEFT SIDE: ICD-10-CM

## 2022-06-01 PROCEDURE — 99214 OFFICE O/P EST MOD 30 MIN: CPT | Mod: 95 | Performed by: PSYCHIATRY & NEUROLOGY

## 2022-06-01 NOTE — PROGRESS NOTES
Verification of Patient Location:  The patient affirms they are currently located in the following state: [place]    Request for Consent:    Audio and Video Encounter   Hello, my name is Sparkle Reynoso MD.  Before we proceed, can you please verify your identification by telling me your full name and date of birth?  Can you tell me who is in the room with you?    You and I are about to have a telemedicine check-in or visit because you have requested it.  This is a live video-conference.  I am a real person, speaking to you in real time.  There is no one else with me on the video-conference.  However, when we use ([employer], [employer], etc) it is important for you to know that the video-conference may not be secure or private.  I am not recording this conversation and I am asking you not to record it.  This telemedicine visit will be billed to your health insurance or you, if you are self-insured.  You understand you will be responsible for any copayments or coinsurances that apply to your telemedicine visit.  Communication platform used for this encounter:  [employer]ity     Before starting our telemedicine visit, I am required to get your consent for this virtual check-in or visit by telemedicine. Do you consent?      Patient Response to Request for Consent:  Yes        [facility]   [facility]  Sparkle Reynoso MD  [address] [place] [address] ([address])  [place] [address]       Patient ID: Alecia Cruz    : 1960  MRN: [id number]                                            Visit Date: 2022  Encounter Provider: Sparkle Reynoso  Referring Provider: No ref. provider found           Assessment/Plan   Problem List Items Addressed This Visit        Nervous    Cervical radiculopathy - Primary    Occipital neuralgia of left side    Trigeminal neuralgia of left side of face          Please message us via My Chart with any questions or concerns    Cervicalgia  Isolated cervical  dystonia  New daily persistent headache  Atypical facial pain  - Patient has noted improvement with the use of Dysport.  But wants to hold off on it.  We will see patient back in July for evaluation and discuss if we need to repeat Dysport at that time.  Basic neck exercises for daily use:    - Neck pathology and poor posture, with straightening of the normal cervical lordosis, can cause headaches.  Tightening of the neck muscles can irritate the nerves in the occipital region of the head and cause or worsen head pain. Thus neck strengthening and relaxation exercises, can help improve this particular pain. It is importance to have good posture for improving shoulder, neck, and head pain.    - Here are some exercises which should take 5 minutes:     1. Standing, drop your head to one side while continuing to look ahead. Hold for 10 seconds then swap sides. Repeat twice more each side. To increase the stretch, drop the opposite shoulder.    2. Standing again, lower your chin to your chest, hold for 10 and then look up to the ceiling and hold for 10. Repeat twice more.     3. Next, standing straight again, look over your right shoulder and hold firm for 10 seconds, then over your left shoulder for 10. Repeat this 3 times.     4. Finally, while sitting upright, bring your head forward and hold for 10, then all the way back and hold for 10.    If this simple exercise does not help improve the posture, we will consider formal physical therapy in the future.           No follow-ups on file.         _________________________________________________________________      Chief Complaint: No chief complaint on file.      Subjective     We had the pleasure of evaluating Alecia Cruz in neurological follow up today. As you know she  is a 61 y.o.  years old  right handed female.  she is here today for evaluation of trigeminal neuralgia.  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    Personal history:  lives with her sister currently.    Medical history review:   QTC: 4/29/2020-   Tobacco use: never used  Vaping: never used  Alcohol use: does not use  Illicit drug use: never used  Daily Caffeine intake? Coffee - none    , Tea -  none    , Soda - none  Daily water intake?  30 Oz  History of breast cancer (BRCA - neg) - 2007 - got chemo/xrt/hormone therapy until 2009 - left arm lymphedema  7 mm left parotid lesion, likely small pleomorphic adenoma  Acquired hypothyroidism  Paroxysmal atrial fibrillation  Hypercholesterolemia  Chronic back pain neck pain    Psychiatry history review:   Depression: yes  Anxiety: yes  Seeing a psychiatrist/ How often? no  Seeing at therapist/ how often? no    Headache/pain history:  Headaches started at what age?  17 years of age    What treatment have you had in the past or currently using for headaches/pain/mood?   Trigger point injection/Nerve block performed and how often? NO not for her neck  Epidural injections or trans foraminal injections performed? NO   Alternative therapies? physical therapy  CBD or THC for your headaches and how often? NO   Other headache devices? NO   Botulinum toxin injection performed and how often?  dysport - 4/14/2022 first treatment  Headache infusions:NO   Preventive medication therapy:   -Vitamin C, vitamin D3, coq.10, omega-3,  -  -Ambien 10 mg,  -Metoprolol 25 mg capsule sprinkle/Toprol-XL,  -cyclobenzaprine 5 mg,  -Benadryl 25 mg injection,  Abortive medication Therapy:   -Aspirin 81 mg, paroxetine 2020 mg capsule,  -Reglan 10 mg injection,  -    FOLLOW-UP CLINIC NOTE:  First visit date: 4/8/2022-last seen date: Today is her second visit  Last procedure date: 4/14/2022    Since last seen: Overall patient feels that since use it does report her headaches have completely subsided.  Her neck pain has overall improved as well but she has noted that if she still bends her head backward she may have intense pain.  Left facial pain intensity is  less but still occurs if she touches her face.  Did notice that after Dysport she did have swelling in between her eyebrows and prefers not to her Dysport there again.      What is your current pain level?   Headache: 0/10  Neck pain: 0/10  Facial pain: 0/10 - but if she pushes on the left side of the face it is 9/10    How often do the headaches/pain occur?   Headaches: improved with dysport  Neck pain: once a week and when she looks up it can still get up to 10/10  Facial pain: anytime she puts pressure on her right side of the face    How often do you take abortive medication for headache in a week?  Over-the-counter medication:now once a week but was using it often in the past  Migraine specific medication:none    Are you ever headache free? Yes she is     Aura/Warning and how long does it last?   Clear/gray floater -   Duration: minutes  Frequency: 5 times a month       What time of the day do headaches/pain start?  Headache: night time now  Neck pain: with headaches   Facial pain: at night time with trigger    How long do the headaches/pain last?   Headache: all night as long as she is lying down - was on both sides but with pt now only on the left side  Neck pain: same as above  Facial pain: as long as she is lying on the left side of her face    Describe your usual headache/pain?   Headache: throbbing, pressure and stabbing  Neck pain:tightness, pressure  Facial pain:numbness stabbing    Where is your headache/pain located?   Headaches: left side of her occipital region and radiates up  Neck pain: left side of her neck and shoulder  Facial pain: left v 2-3 distribution    What is the intensity of pain?   Headache:   9/10 - none thus is not sure  Neck pain: 9 to 10/10 - without moving her head up it is 8/10 but not as bad as it was in the past  Facial pain: 10/10 - only with pressure    Associated symptoms with headache or neck pain:   - Photophobia, Phonophobia,   - Insomnia   - Stiff or sore neck  -  Dizziness  - Light headed  - Off balance   - Problems with concentration   - Prefer to be in a cool, quiet, dark room     Pain is worse are worse if the patient: neck movement or touching the side of her neck and face    Triggers: touching the left occipital region and left side of her face  What time of the year do headaches occur more frequently? none    Have you ever had any Brain imaging? yes  - I personally reviewed old notes over the last few months       2/17/2020-MRI of the brain with and without contrast:  STUDY:  MRI of the Brain without and  with contrast   CLINICAL HISTORY: Headache.  IMPRESSION: No acute intracranial abnormality. No acute infarct, mass effect or  acute intracranial hemorrhage.   COMMENT:  Technique:  The brain was scanned in multiple planes with a variety of pulse  sequences without and  with contrast.  7cc of Gadavist IV contrast were given  without adverse contrast reaction.   Comparison:  CT 1/26/2020   Findings:  Sulci, ventricles and basal cisterns are within normal limits for  patient's stated age.  Scattered foci of hyperintense signal are seen on  T2-weighted and FLAIR sequences in the subcortical, deep and periventricular  white matter; this is a nonspecific finding, most likely from age advanced  microangiopathic changes. No mass-effect, intracranial hemorrhage, acute  segmental infarct or extra-axial fluid collection is seen. Cerebellar tonsils  are normal in position.  Expected signal voids are seen in the intracranial  vessels at the skull base.  No abnormal enhancement is seen.  The orbits and  sella are unremarkable.   The paranasal sinuses and mastoid air cells are clear.    6/3/2020-MRI cervical spine:  CLINICAL HISTORY: Cervical radiculopathy.   COMMENT: MRI examination of the cervical spine was performed without intravenous  contrast following the [facility] standard protocol.   Comparison: Cervical radiographs 6/2/2020.   Cervicothoracic alignment:  Straightening and reversal of the normal cervical  lordosis.  No subluxation.  Prevertebral soft tissues: Normal in thickness.  Vertebral bodies: Normal in height, noting endplate irregularity and  degenerative endplate marrow signal changes ordering the C3-C4, C4-C5, and C6-C7  disc spaces.  Intervertebral discs: Multilevel loss of intervertebral disc height and  signal, appearing moderate at C3-C4 and C4-C5.   Cervical and upper thoracic spinal canal: Mild acquired compromise at C4-C5.  Visualized spinal cord: Normal in caliber and signal.  Visualized contents of the posterior fossa: Normal in appearance.   Axial images:  C2-3: Facet hypertrophy.  Mild left neural foraminal stenosis.  No central  stenosis.  C3-4: Uncovertebral and facet hypertrophy.  Moderate bilateral neural  foraminal stenosis.  No central stenosis.  C4-5: Disc bulge eccentric dorsolaterally to the left.  Uncovertebral and  facet hypertrophy.  Severe left and moderate right neural foraminal stenosis.  Mild central stenosis.  C5-6: Disc osteophyte complex and predominantly uncovertebral hypertrophy.  Severe right and moderate left neural foraminal stenosis.  No central stenosis.  C6-7: Disc osteophyte complex and predominantly uncovertebral hypertrophy.  Moderate to severe left and moderate right neural foraminal stenosis.  No  central stenosis.  C7-T1: No disc herniation.  No central or neural foraminal stenosis.   Visualized soft tissues: Normal.  IMPRESSION: Multilevel cervical spondylosis, most pronounced at C4-C5 where  there is mild central spinal canal stenosis.  No spinal cord compression or  spinal cord signal abnormality at any level.      Medications:   Current Outpatient Medications:   •  alpha lipoic acid, bulk, powder, Take by mouth., Disp: , Rfl:   •  ascorbic acid (VITAMIN C) 500 mg tablet, Take 500 mg by mouth daily., Disp: , Rfl:   •  aspirin 81 mg enteric coated tablet, Take 81 mg by mouth daily., Disp: , Rfl:   •  azelastine  (ASTELIN) 137 mcg (0.1 %) nasal spray, USE 2 SPRAYS IN EACH NOSTRIL EVERY DAY AT BEDTIME, Disp: , Rfl:   •  azithromycin (ZITHROMAX) 250 mg tablet, TAKE 2 TABLETS BY MOUTH ON DAY 1, AND THEN TAKE 1 TABLET BY MOUTH ONCE A DAY ON DAY 2 THROUGH DAY 5, Disp: , Rfl:   •  betamethasone dipropionate (DIPROSONE) 0.05 % lotion, once as needed.  , Disp: , Rfl:   •  cetirizine (ZyrTEC) 10 mg tablet, Take 10 mg by mouth daily., Disp: , Rfl:   •  cholecalciferol, vitamin D3, 1,000 unit (25 mcg) tablet, Take 1,000 Units by mouth daily., Disp: , Rfl:   •  coenzyme Q10 (COQ10) 60 mg capsule, Take by mouth., Disp: , Rfl:   •  cyclobenzaprine (FLEXERIL) 5 mg tablet, Take 1 tablet (5 mg total) by mouth 3 (three) times a day as needed for muscle spasms for up to 14 days., Disp: 30 tablet, Rfl: 0  •  diphenhydrAMINE (SOMINEX) 25 mg tablet, daily., Disp: , Rfl:   •  flecainide (TAMBOCOR) 50 mg tablet, Take 1 tablet (50 mg total) by mouth 2 (two) times a day., Disp: 60 tablet, Rfl: 0  •  fluticasone propionate (FLONASE) 50 mcg/actuation nasal spray, Administer 2 sprays into each nostril daily as needed for rhinitis., Disp: 1 Bottle, Rfl: 0  •  levothyroxine (SYNTHROID) 88 mcg tablet, Take 1 tablet (88 mcg total) by mouth daily., Disp: 90 tablet, Rfl: 3  •  lidocaine 2 % solution, GARGLE AND SPIT 10-15ML BY MOUTH EVERY 4 HOURS AS NEEDED FOR SORE THROAT UP TO 3 DAYS. MAX 8X/DAY, Disp: , Rfl:   •  metoprolol succinate XL (TOPROL-XL) 25 mg 24 hr tablet, Take 1 tablet (25 mg total) by mouth nightly., Disp: 90 tablet, Rfl: 3  •  multivitamin (THERAGRAN) tablet, Take 1 tablet by mouth daily., Disp: , Rfl:   •  naproxen sodium 220 mg capsule, Take by mouth daily as needed., Disp: , Rfl:   •  omega-3-dha-epa-dpa-fish oil 1,050 mg(300 mg -675 mg-75 mg) capsule, , Disp: , Rfl:   •  vitamin E 100 unit capsule, , Disp: , Rfl:   •  XIFAXAN 550 mg tablet, , Disp: , Rfl:     Past Medical History:  has a past medical history of A-fib (CMS/HCC), Breast  cancer (CMS/HCC) (2008), Colon polyp, Fibrocystic breast, Fibroid, History of partial hysterectomy (03/2000), History of radiation therapy, antineoplastic chemo, lipoma (2008), Hypothyroidism, Malignant neoplasm of upper-outer quadrant of left breast in female, estrogen receptor negative (CMS/HCC) (9/10/2021), PAF (paroxysmal atrial fibrillation) (CMS/HCC), PVC (premature ventricular contraction), and Screening for breast cancer.    She has no past medical history of Hormone replacement therapy or Melanoma (CMS/[facility]).    Past Surgical History:  has a past surgical history that includes Partial hysterectomy; Rotator cuff repair (Left); Mandible fracture surgery; Hysterectomy; Breast lumpectomy (Left, 2008); Breast biopsy (Left, 2008); Breast biopsy (Right, 2021); Sinus surgery; Lumbar puncture; and Dental surgery.    Social History:   Social History     Tobacco Use   • Smoking status: Never Smoker   • Smokeless tobacco: Never Used   Substance Use Topics   • Alcohol use: No   • Drug use: No       Family History: family history includes Alzheimer's disease in her biological mother; Arrhythmia in her biological mother; COPD in her biological brother; Diabetes in her biological father, biological sister, and paternal grandfather; Heart disease in her biological sister; Hypertension in her biological brother and biological sister; Lung cancer in her father's brother, father's sister, father's sister, and paternal cousin; Melanoma in her biological sister; Ovarian cancer in her paternal cousin; Prostate cancer in her biological brother, biological brother, father's brother, father's brother, father's brother, father's brother, and paternal grandfather; Sarcoidosis in her biological sister; Stomach cancer in her paternal grandmother; Thyroid cancer in her paternal cousin.    Allergies: is allergic to trazodone-dietary supp no.8, amoxicillin trihydrate, cephalexin, ciprofloxacin, codeine, potassium clavulanate, and sulfa  (sulfonamide antibiotics).     Review of Systems  All other systems reviewed and negative except as noted in the HPI.      The following have been reviewed and updated as appropriate in this visit:   Allergies  Meds  Problems               Sparkle Reynoso MD    Time Spent:  I spent 30 minutes on this date of service performing the following activities: obtaining history, entering orders, documenting, preparing for visit, obtaining / reviewing records, providing counseling and education, independently reviewing study/studies, communicating results and coordinating care.

## 2022-06-06 ENCOUNTER — HOSPITAL ENCOUNTER (OUTPATIENT)
Dept: PHYSICAL THERAPY | Facility: HOSPITAL | Age: 62
Setting detail: THERAPIES SERIES
Discharge: HOME | End: 2022-06-06
Attending: OTOLARYNGOLOGY
Payer: COMMERCIAL

## 2022-06-06 DIAGNOSIS — M26.69 OTHER SPECIFIED DISORDERS OF TEMPOROMANDIBULAR JOINT: Primary | ICD-10-CM

## 2022-06-06 PROCEDURE — 97110 THERAPEUTIC EXERCISES: CPT | Mod: GP

## 2022-06-06 PROCEDURE — 97140 MANUAL THERAPY 1/> REGIONS: CPT | Mod: GP

## 2022-06-06 NOTE — PROGRESS NOTES
PT DAILY NOTE FOR OUTPATIENT THERAPY    Patient: Alecia Cruz MRN: [id number]  : 1960 61 y.o.  Referring Physician: Dean Davis DO  Date of Visit: 2022    Certification Dates: 22 through 22    Diagnosis:   1. Other specified disorders of temporomandibular joint        Chief Complaints:  Right TMJ/neck pain    Precautions:   Precautions comments: hx of A fib, breast cancer with chemo/radiation and lymphadema  Existing Precautions/Restrictions: other (see comments)     TODAY'S VISIT    Time In Session:  Start Time: 1720 (pt arrived late)  Stop Time: 1758  Time Calculation (min): 38 min   History/Vitals/Pain/Encounter Info - 22 1720        Injury History/Precautions/Daily Required Info    Document Type daily treatment     Chief Complaint/Reason for Visit  Right TMJ/neck pain     Onset of Illness/Injury or Date of Surgery --   Neck pain (); TMJ (~)    Referring Physician Dean Davis DO     Existing Precautions/Restrictions other (see comments)     Precautions comments hx of A fib, breast cancer with chemo/radiation and lymphadema     History of present illness/functional impairment Chronic neck pain and back pain dating back to slip and fall in 2018. Started to notice pain in left posterior ear region, thought it was parotid gland related but was told it's her TMJ and was referred to PT.  Pt has hx of mandible fracture with surgery when she was 17. Pt describes pain as sudden, while she's talking or sitting still, usually resolve on its own. Hasn't noticed any consistent aggravating patterns or difference between morning and evenings except lying on left side or applying pressure to that region. Denies numbness/tingling in UE but occasional radiating pain to left proximal arm. Pt was never told she has grinds her teeth, hasn't noticed clicking but referring physician was able to reproduce clicking. Pt was also treated at this facility for neck/UE lymphadema in  "2020 and 2022 and neck/back pain this year.  Pt hasn't been fully compliant with garment wear because she is still waiting for the glove, will follow up with lymphadema therapist. Cervical MRI 04/2022 shows 2. Interval development of prevertebral fluid of unclear etiology that extends  from C1-2 through C5-6 and measures up to 6 mm in thickness at mid C4.  3. Unchanged multilevel spondylosis with neural foraminal stenoses as described,  worst at C3-4 on the left, C4-5 bilaterally, C5-6 on the right, and C6-7 on the  left where the exiting left C4, bilateral C5, right C6 and left C7 nerve roots,  respectively, remain impinged/compressed.  4. No disc herniation or high-grade spinal stenosis at any level.     [facility]     Patient/Family/Caregiver Comments/Observations Pt is in the process of moving so a little stress, denies headaches but c/o pain in bilateral shoulders to mid back and left jaw pain. Pt was chewing gum at arrival without pain and notes the ENT doctor told her she shouldn't chew gum but she does it when she's stressed.     Patient reported fall since last visit No        Pain Assessment    Currently in pain Yes     Preferred Pain Scale number (Numeric Rating Pain Scale)     Pain Side/Orientation bilateral     Pain: Body location Back;Shoulder;Neck     Pain Rating (0-10): Pre Activity 8        Pain Intervention    Intervention  manual     Post Intervention Comments decreased pain and \"stress\"                Daily Treatment Assessment and Plan - 06/06/22 1720        Daily Treatment Assessment and Plan    Daily Outcome Summary Pt arrived late so modified session today. TTP persist in left UT and massester region but improved compared to IE. Also demonstrates improvement in R SB and bilateral cervical  and shoulder flex AROM. Tolerated mandible exercises well with good AROM noted but has to perform in slow control manner to avoid sudden pain. Decreased pain and \"stress\" post tx reported.     " "Plan and Recommendations Active warm up with UBE if able.                     OBJECTIVE DATA TAKEN TODAY:    None taken    Today's Treatment:    DOS    IE 5/25/22   30 Day 6/25/22   60 Day    90 Day    Precautions hx of A fib, breast cancer with chemo/radiation and lymphadema   Insurance Auth    Treatment   Current Session Time   Modalities Y/N Total Time for Session Not performed   Heat/Ice  CPT 29712  PRN   E. Stim Unattended  CPT 45825     Manual   CPT 07936 Y/N Total Time for Session 8-22 Minutes   STM/MFR/TPR Y  Y Gentle STM to left masseter supine   Seated UT, LS as satish   Instrument Assisted STM      Mobilizations     ROM/Flexibility Y Cervical R SB with PT pressure on left LS   Myofascial Decompression     Ther. Exercise  CPT 75626                   Group  Total Time for Session 23-37 Minutes     Y/N Sets Reps Load Comment    UBE       BWD - deferred due to time            Cervical AROM      HEP - Left ROT/SB   Scap retraction  Y 1 10 5\" HEP - seated   Cervical retract      As satish            Supine Mandible depression  Y 1 10  AROM   Supine Mandible depression   Y 1 10  isometric   Mandible protrusion      AROM   Supine Mandible lateral glide  Y 1 10  AROM                     Rows         Ext         No money     YTB             Supine shld flex wand  Y 1 10     Supine horizontal abd  Y 2 10     Openbook stretch                                                                                             Neuro Re-Ed  CPT 84080   Group  Total Time for Session Not performed     Y/N Sets Reps Load Comment                                          Gait  CPT 79194 Group Y/N Total Time for Session Not performed                                       Ther. Activity  CPT 02194 Group Y/N Total Time for Session Not performed   Pt Edu   IE: Pt was educated on anatomy of diagnosis, POC, prognosis, activity modification, posture and HEP was reviewed (has copy from previous PT)  No show/cancellation policy reviewed with copy " provided.                      Group  CPT 98854  Total Time for Session Not performed                Lindsay Smith, PT

## 2022-06-06 NOTE — OP PT TREATMENT LOG
"DOS    IE 5/25/22   30 Day 6/25/22   60 Day    90 Day    Precautions hx of A fib, breast cancer with chemo/radiation and lymphadema   Insurance Auth    Treatment   Current Session Time   Modalities Y/N Total Time for Session Not performed   Heat/Ice  CPT 94523  PRN   E. Stim Unattended  CPT 90591     Manual   CPT 08802 Y/N Total Time for Session 8-22 Minutes   STM/MFR/TPR Y  Y Gentle STM to left masseter supine   Seated UT, LS as satish   Instrument Assisted STM      Mobilizations     ROM/Flexibility Y Cervical R SB with PT pressure on left LS   Myofascial Decompression     Ther. Exercise  CPT 30685                   Group  Total Time for Session 23-37 Minutes     Y/N Sets Reps Load Comment    UBE       BWD - deferred due to time            Cervical AROM      HEP - Left ROT/SB   Scap retraction  Y 1 10 5\" HEP - seated   Cervical retract      As satish            Supine Mandible depression  Y 1 10  AROM   Supine Mandible depression   Y 1 10  isometric   Mandible protrusion      AROM   Supine Mandible lateral glide  Y 1 10  AROM                     Rows         Ext         No money     YTB             Supine shld flex wand  Y 1 10     Supine horizontal abd  Y 2 10     Openbook stretch                                                                                             Neuro Re-Ed  CPT 89520   Group  Total Time for Session Not performed     Y/N Sets Reps Load Comment                                          Gait  CPT 27361 Group Y/N Total Time for Session Not performed                                       Ther. Activity  CPT 40158 Group Y/N Total Time for Session Not performed   Pt Edu   IE: Pt was educated on anatomy of diagnosis, POC, prognosis, activity modification, posture and HEP was reviewed (has copy from previous PT)  No show/cancellation policy reviewed with copy provided.                      Group  CPT 19075  Total Time for Session Not performed         "

## 2022-06-08 ENCOUNTER — HOSPITAL ENCOUNTER (OUTPATIENT)
Dept: PHYSICAL THERAPY | Facility: HOSPITAL | Age: 62
Setting detail: THERAPIES SERIES
Discharge: HOME | End: 2022-06-08
Attending: OTOLARYNGOLOGY
Payer: COMMERCIAL

## 2022-06-08 DIAGNOSIS — M26.69 OTHER SPECIFIED DISORDERS OF TEMPOROMANDIBULAR JOINT: Primary | ICD-10-CM

## 2022-06-08 PROCEDURE — 97110 THERAPEUTIC EXERCISES: CPT | Mod: GP

## 2022-06-08 PROCEDURE — 97140 MANUAL THERAPY 1/> REGIONS: CPT | Mod: GP

## 2022-06-08 NOTE — OP PT TREATMENT LOG
"DOS    IE 5/25/22   30 Day 6/25/22   60 Day    90 Day    Precautions hx of A fib, breast cancer with chemo/radiation and lymphadema   Insurance Auth    Treatment   Current Session Time   Modalities Y/N Total Time for Session Not performed   Heat/Ice  CPT 74201  PRN   E. Stim Unattended  CPT 93479     Manual   CPT 18943 Y/N Total Time for Session 8-22 Minutes   STM/MFR/TPR Y  N Gentle STM to left masseter supine   Seated UT, LS as satish   Instrument Assisted STM      Mobilizations     ROM/Flexibility Y Cervical R SB with PT pressure on left LS   Manual resistance Y Lateral deviation B/L and depression- with tongue on roof of mouth   Myofascial Decompression     Ther. Exercise  CPT 25837                   Group  Total Time for Session 8-22 Minutes     Y/N Sets Reps Load Comment    UBE       BWD - deferred due to time            Cervical AROM      HEP - Left ROT/SB   Scap retraction  N 1 10 5\" HEP - seated   Cervical retract      As satish            Supine Mandible depression  Y 1 10  AROM- controlled open (tongue on roof of mouth)   Supine Mandible depression   n 1 10  isometric   Mandible protrusion      AROM   Supine Mandible lateral glide  Y 1 10ea  AROM                     Rows  Y 2 10 YTB    Ext  Y 2 10 YTB    No money  Y 2 10 YTB             Supine shld flex wand  Y 1 10     Supine horizontal abd  Y 1 10     Openbook stretch                                                                                             Neuro Re-Ed  CPT 26660   Group  Total Time for Session Not performed     Y/N Sets Reps Load Comment                                          Gait  CPT 88693 Group Y/N Total Time for Session Not performed                                       Ther. Activity  CPT 53212 Group Y/N Total Time for Session Not performed   Pt Edu   IE: Pt was educated on anatomy of diagnosis, POC, prognosis, activity modification, posture and HEP was reviewed (has copy from previous PT)  No show/cancellation policy reviewed with " copy provided.                      Group  CPT 31632  Total Time for Session Not performed

## 2022-06-20 ENCOUNTER — TELEPHONE (OUTPATIENT)
Dept: PRIMARY CARE | Facility: CLINIC | Age: 62
End: 2022-06-20
Payer: COMMERCIAL

## 2022-06-20 ENCOUNTER — TELEPHONE (OUTPATIENT)
Dept: PHYSICAL THERAPY | Facility: HOSPITAL | Age: 62
End: 2022-06-20
Payer: COMMERCIAL

## 2022-06-20 ENCOUNTER — DOCUMENTATION (OUTPATIENT)
Dept: SOCIAL WORK | Facility: REHABILITATION | Age: 62
End: 2022-06-20
Payer: COMMERCIAL

## 2022-06-20 NOTE — TELEPHONE ENCOUNTER
Called and reached patient at 5:21pm. Reminded patient that she had an appointment today at 5pm and to see if she would be coming in for today's treatment session. Alecia reported she missed her flight and is stuck in [place], [place]. Forgot to call in and cancel. Would like someone to call her back about changing her appointments. Her schedule has changed and she can no longer come to afternoon appointments, can only come before 4pm. I let her know someone from the  will be reaching out to change her appointments.

## 2022-06-23 ENCOUNTER — TELEPHONE (OUTPATIENT)
Dept: PHYSICAL THERAPY | Facility: HOSPITAL | Age: 62
End: 2022-06-23
Payer: COMMERCIAL

## 2022-06-23 NOTE — TELEPHONE ENCOUNTER
Pt was contacted at 8:30 regarding 8am appt. Pt states she was never notified of this appointment (made changes via phone due to new work schedule). Pt was reminded of our no show/same day cancellation and 2 week policy, pt had 2 same day cancellations due to illness and 1 same day cancellation due to missing her flight from [place] last week. Pt confirmed next appt 6/27 at 11am. Questions regarding POC and diagnosis (cervical vs TMJ or both) were also addressed over the phone.

## 2022-06-27 ENCOUNTER — HOSPITAL ENCOUNTER (OUTPATIENT)
Dept: PHYSICAL THERAPY | Facility: HOSPITAL | Age: 62
Setting detail: THERAPIES SERIES
Discharge: HOME | End: 2022-06-27
Attending: OTOLARYNGOLOGY
Payer: COMMERCIAL

## 2022-06-27 DIAGNOSIS — M26.69 OTHER SPECIFIED DISORDERS OF TEMPOROMANDIBULAR JOINT: Primary | ICD-10-CM

## 2022-06-27 PROCEDURE — 97110 THERAPEUTIC EXERCISES: CPT | Mod: GP

## 2022-06-27 PROCEDURE — 97140 MANUAL THERAPY 1/> REGIONS: CPT | Mod: GP

## 2022-06-27 PROCEDURE — 97530 THERAPEUTIC ACTIVITIES: CPT | Mod: GP

## 2022-06-27 NOTE — PROGRESS NOTES
PT PROGRESS NOTE FOR OUTPATIENT THERAPY    Patient: Alecia Cruz MRN: [id number]  : 1960 61 y.o.  Referring Physician: Dean Davis DO  Date of Visit: 2022      Certification Dates: 22 through 22    Recommended Frequency & Duration:  2 times/week for up to 8 weeks          Diagnosis:   1. Other specified disorders of temporomandibular joint        Chief Complaints:  No chief complaint on file.      Precautions:   Existing Precautions/Restrictions: other (see comments)     TODAY'S VISIT:    Time In Session:  Start Time: 1100  Stop Time: 1155  Time Calculation (min): 55 min   General Information - 22 1203        PT Time Calculation    Start Time 1100     Stop Time 1155     Time Calculation (min) 55 min                Daily Falls Screen - 22 1100        Daily Falls Assessment    Patient reported fall since last visit No                Pain/Vitals - 22 1100        Pain Assessment    Currently in pain No/Denies        Pain Intervention    Intervention  MT, TE     Post Intervention Comments Monitored                PT - 22 1100        Physical Therapy    Physical Therapy Ortho        PT Plan    Frequency of treatment 2 times/week     PT Duration 8 weeks     PT Cert From 22     PT Cert To 22     Date PT POC was sent to provider 22     Signed PT Plan of Care received?  Yes                Assessment and Plan - 22 1100        Assessment    Plan of Care reviewed and patient/family in agreement Yes     System Pathology/Pathophysiology Noted musculoskeletal     Functional Limitations in Following Categories (PT Eval) community/leisure;home management     Rehab Potential/Prognosis good, to achieve stated therapy goals     Problem List decreased flexibility;decreased ROM;decreased strength;pain;decreased endurance     Clinical Assessment Pt presents for progress note. Subjectively she feels she has made ~75-80% improvement since starting  physical therapy. This can be further reported by the results of the TMD questionnaire where she reported 45% disability on eval to just 15% disability on progress note. Objectively she has met 4/5 short term goals and demonstrates most improvement in c/s ROM. She will benefit from continued therapy to address her deficits and improve her overall quality of life     Plan and Recommendations Cont with PT POC. Progress scapular and cervical stabilization                 OBJECTIVE MEASUREMENTS/DATA:    ROM    Range of Motion - 06/27/22 1100        Cervical AROM    Cervical Flexion 52     Cervical Extension 46     Cervical Left SB 22     Cervical Right SB 32     Cervical Left Rotation 60     Cervical Right Rotation 60               Outcome Measures    PT Outcome Measures - 06/27/22 1100        Other Outcome Measures Used/Comments    Other outcome measure used: TMD: 6/40 or 15% disability.                 ROM and MMT        Some values may be hidden. Unless noted otherwise, only the newest values recorded on each date are displayed.         PT UE ROM Measurements 3/4/22 3/18/22 4/1/22 4/14/22 5/25/22 6/27/22   AROM: Right Shoulder Flexion     160 degrees    AROM: Left Shoulder Flexion 110 degrees  limited by pain 135 degrees  Limited by pain (posterior > anterior shoulder).  136 degrees  pain at end range 135 degrees  pain    AROM: Left Shoulder Extension 35 degrees  limited by pain   51 degrees  pain at end range     AROM: Right Shoulder ABD     170 degrees    AROM: Left Shoulder ABD 63 degrees  limited by pain 80 degrees  Limited by pain and tightness.  110 degrees  pain at end range 115 degrees  pain    AROM: Right Shoulder IR       T4    AROM: Left Shoulder IR       T6 - pain    AROM: Right Shoulder ER       C7    AROM: Left Shoulder ER 42 degrees  limited by pain 40 degrees  40 degrees at 80 degrees abduction. 90 degrees at neutral abduction.  70 degrees  pain at end range   left UT - pain       PT LE ROM  "Measurements 3/4/22 3/18/22 4/1/22 4/14/22 5/25/22 6/27/22   No data to display.      PT Cervical/Lumbar/Other ROM Measurements 3/4/22 3/18/22 4/1/22 4/14/22 5/25/22 6/27/22   AROM: Cervical Flexion   40  50  \"jerking pain\" 52   AROM: Cervical Extension   24  increased headache  40  \"jerking pain\" 46   AROM: Left Cervical SB   11  increaed spasm into L spasm  10  severe left sided pain and radiating pain to proximal shld 22   AROM: Right Cervical SB   19  15  pulling and pain 32   AROM: Left Cervical Rotation   47  50  left sided pain 60   AROM: Right Cervical Rotation   49  60 60   AROM: Lumbar Flexion     <50      AROM: Lumar Extension     <25      AROM: Left Lumbar SB     <25      AROM: Right Lumbar SB     <25      AROM: Left Lumbar Rotation     not tested 2/2 pain      AROM: Right Lumbar Rotation     not tested 2/2 pain         Hand ROM Measurements 3/4/22 3/18/22 4/1/22 4/14/22 5/25/22 6/27/22   No data to display.      PT UE MMT Measurements 3/4/22 3/18/22 4/1/22 4/14/22 5/25/22 6/27/22   Right Shoulder Flexion     (4/5) good  pain    Left Shoulder Flexion     (4/5) good  pain    Right Shoulder ABD     (4/5) good  pain    Left Shoulder ABD     (4/5) good  pain    Right Shoulder IR     (5/5) normal    Left Shoulder IR     (5/5) normal    Right Shoulder ER     (4+/5) good plus    Left Shoulder ER     (5/5) normal    Right Elbow Flexion     (5/5) normal    Left Elbow Flexion     (5/5) normal    Right Elbow Extension     (4+/5) good plus    Left Elbow Extension     (4+/5) good plus       PT LE MMT 3/4/22 3/18/22 4/1/22 4/14/22 5/25/22 6/27/22   No data to display.           Outcome Measures    PT OBJECTIVE Outcome Measures 1/14/22 2/17/22 4/1/22 5/25/22 6/27/22   No data to display.      PT SUBJECTIVE Outcome Measures 1/14/22 2/17/22 4/1/22 5/25/22 6/27/22   Other PHILLIP: 64%, NDI: 54% PHILLIP: 56% NDI: 52% PHILLIP: 46% NDI: 34% TMD: 18/40: 45% disability TMD: 6/40 or 15% disability.             Today's " "Treatment::    Education provided:  None/NA    DOS    IE 5/25/22   30 Day 6/25/22   60 Day    90 Day    Precautions hx of A fib, breast cancer with chemo/radiation and lymphadema   Insurance Auth    Treatment   Current Session Time   Modalities Y/N Total Time for Session Not performed   Heat/Ice  CPT 12806  PRN   E. Stim Unattended  CPT 21418     Manual   CPT 01807 Y/N Total Time for Session 8-22 Minutes   STM/MFR/TPR Y  Y Gentle STM to left masseter supine   Seated UT, LS as satish   Instrument Assisted STM      Mobilizations     ROM/Flexibility Y Cervical R SB with PT pressure on left LS   Manual resistance N Lateral deviation B/L and depression- with tongue on roof of mouth   Myofascial Decompression     Ther. Exercise  CPT 16945                   Group  Total Time for Session 23-37 Minutes     Y/N Sets Reps Load Comment    UBE   y   5' BWD            Cervical AROM      HEP - Left ROT/SB   Scap retraction  Y 1 10 5\" HEP - seated   Cervical retract      As satish   LS stretch  y 1 1 30\"    Supine Mandible depression  Y 1 10  AROM- controlled open (tongue on roof of mouth)   Supine Mandible depression   n 1 10  isometric   Mandible protrusion      AROM   Supine Mandible lateral glide  Y 1 10ea  AROM                     Rows  Y 2 10 YTB    Ext  Y 2 10 YTB    No money  Y 2 10 YTB             Supine shld flex wand  Y 1 10     Supine horizontal abd  Y 1 10     Openbook stretch                                                                                             Neuro Re-Ed  CPT 20983   Group  Total Time for Session Not performed     Y/N Sets Reps Load Comment                                          Gait  CPT 83016 Group Y/N Total Time for Session Not performed                                       Ther. Activity  CPT 76682 Group Y/N Total Time for Session 8-22 Minutes   Pt Edu   IE: Pt was educated on anatomy of diagnosis, POC, prognosis, activity modification, posture and HEP was reviewed (has copy from previous " PT)  No show/cancellation policy reviewed with copy provided.    Progress note  Y Review of goals, ROM, TMD questionnaire               Group  CPT 96175  Total Time for Session Not performed            Goals Addressed                 This Visit's Progress    • Pt TMJ goals          ST. Patient will improve cervical left ROT and SB by 5 degrees in 4 weeks-MET  2. Patient will decrease pain level to 6/10 at worst in 4 weeks.-not met pt states it can reach a 9/10  3. Patient will improve TMD score by 10% in 4 weeks. (45% at IE) -MET  4. Demonstrate good sitting posture without cues in 4 weeks.-MET    LTG  1. Patient will achieve pain free symmetrical cervical ROT/SB AROM in 6-8 weeks  2. Pain free mandible AROM in 4 weeks.  3. Improve UE strength by 1 MMT grade or more in 6-8 weeks.  4. 3/10 pain at worst in sitting and/or while talking.

## 2022-06-27 NOTE — OP PT TREATMENT LOG
"DOS    IE 5/25/22   30 Day 6/25/22   60 Day    90 Day    Precautions hx of A fib, breast cancer with chemo/radiation and lymphadema   Insurance Auth    Treatment   Current Session Time   Modalities Y/N Total Time for Session Not performed   Heat/Ice  CPT 97464  PRN   E. Stim Unattended  CPT 22295     Manual   CPT 46471 Y/N Total Time for Session 8-22 Minutes   STM/MFR/TPR Y  Y Gentle STM to left masseter supine   Seated UT, LS as satish   Instrument Assisted STM      Mobilizations     ROM/Flexibility Y Cervical R SB with PT pressure on left LS   Manual resistance N Lateral deviation B/L and depression- with tongue on roof of mouth   Myofascial Decompression     Ther. Exercise  CPT 11606                   Group  Total Time for Session 23-37 Minutes     Y/N Sets Reps Load Comment    UBE   y   5' BWD            Cervical AROM      HEP - Left ROT/SB   Scap retraction  Y 1 10 5\" HEP - seated   Cervical retract      As satish   LS stretch  y 1 1 30\"    Supine Mandible depression  Y 1 10  AROM- controlled open (tongue on roof of mouth)   Supine Mandible depression   n 1 10  isometric   Mandible protrusion      AROM   Supine Mandible lateral glide  Y 1 10ea  AROM                     Rows  Y 2 10 YTB    Ext  Y 2 10 YTB    No money  Y 2 10 YTB             Supine shld flex wand  Y 1 10     Supine horizontal abd  Y 1 10     Openbook stretch                                                                                             Neuro Re-Ed  CPT 79047   Group  Total Time for Session Not performed     Y/N Sets Reps Load Comment                                          Gait  CPT 76050 Group Y/N Total Time for Session Not performed                                       Ther. Activity  CPT 82209 Group Y/N Total Time for Session 8-22 Minutes   Pt Edu   IE: Pt was educated on anatomy of diagnosis, POC, prognosis, activity modification, posture and HEP was reviewed (has copy from previous PT)  No show/cancellation policy reviewed with copy " provided.    Progress note  Y Review of goals, ROM, TMD questionnaire               Group  CPT 70806  Total Time for Session Not performed

## 2022-07-12 DIAGNOSIS — M26.69 OTHER SPECIFIED DISORDERS OF TEMPOROMANDIBULAR JOINT: Primary | ICD-10-CM

## 2022-07-12 NOTE — PROGRESS NOTES
PT DISCHARGE NOTE FOR OUTPATIENT THERAPY    Patient: Aelcia Cruz MRN: [id number]  : 1960 61 y.o.  Referring Physician: No ref. provider found  Date of Visit: 2022      Certification Dates: 22 through 22    Total Visit Count: 4    Chief Complaints:  No chief complaint on file.      Precautions:        TODAY'S VISIT:    Time In Session:            PT - 22 1446        Physical Therapy    Physical Therapy [facility] PT        PT Plan    Frequency of treatment 2 times/week     PT Duration 8 weeks     PT Cert From 22     PT Cert To 22     Date PT POC was sent to provider 22     Signed PT Plan of Care received?  Yes                   OBJECTIVE MEASUREMENTS/DATA:    None taken    ROM and MMT        Some values may be hidden. Unless noted otherwise, only the newest values recorded on each date are displayed.         PT UE ROM Measurements 3/4/22 3/18/22 4/1/22 4/14/22 5/25/22 6/27/22   AROM: Right Shoulder Flexion     160 degrees    AROM: Left Shoulder Flexion 110 degrees  limited by pain 135 degrees  Limited by pain (posterior > anterior shoulder).  136 degrees  pain at end range 135 degrees  pain    AROM: Left Shoulder Extension 35 degrees  limited by pain   51 degrees  pain at end range     AROM: Right Shoulder ABD     170 degrees    AROM: Left Shoulder ABD 63 degrees  limited by pain 80 degrees  Limited by pain and tightness.  110 degrees  pain at end range 115 degrees  pain    AROM: Right Shoulder IR       T4    AROM: Left Shoulder IR       T6 - pain    AROM: Right Shoulder ER       C7    AROM: Left Shoulder ER 42 degrees  limited by pain 40 degrees  40 degrees at 80 degrees abduction. 90 degrees at neutral abduction.  70 degrees  pain at end range   left UT - pain       PT LE ROM Measurements 3/4/22 3/18/22 4/1/22 4/14/22 5/25/22 6/27/22   No data to display.      PT Cervical/Lumbar/Other ROM Measurements 3/4/22 3/18/22 4/1/22 4/14/22 5/25/22  "6/27/22   AROM: Cervical Flexion   40  50  \"jerking pain\" 52   AROM: Cervical Extension   24  increased headache  40  \"jerking pain\" 46   AROM: Left Cervical SB   11  increaed spasm into L spasm  10  severe left sided pain and radiating pain to proximal shld 22   AROM: Right Cervical SB   19  15  pulling and pain 32   AROM: Left Cervical Rotation   47  50  left sided pain 60   AROM: Right Cervical Rotation   49  60 60   AROM: Lumbar Flexion     <50      AROM: Lumar Extension     <25      AROM: Left Lumbar SB     <25      AROM: Right Lumbar SB     <25      AROM: Left Lumbar Rotation     not tested 2/2 pain      AROM: Right Lumbar Rotation     not tested 2/2 pain         Hand ROM Measurements 3/4/22 3/18/22 4/1/22 4/14/22 5/25/22 6/27/22   No data to display.      PT UE MMT Measurements 3/4/22 3/18/22 4/1/22 4/14/22 5/25/22 6/27/22   Right Shoulder Flexion     (4/5) good  pain    Left Shoulder Flexion     (4/5) good  pain    Right Shoulder ABD     (4/5) good  pain    Left Shoulder ABD     (4/5) good  pain    Right Shoulder IR     (5/5) normal    Left Shoulder IR     (5/5) normal    Right Shoulder ER     (4+/5) good plus    Left Shoulder ER     (5/5) normal    Right Elbow Flexion     (5/5) normal    Left Elbow Flexion     (5/5) normal    Right Elbow Extension     (4+/5) good plus    Left Elbow Extension     (4+/5) good plus       PT LE MMT 3/4/22 3/18/22 4/1/22 4/14/22 5/25/22 6/27/22   No data to display.           Outcome Measures    PT OBJECTIVE Outcome Measures 1/14/22 2/17/22 4/1/22 5/25/22 6/27/22   No data to display.      PT SUBJECTIVE Outcome Measures 1/14/22 2/17/22 4/1/22 5/25/22 6/27/22   Other PHILLIP: 64%, NDI: 54% PHILLIP: 56% NDI: 52% PHILLIP: 46% NDI: 34% TMD: 18/40: 45% disability TMD: 6/40 or 15% disability.             Today's Treatment:    Education provided:  None/NA         Goals Addressed                 This Visit's Progress    • COMPLETED: Mutually agreed upon pain goal        7/12: pt has not been to PT " since 22, DC per policy.    Mutually agreed upon pain goal: 3/10 with talking/sitting        • COMPLETED: Pt TMJ goals        : pt has not been to PT since 22, DC per policy.    ST. Patient will improve cervical left ROT and SB by 5 degrees in 4 weeks-MET  2. Patient will decrease pain level to 6/10 at worst in 4 weeks.-not met pt states it can reach a 9/10  3. Patient will improve TMD score by 10% in 4 weeks. (45% at IE) -MET  4. Demonstrate good sitting posture without cues in 4 weeks.-MET    LTG  1. Patient will achieve pain free symmetrical cervical ROT/SB AROM in 6-8 weeks  2. Pain free mandible AROM in 4 weeks.  3. Improve UE strength by 1 MMT grade or more in 6-8 weeks.  4. 3/10 pain at worst in sitting and/or while talking.              : Pt has not been to PT since re-evaluation on 22. Multiple attempts made via phone to assist pt with changing her appointments to accommodate her new work schedule.  Pt is being discharged at this time per our attendance policy.  She has >3 no show/same day cancellations and only attended a total of 3 follow up visits since initial evaluation on 22.

## 2022-08-08 NOTE — PROGRESS NOTES
Verification of Patient Location:  The patient affirms they are currently located in the following state: [place]    Request for Consent:    Audio and Video Encounter   Hello, my name is Sparkle Reynoso MD.  Before we proceed, can you please verify your identification by telling me your full name and date of birth?  Can you tell me who is in the room with you?    You and I are about to have a telemedicine check-in or visit because you have requested it.  This is a live video-conference.  I am a real person, speaking to you in real time.  There is no one else with me on the video-conference.  However, when we use ([employer], [employer], etc) it is important for you to know that the video-conference may not be secure or private.  I am not recording this conversation and I am asking you not to record it.  This telemedicine visit will be billed to your health insurance or you, if you are self-insured.  You understand you will be responsible for any copayments or coinsurances that apply to your telemedicine visit.  Communication platform used for this encounter:  My chart video visit    Before starting our telemedicine visit, I am required to get your consent for this virtual check-in or visit by telemedicine. Do you consent?      Patient Response to Request for Consent:  Yes        [facility]   Headache Center  Sparkle Reynoso MD  [address] [place] [address] ([address])  [place] [address]       Patient ID: Alecia Cruz    : 1960  MRN: [id number]                                            Visit Date: 2022  Encounter Provider: Sparkle Reynoso  Referring Provider: No ref. provider found           Assessment/Plan   Problem List Items Addressed This Visit        Nervous    Cervical radiculopathy    Occipital neuralgia of left side    Trigeminal neuralgia of left side of face    White matter disease, unspecified - Primary          Please message us via My Chart with any questions or  concerns    Cervicalgia  Isolated cervical dystonia  New daily persistent headache  Atypical facial pain  -Has been doing well overall with no new concerns.  States that she has very minimal neck pain and facial pain now.    Basic neck exercises for daily use:      1. Standing, drop your head to one side while continuing to look ahead. Hold for 10 seconds then swap sides. Repeat twice more each side. To increase the stretch, drop the opposite shoulder.    2. Standing again, lower your chin to your chest, hold for 10 and then look up to the ceiling and hold for 10. Repeat twice more.     3. Next, standing straight again, look over your right shoulder and hold firm for 10 seconds, then over your left shoulder for 10. Repeat this 3 times.     4. Finally, while sitting upright, bring your head forward and hold for 10, then all the way back and hold for 10.    If this simple exercise does not help improve the posture, we will consider formal physical therapy in the future.           No follow-ups on file.         _________________________________________________________________      Chief Complaint: No chief complaint on file.      Subjective     We had the pleasure of evaluating Alecia Cruz in neurological follow up today. As you know she  is a 61 y.o.  years old  right handed female.  she is here today for evaluation of trigeminal neuralgia.  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    Personal history: lives with her sister currently.    Medical history review:   QTC: 4/29/2020-   Tobacco use: never used  Vaping: never used  Alcohol use: does not use  Illicit drug use: never used  Daily Caffeine intake? Coffee - none    , Tea -  none    , Soda - none  Daily water intake?  30 Oz  History of breast cancer (BRCA - neg) - 2007 - got chemo/xrt/hormone therapy until 2009 - left arm lymphedema  7 mm left parotid lesion, likely small pleomorphic adenoma  Acquired hypothyroidism  Paroxysmal  atrial fibrillation  Hypercholesterolemia  Chronic back pain neck pain    Psychiatry history review:   Depression: yes  Anxiety: yes  Seeing a psychiatrist/ How often? no  Seeing at therapist/ how often? no    Headache/pain history:  Headaches started at what age?  17 years of age    What treatment have you had in the past or currently using for headaches/pain/mood?   Trigger point injection/Nerve block performed and how often? NO not for her neck  Epidural injections or trans foraminal injections performed? NO   Alternative therapies? physical therapy  CBD or THC for your headaches and how often? NO   Other headache devices? NO   Botulinum toxin injection performed and how often?  dysport - 4/14/2022 first treatment  Headache infusions:NO   Preventive medication therapy:   -Vitamin C, vitamin D3, coq.10, omega-3,  -  -Ambien 10 mg,  -Metoprolol 25 mg capsule sprinkle/Toprol-XL,  -cyclobenzaprine 5 mg,  -Benadryl 25 mg injection,  Abortive medication Therapy:   -Aspirin 81 mg, paroxetine 2020 mg capsule,  -Reglan 10 mg injection,  -    FOLLOW-UP CLINIC NOTE:  First visit date: 4/8/2022-last seen date: 6/1/2022  Last procedure date: 4/14/2022-Dysport      Since last seen  States that she is doing significantly well in regards to her headache and neck pain.  No new concerns at this time.  Wants to continue to hold off on the Dysport at this time.    What is your current pain level?   Headache: 0/10  Neck pain: 0/10  Facial pain: 0/10 - but if she pushes on the left side of the face it is 9/10    How often do the headaches/pain occur?   Headaches: improved with dysport  Neck pain: once a week and when she looks up it can still get up to 10/10  Facial pain: anytime she puts pressure on her right side of the face    How often do you take abortive medication for headache in a week?  Over-the-counter medication:now once a week but was using it often in the past  Migraine specific medication:none    Are you ever headache  free? Yes she is     Aura/Warning and how long does it last?   Clear/gray floater -   Duration: minutes  Frequency: 5 times a month       What time of the day do headaches/pain start?  Headache: night time now  Neck pain: with headaches   Facial pain: at night time with trigger    How long do the headaches/pain last?   Headache: all night as long as she is lying down - was on both sides but with pt now only on the left side  Neck pain: same as above  Facial pain: as long as she is lying on the left side of her face    Describe your usual headache/pain?   Headache: throbbing, pressure and stabbing  Neck pain:tightness, pressure  Facial pain:numbness stabbing    Where is your headache/pain located?   Headaches: left side of her occipital region and radiates up  Neck pain: left side of her neck and shoulder  Facial pain: left v 2-3 distribution    What is the intensity of pain?   Headache:   9/10 - none thus is not sure  Neck pain: 9 to 10/10 - without moving her head up it is 8/10 but not as bad as it was in the past  Facial pain: 10/10 - only with pressure    Associated symptoms with headache or neck pain:   - Photophobia, Phonophobia,   - Insomnia   - Stiff or sore neck  - Dizziness  - Light headed  - Off balance   - Problems with concentration   - Prefer to be in a cool, quiet, dark room     Pain is worse are worse if the patient: neck movement or touching the side of her neck and face    Triggers: touching the left occipital region and left side of her face  What time of the year do headaches occur more frequently? none    Have you ever had any Brain imaging? yes  - I personally reviewed old notes over the last few months       2/17/2020-MRI of the brain with and without contrast:  STUDY:  MRI of the Brain without and  with contrast   CLINICAL HISTORY: Headache.  IMPRESSION: No acute intracranial abnormality. No acute infarct, mass effect or  acute intracranial hemorrhage.   COMMENT:  Technique:  The brain was  scanned in multiple planes with a variety of pulse  sequences without and  with contrast.  7cc of Gadavist IV contrast were given  without adverse contrast reaction.   Comparison:  CT 1/26/2020   Findings:  Sulci, ventricles and basal cisterns are within normal limits for  patient's stated age.  Scattered foci of hyperintense signal are seen on  T2-weighted and FLAIR sequences in the subcortical, deep and periventricular  white matter; this is a nonspecific finding, most likely from age advanced  microangiopathic changes. No mass-effect, intracranial hemorrhage, acute  segmental infarct or extra-axial fluid collection is seen. Cerebellar tonsils  are normal in position.  Expected signal voids are seen in the intracranial  vessels at the skull base.  No abnormal enhancement is seen.  The orbits and  sella are unremarkable.   The paranasal sinuses and mastoid air cells are clear.    6/3/2020-MRI cervical spine:  CLINICAL HISTORY: Cervical radiculopathy.   COMMENT: MRI examination of the cervical spine was performed without intravenous  contrast following the [facility] standard protocol.   Comparison: Cervical radiographs 6/2/2020.   Cervicothoracic alignment: Straightening and reversal of the normal cervical  lordosis.  No subluxation.  Prevertebral soft tissues: Normal in thickness.  Vertebral bodies: Normal in height, noting endplate irregularity and  degenerative endplate marrow signal changes ordering the C3-C4, C4-C5, and C6-C7  disc spaces.  Intervertebral discs: Multilevel loss of intervertebral disc height and  signal, appearing moderate at C3-C4 and C4-C5.   Cervical and upper thoracic spinal canal: Mild acquired compromise at C4-C5.  Visualized spinal cord: Normal in caliber and signal.  Visualized contents of the posterior fossa: Normal in appearance.   Axial images:  C2-3: Facet hypertrophy.  Mild left neural foraminal stenosis.  No central  stenosis.  C3-4: Uncovertebral and facet hypertrophy.   Moderate bilateral neural  foraminal stenosis.  No central stenosis.  C4-5: Disc bulge eccentric dorsolaterally to the left.  Uncovertebral and  facet hypertrophy.  Severe left and moderate right neural foraminal stenosis.  Mild central stenosis.  C5-6: Disc osteophyte complex and predominantly uncovertebral hypertrophy.  Severe right and moderate left neural foraminal stenosis.  No central stenosis.  C6-7: Disc osteophyte complex and predominantly uncovertebral hypertrophy.  Moderate to severe left and moderate right neural foraminal stenosis.  No  central stenosis.  C7-T1: No disc herniation.  No central or neural foraminal stenosis.   Visualized soft tissues: Normal.  IMPRESSION: Multilevel cervical spondylosis, most pronounced at C4-C5 where  there is mild central spinal canal stenosis.  No spinal cord compression or  spinal cord signal abnormality at any level.      Medications:   Current Outpatient Medications:   •  alpha lipoic acid, bulk, powder, Take by mouth., Disp: , Rfl:   •  ascorbic acid (VITAMIN C) 500 mg tablet, Take 500 mg by mouth daily., Disp: , Rfl:   •  aspirin 81 mg enteric coated tablet, Take 81 mg by mouth daily., Disp: , Rfl:   •  azelastine (ASTELIN) 137 mcg (0.1 %) nasal spray, USE 2 SPRAYS IN EACH NOSTRIL EVERY DAY AT BEDTIME, Disp: , Rfl:   •  azithromycin (ZITHROMAX) 250 mg tablet, TAKE 2 TABLETS BY MOUTH ON DAY 1, AND THEN TAKE 1 TABLET BY MOUTH ONCE A DAY ON DAY 2 THROUGH DAY 5, Disp: , Rfl:   •  betamethasone dipropionate (DIPROSONE) 0.05 % lotion, once as needed.  , Disp: , Rfl:   •  cetirizine (ZyrTEC) 10 mg tablet, Take 10 mg by mouth daily., Disp: , Rfl:   •  cholecalciferol, vitamin D3, 1,000 unit (25 mcg) tablet, Take 1,000 Units by mouth daily., Disp: , Rfl:   •  coenzyme Q10 (COQ10) 60 mg capsule, Take by mouth., Disp: , Rfl:   •  cyclobenzaprine (FLEXERIL) 5 mg tablet, Take 1 tablet (5 mg total) by mouth 3 (three) times a day as needed for muscle spasms for up to 14 days.,  Disp: 30 tablet, Rfl: 0  •  diphenhydrAMINE (SOMINEX) 25 mg tablet, daily., Disp: , Rfl:   •  flecainide (TAMBOCOR) 50 mg tablet, Take 1 tablet (50 mg total) by mouth 2 (two) times a day., Disp: 60 tablet, Rfl: 0  •  fluticasone propionate (FLONASE) 50 mcg/actuation nasal spray, Administer 2 sprays into each nostril daily as needed for rhinitis., Disp: 1 Bottle, Rfl: 0  •  levothyroxine (SYNTHROID) 88 mcg tablet, Take 1 tablet (88 mcg total) by mouth daily., Disp: 90 tablet, Rfl: 3  •  lidocaine 2 % solution, GARGLE AND SPIT 10-15ML BY MOUTH EVERY 4 HOURS AS NEEDED FOR SORE THROAT UP TO 3 DAYS. MAX 8X/DAY, Disp: , Rfl:   •  metoprolol succinate XL (TOPROL-XL) 25 mg 24 hr tablet, Take 1 tablet (25 mg total) by mouth nightly., Disp: 90 tablet, Rfl: 3  •  multivitamin (THERAGRAN) tablet, Take 1 tablet by mouth daily., Disp: , Rfl:   •  naproxen sodium 220 mg capsule, Take by mouth daily as needed., Disp: , Rfl:   •  omega-3-dha-epa-dpa-fish oil 1,050 mg(300 mg -675 mg-75 mg) capsule, , Disp: , Rfl:   •  vitamin E 100 unit capsule, , Disp: , Rfl:   •  XIFAXAN 550 mg tablet, , Disp: , Rfl:     Past Medical History:  has a past medical history of A-fib (CMS/HCC), Breast cancer (CMS/HCC) (2008), Colon polyp, Fibrocystic breast, Fibroid, History of partial hysterectomy (03/2000), History of radiation therapy, antineoplastic chemo, lipoma (2008), Hypothyroidism, Malignant neoplasm of upper-outer quadrant of left breast in female, estrogen receptor negative (CMS/HCC) (9/10/2021), PAF (paroxysmal atrial fibrillation) (CMS/HCC), PVC (premature ventricular contraction), and Screening for breast cancer.    She has no past medical history of Hormone replacement therapy or Melanoma (CMS/HCC).    Past Surgical History:  has a past surgical history that includes Partial hysterectomy; Rotator cuff repair (Left); Mandible fracture surgery; Hysterectomy; Breast lumpectomy (Left, 2008); Breast biopsy (Left, 2008); Breast biopsy (Right,  2021); Sinus surgery; Lumbar puncture; and Dental surgery.    Social History:   Social History     Tobacco Use   • Smoking status: Never Smoker   • Smokeless tobacco: Never Used   Substance Use Topics   • Alcohol use: No   • Drug use: No       Family History: family history includes Alzheimer's disease in her biological mother; Arrhythmia in her biological mother; COPD in her biological brother; Diabetes in her biological father, biological sister, and paternal grandfather; Heart disease in her biological sister; Hypertension in her biological brother and biological sister; Lung cancer in her father's brother, father's sister, father's sister, and paternal cousin; Melanoma in her biological sister; Ovarian cancer in her paternal cousin; Prostate cancer in her biological brother, biological brother, father's brother, father's brother, father's brother, father's brother, and paternal grandfather; Sarcoidosis in her biological sister; Stomach cancer in her paternal grandmother; Thyroid cancer in her paternal cousin.    Allergies: is allergic to trazodone-dietary supp no.8, amoxicillin trihydrate, cephalexin, ciprofloxacin, codeine, potassium clavulanate, and sulfa (sulfonamide antibiotics).     Review of Systems  All other systems reviewed and negative except as noted in the HPI.      The following have been reviewed and updated as appropriate in this visit:   Allergies  Meds  Problems               Sparkle Reynoso MD    Time Spent:  I spent 30 minutes on this date of service performing the following activities: obtaining history, entering orders, documenting, preparing for visit, obtaining / reviewing records, providing counseling and education, independently reviewing study/studies, communicating results and coordinating care.

## 2022-08-09 ENCOUNTER — TELEMEDICINE (OUTPATIENT)
Dept: NEUROLOGY | Facility: CLINIC | Age: 62
End: 2022-08-09
Payer: COMMERCIAL

## 2022-08-09 VITALS — BODY MASS INDEX: 23.18 KG/M2 | WEIGHT: 148 LBS

## 2022-08-09 DIAGNOSIS — R90.82 WHITE MATTER DISEASE, UNSPECIFIED: Primary | ICD-10-CM

## 2022-08-09 DIAGNOSIS — G50.0 TRIGEMINAL NEURALGIA OF LEFT SIDE OF FACE: ICD-10-CM

## 2022-08-09 DIAGNOSIS — M54.81 OCCIPITAL NEURALGIA OF LEFT SIDE: ICD-10-CM

## 2022-08-09 DIAGNOSIS — M54.12 CERVICAL RADICULOPATHY: ICD-10-CM

## 2022-08-09 PROCEDURE — 99213 OFFICE O/P EST LOW 20 MIN: CPT | Mod: 95 | Performed by: PSYCHIATRY & NEUROLOGY

## 2022-08-09 PROCEDURE — 3008F BODY MASS INDEX DOCD: CPT | Performed by: PSYCHIATRY & NEUROLOGY

## 2022-08-15 ENCOUNTER — TELEPHONE (OUTPATIENT)
Dept: PRIMARY CARE | Facility: CLINIC | Age: 62
End: 2022-08-15
Payer: COMMERCIAL

## 2022-08-15 NOTE — TELEPHONE ENCOUNTER
Patient calling to speak with . Pt has a thorn in her foot. Pt also would like to discuss her having trouble sleeping and anxiety (Dr. Begum only).

## 2022-08-16 NOTE — TELEPHONE ENCOUNTER
Left a voicemail with Ms. Cruz stating it would be best to be seen in an urgent care about the thorn in her foot if she cannot get it out. we do not do this in the office.  Additionally I could discuss her concerns about having trouble sleeping anxiety and would give a message to Dr. Begum. although if she would like to talk directly to Dr. Begum please call back and schedule a MyChart video visit with him.

## 2022-08-23 ENCOUNTER — TELEMEDICINE (OUTPATIENT)
Dept: PRIMARY CARE | Facility: CLINIC | Age: 62
End: 2022-08-23
Payer: COMMERCIAL

## 2022-08-23 DIAGNOSIS — R91.1 INCIDENTAL LUNG NODULE, GREATER THAN OR EQUAL TO 8MM: ICD-10-CM

## 2022-08-23 DIAGNOSIS — Z63.79 STRESSFUL LIFE EVENTS AFFECTING FAMILY AND HOUSEHOLD: ICD-10-CM

## 2022-08-23 DIAGNOSIS — I48.0 PAROXYSMAL ATRIAL FIBRILLATION (CMS/HCC): ICD-10-CM

## 2022-08-23 DIAGNOSIS — U07.1 ACUTE COVID-19: Primary | ICD-10-CM

## 2022-08-23 DIAGNOSIS — F32.9 REACTIVE DEPRESSION: ICD-10-CM

## 2022-08-23 PROCEDURE — 99213 OFFICE O/P EST LOW 20 MIN: CPT | Mod: 95 | Performed by: STUDENT IN AN ORGANIZED HEALTH CARE EDUCATION/TRAINING PROGRAM

## 2022-08-23 NOTE — PROGRESS NOTES
Verification of Patient Location:  The patient affirms they are currently located in the following state: [place]    Request for Consent:    Audio and Video Encounter   Krish, my name is Mahendra Begum DO.  Before we proceed, can you please verify your identification by telling me your full name and date of birth?  Can you tell me who is in the room with you?    You and I are about to have a telemedicine check-in or visit because you have requested it.  This is a live video-conference.  I am a real person, speaking to you in real time.  There is no one else with me on the video-conference.  However, when we use ([employer], etc) it is important for you to know that the video-conference may not be secure or private.  I am not recording this conversation and I am asking you not to record it.  This telemedicine visit will be billed to your health insurance or you, if you are self-insured.  You understand you will be responsible for any copayments or coinsurances that apply to your telemedicine visit.  Communication platform used for this encounter:  [employer] Video Visit (no Zoom)     Before starting our telemedicine visit, I am required to get your consent for this virtual check-in or visit by telemedicine. Do you consent?      Patient Response to Request for Consent:  Yes      Visit Documentation:  Subjective     Patient ID: Alecia Cruz is a 61 y.o. female.  1960      MARTHA Alston is well-known to me at this point.  She reports that she tested positive for COVID and hence why we are doing this visit by telemedicine.      Reports that he tested positive last Thursday.  She has been having some intermittent fevers and they are responsive to acetaminophen.  She is feeling sluggish and fatigued.  She not having any specific chest pain, shortness of breath, or red flag symptoms.  She is having some episodes of her heart racing which feels like her A. fib.  She is on her flecainide and her anticoagulation.   She understands that if she starts to feel dizzy, chest pain, or continue racing heart that she should take her self to emergency room.  She has done this in the past and knows what it feels like.  She is otherwise doing conservative care for her COVID.    She did try to drink plenty of water but she notes that she has been having very concentrated urine.  Advised to increase her cup to is much as she can.    She also acknowledges that she has been having a ton of life stress related to her daughter.  She gave money to her daughter to help buy a house and now her daughter is apparently looking for further money to help pay for her mortgage.  She previously has had some life stress with her son who it sounds like embezzled money from her company.  She is looking to go to mediation therapy with her daughter but also would be interested in individual therapy as she has been dealing with a lot of stress and is having what looks like may be reactive depression versus a little bit of adjustment issues.    No other new active issues.    The following have been reviewed and updated as appropriate in this visit:          Review of Systems  A 10 point review of systems was asked and negative unless otherwise stated.    Assessment/Plan   Diagnoses and all orders for this visit:    Acute COVID-19 (Primary)  Assessment & Plan:  -was diagnosed with covid this past Thursday morning.  -has had covid-19 in the past.   -ended up seeing her by telemedicine instead.   -Conservative care recommended.  She will bring her self in for care if needed.      Paroxysmal atrial fibrillation (CMS/[facility])  Assessment & Plan:  - Continue anticoagulation.  -Instructed her about red flags that she is already aware of if she started having A. fib with RVR she becomes symptomatic and will bring herself in.      Reactive depression  Assessment & Plan:  I think that she would benefit from talking to Dr. Lopez in the office.  Advised that she should  call back and make an appointment to see her.  I think that they will get along well.      Stressful life events affecting family and household  Assessment & Plan:  - She is seeking outside family counseling for stress with her daughter.  Think is a very good decision..        Time Spent:  I spent 17 minutes on this date of service performing the following activities: obtaining history, performing examination, preparing for visit, obtaining / reviewing records, providing counseling and education and communicating results.

## 2022-08-23 NOTE — ASSESSMENT & PLAN NOTE
-was diagnosed with covid this past Thursday morning.  -has had covid-19 in the past.   -ended up seeing her by telemedicine instead.   -Conservative care recommended.  She will bring her self in for care if needed.

## 2022-08-24 PROBLEM — F32.9 REACTIVE DEPRESSION: Status: ACTIVE | Noted: 2022-08-24

## 2022-08-24 PROBLEM — Z63.79 STRESSFUL LIFE EVENTS AFFECTING FAMILY AND HOUSEHOLD: Status: ACTIVE | Noted: 2022-08-24

## 2022-08-24 NOTE — ASSESSMENT & PLAN NOTE
I think that she would benefit from talking to Dr. Lopez in the office.  Advised that she should call back and make an appointment to see her.  I think that they will get along well.

## 2022-08-24 NOTE — ASSESSMENT & PLAN NOTE
- Continue anticoagulation.  -Instructed her about red flags that she is already aware of if she started having A. fib with RVR she becomes symptomatic and will bring herself in.

## 2022-08-24 NOTE — ASSESSMENT & PLAN NOTE
- She is seeking outside family counseling for stress with her daughter.  Think is a very good decision..

## 2022-08-24 NOTE — ASSESSMENT & PLAN NOTE
- Spoke to patient regarding lung nodule.  Ordered follow-up CT scan to look at this.  She is not particularly high risk patient but this has been seen several times and I worried that they could become lung cancer.  - CT scan ordered.  Patient will call central scheduling and get herself set up.

## 2022-09-09 ENCOUNTER — OFFICE VISIT (OUTPATIENT)
Dept: BEHAVIORAL HEALTH | Facility: CLINIC | Age: 62
End: 2022-09-09
Payer: COMMERCIAL

## 2022-09-09 DIAGNOSIS — F33.2 SEVERE EPISODE OF RECURRENT MAJOR DEPRESSIVE DISORDER, WITHOUT PSYCHOTIC FEATURES (CMS/HCC): Primary | ICD-10-CM

## 2022-09-09 ASSESSMENT — COGNITIVE AND FUNCTIONAL STATUS - GENERAL
SPEECH: REGULAR
IMPULSE CONTROL: INTACT
DELUSIONS: NONE OR AGE APPROPRIATE
APPEARANCE: WELL GROOMED
LIBIDO: NO CHANGE
THOUGHT_CONTENT: APPROPRIATE
RECENT MEMORY: WNL
PERCEPTUAL FUNCTION: NORMAL
APPETITE: DECREASED
INSIGHT: INTACT
THOUGHT_PROCESS: WNL
EYE_CONTACT: WNL
AFFECT: FULL RANGE
AROUSAL LEVEL: ALERT
SLEEP_WAKE_CYCLE: DECREASED
MOOD: DEPRESSED
PSYCHOMOTOR FUNCTIONING: WNL
CONCENTRATION: WNL
REMOTE MEMORY: WNL
ORIENTATION: FULLY ORIENTED
ATTENTION: WNL

## 2022-09-09 NOTE — PROGRESS NOTES
Integrated Behavioral Health Outpatient Initial Visit    Visit Type Performed: In-office     Alecia Cruz presented today for a behavioral health visit.    Clinician confirmed identification of patient by name and birthdate.      Informed Consent/Confidentiality:  Pt was explained the model of primary care behavioral health we provide at [facility], including the following:  The doctoral psychology intern/post-doctoral psychology fellow is under the direct supervision of a licensed psychologist (Shasha Wen Psy.D., Rosalina Liao Psy.D., Grace Noguera Psy.D., Jenaro Jim Psy.D., Mary Ann Mc Psy.D., and Ronda Lemos Psy.D.).    The psychology intern/fellow collaborates regularly with the pts PCP regarding the pts treatment. The integrated behavioral health progress notes are visible to the physicians and advanced practitioners in the practice where the pt is being seen. The visit diagnosis and appointment/scheduling information is visible to the patient's EPIC chart, to provide continuity of care across the [facility] system. The pt will also have access to view their notes via [employer] (pt portal).  This is a low-intensity model of care (we provide 8-10 visits of cognitive-behavioral therapy), and the patient has the right to other options of behavioral health care that are indicated for more severe conditions (ie: Traditional Outpatient psychotherapy, Intensive Outpatient Programs, Partial Hospitalization Programs, and Inpatient services).  The intern/fellow is providing this care by participating in a training program, and therefore they can not be involved with any legal issues/forms (including disability, FMLA, etc.).    Also discussed were confidentiality and the limits of confidentiality (ie: if pt is at imminent risk of suicide, homicide, or reports child abuse/neglect, providers may need to break confidentiality to ensure the safety of the pt or to follow mandated reporting requirements).   Pt  expressed understanding and consent: Yes      SUBJECTIVE       History of Behavioral Health Treatment  Previous treatment: Previous therapy: yes. Pt engaged in therapy on and off throughout her life. Pt has not been in treatment in several years, however the last date of participating in therapy was not identified.  Previously experienced symptoms of: anxiety and depression  Other pertinent behavioral health history: childhood trauma.       Substance Use History  ETOH/alcohol use: denied  Other substance or supplement use: drugs: denied.; tobacco: denied; caffeine: denies use       Social History  Important people in pt's life/Support network: good support system, including family and friends.    Lives with: Daughter and grandchildren  Cultural practices/Scientology/Spiritual beliefs pertinent to treatment: Conservative Oriental orthodox/Gnosticist. Spiritual person.   Patient-Identified Strengths: Helpful, loving, caring, and genuine.   Hobbies/Interests: Reading, writing, gardening, and music   Additional pertinent historical information includes: post college graduate work or degree; Working Full-Time, currently employed.       Symptoms  Depression symptoms:PHQ 9:  Little Interest or Pleasure in Doing Things: 2-->more than half the days    Feeling Down, Depressed or Hopeless: 2-->more than half the days    Trouble Falling or Staying Asleep, or Sleeping Too Much: 3-->nearly every day    Feeling Tired or Having Little Energy: 2-->more than half the days    Poor Appetite or Overeating: 3-->nearly every day    Feeling Bad about Yourself - or that You are a Failure or Have Let Yourself or Your Family Down: 3-->nearly every day    Trouble Concentrating on Things, Such as Reading the Newspaper or Watching Television: 2-->more than half the days    Moving or Speaking So Slowly that Other People Could Have Noticed? Or the Opposite - Being So Fidgety: 1-->several days    Thoughts that You Would be Better Off Dead or of Hurting Yourself  in Some Way: 0-->not at all    PHQ-9: Brief Depression Severity Measure Score: 18    If You Checked Off Any Problems, How Difficult Have These Problems Made It For You to Do Your Work, Take Care of Things at Home, or Get Along with Other People?: very difficult      Anxiety symptoms: SUGAR-7  Feeling nervous, anxious or on edge: 2-->More than half the days    Not being able to stop or control worryin-->More than half the days    Worrying too much about different things: 2-->More than half the days    Trouble relaxin-->More than half the days    Being so restless that it is hard to sit still: 1-->Several days    Becoming easily annoyed or irritable: 1-->Several days    Feeling afraid as if something awful might happen: 2-->More than half the days      GAD7 Total Score: : 12      If you checked off any problems, how difficult have these made it for you to do your work, take care of things at home, or get along with other people?: Very difficult        Additional reported symptoms: N/A      OBJECTIVE     Mental Status Exam  Appearance: Well Groomed  Speech: Regular  Psychomotor Functioning: WNL  Eye Contact: WNL  Orientation: Fully oriented  Attention: WNL  Concentration: WNL  Recent Memory: WNL  Remote Memory: WNL  Thought Content: Appropriate  Thought Process: WNL  Insight: Intact  Perceptual Function: Normal  Delusions: None or age appropriate  Sleeping: Decreased  Appetite: Decreased  Libido: No change  Affect: Full Range  Mood: Depressed      ASSESSMENT     Psychotropic medications: Pt is not currently prescribed psychotropic medications.   Current Outpatient Medications   Medication Sig Dispense Refill    alpha lipoic acid, bulk, powder Take by mouth.      ascorbic acid (VITAMIN C) 500 mg tablet Take 500 mg by mouth daily.      aspirin 81 mg enteric coated tablet Take 81 mg by mouth daily.      azelastine (ASTELIN) 137 mcg (0.1 %) nasal spray USE 2 SPRAYS IN EACH NOSTRIL EVERY DAY AT BEDTIME       azithromycin (ZITHROMAX) 250 mg tablet TAKE 2 TABLETS BY MOUTH ON DAY 1, AND THEN TAKE 1 TABLET BY MOUTH ONCE A DAY ON DAY 2 THROUGH DAY 5      betamethasone dipropionate (DIPROSONE) 0.05 % lotion once as needed.        cetirizine (ZyrTEC) 10 mg tablet Take 10 mg by mouth daily.      cholecalciferol, vitamin D3, 1,000 unit (25 mcg) tablet Take 1,000 Units by mouth daily.      coenzyme Q10 (COQ10) 60 mg capsule Take by mouth.      cyclobenzaprine (FLEXERIL) 5 mg tablet Take 1 tablet (5 mg total) by mouth 3 (three) times a day as needed for muscle spasms for up to 14 days. 30 tablet 0    diphenhydrAMINE (SOMINEX) 25 mg tablet daily.      flecainide (TAMBOCOR) 50 mg tablet Take 1 tablet (50 mg total) by mouth 2 (two) times a day. 60 tablet 0    fluticasone propionate (FLONASE) 50 mcg/actuation nasal spray Administer 2 sprays into each nostril daily as needed for rhinitis. 1 Bottle 0    levothyroxine (SYNTHROID) 88 mcg tablet Take 1 tablet (88 mcg total) by mouth daily. 90 tablet 3    lidocaine 2 % solution GARGLE AND SPIT 10-15ML BY MOUTH EVERY 4 HOURS AS NEEDED FOR SORE THROAT UP TO 3 DAYS. MAX 8X/DAY      metoprolol succinate XL (TOPROL-XL) 25 mg 24 hr tablet Take 1 tablet (25 mg total) by mouth nightly. 90 tablet 3    multivitamin (THERAGRAN) tablet Take 1 tablet by mouth daily.      naproxen sodium 220 mg capsule Take by mouth daily as needed.      omega-3-dha-epa-dpa-fish oil 1,050 mg(300 mg -675 mg-75 mg) capsule       vitamin E 100 unit capsule       XIFAXAN 550 mg tablet        No current facility-administered medications for this visit.         Suicidal Ideation/Homicidal Ideation Risk Assessment  Risk Factors: Presence of a Mood Disorder and Loss (relational, social, work, or financial)  Protective factors: Effective and accessible mental health care , Connectedness to individuals, family, community, and social institutions, Problem-solving and conflict resolution skills and Cultural/Rastafari  beliefs that discourage suicide    Suicidal Ideation: Not Present, No intention or plan.  Self Injurious Behavior:  Not Present  Homicidal Ideation: Not Present  Estimate of Current Risk: Minimal risk    Plan for Safety-   N/A:  Risk is assessed to be minimal; therefore, developing a safety plan is not indicated at this time.          Screening measures administered during this visit  PHQ-9: Brief Depression Severity Measure Score: 18  GAD7 Total Score: : 12      ASSESSMENT / IMPRESSIONS  Alecia Cruz seems to be experiencing increased symptoms of anxiety and depression. The pt has been having difficulty within her relationship with her children. She appears to feel taken advantage of by her adult children financially. She is concerned about how her children have been behaving,, which causes her a great deal of worry and stress. Alecia endorsed rumination, poor sleep, poor appetite, and feelings of worthlessness and guilt. The pt appears to have a difficult time setting boundaries with her children, especially her daughter. The pt seems to meet criteria for major depressive disorder, recurrent, severe. It is likely that CBT will be beneficial in reducing symptoms.   Severity: moderate, chronicity: chronic, duration:year(s)  Associated factors include stress related to work.  Prognostic protective factors include, openness to treatment, awareness of thoughts/feelings, and good support system. Prognostic risk factors include, pt's desire to protect her children over herself, especially financially.      PLAN     Goals:  · Skills/tools to cope   · Reduce symptoms of depression/anxiety  · Healthy bounday with kids     Recommendations for treatment: Individual Therapy, 30 minutes 2 times monthly    Recommendations for Interventions: Cognitive Behavior Therapy    Next visit plan  Introduce CBT and anxiety/depressive cycle    I spent 60 minutes on this date of service performing the following activities: obtaining  history, documenting, preparing for visit and providing counseling and education.

## 2022-09-15 ENCOUNTER — TELEPHONE (OUTPATIENT)
Dept: CARDIOLOGY | Facility: CLINIC | Age: 62
End: 2022-09-15
Payer: COMMERCIAL

## 2022-09-15 DIAGNOSIS — I48.0 PAROXYSMAL ATRIAL FIBRILLATION (CMS/HCC): Primary | ICD-10-CM

## 2022-09-15 NOTE — TELEPHONE ENCOUNTER
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    Last OV 9/2021.    Due to return in 12/2021.    PMH notable for PVCs and PAF on Flecainide 50mg BID.    Metoprolol on an as needed basis was changed to 25mg daily in 9/2021 for more frequent breakthrough episodes.    [id number] score=1 (female)- on aspirin 81 mg daily.     Has samples of Eliquis to be taken as needed in place of aspirin for episodes of atrial fibrillation lasting greater than 24 hours.    Ms. Cruz phoned this morning to ask for an OV appointment and to provide you with an update on symptoms.    In mid May, she began to note intermittent palpitations which she described as a fluttering in her chest.    Over the past month symptoms have increased-occurring on a daily basis now.    She states, \" I feel like I'm having the beginning stages of a-fib then it stops.\"    Believes she's had two brief episodes of atrial fibrillation lasting 60 to 90 seconds.    This am awoke with a brief episode of heart racing.    Symptom free at the time of her call.    Stopped taking Metoprolol on a daily basis in Feb-made her feel tired/lethargic and BP readings were low at 80-90/50-60 mm Hg. No HR tracking.    Under a lot of stress.    Would like a return call to discuss and can be reached at [phone].    "

## 2022-09-15 NOTE — TELEPHONE ENCOUNTER
Patient last seen 1 year ago. Having more palpitation symptoms-possible AF. Please call patient to schedule OV with Dr Alonso. Patient overdue for office follow up. TY!

## 2022-09-15 NOTE — TELEPHONE ENCOUNTER
If symptoms are daily, please schedule her for 48 hour holter prior to follow up office visit.  Thanks

## 2022-09-16 NOTE — PROGRESS NOTES
I reviewed the Psychology Resident's/Post-Doctoral Fellow's note and agree with the documented findings and plan of care, except for my comments below or within the additional notes today.    Mike Jim Psy.D.   Integrated Behavioral Health  Staff Psychologist, [facility]  [place] License #[id number]

## 2022-09-23 ENCOUNTER — OFFICE VISIT (OUTPATIENT)
Dept: BEHAVIORAL HEALTH | Facility: CLINIC | Age: 62
End: 2022-09-23
Payer: COMMERCIAL

## 2022-09-23 DIAGNOSIS — F33.2 SEVERE EPISODE OF RECURRENT MAJOR DEPRESSIVE DISORDER, WITHOUT PSYCHOTIC FEATURES (CMS/HCC): Primary | ICD-10-CM

## 2022-09-23 NOTE — PROGRESS NOTES
"Integrated Behavioral Health Follow-up Visit Note  Visit number: 1     Visit Type Performed: In-office     Alecia Cruz presented today for a behavioral health visit.      SUBJECTIVE     Symptoms  Depression symptoms: depressed mood, sadness, fatigue/lack of energy and feelings of worthlessness/guilt  Anxiety symptoms: moderate anxiety/worry, difficulty controlling worry and restless/\"on edge\"  Additional reported symptoms: N/A      OBJECTIVE     Mental Status Evaluation  Patient's mood and affect were consistent with the context, and consistent with their baseline: Yes   Comments:  depressed, awake and alert; oriented to person, place, and time    Additional Assessments completed this visit  N/A    Suicidal Ideation/Homicidal Ideation Risk Assessment: not assessed. If not assessed, reason:  Previous evaluation indicated minimal risk. Pt did not endorse any changes in risk or protective factors and none were observed.     Risk Factors: Presence of a Mood Disorder and Loss (relational, social, work, or financial)  Protective factors: Effective and accessible mental health care , Connectedness to individuals, family, community, and social institutions and Problem-solving and conflict resolution skills    Estimate of Current Risk: Minimal risk    Plan for Safety-   N/A:  Risk is assessed to be minimal; therefore, developing a safety plan is not indicated at this time.      Interventions  Cognitive Behavior Therapy  We discussed the ongoing challenges the pt is experiencing with her daughter. The pt reflected on conflict she has had with her daughter over the last 2 weeks, which has contributed to her increased stress and depression. It seems like when the pt's daughter becomes upset, she will say mean things to the pt regarding herself as a person and as a parent. It appears the pt takes what her daughter tells her to be true, which is impacting the pt's self-worth. The psychology fellow guided the pt to " challenging the unhelpful statements said to (I.e. provide evidence that shows you are a good person or a good mother).    Psychotropic medications: Pt is not currently prescribed psychotropic medications.   Current Outpatient Medications   Medication Sig Dispense Refill    alpha lipoic acid, bulk, powder Take by mouth.      ascorbic acid (VITAMIN C) 500 mg tablet Take 500 mg by mouth daily.      aspirin 81 mg enteric coated tablet Take 81 mg by mouth daily.      azelastine (ASTELIN) 137 mcg (0.1 %) nasal spray USE 2 SPRAYS IN EACH NOSTRIL EVERY DAY AT BEDTIME      azithromycin (ZITHROMAX) 250 mg tablet TAKE 2 TABLETS BY MOUTH ON DAY 1, AND THEN TAKE 1 TABLET BY MOUTH ONCE A DAY ON DAY 2 THROUGH DAY 5      betamethasone dipropionate (DIPROSONE) 0.05 % lotion once as needed.        cetirizine (ZyrTEC) 10 mg tablet Take 10 mg by mouth daily.      cholecalciferol, vitamin D3, 1,000 unit (25 mcg) tablet Take 1,000 Units by mouth daily.      coenzyme Q10 (COQ10) 60 mg capsule Take by mouth.      cyclobenzaprine (FLEXERIL) 5 mg tablet Take 1 tablet (5 mg total) by mouth 3 (three) times a day as needed for muscle spasms for up to 14 days. 30 tablet 0    diphenhydrAMINE (SOMINEX) 25 mg tablet daily.      flecainide (TAMBOCOR) 50 mg tablet Take 1 tablet (50 mg total) by mouth 2 (two) times a day. 60 tablet 0    fluticasone propionate (FLONASE) 50 mcg/actuation nasal spray Administer 2 sprays into each nostril daily as needed for rhinitis. 1 Bottle 0    levothyroxine (SYNTHROID) 88 mcg tablet Take 1 tablet (88 mcg total) by mouth daily. 90 tablet 3    lidocaine 2 % solution GARGLE AND SPIT 10-15ML BY MOUTH EVERY 4 HOURS AS NEEDED FOR SORE THROAT UP TO 3 DAYS. MAX 8X/DAY      metoprolol succinate XL (TOPROL-XL) 25 mg 24 hr tablet Take 1 tablet (25 mg total) by mouth nightly. 90 tablet 3    multivitamin (THERAGRAN) tablet Take 1 tablet by mouth daily.      naproxen sodium 220 mg capsule Take by mouth daily as  needed.      omega-3-dha-epa-dpa-fish oil 1,050 mg(300 mg -675 mg-75 mg) capsule       vitamin E 100 unit capsule       XIFAXAN 550 mg tablet        No current facility-administered medications for this visit.       ASSESSMENT       Progress  Patient's progress toward their goals is generally showing no change (Pt endorsed no changes in mood since the initial evaluation).   Patient's symptomology is unchanged.       PLAN     Goals:  · Skills/tools to cope   · Reduce symptoms of depression/anxiety  · Healthy bounday with kids      Recommendations  Individual Therapy  30 minutes 2 times monthly    Next visit plan:  Review homework  Continue working on creating healthy boundaries     I spent 40 minutes on this date of service performing the following activities: documenting, preparing for visit and providing counseling and education.

## 2022-09-27 ENCOUNTER — TELEMEDICINE (OUTPATIENT)
Dept: PRIMARY CARE | Facility: CLINIC | Age: 62
End: 2022-09-27
Payer: COMMERCIAL

## 2022-09-27 DIAGNOSIS — R19.5 STOOL COLOR ABNORMAL: ICD-10-CM

## 2022-09-27 DIAGNOSIS — Z85.3 HISTORY OF LEFT BREAST CANCER: Primary | ICD-10-CM

## 2022-09-27 DIAGNOSIS — R10.13 EPIGASTRIC PAIN: ICD-10-CM

## 2022-09-27 DIAGNOSIS — R63.4 WEIGHT LOSS: ICD-10-CM

## 2022-09-27 DIAGNOSIS — F41.1 ANXIETY STATE: ICD-10-CM

## 2022-09-27 DIAGNOSIS — R91.1 INCIDENTAL LUNG NODULE, GREATER THAN OR EQUAL TO 8MM: ICD-10-CM

## 2022-09-27 PROCEDURE — 99214 OFFICE O/P EST MOD 30 MIN: CPT | Mod: 95 | Performed by: STUDENT IN AN ORGANIZED HEALTH CARE EDUCATION/TRAINING PROGRAM

## 2022-09-27 ASSESSMENT — ENCOUNTER SYMPTOMS
FEVER: 0
FATIGUE: 1
DIZZINESS: 1
POLYDIPSIA: 0
ARTHRALGIAS: 0
LIGHT-HEADEDNESS: 0
WOUND: 0
WHEEZING: 0
AGITATION: 0
APPETITE CHANGE: 1
ADENOPATHY: 0
DIARRHEA: 0
WEAKNESS: 0
PALPITATIONS: 1
DYSURIA: 0
ACTIVITY CHANGE: 0
NAUSEA: 0
POLYPHAGIA: 0
VOMITING: 0
BLOOD IN STOOL: 1
COUGH: 0
UNEXPECTED WEIGHT CHANGE: 0
SHORTNESS OF BREATH: 0
BACK PAIN: 0
NUMBNESS: 0
ABDOMINAL PAIN: 1
FREQUENCY: 0
CHEST TIGHTNESS: 0
CONSTIPATION: 0
HEMATURIA: 1

## 2022-09-27 NOTE — PROGRESS NOTES
Verification of Patient Location:  The patient affirms they are currently located in the following state: [place]    Request for Consent:    Audio and Video Encounter   Krish, my name is Mahendra Begum DO.  Before we proceed, can you please verify your identification by telling me your full name and date of birth?  Can you tell me who is in the room with you?    You and I are about to have a telemedicine check-in or visit because you have requested it.  This is a live video-conference.  I am a real person, speaking to you in real time.  There is no one else with me on the video-conference.  However, when we use ([employer], etc) it is important for you to know that the video-conference may not be secure or private.  I am not recording this conversation and I am asking you not to record it.  This telemedicine visit will be billed to your health insurance or you, if you are self-insured.  You understand you will be responsible for any copayments or coinsurances that apply to your telemedicine visit.  Communication platform used for this encounter:  [employer] Video Visit (no Zoom)     Before starting our telemedicine visit, I am required to get your consent for this virtual check-in or visit by telemedicine. Do you consent?      Patient Response to Request for Consent:  Yes      Visit Documentation:  Subjective     Patient ID: Alecia Cruz is a 61 y.o. female.  1960      HPI  She reports that she has been losing weight and she has been having a redish color to her urine. She has also seen some bright red blood in her stools. She has hemorrhoids which is what she attributes this blood too. She follows with a GI doctor but hasn't recently seen them. She has had a colonoscopy.     This is her first time talking to me about her weight loss.  She endorses that started about 3 months ago.  She had originally attributed to anxiety and decreased p.o. intake.  This may actually be true but additionally she  reports that she has been having some mid epigastric bloating and gassy sensation.  She also notes that she has seen a change in the color of her stools corresponding to around the same time where her stools have been more of a light tan to almost gray color.  She has started seeing the psychologist in our office for her anxiety.  I do want to make sure that we rule out alternate etiologies of her weight loss and given the change in color of her stools I think it is important that we get some imaging of her upper abdomen.  I asked her to get an ultrasound on as soon as is possible as well as we will order her blood work and urine studies.      She also should follow-up with her GI doctor regarding the blood in the stool.  Last colonoscopy listed 2021.    We previously looked into renal cyst that was identified incidentally as well as a breast calcification that was biopsied by breast surgery.    We are going to bring her cancer screening up-to-date.  I have discussed with her regarding her hysterectomy and it was done not for cancer and she believes that she no longer has a cervix.  But still has ovaries as she did not go into surgical menopause as far as we know.  I discussed with her previously about possibly seeing an OB/GYN to have a pelvic exam to determine definitively if she has a cervix or not.    The following have been reviewed and updated as appropriate in this visit:   Meds  Problems       Review of Systems   Constitutional: Positive for appetite change and fatigue. Negative for activity change, fever and unexpected weight change.   Respiratory: Negative for cough, chest tightness, shortness of breath and wheezing.    Cardiovascular: Positive for palpitations. Negative for chest pain and leg swelling.   Gastrointestinal: Positive for abdominal pain and blood in stool. Negative for constipation, diarrhea, nausea and vomiting.   Endocrine: Negative for polydipsia and polyphagia.   Genitourinary: Positive  for hematuria. Negative for dysuria and frequency.   Musculoskeletal: Negative for arthralgias and back pain.   Skin: Negative for rash and wound.   Neurological: Positive for dizziness. Negative for weakness, light-headedness and numbness.   Hematological: Negative for adenopathy.   Psychiatric/Behavioral: Negative for agitation and behavioral problems.         Assessment/Plan   Diagnoses and all orders for this visit:    History of left breast cancer (Primary)  Assessment & Plan:  - She is due for her next mammogram.    Orders:  -     BI SCREENING MAMMOGRAM BILATERAL(TOMOSYNTHESIS); Future  -     ULTRASOUND ABDOMEN COMPLETE; Future    Weight loss  Assessment & Plan:  - Given her reported weight loss I would like her to increase her p.o. intake and I would like her to come see us in the office so that we can determine if she really is having true weight loss.  - Order for ultrasound of the abdomen to look at the pancreas and gallbladder given the change in stool color  - Ordered broad lab work to assess further if we are able to identify etiology from that  - Hopefully we are not seeing true weight loss and this is anxiety dependent.    Orders:  -     Urinalysis with microscopic; Future  -     CBC and differential; Future  -     Comprehensive metabolic panel; Future    Epigastric pain  Assessment & Plan:  - Her weight loss could be associated with gastritis or perhaps peptic ulcer disease.  Not having an extraordinary amount of NSAIDs in her diet.  - I would like her to see her GI doctor if our work-up is unremarkable as there may be indication to do EGD.    Orders:  -     ULTRASOUND ABDOMEN COMPLETE; Future    Stool color abnormal  -     ULTRASOUND ABDOMEN COMPLETE; Future    Anxiety state  Assessment & Plan:  - She is working with psychology to help form boundaries with her  and to help her with her anxiety        Incidental lung nodule, greater than or equal to 8mm  Assessment & Plan:  - We had previously  ordered CT scan of the chest which I reminded her to get done again today.  No new issues or symptoms.        Time Spent:  I spent 15 minutes on this date of service performing the following activities: obtaining history, entering orders, obtaining / reviewing records, providing counseling and education and independently reviewing study/studies.

## 2022-09-29 NOTE — PROGRESS NOTES
I reviewed the Psychology Resident's/Post-Doctoral Fellow's note and agree with the documented findings and plan of care, except for my comments below or within the additional notes today.    Mike Jim Psy.D.   Integrated Behavioral Health  Staff Psychologist, [facility]  [place] License #[id number]

## 2022-10-07 ENCOUNTER — TELEMEDICINE (OUTPATIENT)
Dept: BEHAVIORAL HEALTH | Facility: CLINIC | Age: 62
End: 2022-10-07
Payer: COMMERCIAL

## 2022-10-07 DIAGNOSIS — F33.2 SEVERE EPISODE OF RECURRENT MAJOR DEPRESSIVE DISORDER, WITHOUT PSYCHOTIC FEATURES (CMS/HCC): Primary | ICD-10-CM

## 2022-10-07 NOTE — PROGRESS NOTES
Integrated Behavioral Health Follow-up Visit Note  Visit number: 2     Visit Type Performed: Video   Communication platform used for this encounter:  [employer] Video Visit (no Zoom)     Alecia Cruz was contacted today for a behavioral health visit.  Clinician confirmed identification of patient by name and birthdate, provider name, location of patient and clinician, and callback number in case disconnected.    Verification of Patient Location:  The patient affirms they are currently located in the following state: [place]  This visit was conducted via telehealth/telephone in lieu of an in-person visit due to precautions related to the COVID-19 pandemic.    Request for Consent:  You and I are about to have a tele-health check-in or visit. This is allowed because you are already a patient of our [facility] practice, and you have requested it.  This tele-health visit will be billed to your health insurance or you, if you are self-insured. You understand you will be responsible for any copayments or coinsurances that apply to your tele-health visit.  Before starting our telemedicine visit, I am required to get your consent for this virtual check-in or visit by tele-health . Do you consent?    Patient Response to Request for Consent: Yes      SUBJECTIVE     Symptoms  Depression symptoms: depressed mood, irritable mood, fatigue/lack of energy and feelings of worthlessness/guilt  Anxiety symptoms: moderate anxiety/worry  Additional reported symptoms: N/A      OBJECTIVE     Mental Status Evaluation  Patient's mood and affect were consistent with the context, and consistent with their baseline: Yes   Comments:  depressed, awake and alert; oriented to person, place, and time    Additional Assessments completed this visit  N/A    Suicidal Ideation/Homicidal Ideation Risk Assessment: not assessed. If not assessed, reason:  Previous evaluation indicated minimal risk. Pt did not endorse any changes in risk or protective  factors and none were observed.      Risk Factors: Presence of a Mood Disorder and Loss (relational, social, work, or financial)  Protective factors: Effective and accessible mental health care , Connectedness to individuals, family, community, and social institutions and Problem-solving and conflict resolution skills     Estimate of Current Risk: Minimal risk     Plan for Safety-   N/A:  Risk is assessed to be minimal; therefore, developing a safety plan is not indicated at this time.      Interventions  Cognitive Behavior Therapy and Empathic Listening and Validation  We discussed the ongoing difficulty the pt is having in her relationship with her daughter. There has been increased tension between her and her daughter related to financial disagreements. The psychology fellow provided empathetic listening and supportive therapy. The psychology fellow guided the pt to think about what would be the most helpful for her to do while she is facing these challenges with her daughter (I.e. move in with a friend, utilize self-care, gratitude journaling). The pt discussed her upcoming plans to move out of her daughters home and what she is looking forward to when she is no longer in the home.     Psychotropic medications: Pt is not currently prescribed psychotropic medications.   Current Outpatient Medications   Medication Sig Dispense Refill    alpha lipoic acid, bulk, powder Take by mouth.      ascorbic acid (VITAMIN C) 500 mg tablet Take 500 mg by mouth daily.      aspirin 81 mg enteric coated tablet Take 81 mg by mouth daily.      azelastine (ASTELIN) 137 mcg (0.1 %) nasal spray USE 2 SPRAYS IN EACH NOSTRIL EVERY DAY AT BEDTIME      azithromycin (ZITHROMAX) 250 mg tablet TAKE 2 TABLETS BY MOUTH ON DAY 1, AND THEN TAKE 1 TABLET BY MOUTH ONCE A DAY ON DAY 2 THROUGH DAY 5      betamethasone dipropionate (DIPROSONE) 0.05 % lotion once as needed.        cetirizine (ZyrTEC) 10 mg tablet Take 10 mg by mouth daily.       cholecalciferol, vitamin D3, 1,000 unit (25 mcg) tablet Take 1,000 Units by mouth daily.      coenzyme Q10 (COQ10) 60 mg capsule Take by mouth.      cyclobenzaprine (FLEXERIL) 5 mg tablet Take 1 tablet (5 mg total) by mouth 3 (three) times a day as needed for muscle spasms for up to 14 days. 30 tablet 0    diphenhydrAMINE (SOMINEX) 25 mg tablet daily.      flecainide (TAMBOCOR) 50 mg tablet Take 1 tablet (50 mg total) by mouth 2 (two) times a day. 60 tablet 0    fluticasone propionate (FLONASE) 50 mcg/actuation nasal spray Administer 2 sprays into each nostril daily as needed for rhinitis. 1 Bottle 0    levothyroxine (SYNTHROID) 88 mcg tablet Take 1 tablet (88 mcg total) by mouth daily. 90 tablet 3    lidocaine 2 % solution GARGLE AND SPIT 10-15ML BY MOUTH EVERY 4 HOURS AS NEEDED FOR SORE THROAT UP TO 3 DAYS. MAX 8X/DAY      metoprolol succinate XL (TOPROL-XL) 25 mg 24 hr tablet Take 1 tablet (25 mg total) by mouth nightly. 90 tablet 3    multivitamin (THERAGRAN) tablet Take 1 tablet by mouth daily.      naproxen sodium 220 mg capsule Take by mouth daily as needed.      omega-3-dha-epa-dpa-fish oil 1,050 mg(300 mg -675 mg-75 mg) capsule       vitamin E 100 unit capsule       XIFAXAN 550 mg tablet        No current facility-administered medications for this visit.       ASSESSMENT       Progress  Patient's progress toward their goals is generally showing no change (Pt endorsed no changes in mood symptoms).  Patient's symptomology is unchanged.        PLAN     Goals:  · Skills/tools to cope   · Reduce symptoms of depression/anxiety  · Healthy bounday with kids      Recommendations  Individual Therapy  30 minutes 2 times monthly    Next visit plan:  Continue with strengths based work.     I spent 30 minutes on this date of service performing the following activities: providing counseling and education.

## 2022-10-09 PROBLEM — R63.4 WEIGHT LOSS: Status: ACTIVE | Noted: 2022-10-09

## 2022-10-09 PROBLEM — U07.1 ACUTE COVID-19: Status: RESOLVED | Noted: 2022-08-23 | Resolved: 2022-10-09

## 2022-10-09 PROBLEM — M54.50 RIGHT LOW BACK PAIN: Status: RESOLVED | Noted: 2022-01-14 | Resolved: 2022-10-09

## 2022-10-09 PROBLEM — R10.13 EPIGASTRIC PAIN: Status: ACTIVE | Noted: 2022-10-09

## 2022-10-09 PROBLEM — Z12.11 ENCOUNTER FOR SCREENING COLONOSCOPY: Status: RESOLVED | Noted: 2022-02-17 | Resolved: 2022-10-09

## 2022-10-09 PROBLEM — R92.1 BREAST CALCIFICATION SEEN ON MAMMOGRAM: Status: RESOLVED | Noted: 2021-09-10 | Resolved: 2022-10-09

## 2022-10-09 NOTE — ASSESSMENT & PLAN NOTE
- We had previously ordered CT scan of the chest which I reminded her to get done again today.  No new issues or symptoms.

## 2022-10-09 NOTE — ASSESSMENT & PLAN NOTE
- Her weight loss could be associated with gastritis or perhaps peptic ulcer disease.  Not having an extraordinary amount of NSAIDs in her diet.  - I would like her to see her GI doctor if our work-up is unremarkable as there may be indication to do EGD.

## 2022-10-09 NOTE — ASSESSMENT & PLAN NOTE
- She is working with psychology to help form boundaries with her  and to help her with her anxiety

## 2022-10-09 NOTE — ASSESSMENT & PLAN NOTE
- Given her reported weight loss I would like her to increase her p.o. intake and I would like her to come see us in the office so that we can determine if she really is having true weight loss.  - Order for ultrasound of the abdomen to look at the pancreas and gallbladder given the change in stool color  - Ordered broad lab work to assess further if we are able to identify etiology from that  - Hopefully we are not seeing true weight loss and this is anxiety dependent.

## 2022-10-10 NOTE — PROGRESS NOTES
I reviewed the Psychology Resident's/Post-Doctoral Fellow's note and agree with the documented findings and plan of care, except for my comments below or within the additional notes today.    Mike Jim Psy.D.   Integrated Behavioral Health  Staff Psychologist, [facility]  [place] License #[id number]

## 2022-10-15 ENCOUNTER — HOSPITAL ENCOUNTER (OUTPATIENT)
Dept: RADIOLOGY | Facility: HOSPITAL | Age: 62
Discharge: HOME | End: 2022-10-15
Attending: STUDENT IN AN ORGANIZED HEALTH CARE EDUCATION/TRAINING PROGRAM
Payer: COMMERCIAL

## 2022-10-15 ENCOUNTER — TRANSCRIBE ORDERS (OUTPATIENT)
Dept: LAB | Facility: HOSPITAL | Age: 62
End: 2022-10-15

## 2022-10-15 ENCOUNTER — APPOINTMENT (OUTPATIENT)
Dept: LAB | Facility: HOSPITAL | Age: 62
End: 2022-10-15
Attending: STUDENT IN AN ORGANIZED HEALTH CARE EDUCATION/TRAINING PROGRAM
Payer: COMMERCIAL

## 2022-10-15 DIAGNOSIS — R63.4 ABNORMAL WEIGHT LOSS: Primary | ICD-10-CM

## 2022-10-15 DIAGNOSIS — R91.1 INCIDENTAL LUNG NODULE, GREATER THAN OR EQUAL TO 8MM: ICD-10-CM

## 2022-10-15 DIAGNOSIS — Z85.3 HISTORY OF LEFT BREAST CANCER: ICD-10-CM

## 2022-10-15 DIAGNOSIS — R10.13 EPIGASTRIC PAIN: ICD-10-CM

## 2022-10-15 DIAGNOSIS — R19.5 STOOL COLOR ABNORMAL: ICD-10-CM

## 2022-10-15 DIAGNOSIS — R63.4 ABNORMAL WEIGHT LOSS: ICD-10-CM

## 2022-10-15 PROCEDURE — G1004 CDSM NDSC: HCPCS

## 2022-10-15 PROCEDURE — 76700 US EXAM ABDOM COMPLETE: CPT

## 2022-10-15 PROCEDURE — 77067 SCR MAMMO BI INCL CAD: CPT

## 2022-10-17 LAB
ALBUMIN SERPL-MCNC: 3.8 G/DL (ref 3.6–5.1)
ALBUMIN/GLOB SERPL: 1.5 (CALC) (ref 1–2.5)
ALP SERPL-CCNC: 59 U/L (ref 37–153)
ALT SERPL-CCNC: 14 U/L (ref 6–29)
APPEARANCE UR: CLEAR
AST SERPL-CCNC: 16 U/L (ref 10–35)
BACTERIA #/AREA URNS HPF: ABNORMAL /HPF
BASOPHILS # BLD AUTO: 49 CELLS/UL (ref 0–200)
BASOPHILS NFR BLD AUTO: 0.9 %
BILIRUB SERPL-MCNC: 0.6 MG/DL (ref 0.2–1.2)
BILIRUB UR QL STRIP: NEGATIVE
BUN SERPL-MCNC: 13 MG/DL (ref 7–25)
BUN/CREAT SERPL: NORMAL (CALC) (ref 6–22)
CALCIUM SERPL-MCNC: 9.1 MG/DL (ref 8.6–10.4)
CAOX CRY #/AREA URNS HPF: ABNORMAL /HPF
CHLORIDE SERPL-SCNC: 107 MMOL/L (ref 98–110)
CO2 SERPL-SCNC: 29 MMOL/L (ref 20–32)
COLOR UR: ABNORMAL
CREAT SERPL-MCNC: 0.75 MG/DL (ref 0.5–1.05)
EGFRCR SERPLBLD CKD-EPI 2021: 89 ML/MIN/1.73M2
EOSINOPHIL # BLD AUTO: 70 CELLS/UL (ref 15–500)
EOSINOPHIL NFR BLD AUTO: 1.3 %
ERYTHROCYTE [DISTWIDTH] IN BLOOD BY AUTOMATED COUNT: 12.2 % (ref 11–15)
GLOBULIN SER CALC-MCNC: 2.6 G/DL (CALC) (ref 1.9–3.7)
GLUCOSE SERPL-MCNC: 85 MG/DL (ref 65–99)
GLUCOSE UR QL STRIP: NEGATIVE
HCT VFR BLD AUTO: 37.2 % (ref 35–45)
HGB BLD-MCNC: 12.2 G/DL (ref 11.7–15.5)
HGB UR QL STRIP: NEGATIVE
HYALINE CASTS #/AREA URNS LPF: ABNORMAL /LPF
KETONES UR QL STRIP: NEGATIVE
LEUKOCYTE ESTERASE UR QL STRIP: NEGATIVE
LYMPHOCYTES # BLD AUTO: 1566 CELLS/UL (ref 850–3900)
LYMPHOCYTES NFR BLD AUTO: 29 %
MCH RBC QN AUTO: 30.7 PG (ref 27–33)
MCHC RBC AUTO-ENTMCNC: 32.8 G/DL (ref 32–36)
MCV RBC AUTO: 93.7 FL (ref 80–100)
MONOCYTES # BLD AUTO: 329 CELLS/UL (ref 200–950)
MONOCYTES NFR BLD AUTO: 6.1 %
NEUTROPHILS # BLD AUTO: 3386 CELLS/UL (ref 1500–7800)
NEUTROPHILS NFR BLD AUTO: 62.7 %
NITRITE UR QL STRIP: NEGATIVE
PH UR STRIP: 8 [PH] (ref 5–8)
PLATELET # BLD AUTO: 244 THOUSAND/UL (ref 140–400)
PMV BLD REES-ECKER: 10.5 FL (ref 7.5–12.5)
POTASSIUM SERPL-SCNC: 4.1 MMOL/L (ref 3.5–5.3)
PROT SERPL-MCNC: 6.4 G/DL (ref 6.1–8.1)
PROT UR QL STRIP: ABNORMAL
RBC # BLD AUTO: 3.97 MILLION/UL (ref 3.8–5.1)
RBC #/AREA URNS HPF: ABNORMAL /HPF
SERVICE CMNT-IMP: ABNORMAL
SODIUM SERPL-SCNC: 144 MMOL/L (ref 135–146)
SP GR UR STRIP: 1.02 (ref 1–1.03)
SQUAMOUS #/AREA URNS HPF: ABNORMAL /HPF
TRI-PHOS CRY #/AREA URNS HPF: ABNORMAL /HPF
WBC # BLD AUTO: 5.4 THOUSAND/UL (ref 3.8–10.8)
WBC #/AREA URNS HPF: ABNORMAL /HPF

## 2022-10-19 ENCOUNTER — TELEMEDICINE (OUTPATIENT)
Dept: PRIMARY CARE | Facility: CLINIC | Age: 62
End: 2022-10-19
Payer: COMMERCIAL

## 2022-10-19 DIAGNOSIS — Z63.79 STRESSFUL LIFE EVENTS AFFECTING FAMILY AND HOUSEHOLD: ICD-10-CM

## 2022-10-19 DIAGNOSIS — R10.13 EPIGASTRIC PAIN: ICD-10-CM

## 2022-10-19 DIAGNOSIS — I48.0 PAROXYSMAL ATRIAL FIBRILLATION (CMS/HCC): ICD-10-CM

## 2022-10-19 DIAGNOSIS — R91.1 INCIDENTAL LUNG NODULE, GREATER THAN OR EQUAL TO 8MM: Primary | ICD-10-CM

## 2022-10-19 PROCEDURE — 99213 OFFICE O/P EST LOW 20 MIN: CPT | Mod: 95 | Performed by: STUDENT IN AN ORGANIZED HEALTH CARE EDUCATION/TRAINING PROGRAM

## 2022-10-19 NOTE — PROGRESS NOTES
Verification of Patient Location:  The patient affirms they are currently located in the following state: [place]    Request for Consent:    Audio and Video Encounter   Krish, my name is Mahendra Begum DO.  Before we proceed, can you please verify your identification by telling me your full name and date of birth?  Can you tell me who is in the room with you?    You and I are about to have a telemedicine check-in or visit because you have requested it.  This is a live video-conference.  I am a real person, speaking to you in real time.  There is no one else with me on the video-conference.  However, when we use ([employer], MyDoc, etc) it is important for you to know that the video-conference may not be secure or private.  I am not recording this conversation and I am asking you not to record it.  This telemedicine visit will be billed to your health insurance or you, if you are self-insured.  You understand you will be responsible for any copayments or coinsurances that apply to your telemedicine visit.  Communication platform used for this encounter:  [employer] Video Visit (Epic Video Client)     Before starting our telemedicine visit, I am required to get your consent for this virtual check-in or visit by telemedicine. Do you consent?      Patient Response to Request for Consent:  Yes      Visit Documentation:  Subjective     Patient ID: Alecia Cruz is a 61 y.o. female.  1960      MARTHA Alston is presenting today for routine f/u after she recently had blood work as well as ultrasound of the abdomen in evaluation of weight loss. I was hoping to see her in the office for a physical examination for this visit.  We had become concerned after her last visit that she was having weight loss as well as tan-colored stool.  Color stools this returned to normal.  Weight loss is attributed to anxiety and stress.  We did basic work-up to evaluate for pathologic weight loss etiology.  No new concerns based on today's  evaluation.  Cancer screenings brought up-to-date.  Mammogram done, CT of the lungs done, and ultrasound of the abdomen done.  I would like to get her into the office to have her weight done so that we can compare and try to assess if there is ongoing pathologic weight loss.      The following have been reviewed and updated as appropriate in this visit:        Review of Systems  A 10 point review systems was asked negative unless otherwise stated above.    Assessment/Plan   Diagnoses and all orders for this visit:    Incidental lung nodule, greater than or equal to 8mm (Primary)  Assessment & Plan:  Lung nodule is only slightly enlarged suggestive that this is more of a benign pathology.  - Repeat CT scan in 1 year.  Discussed with patient.      Epigastric pain  Assessment & Plan:  Improved, work-up unremarkable.  Stools are normal color now.  - No further work-up needed today.      Paroxysmal atrial fibrillation (CMS/HCC)  Assessment & Plan:  - She is due to see cardiology.      Stressful life events affecting family and household  Assessment & Plan:  She has moved out from living with her daughter.  - Continue working with psychology.        Time Spent:  I spent 15 minutes on this date of service performing the following activities: obtaining history, entering orders, obtaining / reviewing records, providing counseling and education and communicating results.

## 2022-10-21 RX ORDER — FLECAINIDE ACETATE 50 MG/1
50 TABLET ORAL
Qty: 60 TABLET | Refills: 0 | OUTPATIENT
Start: 2022-10-21

## 2022-10-21 NOTE — TELEPHONE ENCOUNTER
DR TRIVEDI PT req refill Flecainide    Last OV as 9/24/2021 no appt scheduled    See encounter 9/15/2022 for issues and calls to make OV    Per protocol not escribed    Please advise    Thank you

## 2022-10-21 NOTE — TELEPHONE ENCOUNTER
Medicine Refill Request    Last Office: Visit date not found   Last Consult Visit: Visit date not found  Last Telemedicine Visit: Visit date not found    Next Appointment: Visit date not found      Current Outpatient Medications:     alpha lipoic acid, bulk, powder, Take by mouth., Disp: , Rfl:     ascorbic acid (VITAMIN C) 500 mg tablet, Take 500 mg by mouth daily., Disp: , Rfl:     aspirin 81 mg enteric coated tablet, Take 81 mg by mouth daily., Disp: , Rfl:     azelastine (ASTELIN) 137 mcg (0.1 %) nasal spray, USE 2 SPRAYS IN EACH NOSTRIL EVERY DAY AT BEDTIME, Disp: , Rfl:     azithromycin (ZITHROMAX) 250 mg tablet, TAKE 2 TABLETS BY MOUTH ON DAY 1, AND THEN TAKE 1 TABLET BY MOUTH ONCE A DAY ON DAY 2 THROUGH DAY 5, Disp: , Rfl:     betamethasone dipropionate (DIPROSONE) 0.05 % lotion, once as needed.  , Disp: , Rfl:     cetirizine (ZyrTEC) 10 mg tablet, Take 10 mg by mouth daily., Disp: , Rfl:     cholecalciferol, vitamin D3, 1,000 unit (25 mcg) tablet, Take 1,000 Units by mouth daily., Disp: , Rfl:     coenzyme Q10 (COQ10) 60 mg capsule, Take by mouth., Disp: , Rfl:     cyclobenzaprine (FLEXERIL) 5 mg tablet, Take 1 tablet (5 mg total) by mouth 3 (three) times a day as needed for muscle spasms for up to 14 days., Disp: 30 tablet, Rfl: 0    diphenhydrAMINE (SOMINEX) 25 mg tablet, daily., Disp: , Rfl:     flecainide (TAMBOCOR) 50 mg tablet, Take 1 tablet (50 mg total) by mouth 2 (two) times a day., Disp: 60 tablet, Rfl: 0    fluticasone propionate (FLONASE) 50 mcg/actuation nasal spray, Administer 2 sprays into each nostril daily as needed for rhinitis., Disp: 1 Bottle, Rfl: 0    levothyroxine (SYNTHROID) 88 mcg tablet, Take 1 tablet (88 mcg total) by mouth daily., Disp: 90 tablet, Rfl: 3    lidocaine 2 % solution, GARGLE AND SPIT 10-15ML BY MOUTH EVERY 4 HOURS AS NEEDED FOR SORE THROAT UP TO 3 DAYS. MAX 8X/DAY, Disp: , Rfl:     metoprolol succinate XL (TOPROL-XL) 25 mg 24 hr tablet, Take 1 tablet  (25 mg total) by mouth nightly., Disp: 90 tablet, Rfl: 3    multivitamin (THERAGRAN) tablet, Take 1 tablet by mouth daily., Disp: , Rfl:     naproxen sodium 220 mg capsule, Take by mouth daily as needed., Disp: , Rfl:     omega-3-dha-epa-dpa-fish oil 1,050 mg(300 mg -675 mg-75 mg) capsule, , Disp: , Rfl:     vitamin E 100 unit capsule, , Disp: , Rfl:     XIFAXAN 550 mg tablet, , Disp: , Rfl:       BP Readings from Last 3 Encounters:   04/14/22 128/78   04/08/22 132/78   02/10/22 120/80       Recent Lab results:  Lab Results   Component Value Date    CHOL 208 (H) 02/24/2022   ,   Lab Results   Component Value Date    HDL 53 02/24/2022   ,   Lab Results   Component Value Date    LDLCALC 133 (H) 02/24/2022   ,   Lab Results   Component Value Date    TRIG 117 02/24/2022        Lab Results   Component Value Date    GLUCOSE 85 10/15/2022    GLUCOSE NEGATIVE 10/15/2022   , No results found for: HGBA1C      Lab Results   Component Value Date    CREATININE 0.75 10/15/2022       Lab Results   Component Value Date    TSH 3.13 02/24/2022

## 2022-10-21 NOTE — TELEPHONE ENCOUNTER
Spoke to patient. She just had CT chest, U/S of kidneys and blood work done by her PCP. She is scheduling an appt with GI to undergo colonoscopy. She does admit to being under a great amount of stress lately. She is agreeable to wearing a 48 Holter. She will follow up with Dr. Alonso after monitor is worn.     Jennifer-can you set patient up for a 48-hour monitor?  Thanks     Cecille-please call the patient and schedule follow-up OV with Dr. Alonso after monitor is worn.  Thanks

## 2022-10-21 NOTE — TELEPHONE ENCOUNTER
Pt is returning a call to the office to request earliest appt for evaluation of persistent and worsening sx    Pt is reporting sporadic daily episodes of palpitations w/ associated shooting pain down her LT arm, mild SOB    No avail vitals    Currently asymptomatic    Medications reviewed and are accurate per the pt profile  She denies any recent need for Eliquis    Denies use of ETOH  Denies use of caffeine  Denies recent use of OTC cold/sinus preparations  Hydration is fair, advised to increase daily intake to 48-64oz  Denies any active bleeding on ASA therapy only  Does report to increase in life stressors as currently in transition w/ moving  Has lost 30 lbs in the past 2 months unintentionally     Pt aware to seek immed EMS for any abrupt/unresolved CP/SOB    Please reach pt at [phone]

## 2022-10-23 NOTE — ASSESSMENT & PLAN NOTE
- Felt to be related to anxiety.  Work-up to rule out organic etiology so far negative.  - I would like to see her in the office to get the actual weight done and then repeat weight done 4 weeks later to assess his energy is weight loss.  Also advised her that she should try to eat a diet to avoid further weight loss and so that we can get an assessment as to whether or not we actually have a concern.

## 2022-10-23 NOTE — ASSESSMENT & PLAN NOTE
Lung nodule is only slightly enlarged suggestive that this is more of a benign pathology.  - Repeat CT scan in 1 year.  Discussed with patient.

## 2022-11-03 DIAGNOSIS — I48.0 PAROXYSMAL ATRIAL FIBRILLATION (CMS/HCC): Primary | ICD-10-CM

## 2022-11-04 ENCOUNTER — APPOINTMENT (OUTPATIENT)
Dept: CARDIOLOGY | Facility: CLINIC | Age: 62
End: 2022-11-04
Attending: INTERNAL MEDICINE
Payer: COMMERCIAL

## 2022-11-04 DIAGNOSIS — I48.0 PAROXYSMAL ATRIAL FIBRILLATION (CMS/HCC): ICD-10-CM

## 2022-11-07 RX ORDER — METOPROLOL SUCCINATE 25 MG/1
25 TABLET, EXTENDED RELEASE ORAL NIGHTLY
Qty: 90 TABLET | Refills: 0 | Status: SHIPPED | OUTPATIENT
Start: 2022-11-07 | End: 2023-01-25

## 2022-11-07 RX ORDER — FLECAINIDE ACETATE 50 MG/1
50 TABLET ORAL
Qty: 180 TABLET | Refills: 0 | Status: SHIPPED | OUTPATIENT
Start: 2022-11-07 | End: 2023-01-25 | Stop reason: SDUPTHER

## 2022-11-07 NOTE — TELEPHONE ENCOUNTER
escribed to [facility]  Pharmacy with note Please instruct PT to  complete upcoming appointment for refills

## 2022-11-11 PROCEDURE — 93224 XTRNL ECG REC UP TO 48 HRS: CPT | Performed by: INTERNAL MEDICINE

## 2022-11-22 ENCOUNTER — TELEMEDICINE (OUTPATIENT)
Dept: BEHAVIORAL HEALTH | Facility: CLINIC | Age: 62
End: 2022-11-22
Payer: COMMERCIAL

## 2022-11-22 DIAGNOSIS — F33.2 SEVERE EPISODE OF RECURRENT MAJOR DEPRESSIVE DISORDER, WITHOUT PSYCHOTIC FEATURES (CMS/HCC): Primary | ICD-10-CM
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
## 2022-11-23 NOTE — PROGRESS NOTES
I reviewed the Psychology Resident's/Post-Doctoral Fellow's note and agree with the documented findings and plan of care, except for my comments below or within the additional notes today.    Mike Jim Psy.D.   Integrated Behavioral Health  Staff Psychologist, [facility]  [place] License #[id number]

## 2022-11-28 ENCOUNTER — TELEMEDICINE (OUTPATIENT)
Dept: PRIMARY CARE | Facility: CLINIC | Age: 62
End: 2022-11-28
Payer: COMMERCIAL

## 2022-11-28 DIAGNOSIS — R63.4 WEIGHT LOSS: Primary | ICD-10-CM

## 2022-11-28 DIAGNOSIS — Z01.419 VISIT FOR PELVIC EXAM: ICD-10-CM

## 2022-11-28 DIAGNOSIS — F32.9 REACTIVE DEPRESSION: ICD-10-CM

## 2022-11-28 PROCEDURE — 99213 OFFICE O/P EST LOW 20 MIN: CPT | Mod: 95 | Performed by: STUDENT IN AN ORGANIZED HEALTH CARE EDUCATION/TRAINING PROGRAM

## 2022-11-28 NOTE — PROGRESS NOTES
Verification of Patient Location:  The patient affirms they are currently located in the following state: [place]    Request for Consent:    Audio and Video Encounter   Krish, my name is Mahendra DO Kel.  Before we proceed, can you please verify your identification by telling me your full name and date of birth?  Can you tell me who is in the room with you?    You and I are about to have a telemedicine check-in or visit because you have requested it.  This is a live video-conference.  I am a real person, speaking to you in real time.  There is no one else with me on the video-conference. I am not recording this conversation and I am asking you not to record it.  This telemedicine visit will be billed to your health insurance or you, if you are self-insured.  You understand you will be responsible for any copayments or coinsurances that apply to your telemedicine visit.  Communication platform used for this encounter:  ReplySend Video Visit (Epic Video Client)       Before starting our telemedicine visit, I am required to get your consent for this virtual check-in or visit by telemedicine. Do you consent?      Patient Response to Request for Consent:  Yes      Visit Documentation:  Subjective     Patient ID: Alecia Cruz is a 61 y.o. female.  1960      HPI  60 y/o presents today for repeat evaluation.  I had originally asked for her to come into the office but she was not able to manage it.  She is currently staying at her godson's place.  She is out from her daughter's place.  She has been following with psychology to help set barriers.  Was also provided referrals to ongoing therapy needs.  She notes that her weight is stable to increased by 2 to 3 pounds since last my talk to her.  She was wondering about repeat TSH testing.  She had TSH testing done earlier this year which was normal.  I do not think at this time needs repeat TSH testing.    We had also done additional imaging that had showed that  she had, some gallstones but nothing within the gallbladder pancreas.  She is wondering about the gallstones which apparently run in her family.  She does not to be symptomatic at this time of these.    No new symptoms including weight loss, fevers, chills, or abdominal pain.  Stools had returned to normal color based on her previous discussion and documentation.    No new issues at this time.  I really would like to see her in the office so that we can get a weight recorded on her chart.    She is switching over to see gastroenterology here at [facility] due to inability to get down to [place].  She is going to be seen on December 12.  She notes that she had a very small amount of blood on the day after her visit when I called her.  Blood was on the toilet paper.  Not in the stool.  No blood in the water.  This is second time that it happened.  Does not seem to have any emergent findings that would indicate that she is to be seen by the [facility].    The following have been reviewed and updated as appropriate in this visit:        Review of Systems  A 10 point review of systems was asked and negative unless otherwise stated.    Assessment/Plan   Diagnoses and all orders for this visit:    Weight loss (Primary)  Assessment & Plan:  - She reports that her weight has been stable to increasing.  I encouraged her to continue to keep on eating.  - Her previous work-up seem to be unremarkable.  We had held off on further work-up as her weight seem to be stablize.  The ongoing concern was that this was anxiety related.  Anxiety is better managed now.  She has been referred to psychology and she has been doing well with this.  She has been referred to ongoing psychology.  - We had done a CT scan of her chest and identified very slow growing lesion suggestive of benign lesion.  -Mammogram was done.  -I would like her to come into the office to make sure that she gets a weight done on the chart.  - As noted previously  we believe that she does not have a cervix any longer as we believe that she had a total hysterectomy that was not done for cancer purposes so I do not think that this is the most urgent thing to evaluate though remains on the back of my mind.      Reactive depression  Assessment & Plan:  Continues to follow with psychology which I agree with.      Visit for pelvic exam  Assessment & Plan:  - We are referring her to see gynecology here.  Based on her previous history of total hysterectomy I want to make sure that she does not actually have a cervix that we need to worry about.  - Referral placed to see Dr. Leticia Gonzalez here at [facility].    Orders:  -     Ambulatory referral to Gynecology; Future      Time Spent:  I spent 17 minutes on this date of service performing the following activities: obtaining history, performing examination, preparing for visit, obtaining / reviewing records, providing counseling and education and independently reviewing study/studies.

## 2022-11-29 PROBLEM — Z01.419 VISIT FOR PELVIC EXAM: Status: ACTIVE | Noted: 2022-11-29

## 2022-11-29 NOTE — ASSESSMENT & PLAN NOTE
- We are referring her to see gynecology here.  Based on her previous history of total hysterectomy I want to make sure that she does not actually have a cervix that we need to worry about.  - Referral placed to see Dr. Leticia Gonzalez here at [facility].

## 2022-11-29 NOTE — ASSESSMENT & PLAN NOTE
- She reports that her weight has been stable to increasing.  I encouraged her to continue to keep on eating.  - Her previous work-up seem to be unremarkable.  We had held off on further work-up as her weight seem to be stablize.  The ongoing concern was that this was anxiety related.  Anxiety is better managed now.  She has been referred to psychology and she has been doing well with this.  She has been referred to ongoing psychology.  - We had done a CT scan of her chest and identified very slow growing lesion suggestive of benign lesion.  -Mammogram was done.  -I would like her to come into the office to make sure that she gets a weight done on the chart.  - As noted previously we believe that she does not have a cervix any longer as we believe that she had a total hysterectomy that was not done for cancer purposes so I do not think that this is the most urgent thing to evaluate though remains on the back of my mind.

## 2022-12-08 ENCOUNTER — TELEPHONE (OUTPATIENT)
Dept: PRIMARY CARE | Facility: CLINIC | Age: 62
End: 2022-12-08
Payer: COMMERCIAL

## 2022-12-08 ENCOUNTER — OFFICE VISIT (OUTPATIENT)
Dept: GASTROENTEROLOGY | Facility: MEDICAL CENTER | Age: 62
End: 2022-12-08

## 2022-12-08 VITALS
TEMPERATURE: 98.4 F | BODY MASS INDEX: 20.99 KG/M2 | HEART RATE: 85 BPM | DIASTOLIC BLOOD PRESSURE: 80 MMHG | WEIGHT: 134 LBS | SYSTOLIC BLOOD PRESSURE: 125 MMHG

## 2022-12-08 DIAGNOSIS — R13.19 ESOPHAGEAL DYSPHAGIA: ICD-10-CM

## 2022-12-08 DIAGNOSIS — R63.4 WEIGHT LOSS: Primary | ICD-10-CM

## 2022-12-08 RX ORDER — OMEPRAZOLE 20 MG/1
20 CAPSULE, DELAYED RELEASE ORAL DAILY
Qty: 30 CAPSULE | Refills: 0 | Status: SHIPPED | OUTPATIENT
Start: 2022-12-08

## 2022-12-08 RX ORDER — FLUTICASONE PROPIONATE 50 MCG
SPRAY, SUSPENSION (ML) NASAL EVERY 24 HOURS
COMMUNITY

## 2022-12-08 RX ORDER — UBIDECARENONE 60 MG
CAPSULE ORAL EVERY 24 HOURS
COMMUNITY

## 2022-12-08 RX ORDER — ASPIRIN 81 MG/1
TABLET, CHEWABLE ORAL EVERY 24 HOURS
COMMUNITY

## 2022-12-08 RX ORDER — DIPHENHYDRAMINE HCL 25 MG
CAPSULE ORAL EVERY 24 HOURS
COMMUNITY

## 2022-12-08 NOTE — PATIENT INSTRUCTIONS
Scheduled date of EGD (as of today) 1/18/23  Physician performing: Dr Chad Yoder  Location of procedure:   [facility]   Instructions given to patient: EGD  Clearances: N/A

## 2022-12-08 NOTE — PROGRESS NOTES
[facility] - Outpatient Follow-up Note  Darvin Asencio 57 y o  female MRN: [id number]  Encounter: [id number]          ASSESSMENT AND PLAN:      1  Weight loss: She admits to about a 10 pound weight loss over the past 2 weeks  This is unintentional in nature  She did weigh 145 pounds at her cardiologist in May 2021, that is the last office visit to compare her weight  Today she was 134 pounds  She did have a colonoscopy in March 2021 with 5 polyps removed, melanosis coli and diverticulosis  EGD many years ago  Did have recent abdominal ultrasound showing cholelithiasis and CT of the chest showing longstanding lung nodule  We will plan for CT of the abdomen and pelvis with contrast for further evaluation  - CT abdomen pelvis w contrast; Future  - EGD; Future    2  Esophageal dysphagia: She admits over the past few weeks to dysphagia  She denies difficulty initiating a swallow but feels like it gets stuck on the way down  She has been on Prilosec in the past but is not currently on PPI  Given her dysphagia and her weight loss, will recommend EGD at this time  - EGD; Future  - omeprazole (PriLOSEC) 20 mg delayed release capsule; Take 1 capsule (20 mg total) by mouth daily  Dispense: 30 capsule; Refill: 0    3  Constipation: She admits to having a bowel movement on a daily basis but states that this is very small in nature, with feelings of complete evacuation  She also admits to bloating and occasional bright red blood per rectum on the tissue paper with wiping  Symptoms consistent with constipation  Will evaluate for constipation further on CT scan  Given colonoscopy within the past 2 years, will not plan to schedule at this time  -f/u CT a/p  -start miralax 17g, she would like to hold off until CAT scan results    Risks of EGD were discussed including but not limited to bleeding, infection, perforation  She understands and agrees to proceed with procedure  ______________________________________________________________________    SUBJECTIVE:  Dakotah Jarrett is a 49-year-old female with past medical history of atrial fibrillation on 81 mg aspirin, history of breast cancer status post surgery who is here for follow-up  She was referred for unintentional weight loss  She admits to a 20 pound unintentional weight loss over the past 2 months  It does appear that 5/21 she weighed 145 pounds and today weighed 134 pounds  On further questioning she does admit to dysphagia and is tender in her epigastric region on exam   She denies difficulty initiating a swallow but feels like food gets stuck on the way down  She was previously on Prilosec in the past but is not currently on any antacid medication  She does describe abdominal pain, bloating and change in her bowel habits  She admits to having a bowel movement on a daily basis but states that they are small and difficult to pass  He also admits to bright red blood per rectum that is intermittent and on the tissue paper with wiping  She has had an EGD in the past but this was many years ago  Last colonoscopy was in March 2021 with melanosis coli, diverticulosis and she had 5 polyps removed  Repeat colonoscopy recommended in 5 years due to tubular adenomas  He did recently have imaging done at outside facility including a CT of the chest that revealed known lung nodule  She also had abdominal ultrasound showing cholelithiasis  REVIEW OF SYSTEMS IS OTHERWISE NEGATIVE        Historical Information   Past Medical History:   Diagnosis Date   • Atrial fibrillation ([facility] [address] )    • Cancer ([facility] [address] )    • COVID-19 01/17/2021   • Disease of thyroid gland      Past Surgical History:   Procedure Laterality Date   • BREAST SURGERY     • HYSTERECTOMY     • LYMPH NODE DISSECTION     • MANDIBLE FRACTURE SURGERY     • PARTIAL HYSTERECTOMY     • ROTATOR CUFF REPAIR       Social History   Social History     Substance and Sexual Activity   Alcohol Use Never     Social History     Substance and Sexual Activity   Drug Use Not on file     Social History     Tobacco Use   Smoking Status Never   Smokeless Tobacco Never     Family History   Problem Relation Age of Onset   • Alzheimer's disease Mother    • Angina Mother    • Diabetes Father    • Heart disease Sister    • Heart disease Brother    • Prostate cancer Brother        Meds/Allergies       Current Outpatient Medications:   •  Alpha-Lipoic Acid (LIPOIC ACID PO)  •  Ascorbic Acid (vitamin C) 500 MG/5ML syrup  •  aspirin (ECOTRIN LOW STRENGTH) 81 mg EC tablet  •  aspirin 81 mg chewable tablet  •  ASTAXANTHIN PO  •  betamethasone dipropionate 0 05 % lotion  •  Cholecalciferol (Vitamin D-1000 Max St) 25 MCG (1000 UT) tablet  •  Coenzyme Q10 (CO Q 10 PO)  •  Coenzyme Q10 (Co Q 10) 60 MG CAPS  •  cyclobenzaprine (FLEXERIL) 5 mg tablet  •  diphenhydrAMINE (BENADRYL) 25 mg capsule  •  flecainide (TAMBOCOR) 50 mg tablet  •  fluticasone (FLONASE) 50 mcg/act nasal spray  •  fluticasone (FLOVENT DISKUS) 50 MCG/BLIST diskus inhaler  •  levothyroxine 75 mcg tablet  •  loratadine (CLARITIN) 10 mg tablet  •  metoprolol succinate (KAPSPARGO SPRINKLE) 25 MG extended-release capsule  •  metoprolol tartrate (LOPRESSOR) 25 mg tablet  •  multivitamin (THERAGRAN) TABS  •  Naproxen Sodium 220 MG CAPS  •  omeprazole (PriLOSEC) 20 mg delayed release capsule  •  zolpidem (AMBIEN) 5 mg tablet    Allergies   Allergen Reactions   • Trazodone Headache     Headache and blurred vision   • Augmentin [Amoxicillin-Pot Clavulanate] Palpitations   • Cephalexin Palpitations   • Ciprofloxacin Tachycardia and Palpitations     Other reaction(s):  Other (see comments)   • Codeine Other (See Comments), Palpitations and Tachycardia     unspecified  nausea     • Sulfa Antibiotics Other (See Comments), Palpitations and Tachycardia           Objective     Blood pressure 125/80, pulse 85, temperature 98 4 °F (36 9 °C), weight 60 8 kg (134 lb)  Body mass index is 20 99 kg/m²  PHYSICAL EXAM:      General Appearance:   Alert, cooperative, no distress   HEENT:   Normocephalic, atraumatic, anicteric      Neck:  Supple, symmetrical, trachea midline   Lungs:   Clear to auscultation bilaterally; no rales, rhonchi or wheezing; respirations unlabored    Heart[de-identified]   Regular rate and rhythm; no murmur, rub, or gallop  Abdomen:   Soft, epigastric tenderness, non-distended; normal bowel sounds; no masses, no organomegaly    Genitalia:   Deferred    Rectal:   Deferred    Extremities:  No cyanosis, clubbing or edema        Skin:  No jaundice, rashes, or lesions          Lab Results:   No visits with results within 1 Day(s) from this visit  Latest known visit with results is:   Telephone on 09/02/2021   Component Date Value   • HEP C AB 02/09/2019 Negative          Radiology Results:   No results found

## 2022-12-09 ENCOUNTER — TELEPHONE (OUTPATIENT)
Dept: GASTROENTEROLOGY | Facility: CLINIC | Age: 62
End: 2022-12-09

## 2022-12-09 NOTE — TELEPHONE ENCOUNTER
Patients GI provider: Valeria SONG    Number to return call: [phone]    Reason for call: Pt calling stating she was unable to schedule her CT ABDOMEN PELVIS W CONTRAST due to no authorization  Please put in auth and call pt back       Scheduled procedure/appointment date if applicable: procedure 8/43/07

## 2022-12-09 NOTE — TELEPHONE ENCOUNTER
Cant submit auth for Ct unless it is scheduled called PT and got voicemail left a message back to try and call central scheduling and gave the number to call again and try and schedule it to be able to put the auth in

## 2022-12-16 ENCOUNTER — TELEPHONE (OUTPATIENT)
Dept: PRIMARY CARE | Facility: CLINIC | Age: 62
End: 2022-12-16
Payer: COMMERCIAL

## 2022-12-16 NOTE — TELEPHONE ENCOUNTER
Pt called stating that her Endo dr has retired and she need for pcp to order lab work for her thyroids    Any questions please contact pt

## 2022-12-19 RX ORDER — LEVOTHYROXINE SODIUM 75 UG/1
75 TABLET ORAL
Qty: 90 TABLET | Refills: 3 | Status: SHIPPED | OUTPATIENT
Start: 2022-12-19 | End: 2024-02-08 | Stop reason: SDUPTHER

## 2022-12-19 NOTE — TELEPHONE ENCOUNTER
Spoke with Alecia she informed me her previous endocrinologist (Dr. Rosalie Puckett now retired) had decreased her levothyroxine to 75 mics daily this is what she currently is taking.  She is following up with finding endocrinologist and the [facility]. In regards to her thyroid studies, per Dr. Begum note on 11/20/2022 TSH testing done earlier this year which was normal and he does not think needs repeat TSH testing at this time.Alecia said thank you

## 2022-12-22 ENCOUNTER — TRANSCRIBE ORDERS (OUTPATIENT)
Dept: SCHEDULING | Age: 62
End: 2022-12-22

## 2022-12-22 ENCOUNTER — TELEPHONE (OUTPATIENT)
Dept: PRIMARY CARE | Facility: CLINIC | Age: 62
End: 2022-12-22

## 2022-12-22 ENCOUNTER — APPOINTMENT (OUTPATIENT)
Dept: LAB | Facility: HOSPITAL | Age: 62
End: 2022-12-22
Attending: STUDENT IN AN ORGANIZED HEALTH CARE EDUCATION/TRAINING PROGRAM
Payer: COMMERCIAL

## 2022-12-22 ENCOUNTER — OFFICE VISIT (OUTPATIENT)
Dept: PRIMARY CARE | Facility: CLINIC | Age: 62
End: 2022-12-22
Payer: COMMERCIAL

## 2022-12-22 VITALS
SYSTOLIC BLOOD PRESSURE: 124 MMHG | OXYGEN SATURATION: 97 % | BODY MASS INDEX: 21.75 KG/M2 | TEMPERATURE: 98.8 F | WEIGHT: 138.6 LBS | HEART RATE: 77 BPM | DIASTOLIC BLOOD PRESSURE: 80 MMHG | HEIGHT: 67 IN | RESPIRATION RATE: 14 BRPM

## 2022-12-22 DIAGNOSIS — J06.9 ACUTE URI: ICD-10-CM

## 2022-12-22 DIAGNOSIS — R63.4 ABNORMAL WEIGHT LOSS: Primary | ICD-10-CM

## 2022-12-22 DIAGNOSIS — J06.9 ACUTE URI: Primary | ICD-10-CM

## 2022-12-22 DIAGNOSIS — Z01.812 ENCOUNTER FOR PREPROCEDURAL LABORATORY EXAMINATION: ICD-10-CM

## 2022-12-22 DIAGNOSIS — Z01.812 PRE-PROCEDURAL LABORATORY EXAMINATION: Primary | ICD-10-CM

## 2022-12-22 PROBLEM — R19.4 BOWEL HABIT CHANGES: Status: ACTIVE | Noted: 2022-10-01

## 2022-12-22 PROBLEM — Z01.419 VISIT FOR PELVIC EXAM: Status: RESOLVED | Noted: 2022-11-29 | Resolved: 2022-12-22

## 2022-12-22 LAB
FLUAV RNA SPEC QL NAA+PROBE: NEGATIVE
FLUBV RNA SPEC QL NAA+PROBE: NEGATIVE
RSV RNA SPEC QL NAA+PROBE: NEGATIVE
SARS-COV-2 RNA RESP QL NAA+PROBE: NEGATIVE

## 2022-12-22 PROCEDURE — 3008F BODY MASS INDEX DOCD: CPT | Performed by: STUDENT IN AN ORGANIZED HEALTH CARE EDUCATION/TRAINING PROGRAM

## 2022-12-22 PROCEDURE — 30099997 SPECIMEN PROCESSING

## 2022-12-22 PROCEDURE — 87637 SARSCOV2&INF A&B&RSV AMP PRB: CPT | Performed by: STUDENT IN AN ORGANIZED HEALTH CARE EDUCATION/TRAINING PROGRAM

## 2022-12-22 PROCEDURE — 99213 OFFICE O/P EST LOW 20 MIN: CPT | Performed by: STUDENT IN AN ORGANIZED HEALTH CARE EDUCATION/TRAINING PROGRAM

## 2022-12-22 RX ORDER — OMEPRAZOLE 20 MG/1
CAPSULE, DELAYED RELEASE ORAL
COMMUNITY
Start: 2022-12-08 | End: 2023-01-25 | Stop reason: ALTCHOICE

## 2022-12-22 RX ORDER — DOXYCYCLINE HYCLATE 100 MG
TABLET ORAL
COMMUNITY
Start: 2022-09-17 | End: 2023-01-25 | Stop reason: ALTCHOICE

## 2022-12-22 ASSESSMENT — PAIN SCALES - GENERAL: PAINLEVEL: 0-NO PAIN

## 2022-12-22 NOTE — ASSESSMENT & PLAN NOTE
-likely viral etiology, possibly rsv based on exposure from grandson  -test for covid, flu/rsv  -continue conservative care, has been sick for 24 hours  -vitals stable, hold on abx  -red flags to rtc provided to patient including chest pain, sob, high temp > 100.4F, confusion, or claire sputum/hemoptysis  -rsv/covid will come back overnight.

## 2022-12-22 NOTE — PATIENT INSTRUCTIONS
-Use mucinex for the congestion, tylenol for aches and pain, and continue with hydration  -Sterile nasal saline like arm & hammer or neilmed saline is helpful for congestion  -Flu/covid/rsv sent today and will come back on your mychart.   -Let us know for any shortness of breath or chest pain.

## 2022-12-22 NOTE — PROGRESS NOTES
[facility]   Progress Note       ASSESSMENT AND PLAN     Problem List Items Addressed This Visit        Infectious/Inflammatory    Acute URI - Primary    Current Assessment & Plan     -likely viral etiology, possibly rsv based on exposure from grandson  -test for covid, flu/rsv  -continue conservative care, has been sick for 24 hours  -vitals stable, hold on abx  -red flags to rtc provided to patient including chest pain, sob, high temp > 100.4F, confusion, or claire sputum/hemoptysis  -rsv/covid will come back overnight.          Relevant Orders    COVID- 19 PCR Symptomatic (includes FLU A/B & RSV) - [facility] (Completed)       Health Maintenance Due   Topic Date Due   • Depression Screening  Never done   • Medicare Annual Wellness Visit  Never done   • Zoster Vaccine (1 of 2) Never done   • COVID-19 Vaccine (5 - Booster for Moderna series) 07/25/2022   • Influenza Vaccine (1) 08/01/2022       Return in about 2 weeks (around 1/5/2023), or if symptoms worsen or fail to improve.  At next visit: routine care.     This office note has been dictated using voice recognition software which may introduce inadvertent typographical or language errors.  Please contact me directly for any clarifications.    Mahendra Begum D.O.  Office: [phone]  Pager: [phone]     SUBJECTIVE     Alecia Cruz is a 62 y.o. year-old female, primary patient of Dr. Begum presents for sick visit.     Interval History: URI sxs starting 24 hours prior w/ hoarse voice, sinus congestion, mild cough, left ear congestion w/ pain. No discharge from ear. No fevers measured. Slight ache after coughing on left side of chest. Taking care of her grandson who was exposed to rsv in granddaughter. Daughter has flu. No headache.     Weight has been stable recently. Eating better recently. Stress level is more mitigated. Son paid back 50% of the money that he owed her.     PHYSICAL EXAMINATION     Visit Vitals  /80 (BP Location: Right upper  "arm)   Pulse 77   Temp 37.1 °C (98.8 °F)   Resp 14   Ht 1.702 m (5' 7\")   Wt 62.9 kg (138 lb 9.6 oz)   SpO2 97%   BMI 21.71 kg/m²       Physical Exam  Vitals reviewed.   Constitutional:       Appearance: Normal appearance. She is normal weight.   HENT:      Right Ear: Tympanic membrane, ear canal and external ear normal.      Left Ear: Tympanic membrane, ear canal and external ear normal.   Neck:      Vascular: No carotid bruit.   Cardiovascular:      Rate and Rhythm: Normal rate and regular rhythm.      Pulses: Normal pulses.      Heart sounds: Normal heart sounds. No murmur heard.    No friction rub. No gallop.   Pulmonary:      Effort: Pulmonary effort is normal. No respiratory distress.      Breath sounds: Normal breath sounds. No stridor. No wheezing, rhonchi or rales.   Musculoskeletal:      Cervical back: No tenderness.      Right lower leg: No edema.      Left lower leg: No edema.   Lymphadenopathy:      Cervical: No cervical adenopathy.   Skin:     General: Skin is warm.   Neurological:      General: No focal deficit present.      Mental Status: She is alert and oriented to person, place, and time.   Psychiatric:         Mood and Affect: Mood normal.         Behavior: Behavior normal.         Thought Content: Thought content normal.         Judgment: Judgment normal.             LABS / IMAGING / STUDIES        Labs Reviewed:   CBC Results       10/15/22 12/04/20 04/29/20     0944 1326 2121    WBC 5.4 5.80 7.68    RBC 3.97 4.36 4.31    HGB 12.2 13.4 13.3    HCT 37.2 40.6 39.6    MCV 93.7 93.1 91.9    MCH 30.7 30.7 30.9    MCHC 32.8 33.0 33.6     238 243          CMP Results       10/15/22 04/11/22 02/24/22     0944 1354 1140     -- 141    K 4.1 -- 4.2    Cl 107 -- 105    CO2 29 -- 27    Glucose 85 -- 82     NEGATIVE  NEGATIVE    BUN 13 11 10    Creatinine 0.75 0.77 0.77    Calcium 9.1 -- 9.4    AST 16 -- 14    ALT 14 -- 13    Albumin 3.8 -- 4.2    EGFR 89 82 82      95 95      "       Studies/Imaging Reviewed: no new studies/imaging.       MEDICATIONS      Current Outpatient Medications   Medication Instructions   • alpha lipoic acid, bulk, powder oral   • ascorbic acid (VITAMIN C) 500 mg, oral, Daily   • aspirin 81 mg, oral, Daily   • azelastine (ASTELIN) 137 mcg (0.1 %) nasal spray USE 2 SPRAYS IN EACH NOSTRIL EVERY DAY AT BEDTIME   • azithromycin (ZITHROMAX) 250 mg tablet TAKE 2 TABLETS BY MOUTH ON DAY 1, AND THEN TAKE 1 TABLET BY MOUTH ONCE A DAY ON DAY 2 THROUGH DAY 5   • betamethasone dipropionate (DIPROSONE) 0.05 % lotion Once as needed   • cetirizine (ZYRTEC) 10 mg, oral, Daily   • cholecalciferol (vitamin D3) 1,000 Units, oral, Daily   • coenzyme Q10 (COQ10) 60 mg capsule oral   • diphenhydrAMINE (SOMINEX) 25 mg tablet Every 24 hours interval   • doxycycline hyclate (VIBRA-TABS) 100 mg tablet TAKE 1 TABLET BY MOUTH TWICE DAILY FOR 5 DAYS   • flecainide (TAMBOCOR) 50 mg, oral, 2 times daily (6a, 6p)   • fluticasone propionate (FLONASE) 50 mcg/actuation nasal spray 2 sprays, Each Nostril, Daily PRN   • levothyroxine (SYNTHROID) 75 mcg, oral, Daily (6:30a)   • lidocaine 2 % solution GARGLE AND SPIT 10-15ML BY MOUTH EVERY 4 HOURS AS NEEDED FOR SORE THROAT UP TO 3 DAYS. MAX 8X/DAY   • metoprolol succinate XL (TOPROL-XL) 25 mg, oral, Nightly   • multivitamin (THERAGRAN) tablet 1 tablet, oral, Daily   • naproxen sodium 220 mg capsule oral, Daily PRN   • omega-3-dha-epa-dpa-fish oil 1,050 mg(300 mg -675 mg-75 mg) capsule No dose, route, or frequency recorded.   • omeprazole (PriLOSEC) 20 mg capsule No dose, route, or frequency recorded.   • vitamin E 100 unit capsule No dose, route, or frequency recorded.   • XIFAXAN 550 mg tablet No dose, route, or frequency recorded.

## 2022-12-22 NOTE — TELEPHONE ENCOUNTER
BMP ordered.  Although CAT scan abdomen pelvis with IV contrast was ordered from a physician not in this practice Last Patel MD..Spoke with Dr. Begum he stated was okay to order BMP.

## 2022-12-22 NOTE — TELEPHONE ENCOUNTER
Spoke with Alecia initially I was reaching out in regards to the message I received in regards to a referral for gastro?????  Although this issue has been resolved.  Main concern now is for the past 2 days she has been taking care of her sick grandchild.  She has concerns of left earache progressively getting worse, sinus pressure green snot when she blows her nose.  Headache.  She denies a cough although has a COVID test at home she will take and I will call her back at 3:30 to further evaluate.  She said thank you    Spoke with Alecia she informed me her home COVID swab was negative. therefore we will bring her in to see Dr. Begum today for concerns of left earache and possible swab for flu and RSV.

## 2022-12-24 ENCOUNTER — NURSE TRIAGE (OUTPATIENT)
Dept: PRIMARY CARE | Facility: CLINIC | Age: 62
End: 2022-12-24
Payer: COMMERCIAL

## 2022-12-24 LAB
BUN SERPL-MCNC: 13 MG/DL (ref 7–25)
BUN/CREAT SERPL: NORMAL (CALC) (ref 6–22)
CALCIUM SERPL-MCNC: 8.9 MG/DL (ref 8.6–10.4)
CHLORIDE SERPL-SCNC: 109 MMOL/L (ref 98–110)
CO2 SERPL-SCNC: 27 MMOL/L (ref 20–32)
CREAT SERPL-MCNC: 0.85 MG/DL (ref 0.5–1.05)
EGFRCR SERPLBLD CKD-EPI 2021: 76 ML/MIN/1.73M2
GLUCOSE SERPL-MCNC: 86 MG/DL (ref 65–99)
POTASSIUM SERPL-SCNC: 4.2 MMOL/L (ref 3.5–5.3)
SODIUM SERPL-SCNC: 143 MMOL/L (ref 135–146)

## 2022-12-24 RX ORDER — AZITHROMYCIN 250 MG/1
TABLET, FILM COATED ORAL
Qty: 6 TABLET | Refills: 0 | Status: SHIPPED | OUTPATIENT
Start: 2022-12-24 | End: 2022-12-29

## 2022-12-24 NOTE — TELEPHONE ENCOUNTER
Saw provider on 12/22, told to call if any changes in mucous coloring. Has history of bronchitis and pneumonia. Today her mucous is brownish in moderate amounts. Denies any SOB/BURTON/CP/edema. peyton abraham sent to pharmacy. Cont w OTC medications as directed. If any changes/worsening to call back or go to UC/ED.

## 2022-12-27 ENCOUNTER — TELEPHONE (OUTPATIENT)
Dept: PRIMARY CARE | Facility: CLINIC | Age: 62
End: 2022-12-27

## 2022-12-27 DIAGNOSIS — R63.4 WEIGHT LOSS: Primary | ICD-10-CM

## 2022-12-27 DIAGNOSIS — E03.9 HYPOTHYROIDISM, UNSPECIFIED TYPE: ICD-10-CM

## 2022-12-27 NOTE — TELEPHONE ENCOUNTER
Pt calling to request a call back from Dr. Begum or a nurse because she is having studies done checking for colon cancer. But also has other issues  Please call pt to discuss

## 2022-12-29 ENCOUNTER — HOSPITAL ENCOUNTER (EMERGENCY)
Facility: HOSPITAL | Age: 62
Discharge: HOME | End: 2022-12-29
Attending: EMERGENCY MEDICINE
Payer: COMMERCIAL

## 2022-12-29 ENCOUNTER — HOSPITAL ENCOUNTER (OUTPATIENT)
Dept: RADIOLOGY | Facility: HOSPITAL | Age: 62
Discharge: HOME | End: 2022-12-29
Attending: INTERNAL MEDICINE
Payer: COMMERCIAL

## 2022-12-29 ENCOUNTER — HOSPITAL ENCOUNTER (OUTPATIENT)
Dept: CARDIOLOGY | Facility: HOSPITAL | Age: 62
Discharge: HOME | End: 2022-12-29
Attending: INTERNAL MEDICINE
Payer: COMMERCIAL

## 2022-12-29 ENCOUNTER — TRANSCRIBE ORDERS (OUTPATIENT)
Dept: CARDIOLOGY | Facility: HOSPITAL | Age: 62
End: 2022-12-29

## 2022-12-29 VITALS
BODY MASS INDEX: 20.4 KG/M2 | OXYGEN SATURATION: 99 % | HEIGHT: 67 IN | DIASTOLIC BLOOD PRESSURE: 77 MMHG | RESPIRATION RATE: 20 BRPM | SYSTOLIC BLOOD PRESSURE: 143 MMHG | TEMPERATURE: 98.2 F | HEART RATE: 74 BPM | WEIGHT: 130 LBS

## 2022-12-29 DIAGNOSIS — R10.84 GENERALIZED ABDOMINAL PAIN: Primary | ICD-10-CM

## 2022-12-29 DIAGNOSIS — R63.4 ABNORMAL WEIGHT LOSS: ICD-10-CM

## 2022-12-29 DIAGNOSIS — R63.4 ABNORMAL WEIGHT LOSS: Primary | ICD-10-CM

## 2022-12-29 PROCEDURE — 96372 THER/PROPH/DIAG INJ SC/IM: CPT

## 2022-12-29 PROCEDURE — 93005 ELECTROCARDIOGRAM TRACING: CPT | Performed by: STUDENT IN AN ORGANIZED HEALTH CARE EDUCATION/TRAINING PROGRAM

## 2022-12-29 PROCEDURE — 63600000 HC DRUGS/DETAIL CODE

## 2022-12-29 PROCEDURE — 3E013GC INTRODUCTION OF OTHER THERAPEUTIC SUBSTANCE INTO SUBCUTANEOUS TISSUE, PERCUTANEOUS APPROACH: ICD-10-PCS | Performed by: EMERGENCY MEDICINE

## 2022-12-29 PROCEDURE — 63700000 HC SELF-ADMINISTRABLE DRUG

## 2022-12-29 PROCEDURE — 99283 EMERGENCY DEPT VISIT LOW MDM: CPT | Mod: 25

## 2022-12-29 PROCEDURE — 63600105 HC IODINE BASED CONTRAST

## 2022-12-29 PROCEDURE — 25500000 HC DRUGS/INCIDENT RAD

## 2022-12-29 PROCEDURE — 74177 CT ABD & PELVIS W/CONTRAST: CPT

## 2022-12-29 RX ORDER — DICYCLOMINE HYDROCHLORIDE 10 MG/ML
20 INJECTION INTRAMUSCULAR ONCE
Status: COMPLETED | OUTPATIENT
Start: 2022-12-29 | End: 2022-12-29

## 2022-12-29 RX ORDER — FAMOTIDINE 20 MG/1
20 TABLET, FILM COATED ORAL ONCE
Status: COMPLETED | OUTPATIENT
Start: 2022-12-29 | End: 2022-12-29

## 2022-12-29 RX ORDER — ONDANSETRON 4 MG/1
4 TABLET, ORALLY DISINTEGRATING ORAL ONCE
Status: COMPLETED | OUTPATIENT
Start: 2022-12-29 | End: 2022-12-29

## 2022-12-29 RX ORDER — DICYCLOMINE HYDROCHLORIDE 20 MG/1
20 TABLET ORAL 2 TIMES DAILY
Qty: 20 TABLET | Refills: 0 | Status: SHIPPED | OUTPATIENT
Start: 2022-12-29 | End: 2023-01-25 | Stop reason: ALTCHOICE

## 2022-12-29 RX ORDER — ONDANSETRON 4 MG/1
4 TABLET, ORALLY DISINTEGRATING ORAL EVERY 8 HOURS PRN
Qty: 15 TABLET | Refills: 0 | Status: SHIPPED | OUTPATIENT
Start: 2022-12-29 | End: 2023-01-25 | Stop reason: ALTCHOICE

## 2022-12-29 RX ADMIN — IOHEXOL 100 ML: 350 INJECTION, SOLUTION INTRAVENOUS at 12:42

## 2022-12-29 RX ADMIN — BARIUM SULFATE 900 ML: 21 SUSPENSION ORAL at 12:30

## 2022-12-29 RX ADMIN — DICYCLOMINE HYDROCHLORIDE 20 MG: 20 INJECTION, SOLUTION INTRAMUSCULAR at 21:04

## 2022-12-29 RX ADMIN — FAMOTIDINE 20 MG: 20 TABLET ORAL at 21:05

## 2022-12-29 RX ADMIN — ONDANSETRON 4 MG: 4 TABLET, ORALLY DISINTEGRATING ORAL at 21:18

## 2022-12-29 ASSESSMENT — ENCOUNTER SYMPTOMS
LIGHT-HEADEDNESS: 0
FLANK PAIN: 0
ARTHRALGIAS: 0
COLOR CHANGE: 0
NAUSEA: 0
BACK PAIN: 0
VOMITING: 0
HEADACHES: 0
DIARRHEA: 0
PALPITATIONS: 0
SEIZURES: 0
EYE PAIN: 0
CHILLS: 0
SORE THROAT: 0
HEMATURIA: 0
CONFUSION: 0
COUGH: 0
SHORTNESS OF BREATH: 0
ABDOMINAL PAIN: 1
DIZZINESS: 0
FEVER: 0
DYSURIA: 0

## 2022-12-30 LAB
ATRIAL RATE: 61
P AXIS: 73
PR INTERVAL: 144
QRS DURATION: 88
QT INTERVAL: 362
QTC CALCULATION(BAZETT): 364
R AXIS: 8
T WAVE AXIS: -32
VENTRICULAR RATE: 61

## 2022-12-30 PROCEDURE — 93010 ELECTROCARDIOGRAM REPORT: CPT | Performed by: INTERNAL MEDICINE

## 2022-12-30 NOTE — ED PROVIDER NOTES
Emergency Medicine Note  HPI   HISTORY OF PRESENT ILLNESS     Patient is a 62-year-old female with history of A. fib, breast cancer, hypothyroidism who presents to the emergency department for abdominal pain.  Patient states she got a CAT scan earlier today where she needed to drink contrast.  Patient states this upset her stomach.  Patient states she had intermittent episodes of excruciating sharp pain diffuse throughout her abdomen.  Patient states that this worsened with drinking or eating.  Improved with rest.  Denies fevers, chills, nausea, vomiting, diarrhea, constipation.      History provided by:  Patient   used: No    Abdominal Pain  Associated symptoms: no chest pain, no chills, no cough, no diarrhea, no dysuria, no fever, no hematuria, no nausea, no shortness of breath, no sore throat and no vomiting          Patient History   PAST HISTORY     Reviewed from Nursing Triage:       Past Medical History:   Diagnosis Date   • A-fib (CMS/HCC)    • Breast cancer (CMS/HCC) 2008    left partial mastectomy axillary dissection, whole breast radiation and chemotherapy in 2008 for a 2-1/2 cm invasive ductal carcinoma that was ER/CT negative and HER-2/alyson positive with 7-15 positive lymph nodes   • Colon polyp     couple on each colonoscopy per patient report   • Fibrocystic breast    • Fibroid    • History of partial hysterectomy 03/2000    c/o fibroids   • History of radiation therapy    • Hx antineoplastic chemo    • Hx of lipoma 2008    resected from shoulder   • Hypothyroidism    • Malignant neoplasm of upper-outer quadrant of left breast in female, estrogen receptor negative (CMS/HCC) 9/10/2021    2008-2.5 cm cancer with 7 of 15+ nodes treated with partial mastectomy, axillary dissection, chemotherapy and whole breast radiation.   • PAF (paroxysmal atrial fibrillation) (CMS/HCC)    • PVC (premature ventricular contraction)    • Screening for breast cancer        Past Surgical History:    Procedure Laterality Date   • BREAST BIOPSY Left 2008   • BREAST BIOPSY Right 2021    benign ([facility] specimen [id number])   • BREAST LUMPECTOMY Left 2008    [facility] specimen [id number]   • DENTAL SURGERY     • HYSTERECTOMY      partial   • LUMBAR PUNCTURE     • MANDIBLE FRACTURE SURGERY     • PARTIAL HYSTERECTOMY     • ROTATOR CUFF REPAIR Left    • SINUS SURGERY         Family History   Problem Relation Age of Onset   • Alzheimer's disease Biological Mother    • Arrhythmia Biological Mother    • Diabetes Biological Father    • Hypertension Biological Sister    • Melanoma Biological Sister    • COPD Biological Brother    • Prostate cancer Biological Brother    • Stomach cancer Paternal Grandmother    • Diabetes Paternal Grandfather    • Prostate cancer Paternal Grandfather    • Sarcoidosis Biological Sister    • Diabetes Biological Sister    • Heart disease Biological Sister    • Hypertension Biological Brother    • Prostate cancer Biological Brother    • Lung cancer Father's Sister    • Prostate cancer Father's Brother    • Prostate cancer Father's Brother    • Prostate cancer Father's Brother    • Prostate cancer Father's Brother    • Lung cancer Father's Brother    • Lung cancer Father's Sister    • Ovarian cancer Paternal Cousin    • Lung cancer Paternal Cousin    • Thyroid cancer Paternal Cousin        Social History     Tobacco Use   • Smoking status: Never   • Smokeless tobacco: Never   Substance Use Topics   • Alcohol use: No   • Drug use: No         Review of Systems   REVIEW OF SYSTEMS     Review of Systems   Constitutional: Negative for chills and fever.   HENT: Negative for ear pain and sore throat.    Eyes: Negative for pain and visual disturbance.   Respiratory: Negative for cough and shortness of breath.    Cardiovascular: Negative for chest pain and palpitations.   Gastrointestinal: Positive for abdominal pain. Negative for diarrhea, nausea and vomiting.   Genitourinary: Negative for dysuria, flank pain and  hematuria.   Musculoskeletal: Negative for arthralgias and back pain.   Skin: Negative for color change and rash.   Neurological: Negative for dizziness, seizures, syncope, light-headedness and headaches.   Psychiatric/Behavioral: Negative for confusion.   All other systems reviewed and are negative.        VITALS     ED Vitals    Date/Time Temp Pulse Resp BP SpO2 [facility]   12/29/22 2103 -- 74 20 143/77 99 % [facility]   12/29/22 1659 36.8 °C (98.2 °F) 79 18 161/89 98 % MB        Pulse Ox %: 98 % (12/29/22 2055)  Pulse Ox Interpretation: Normal (12/29/22 2055)  Heart Rate: 79 (12/29/22 2055)  Rhythm Strip Interpretation: Normal Sinus Rhythm (12/29/22 2055)     Physical Exam   PHYSICAL EXAM     Physical Exam  Vitals and nursing note reviewed.   Constitutional:       General: She is not in acute distress.     Appearance: She is well-developed. She is not ill-appearing, toxic-appearing or diaphoretic.   HENT:      Head: Normocephalic and atraumatic.      Nose: No congestion or rhinorrhea.      Mouth/Throat:      Mouth: Mucous membranes are moist.   Eyes:      Extraocular Movements: Extraocular movements intact.      Conjunctiva/sclera: Conjunctivae normal.      Pupils: Pupils are equal, round, and reactive to light.   Cardiovascular:      Rate and Rhythm: Normal rate and regular rhythm.      Heart sounds: Normal heart sounds.   Pulmonary:      Effort: Pulmonary effort is normal. No respiratory distress.      Breath sounds: Normal breath sounds.   Abdominal:      Palpations: Abdomen is soft.      Tenderness: There is no abdominal tenderness. There is no right CVA tenderness or left CVA tenderness.   Musculoskeletal:         General: No tenderness.      Right lower leg: No edema.      Left lower leg: No edema.   Skin:     General: Skin is warm and dry.      Capillary Refill: Capillary refill takes less than 2 seconds.   Neurological:      Mental Status: She is alert and oriented to person, place, and time.   Psychiatric:          Mood and Affect: Mood normal.           PROCEDURES     Procedures     DATA     Results     None          Imaging Results    None         No orders to display       Scoring tools                                  ED Course & MDM   MDM / ED COURSE / CLINICAL IMPRESSION / DISPO     MDM  Number of Diagnoses or Management Options  Generalized abdominal pain  Diagnosis management comments: Differential is broad.  Suspect this was likely secondary to p.o. contrast of sending patient stomach.  Patient's CAT scan from a few hours prior reviewed, no acute findings.  Patient had symptomatic improvement with Pepcid and Zofran.  Discussed that this was likely not allergic reaction with patient.  Discussed strict return precautions, discussed importance of follow-up with PCP, discussed new medications prescribed, discussed all results, patient verbalized understanding.  Case discussed with attending.  Patient discharged in stable condition.        Amount and/or Complexity of Data Reviewed  Tests in the radiology section of CPT®: reviewed  Discuss the patient with other providers: yes  Independent visualization of images, tracings, or specimens: yes        ED Course as of 12/29/22 2144   u Dec 29, 2022   2131 Patient had symptomatic improvement after Pepcid Bentyl Zofran [JS]      ED Course User Index  [JS] Adriana Fall PA C     Clinical Impression      Generalized abdominal pain     _________________     ED Disposition   Discharge                   Adriana Fall PA C  12/29/22 2144

## 2022-12-30 NOTE — ED ATTESTATION NOTE
The patient was evaluated and managed by the physician assistant.  I agree with the PA/NP’s history, physical, assessment, and plan of care, with the following exceptions: None       Ida Dallas MD  12/29/22 6223

## 2022-12-30 NOTE — DISCHARGE INSTRUCTIONS
As we discussed your stomach was likely just upset from all the contrast that you have to swallow.  Please continue eating a bland diet and slowly advance.  If you develop any new or worsening symptoms please return to the emergency department.    Please follow up as directed to obtain your results and review your plan of care. CALL your primary doctor's office today to schedule a follow up appointment within the next 48 hours.     REVIEW AND DISCUSS ALL OF YOUR MEDICATIONS WITH YOUR PRIMARY CARE TEAM WITHIN THE NEXT 2 DAYS, even ones that you may have been on for a long time.    Radiology review of your studies (XRAYs, CT Scans, Ultrasounds, MRI scans) may still be pending. Preliminary results may be available today but final written reports may not be available until tomorrow. Important findings may be included in the final radiology review.     Please CALL the Emergency Department at [phone] to obtain the final results within the next 24 hours. Review the final results of all of your tests with your primary care doctor within the next 2 days.     Cultures and uncommon labs may take 2 days or more before results are available. Please ask about all pending tests until the final results are known. You may not be notified of important test results unless you ask for these when you call or when you follow up.    Please call or visit your primary doctor or return to this Emergency Department (or the closest Emergency Department) if you have new concerns, if you are not getting better, or if  you feel that you are getting worse.    Thank you and good luck in your recovery!

## 2023-01-05 ENCOUNTER — TELEPHONE (OUTPATIENT)
Dept: PRIMARY CARE | Facility: CLINIC | Age: 63
End: 2023-01-05
Payer: COMMERCIAL

## 2023-01-05 ENCOUNTER — TELEPHONE (OUTPATIENT)
Dept: GASTROENTEROLOGY | Facility: MEDICAL CENTER | Age: 63
End: 2023-01-05

## 2023-01-05 NOTE — TELEPHONE ENCOUNTER
Pt calling because her B/P is up high 210/150 is the highest and it fluctuates , she had a death In the family recently and she has been AfIb . She would like something to help get  B/P back down    Please call to discuss

## 2023-01-05 NOTE — TELEPHONE ENCOUNTER
Spoke with Alecia she stated having episodes of irregular heartbeat (hx. fib) she takes Tambocor.  Tightness in her chest, shortness of breath which radiates down her left arm she gets diaphoretic.Additionally severe headache blurry vision blood pressure 210/150  Denies nauseousness vomiting or diarrhea.  Denies temperature.  Positive stressors death in her family.Recommended she go to the ED for an evaluation.  She stated thank you

## 2023-01-05 NOTE — TELEPHONE ENCOUNTER
Pt called in about the results of her CT scan  She wanted to know if she could not do the EGD based on the CT scan results  She would like have a Colonoscopy  She doesn't want to wait the recommended recall time frame  Since she is a cancer survivor and her sister passed away from colon cancer after her 5 year recommendation

## 2023-01-06 DIAGNOSIS — Z80.0 FAMILY HX OF COLON CANCER: Primary | ICD-10-CM

## 2023-01-06 RX ORDER — BISACODYL 5 MG/1
TABLET, DELAYED RELEASE ORAL
Qty: 2 TABLET | Refills: 0 | Status: SHIPPED | OUTPATIENT
Start: 2023-01-06

## 2023-01-06 NOTE — TELEPHONE ENCOUNTER
Called pt and we talked about her ct scan she had it done at a [address] [facility] and she is going to try and send them to our fax or email the results to me to uploaded

## 2023-01-17 RX ORDER — SODIUM CHLORIDE 9 MG/ML
125 INJECTION, SOLUTION INTRAVENOUS CONTINUOUS
Status: CANCELLED | OUTPATIENT
Start: 2023-01-17

## 2023-01-18 ENCOUNTER — ANESTHESIA EVENT (OUTPATIENT)
Dept: GASTROENTEROLOGY | Facility: MEDICAL CENTER | Age: 63
End: 2023-01-18

## 2023-01-18 ENCOUNTER — HOSPITAL ENCOUNTER (OUTPATIENT)
Dept: GASTROENTEROLOGY | Facility: MEDICAL CENTER | Age: 63
Setting detail: OUTPATIENT SURGERY
Discharge: HOME | End: 2023-01-18
Admitting: PHYSICIAN ASSISTANT

## 2023-01-18 ENCOUNTER — ANESTHESIA (OUTPATIENT)
Dept: GASTROENTEROLOGY | Facility: MEDICAL CENTER | Age: 63
End: 2023-01-18

## 2023-01-18 VITALS
HEART RATE: 69 BPM | OXYGEN SATURATION: 100 % | BODY MASS INDEX: 20.56 KG/M2 | WEIGHT: 131 LBS | SYSTOLIC BLOOD PRESSURE: 137 MMHG | DIASTOLIC BLOOD PRESSURE: 71 MMHG | HEIGHT: 67 IN | TEMPERATURE: 97.7 F | RESPIRATION RATE: 20 BRPM

## 2023-01-18 DIAGNOSIS — R63.4 WEIGHT LOSS: ICD-10-CM

## 2023-01-18 DIAGNOSIS — Z12.11 COLON CANCER SCREENING: ICD-10-CM

## 2023-01-18 DIAGNOSIS — R13.19 ESOPHAGEAL DYSPHAGIA: ICD-10-CM

## 2023-01-18 RX ORDER — PROPOFOL 10 MG/ML
INJECTION, EMULSION INTRAVENOUS CONTINUOUS PRN
Status: DISCONTINUED | OUTPATIENT
Start: 2023-01-18 | End: 2023-01-18

## 2023-01-18 RX ORDER — PROPOFOL 10 MG/ML
INJECTION, EMULSION INTRAVENOUS AS NEEDED
Status: DISCONTINUED | OUTPATIENT
Start: 2023-01-18 | End: 2023-01-18

## 2023-01-18 RX ORDER — LIDOCAINE HYDROCHLORIDE 20 MG/ML
INJECTION, SOLUTION EPIDURAL; INFILTRATION; INTRACAUDAL; PERINEURAL AS NEEDED
Status: DISCONTINUED | OUTPATIENT
Start: 2023-01-18 | End: 2023-01-18

## 2023-01-18 RX ORDER — SODIUM CHLORIDE 9 MG/ML
125 INJECTION, SOLUTION INTRAVENOUS CONTINUOUS
Status: DISCONTINUED | OUTPATIENT
Start: 2023-01-18 | End: 2023-01-22 | Stop reason: HOSPADM

## 2023-01-18 RX ADMIN — SODIUM CHLORIDE 125 ML/HR: 0.9 INJECTION, SOLUTION INTRAVENOUS at 14:00

## 2023-01-18 RX ADMIN — PROPOFOL 100 MG: 10 INJECTION, EMULSION INTRAVENOUS at 14:05

## 2023-01-18 RX ADMIN — PROPOFOL 100 MCG/KG/MIN: 10 INJECTION, EMULSION INTRAVENOUS at 14:16

## 2023-01-18 RX ADMIN — LIDOCAINE HYDROCHLORIDE 100 MG: 20 INJECTION, SOLUTION EPIDURAL; INFILTRATION; INTRACAUDAL at 14:04

## 2023-01-18 RX ADMIN — PROPOFOL 30 MG: 10 INJECTION, EMULSION INTRAVENOUS at 14:07

## 2023-01-18 RX ADMIN — PROPOFOL 20 MG: 10 INJECTION, EMULSION INTRAVENOUS at 14:13

## 2023-01-18 RX ADMIN — PROPOFOL 20 MG: 10 INJECTION, EMULSION INTRAVENOUS at 14:15

## 2023-01-18 RX ADMIN — PROPOFOL 30 MG: 10 INJECTION, EMULSION INTRAVENOUS at 14:10

## 2023-01-18 NOTE — ANESTHESIA POSTPROCEDURE EVALUATION
Post-Op Assessment Note    CV Status:  Stable    Pain management: adequate     Mental Status:  Alert and awake   Hydration Status:  Euvolemic   PONV Controlled:  Controlled   Airway Patency:  Patent   Two or more mitigation strategies used for obstructive sleep apnea   Post Op Vitals Reviewed: Yes      Staff: Anesthesiologist         No notable events documented      BP 93/51 (01/18/23 1435)    Temp      Pulse 72 (01/18/23 1435)   Resp 14 (01/18/23 1435)    SpO2 98 % (01/18/23 1435)

## 2023-01-18 NOTE — ANESTHESIA PREPROCEDURE EVALUATION
Procedure:  EGD  COLONOSCOPY    Relevant Problems   ANESTHESIA (within normal limits)      CARDIO   (+) Chest pain   (+) Chronic bilateral thoracic back pain   (+) Paroxysmal atrial fibrillation (HCC)      ENDO   (+) Acquired hypothyroidism      MUSCULOSKELETAL   (+) Chronic bilateral thoracic back pain      NEURO/PSYCH   (+) Chronic bilateral thoracic back pain   (+) HX: breast cancer   (+) Headache disorder      Other   (+) Postmastectomy lymphedema syndrome        Physical Exam    Airway    Mallampati score: II  TM Distance: >3 FB  Neck ROM: full     Dental   upper dentures,     Cardiovascular  Rhythm: regular, Rate: normal, Cardiovascular exam normal    Pulmonary  Pulmonary exam normal Breath sounds clear to auscultation,     Other Findings        Anesthesia Plan  ASA Score- 3     Anesthesia Type- IV sedation with anesthesia with ASA Monitors  Additional Monitors:   Airway Plan:     Comment:   Stress results: There was no chest pain during stress  -  ECG conclusions: The stress ECG was non-diagnostic due to resting ST/T wave abnormality   -  Perfusion imaging: There were no perfusion defects   -  Gated SPECT: The calculated left ventricular ejection fraction was 68 %  There was no left ventricular regional abnormality          Plan Factors-Exercise tolerance (METS): >4 METS  Chart reviewed  EKG reviewed  Existing labs reviewed  Patient summary reviewed  Patient is not a current smoker  Induction- intravenous  Postoperative Plan-     Informed Consent- Anesthetic plan and risks discussed with patient

## 2023-01-18 NOTE — H&P
History and Physical - [facility]  Clarice Perez 57 y o  female MRN: [id number]                  HPI: Clarice Perez is a 57y o  year old female who presents for EGD and colonoscopy eval      REVIEW OF SYSTEMS: Per the HPI, and otherwise unremarkable      Historical Information   Past Medical History:   Diagnosis Date   • Atrial fibrillation ([facility] [address] )    • Cancer ([facility] [address] )    • COVID-19 01/17/2021   • Disease of thyroid gland      Past Surgical History:   Procedure Laterality Date   • BREAST SURGERY     • HYSTERECTOMY     • LYMPH NODE DISSECTION     • MANDIBLE FRACTURE SURGERY     • PARTIAL HYSTERECTOMY     • ROTATOR CUFF REPAIR       Social History   Social History     Substance and Sexual Activity   Alcohol Use Never     Social History     Substance and Sexual Activity   Drug Use Not on file     Social History     Tobacco Use   Smoking Status Never   Smokeless Tobacco Never     Family History   Problem Relation Age of Onset   • Alzheimer's disease Mother    • Angina Mother    • Diabetes Father    • Heart disease Sister    • Heart disease Brother    • Prostate cancer Brother        Meds/Allergies       Current Outpatient Medications:   •  Alpha-Lipoic Acid (LIPOIC ACID PO)  •  Ascorbic Acid (vitamin C) 500 MG/5ML syrup  •  aspirin (ECOTRIN LOW STRENGTH) 81 mg EC tablet  •  aspirin 81 mg chewable tablet  •  ASTAXANTHIN PO  •  betamethasone dipropionate 0 05 % lotion  •  bisacodyl (DULCOLAX) 5 mg EC tablet  •  Cholecalciferol (Vitamin D-1000 Max St) 25 MCG (1000 UT) tablet  •  Coenzyme Q10 (CO Q 10 PO)  •  Coenzyme Q10 (Co Q 10) 60 MG CAPS  •  cyclobenzaprine (FLEXERIL) 5 mg tablet  •  diphenhydrAMINE (BENADRYL) 25 mg capsule  •  flecainide (TAMBOCOR) 50 mg tablet  •  fluticasone (FLONASE) 50 mcg/act nasal spray  •  fluticasone (FLOVENT DISKUS) 50 MCG/BLIST diskus inhaler  •  levothyroxine 75 mcg tablet  •  loratadine (CLARITIN) 10 mg tablet  •  metoprolol succinate (KAPSPARGO SPRINKLE) 25 MG extended-release capsule  •  metoprolol tartrate (LOPRESSOR) 25 mg tablet  •  multivitamin (THERAGRAN) TABS  •  Naproxen Sodium 220 MG CAPS  •  omeprazole (PriLOSEC) 20 mg delayed release capsule  •  polyethylene glycol (GOLYTELY) 4000 mL solution  •  zolpidem (AMBIEN) 5 mg tablet    Current Facility-Administered Medications:   •  sodium chloride 0 9 % infusion, 125 mL/hr, Intravenous, Continuous    Allergies   Allergen Reactions   • Trazodone Headache     Headache and blurred vision   • Augmentin [Amoxicillin-Pot Clavulanate] Palpitations   • Cephalexin Palpitations   • Ciprofloxacin Tachycardia and Palpitations     Other reaction(s): Other (see comments)   • Codeine Other (See Comments), Palpitations and Tachycardia     unspecified  nausea     • Sulfa Antibiotics Other (See Comments), Palpitations and Tachycardia       Objective     There were no vitals taken for this visit  PHYSICAL EXAM    Gen: NAD  Head: NCAT  CV: RRR  CHEST: Clear  ABD: soft, NT/ND  EXT: no edema      ASSESSMENT/PLAN:  This is a 57y o  year old female here for weight loss eval, and she is stable and optimized for her procedure

## 2023-01-23 LAB
T4 FREE SERPL-MCNC: 1.2 NG/DL (ref 0.8–1.8)
TSH SERPL-ACNC: 5.32 MIU/L (ref 0.4–4.5)

## 2023-01-24 ENCOUNTER — PREP FOR PROCEDURE (OUTPATIENT)
Dept: GASTROENTEROLOGY | Facility: CLINIC | Age: 63
End: 2023-01-24

## 2023-01-24 ENCOUNTER — TELEPHONE (OUTPATIENT)
Dept: GASTROENTEROLOGY | Facility: CLINIC | Age: 63
End: 2023-01-24

## 2023-01-24 DIAGNOSIS — R13.19 ESOPHAGEAL DYSPHAGIA: Primary | ICD-10-CM

## 2023-01-24 NOTE — TELEPHONE ENCOUNTER
----- Message from Lawyer Florentino MD sent at 1/24/2023 12:46 PM EST -----  We did stretch the lower esophagus  Can plan for esophagram and manometry for further eval     Thanks   Lawyer Good   ----- Message -----  From: Arsh Apolonia  Sent: 1/24/2023   9:33 AM EST  To: Lawyer Florentino MD    Spoke with pt about results and she wanted to let you know she is having discomfort swallowing  Pt states she can feel her food going down her throat and she was having this discomfort before egd as well  Also pt would like to know why she is having this discomfort? And why did her throat need to be stretched?  Please advise thank you

## 2023-01-25 ENCOUNTER — TELEPHONE (OUTPATIENT)
Dept: GASTROENTEROLOGY | Facility: HOSPITAL | Age: 63
End: 2023-01-25

## 2023-01-25 ENCOUNTER — OFFICE VISIT (OUTPATIENT)
Dept: CARDIOLOGY | Facility: CLINIC | Age: 63
End: 2023-01-25
Payer: COMMERCIAL

## 2023-01-25 VITALS
RESPIRATION RATE: 16 BRPM | BODY MASS INDEX: 21.03 KG/M2 | WEIGHT: 134 LBS | DIASTOLIC BLOOD PRESSURE: 76 MMHG | HEIGHT: 67 IN | SYSTOLIC BLOOD PRESSURE: 122 MMHG | HEART RATE: 60 BPM

## 2023-01-25 DIAGNOSIS — I48.0 PAROXYSMAL ATRIAL FIBRILLATION (CMS/HCC): Primary | ICD-10-CM

## 2023-01-25 DIAGNOSIS — I49.3 PVC (PREMATURE VENTRICULAR CONTRACTION): ICD-10-CM

## 2023-01-25 PROCEDURE — 3008F BODY MASS INDEX DOCD: CPT | Performed by: INTERNAL MEDICINE

## 2023-01-25 PROCEDURE — 93000 ELECTROCARDIOGRAM COMPLETE: CPT | Performed by: INTERNAL MEDICINE

## 2023-01-25 PROCEDURE — 99214 OFFICE O/P EST MOD 30 MIN: CPT | Performed by: INTERNAL MEDICINE

## 2023-01-25 RX ORDER — FLECAINIDE ACETATE 50 MG/1
50 TABLET ORAL
Qty: 180 TABLET | Refills: 3 | Status: SHIPPED | OUTPATIENT
Start: 2023-01-25 | End: 2024-12-10

## 2023-01-25 RX ORDER — FLECAINIDE ACETATE 50 MG/1
50 TABLET ORAL
Qty: 180 TABLET | Refills: 3 | Status: SHIPPED | OUTPATIENT
Start: 2023-01-25 | End: 2023-01-25

## 2023-01-25 RX ORDER — OMEPRAZOLE 20 MG/1
20 CAPSULE, DELAYED RELEASE ORAL
Qty: 90 CAPSULE | Refills: 3
Start: 2023-01-25 | End: 2023-03-07 | Stop reason: ALTCHOICE

## 2023-01-25 RX ORDER — METOPROLOL SUCCINATE 25 MG/1
12.5 TABLET, EXTENDED RELEASE ORAL NIGHTLY
Qty: 45 TABLET | Refills: 3 | Status: SHIPPED | OUTPATIENT
Start: 2023-01-25 | End: 2023-03-07 | Stop reason: SDUPTHER

## 2023-01-25 ASSESSMENT — CHADS2 SCORE
AGE: <65
SEX: FEMALE (+1PT.)
PRIOR STROKE OR TIA OR THROMBOEMBOLISM: NO
VASCULAR DISEASE: NO
DIABETES: NO
CHADS2 SCORE: 1
HYPERTENSION: NO
CHF: NO

## 2023-01-25 NOTE — PROGRESS NOTES
Electrophysiology Office  Consultation       Reason for visit:   Chief Complaint   Patient presents with   • Atrial Fibrillation      HPI   Alecia Cruz is a 62 y.o. female who presents for follow-up on atrial fibrillation.  She reports she was diagnosed with atrial fibrillation in the 90s.  She was treated with verapamil, digoxin and Coumadin.  She subsequently moved to [place] and followed with cardiology there.  She had no episodes of atrial fibrillation for over 5 years and was weaned off her medications.  [place] and [place] to [facility] in 2019 with atrial fibrillation.  She underwent SANDRA in anticipation of cardioversion but converted to sinus rhythm during transesophageal echocardiogram.  She was subsequently started on flecainide 50 mg twice daily.  Given her [id number] score of 1 (female), she was not chronically anticoagulated.  In 202, she began experiencing breakthrough palpitations.  She was seen in [facility], underwent 24-hour Holter monitor which showed PVCs but no recurrent atrial fibrillation.  She was prescribed metoprolol succinate to be taken as needed for breakthrough episodes or for symptomatic PVCs.      Since her last office visit in 2021, she reports more frequent palpitations beginning in the fall 2022.  She is unsure if they are PVCs or recurrent atrial fibrillation.  She admits she has been under a lot of stress recently at the loss of several family members and social stressors with her living situation with her daughter.  She also took a new job with the [employer].  She denies anginal chest pain, dyspnea or syncope.  She reports weight loss and underwent EGD and colonoscopy.  Several polyps removed but it was otherwise unremarkable.      Past Medical History:   Diagnosis Date   • A-fib (CMS/HCC)    • Breast cancer (CMS/HCC) 2008    left partial mastectomy axillary dissection, whole breast radiation and chemotherapy in 2008 for a 2-1/2 cm  invasive ductal carcinoma that was ER/SC negative and HER-2/alyson positive with 7-15 positive lymph nodes   • Colon polyp     couple on each colonoscopy per patient report   • Fibrocystic breast    • Fibroid    • History of partial hysterectomy 03/2000    c/o fibroids   • History of radiation therapy    • Hx antineoplastic chemo    • Hx of lipoma 2008    resected from shoulder   • Hypothyroidism    • Malignant neoplasm of upper-outer quadrant of left breast in female, estrogen receptor negative (CMS/HCC) 9/10/2021    2008-2.5 cm cancer with 7 of 15+ nodes treated with partial mastectomy, axillary dissection, chemotherapy and whole breast radiation.   • PAF (paroxysmal atrial fibrillation) (CMS/HCC)    • PVC (premature ventricular contraction)    • Screening for breast cancer        Past Surgical History:   Procedure Laterality Date   • BREAST BIOPSY Left 2008   • BREAST BIOPSY Right 2021    benign ([facility] specimen [id number])   • BREAST LUMPECTOMY Left 2008    [facility] specimen [id number]   • DENTAL SURGERY     • HYSTERECTOMY      partial   • LUMBAR PUNCTURE     • MANDIBLE FRACTURE SURGERY     • PARTIAL HYSTERECTOMY     • ROTATOR CUFF REPAIR Left    • SINUS SURGERY           Social history, family history and allergies were reviewed and updated in EMR.      Current Outpatient Medications   Medication Sig Dispense Refill   • alpha lipoic acid, bulk, powder Take by mouth.     • ascorbic acid (VITAMIN C) 500 mg tablet Take 500 mg by mouth daily.     • aspirin 81 mg enteric coated tablet Take 81 mg by mouth daily.     • azelastine (ASTELIN) 137 mcg (0.1 %) nasal spray USE 2 SPRAYS IN EACH NOSTRIL EVERY DAY AT BEDTIME     • azithromycin (ZITHROMAX) 250 mg tablet TAKE 2 TABLETS BY MOUTH ON DAY 1, AND THEN TAKE 1 TABLET BY MOUTH ONCE A DAY ON DAY 2 THROUGH DAY 5     • betamethasone dipropionate (DIPROSONE) 0.05 % lotion once as needed.       • cetirizine (ZyrTEC) 10 mg tablet Take 10 mg by mouth as needed.     • cholecalciferol,  vitamin D3, 1,000 unit (25 mcg) tablet Take 1,000 Units by mouth daily.     • coenzyme Q10 (COQ10) 60 mg capsule Take by mouth.     • doxycycline hyclate (VIBRA-TABS) 100 mg tablet TAKE 1 TABLET BY MOUTH TWICE DAILY FOR 5 DAYS     • flecainide (TAMBOCOR) 50 mg tablet Take 1 tablet (50 mg total) by mouth 2 (two) times a day. 180 tablet 0   • fluticasone propionate (FLONASE) 50 mcg/actuation nasal spray Administer 2 sprays into each nostril daily as needed for rhinitis. 1 Bottle 0   • levothyroxine (SYNTHROID) 75 mcg tablet Take 1 tablet (75 mcg total) by mouth daily. 90 tablet 3   • multivitamin (THERAGRAN) tablet Take 1 tablet by mouth as needed.     • naproxen sodium 220 mg capsule Take by mouth daily as needed.     • omega-3-dha-epa-dpa-fish oil 1,050 mg(300 mg -675 mg-75 mg) capsule      • dicyclomine (BENTYL) 20 mg tablet Take 1 tablet (20 mg total) by mouth 2 (two) times a day for 10 days. 20 tablet 0   • diphenhydrAMINE (SOMINEX) 25 mg tablet daily.     • lidocaine 2 % solution GARGLE AND SPIT 10-15ML BY MOUTH EVERY 4 HOURS AS NEEDED FOR SORE THROAT UP TO 3 DAYS. MAX 8X/DAY     • metoprolol succinate XL (TOPROL-XL) 25 mg 24 hr tablet Take 1 tablet (25 mg total) by mouth nightly. (Patient not taking: Reported on 1/25/2023) 90 tablet 0   • omeprazole (PriLOSEC) 20 mg capsule      • ondansetron ODT (ZOFRAN-ODT) 4 mg disintegrating tablet Take 1 tablet (4 mg total) by mouth every 8 (eight) hours as needed for nausea or vomiting for up to 7 days. 15 tablet 0   • vitamin E 100 unit capsule      • XIFAXAN 550 mg tablet        No current facility-administered medications for this visit.          ROS  As per the HPI.  Otherwise comprehensive 10 point review of systems was reviewed and is negative.        Vitals:    01/25/23 0952   BP: 122/76   Pulse: 60   Resp: 16     Body mass index is 20.99 kg/m².  Physical Exam  General: No acute distress.  HEENT: Anicteric.  Moist mucous membranes.  Neck: Supple, no JVD.  Lungs:  Clear to auscultation bilaterally.  Cardiac: Regular.  No murmurs, rubs or gallops.  Abdomen: Soft, nontender.  Extremities: No lower extremity edema.  Skin: Warm, dry.  Neurologic: Grossly intact.  Psychiatric: Behavior is appropriate and cooperative.       Lab Results   Component Value Date    WBC 5.4 10/15/2022    HGB 12.2 10/15/2022     10/15/2022    CHOL 208 (H) 02/24/2022    TRIG 117 02/24/2022    HDL 53 02/24/2022    ALT 14 10/15/2022    AST 16 10/15/2022     12/22/2022    K 4.2 12/22/2022    CREATININE 0.85 12/22/2022    TSH 5.32 (H) 01/23/2023       EKG   Sinus rhythm, nonspecific ST-T wave abnormality    Holter monitor 11/4/2022  1.  The patient was monitored for 48 hours  2.  The predominant rhythm was sinus rhythm  3.  The average heart rate was 84 bpm  4.  Minimum heart rate was 59 bpm  5.  Maximum heart rate 129 bpm  6.  There were 2 supraventricular beats  7.  There were 267 PVCs (less than 1%).  There were 18 bigeminal cycles, 1 ventricular couplet.  8.  Patient did not log symptoms.    Regadenoson nuclear stress test 2/25/2021 ([facility])  No chest pain during stress.  EKG nondiagnostic due to resting ST-T wave abnormality.  No perfusion defects.  EF 68%.  No regional abnormalities.    Zio patch 3/15/2021 ([facility])  Sinus rhythm, average heart rate 81 bpm, minimum heart rate 48 bpm, maximum heart rate 141 bpm.  Isolated SVE (less than 1%) couplets less than 1%, no SVE triplets.  Isolated VE (less than 1%) couplets (less than 1%) and no VE triplets were present.  Ventricular bigeminy and trigeminy were present.    Transesophageal echocardiogram 4/5/2019 ([facility])  Normal LVEF, 55%.  No regional wall motion abnormalities.  No thrombus in the left atrial appendage.  Limited study as the patient converted to sinus rhythm following probe insertion.    Transthoracic echocardiogram 4/5/2019  Normal LVEF, 55-60%.  No regional wall motion  abnormalities.  Normal RV size with mild systolic dysfunction.  Mild mitral regurgitation.  Trace tricuspid regurgitation with normal RVSP.    EKG 4/4/2019  Atrial fibrillation with rapid ventricular response, nonspecific ST-T wave abnormality             Assessment and Plan:    No problem-specific Assessment & Plan notes found for this encounter.    Paroxysmal atrial fibrillation (CMS/HCC)  She has a history of paroxysmal atrial fibrillation dating back to the 90s per her report.  She was treated with verapamil, digoxin and Coumadin back then.  These medications were discontinued sometime over the last few decades.  She had recurrent atrial fibrillation in 2019, underwent SANDRA in anticipation of cardioversion but converted to sinus rhythm spontaneously during the echocardiogram.  She was subsequently started on flecainide 50 mg twice daily.  Since then, she has had break through episodes lasting minutes to hours but has not required a cardioversion.  She takes metoprolol only as needed because she felt lousy taking it daily.  Her recent Holter monitor showed no recurrent atrial fibrillation and PVC burden less than 1%.  It is unclear whether her symptoms are related to stress and anxiety for recurrent atrial fibrillation/PVCs.  I am ordering a 2-week Zio patch and we discussed purchasing a [employer] mobile device for further monitoring.  She will look into this.  She we will try taking Toprol-XL 12.5 mg nightly.  Her [id number] score is 1 (female).    She has samples of Eliquis to be taken as needed in place of aspirin for episodes of atrial fibrillation lasting greater than 24 hours.     PVC (premature ventricular contraction)  She has normal LV function and a negative nuclear stress test in 2021.  She is occasionally symptomatic with PVCs.  She will continue flecainide and start low-dose Toprol as above.  Recent Holter monitor showed less than 1% burden of PVCs.     Hypothyroidism  She will continue Synthroid and  follow with her PCP.     History of left breast cancer  She underwent left partial mastectomy with whole breast radiation and chemotherapy in 2008.  She will continue to follow with Dr. Merino.    Thank you for allowing me to participate in the care of your patient, please feel free to contact me with any questions or concerns.     Yris Alonso MD  1/25/2023

## 2023-01-31 ENCOUNTER — TELEPHONE (OUTPATIENT)
Dept: PRIMARY CARE | Facility: CLINIC | Age: 63
End: 2023-01-31

## 2023-01-31 NOTE — TELEPHONE ENCOUNTER
Pt calling to speak with Dr. Begum about a document she needs filled out and two procedures she has coming up

## 2023-02-02 ENCOUNTER — TELEMEDICINE (OUTPATIENT)
Dept: GASTROENTEROLOGY | Facility: MEDICAL CENTER | Age: 63
End: 2023-02-02

## 2023-02-02 DIAGNOSIS — D36.9 TUBULAR ADENOMA: ICD-10-CM

## 2023-02-02 DIAGNOSIS — R63.4 WEIGHT LOSS: ICD-10-CM

## 2023-02-02 DIAGNOSIS — R13.10 DYSPHAGIA, UNSPECIFIED TYPE: ICD-10-CM

## 2023-02-02 DIAGNOSIS — R91.1 PULMONARY NODULE: Primary | ICD-10-CM

## 2023-02-02 NOTE — PROGRESS NOTES
Virtual Brief Visit    Patient is located in the following state in which I hold an active license [place]      Assessment/Plan:    ASSESSMENT & PLAN:    1  Pulmonary nodule: CT showing right sided pulmonary nodule that increased since 2013  Repeat CT chest recommended in 2-3 months  I have ordered this for her and would like her to follow up with her pcp regarding the results and need for pulmonology consult  She understands and agrees to this  - CT chest w contrast; Future  -f/u with pcp and/or pulmonology     2  Weight loss:  - Ambulatory Referral to Nutrition Services; Future    3  Dysphagia, unspecified type: EGD with schatzki ring in lower third of the esophagus s/p dilation of 20mm  She is still having dysphagia and barium swallow and manometry are planned   -f/u barium swallow, manometry     4  Tubular adenoma: she had 4 polyps removed from the colon some of which were tubular adenomas  Repeat colonoscopy recommended in 3 years    5  Constipation: has tried dulcolax without relief    -try miralax 17g daily       Sidney Leyden is a 15-year-old female with past medical history of atrial fibrillation on 81 mg of aspirin, history of breast cancer status post surgery here for follow-up after EGD and colonoscopy  She was originally seen last month for unintentional weight loss  She had admitted to a 20 pound unintentional weight loss over 2 months  In May 2021 she weighed 145 pounds, last month she weighed 134 pounds  Most recent weight on file is 131 pounds  Did undergo CT abdomen pelvis with no findings suggestive of her weight loss  However, it did find a pulmonary nodule that had increased in size since 2013  A repeat CT of the chest was recommended in 2 to 3 months  She underwent EGD and colonoscopy  EGD 1/18/2023 was normal other than Schatzki's ring in the lower third of the esophagus status post balloon dilation to 20 mm  Biopsies were unremarkable    Colonoscopy with 4 polyps removed, melanosis coli, small internal hemorrhoids  Biopsy showing tubular adenomas  Repeat colonoscopy recommended in 3 years  Today she continues to complain of constipation  She has been using Dulcolax which was too aggressive for her  She has not yet tried MiraLAX  She does continue to complain of dysphagia to solid foods  She already has barium swallow and manometry testing planned  She does also continue to complain of unintentional weight loss  Problem List Items Addressed This Visit    None  Visit Diagnoses     Pulmonary nodule    -  Primary    Relevant Orders    CT chest w contrast    Weight loss        Relevant Orders    Ambulatory Referral to Nutrition Services    Dysphagia, unspecified type        Tubular adenoma              Recent Visits  No visits were found meeting these conditions  Showing recent visits within past 7 days and meeting all other requirements  Future Appointments  No visits were found meeting these conditions  Showing future appointments within next 150 days and meeting all other requirements   REVIEW OF SYSTEMS:    CONSTITUTIONAL: Denies any fever, chills, rigors, and weight loss  HEENT: No earache or tinnitus  Denies hearing loss or visual disturbances  CARDIOVASCULAR: No chest pain or palpitations  RESPIRATORY: Denies any cough, hemoptysis, shortness of breath or dyspnea on exertion  GASTROINTESTINAL: As noted in the History of Present Illness  GENITOURINARY: No problems with urination  Denies any hematuria or dysuria  NEUROLOGIC: No dizziness or vertigo, denies headaches  MUSCULOSKELETAL: Denies any muscle or joint pain  SKIN: Denies skin rashes or itching  ENDOCRINE: Denies excessive thirst  Denies intolerance to heat or cold  PSYCHOSOCIAL: Denies depression or anxiety  Denies any recent memory loss         PHYSICAL EXAMINATION:  No PE due to telephone visit           Visit Time    Visit Start Time: 129  Visit Stop Time: 950  Total Visit Duration: 20 minutes

## 2023-02-03 ENCOUNTER — TELEPHONE (OUTPATIENT)
Dept: SCHEDULING | Facility: CLINIC | Age: 63
End: 2023-02-03
Payer: COMMERCIAL

## 2023-02-03 ENCOUNTER — APPOINTMENT (OUTPATIENT)
Dept: RADIOLOGY | Facility: HOSPITAL | Age: 63
End: 2023-02-03
Attending: INTERNAL MEDICINE

## 2023-02-03 ENCOUNTER — HOSPITAL ENCOUNTER (OUTPATIENT)
Dept: GASTROENTEROLOGY | Facility: HOSPITAL | Age: 63
End: 2023-02-03
Attending: INTERNAL MEDICINE

## 2023-02-03 VITALS
TEMPERATURE: 98.4 F | RESPIRATION RATE: 16 BRPM | DIASTOLIC BLOOD PRESSURE: 83 MMHG | HEART RATE: 70 BPM | OXYGEN SATURATION: 97 % | SYSTOLIC BLOOD PRESSURE: 146 MMHG

## 2023-02-03 DIAGNOSIS — R13.19 ESOPHAGEAL DYSPHAGIA: ICD-10-CM

## 2023-02-03 NOTE — PERIOPERATIVE NURSING NOTE
Patient brought in the room and explained the esophageal manometry procedure  After the confirmation of allergies, lidocaine 2% inserted via right /left nostril and  a transnasal insertion of the High Resolution esophageal manometry catheter was inserted via left nostril  Patient given water to drink during the insertion and once the catheter inserted pressure bands of both Upper esophageal sphincter  (UES) and Lower esophageal sphincter ( LES) were observed on the color contour  Patient instructed to take a deep breath to verify placement of the catheter, diaphragmatic pinch noted on inspiration  Catheter was secured to left cheek  Patient was assisted to supine position   Patient was instructed to relax  while acclimating the catheter for about 5 minutes  A 30 second baseline resting pressure was obtained to identify the UES and LES followed by a series of 10 liquid swallows using 5 cc room temperature normal saline to assess esophageal motility and bolus transit  No 10 viscous swallows done, 1 multiple rapid drink swallow using 2 cc room temperature normal saline given a total of 5 drinks  Patient instructed to sit up at the edge of the stretcher and given 5 upright liquid swallows using 5 cc room temperature normal saline and 1 rapid drink challenge using 200 cc room temperature water  At the end of the procedure the high resolution esophageal manometry catheter was removed from the nostril intact  Patient given discharge instructions and patient left in stable condition

## 2023-02-03 NOTE — TELEPHONE ENCOUNTER
Vidal from [employer] calling to notify MD that patient has not worn the patch, also he was unable to reach out to the pt( phone disconnected), please advise    Vidal [phone]    Pt :[phone]

## 2023-02-08 ENCOUNTER — HOSPITAL ENCOUNTER (OUTPATIENT)
Dept: RADIOLOGY | Facility: HOSPITAL | Age: 63
Discharge: HOME | End: 2023-02-08
Attending: INTERNAL MEDICINE

## 2023-02-08 DIAGNOSIS — R13.19 ESOPHAGEAL DYSPHAGIA: ICD-10-CM

## 2023-02-28 ENCOUNTER — TELEMEDICINE (OUTPATIENT)
Dept: PRIMARY CARE | Facility: CLINIC | Age: 63
End: 2023-02-28
Payer: COMMERCIAL

## 2023-02-28 DIAGNOSIS — J06.9 ACUTE URI: Primary | ICD-10-CM

## 2023-02-28 PROCEDURE — 99213 OFFICE O/P EST LOW 20 MIN: CPT | Mod: 95 | Performed by: STUDENT IN AN ORGANIZED HEALTH CARE EDUCATION/TRAINING PROGRAM

## 2023-03-04 ENCOUNTER — NURSE TRIAGE (OUTPATIENT)
Dept: PRIMARY CARE | Facility: CLINIC | Age: 63
End: 2023-03-04
Payer: COMMERCIAL

## 2023-03-04 NOTE — TELEPHONE ENCOUNTER
"Synopsis:  On call - patient called for sinus congestion and pain. Started a week ago. She had Telemed with PCP last week. Sore throat. Cough. Ear ache. Temp 101F responsive to tylenol. No chest pain or shortness of breath. Mild chest tightness. No abd pain or diarrhea. covid test negative 4 days ago. Grandson was also sick. Reported taking mucinex and flonase.     Discussed about trying low dose prednisone to help with sinus congestion. Patient prefer to go to urgent care for evaluation. She will go to local urgent care. F/u with PCP this week.      Disposition:  Go to Urgent Care Now  Care Advice:  Care Advice Given     No Care Advice given for this encounter.        Orders Placed This Encounter:  No orders of the defined types were placed in this encounter.    Answer Assessment - Initial Assessment Questions  1. LOCATION: \"Where does it hurt?\"       Sinus    2. ONSET: \"When did the sinus pain start?\"  (e.g., hours, days)       A week ago    3. SEVERITY: \"How bad is the pain?\"   (Scale 1-10; mild, moderate or severe)    Moderate    4. RECURRENT SYMPTOM: \"Have you ever had sinus problems before?\" If so, ask: \"When was the last time?\" and \"What happened that time?\"       No     5. NASAL CONGESTION: \"Is the nose blocked?\" If so, ask, \"Can you open it or must you breathe through the mouth?\"      Yes    6. NASAL DISCHARGE: \"Do you have discharge from your nose?\" If so ask, \"What color?\"      Brown    7. FEVER: \"Do you have a fever?\" If so, ask: \"What is it, how was it measured, and when did it start?\"       Yes temp 101    8. OTHER SYMPTOMS: \"Do you have any other symptoms?\" (e.g., sore throat, cough, earache, difficulty breathing)      Sore throat. Cough. Ear ache. Chest tightness    9. PREGNANCY: \"Is there any chance you are pregnant?\" \"When was your last menstrual period?\"      No    Protocols used: SINUS PAIN OR CONGESTION-ADULT-AH      "

## 2023-03-07 ENCOUNTER — TELEPHONE (OUTPATIENT)
Dept: CARDIOLOGY | Facility: CLINIC | Age: 63
End: 2023-03-07
Payer: COMMERCIAL

## 2023-03-07 ENCOUNTER — HOSPITAL ENCOUNTER (INPATIENT)
Facility: HOSPITAL | Age: 63
LOS: 2 days | Discharge: HOME | DRG: 286 | End: 2023-03-11
Attending: EMERGENCY MEDICINE | Admitting: INTERNAL MEDICINE
Payer: COMMERCIAL

## 2023-03-07 ENCOUNTER — APPOINTMENT (EMERGENCY)
Dept: RADIOLOGY | Facility: HOSPITAL | Age: 63
DRG: 286 | End: 2023-03-07
Attending: EMERGENCY MEDICINE
Payer: COMMERCIAL

## 2023-03-07 DIAGNOSIS — R90.82 WHITE MATTER DISEASE, UNSPECIFIED: ICD-10-CM

## 2023-03-07 DIAGNOSIS — R07.9 CHEST PAIN, UNSPECIFIED TYPE: Primary | ICD-10-CM

## 2023-03-07 DIAGNOSIS — I20.0 UNSTABLE ANGINA (CMS/HCC): ICD-10-CM

## 2023-03-07 PROBLEM — I48.91 ATRIAL FIBRILLATION (CMS/HCC): Status: ACTIVE | Noted: 2020-01-23

## 2023-03-07 LAB
ALBUMIN SERPL-MCNC: 3.5 G/DL (ref 3.4–5)
ALP SERPL-CCNC: 58 IU/L (ref 35–126)
ALT SERPL-CCNC: 15 IU/L (ref 11–54)
ANION GAP SERPL CALC-SCNC: 6 MEQ/L (ref 3–15)
APTT PPP: <22 SEC (ref 23–35)
AST SERPL-CCNC: 16 IU/L (ref 15–41)
ATRIAL RATE: 72
BASOPHILS # BLD: 0.05 K/UL (ref 0.01–0.1)
BASOPHILS NFR BLD: 1.1 %
BILIRUB SERPL-MCNC: 0.7 MG/DL (ref 0.3–1.2)
BUN SERPL-MCNC: 11 MG/DL (ref 8–20)
CALCIUM SERPL-MCNC: 9.3 MG/DL (ref 8.9–10.3)
CHLORIDE SERPL-SCNC: 106 MEQ/L (ref 98–109)
CO2 SERPL-SCNC: 27 MEQ/L (ref 22–32)
CREAT SERPL-MCNC: 0.8 MG/DL (ref 0.6–1.1)
DIFFERENTIAL METHOD BLD: ABNORMAL
EOSINOPHIL # BLD: 0.08 K/UL (ref 0.04–0.36)
EOSINOPHIL NFR BLD: 1.7 %
ERYTHROCYTE [DISTWIDTH] IN BLOOD BY AUTOMATED COUNT: 12.3 % (ref 11.7–14.4)
GFR SERPL CREATININE-BSD FRML MDRD: >60 ML/MIN/1.73M*2
GLUCOSE SERPL-MCNC: 93 MG/DL (ref 70–99)
HCT VFR BLDCO AUTO: 39.6 % (ref 35–45)
HGB BLD-MCNC: 13.1 G/DL (ref 11.8–15.7)
IMM GRANULOCYTES # BLD AUTO: 0.01 K/UL (ref 0–0.08)
IMM GRANULOCYTES NFR BLD AUTO: 0.2 %
INR PPP: 0.9
LYMPHOCYTES # BLD: 1.69 K/UL (ref 1.2–3.5)
LYMPHOCYTES NFR BLD: 36.7 %
MCH RBC QN AUTO: 31.4 PG (ref 28–33.2)
MCHC RBC AUTO-ENTMCNC: 33.1 G/DL (ref 32.2–35.5)
MCV RBC AUTO: 95 FL (ref 83–98)
MONOCYTES # BLD: 0.24 K/UL (ref 0.28–0.8)
MONOCYTES NFR BLD: 5.2 %
NEUTROPHILS # BLD: 2.53 K/UL (ref 1.7–7)
NEUTS SEG NFR BLD: 55.1 %
NRBC BLD-RTO: 0 %
P AXIS: 72
PDW BLD AUTO: 10.2 FL (ref 9.4–12.3)
PLATELET # BLD AUTO: 216 K/UL (ref 150–369)
POTASSIUM SERPL-SCNC: 3.8 MEQ/L (ref 3.6–5.1)
PR INTERVAL: 138
PROT SERPL-MCNC: 6.5 G/DL (ref 6–8.2)
PROTHROMBIN TIME: 11.8 SEC (ref 12.2–14.5)
QRS DURATION: 86
QT INTERVAL: 350
QTC CALCULATION(BAZETT): 383
R AXIS: 12
RBC # BLD AUTO: 4.17 M/UL (ref 3.93–5.22)
SARS-COV-2 RNA RESP QL NAA+PROBE: POSITIVE
SODIUM SERPL-SCNC: 139 MEQ/L (ref 136–144)
T WAVE AXIS: -25
TROPONIN I SERPL HS-MCNC: 3 PG/ML
TROPONIN I SERPL HS-MCNC: 4 PG/ML
TSH SERPL DL<=0.05 MIU/L-ACNC: 1.08 MIU/L (ref 0.34–5.6)
VENTRICULAR RATE: 72
WBC # BLD AUTO: 4.6 K/UL (ref 3.8–10.5)

## 2023-03-07 PROCEDURE — G0378 HOSPITAL OBSERVATION PER HR: HCPCS

## 2023-03-07 PROCEDURE — 63600000 HC DRUGS/DETAIL CODE: Mod: JZ | Performed by: INTERNAL MEDICINE

## 2023-03-07 PROCEDURE — 99285 EMERGENCY DEPT VISIT HI MDM: CPT | Mod: 25

## 2023-03-07 PROCEDURE — 36415 COLL VENOUS BLD VENIPUNCTURE: CPT | Performed by: EMERGENCY MEDICINE

## 2023-03-07 PROCEDURE — 63700000 HC SELF-ADMINISTRABLE DRUG: Performed by: INTERNAL MEDICINE

## 2023-03-07 PROCEDURE — 84484 ASSAY OF TROPONIN QUANT: CPT | Mod: 91 | Performed by: EMERGENCY MEDICINE

## 2023-03-07 PROCEDURE — 85025 COMPLETE CBC W/AUTO DIFF WBC: CPT | Performed by: EMERGENCY MEDICINE

## 2023-03-07 PROCEDURE — 99223 1ST HOSP IP/OBS HIGH 75: CPT | Mod: CR | Performed by: HOSPITALIST

## 2023-03-07 PROCEDURE — 85730 THROMBOPLASTIN TIME PARTIAL: CPT | Performed by: INTERNAL MEDICINE

## 2023-03-07 PROCEDURE — 71046 X-RAY EXAM CHEST 2 VIEWS: CPT

## 2023-03-07 PROCEDURE — 83036 HEMOGLOBIN GLYCOSYLATED A1C: CPT | Performed by: HOSPITALIST

## 2023-03-07 PROCEDURE — 93005 ELECTROCARDIOGRAM TRACING: CPT

## 2023-03-07 PROCEDURE — 84484 ASSAY OF TROPONIN QUANT: CPT | Performed by: EMERGENCY MEDICINE

## 2023-03-07 PROCEDURE — 1123F ACP DISCUSS/DSCN MKR DOCD: CPT | Mod: CR | Performed by: HOSPITALIST

## 2023-03-07 PROCEDURE — 84443 ASSAY THYROID STIM HORMONE: CPT | Performed by: INTERNAL MEDICINE

## 2023-03-07 PROCEDURE — 99222 1ST HOSP IP/OBS MODERATE 55: CPT | Mod: GC | Performed by: INTERNAL MEDICINE

## 2023-03-07 PROCEDURE — 93010 ELECTROCARDIOGRAM REPORT: CPT | Performed by: INTERNAL MEDICINE

## 2023-03-07 PROCEDURE — 93005 ELECTROCARDIOGRAM TRACING: CPT | Performed by: EMERGENCY MEDICINE

## 2023-03-07 PROCEDURE — 85610 PROTHROMBIN TIME: CPT | Performed by: INTERNAL MEDICINE

## 2023-03-07 PROCEDURE — U0003 INFECTIOUS AGENT DETECTION BY NUCLEIC ACID (DNA OR RNA); SEVERE ACUTE RESPIRATORY SYNDROME CORONAVIRUS 2 (SARS-COV-2) (CORONAVIRUS DISEASE [COVID-19]), AMPLIFIED PROBE TECHNIQUE, MAKING USE OF HIGH THROUGHPUT TECHNOLOGIES AS DESCRIBED BY CMS-2020-01-R: HCPCS | Performed by: PHYSICIAN ASSISTANT

## 2023-03-07 PROCEDURE — 63700000 HC SELF-ADMINISTRABLE DRUG: Performed by: PHYSICIAN ASSISTANT

## 2023-03-07 PROCEDURE — 96375 TX/PRO/DX INJ NEW DRUG ADDON: CPT | Performed by: INTERNAL MEDICINE

## 2023-03-07 PROCEDURE — 80053 COMPREHEN METABOLIC PANEL: CPT | Performed by: EMERGENCY MEDICINE

## 2023-03-07 RX ORDER — METOPROLOL SUCCINATE 25 MG/1
12.5 TABLET, EXTENDED RELEASE ORAL NIGHTLY
Status: DISCONTINUED | OUTPATIENT
Start: 2023-03-07 | End: 2023-03-11 | Stop reason: HOSPADM

## 2023-03-07 RX ORDER — ACETAMINOPHEN 325 MG/1
975 TABLET ORAL ONCE
Status: COMPLETED | OUTPATIENT
Start: 2023-03-07 | End: 2023-03-07

## 2023-03-07 RX ORDER — ACETAMINOPHEN 325 MG/1
650 TABLET ORAL EVERY 4 HOURS PRN
Status: DISCONTINUED | OUTPATIENT
Start: 2023-03-07 | End: 2023-03-11 | Stop reason: HOSPADM

## 2023-03-07 RX ORDER — IBUPROFEN 200 MG
16-32 TABLET ORAL AS NEEDED
Status: DISCONTINUED | OUTPATIENT
Start: 2023-03-07 | End: 2023-03-11 | Stop reason: HOSPADM

## 2023-03-07 RX ORDER — FLECAINIDE ACETATE 50 MG/1
50 TABLET ORAL
Status: DISCONTINUED | OUTPATIENT
Start: 2023-03-07 | End: 2023-03-11 | Stop reason: HOSPADM

## 2023-03-07 RX ORDER — DEXTROSE 50 % IN WATER (D50W) INTRAVENOUS SYRINGE
25 AS NEEDED
Status: DISCONTINUED | OUTPATIENT
Start: 2023-03-07 | End: 2023-03-11 | Stop reason: HOSPADM

## 2023-03-07 RX ORDER — HEPARIN SODIUM 5000 [USP'U]/ML
5000 INJECTION, SOLUTION INTRAVENOUS; SUBCUTANEOUS EVERY 8 HOURS
Status: DISCONTINUED | OUTPATIENT
Start: 2023-03-08 | End: 2023-03-07

## 2023-03-07 RX ORDER — LEVOTHYROXINE SODIUM 75 UG/1
75 TABLET ORAL
Status: DISCONTINUED | OUTPATIENT
Start: 2023-03-08 | End: 2023-03-11 | Stop reason: HOSPADM

## 2023-03-07 RX ORDER — ATORVASTATIN CALCIUM 80 MG/1
80 TABLET, FILM COATED ORAL
Status: DISCONTINUED | OUTPATIENT
Start: 2023-03-07 | End: 2023-03-09

## 2023-03-07 RX ORDER — HEPARIN SODIUM 10000 [USP'U]/100ML
100-4000 INJECTION, SOLUTION INTRAVENOUS
Status: DISCONTINUED | OUTPATIENT
Start: 2023-03-07 | End: 2023-03-09

## 2023-03-07 RX ORDER — DEXTROSE 40 %
15-30 GEL (GRAM) ORAL AS NEEDED
Status: DISCONTINUED | OUTPATIENT
Start: 2023-03-07 | End: 2023-03-11 | Stop reason: HOSPADM

## 2023-03-07 RX ORDER — METOPROLOL SUCCINATE 25 MG/1
12.5 TABLET, EXTENDED RELEASE ORAL DAILY PRN
COMMUNITY
End: 2023-07-12 | Stop reason: SDUPTHER

## 2023-03-07 RX ORDER — ASPIRIN 81 MG/1
81 TABLET ORAL DAILY
Status: DISCONTINUED | OUTPATIENT
Start: 2023-03-08 | End: 2023-03-11 | Stop reason: HOSPADM

## 2023-03-07 RX ORDER — NITROGLYCERIN 0.4 MG/1
0.4 TABLET SUBLINGUAL EVERY 5 MIN PRN
Status: DISCONTINUED | OUTPATIENT
Start: 2023-03-07 | End: 2023-03-11 | Stop reason: HOSPADM

## 2023-03-07 RX ORDER — NAPROXEN SODIUM 220 MG/1
324 TABLET, FILM COATED ORAL ONCE
Status: COMPLETED | OUTPATIENT
Start: 2023-03-07 | End: 2023-03-07

## 2023-03-07 RX ADMIN — ASPIRIN 81 MG CHEWABLE TABLET 324 MG: 81 TABLET CHEWABLE at 14:10

## 2023-03-07 RX ADMIN — METOPROLOL SUCCINATE 12.5 MG: 25 TABLET, EXTENDED RELEASE ORAL at 22:04

## 2023-03-07 RX ADMIN — NITROGLYCERIN 0.4 MG: 0.4 TABLET SUBLINGUAL at 15:22

## 2023-03-07 RX ADMIN — HEPARIN SODIUM 750 UNITS/HR: 10000 INJECTION, SOLUTION INTRAVENOUS at 20:37

## 2023-03-07 RX ADMIN — FLECAINIDE ACETATE TABLET 50 MG: 50 TABLET ORAL at 17:48

## 2023-03-07 RX ADMIN — ACETAMINOPHEN 975 MG: 325 TABLET ORAL at 15:35

## 2023-03-07 ASSESSMENT — ENCOUNTER SYMPTOMS
SHORTNESS OF BREATH: 1
ABDOMINAL PAIN: 0
COUGH: 0
BACK PAIN: 1
FEVER: 0
LOWER EXTREMITY EDEMA: 0
VOMITING: 0

## 2023-03-07 NOTE — Clinical Note
The right coronary artery was selectively engaged, injected and visualized. Multiple views of the injected vessel were taken.

## 2023-03-07 NOTE — ASSESSMENT & PLAN NOTE
"-on flecainide 50mg bid   -per last note by Dr. Alonso should be taking Toprol 12.5mg nightly, although patient continues to take prn for \"break through\" episodes of A-fib   -not on AC due to low [id number] score    Plan:  Cont flecainide   Continue Toprol  CTM on tele   Follows with Dr. Alonso   "

## 2023-03-07 NOTE — TELEPHONE ENCOUNTER
Dr. Alonso patient.    Last OV 1/25/23 with scheduled follow up on 4/26/23.    PMH notable for PAF, PVCs, hypothyroidism and (L) breast cancer.    Pt contacted the office this morning to report development of chest pain with radiation down her (L) arm which began about one month ago. At first, her pain occurred while walking at a fast pace or running.    Symptoms progressed on 3/2/23-occurring with household cleaning or walking at a slower pace.    Since 3/2/23-having rest episodes with active symptoms at time of this call.    Per her report saw Dr. Pulliam in the past but I scrolled back 5 years in her electronic medical record and didn't see an OV.    I advised closest ER now for evaluation. I asked her not to drive herself and explained why.    Report phoned to EBEN Rodriguez.    Pt contact if needed, [phone].

## 2023-03-07 NOTE — Clinical Note
The bilateral groins was and prepped with ChloraPrep. The patient was draped in a sterile fashion after allowing for the recommended dry time.

## 2023-03-07 NOTE — ASSESSMENT & PLAN NOTE
-on flecainide 50mg bid and Toprol 12.5mg qD  -not on AC due to low [id number] score    Plan:  Cont flecainide and Toprol  Follows with Dr. Alonso

## 2023-03-07 NOTE — ASSESSMENT & PLAN NOTE
-presents w/ intermittent chest pain that started ~ 1 month ago, now occurring daily and is more intense   -CXr showing stable DEVEN nodule, otherwise unremarkable  -ECG with NSR w nonspecific T wave abnormalities in the precordial leads.  -HST 4-->3  -s/p asa 325 in ED and SL nitro with relief    Plan:  -Cardiology consulted and following, personally discussed with team today, underwent LHC today which revealed mild non-obstructive CAD, recommend lifestyle modification and medical management.   -CT PE study this admission negative for PE   -Cont asa 81 m qD, atorvastatin 80mg qD  -CTM on tele  -Patient developed chest pain after LHC, repeat EKG obtained and unchanged, will monitor overnight

## 2023-03-07 NOTE — ASSESSMENT & PLAN NOTE
-presents w/ intermittent chest pain that started ~ 1 month ago, now occurring daily and is more intense   -CXr showing stable DEVEN nodule, otherwise unremarkable  -ECG with NSR w nonspecific T wave abnormalities in the precordial leads.  -HST 4-->3  -s/p asa 325 in ED and SL nitro with relief    Plan:  Evaluated by cardiology, will keep npo p midnight for heart catheterization tomorrow   Cont asa 81 m qD  F/u FLP  CTM on tele  Cariology following

## 2023-03-07 NOTE — PROGRESS NOTES
Spoke with patient and confirmed with medication list from [facility] and/or patient's own pharmacy to complete the medication reconciliation.     Prior to admission medication list:  •  alpha lipoic acid, bulk, powder, Take 1 capsule by mouth daily.  •  ascorbic acid (VITAMIN C) 500 mg tablet, Take 500 mg by mouth daily.  •  aspirin 81 mg enteric coated tablet, Take 81 mg by mouth daily.  •  cholecalciferol, vitamin D3, 1,000 unit (25 mcg) tablet, Take 1,000 Units by mouth daily.  •  coenzyme Q10 (COQ10) 60 mg capsule, Take 60 mg by mouth daily.  •  flecainide (TAMBOCOR) 50 mg tablet, Take 1 tablet (50 mg total) by mouth 2 (two) times a day.  •  levothyroxine (SYNTHROID) 75 mcg tablet, Take 1 tablet (75 mcg total) by mouth daily.  •  multivitamin (THERAGRAN) tablet, Take 1 tablet by mouth daily.  •  omega-3-dha-epa-dpa-fish oil 1,050 mg(300 mg -675 mg-75 mg) capsule, Take 1 capsule by mouth daily.  •  azelastine (ASTELIN) 137 mcg (0.1 %) nasal spray, Administer 2 sprays into each nostril 2 (two) times a day as needed.  •  betamethasone dipropionate (DIPROSONE) 0.05 % lotion, Apply 1 each topically daily as needed.  •  cetirizine (ZyrTEC) 10 mg tablet, Take 10 mg by mouth as needed.  •  metoprolol succinate XL (TOPROL-XL) 25 mg 24 hr tablet, Take 12.5 mg by mouth daily as needed.  •  naproxen sodium 220 mg capsule, Take 220 mg by mouth daily as needed.    Comments about home medications:  - Patient reports she only takes metoprolol as needed.     Compliance:   - All medications have recent fill history or can be found as OTC products.

## 2023-03-07 NOTE — ED PROVIDER NOTES
"Emergency Medicine Note  HPI   HISTORY OF PRESENT ILLNESS     63 y/o F with PMH paroxysmal atrial fibrillation, remote h/o breast ca presents today for evaluation of chest pain.      History provided by:  Patient  Chest Pain  Pain location:  L chest  Pain quality: pressure and tightness    Pain radiates to:  L arm and mid back  Pain severity:  Moderate  Onset quality:  Gradual  Duration: started 1 month ago after walking 1 block. Has been intermittent since with exertion and per patient \"I'm taking it easy\". 5 days ago walking into work pain return and has been constant since.  Associated symptoms: back pain and shortness of breath    Associated symptoms: no abdominal pain, no cough, no fever, no lower extremity edema and no vomiting    Risk factors: no coronary artery disease, no diabetes mellitus and no prior DVT/PE          Patient History   PAST HISTORY     Reviewed from Nursing Triage:  Meds       Past Medical History:   Diagnosis Date   • A-fib (CMS/[facility])    • Breast cancer (CMS/[facility]) 2008    left partial mastectomy axillary dissection, whole breast radiation and chemotherapy in 2008 for a 2-1/2 cm invasive ductal carcinoma that was ER/MA negative and HER-2/alyson positive with 7-15 positive lymph nodes   • Colon polyp     couple on each colonoscopy per patient report   • Fibrocystic breast    • Fibroid    • History of partial hysterectomy 03/2000    c/o fibroids   • History of radiation therapy    • Hx antineoplastic chemo    • Hx of lipoma 2008    resected from shoulder   • Hypothyroidism    • Malignant neoplasm of upper-outer quadrant of left breast in female, estrogen receptor negative (CMS/[facility]) 9/10/2021    2008-2.5 cm cancer with 7 of 15+ nodes treated with partial mastectomy, axillary dissection, chemotherapy and whole breast radiation.   • PAF (paroxysmal atrial fibrillation) (CMS/[facility])    • PVC (premature ventricular contraction)    • Screening for breast cancer        Past Surgical History:   Procedure " Laterality Date   • BREAST BIOPSY Left 2008   • BREAST BIOPSY Right 2021    benign ([facility] specimen [id number])   • BREAST LUMPECTOMY Left 2008    [facility] specimen [id number]   • DENTAL SURGERY     • HYSTERECTOMY      partial   • LUMBAR PUNCTURE     • MANDIBLE FRACTURE SURGERY     • PARTIAL HYSTERECTOMY     • ROTATOR CUFF REPAIR Left    • SINUS SURGERY         Family History   Problem Relation Age of Onset   • Alzheimer's disease Biological Mother    • Arrhythmia Biological Mother    • Diabetes Biological Father    • Hypertension Biological Sister    • Melanoma Biological Sister    • COPD Biological Brother    • Prostate cancer Biological Brother    • Stomach cancer Paternal Grandmother    • Diabetes Paternal Grandfather    • Prostate cancer Paternal Grandfather    • Sarcoidosis Biological Sister    • Diabetes Biological Sister    • Heart disease Biological Sister    • Hypertension Biological Brother    • Prostate cancer Biological Brother    • Lung cancer Father's Sister    • Prostate cancer Father's Brother    • Prostate cancer Father's Brother    • Prostate cancer Father's Brother    • Prostate cancer Father's Brother    • Lung cancer Father's Brother    • Lung cancer Father's Sister    • Ovarian cancer Paternal Cousin    • Lung cancer Paternal Cousin    • Thyroid cancer Paternal Cousin        Social History     Tobacco Use   • Smoking status: Never   • Smokeless tobacco: Never   Substance Use Topics   • Alcohol use: No   • Drug use: No         Review of Systems   REVIEW OF SYSTEMS     Review of Systems   Constitutional: Negative for fever.   Respiratory: Positive for shortness of breath. Negative for cough.    Cardiovascular: Positive for chest pain. Negative for leg swelling.   Gastrointestinal: Negative for abdominal pain and vomiting.   Musculoskeletal: Positive for back pain.   Neurological: Negative for syncope.         VITALS     ED Vitals    Date/Time Temp Pulse Resp BP SpO2 [facility]   03/07/23 1600 -- 69 15 136/71 98  % EE   03/07/23 1500 -- 70 18 146/81 98 % EE   03/07/23 1400 -- 74 17 140/78 99 % EE   03/07/23 1300 -- 73 18 134/77 98 % EE   03/07/23 1203 -- 71 16 140/70 97 % EE   03/07/23 1053 36.7 °C (98 °F) 76 20 157/85 98 % ALW        Pulse Ox %: 98 % (03/07/23 1231)  Pulse Ox Interpretation: Normal (03/07/23 1231)           Physical Exam   PHYSICAL EXAM     Physical Exam  Vitals and nursing note reviewed.   Constitutional:       General: She is not in acute distress.     Appearance: She is well-developed.   HENT:      Head: Normocephalic.   Cardiovascular:      Rate and Rhythm: Normal rate and regular rhythm.   Pulmonary:      Effort: Pulmonary effort is normal.      Breath sounds: Normal breath sounds.   Abdominal:      Palpations: Abdomen is soft.      Tenderness: There is no abdominal tenderness.   Musculoskeletal:      Cervical back: Normal range of motion and neck supple.      Right lower leg: No tenderness. No edema.      Left lower leg: No tenderness. No edema.   Skin:     General: Skin is warm and dry.   Neurological:      General: No focal deficit present.      Mental Status: She is alert and oriented to person, place, and time.           PROCEDURES     Procedures     DATA     Results     Procedure Component Value Units Date/Time    SARS-CoV-2 (COVID-19), PCR Nasopharynx [[id number]]  (Abnormal) Collected: 03/07/23 1411    Specimen: Nasopharyngeal Swab from Nasopharynx Updated: 03/07/23 1618    Narrative:      The following orders were created for panel order SARS-CoV-2 (COVID-19), PCR Nasopharynx.  Procedure                               Abnormality         Status                     ---------                               -----------         ------                     SARS-CoV-2 (COVID-19), P...[[id number]]  Abnormal            Final result                 Please view results for these tests on the individual orders.    SARS-CoV-2 (COVID-19), PCR Nasopharynx [[id number]]  (Abnormal) Collected: 03/07/23 1411     Specimen: Nasopharyngeal Swab from Nasopharynx Updated: 03/07/23 1618     SARS-CoV-2 (COVID-19) Positive    Narrative:      Testing performed using real-time PCR for detection of COVID-19. EUA approved validation studies performed on site.     HS Troponin I (with 2 hour reflex) [[id number]]  (Normal) Collected: 03/07/23 1203    Specimen: Blood, Venous Updated: 03/07/23 1306     High Sens Troponin I 4.0 pg/mL     Comprehensive metabolic panel [[id number]]  (Normal) Collected: 03/07/23 1203    Specimen: Blood, Venous Updated: 03/07/23 1253     Sodium 139 mEQ/L      Potassium 3.8 mEQ/L      Comment: Results obtained on plasma. Plasma Potassium values may be up to 0.4 mEQ/L less than serum values. The differences may be greater for patients with high platelet or white cell counts.        Chloride 106 mEQ/L      CO2 27 mEQ/L      BUN 11 mg/dL      Creatinine 0.8 mg/dL      Glucose 93 mg/dL      Calcium 9.3 mg/dL      AST (SGOT) 16 IU/L      ALT (SGPT) 15 IU/L      Alkaline Phosphatase 58 IU/L      Total Protein 6.5 g/dL      Comment: Test performed on plasma which typically contains approximately 0.4 g/dL more protein than serum.        Albumin 3.5 g/dL      Bilirubin, Total 0.7 mg/dL      eGFR >60.0 mL/min/1.73m*2      Anion Gap 6 mEQ/L     CBC and differential [[id number]]  (Abnormal) Collected: 03/07/23 1203    Specimen: Blood, Venous Updated: 03/07/23 1230     WBC 4.60 K/uL      RBC 4.17 M/uL      Hemoglobin 13.1 g/dL      Hematocrit 39.6 %      MCV 95.0 fL      MCH 31.4 pg      MCHC 33.1 g/dL      RDW 12.3 %      Platelets 216 K/uL      MPV 10.2 fL      Differential Type Auto     nRBC 0.0 %      Immature Granulocytes 0.2 %      Neutrophils 55.1 %      Lymphocytes 36.7 %      Monocytes 5.2 %      Eosinophils 1.7 %      Basophils 1.1 %      Immature Granulocytes, Absolute 0.01 K/uL      Neutrophils, Absolute 2.53 K/uL      Lymphocytes, Absolute 1.69 K/uL      Monocytes, Absolute 0.24 K/uL      Eosinophils, Absolute 0.08  K/uL      Basophils, Absolute 0.05 K/uL     [facility] Draw Panel [[id number]] Collected: 03/07/23 1203    Specimen: Blood, Venous Updated: 03/07/23 1222    Narrative:      The following orders were created for panel order [facility] Draw Panel.  Procedure                               Abnormality         Status                     ---------                               -----------         ------                     NAVDEEP SOL BLUE[[id number]]                                  In process                   Please view results for these tests on the individual orders.    NAVDEEP SOL BLUE [[id number]] Collected: 03/07/23 1203    Specimen: Blood, Venous Updated: 03/07/23 1222          Imaging Results          X-RAY CHEST 2 VIEWS (Final result)  Result time 03/07/23 12:30:29    Final result                 Impression:    IMPRESSION:  No acute disease in chest. Stable appearance of a 9 mm nodule within the left  upper lobe, more completely characterized on previous CT imaging.    COMMENT:    Comparison: Chest x-ray 4/29/2020. CT chest 10/15/2022.    Technique: PA frontal and lateral views of the chest were obtained.    FINDINGS:    There is stable appearance of a 9 mm nodule within the left upper lobe. There is  no focal airspace consolidation, pleural effusion or pneumothorax. The  cardiomediastinal silhouette is normal. The upper abdomen is within normal  limits. There is no acute osseous abnormality. Surgical clips overlie the left  axillary region.             Narrative:    CLINICAL HISTORY: Chest Pain                                ECG 12 lead   Final Result          Scoring tools                                  ED Course & MDM   MDM / ED COURSE / CLINICAL IMPRESSION / DISPO     [facility]    ED Course as of 03/07/23 1712   Tue Mar 07, 2023   1231 Pt presents today for 1 month of intermittent exertional chest pain. EKG appears unchanged from prior  Plan for labs, CXR and cardiac monitoring [NH]   1340 High Sens Troponin I: 4.0 [NH]    1408 CXR clear  Pt with very concerning story with strong FH of heart disease  Pt high heart score  ASA ordered  Seems pain free currently  Paged [facility] [[place]]   1429 D/w [facility]. Recommends cards consult prior to admission. D/w cardiology fellow who will perform consult [NH]   8853 Cardiology fellow evaluated patient and agrees. Updated [facility] [NH]      ED Course User Index  [NH] Ann Meng PA C     Clinical Impression      Chest pain, unspecified type     _________________     ED Disposition   Admit / Observation                   Ann Meng PA C  03/07/23 0994

## 2023-03-07 NOTE — H&P
Hospital Medicine Service -  History & Physical        CHIEF COMPLAINT   Chest pain     HISTORY OF PRESENT ILLNESS      Alecia Cruz is a 62 y.o. female with a past medical history of A-fib (not on AC), HTN, HLD, breast cancer s/p L mastectomy XRT/chemo who presents from home with chest pain.    Patient reports about one month ago developing chest discomfort with activity, would resolve with rest. Since then she as been experiencing chest pain intermittently. Last Thursday she reports developing severe, clenching, chest pain while getting off a train and walking out of the building. The pain radiated down her left arm and in between her shoulders. She sat down to rest and the pain resolved. Since Thursday she has been experiencing severe chest discomfort daily. She called Dr. Garcia office who recommended that she come to the ED for further evaluation. She denies any fever, chills, dizziness, lightheadedness, cough, abdominal pain, n/v/d.     In ED CXr shows stable DEVEN nodule, otherwise unremarkable. Labs unrevealing. HST 4-->3. Covid 19 positive. ECG NSR with T wave abnormalities in the precordial leads. In ED she was given asa 324 mg and SL nitro with improvement of chest pain.     VS: T 98 HR 69 Resp 16 /71 Pox 98% room air.     During my encounter patient seen and examined in ED 8. She is AAOX3, pleasant, appears to be in NAD. She reports still having some chest discomfort, although improved since SL nitro. Reports nitro gave her a headache. Otherwise she offers no new complaints.     Soc Hx: Lives with her son. Never used tobacco products. No ETOH.     Patient is full code and identifies her son, Ricardo to be her surrogate decision maker.     PAST MEDICAL AND SURGICAL HISTORY      Past Medical History:   Diagnosis Date   • A-fib (CMS/HCC)    • Breast cancer (CMS/[facility]) 2008    left partial mastectomy axillary dissection, whole breast radiation and chemotherapy in 2008 for a 2-1/2 cm invasive  ductal carcinoma that was ER/WA negative and HER-2/alyson positive with 7-15 positive lymph nodes   • Colon polyp     couple on each colonoscopy per patient report   • Fibrocystic breast    • Fibroid    • History of partial hysterectomy 03/2000    c/o fibroids   • History of radiation therapy    • Hx antineoplastic chemo    • Hx of lipoma 2008    resected from shoulder   • Hypothyroidism    • Malignant neoplasm of upper-outer quadrant of left breast in female, estrogen receptor negative (CMS/HCC) 9/10/2021    2008-2.5 cm cancer with 7 of 15+ nodes treated with partial mastectomy, axillary dissection, chemotherapy and whole breast radiation.   • PAF (paroxysmal atrial fibrillation) (CMS/HCC)    • PVC (premature ventricular contraction)    • Screening for breast cancer        Past Surgical History:   Procedure Laterality Date   • BREAST BIOPSY Left 2008   • BREAST BIOPSY Right 2021    benign ([facility] specimen [id number])   • BREAST LUMPECTOMY Left 2008    [facility] specimen [id number]   • DENTAL SURGERY     • HYSTERECTOMY      partial   • LUMBAR PUNCTURE     • MANDIBLE FRACTURE SURGERY     • PARTIAL HYSTERECTOMY     • ROTATOR CUFF REPAIR Left    • SINUS SURGERY         PCP: Mahendra Begum, DO    MEDICATIONS      Prior to Admission medications    Medication Sig Start Date End Date Taking? Authorizing Provider   alpha lipoic acid, bulk, powder Take 1 capsule by mouth daily.   Yes Fer Zaragoza MD   ascorbic acid (VITAMIN C) 500 mg tablet Take 500 mg by mouth daily.   Yes Fer Zaragoza MD   aspirin 81 mg enteric coated tablet Take 81 mg by mouth daily.   Yes Fer Zaragoza MD   cholecalciferol, vitamin D3, 1,000 unit (25 mcg) tablet Take 1,000 Units by mouth daily.   Yes Fer Zaragoza MD   coenzyme Q10 (COQ10) 60 mg capsule Take 60 mg by mouth daily.   Yes Fer Zargaoza MD   flecainide (TAMBOCOR) 50 mg tablet Take 1 tablet (50 mg total) by mouth 2 (two) times a day. 1/25/23 1/25/24 Yes Gavin  Yris KNOTT MD   levothyroxine (SYNTHROID) 75 mcg tablet Take 1 tablet (75 mcg total) by mouth daily. 12/19/22 12/19/23 Yes Mahendra Begum DO   multivitamin (THERAGRAN) tablet Take 1 tablet by mouth daily.   Yes Fer Zaragoza MD   omega-3-dha-epa-dpa-fish oil 1,050 mg(300 mg -675 mg-75 mg) capsule Take 1 capsule by mouth daily.   Yes Fer Zaragoza MD   azelastine (ASTELIN) 137 mcg (0.1 %) nasal spray Administer 2 sprays into each nostril 2 (two) times a day as needed. 4/28/22   Fer Zaragoza MD   betamethasone dipropionate (DIPROSONE) 0.05 % lotion Apply 1 each topically daily as needed. 2/19/20   Fer Zaragoza MD   cetirizine (ZyrTEC) 10 mg tablet Take 10 mg by mouth as needed.    Fer Zaragoza MD   metoprolol succinate XL (TOPROL-XL) 25 mg 24 hr tablet Take 12.5 mg by mouth daily as needed.    ProviderIra MD   naproxen sodium 220 mg capsule Take 220 mg by mouth daily as needed.    Fer Zaragoza MD   fluticasone propionate (FLONASE) 50 mcg/actuation nasal spray Administer 2 sprays into each nostril daily as needed for rhinitis. 1/28/20 3/7/23  Asher Lee MD   metoprolol succinate XL (TOPROL-XL) 25 mg 24 hr tablet Take 0.5 tablets (12.5 mg total) by mouth nightly. 1/25/23 3/7/23  Yris Alonso MD   omeprazole (PriLOSEC) 20 mg capsule Take 1 capsule (20 mg total) by mouth daily before breakfast. 1/25/23 3/7/23  Yris Alonso MD   XIFAXAN 550 mg tablet  3/4/22 3/7/23  ProviderFer MD       ALLERGIES      Hydrocodone-acetaminophen, Trazodone-dietary supp no.8, Amoxicillin trihydrate, Cephalexin, Ciprofloxacin, Codeine, Potassium clavulanate, and Sulfa (sulfonamide antibiotics)    FAMILY HISTORY      Family History   Problem Relation Age of Onset   • Alzheimer's disease Biological Mother    • Arrhythmia Biological Mother    • Diabetes Biological Father    • Hypertension Biological Sister    • Melanoma Biological Sister    • COPD  Biological Brother    • Prostate cancer Biological Brother    • Stomach cancer Paternal Grandmother    • Diabetes Paternal Grandfather    • Prostate cancer Paternal Grandfather    • Sarcoidosis Biological Sister    • Diabetes Biological Sister    • Heart disease Biological Sister    • Hypertension Biological Brother    • Prostate cancer Biological Brother    • Lung cancer Father's Sister    • Prostate cancer Father's Brother    • Prostate cancer Father's Brother    • Prostate cancer Father's Brother    • Prostate cancer Father's Brother    • Lung cancer Father's Brother    • Lung cancer Father's Sister    • Ovarian cancer Paternal Cousin    • Lung cancer Paternal Cousin    • Thyroid cancer Paternal Cousin        SOCIAL HISTORY      Social History     Socioeconomic History   • Marital status:    Tobacco Use   • Smoking status: Never   • Smokeless tobacco: Never   Substance and Sexual Activity   • Alcohol use: No   • Drug use: No   Social History Narrative    Pt is .      Social Determinants of Health     Food Insecurity: No Food Insecurity (3/7/2023)    Hunger Vital Sign    • Worried About Running Out of Food in the Last Year: Never true    • Ran Out of Food in the Last Year: Never true       REVIEW OF SYSTEMS      All other systems reviewed and negative except as noted in HPI    PHYSICAL EXAMINATION      Temp:  [36.7 °C (98 °F)] 36.7 °C (98 °F)  Heart Rate:  [69-76] 69  Resp:  [15-20] 15  BP: (134-157)/(70-85) 136/71  Body mass index is 21.14 kg/m².    Physical Exam  Constitutional:       General: She is not in acute distress.     Appearance: Normal appearance. She is not ill-appearing or toxic-appearing.   HENT:      Head: Normocephalic.      Mouth/Throat:      Mouth: Mucous membranes are moist.   Eyes:      Extraocular Movements: Extraocular movements intact.      Pupils: Pupils are equal, round, and reactive to light.   Cardiovascular:      Rate and Rhythm: Normal rate and regular rhythm.       Pulses: Normal pulses.      Heart sounds: Normal heart sounds. No murmur heard.     No friction rub. No gallop.   Pulmonary:      Effort: Pulmonary effort is normal.      Breath sounds: Normal breath sounds. No wheezing, rhonchi or rales.   Abdominal:      General: Bowel sounds are normal. There is no distension.      Palpations: Abdomen is soft.      Tenderness: There is no abdominal tenderness.   Musculoskeletal:         General: Swelling present. Normal range of motion.      Cervical back: Normal range of motion and neck supple.      Right lower leg: No edema.      Left lower leg: No edema.      Comments: L/UE swelling, chr lymphedema    Skin:     General: Skin is warm and dry.   Neurological:      Mental Status: She is alert and oriented to person, place, and time. Mental status is at baseline.         LABS / IMAGING / EKG        Labs  Results from last 7 days   Lab Units 03/07/23  1203   SODIUM mEQ/L 139   POTASSIUM mEQ/L 3.8   CHLORIDE mEQ/L 106   CO2 mEQ/L 27   BUN mg/dL 11   CREATININE mg/dL 0.8   GLUCOSE mg/dL 93   CALCIUM mg/dL 9.3     Results from last 7 days   Lab Units 03/07/23  1203   WBC K/uL 4.60   HEMOGLOBIN g/dL 13.1   HEMATOCRIT % 39.6   PLATELETS K/uL 216     Imaging  X-RAY CHEST 2 VIEWS    Result Date: 3/7/2023  Narrative: CLINICAL HISTORY: Chest Pain     Impression: IMPRESSION: No acute disease in chest. Stable appearance of a 9 mm nodule within the left upper lobe, more completely characterized on previous CT imaging. COMMENT: Comparison: Chest x-ray 4/29/2020. CT chest 10/15/2022. Technique: PA frontal and lateral views of the chest were obtained. FINDINGS: There is stable appearance of a 9 mm nodule within the left upper lobe. There is no focal airspace consolidation, pleural effusion or pneumothorax. The cardiomediastinal silhouette is normal. The upper abdomen is within normal limits. There is no acute osseous abnormality. Surgical clips overlie the left axillary region.      SARS-CoV-2  "(COVID-19) (no units)   Date/Time Value   03/07/2023 1411 Positive (A)       ECG/Telemetry  I have independently reviewed the ECG. Significant findings include NSR T wave abnormalities .    ASSESSMENT AND PLAN           * Chest pain  Assessment & Plan  -presents w/ intermittent chest pain that started ~ 1 month ago, now occurring daily and is more intense   -CXr showing stable DEVEN nodule, otherwise unremarkable  -ECG with NSR w nonspecific T wave abnormalities in the precordial leads.  -HST 4-->3  -s/p asa 325 in ED and SL nitro with relief    Plan:  Evaluated by cardiology, will keep npo p midnight for heart catheterization tomorrow   Cont asa 81 m qD  F/u FLP  CTM on tele  Cariology following    Atrial fibrillation (CMS/[facility])  Assessment & Plan  -on flecainide 50mg bid   -per last note by Dr. Alonso should be taking Toprol 12.5mg nightly, although patient continues to take prn for \"break through\" episodes of A-fib   -not on AC due to low [id number] score    Plan:  Cont flecainide   Reasonable to hold Toprol for now, would resume nightly if she has frequent episodes of a-fib  CTM on tele   Follows with Dr. Gavin ORDAZ  Assessment & Plan  -incidental finding upon admission  -CXr unremarkable, not hypoxic  -asymptomatic      Hypothyroidism  Assessment & Plan  Plan:  Will check TSH  Cont home levothyroxine 75 mcg       VTE Assessment: Padua    VTE Prophylaxis: heparin   Code Status: Full Code  Palliative Care Screening Score: 0   Discussed advanced care planning.   Estimated Discharge Date: 3/9/2023  Disposition Planning: pending clinical course.      MEE Duong  3/7/2023     "

## 2023-03-07 NOTE — CONSULTS
"    Cardiology Consult Note   [facility]    [facility]  The [facility] [address]  [address] [place] [address]    TEL  [phone]  [facility]       Patient: Alecia Cruz  MRN: [id number]  : 1960  Admission Date: 3/7/2023   Consult Date: 23  Reason for Consult: \"chest pain\"  Outpatient Cardiologist: Dr Alonso (EP)      Alecia Cruz is a 62 y.o. female with past medical history significant for afib, hypothyroidism and hl for which we are consulted for chest pain. Hgb, bmp, and troponin were normal. ecg was SR w OMAR, and nonspecific T wave abnormalities in the precordial leads. CXR showed a stable pulmonary nodule but no consolidation, effusion, or ptx.     Today she mentions 2 episodes of exertional cp. One a month ago when she was walking quickly between buildings and realized she forgot her bag and was emotionally distressed she had chest pressure. More recently this past thu got off train and got off escalator and developed cp while walking out of the building. Lasted approx. 2-5min once she she stopped walking. This weekend she called her friend a retired physician who advised her to be evaluated. She did not come in right away because she was busy but states she still has baseline chest pressure. She rates this as 2-3/10 in severity which increases to 8/10 intermittently.  The pain is not positional or worsened with palpation. There is radiation down the left arm. She has chronic lymphedema s/p lumpectomy in that arm, but hasn't had pain since this developed in '. Recent LDL was 133. She did have a stress echo and LHC in the past that were unrevealing reportedly 5+ years ago for unclear reasons. Of note, she also mentioned an episode of back pain that occurred in bed but this was worsened w movement and palpation. No family h/o heart problems until recently her sister was dx with HFrEF.     Here she was given [id number] " and we were asked to see patient.      Past Medical History:   Diagnosis Date   • A-fib (CMS/HCC)    • Breast cancer (CMS/HCC) 2008    left partial mastectomy axillary dissection, whole breast radiation and chemotherapy in 2008 for a 2-1/2 cm invasive ductal carcinoma that was ER/WI negative and HER-2/alyson positive with 7-15 positive lymph nodes   • Colon polyp     couple on each colonoscopy per patient report   • Fibrocystic breast    • Fibroid    • History of partial hysterectomy 03/2000    c/o fibroids   • History of radiation therapy    • Hx antineoplastic chemo    • Hx of lipoma 2008    resected from shoulder   • Hypothyroidism    • Malignant neoplasm of upper-outer quadrant of left breast in female, estrogen receptor negative (CMS/HCC) 9/10/2021    2008-2.5 cm cancer with 7 of 15+ nodes treated with partial mastectomy, axillary dissection, chemotherapy and whole breast radiation.   • PAF (paroxysmal atrial fibrillation) (CMS/HCC)    • PVC (premature ventricular contraction)    • Screening for breast cancer           Past Surgical History:   Procedure Laterality Date   • BREAST BIOPSY Left 2008   • BREAST BIOPSY Right 2021    benign ([facility] specimen [id number])   • BREAST LUMPECTOMY Left 2008    [facility] specimen [id number]   • DENTAL SURGERY     • HYSTERECTOMY      partial   • LUMBAR PUNCTURE     • MANDIBLE FRACTURE SURGERY     • PARTIAL HYSTERECTOMY     • ROTATOR CUFF REPAIR Left    • SINUS SURGERY         Social History     Socioeconomic History   • Marital status:      Spouse name: Not on file   • Number of children: Not on file   • Years of education: Not on file   • Highest education level: Not on file   Occupational History   • Not on file   Tobacco Use   • Smoking status: Never   • Smokeless tobacco: Never   Vaping Use   • Vaping status: Not on file   Substance and Sexual Activity   • Alcohol use: No   • Drug use: No   • Sexual activity: Not on file   Other Topics Concern   • Not on file   Social History  Narrative    Pt is .      Social Determinants of Health     Financial Resource Strain: Not on file   Food Insecurity: No Food Insecurity (3/7/2023)    Hunger Vital Sign    • Worried About Running Out of Food in the Last Year: Never true    • Ran Out of Food in the Last Year: Never true   Transportation Needs: Not on file   Physical Activity: Not on file   Stress: Not on file   Social Connections: Not on file   Intimate Partner Violence: Not on file   Housing Stability: Not on file          Family History   Problem Relation Age of Onset   • Alzheimer's disease Biological Mother    • Arrhythmia Biological Mother    • Diabetes Biological Father    • Hypertension Biological Sister    • Melanoma Biological Sister    • COPD Biological Brother    • Prostate cancer Biological Brother    • Stomach cancer Paternal Grandmother    • Diabetes Paternal Grandfather    • Prostate cancer Paternal Grandfather    • Sarcoidosis Biological Sister    • Diabetes Biological Sister    • Heart disease Biological Sister    • Hypertension Biological Brother    • Prostate cancer Biological Brother    • Lung cancer Father's Sister    • Prostate cancer Father's Brother    • Prostate cancer Father's Brother    • Prostate cancer Father's Brother    • Prostate cancer Father's Brother    • Lung cancer Father's Brother    • Lung cancer Father's Sister    • Ovarian cancer Paternal Cousin    • Lung cancer Paternal Cousin    • Thyroid cancer Paternal Cousin         Hydrocodone-acetaminophen, Trazodone-dietary supp no.8, Amoxicillin trihydrate, Cephalexin, Ciprofloxacin, Codeine, Potassium clavulanate, and Sulfa (sulfonamide antibiotics)    Current Outpatient Medications   Medication Instructions   • alpha lipoic acid, bulk, powder oral   • ascorbic acid (VITAMIN C) 500 mg, oral, Daily   • aspirin 81 mg, oral, Daily   • azelastine (ASTELIN) 137 mcg (0.1 %) nasal spray USE 2 SPRAYS IN EACH NOSTRIL EVERY DAY AT BEDTIME   • betamethasone dipropionate  "(DIPROSONE) 0.05 % lotion Once as needed   • cetirizine (ZYRTEC) 10 mg, oral, As needed   • cholecalciferol (vitamin D3) 1,000 Units, oral, Daily   • coenzyme Q10 (COQ10) 60 mg capsule oral   • flecainide (TAMBOCOR) 50 mg, oral, 2 times daily (6a, 6p)   • fluticasone propionate (FLONASE) 50 mcg/actuation nasal spray 2 sprays, Each Nostril, Daily PRN   • levothyroxine (SYNTHROID) 75 mcg, oral, Daily (6:30a)   • metoprolol succinate XL (TOPROL-XL) 12.5 mg, oral, Nightly   • multivitamin (THERAGRAN) tablet 1 tablet, oral, As needed   • naproxen sodium 220 mg capsule oral, Daily PRN   • omega-3-dha-epa-dpa-fish oil 1,050 mg(300 mg -675 mg-75 mg) capsule No dose, route, or frequency recorded.   • omeprazole (PRILOSEC) 20 mg, oral, Daily before breakfast   • XIFAXAN 550 mg tablet No dose, route, or frequency recorded.       Scheduled Meds:  Continuous Infusions:  No current facility-administered medications for this encounter.     PRN Meds:.    No intake or output data in the 24 hours ending 03/07/23 1445     Weights (last 7 days)     Date/Time Weight    03/07/23 1053 61.2 kg (135 lb)           Wt Readings from Last 3 Encounters:   03/07/23 61.2 kg (135 lb)   01/25/23 60.8 kg (134 lb)   12/29/22 59 kg (130 lb)        REVIEW OF SYSTEMS:   10 ROS were reviewed and negative per patient except as per mentioned in the HPI.    PHYSICAL EXAMINATION:  Visit Vitals  /78   Pulse 74   Temp 36.7 °C (98 °F) (Oral)   Resp 17   Ht 1.702 m (5' 7\")   Wt 61.2 kg (135 lb)   SpO2 99%   BMI 21.14 kg/m²     Body surface area is 1.7 meters squared.  Body mass index is 21.14 kg/m².  Gen: NAD. Well-developed, well-nourished, and appears stated age.  HEENT: NC/AT. EOM grossly intact.  CV: RRR, + s1/2, no appreciable r/m/g. Neg. JVD. Chest wall not TTP.  Lungs: CTA B/L w/o appreciable wheezes, rales, or crackles  Abdomen: +Bs, Soft, nontender, nondistended. No guarding or rebound tenderness.  Ext: No clubbing, cyanosis, or edema.  Skin: Warm, " dry, no exposed rashes or lesions.  Neuro: CN2-12 grossly intact. A&O  Psych: Appropriate affect.   Labs     Recent Results (from the past 24 hour(s))   ECG 12 lead    Collection Time: 03/07/23 10:53 AM   Result Value Ref Range    Ventricular rate 72     Atrial rate 72     MO Interval 138     QRS duration 86     QT Interval 350     QTC Calculation(Bazett) 383     P Axis 72     R Axis 12     T Wave Axis -25    CBC and differential    Collection Time: 03/07/23 12:03 PM   Result Value Ref Range    WBC 4.60 3.80 - 10.50 K/uL    RBC 4.17 3.93 - 5.22 M/uL    Hemoglobin 13.1 11.8 - 15.7 g/dL    Hematocrit 39.6 35.0 - 45.0 %    MCV 95.0 83.0 - 98.0 fL    MCH 31.4 28.0 - 33.2 pg    MCHC 33.1 32.2 - 35.5 g/dL    RDW 12.3 11.7 - 14.4 %    Platelets 216 150 - 369 K/uL    MPV 10.2 9.4 - 12.3 fL    Differential Type Auto     nRBC 0.0 <=0.0 %    Immature Granulocytes 0.2 %    Neutrophils 55.1 %    Lymphocytes 36.7 %    Monocytes 5.2 %    Eosinophils 1.7 %    Basophils 1.1 %    Immature Granulocytes, Absolute 0.01 0.00 - 0.08 K/uL    Neutrophils, Absolute 2.53 1.70 - 7.00 K/uL    Lymphocytes, Absolute 1.69 1.20 - 3.50 K/uL    Monocytes, Absolute 0.24 (L) 0.28 - 0.80 K/uL    Eosinophils, Absolute 0.08 0.04 - 0.36 K/uL    Basophils, Absolute 0.05 0.01 - 0.10 K/uL   Comprehensive metabolic panel    Collection Time: 03/07/23 12:03 PM   Result Value Ref Range    Sodium 139 136 - 144 mEQ/L    Potassium 3.8 3.6 - 5.1 mEQ/L    Chloride 106 98 - 109 mEQ/L    CO2 27 22 - 32 mEQ/L    BUN 11 8 - 20 mg/dL    Creatinine 0.8 0.6 - 1.1 mg/dL    Glucose 93 70 - 99 mg/dL    Calcium 9.3 8.9 - 10.3 mg/dL    AST (SGOT) 16 15 - 41 IU/L    ALT (SGPT) 15 11 - 54 IU/L    Alkaline Phosphatase 58 35 - 126 IU/L    Total Protein 6.5 6.0 - 8.2 g/dL    Albumin 3.5 3.4 - 5.0 g/dL    Bilirubin, Total 0.7 0.3 - 1.2 mg/dL    eGFR >60.0 >=60.0 mL/min/1.73m*2    Anion Gap 6 3 - 15 mEQ/L   HS Troponin I (with 2 hour reflex)    Collection Time: 03/07/23 12:03 PM    Result Value Ref Range    High Sens Troponin I 4.0 <15.0 pg/mL       Imaging    X-RAY CHEST 2 VIEWS  Narrative: CLINICAL HISTORY: Chest Pain  Impression: IMPRESSION:  No acute disease in chest. Stable appearance of a 9 mm nodule within the left  upper lobe, more completely characterized on previous CT imaging.    COMMENT:    Comparison: Chest x-ray 4/29/2020. CT chest 10/15/2022.    Technique: PA frontal and lateral views of the chest were obtained.    FINDINGS:    There is stable appearance of a 9 mm nodule within the left upper lobe. There is  no focal airspace consolidation, pleural effusion or pneumothorax. The  cardiomediastinal silhouette is normal. The upper abdomen is within normal  limits. There is no acute osseous abnormality. Surgical clips overlie the left  axillary region.       Cardiac Studies  Cardiac Imaging    ECHOCARDIOGRAM - EXTERNAL RESULT     Narrative  Ordered by an unspecified provider.      ECG   As above      Assessment:  Alecia Cruz is a 62 y.o. female with past medical history significant for afib, hypothyroidism, and hld for which we are consulted for chest pain. Her symptoms appear to be cardiac in nature. Her negative hs trop is reassuring in that I would expect her to leak troponin after sx of this duration. Nonetheless, the nature of her sx warrant further testing.      Recommendations:  -SANDRA MINAYA, pending response will pursue stress testing, coronary CT, or heart catheterization  -currently rate controlled on home meds, continue metoprolol succinate. She is not on AC due to low [id number] score.   -will consider statin pending results    Preliminary recommendations discussed with ED attending and PRIYA. Final recommendations are pending attending co-signature.     We will follow. However, please do not hesitate to contact myself or covering cardiology fellow with questions at any time.     Zeyad Wolff MD  3/7/2023

## 2023-03-08 LAB
ANION GAP SERPL CALC-SCNC: 7 MEQ/L (ref 3–15)
APTT PPP: 68 SEC (ref 23–35)
APTT PPP: 88 SEC (ref 23–35)
BASOPHILS # BLD: 0.04 K/UL (ref 0.01–0.1)
BASOPHILS NFR BLD: 0.8 %
BUN SERPL-MCNC: 14 MG/DL (ref 8–20)
CALCIUM SERPL-MCNC: 9.2 MG/DL (ref 8.9–10.3)
CHLORIDE SERPL-SCNC: 106 MEQ/L (ref 98–109)
CHOLEST SERPL-MCNC: 180 MG/DL
CO2 SERPL-SCNC: 27 MEQ/L (ref 22–32)
CREAT SERPL-MCNC: 0.9 MG/DL (ref 0.6–1.1)
DIFFERENTIAL METHOD BLD: ABNORMAL
EOSINOPHIL # BLD: 0.16 K/UL (ref 0.04–0.36)
EOSINOPHIL NFR BLD: 3.1 %
ERYTHROCYTE [DISTWIDTH] IN BLOOD BY AUTOMATED COUNT: 12.5 % (ref 11.7–14.4)
EST. AVERAGE GLUCOSE BLD GHB EST-MCNC: 111 MG/DL
GFR SERPL CREATININE-BSD FRML MDRD: >60 ML/MIN/1.73M*2
GLUCOSE SERPL-MCNC: 97 MG/DL (ref 70–99)
HBA1C MFR BLD HPLC: 5.5 %
HCT VFR BLDCO AUTO: 34 % (ref 35–45)
HDLC SERPL-MCNC: 59 MG/DL
HDLC SERPL: 3.1 {RATIO}
HGB BLD-MCNC: 11.7 G/DL (ref 11.8–15.7)
IMM GRANULOCYTES # BLD AUTO: 0.01 K/UL (ref 0–0.08)
IMM GRANULOCYTES NFR BLD AUTO: 0.2 %
LDLC SERPL CALC-MCNC: 113 MG/DL
LYMPHOCYTES # BLD: 2.33 K/UL (ref 1.2–3.5)
LYMPHOCYTES NFR BLD: 45.3 %
MAGNESIUM SERPL-MCNC: 1.6 MG/DL (ref 1.8–2.5)
MCH RBC QN AUTO: 32.3 PG (ref 28–33.2)
MCHC RBC AUTO-ENTMCNC: 34.4 G/DL (ref 32.2–35.5)
MCV RBC AUTO: 93.9 FL (ref 83–98)
MONOCYTES # BLD: 0.35 K/UL (ref 0.28–0.8)
MONOCYTES NFR BLD: 6.8 %
NEUTROPHILS # BLD: 2.25 K/UL (ref 1.7–7)
NEUTS SEG NFR BLD: 43.8 %
NONHDLC SERPL-MCNC: 121 MG/DL
NRBC BLD-RTO: 0 %
PDW BLD AUTO: 10.2 FL (ref 9.4–12.3)
PLATELET # BLD AUTO: 189 K/UL (ref 150–369)
POTASSIUM SERPL-SCNC: 3.8 MEQ/L (ref 3.6–5.1)
RBC # BLD AUTO: 3.62 M/UL (ref 3.93–5.22)
SODIUM SERPL-SCNC: 140 MEQ/L (ref 136–144)
TRIGL SERPL-MCNC: 39 MG/DL (ref 30–149)
WBC # BLD AUTO: 5.14 K/UL (ref 3.8–10.5)

## 2023-03-08 PROCEDURE — 63700000 HC SELF-ADMINISTRABLE DRUG: Performed by: INTERNAL MEDICINE

## 2023-03-08 PROCEDURE — 85730 THROMBOPLASTIN TIME PARTIAL: CPT | Performed by: INTERNAL MEDICINE

## 2023-03-08 PROCEDURE — 63600000 HC DRUGS/DETAIL CODE: Performed by: INTERNAL MEDICINE

## 2023-03-08 PROCEDURE — 63600000 HC DRUGS/DETAIL CODE: Mod: JZ | Performed by: STUDENT IN AN ORGANIZED HEALTH CARE EDUCATION/TRAINING PROGRAM

## 2023-03-08 PROCEDURE — 96366 THER/PROPH/DIAG IV INF ADDON: CPT | Performed by: INTERNAL MEDICINE

## 2023-03-08 PROCEDURE — 99233 SBSQ HOSP IP/OBS HIGH 50: CPT | Performed by: INTERNAL MEDICINE

## 2023-03-08 PROCEDURE — 63700000 HC SELF-ADMINISTRABLE DRUG: Performed by: NURSE PRACTITIONER

## 2023-03-08 PROCEDURE — 36415 COLL VENOUS BLD VENIPUNCTURE: CPT | Performed by: INTERNAL MEDICINE

## 2023-03-08 PROCEDURE — 96365 THER/PROPH/DIAG IV INF INIT: CPT | Performed by: INTERNAL MEDICINE

## 2023-03-08 PROCEDURE — 83735 ASSAY OF MAGNESIUM: CPT | Performed by: INTERNAL MEDICINE

## 2023-03-08 PROCEDURE — G0378 HOSPITAL OBSERVATION PER HR: HCPCS

## 2023-03-08 PROCEDURE — 80048 BASIC METABOLIC PNL TOTAL CA: CPT | Performed by: INTERNAL MEDICINE

## 2023-03-08 PROCEDURE — 80061 LIPID PANEL: CPT | Performed by: INTERNAL MEDICINE

## 2023-03-08 PROCEDURE — 85025 COMPLETE CBC W/AUTO DIFF WBC: CPT | Performed by: INTERNAL MEDICINE

## 2023-03-08 RX ORDER — BISACODYL 10 MG/1
10 SUPPOSITORY RECTAL DAILY PRN
Status: DISCONTINUED | OUTPATIENT
Start: 2023-03-08 | End: 2023-03-11 | Stop reason: HOSPADM

## 2023-03-08 RX ORDER — SENNOSIDES 8.6 MG/1
2 TABLET ORAL 2 TIMES DAILY PRN
Status: DISCONTINUED | OUTPATIENT
Start: 2023-03-08 | End: 2023-03-11 | Stop reason: HOSPADM

## 2023-03-08 RX ADMIN — Medication 81 MG: at 08:38

## 2023-03-08 RX ADMIN — HEPARIN SODIUM 750 UNITS/HR: 10000 INJECTION, SOLUTION INTRAVENOUS at 22:58

## 2023-03-08 RX ADMIN — ACETAMINOPHEN 650 MG: 325 TABLET ORAL at 22:34

## 2023-03-08 RX ADMIN — SENNOSIDES 2 TABLET: 8.6 TABLET, FILM COATED ORAL at 22:34

## 2023-03-08 RX ADMIN — MAGNESIUM SULFATE HEPTAHYDRATE 2 G: 40 INJECTION, SOLUTION INTRAVENOUS at 05:45

## 2023-03-08 RX ADMIN — FLECAINIDE ACETATE TABLET 50 MG: 50 TABLET ORAL at 05:52

## 2023-03-08 RX ADMIN — ATORVASTATIN CALCIUM 80 MG: 80 TABLET, FILM COATED ORAL at 17:56

## 2023-03-08 RX ADMIN — LEVOTHYROXINE SODIUM 75 MCG: 0.07 TABLET ORAL at 05:52

## 2023-03-08 RX ADMIN — FLECAINIDE ACETATE TABLET 50 MG: 50 TABLET ORAL at 20:47

## 2023-03-08 NOTE — ED ATTESTATION NOTE
I have personally seen and examined Alecia Cruz.  I personally performed the key components of the encounter and provided a substantive portion of the care and medical decision making for this patient.    I reviewed and agree with physician assistant / nurse practitioner’s assessment and plan of care, with any exceptions as documented below.      My focused history, examination, assessment, and plan of care of Alecia Cruz is as follows:    Brief History:  HPI     62-year-old female presents ED with chest pressure.  Worsening with exertion.        Focused Physical Exam:  Physical Exam    General     Non-toxic appearance. NAD.   Cardio/Pulm  normal heart sounds and intact distal pulses. No murmur appreciated.    No respiratory distress    Abd Soft  Extremities and Skin    Extrem are grossly normal. normal ROM. No edema or tenderness.     Skin is warm, dry and intact    AAOx3. Cooperative and mood/affect normal.    Chest x-ray reviewed.  Troponin negative.      EKG with inferior ST depressions    Assessment / Plan / MDM:  MDM    62-year-old female presents ED with chest pressure shortness of breath.  Pressure with exertion.    Initial troponin is negative for ischemic EKG.  Heart score 5.  Will admit.  Cardiology consulted and evaluated in ER.  Critical care          I was physically present for the key/critical portions of the following procedures:  Procedures           George Cross MD  03/08/23 2317

## 2023-03-08 NOTE — PROGRESS NOTES
Hospital Medicine Service -  Daily Progress Note       SUBJECTIVE   Interval History: Patient was seen and examined at the bedside. Was resting comfortably in bed. Denied any further chest pain. Was speaking with her  on the telephone.      OBJECTIVE      Vital signs in last 24 hours:  Temp:  [36.6 °C (97.8 °F)-36.9 °C (98.5 °F)] 36.8 °C (98.2 °F)  Heart Rate:  [64-84] 70  Resp:  [14-28] 16  BP: (119-156)/(61-99) 150/83    Intake/Output Summary (Last 24 hours) at 3/8/2023 1636  Last data filed at 3/8/2023 1600  Gross per 24 hour   Intake 120.38 ml   Output 1000 ml   Net -879.62 ml       PHYSICAL EXAMINATION      Physical Exam  Consitutional:  Awake, alert.  No acute distress.  Eyes:  PERRLA, EOMI, anicteric sclera with clear conjunctiva.    ENMT: Oropharynx clear, moist mucous membranes  Respiratory:  Clear to ascultation bilaterally.  No wheezes, rales, or ronchi.  Cardiovascular:  Regular rate and rhythm.  No murmurs.  GI:  Soft, non tender, non distended.  +Bowel sounds.  Extremities:  No LE CCE, has chronic LUE lymphedema  Neuro: AAOx3, nonfocal exam  Psych: Mood and affect wnl     LINES, CATHETERS, DRAINS, AIRWAYS, AND WOUNDS   Lines, Drains, and Airways:  Wounds (agree with documentation and present on admission):      LABS / IMAGING / TELE      Labs  I have reviewed the patient's pertinent labs.  Significant abnormals are , Mg 1.6.    SARS-CoV-2 (COVID-19) (no units)   Date/Time Value   03/07/2023 1411 Positive (A)       Imaging  I have independently reviewed the pertinent imaging from the last 24 hrs.    Telemetry  NSR     ASSESSMENT AND PLAN      * Chest pain  Assessment & Plan  -presents w/ intermittent chest pain that started ~ 1 month ago, now occurring daily and is more intense   -CXr showing stable DEVEN nodule, otherwise unremarkable  -ECG with NSR w nonspecific T wave abnormalities in the precordial leads.  -HST 4-->3  -s/p asa 325 in ED and SL nitro with relief    Plan:  Cardiology  "consulted and following, personally messaged with team today, opting to defer cath at this time. Believe etiology possibly could be 2/2 to PE. Will obtain CT PE study.   Cont asa 81 m qD, atorvastatin 80mg qD  If PE study negative, will stop heparin gtt   CTM on tele    COVID  Assessment & Plan  -incidental finding upon admission  -CXr unremarkable, not hypoxic  -asymptomatic      Atrial fibrillation (CMS/HCC)  Assessment & Plan  -on flecainide 50mg bid   -per last note by Dr. Alonso should be taking Toprol 12.5mg nightly, although patient continues to take prn for \"break through\" episodes of A-fib   -not on AC due to low [id number] score    Plan:  Cont flecainide   Continue Toprol  CTM on tele   Follows with Dr. Alonso     Hypothyroidism  Assessment & Plan  Plan:  TSH WNL   Cont home levothyroxine 75 mcg       VTE Assessment: Padua    VTE Prophylaxis:  Current anticoagulants:  heparin (porcine) bolus from bag 2,450-4,900 Units, intravenous, q6h PRN  heparin infusion in D5W 100 units/mL, intravenous, Titrated      Code Status: Full Code  Palliative Care Screening Score: 0   Estimated Discharge Date: 3/9/2023     Disposition Planning: Pending CT PE study.      Van Pichardo, DO  3/8/2023                 "

## 2023-03-09 ENCOUNTER — APPOINTMENT (INPATIENT)
Dept: RADIOLOGY | Facility: HOSPITAL | Age: 63
DRG: 286 | End: 2023-03-09
Attending: INTERNAL MEDICINE
Payer: COMMERCIAL

## 2023-03-09 PROBLEM — E78.5 HLD (HYPERLIPIDEMIA): Status: ACTIVE | Noted: 2022-02-17

## 2023-03-09 PROBLEM — R07.9 CHEST PAIN, UNSPECIFIED TYPE: Status: ACTIVE | Noted: 2023-03-09

## 2023-03-09 LAB — APTT PPP: 72 SEC (ref 23–35)

## 2023-03-09 PROCEDURE — 63600105 HC IODINE BASED CONTRAST: Mod: JW | Performed by: INTERNAL MEDICINE

## 2023-03-09 PROCEDURE — G0378 HOSPITAL OBSERVATION PER HR: HCPCS

## 2023-03-09 PROCEDURE — 63700000 HC SELF-ADMINISTRABLE DRUG: Performed by: STUDENT IN AN ORGANIZED HEALTH CARE EDUCATION/TRAINING PROGRAM

## 2023-03-09 PROCEDURE — 63700000 HC SELF-ADMINISTRABLE DRUG: Performed by: NURSE PRACTITIONER

## 2023-03-09 PROCEDURE — 99233 SBSQ HOSP IP/OBS HIGH 50: CPT | Performed by: INTERNAL MEDICINE

## 2023-03-09 PROCEDURE — 21400000 HC ROOM AND CARE CCU/INTERMEDIATE

## 2023-03-09 PROCEDURE — 63700000 HC SELF-ADMINISTRABLE DRUG: Performed by: INTERNAL MEDICINE

## 2023-03-09 PROCEDURE — 85730 THROMBOPLASTIN TIME PARTIAL: CPT | Performed by: INTERNAL MEDICINE

## 2023-03-09 PROCEDURE — G1004 CDSM NDSC: HCPCS

## 2023-03-09 PROCEDURE — 36415 COLL VENOUS BLD VENIPUNCTURE: CPT | Performed by: INTERNAL MEDICINE

## 2023-03-09 RX ORDER — ATORVASTATIN CALCIUM 40 MG/1
40 TABLET, FILM COATED ORAL
Status: DISCONTINUED | OUTPATIENT
Start: 2023-03-09 | End: 2023-03-11 | Stop reason: HOSPADM

## 2023-03-09 RX ORDER — FLUTICASONE PROPIONATE 50 MCG
2 SPRAY, SUSPENSION (ML) NASAL DAILY
Status: DISCONTINUED | OUTPATIENT
Start: 2023-03-09 | End: 2023-03-11 | Stop reason: HOSPADM

## 2023-03-09 RX ADMIN — FLECAINIDE ACETATE TABLET 50 MG: 50 TABLET ORAL at 05:33

## 2023-03-09 RX ADMIN — FLUTICASONE PROPIONATE 2 SPRAY: 50 SPRAY, METERED NASAL at 09:19

## 2023-03-09 RX ADMIN — LEVOTHYROXINE SODIUM 75 MCG: 0.07 TABLET ORAL at 05:33

## 2023-03-09 RX ADMIN — FLECAINIDE ACETATE TABLET 50 MG: 50 TABLET ORAL at 17:43

## 2023-03-09 RX ADMIN — ACETAMINOPHEN 650 MG: 325 TABLET ORAL at 23:20

## 2023-03-09 RX ADMIN — BISACODYL 10 MG: 10 SUPPOSITORY RECTAL at 09:20

## 2023-03-09 RX ADMIN — Medication 81 MG: at 09:02

## 2023-03-09 RX ADMIN — IOHEXOL 75 ML: 350 INJECTION, SOLUTION INTRAVENOUS at 19:07

## 2023-03-09 RX ADMIN — ACETAMINOPHEN 650 MG: 325 TABLET ORAL at 12:57

## 2023-03-09 RX ADMIN — ATORVASTATIN CALCIUM 40 MG: 40 TABLET, FILM COATED ORAL at 17:42

## 2023-03-09 NOTE — PLAN OF CARE
18% unintentional wt loss in past few months  Continue to encourage po  Follow work up.     Problem: Adult Inpatient Plan of Care  Goal: Plan of Care Review  Outcome: Progressing

## 2023-03-09 NOTE — ASSESSMENT & PLAN NOTE
-Lipid panel with elevated cholesterol/LDL  -ASCVD Score of 5-7.5% 10 year risk, qualifies for moderate-high intensity statin  -Have started atorvastatin 40mg qD

## 2023-03-09 NOTE — PLAN OF CARE
Plan of Care Review  Plan of Care Reviewed With: patient  Progress: improving  Outcome Summary: pt on heparin gtt per MAR. lab results pending. pt NPO for cardiac cath.

## 2023-03-09 NOTE — PROGRESS NOTES
Hospital Medicine Service -  Daily Progress Note       SUBJECTIVE   Interval History: Patient was seen and examined at the bedside. Was resting comfortably in bed. Understands current plan of care. Plan for CT PE study today.      OBJECTIVE      Vital signs in last 24 hours:  Temp:  [36.2 °C (97.1 °F)-37.1 °C (98.7 °F)] 36.2 °C (97.1 °F)  Heart Rate:  [64-78] 66  Resp:  [18] 18  BP: (107-150)/(57-83) 111/61    Intake/Output Summary (Last 24 hours) at 3/9/2023 1620  Last data filed at 3/8/2023 2100  Gross per 24 hour   Intake 112.5 ml   Output --   Net 112.5 ml       PHYSICAL EXAMINATION      Physical Exam  Consitutional:  Awake, alert.  No acute distress.  Eyes:  PERRLA, EOMI, anicteric sclera with clear conjunctiva.    ENMT: Oropharynx clear, moist mucous membranes  Respiratory:  Clear to ascultation bilaterally.  No wheezes, rales, or ronchi.  Cardiovascular:  Regular rate and rhythm.  No murmurs.  GI:  Soft, non tender, non distended.  +Bowel sounds.  Extremities:  No LE CCE, has chronic LUE lymphedema  Neuro: AAOx3, nonfocal exam  Psych: Mood and affect wnl     LINES, CATHETERS, DRAINS, AIRWAYS, AND WOUNDS   Lines, Drains, and Airways:  Wounds (agree with documentation and present on admission):      LABS / IMAGING / TELE      Labs  I have reviewed the patient's pertinent labs.     SARS-CoV-2 (COVID-19) (no units)   Date/Time Value   03/07/2023 1411 Positive (A)       Imaging  I have independently reviewed the pertinent imaging from the last 24 hrs.    Telemetry  NSR     ASSESSMENT AND PLAN      * Chest pain  Assessment & Plan  -presents w/ intermittent chest pain that started ~ 1 month ago, now occurring daily and is more intense   -CXr showing stable DEVEN nodule, otherwise unremarkable  -ECG with NSR w nonspecific T wave abnormalities in the precordial leads.  -HST 4-->3  -s/p asa 325 in ED and SL nitro with relief    Plan:  Cardiology consulted and following, personally messaged with team yesterday, opting  "to defer cath at this time. Believe etiology possibly could be 2/2 to PE. Will obtain CT PE study.   Cont asa 81 m qD, atorvastatin 80mg qD  If PE study negative, will stop heparin gtt and discuss with cardiology need for possible stress testing.   CTM on tele    COVID  Assessment & Plan  -incidental finding upon admission  -CXr unremarkable, not hypoxic  -asymptomatic      HLD (hyperlipidemia)  Assessment & Plan  -Lipid panel with elevated cholesterol/LDL  -ASCVD Score of 5-7.5% 10 year risk, qualifies for moderate-high intensity statin  -Have started atorvastatin 40mg qD     Atrial fibrillation (CMS/HCC)  Assessment & Plan  -on flecainide 50mg bid   -per last note by Dr. Alonso should be taking Toprol 12.5mg nightly, although patient continues to take prn for \"break through\" episodes of A-fib   -not on AC due to low [id number] score    Plan:  Cont flecainide   Continue Toprol  CTM on tele   Follows with Dr. Alonso     Hypothyroidism  Assessment & Plan  Plan:  TSH WNL   Cont home levothyroxine 75 mcg       VTE Assessment: Padua    VTE Prophylaxis:  Current anticoagulants:  heparin (porcine) bolus from bag 2,450-4,900 Units, intravenous, q6h PRN  heparin infusion in D5W 100 units/mL, intravenous, Titrated      Code Status: Full Code  Palliative Care Screening Score: 0   Estimated Discharge Date: 3/10/2023  Disposition Planning: Pending CT PE study.      Van Pichardo,   3/9/2023                   "

## 2023-03-09 NOTE — PROGRESS NOTES
RNCC attempted to contact pt x 2 to complete assessment  In CT both attempts; will f/u in a.m.    Adm w/ chest pain; for CT PE study and possible stress test today

## 2023-03-09 NOTE — CONSULTS
Nutrition Assessment    Recommendations    Regular diet   Boost BID   Magic cup BID   Daily MVI   Please check Phos          Clinical Course: Patient is a 62 y.o. female who was admitted on 3/7/2023 with a diagnosis of Unstable angina (CMS/HCC) [I20.0]  Chest pain [R07.9]  Chest pain, unspecified type [R07.9].     Past Medical History:   Diagnosis Date   • A-fib (CMS/HCC)    • Breast cancer (CMS/HCC) 2008    left partial mastectomy axillary dissection, whole breast radiation and chemotherapy in 2008 for a 2-1/2 cm invasive ductal carcinoma that was ER/MI negative and HER-2/alyson positive with 7-15 positive lymph nodes   • Colon polyp     couple on each colonoscopy per patient report   • Fibrocystic breast    • Fibroid    • History of partial hysterectomy 03/2000    c/o fibroids   • History of radiation therapy    • Hx antineoplastic chemo    • Hx of lipoma 2008    resected from shoulder   • Hypothyroidism    • Malignant neoplasm of upper-outer quadrant of left breast in female, estrogen receptor negative (CMS/HCC) 9/10/2021    2008-2.5 cm cancer with 7 of 15+ nodes treated with partial mastectomy, axillary dissection, chemotherapy and whole breast radiation.   • PAF (paroxysmal atrial fibrillation) (CMS/HCC)    • PVC (premature ventricular contraction)    • Screening for breast cancer      Past Surgical History:   Procedure Laterality Date   • BREAST BIOPSY Left 2008   • BREAST BIOPSY Right 2021    benign ([facility] specimen [id number])   • BREAST LUMPECTOMY Left 2008    [facility] specimen [id number]   • DENTAL SURGERY     • HYSTERECTOMY      partial   • LUMBAR PUNCTURE     • MANDIBLE FRACTURE SURGERY     • PARTIAL HYSTERECTOMY     • ROTATOR CUFF REPAIR Left    • SINUS SURGERY         Reason for Assessment  Reason For Assessment: identified at risk by screening criteria (MST)     [facility] [[email or web address]] Nutrition Screen Tool  Has patient lost weight without trying?: 3-->Yes, 24-33 lbs  Has patient been eating poorly due to decreased appetite?:  "0-->No  [facility] Nutrition Screen Score: 3     Nutrition/Diet History  Appetite Prior to Admission: Good-50-75%  Intake (%): 100%     Physical Findings  Last Bowel Movement: 03/01/23  Skin: intact     RETS18 Physical Appearance  Last Bowel Movement: 03/01/23  Skin: intact     Nutrition Order  Nutrition Order: does not meet nutritional requirements  Nutrition Order Comments: cardiac diet     Anthropometrics  Height: 170.2 cm (5' 7\")       Current Weight  Weight Method: Standing scale  Weight: 61.9 kg (136 lb 6.4 oz)     Ideal Body Weight (IBW)  Ideal Body Weight (IBW) (kg): 61.86  % Ideal Body Weight: 100.01     Usual Body Weight (UBW)  Usual Body Weight: 70.3 kg (155 lb)      Body Mass Index (BMI)  BMI (Calculated): 21.4     RETS18 Lab Results  Lab Results Reviewed: reviewed   BMP Results       03/08/23 03/07/23 12/22/22     0227 1203 1637     139 143    K 3.8 3.8 4.2    Cl 106 106 109    CO2 27 27 27    Glucose 97 93 86    BUN 14 11 13    Creatinine 0.9 0.8 0.85    Calcium 9.2 9.3 8.9    Anion Gap 7 6 --    EGFR >60.0 >60.0 76         Comment for K at 0227 on 03/08/23: Results obtained on plasma. Plasma Potassium values may be up to 0.4 mEQ/L less than serum values. The differences may be greater for patients with high platelet or white cell counts.    Comment for K at 1203 on 03/07/23: Results obtained on plasma. Plasma Potassium values may be up to 0.4 mEQ/L less than serum values. The differences may be greater for patients with high platelet or white cell counts.    Comment for Glucose at 1637 on 12/22/22:               Fasting reference interval         Comment for EGFR at 1637 on 12/22/22: The eGFR is based on the CKD-EPI 2021 equation. To calculate   the new eGFR from a previous Creatinine or Cystatin C  result, go to https://www.kidney.org/professionals/  kdoqi/gfr%5Fcalculator          Lab Results   Component Value Date    HGBA1C 5.5 03/07/2023     Lab Results   Component Value Date    ALT 15 03/07/2023 "    AST 16 03/07/2023    ALKPHOS 58 03/07/2023    BILITOT 0.7 03/07/2023     Mg 1.6, low         Medications  Pertinent Medications Reviewed: reviewed   • aspirin  81 mg oral Daily   • atorvastatin  40 mg oral Daily (6p)   • flecainide  50 mg oral BID (6a, 6p)   • fluticasone propionate  2 spray Each Nostril Daily   • levothyroxine  75 mcg oral Daily (6:30a)   • metoprolol succinate XL  12.5 mg oral Nightly     • heparin infusion - MAR calculator by PTT  100-4,000 Units/hr 750 Units/hr (03/09/23 0716)       Calorie Requirements  Estimated kCal Needs: Actual Body Weight (62 kg)  Estimated Calorie Need Method: kcal/kg  Calorie/kg Recommended: 25-30  Calorie Recommendations: 3760-8077     Protein Requirements  Recommended Dosing Weight (Estimated Protein Needs): Actual Body Weight  Est Protein Requirement Amount (gms/kg): 1.5-->1.5 gm protein  Protein Recommendations: 95 grams     Fluid requirements: 1860 ml,(30 ml/kg )     Dosing weight: 62 kg, ABW     Skin:intact     NFPE:wnl     Clinical comments:  Pt seen for MST score, pt reports unintentional wt loss of 30 lbs in past 2-3 months, despite eating normally, no change in appetite or intake. 18% wt loss in 2-3 months is severe for time frame, states she did have a GI workup and it is still in progress. . Pt was Admitted with CP, was  planned for cardiac cath today- was cancelled and she just received bkf tray. We discussed high calorie and protein foods. She will try Boost strawberry, magic cup for supplemental intake.   Regular diet     Goals:> 75% meals, supplements   Monitor: Labs, skin, po diet satish, wts     Recommendations: See above     PES  Statement: PES Statement  Nutrition Diagnosis: Inadequate Energy Intake  Related To:: Decreased ability to consume sufficient energy  As Evidenced By:: unintentional wt loss  Nutritional Needs Met?: Other (comment) (unclear at this time)                                   Date: 03/09/23  Signature: Kristen Echavarria RD

## 2023-03-10 PROCEDURE — 63600105 HC IODINE BASED CONTRAST: Mod: JW | Performed by: INTERNAL MEDICINE

## 2023-03-10 PROCEDURE — 25000000 HC PHARMACY GENERAL: Performed by: INTERNAL MEDICINE

## 2023-03-10 PROCEDURE — 63700000 HC SELF-ADMINISTRABLE DRUG: Performed by: INTERNAL MEDICINE

## 2023-03-10 PROCEDURE — B211YZZ FLUOROSCOPY OF MULTIPLE CORONARY ARTERIES USING OTHER CONTRAST: ICD-10-PCS | Performed by: INTERNAL MEDICINE

## 2023-03-10 PROCEDURE — 25800000 HC PHARMACY IV SOLUTIONS: Performed by: INTERNAL MEDICINE

## 2023-03-10 PROCEDURE — 93458 L HRT ARTERY/VENTRICLE ANGIO: CPT | Performed by: INTERNAL MEDICINE

## 2023-03-10 PROCEDURE — 99233 SBSQ HOSP IP/OBS HIGH 50: CPT | Performed by: INTERNAL MEDICINE

## 2023-03-10 PROCEDURE — C1760 CLOSURE DEV, VASC: HCPCS | Performed by: INTERNAL MEDICINE

## 2023-03-10 PROCEDURE — 93458 L HRT ARTERY/VENTRICLE ANGIO: CPT | Mod: 26 | Performed by: INTERNAL MEDICINE

## 2023-03-10 PROCEDURE — 99152 MOD SED SAME PHYS/QHP 5/>YRS: CPT | Performed by: INTERNAL MEDICINE

## 2023-03-10 PROCEDURE — 99232 SBSQ HOSP IP/OBS MODERATE 35: CPT | Mod: GC | Performed by: INTERNAL MEDICINE

## 2023-03-10 PROCEDURE — 63600000 HC DRUGS/DETAIL CODE: Performed by: INTERNAL MEDICINE

## 2023-03-10 PROCEDURE — 21400000 HC ROOM AND CARE CCU/INTERMEDIATE

## 2023-03-10 PROCEDURE — 27200000 HC STERILE SUPPLY: Performed by: INTERNAL MEDICINE

## 2023-03-10 PROCEDURE — 93005 ELECTROCARDIOGRAM TRACING: CPT | Performed by: INTERNAL MEDICINE

## 2023-03-10 PROCEDURE — C1769 GUIDE WIRE: HCPCS | Performed by: INTERNAL MEDICINE

## 2023-03-10 DEVICE — MYNXGRIP 6F/7F
Type: IMPLANTABLE DEVICE | Site: GROIN | Status: FUNCTIONAL
Brand: MYNXGRIP

## 2023-03-10 RX ORDER — BUTALBITAL, ACETAMINOPHEN AND CAFFEINE 50; 325; 40 MG/1; MG/1; MG/1
1 TABLET ORAL ONCE AS NEEDED
Status: DISCONTINUED | OUTPATIENT
Start: 2023-03-10 | End: 2023-03-11 | Stop reason: HOSPADM

## 2023-03-10 RX ORDER — POLYETHYLENE GLYCOL 3350 17 G/17G
17 POWDER, FOR SOLUTION ORAL DAILY PRN
Status: DISCONTINUED | OUTPATIENT
Start: 2023-03-10 | End: 2023-03-11 | Stop reason: HOSPADM

## 2023-03-10 RX ORDER — FENTANYL CITRATE 50 UG/ML
INJECTION, SOLUTION INTRAMUSCULAR; INTRAVENOUS
Status: DISCONTINUED | OUTPATIENT
Start: 2023-03-10 | End: 2023-03-10 | Stop reason: HOSPADM

## 2023-03-10 RX ORDER — HEPARIN SODIUM 5000 [USP'U]/ML
5000 INJECTION, SOLUTION INTRAVENOUS; SUBCUTANEOUS
Status: DISCONTINUED | OUTPATIENT
Start: 2023-03-10 | End: 2023-03-11 | Stop reason: HOSPADM

## 2023-03-10 RX ORDER — MIDAZOLAM HYDROCHLORIDE 2 MG/2ML
INJECTION, SOLUTION INTRAMUSCULAR; INTRAVENOUS
Status: DISCONTINUED | OUTPATIENT
Start: 2023-03-10 | End: 2023-03-10 | Stop reason: HOSPADM

## 2023-03-10 RX ORDER — SODIUM CHLORIDE 9 MG/ML
INJECTION, SOLUTION INTRAVENOUS CONTINUOUS
Status: ACTIVE | OUTPATIENT
Start: 2023-03-10 | End: 2023-03-10

## 2023-03-10 RX ORDER — IBUPROFEN 400 MG/1
400 TABLET ORAL ONCE
Status: COMPLETED | OUTPATIENT
Start: 2023-03-10 | End: 2023-03-10

## 2023-03-10 RX ORDER — LIDOCAINE HYDROCHLORIDE 10 MG/ML
INJECTION, SOLUTION INFILTRATION; PERINEURAL
Status: DISCONTINUED | OUTPATIENT
Start: 2023-03-10 | End: 2023-03-10 | Stop reason: HOSPADM

## 2023-03-10 RX ORDER — IODIXANOL 320 MG/ML
INJECTION, SOLUTION INTRAVASCULAR
Status: DISCONTINUED | OUTPATIENT
Start: 2023-03-10 | End: 2023-03-11 | Stop reason: HOSPADM

## 2023-03-10 RX ORDER — POLYETHYLENE GLYCOL 3350 17 G/17G
17 POWDER, FOR SOLUTION ORAL DAILY
Status: DISCONTINUED | OUTPATIENT
Start: 2023-03-10 | End: 2023-03-10

## 2023-03-10 RX ORDER — DOCUSATE SODIUM 100 MG/1
200 CAPSULE, LIQUID FILLED ORAL 2 TIMES DAILY
Status: DISCONTINUED | OUTPATIENT
Start: 2023-03-10 | End: 2023-03-11 | Stop reason: HOSPADM

## 2023-03-10 RX ADMIN — FLUTICASONE PROPIONATE 2 SPRAY: 50 SPRAY, METERED NASAL at 08:42

## 2023-03-10 RX ADMIN — LEVOTHYROXINE SODIUM 75 MCG: 0.07 TABLET ORAL at 05:36

## 2023-03-10 RX ADMIN — ACETAMINOPHEN 650 MG: 325 TABLET ORAL at 18:24

## 2023-03-10 RX ADMIN — ACETAMINOPHEN 650 MG: 325 TABLET ORAL at 10:45

## 2023-03-10 RX ADMIN — SODIUM CHLORIDE: 9 INJECTION, SOLUTION INTRAVENOUS at 10:46

## 2023-03-10 RX ADMIN — FLECAINIDE ACETATE TABLET 50 MG: 50 TABLET ORAL at 18:04

## 2023-03-10 RX ADMIN — IBUPROFEN 400 MG: 400 TABLET, FILM COATED ORAL at 20:28

## 2023-03-10 RX ADMIN — ACETAMINOPHEN 650 MG: 325 TABLET ORAL at 14:39

## 2023-03-10 RX ADMIN — DOCUSATE SODIUM 200 MG: 100 CAPSULE, LIQUID FILLED ORAL at 08:38

## 2023-03-10 RX ADMIN — Medication 81 MG: at 10:44

## 2023-03-10 RX ADMIN — DOCUSATE SODIUM 200 MG: 100 CAPSULE, LIQUID FILLED ORAL at 20:28

## 2023-03-10 RX ADMIN — FLECAINIDE ACETATE TABLET 50 MG: 50 TABLET ORAL at 05:36

## 2023-03-10 RX ADMIN — ATORVASTATIN CALCIUM 40 MG: 40 TABLET, FILM COATED ORAL at 18:04

## 2023-03-10 ASSESSMENT — ENCOUNTER SYMPTOMS: ALLERGIC REACTION: 1

## 2023-03-10 NOTE — PROGRESS NOTES
"    Cardiology Progress Note   [facility]    [facility]  [address]  [address] [place] [address]    TEL  [phone]  [facility]       Patient: Alecia Cruz  MRN: [id number]  : 1960  Admission Date: 3/7/2023   Consult Date: 03/10/23    Brief Patient Summary:  62 y.o. female with past medical history significant for afib, hypothyroidism, and hld for which we are consulted for chest pain.     Subjective/interval events:  Continues to experience exertional cp, no cp at rest. Otherwise denies new or worsening sx.     Scheduled Meds:  • aspirin  81 mg oral Daily   • atorvastatin  40 mg oral Daily (6p)   • docusate sodium  200 mg oral BID   • flecainide  50 mg oral BID (6a, 6p)   • fluticasone propionate  2 spray Each Nostril Daily   • levothyroxine  75 mcg oral Daily (6:30a)   • metoprolol succinate XL  12.5 mg oral Nightly     Continuous Infusions:  • sodium chloride 0.9 %   100 mL/hr at 03/10/23 1046     PRN Meds:.•  acetaminophen  •  bisacodyL  •  glucose **OR** dextrose **OR** glucagon **OR** dextrose 50 % in water (D50)  •  nitroglycerin  •  polyethylene glycol  •  senna      Intake/Output Summary (Last 24 hours) at 3/10/2023 1209  Last data filed at 3/10/2023 1151  Gross per 24 hour   Intake 0 ml   Output --   Net 0 ml        Weights (last 7 days)     Date/Time Weight    23 2201 61.9 kg (136 lb 6.4 oz)    23 1053 61.2 kg (135 lb)           Wt Readings from Last 3 Encounters:   23 61.9 kg (136 lb 6.4 oz)   23 60.8 kg (134 lb)   22 59 kg (130 lb)        REVIEW OF SYSTEMS:   10 ROS were reviewed and negative per patient except as per mentioned in the HPI.    PHYSICAL EXAMINATION:  Visit Vitals  /83 (BP Location: Right upper arm, Patient Position: Sitting)   Pulse 76   Temp 36.9 °C (98.5 °F) (Oral)   Resp 18   Ht 1.702 m (5' 7\")   Wt 61.9 kg (136 lb 6.4 oz)   SpO2 98%   Breastfeeding No "   BMI 21.36 kg/m²     Body surface area is 1.71 meters squared.  Body mass index is 21.36 kg/m².  Gen: NAD. Well-developed, well-nourished, and appears stated age.  HEENT: NC/AT. EOM grossly intact.  CV: RRR, + s1/2, no appreciable r/m/g. Neg. JVD. Chest wall not TTP.  Lungs: CTA B/L w/o appreciable wheezes, rales, or crackles  Abdomen: +Bs, Soft, nontender, nondistended. No guarding or rebound tenderness.  Ext: No clubbing, cyanosis, or edema.  Skin: Warm, dry, no exposed rashes or lesions.  Neuro: CN2-12 grossly intact. A&O  Psych: Appropriate affect.   Labs     No results found for this or any previous visit (from the past 24 hour(s)).    Imaging    CT ANGIOGRAPHY CHEST PULMONARY EMBOLISM WITH IV CONTRAST  Narrative: CLINICAL HISTORY:Shortness of breath. Covid infection.    COMMENT:Helical CT angiography of the chest was performed March 9, 2023 during  administration of 75 cc of Omnipaque 350 contrast intravenously. Thin section  axial images were acquired during the pulmonary arterial phase of contrast bolus  administration. Postprocessing was performed and maximum intensity projection  reformations were created and reviewed. Comparison is made previous study  performed October 15, 2022.    CT DOSE:  One or more dose reduction techniques (e.g. automated exposure  control, adjustment of the mA and/or kV according to patient size, use of  iterative reconstruction technique) utilized for this examination.    The contrast bolus is adequate. Images are mildly degraded by respiratory motion  artifact.    No filling defects are demonstrated throughout the pulmonary arterial tree and  the study is negative for pulmonary embolism. The thoracic aorta contains  atheromatous calcification, is patent, normal in caliber and without dissection.  The heart is normal in size. There is no pericardial effusion and there are no  pleural effusions.    Visualized lower neck is unremarkable. No axillary, mediastinal or  hilar  adenopathy is demonstrated.    Linear parenchymal density extending throughout bilateral lower lobes consistent  with platelike atelectasis. No focal consolidation or pleural effusion is  demonstrated. A 8.8 mm smoothly marginated circumscribed solid nodule within the  left upper lobe is unchanged in size from June 29, 2010 and is benign. No new or  suspicious pulmonary nodules are demonstrated.    The visualized upper abdominal organs are unremarkable. The gastric lumen is  significantly distended with food debris and fluid.    The bony thorax is grossly unremarkable.  Impression: IMPRESSION: No CT evidence of pulmonary embolism. No acute pathology is  demonstrated within the chest.       Cardiac Studies  Cardiac Imaging    ECHOCARDIOGRAM - EXTERNAL RESULT     Narrative  Ordered by an unspecified provider.     Assessment:  Alecia Cruz is a 62 y.o. female with past medical history significant for afib, hypothyroidism, CAC on CT, and hld for which we are consulted for chest pain. Her symptoms appear to be cardiac in nature. Her negative hs trop is reassuring in that I would expect her to leak troponin after sx of this duration. Nonetheless, the nature of her sx warrant further testing.       Recommendations:  -SL NG, iv heparin, ASA81, atorva 80, Dx LHC today.   -currently rate controlled on home meds, continue metoprolol succinate. She is not on AC due to low [id number] score.   -will consider statin pending results    We will follow. However, please do not hesitate to contact myself or covering cardiology fellow with questions at any time.     Zeyad Wolff MD  3/10/2023

## 2023-03-10 NOTE — PLAN OF CARE
Care Coordination Admission Assessment Note  Date: 3/10/2023   Time: 10:10 AM    Patient Name: Alecia Cruz  Medical Record Number: [id number]  YOB: 1960  Room/BED: 0207    General Information  Patient-Specific Goals (Include Timeframe)  to find out what tests she is having today  PCP: Mahendra Begum DO   Insurance Coverage: [employer]  Readmission Within the last 30 days  no previous admission in last 30 days    Advance Directives (for Healthcare)?  Does patient have advance directive?: No  Patient does not have Advance Directive: [facility] Advance Care Planning brochure provided  Patient has Advance Directive: Advance Directive is NOT in chart, requested to bring in  Does patient have current OOH DNR form?: No  Patient does not have current OOH DNR form: Patient/Family declines further information  Does patient have current POLST?: No  Patient does not have current POLST: Patient/Family declines further information  Does patient have mental health advance directive?: No  Patient does not have Mental Health Advance Directive: Patient/Family declines further information        Information       Living Arrangements  Arrived From: home  Current Living Arrangements: home  People in Home: child(abhi), adult  Home Accessibility: stairs to enter home (Group), stairs within home (Group)  Living Arrangement Comments: l/w daughter in 2 SH, 1st floor bathroom, FF to 2nd fl b/b  Able to Return to Prior Arrangements: yes    Housing Stability and Financial Resources (SDOH)  In the last 12 months, was there a time when you were not able to pay the mortgage or rent on time?:  (doctors went into room- pt wanted to get off the phone)    Current Outpatient/Agency/Support Group       Type of Current Home Care Services       Assistive Device/Animal Currently Used at Home  none    Functional Status Comments  independent w/ ambulation; pt states she has chronic lymphedema    IADL Comments  independent w/  adls    Employment/ Status       Concerns to be Addressed  no discharge needs identified    Current Discharge Risk       Anticipated Changes Related to Illness  none    Transportation Concerns (SDOH)  Transportation Anticipated: family or friend will provide  In the past 12 months, has lack of transportation kept you from medical appointments or from getting medications?: No  In the past 12 months, has lack of transportation kept you from meetings, work, or from getting things needed for daily living?: No    Concerns Comments       Anticipated Clinical Needs       Anticipated Discharge Plan   Anticipated Discharge Disposition: home without assistance or services  Patient/Family Anticipates Transition to: home with family  Patient/Family Anticipated Services at Transition: none  Screening complete: yes       RNCC s/w pt to complete CMA    Adm w/ chest pain  s/p CT PE 3/9; for cardiac cath today  SHARMAINE: 3/11     No dc needs identified

## 2023-03-10 NOTE — POST-PROCEDURE NOTE
Cardiovascular Post Procedure Note:    Alecia Cruz  3/7/2023 - 3/10/2023    Pre-op Diagnosis     * Chest pain, unspecified type [R07.9]   Post-op Diagnosis     * Chest pain, unspecified type [R07.9]     Procedure(s):  Coronary angiography   Surgeon(s):  Rodo Rossi DO Ooi, Yinn Shaung, MD    Findings:  Mild nonobstructive coronary artery disease.     Anesthesia:  Choice    Staff:   Circulator: Purnima Jones RN  Monitor Nurse: Sara Dasilva RN     Estimated Blood Loss:   10 mL     Specimens:   No specimens collected during this procedure.     Implants:   Implant Name Type Inv. Item Serial No.  Lot No. LRB No. Used Action   CLOSURE DEVICE MYNX 6FR-7FR (MUST ORDER MULTIPLES OF 10/EA) - [id number] Device Vascular Closure CLOSURE DEVICE MYNX 6FR-7FR (MUST ORDER MULTIPLES OF 10/EA)  ACCESS CLOSURE  N/A 1 Implanted        Complications:  None    Date: 3/10/2023 Time: 1:03 PM

## 2023-03-10 NOTE — PRE-PROCEDURE NOTE
Cardiac Cath Lab Pre-procedure Note    - Patient was seen and examined at bedside.  - The patient's chart and all data was reviewed.  - The procedure, treatment alternatives, risks and benefits were explained with specific risks discussed.  - Patient was consented for cardiac cath procedure and possible PCI.  - Patient's case was found appropriate for dual antiplatelet therapy.    Indication for procedure: Pre-Op Diagnosis Codes:     * Chest pain, unspecified type (R07.9)    Patient's clinical presentation to the cardiac cath lab: unstable angina.    Patient is at risk for stroke due to extensive atherosclerotic vascular disease; acute myocardial infarction; vascular complications due to the presence of severe peripheral arterial disease in the iliac/femoral vessels increasing the risk of hematoma, retroperitoneal bleeding, pseudo-aneurysms and arteriovenous fistula formation; bleeding; renal failure potentially requiring dialysis due to the presence of pre-procedural abnormal renal function; allergic reaction including anaphylaxis due to known history of allergy to contrast agents containing iodine and emergency cardiac surgery.   Patient appears to be managing well.         Total anti-anginal medications: 1.     Patient is presenting today with no CHF.    Pre-sedation assessment  ASA 3  Mallampati class: III - soft palate, base of uvula visible.

## 2023-03-10 NOTE — PROGRESS NOTES
Hospital Medicine Service -  Daily Progress Note       SUBJECTIVE   Interval History: Patient was seen and examined at the bedside. Was resting comfortably in bed. Understands current plan of care. Plan for LHC today.      OBJECTIVE      Vital signs in last 24 hours:  Temp:  [36.3 °C (97.3 °F)-37.6 °C (99.6 °F)] 36.3 °C (97.3 °F)  Heart Rate:  [62-80] 79  Resp:  [18] 18  BP: (110-155)/(58-85) 154/85    Intake/Output Summary (Last 24 hours) at 3/10/2023 1739  Last data filed at 3/10/2023 1253  Gross per 24 hour   Intake 0 ml   Output 10 ml   Net -10 ml       PHYSICAL EXAMINATION      Physical Exam  Consitutional:  Awake, alert.  No acute distress.  Eyes:  PERRLA, EOMI, anicteric sclera with clear conjunctiva.    ENMT: Oropharynx clear, moist mucous membranes  Respiratory:  Clear to ascultation bilaterally.  No wheezes, rales, or ronchi.  Cardiovascular:  Regular rate and rhythm.  No murmurs.  GI:  Soft, non tender, non distended.  +Bowel sounds.  Extremities:  No LE CCE, has chronic LUE lymphedema  Neuro: AAOx3, nonfocal exam  Psych: Mood and affect wnl     LINES, CATHETERS, DRAINS, AIRWAYS, AND WOUNDS   Lines, Drains, and Airways:  Wounds (agree with documentation and present on admission):      LABS / IMAGING / TELE      Labs  Labs pending today    SARS-CoV-2 (COVID-19) (no units)   Date/Time Value   03/07/2023 1411 Positive (A)       Imaging  I have independently reviewed the pertinent imaging from the last 24 hrs.    Telemetry  NSR     ASSESSMENT AND PLAN      * Chest pain  Assessment & Plan  -presents w/ intermittent chest pain that started ~ 1 month ago, now occurring daily and is more intense   -CXr showing stable DEVEN nodule, otherwise unremarkable  -ECG with NSR w nonspecific T wave abnormalities in the precordial leads.  -HST 4-->3  -s/p asa 325 in ED and SL nitro with relief    Plan:  -Cardiology consulted and following, personally discussed with team today, underwent LHC today which revealed mild  "non-obstructive CAD, recommend lifestyle modification and medical management.   -CT PE study this admission negative for PE   -Cont asa 81 m qD, atorvastatin 80mg qD  -CTM on tele  -Patient developed chest pain after LHC, repeat EKG obtained and unchanged, will monitor overnight    COVID  Assessment & Plan  -incidental finding upon admission  -CXr unremarkable, not hypoxic  -asymptomatic      HLD (hyperlipidemia)  Assessment & Plan  -Lipid panel with elevated cholesterol/LDL  -ASCVD Score of 5-7.5% 10 year risk, qualifies for moderate-high intensity statin  -Have started atorvastatin 40mg qD     Atrial fibrillation (CMS/HCC)  Assessment & Plan  -on flecainide 50mg bid   -per last note by Dr. Alonso should be taking Toprol 12.5mg nightly, although patient continues to take prn for \"break through\" episodes of A-fib   -not on AC due to low [id number] score    Plan:  Cont flecainide   Continue Toprol  CTM on tele   Follows with Dr. Alonso     Hypothyroidism  Assessment & Plan  Plan:  TSH WNL   Cont home levothyroxine 75 mcg       VTE Assessment: Padua    VTE Prophylaxis:  Current anticoagulants:  heparin (porcine) 5,000 unit/mL injection 5,000 Units, subcutaneous, q12h (6a, 6p)      Code Status: Full Code  Palliative Care Screening Score: 0   Estimated Discharge Date: 3/10/2023  Disposition Planning: Likely discharge tomorrow to home.      Van Pichardo, DO  3/10/2023                   "

## 2023-03-10 NOTE — NURSING NOTE
Patient briefly assessed as MD wanting to get into room to start procedure.  IV med lock assessed and groins clipped as per policy.

## 2023-03-11 VITALS
OXYGEN SATURATION: 98 % | WEIGHT: 136.4 LBS | BODY MASS INDEX: 21.41 KG/M2 | RESPIRATION RATE: 18 BRPM | SYSTOLIC BLOOD PRESSURE: 121 MMHG | TEMPERATURE: 99.1 F | HEIGHT: 67 IN | DIASTOLIC BLOOD PRESSURE: 63 MMHG | HEART RATE: 91 BPM

## 2023-03-11 LAB
ANION GAP SERPL CALC-SCNC: 9 MEQ/L (ref 3–15)
ATRIAL RATE: 68
BUN SERPL-MCNC: 13 MG/DL (ref 8–20)
CALCIUM SERPL-MCNC: 9 MG/DL (ref 8.9–10.3)
CHLORIDE SERPL-SCNC: 103 MEQ/L (ref 98–109)
CO2 SERPL-SCNC: 25 MEQ/L (ref 22–32)
CREAT SERPL-MCNC: 0.9 MG/DL (ref 0.6–1.1)
ERYTHROCYTE [DISTWIDTH] IN BLOOD BY AUTOMATED COUNT: 12.6 % (ref 11.7–14.4)
GFR SERPL CREATININE-BSD FRML MDRD: >60 ML/MIN/1.73M*2
GLUCOSE SERPL-MCNC: 94 MG/DL (ref 70–99)
HCT VFR BLDCO AUTO: 35.2 % (ref 35–45)
HGB BLD-MCNC: 12 G/DL (ref 11.8–15.7)
MCH RBC QN AUTO: 32.3 PG (ref 28–33.2)
MCHC RBC AUTO-ENTMCNC: 34.1 G/DL (ref 32.2–35.5)
MCV RBC AUTO: 94.9 FL (ref 83–98)
P AXIS: 74
PDW BLD AUTO: 10.4 FL (ref 9.4–12.3)
PLATELET # BLD AUTO: 192 K/UL (ref 150–369)
POTASSIUM SERPL-SCNC: 4.3 MEQ/L (ref 3.6–5.1)
PR INTERVAL: 150
QRS DURATION: 92
QT INTERVAL: 402
QTC CALCULATION(BAZETT): 427
R AXIS: 16
RBC # BLD AUTO: 3.71 M/UL (ref 3.93–5.22)
SODIUM SERPL-SCNC: 137 MEQ/L (ref 136–144)
T WAVE AXIS: 18
VENTRICULAR RATE: 68
WBC # BLD AUTO: 5.15 K/UL (ref 3.8–10.5)

## 2023-03-11 PROCEDURE — 80048 BASIC METABOLIC PNL TOTAL CA: CPT | Performed by: INTERNAL MEDICINE

## 2023-03-11 PROCEDURE — 99233 SBSQ HOSP IP/OBS HIGH 50: CPT | Performed by: INTERNAL MEDICINE

## 2023-03-11 PROCEDURE — 93010 ELECTROCARDIOGRAM REPORT: CPT | Performed by: INTERNAL MEDICINE

## 2023-03-11 PROCEDURE — 85027 COMPLETE CBC AUTOMATED: CPT | Performed by: INTERNAL MEDICINE

## 2023-03-11 PROCEDURE — 63600000 HC DRUGS/DETAIL CODE: Performed by: INTERNAL MEDICINE

## 2023-03-11 PROCEDURE — 36415 COLL VENOUS BLD VENIPUNCTURE: CPT | Performed by: INTERNAL MEDICINE

## 2023-03-11 PROCEDURE — 63700000 HC SELF-ADMINISTRABLE DRUG: Performed by: INTERNAL MEDICINE

## 2023-03-11 RX ORDER — LIDOCAINE 560 MG/1
1 PATCH PERCUTANEOUS; TOPICAL; TRANSDERMAL DAILY
Qty: 30 PATCH | Refills: 0 | Status: SHIPPED | OUTPATIENT
Start: 2023-03-12 | End: 2024-12-10

## 2023-03-11 RX ORDER — POLYETHYLENE GLYCOL 3350 17 G/17G
17 POWDER, FOR SOLUTION ORAL DAILY PRN
Qty: 30 EACH | Refills: 0 | Status: SHIPPED | OUTPATIENT
Start: 2023-03-11 | End: 2024-12-10

## 2023-03-11 RX ORDER — KETOROLAC TROMETHAMINE 15 MG/ML
15 INJECTION, SOLUTION INTRAMUSCULAR; INTRAVENOUS ONCE
Status: COMPLETED | OUTPATIENT
Start: 2023-03-11 | End: 2023-03-11

## 2023-03-11 RX ORDER — TRAMADOL HYDROCHLORIDE 50 MG/1
25 TABLET ORAL 2 TIMES DAILY PRN
Qty: 5 TABLET | Refills: 0 | Status: SHIPPED | OUTPATIENT
Start: 2023-03-11 | End: 2023-03-16

## 2023-03-11 RX ORDER — ATORVASTATIN CALCIUM 40 MG/1
40 TABLET, FILM COATED ORAL
Qty: 30 TABLET | Refills: 1 | Status: SHIPPED | OUTPATIENT
Start: 2023-03-11 | End: 2024-04-15 | Stop reason: SDUPTHER

## 2023-03-11 RX ORDER — LIDOCAINE 560 MG/1
1 PATCH PERCUTANEOUS; TOPICAL; TRANSDERMAL DAILY
Status: DISCONTINUED | OUTPATIENT
Start: 2023-03-11 | End: 2023-03-11 | Stop reason: HOSPADM

## 2023-03-11 RX ORDER — DOCUSATE SODIUM 100 MG/1
200 CAPSULE, LIQUID FILLED ORAL 2 TIMES DAILY PRN
Qty: 60 CAPSULE | Refills: 0 | Status: SHIPPED | OUTPATIENT
Start: 2023-03-11 | End: 2024-12-10

## 2023-03-11 RX ADMIN — LEVOTHYROXINE SODIUM 75 MCG: 0.07 TABLET ORAL at 06:01

## 2023-03-11 RX ADMIN — FLUTICASONE PROPIONATE 2 SPRAY: 50 SPRAY, METERED NASAL at 09:41

## 2023-03-11 RX ADMIN — DOCUSATE SODIUM 200 MG: 100 CAPSULE, LIQUID FILLED ORAL at 09:40

## 2023-03-11 RX ADMIN — ACETAMINOPHEN 650 MG: 325 TABLET ORAL at 01:35

## 2023-03-11 RX ADMIN — HEPARIN SODIUM 5000 UNITS: 5000 INJECTION, SOLUTION INTRAVENOUS; SUBCUTANEOUS at 06:01

## 2023-03-11 RX ADMIN — Medication 81 MG: at 09:40

## 2023-03-11 RX ADMIN — FLECAINIDE ACETATE TABLET 50 MG: 50 TABLET ORAL at 06:01

## 2023-03-11 RX ADMIN — LIDOCAINE 1 PATCH: 4 PATCH TOPICAL at 09:41

## 2023-03-11 RX ADMIN — KETOROLAC TROMETHAMINE 15 MG: 15 INJECTION, SOLUTION INTRAMUSCULAR; INTRAVENOUS at 09:40

## 2023-03-11 RX ADMIN — ACETAMINOPHEN 650 MG: 325 TABLET ORAL at 06:01

## 2023-03-11 NOTE — PLAN OF CARE
Problem: Adult Inpatient Plan of Care  Goal: Plan of Care Review  Outcome: Progressing  Flowsheets (Taken 3/11/2023 0406)  Progress: improving  Plan of Care Reviewed With: patient  Outcome Summary: Pt headache free compared to previous shift  Goal: Patient-Specific Goal (Individualized)  Outcome: Progressing  Goal: Absence of Hospital-Acquired Illness or Injury  Outcome: Progressing  Goal: Optimal Comfort and Wellbeing  Outcome: Progressing  Goal: Readiness for Transition of Care  Outcome: Progressing     Problem: Infection  Goal: Absence of Infection Signs and Symptoms  Outcome: Progressing     Problem: Chest Pain  Goal: Resolution of Chest Pain Symptoms  Outcome: Progressing     Problem: Fall Injury Risk  Goal: Absence of Fall and Fall-Related Injury  Outcome: Progressing

## 2023-03-11 NOTE — NURSING NOTE
Per MD, patient stable for discharge. Discharge instructions reviewed with patient and patient verbalized understanding of these instructions. Chary and tele monitor removed. All patient belongings returned from safe and from room. All VSS.

## 2023-03-11 NOTE — DISCHARGE SUMMARY
"   Hospital Medicine Service -  Inpatient Discharge Summary        BRIEF OVERVIEW   Admitting Provider: Van Reddy DO  Attending Provider: Van Reddy DO Attending phys phone: [phone]    PCP: Mahendra Begum [phone]    Admission Date: 3/7/2023  Discharge Date: 3/11/2023     DISCHARGE DIAGNOSES      Primary Discharge Diagnosis  Chest pain    Secondary Discharge Diagnoses  Active Hospital Problems    Diagnosis Date Noted   • Chest pain 03/07/2023     Priority: High   • Chest pain, unspecified type 03/09/2023   • COVID 08/23/2022   • HLD (hyperlipidemia) 02/17/2022   • Atrial fibrillation (CMS/HCC) 01/23/2020   • Hypothyroidism 05/10/2011      Resolved Hospital Problems   No resolved problems to display.       Problem List on Day of Discharge  * Chest pain  Assessment & Plan  -presents w/ intermittent chest pain that started ~ 1 month ago, now occurring daily and is more intense   -CXr showing stable DEVEN nodule, otherwise unremarkable  -ECG with NSR w nonspecific T wave abnormalities in the precordial leads.  -HST 4-->3  -s/p asa 325 in ED and SL nitro with relief    Plan:  -Cardiology consulted and followed this admission, underwent LHC on 3/10/23 which revealed mild non-obstructive CAD, recommended lifestyle modification and medical management.   -CT PE study this admission negative for PE   -Cont asa 81 m qD, atorvastatin 80mg qD  -CTM on tele    COVID  Assessment & Plan  -incidental finding upon admission  -CXr unremarkable, not hypoxic  -asymptomatic      HLD (hyperlipidemia)  Assessment & Plan  -Lipid panel with elevated cholesterol/LDL  -ASCVD Score of 5-7.5% 10 year risk, qualifies for moderate-high intensity statin  -Have started atorvastatin 40mg qD     Atrial fibrillation (CMS/HCC)  Assessment & Plan  -on flecainide 50mg bid   -per last note by Dr. Alonso should be taking Toprol 12.5mg nightly, although patient continues to take prn for \"break through\" episodes of A-fib   -not on AC due " to low [id number] score    Plan:  Cont flecainide   Continue Toprol  CTM on tele   Follows with Dr. Alonso     Hypothyroidism  Assessment & Plan  Plan:  TSH WNL   Cont home levothyroxine 75 mcg      SUMMARY OF HOSPITALIZATION      Presenting Problem/History of Present Illness  62 F w/ PMHx of pAF (not on A/C given low [id number]), HTN, HLD, prior breast ca s/p L mastecomty and XRT/chemo who presents today with worsening left sided chest pain.  Patient reports she had an episode of severe chest pain a few days ago when taking the train and then crossing the street to an office building.  She became SOB and had L sided CP which radiated down the L arm.  She had to sit down and rest for 3-5 minutes before it went away. She reports she had a similar episode about a month ago that also resolved with rest, but was even more severe.  In the interim she has noted occasional chest pressure and pain with exertion as well as some increased dyspnea on exertion.  She spoke to a physician friend after her recent bout of CP and she recommended that she go to the ER.  She did not come right away because she was busy and instead presented today.  In the ER today she continues to complain of intermittent L chest pain.  hsTrop trend was negative and wnl, but her EKG showed some TW inversions in the precordial leads.  Cardiology evaluated the patient in the ER and recommended admission for further ischemic evaluation.       She was also incidentally found to be COVID+ in the ER.  She notes some recent dry cough and congestion.  She denies sick contacts.  She is UTD on COVID immunizations.       She confirms she is FULL code, and that her son Ricardo would be her surrogate decision maker.       Hospital Course    Patient was hospitalized from 3/7/2023 to 3/11/2023. Underwent an extensive work up for her chest pain. Troponin's remained flat x 2 and EKG's were non-ischemic. CT PE study revealed no evidence of PE or acute abnormality in the  chest. Cardiology was consulted and patient underwent LHC on 3/10/2023 which revealed non-obstructive CAD. Patient was started on ASA 81mg qD and Atorvastatin 40mg qD and counseled on lifestyle changes. Patient was further found to be COVID positive this admission but did not require treatment. Prior to discharged she developed mild acute on chronic back pain. Was discharged on multimodal pain regimen of PRN acetaminophen, lidocaine patches, and tramadol. Patient will follow up with PCP after discharge.     Exam on Day of Discharge  Physical Exam  Consitutional:  Awake, alert.  No acute distress.  Eyes:  PERRLA, EOMI, anicteric sclera with clear conjunctiva.    ENMT: Oropharynx clear, moist mucous membranes  Respiratory:  Clear to ascultation bilaterally.  No wheezes, rales, or ronchi.  Cardiovascular:  Regular rate and rhythm.  No murmurs.  GI:  Soft, non tender, non distended.  +Bowel sounds.  Extremities:  No LE CCE, has chronic LUE lymphedema  Neuro: AAOx3, nonfocal exam  Psych: Mood and affect wnl    Consults During Admission  IP CONSULT TO CARDIOLOGY    DISCHARGE MEDICATIONS               Medication List      START taking these medications    atorvastatin 40 mg tablet  Commonly known as: LIPITOR  Take 1 tablet (40 mg total) by mouth daily.  Dose: 40 mg     docusate sodium 100 mg capsule  Commonly known as: COLACE  Take 2 capsules (200 mg total) by mouth 2 (two) times a day as needed for constipation.  Dose: 200 mg     lidocaine 4 % adhesive patch,medicated topical patch  Commonly known as: ASPERCREME  Start taking on: March 12, 2023  Apply 1 patch topically daily.  Dose: 1 patch     polyethylene glycol 17 gram packet  Commonly known as: MIRALAX  Take 17 g by mouth daily as needed (Constipation) for up to 3 days.  Dose: 17 g     traMADoL 50 mg tablet  Commonly known as: ULTRAM  Take 0.5 tablets (25 mg total) by mouth 2 (two) times a day as needed for moderate pain or severe pain for up to 5 days.  Dose: 25 mg         CONTINUE taking these medications    ascorbic acid 500 mg tablet  Commonly known as: VITAMIN C  Take 500 mg by mouth daily.  Dose: 500 mg     aspirin 81 mg enteric coated tablet  Take 81 mg by mouth daily.  Dose: 81 mg     azelastine 137 mcg (0.1 %) nasal spray  Commonly known as: ASTELIN  Administer 2 sprays into each nostril 2 (two) times a day as needed.  Dose: 2 spray     betamethasone dipropionate 0.05 % lotion  Apply 1 each topically daily as needed.  Dose: 1 each     cetirizine 10 mg tablet  Commonly known as: ZyrTEC  Take 10 mg by mouth as needed.  Dose: 10 mg     cholecalciferol (vitamin D3) 1,000 unit (25 mcg) tablet  Take 1,000 Units by mouth daily.  Dose: 1,000 Units     flecainide 50 mg tablet  Commonly known as: TAMBOCOR  Take 1 tablet (50 mg total) by mouth 2 (two) times a day.  Dose: 50 mg     levothyroxine 75 mcg tablet  Commonly known as: SYNTHROID  Take 1 tablet (75 mcg total) by mouth daily.  Dose: 75 mcg     metoprolol succinate XL 25 mg 24 hr tablet  Commonly known as: TOPROL-XL  Take 12.5 mg by mouth daily as needed.  Dose: 12.5 mg     multivitamin tablet  Commonly known as: THERAGRAN  Take 1 tablet by mouth daily.  Dose: 1 tablet     omega-3-dha-epa-dpa-fish oil 1,050 mg(300 mg -675 mg-75 mg) capsule  Take 1 capsule by mouth daily.  Dose: 1 capsule        STOP taking these medications    alpha lipoic acid (bulk) powder     coenzyme Q10 60 mg capsule  Commonly known as: COQ10     naproxen sodium 220 mg capsule              PROCEDURES / IMAGING      Operative Procedures  None    SARS-CoV-2 (COVID-19) (no units)   Date/Time Value   03/07/2023 1411 Positive (A)       Pertinent Imaging  CT ANGIOGRAPHY CHEST PULMONARY EMBOLISM WITH IV CONTRAST    Result Date: 3/9/2023  IMPRESSION: No CT evidence of pulmonary embolism. No acute pathology is demonstrated within the chest.     X-RAY CHEST 2 VIEWS    Result Date: 3/7/2023  IMPRESSION: No acute disease in chest. Stable appearance of a 9 mm nodule  within the left upper lobe, more completely characterized on previous CT imaging. COMMENT: Comparison: Chest x-ray 4/29/2020. CT chest 10/15/2022. Technique: PA frontal and lateral views of the chest were obtained. FINDINGS: There is stable appearance of a 9 mm nodule within the left upper lobe. There is no focal airspace consolidation, pleural effusion or pneumothorax. The cardiomediastinal silhouette is normal. The upper abdomen is within normal limits. There is no acute osseous abnormality. Surgical clips overlie the left axillary region.      OUTPATIENT  FOLLOW-UP / REFERRALS / PENDING TESTS        Outpatient Follow-Up Appointments            In 1 month Yris Alonso MD [facility] Electrophysiology at [facility]          Referrals  No orders of the defined types were placed in this encounter.      Test Results Pending at Discharge  Unresulted Labs (From admission, onward)     Start     Ordered    03/07/23 1058  [place] Draw Panel  STAT        Question Answer Comment   Red Top No Labels    Gold Top No Labels    Light Blue 1 Label    Lavender Top No Labels    Pink Top No Labels    Yellow - Urine Tall No Labels    Urine Culture Tube No Labels    Blood Culture No Labels        03/07/23 1057              DISCHARGE DISPOSITION AND DESTINATION      Disposition: Home   Destination:                              Code Status At Discharge: Full Code    Physician Order for Life-Sustaining Treatment Document Status      No documents found                 Dr. Van Pichardo

## 2023-03-11 NOTE — DISCHARGE INSTRUCTIONS
To Ms. Aleciakaren Cruz,    During this admission you were extensively worked up for your chief complaint of chest pain. You underwent a CT scan of your chest which did not reveal any evidence of pulmonary embolism or acute findings. You were further evaluated by our cardiology team and underwent a heart catherization which revealed mild non-obstructive coronary artery disease. You will need to take a baby aspirin (81mg) daily and atorvastatin 40mg daily to reduce your risk of heart attack and stroke in the future.     You were further found to be COVID positive this admission but were largely asymptomatic. You did not require any treatment for this. Today you will be done your quarantine period.     You further developed severe back pain prior to discharge. You can take over the counter acetaminophen 650mg every 6 hours as needed for mild pain. You were further prescribed lidocaine patches and as needed low-dose tramadol for severe pain.     Please follow up with your PCP within 1 month after discharge. It was a pleasure taking care of you!

## 2023-03-13 ENCOUNTER — PATIENT OUTREACH (OUTPATIENT)
Dept: CASE MANAGEMENT | Facility: CLINIC | Age: 63
End: 2023-03-13
Payer: COMMERCIAL

## 2023-03-13 ENCOUNTER — TELEPHONE (OUTPATIENT)
Dept: PRIMARY CARE | Facility: CLINIC | Age: 63
End: 2023-03-13

## 2023-03-13 ENCOUNTER — HOSPITAL ENCOUNTER (EMERGENCY)
Facility: HOSPITAL | Age: 63
Discharge: HOME | End: 2023-03-14
Attending: EMERGENCY MEDICINE
Payer: COMMERCIAL

## 2023-03-13 ENCOUNTER — APPOINTMENT (EMERGENCY)
Dept: RADIOLOGY | Facility: HOSPITAL | Age: 63
End: 2023-03-13
Attending: EMERGENCY MEDICINE
Payer: COMMERCIAL

## 2023-03-13 DIAGNOSIS — S30.1XXA HEMATOMA OF GROIN, INITIAL ENCOUNTER: Primary | ICD-10-CM

## 2023-03-13 LAB
ALBUMIN SERPL-MCNC: 3.7 G/DL (ref 3.4–5)
ALP SERPL-CCNC: 75 IU/L (ref 35–126)
ALT SERPL-CCNC: 61 IU/L (ref 11–54)
ANION GAP SERPL CALC-SCNC: 10 MEQ/L (ref 3–15)
AST SERPL-CCNC: 41 IU/L (ref 15–41)
BASOPHILS # BLD: 0.05 K/UL (ref 0.01–0.1)
BASOPHILS NFR BLD: 0.7 %
BILIRUB SERPL-MCNC: 0.4 MG/DL (ref 0.3–1.2)
BUN SERPL-MCNC: 13 MG/DL (ref 8–20)
CALCIUM SERPL-MCNC: 9.5 MG/DL (ref 8.9–10.3)
CHLORIDE SERPL-SCNC: 104 MEQ/L (ref 98–109)
CO2 SERPL-SCNC: 23 MEQ/L (ref 22–32)
CREAT SERPL-MCNC: 0.7 MG/DL (ref 0.6–1.1)
DIFFERENTIAL METHOD BLD: NORMAL
EOSINOPHIL # BLD: 0.09 K/UL (ref 0.04–0.36)
EOSINOPHIL NFR BLD: 1.2 %
ERYTHROCYTE [DISTWIDTH] IN BLOOD BY AUTOMATED COUNT: 12.6 % (ref 11.7–14.4)
GFR SERPL CREATININE-BSD FRML MDRD: >60 ML/MIN/1.73M*2
GLUCOSE SERPL-MCNC: 101 MG/DL (ref 70–99)
HCT VFR BLDCO AUTO: 39.1 % (ref 35–45)
HGB BLD-MCNC: 13.4 G/DL (ref 11.8–15.7)
IMM GRANULOCYTES # BLD AUTO: 0.02 K/UL (ref 0–0.08)
IMM GRANULOCYTES NFR BLD AUTO: 0.3 %
LYMPHOCYTES # BLD: 1.97 K/UL (ref 1.2–3.5)
LYMPHOCYTES NFR BLD: 26.4 %
MCH RBC QN AUTO: 31.9 PG (ref 28–33.2)
MCHC RBC AUTO-ENTMCNC: 34.3 G/DL (ref 32.2–35.5)
MCV RBC AUTO: 93.1 FL (ref 83–98)
MONOCYTES # BLD: 0.53 K/UL (ref 0.28–0.8)
MONOCYTES NFR BLD: 7.1 %
NEUTROPHILS # BLD: 4.8 K/UL (ref 1.7–7)
NEUTS SEG NFR BLD: 64.3 %
NRBC BLD-RTO: 0 %
PDW BLD AUTO: 10.4 FL (ref 9.4–12.3)
PLATELET # BLD AUTO: 212 K/UL (ref 150–369)
POTASSIUM SERPL-SCNC: 4.1 MEQ/L (ref 3.6–5.1)
PROT SERPL-MCNC: 6.4 G/DL (ref 6–8.2)
RBC # BLD AUTO: 4.2 M/UL (ref 3.93–5.22)
SODIUM SERPL-SCNC: 137 MEQ/L (ref 136–144)
WBC # BLD AUTO: 7.46 K/UL (ref 3.8–10.5)

## 2023-03-13 PROCEDURE — 63700000 HC SELF-ADMINISTRABLE DRUG: Performed by: EMERGENCY MEDICINE

## 2023-03-13 PROCEDURE — 85025 COMPLETE CBC W/AUTO DIFF WBC: CPT | Performed by: EMERGENCY MEDICINE

## 2023-03-13 PROCEDURE — 99283 EMERGENCY DEPT VISIT LOW MDM: CPT

## 2023-03-13 PROCEDURE — 93971 EXTREMITY STUDY: CPT | Mod: RT

## 2023-03-13 PROCEDURE — 36415 COLL VENOUS BLD VENIPUNCTURE: CPT

## 2023-03-13 PROCEDURE — 80053 COMPREHEN METABOLIC PANEL: CPT | Performed by: EMERGENCY MEDICINE

## 2023-03-13 PROCEDURE — 85025 COMPLETE CBC W/AUTO DIFF WBC: CPT

## 2023-03-13 RX ORDER — LIDOCAINE 560 MG/1
1 PATCH PERCUTANEOUS; TOPICAL; TRANSDERMAL ONCE
Status: DISCONTINUED | OUTPATIENT
Start: 2023-03-13 | End: 2023-03-14 | Stop reason: HOSPADM

## 2023-03-13 RX ORDER — DIPHENHYDRAMINE HCL 50 MG/ML
12.5 VIAL (ML) INJECTION ONCE
Status: DISCONTINUED | OUTPATIENT
Start: 2023-03-13 | End: 2023-03-13

## 2023-03-13 RX ORDER — METOCLOPRAMIDE HYDROCHLORIDE 5 MG/ML
10 INJECTION INTRAMUSCULAR; INTRAVENOUS ONCE
Status: DISCONTINUED | OUTPATIENT
Start: 2023-03-13 | End: 2023-03-13

## 2023-03-13 RX ADMIN — LIDOCAINE 1 PATCH: 4 PATCH TOPICAL at 23:55

## 2023-03-13 ASSESSMENT — ENCOUNTER SYMPTOMS
FATIGUE: 0
STIFFNESS: 0
FEVER: 0
EXTREMITY NUMBNESS: 0
MUSCLE WEAKNESS: 0
NECK PAIN: 0
BACK PAIN: 1

## 2023-03-13 NOTE — TELEPHONE ENCOUNTER
Patient had Cardiac cath on 3/9/2023 and was DC'd from hospital on 3/13/2023. Tested positive for COVID on 3/7/2023 in she was told her isolation was completed at discharge..  Femoral line came out 3/12/23.  Today she noticed yellow drainage site is very painful especially when she moves her leg around and with weightbearing. Denies any red streaks, any swelling. Although very tender to touch.  Will schedule appointment with first available provider.

## 2023-03-13 NOTE — TELEPHONE ENCOUNTER
Patient had Cardiac cath on 3/9/2023 and was DC'd from hospital on 3/13/2023. Tested positive for COVID on 3/7/2023 in she was told her isolation was completed at discharge..  Femoral line came out 3/12/23.  Today temp 97.4 she noticed yellow drainage site is very painful especially when she moves her leg around and with weightbearing. Denies any red streaks, any swelling. Although very tender to touch. initally office visit scheduled   Sent message to Dr. Lindsey she informed me send patient to the emergency room for an evaluation including an ultrasound that they are able to do in the ER to make sure she does not have a collection or an aneurysm.  Alecia said thank you and will go there now

## 2023-03-13 NOTE — Clinical Note
Patient DORIS call done. She will be in for a HFU (not DORIS) on 3/30 as this is when she would have ride. All is stable.

## 2023-03-13 NOTE — TELEPHONE ENCOUNTER
Patient called with complaints of pain and questions re caring for wound from recent hospital stay. Call transferred to [facility] for triage.

## 2023-03-13 NOTE — PROGRESS NOTES
03/13/2023 03:35 PM EDT by Ragini Platt, RN 03/13/2023 03:35 PM EDT by Ragini Platt, RN  Outgoing Alecia Nelson ([facility]) [phone] ([place])   Left MessageCommunicated - DORIS # 1

## 2023-03-14 VITALS
RESPIRATION RATE: 14 BRPM | WEIGHT: 130 LBS | OXYGEN SATURATION: 100 % | BODY MASS INDEX: 20.4 KG/M2 | HEART RATE: 75 BPM | SYSTOLIC BLOOD PRESSURE: 138 MMHG | TEMPERATURE: 98 F | DIASTOLIC BLOOD PRESSURE: 77 MMHG | HEIGHT: 67 IN

## 2023-03-14 PROCEDURE — 63700000 HC SELF-ADMINISTRABLE DRUG: Performed by: EMERGENCY MEDICINE

## 2023-03-14 RX ORDER — ACETAMINOPHEN 325 MG/1
TABLET ORAL
Status: DISCONTINUED
Start: 2023-03-14 | End: 2023-03-14 | Stop reason: HOSPADM

## 2023-03-14 RX ORDER — ACETAMINOPHEN 325 MG/1
650 TABLET ORAL ONCE
Status: COMPLETED | OUTPATIENT
Start: 2023-03-14 | End: 2023-03-14

## 2023-03-14 RX ADMIN — ACETAMINOPHEN 650 MG: 325 TABLET ORAL at 02:34

## 2023-03-14 ASSESSMENT — ENCOUNTER SYMPTOMS
DIFFICULTY URINATING: 0
CONSTIPATION: 1
LIGHT-HEADEDNESS: 0
BACK PAIN: 1
DIARRHEA: 0
DIZZINESS: 0
NAUSEA: 0
VOMITING: 0
SHORTNESS OF BREATH: 0

## 2023-03-14 NOTE — ED PROVIDER NOTES
Emergency Medicine Note  HPI   HISTORY OF PRESENT ILLNESS       History provided by:  Patient  Lower Extremity Issue  Lower extremity pain location: right groin.  Injury: yes    Mechanism of injury comment:  S/p cardiac cath  Pain details:     Quality:  Aching and throbbing    Radiates to:  Does not radiate    Severity:  Severe    Onset quality:  Gradual    Timing:  Constant    Progression:  Worsening  Chronicity:  New  Dislocation: no    Foreign body present:  No foreign bodies  Relieved by:  None tried  Worsened by:  Nothing  Ineffective treatments:  None tried  Associated symptoms: back pain and swelling    Associated symptoms: no decreased ROM, no fatigue, no fever, no itching, no muscle weakness, no neck pain, no numbness and no stiffness          Patient History   PAST HISTORY     Reviewed from Nursing Triage:       Past Medical History:   Diagnosis Date    A-fib (CMS/[facility])     Breast cancer (CMS/HCC) 2008    left partial mastectomy axillary dissection, whole breast radiation and chemotherapy in 2008 for a 2-1/2 cm invasive ductal carcinoma that was ER/OK negative and HER-2/alyson positive with 7-15 positive lymph nodes    Colon polyp     couple on each colonoscopy per patient report    Fibrocystic breast     Fibroid     History of partial hysterectomy 03/2000    c/o fibroids    History of radiation therapy     Hx antineoplastic chemo     Hx of lipoma 2008    resected from shoulder    Hypothyroidism     Malignant neoplasm of upper-outer quadrant of left breast in female, estrogen receptor negative (CMS/HCC) 9/10/2021    2008-2.5 cm cancer with 7 of 15+ nodes treated with partial mastectomy, axillary dissection, chemotherapy and whole breast radiation.    PAF (paroxysmal atrial fibrillation) (CMS/HCC)     PVC (premature ventricular contraction)     Screening for breast cancer        Past Surgical History:   Procedure Laterality Date    BREAST BIOPSY Left 2008    BREAST BIOPSY Right 2021    benign  ([facility] specimen [id number])    BREAST LUMPECTOMY Left 2008    [facility] specimen [id number]    DENTAL SURGERY      HYSTERECTOMY      partial    LUMBAR PUNCTURE      MANDIBLE FRACTURE SURGERY      PARTIAL HYSTERECTOMY      ROTATOR CUFF REPAIR Left     SINUS SURGERY         Family History   Problem Relation Age of Onset    Alzheimer's disease Biological Mother     Arrhythmia Biological Mother     Diabetes Biological Father     Hypertension Biological Sister     Melanoma Biological Sister     COPD Biological Brother     Prostate cancer Biological Brother     Stomach cancer Paternal Grandmother     Diabetes Paternal Grandfather     Prostate cancer Paternal Grandfather     Sarcoidosis Biological Sister     Diabetes Biological Sister     Heart disease Biological Sister     Hypertension Biological Brother     Prostate cancer Biological Brother     Lung cancer Father's Sister     Prostate cancer Father's Brother     Prostate cancer Father's Brother     Prostate cancer Father's Brother     Prostate cancer Father's Brother     Lung cancer Father's Brother     Lung cancer Father's Sister     Ovarian cancer Paternal Cousin     Lung cancer Paternal Cousin     Thyroid cancer Paternal Cousin        Social History     Tobacco Use    Smoking status: Never    Smokeless tobacco: Never   Substance Use Topics    Alcohol use: No    Drug use: No         Review of Systems   REVIEW OF SYSTEMS     Review of Systems   Constitutional: Negative for fatigue and fever.   Musculoskeletal: Positive for back pain. Negative for neck pain and stiffness.   Skin: Negative for itching.   All other systems reviewed and are negative.        VITALS     ED Vitals    Date/Time Temp Pulse Resp BP SpO2 [facility]   03/13/23 1835 36.4 °C (97.5 °F) 87 16 123/72 97 % SCF                       Physical Exam   PHYSICAL EXAM     Physical Exam  Vitals and nursing note reviewed.   Constitutional:       General: She is not in acute distress.      Appearance: She is well-developed. She is not ill-appearing, toxic-appearing or diaphoretic.   HENT:      Head: Normocephalic and atraumatic.      Nose: No congestion or rhinorrhea.      Mouth/Throat:      Mouth: Mucous membranes are moist.   Eyes:      Extraocular Movements: Extraocular movements intact.      Conjunctiva/sclera: Conjunctivae normal.      Pupils: Pupils are equal, round, and reactive to light.   Cardiovascular:      Rate and Rhythm: Normal rate and regular rhythm.      Heart sounds: Normal heart sounds.   Pulmonary:      Effort: Pulmonary effort is normal. No respiratory distress.      Breath sounds: Normal breath sounds.   Abdominal:      Palpations: Abdomen is soft.      Tenderness: There is no abdominal tenderness.   Musculoskeletal:         General: No tenderness.      Right lower leg: No edema.      Left lower leg: No edema.      Comments: Right groin with tender nodule 3cm x 2cm. Mukund drainage on bandage with no active drainage. No overlying erythema or warmth.   Skin:     General: Skin is warm and dry.      Capillary Refill: Capillary refill takes less than 2 seconds.   Neurological:      Mental Status: She is alert and oriented to person, place, and time.           PROCEDURES     Procedures     DATA     Results     Procedure Component Value Units Date/Time    Comprehensive metabolic panel [[id number]]  (Abnormal) Collected: 03/13/23 2039    Specimen: Blood, Venous Updated: 03/13/23 2225     Sodium 137 mEQ/L      Potassium 4.1 mEQ/L      Chloride 104 mEQ/L      CO2 23 mEQ/L      BUN 13 mg/dL      Creatinine 0.7 mg/dL      Glucose 101 mg/dL      Calcium 9.5 mg/dL      AST (SGOT) 41 IU/L      ALT (SGPT) 61 IU/L      Alkaline Phosphatase 75 IU/L      Total Protein 6.4 g/dL      Albumin 3.7 g/dL      Bilirubin, Total 0.4 mg/dL      eGFR >60.0 mL/min/1.73m*2      Anion Gap 10 mEQ/L     CBC and differential [[id number]] Collected: 03/13/23 2039    Specimen: Blood, Venous Updated: 03/13/23 2201      WBC 7.46 K/uL      RBC 4.20 M/uL      Hemoglobin 13.4 g/dL      Hematocrit 39.1 %      MCV 93.1 fL      MCH 31.9 pg      MCHC 34.3 g/dL      RDW 12.6 %      Platelets 212 K/uL      MPV 10.4 fL      Differential Type Auto     nRBC 0.0 %      Immature Granulocytes 0.3 %      Neutrophils 64.3 %      Lymphocytes 26.4 %      Monocytes 7.1 %      Eosinophils 1.2 %      Basophils 0.7 %      Immature Granulocytes, Absolute 0.02 K/uL      Neutrophils, Absolute 4.80 K/uL      Lymphocytes, Absolute 1.97 K/uL      Monocytes, Absolute 0.53 K/uL      Eosinophils, Absolute 0.09 K/uL      Basophils, Absolute 0.05 K/uL     [facility] Draw Panel [[id number]] Collected: 03/13/23 2039    Specimen: Blood, Venous Updated: 03/13/23 2154    Narrative:      The following orders were created for panel order [facility] Draw Panel.  Procedure                               Abnormality         Status                     ---------                               -----------         ------                     [facility] Blood Culture[[id number]]                            In process                   Please view results for these tests on the individual orders.    [facility] Blood Culture [[id number]] Collected: 03/13/23 2039    Specimen: Blood, Venous Updated: 03/13/23 2154          Imaging Results    None         No orders to display       Scoring tools                                  ED Course & MDM   MDM / ED COURSE / CLINICAL IMPRESSION / DISPO     MDM    ED Course as of 03/14/23 0203   Mon Mar 13, 2023   2246 Patient presents for evaluation of right groin pain.  Symptoms onset few days ago and have progressively worsened.  She noted some drainage over her bandage that covers the site where she had a cardiac catheterization performed a week ago.  Patient reports a temperature of 99 but no true fevers.  Marked tenderness noted over a palpable lump in her right groin.  Will ultrasound to assess for presence of pseudoaneurysm versus hematoma.  Clinically do not  suspect abscess or other infectious collection.  Blood work reviewed and unremarkable.  Will reevaluate. [KR]   Tue Mar 14, 2023   0202 Ultrasound without evidence of pseudoaneurysm.  Patient remains well-appearing and hemodynamically stable.  [place] for reevaluation.  Will discharge with instruction to follow-up with PMD for further outpatient work-up and evaluation. [KR]      ED Course User Index  [KR] Britney Zuleta MD     Clinical Impression      None               Britney Zuleta MD  03/14/23 0669

## 2023-03-14 NOTE — PROGRESS NOTES
NAME: Alecia Cruz    MRN: [id number]    YOB: 1960    Event Review:    Initial TCM Patient Outreach Date: 03/13/23    Assessment completed with: Patient  Patient stated reason for hospitalization: In for CP and extensive cardiac work up done. CT chest negative for PE or acute issues. Card Cath done which showed mild non-obstructive CAD. Continued on her Asa low dose and Atorvastatin added. Was instructed on lifestyle modification and med mgmt. HX afib but not on AC re: CHADSzVASC score low. She complained of back pain and was treated with Tylenol, Lido patches and low dose Tramadol. CM called patient and reviewed d/c papers and meds. Patient scheduled a HFU appt with PCP for 3/30-she would not have a ride to come in at a sooner date. Patient had gone to ER yesterday as CC site groin had a bump and was tender but US ruled out issues and she was sent home. She has some constipation on Colace so will now obtain and take Miralax. No DME use. Lives with dgt in a 2 SH. DX with Covid but largely asymptomatic and no treatment needed.      Discharge Diagnosis: [place]; CP    Patient readmitted in the last 30 days: No  Discharging Facility: [facility]  Date of Last Admission: 03/07/23  Date of Last Discharge: 03/11/23       Patient's perception of their health status since discharge: Improving        Review of Systems   Respiratory: Negative for shortness of breath.    Cardiovascular: Negative for chest pain.   Gastrointestinal: Positive for constipation. Negative for diarrhea, nausea and vomiting.   Genitourinary: Negative for difficulty urinating.   Musculoskeletal: Positive for back pain.        Tender CC site groin   Skin:        CC site groin with small bump   Neurological: Negative for dizziness and light-headedness.       Medication Review:    Medication Review: Yes     Reported by: Patient  Any new medications prescribed at discharge?: Yes  Is the patient having any side effects they  believe may be caused by any medication additions or changes?: Yes (constipation)  New prescriptions filled?: Yes     Do you have enough of your regularly prescribed medications?: Yes       Medication adherence problem?: No  Was a medication discrepancy indentified?: No       Nursing Interventions: Nurse provided patient education  Reconciled the current and discharge medications: Yes  Reviewed AVS (Discharge Instructions)?: Yes         Acute Pain:    Acute pain: Yes      Location of acute pain: tender groin site from CC       Diet/Nutrition:    Type of Diet: Regular, Cardiac 2mg sodium            Post-Discharge Durable Medical Equipment::    Durable Medical Equipment: None        Home Management:    Living Arrangement: Child  Support System:: Child/Children  Type of Residence: [address] [place] [address]  Home Monitoring: Blood Pressure          Appointment Scheduling:    PCP appointment scheduled: Yes  TCM Appointment Type: Hospital Follow-Up  Appointment Date: 03/30/23     Appointment Provider: Dr Begum            Follow-Ups:    Card: Dr Alonso: 4/26            Interventions/ Care Coordination:    Interventions/ Care Coordination: Encouraged patient to schedule with the PCP/Specialist, Addressed a knowledge deficit, Assisted patient in the scheduling of an appointment  General Education:  (constipation relief)       Reviewed signs/symptoms of worsening condition or complication that necessitate a call to the Physician's office.  Educated patient on access to care.  RN phone number given for future care management.    Ragini Platt, RN   [phone]

## 2023-03-17 ENCOUNTER — PATIENT OUTREACH (OUTPATIENT)
Dept: CASE MANAGEMENT | Facility: CLINIC | Age: 63
End: 2023-03-17
Payer: COMMERCIAL

## 2023-03-17 NOTE — PROGRESS NOTES
03/17/2023 04:43 PM EDT by Ragini Platt, RN 03/17/2023 04:43 PM EDT by Ragini Platt, RN  Outgoing Alecia Nelson (Self) [phone] (Home)   Reached PatientCommunicated - DORIS F/U: Patient called CM and CM returned call. Patient states she is feeling pretty well and she is back to work. She asked for fax number to send paper work over to Dr Begum so CM gave her fax number. CM will ask clinical staff to keep eye out for it.

## 2023-03-20 ENCOUNTER — PATIENT OUTREACH (OUTPATIENT)
Dept: CASE MANAGEMENT | Facility: CLINIC | Age: 63
End: 2023-03-20
Payer: COMMERCIAL

## 2023-03-20 NOTE — PROGRESS NOTES
03/20/2023 01:59 PM EDT by Ragini Platt, RN 03/20/2023 01:59 PM EDT by Ragini Platt, RN  Outgoing Alecia Nelson (Self) [phone] (Home)   Reached Patient: CM returned patient call to inform her that she sent over message to Dr Begum, PCP, and clinical staff to feel free to reach out to patient with any questions or concerns re: forms faxed over to be filled out for work.

## 2023-03-23 ENCOUNTER — PATIENT OUTREACH (OUTPATIENT)
Dept: CASE MANAGEMENT | Facility: CLINIC | Age: 63
End: 2023-03-23
Payer: COMMERCIAL

## 2023-03-23 NOTE — PROGRESS NOTES
03/23/2023 02:20 PM EDT by Ragini Platt RN 03/23/2023 02:20 PM EDT by Ragini Platt, RN  Outgoing Alecia Nelson (Self) [phone] (Home)   Reached Patient: Informed patient who is at work that CM can leave message so patient would like CM to do that.           03/23/2023 04:48 PM EDT by Ragini Platt RN 03/23/2023 04:48 PM EDT by Ragini Platt, RN  Outgoing Alecia Nelson (Self) [phone] (Home)   CM informed patient via VM that Dr Begum feels he has wrong forms to fill out. He should have the physician attestation forms but he has the accommodations form. CM asked patient to please get in touch with coordinator to see about getting the correct forms to Dr Begum at Fax .

## 2023-03-24 ENCOUNTER — PATIENT OUTREACH (OUTPATIENT)
Dept: CASE MANAGEMENT | Facility: CLINIC | Age: 63
End: 2023-03-24
Payer: COMMERCIAL

## 2023-03-24 NOTE — PROGRESS NOTES
03/24/2023 12:24 PM EDT by Ragini Platt RN 03/24/2023 12:24 PM EDT by Ragini Platt, RN  [facility] Alecia Nelson ([facility]) [phone] (Home)   Scheduled a T/M appt for Monday with Dr Begum to review forms.

## 2023-03-24 NOTE — PROGRESS NOTES
03/24/2023 10:06 AM EDT by Ragini Platt, RN 03/24/2023 10:06 AM EDT by Ragini Platt, RN  [facility] Alecia Nelson ([facility]) [phone] (Home)   Patient called to say she will have company refax forms to Dr Begum. CM updated Dr Begum.

## 2023-03-27 ENCOUNTER — TELEMEDICINE (OUTPATIENT)
Dept: PRIMARY CARE | Facility: CLINIC | Age: 63
End: 2023-03-27
Payer: COMMERCIAL

## 2023-03-27 ENCOUNTER — PATIENT OUTREACH (OUTPATIENT)
Dept: CASE MANAGEMENT | Facility: CLINIC | Age: 63
End: 2023-03-27
Payer: COMMERCIAL

## 2023-03-27 DIAGNOSIS — Z02.89 ENCOUNTER FOR COMPLETION OF FORM WITH PATIENT: Primary | ICD-10-CM

## 2023-03-27 PROCEDURE — 99213 OFFICE O/P EST LOW 20 MIN: CPT | Mod: 95 | Performed by: STUDENT IN AN ORGANIZED HEALTH CARE EDUCATION/TRAINING PROGRAM

## 2023-03-27 NOTE — PROGRESS NOTES
Verification of Patient Location:  The patient affirms they are currently located in the following state: [place]    Request for Consent:    Audio and Video Encounter   Krish, my name is Mahendra Begum DO.  Before we proceed, can you please verify your identification by telling me your full name and date of birth?  Can you tell me who is in the room with you?    You and I are about to have a telemedicine check-in or visit because you have requested it.  This is a live video-conference.  I am a real person, speaking to you in real time.  There is no one else with me on the video-conference. I am not recording this conversation and I am asking you not to record it.  This telemedicine visit will be billed to your health insurance or you, if you are self-insured.  You understand you will be responsible for any copayments or coinsurances that apply to your telemedicine visit.  Communication platform used for this encounter:  [employer] Video Visit (Epic Video Client)       Before starting our telemedicine visit, I am required to get your consent for this virtual check-in or visit by telemedicine. Do you consent?      Patient Response to Request for Consent:  Yes      Visit Documentation:  Subjective     Patient ID: Alecia Cruz is a 62 y.o. female.  1960      HPI  Alecia Cruz is a 62 y.o. year-old female, primary patient of Dr. Begum presents for form completion.    Interval History: Needs a form completed for the [employer] for reasonable commendations in her workplace.  All accommodations seem completely reasonable.    She does have lymphedema and her employer occasionally requires her to move heavy boxes which exacerbates her left upper extremity edema causing worsening pain and also swelling.  Also spends long periods of time looking at a screen which exacerbates her migraines.  Has been also asked to work late into the evening until close to 1 AM which in the setting of her migraines as well as  depression/anxiety this is not conducive to a normal sleep-wake cycle which can exacerbate the symptoms.  Also would like to get a better chair given that she feels like her loss of subcutaneous tissues placing her at risk of decubitus ulcer.    Needs to have this form completed and sent in ASAP so we are doing this via video visit rather than in person.  The following have been reviewed and updated as appropriate in this visit:   Allergies  Meds  Problems       Review of Systems  A 10 point review of symptoms was asked negative as otherwise stated.    Assessment/Plan   Diagnoses and all orders for this visit:    Encounter for completion of form with patient (Primary)  Assessment & Plan:  - Here for completion of form for reasonable accommodations.  - We may need to talk about reasonable accommodations that she is requesting to make sure that forms completed completed correctly.  - Requesting form completion due to lymphedema and that her job has been asking her to move boxes which exacerbate her lymphedema, sitting is causing buttock pain and given weight loss concern for potential risk of developing decubitus ulcer, and also increased anxiety/depression/migraines not conducive to working late night or having prolonged periods of sitting in front of a computer.  - Form completed and given to medical assistant for faxing.  Accommodations seem reasonable.      She also notes that there are some concerns about the hematoma site in her thigh where she had cardiac catheterization done.  I recommended that she call the cardiologist that did the procedure so that they can take a look at this in person otherwise I will look at it when I see her on 3/30.    Time Spent:  I spent 15 minutes on this date of service performing the following activities: obtaining history, entering orders, documenting, providing counseling and education and coordinating care.

## 2023-03-27 NOTE — PROGRESS NOTES
03/27/2023 12:35 PM EDT by Ragini Platt, RN 03/27/2023 12:35 PM EDT by Ragini Platt, RN  [facility] Alecia Nelson (Self) [phone] (Home)   Patient called  stating she spoke with Dr Begum re: work forms needing to be filled out. She states she forgot to tell him one thing. Her work has outbreaks of bedbugs and she wants to work from home during these outbreaks because if she gets bite in her arm with lymphedema, she could develop infection. Will pass this onto Dr Begum.

## 2023-03-27 NOTE — ASSESSMENT & PLAN NOTE
- Here for completion of form for reasonable accommodations.  - We may need to talk about reasonable accommodations that she is requesting to make sure that forms completed completed correctly.  - Requesting form completion due to lymphedema and that her job has been asking her to move boxes which exacerbate her lymphedema, sitting is causing buttock pain and given weight loss concern for potential risk of developing decubitus ulcer, and also increased anxiety/depression/migraines not conducive to working late night or having prolonged periods of sitting in front of a computer.  - Form completed and given to medical assistant for faxing.  Accommodations seem reasonable.

## 2023-03-31 ENCOUNTER — PATIENT OUTREACH (OUTPATIENT)
Dept: CASE MANAGEMENT | Facility: CLINIC | Age: 63
End: 2023-03-31
Payer: COMMERCIAL

## 2023-03-31 NOTE — PROGRESS NOTES
03/31/2023 09:21 AM EDT by Ragini Platt RN 03/31/2023 09:21 AM EDT by Ragini Platt, RN  Outgoing Alecia Nelson (Self) [phone] (Home)   No Answer/BusyCommunicated - MB full:    Returned patient call. She had left a message for CM to apologize to Dr Begum for missing appt yesterday. She was stuck at work and had left her phone at home and di not have number to call him. She wants to reschedule.        03/31/2023 10:04 AM EDT by Ragini Platt RN 03/31/2023 10:04 AM EDT by Ragini Platt, RN  Incoming Alecia Nelson (Self) [phone] (Home)   Patient called back to reschedule appt-scheduled for 4/3 Monday.

## 2023-04-03 ENCOUNTER — OFFICE VISIT (OUTPATIENT)
Dept: CARDIOLOGY | Facility: CLINIC | Age: 63
End: 2023-04-03
Payer: COMMERCIAL

## 2023-04-03 ENCOUNTER — TELEPHONE (OUTPATIENT)
Dept: SCHEDULING | Facility: CLINIC | Age: 63
End: 2023-04-03
Payer: COMMERCIAL

## 2023-04-03 ENCOUNTER — OFFICE VISIT (OUTPATIENT)
Dept: PRIMARY CARE | Facility: CLINIC | Age: 63
End: 2023-04-03
Payer: COMMERCIAL

## 2023-04-03 ENCOUNTER — OFFICE VISIT (OUTPATIENT)
Dept: VASCULAR SURGERY | Facility: CLINIC | Age: 63
End: 2023-04-03
Payer: COMMERCIAL

## 2023-04-03 VITALS
HEIGHT: 67 IN | SYSTOLIC BLOOD PRESSURE: 122 MMHG | HEART RATE: 90 BPM | DIASTOLIC BLOOD PRESSURE: 72 MMHG | RESPIRATION RATE: 18 BRPM | OXYGEN SATURATION: 98 % | WEIGHT: 140 LBS | BODY MASS INDEX: 21.97 KG/M2

## 2023-04-03 VITALS — HEART RATE: 74 BPM | BODY MASS INDEX: 21.19 KG/M2 | OXYGEN SATURATION: 97 % | WEIGHT: 135 LBS | HEIGHT: 67 IN

## 2023-04-03 VITALS
WEIGHT: 138 LBS | HEIGHT: 67 IN | SYSTOLIC BLOOD PRESSURE: 130 MMHG | OXYGEN SATURATION: 98 % | HEART RATE: 77 BPM | BODY MASS INDEX: 21.66 KG/M2 | RESPIRATION RATE: 18 BRPM | TEMPERATURE: 98.5 F | DIASTOLIC BLOOD PRESSURE: 70 MMHG

## 2023-04-03 DIAGNOSIS — R10.31 RIGHT GROIN PAIN: Primary | ICD-10-CM

## 2023-04-03 DIAGNOSIS — R01.1 HEART MURMUR: Primary | ICD-10-CM

## 2023-04-03 DIAGNOSIS — E03.9 HYPOTHYROIDISM, UNSPECIFIED TYPE: ICD-10-CM

## 2023-04-03 DIAGNOSIS — R63.4 WEIGHT LOSS: ICD-10-CM

## 2023-04-03 DIAGNOSIS — R61 NIGHT SWEATS: ICD-10-CM

## 2023-04-03 DIAGNOSIS — R10.31 GROIN PAIN, RIGHT: Primary | ICD-10-CM

## 2023-04-03 DIAGNOSIS — Z01.419 VISIT FOR PELVIC EXAM: ICD-10-CM

## 2023-04-03 PROBLEM — I25.10 CORONARY ARTERY DISEASE INVOLVING NATIVE CORONARY ARTERY OF NATIVE HEART WITHOUT ANGINA PECTORIS: Status: ACTIVE | Noted: 2023-04-03

## 2023-04-03 PROBLEM — R07.9 CHEST PAIN, UNSPECIFIED TYPE: Status: RESOLVED | Noted: 2023-03-09 | Resolved: 2023-04-03

## 2023-04-03 PROBLEM — R19.4 BOWEL HABIT CHANGES: Status: RESOLVED | Noted: 2022-10-01 | Resolved: 2023-04-03

## 2023-04-03 PROBLEM — J06.9 ACUTE URI: Status: RESOLVED | Noted: 2020-02-13 | Resolved: 2023-04-03

## 2023-04-03 PROBLEM — Z02.89 ENCOUNTER FOR COMPLETION OF FORM WITH PATIENT: Status: RESOLVED | Noted: 2023-03-27 | Resolved: 2023-04-03

## 2023-04-03 PROBLEM — R07.9 CHEST PAIN: Status: RESOLVED | Noted: 2023-03-07 | Resolved: 2023-04-03

## 2023-04-03 PROBLEM — U07.1 COVID: Status: RESOLVED | Noted: 2022-08-23 | Resolved: 2023-04-03

## 2023-04-03 PROCEDURE — 99204 OFFICE O/P NEW MOD 45 MIN: CPT | Performed by: SURGERY

## 2023-04-03 PROCEDURE — 1111F DSCHRG MED/CURRENT MED MERGE: CPT | Performed by: STUDENT IN AN ORGANIZED HEALTH CARE EDUCATION/TRAINING PROGRAM

## 2023-04-03 PROCEDURE — 99205 OFFICE O/P NEW HI 60 MIN: CPT | Performed by: INTERNAL MEDICINE

## 2023-04-03 PROCEDURE — 3008F BODY MASS INDEX DOCD: CPT | Performed by: STUDENT IN AN ORGANIZED HEALTH CARE EDUCATION/TRAINING PROGRAM

## 2023-04-03 PROCEDURE — 3008F BODY MASS INDEX DOCD: CPT | Performed by: SURGERY

## 2023-04-03 PROCEDURE — 3008F BODY MASS INDEX DOCD: CPT | Performed by: INTERNAL MEDICINE

## 2023-04-03 PROCEDURE — 99214 OFFICE O/P EST MOD 30 MIN: CPT | Performed by: STUDENT IN AN ORGANIZED HEALTH CARE EDUCATION/TRAINING PROGRAM

## 2023-04-03 ASSESSMENT — PAIN SCALES - GENERAL: PAINLEVEL: 6

## 2023-04-03 NOTE — ASSESSMENT & PLAN NOTE
-long differential but so far all workup negative  -needs pelvic examination  -return to care in 3 months pending eval by gyn.

## 2023-04-03 NOTE — LETTER
April 3, 2023     Hank Laurent,   [address]  [place] [address]    Patient: Alecia Cruz  YOB: 1960  Date of Visit: 4/3/2023      Dear Dr. Laurent:    Thank you for referring Alecia Cruz to me for evaluation. Below are my notes for this consultation.    If you have questions, please do not hesitate to call me. I look forward to following your patient along with you.         Sincerely,        Rodo Rossi DO        CC: No Recipients    Hoa Ramsey PA C  4/3/2023  2:32 PM  Sign when Signing Visit    Interventional Cardiology Consultation    Subjective     Reason for Consult: Left groin pain s/p cath on 3/10/23    HPI: Alecia Cruz is a 62 y.o. female who was recently discharged from the hospital.  She was admitted for severe chest pain with associated shortness of breath radiating down the left arm.  She had to rest for 3 to 5 minutes.  She was admitted for evaluation of cardiac source due to T wave inversions in the precordial leads.  She was incidentally known to be COVID-positive in the emergency department.  She underwent CTAngiography of the chest that was without evidence of pulmonary embolism.  Previously noted right lower lobe pulmonary nodule identified on CT scan of the abdomen dated 12/29/2022 was not evident on repeat CT scan to be enlarged.  A consistent left upper lobe circumscribed solid nodule is stable from 2/29/2010.  She underwent cardiac catheterization on 3/10/2023 that showed mild nonobstructive coronary artery disease.  She was started on atorvastatin 40 mg.     She subsequently developed right groin pain and noted yellow drainage around the femoral access site. She was instructed to go to the emergency department for evaluation.  She was seen there and had point-of-care ultrasound done that showed no evidence of pseudoaneurysm.  It was believed that she had a hematoma and was discharged.  3 days after discharge  she was resting in bed and felt moisture at the groin site.  She had a small amount of discharge.  Patient still with pain radiating down her left leg.  Site is clean, dry and intact.  Pulsatile.       PCP: Dr. Mahendra Begum     Medical History:   Current Outpatient Medications   Medication Sig Dispense Refill   • ascorbic acid (VITAMIN C) 500 mg tablet Take 500 mg by mouth daily.     • aspirin 81 mg enteric coated tablet Take 81 mg by mouth daily.     • atorvastatin (LIPITOR) 40 mg tablet Take 1 tablet (40 mg total) by mouth daily. 30 tablet 1   • azelastine (ASTELIN) 137 mcg (0.1 %) nasal spray Administer 2 sprays into each nostril 2 (two) times a day as needed.     • betamethasone dipropionate (DIPROSONE) 0.05 % lotion Apply 1 each topically daily as needed.     • cetirizine (ZyrTEC) 10 mg tablet Take 10 mg by mouth as needed.     • cholecalciferol, vitamin D3, 1,000 unit (25 mcg) tablet Take 1,000 Units by mouth daily.     • docusate sodium (COLACE) 100 mg capsule Take 2 capsules (200 mg total) by mouth 2 (two) times a day as needed for constipation. 60 capsule 0   • flecainide (TAMBOCOR) 50 mg tablet Take 1 tablet (50 mg total) by mouth 2 (two) times a day. 180 tablet 3   • levothyroxine (SYNTHROID) 75 mcg tablet Take 1 tablet (75 mcg total) by mouth daily. 90 tablet 3   • lidocaine (ASPERCREME) 4 % adhesive patch,medicated topical patch Apply 1 patch topically daily. 30 patch 0   • metoprolol succinate XL (TOPROL-XL) 25 mg 24 hr tablet Take 12.5 mg by mouth daily as needed.     • multivitamin (THERAGRAN) tablet Take 1 tablet by mouth daily.     • omega-3-dha-epa-dpa-fish oil 1,050 mg(300 mg -675 mg-75 mg) capsule Take 1 capsule by mouth daily.     • polyethylene glycol (MIRALAX) 17 gram packet Take 17 g by mouth daily as needed (Constipation) for up to 3 days. 30 each 0     No current facility-administered medications for this visit.       Surgical History:   Past Surgical History:   Procedure Laterality Date    • BREAST BIOPSY Left 2008   • BREAST BIOPSY Right 2021    benign ([facility] specimen [id number])   • BREAST LUMPECTOMY Left 2008    [facility] specimen [id number]   • DENTAL SURGERY     • HYSTERECTOMY      partial   • LUMBAR PUNCTURE     • MANDIBLE FRACTURE SURGERY     • PARTIAL HYSTERECTOMY     • ROTATOR CUFF REPAIR Left    • SINUS SURGERY         Social History:   Social History     Socioeconomic History   • Marital status:      Spouse name: None   • Number of children: None   • Years of education: None   • Highest education level: None   Tobacco Use   • Smoking status: Never   • Smokeless tobacco: Never   Vaping Use   • Vaping status: Never Used   Substance and Sexual Activity   • Alcohol use: No   • Drug use: No   Social History Narrative    Pt is .      Social Determinants of Health     Food Insecurity: No Food Insecurity (3/13/2023)    Hunger Vital Sign    • Worried About Running Out of Food in the Last Year: Never true    • Ran Out of Food in the Last Year: Never true   Transportation Needs: No Transportation Needs (3/10/2023)    PRAPARE - Transportation    • Lack of Transportation (Medical): No    • Lack of Transportation (Non-Medical): No       Family History:   Family History   Problem Relation Age of Onset   • Alzheimer's disease Biological Mother    • Arrhythmia Biological Mother    • Diabetes Biological Father    • Hypertension Biological Sister    • Melanoma Biological Sister    • COPD Biological Brother    • Prostate cancer Biological Brother    • Stomach cancer Paternal Grandmother    • Diabetes Paternal Grandfather    • Prostate cancer Paternal Grandfather    • Sarcoidosis Biological Sister    • Diabetes Biological Sister    • Heart disease Biological Sister    • Hypertension Biological Brother    • Prostate cancer Biological Brother    • Lung cancer Father's Sister    • Prostate cancer Father's Brother    • Prostate cancer Father's Brother    • Prostate cancer Father's Brother    • Prostate  cancer Father's Brother    • Lung cancer Father's Brother    • Lung cancer Father's Sister    • Ovarian cancer Paternal Cousin    • Lung cancer Paternal Cousin    • Thyroid cancer Paternal Cousin        Allergies: Hydrocodone-acetaminophen, Trazodone-dietary supp no.8, Amoxicillin trihydrate, Cephalexin, Ciprofloxacin, Codeine, Potassium clavulanate, and Sulfa (sulfonamide antibiotics)     Home Medications:   Current Outpatient Medications   Medication Sig Dispense Refill   • ascorbic acid (VITAMIN C) 500 mg tablet Take 500 mg by mouth daily.     • aspirin 81 mg enteric coated tablet Take 81 mg by mouth daily.     • atorvastatin (LIPITOR) 40 mg tablet Take 1 tablet (40 mg total) by mouth daily. 30 tablet 1   • azelastine (ASTELIN) 137 mcg (0.1 %) nasal spray Administer 2 sprays into each nostril 2 (two) times a day as needed.     • betamethasone dipropionate (DIPROSONE) 0.05 % lotion Apply 1 each topically daily as needed.     • cetirizine (ZyrTEC) 10 mg tablet Take 10 mg by mouth as needed.     • cholecalciferol, vitamin D3, 1,000 unit (25 mcg) tablet Take 1,000 Units by mouth daily.     • docusate sodium (COLACE) 100 mg capsule Take 2 capsules (200 mg total) by mouth 2 (two) times a day as needed for constipation. 60 capsule 0   • flecainide (TAMBOCOR) 50 mg tablet Take 1 tablet (50 mg total) by mouth 2 (two) times a day. 180 tablet 3   • levothyroxine (SYNTHROID) 75 mcg tablet Take 1 tablet (75 mcg total) by mouth daily. 90 tablet 3   • lidocaine (ASPERCREME) 4 % adhesive patch,medicated topical patch Apply 1 patch topically daily. 30 patch 0   • metoprolol succinate XL (TOPROL-XL) 25 mg 24 hr tablet Take 12.5 mg by mouth daily as needed.     • multivitamin (THERAGRAN) tablet Take 1 tablet by mouth daily.     • omega-3-dha-epa-dpa-fish oil 1,050 mg(300 mg -675 mg-75 mg) capsule Take 1 capsule by mouth daily.     • polyethylene glycol (MIRALAX) 17 gram packet Take 17 g by mouth daily as needed (Constipation) for  "up to 3 days. 30 each 0     No current facility-administered medications for this visit.       Review of Systems:  A 14-point review of system was performed and was negative, or as documented above. 10 systems examined.     Objective     Physical Exam:  Visit Vitals  /72 (BP Location: Left upper arm, Patient Position: Sitting)   Pulse 90   Resp 18   Ht 1.702 m (5' 7\")   Wt 63.5 kg (140 lb)   SpO2 98%   BMI 21.93 kg/m²        Physical Exam:  General: WDWN, No acute distress  HEENT: Normocephalic and atraumatic. EOMI.  PERRLA.    Neck: No jugular vein distention.  Carotids are equal with no audible bruits.    Heart: Regular rate and rhythm.  Normal S1-S2.  No murmurs, no friction rub appreciated   Lungs: BS normal, clear to auscultation bilaterally.  No rales or wheezes.  Abdomen: Soft and nontender.  Bowel sounds are present.   Extremities: No cyanosis clubbing or edema.  Distal pulses are intact 2+ bilaterally. Doppler pulsatile Left fem   Skin: Warm, dry and well perfused.  Psych: Normal mood and judgment.  Neurologic: Alert and oriented ×3.      Labs:  Pertinent lab results reviewed.    Lab Results   Component Value Date    WBC 7.46 03/13/2023    HGB 13.4 03/13/2023    HCT 39.1 03/13/2023     03/13/2023    CHOL 180 03/08/2023    TRIG 39 03/08/2023    HDL 59 03/08/2023    ALT 61 (H) 03/13/2023    AST 41 03/13/2023     03/13/2023    K 4.1 03/13/2023     03/13/2023    CREATININE 0.7 03/13/2023    BUN 13 03/13/2023    CO2 23 03/13/2023    TSH 1.08 03/07/2023    INR 0.9 03/07/2023    HGBA1C 5.5 03/07/2023       Imaging:    3/13/23 Left LE ultrasound   CLINICAL HISTORY:   Right lower extremity pain and swelling, clinical concern  for deep venous thrombosis.     COMMENT:    Multiple real time images of the deep venous system of the right  lower extremity were performed.  Compression maneuvers as well as duplex and  color Doppler evaluations were also performed.  There is normal " compressibility  of the right common femoral, femoral and  popliteal veins. Additionally  visualized portions of the upper calf veins are compressible.     Doppler evaluation is  grossly unremarkable.     Additionally, there is no discrete evidence of a pseudoaneurysm or hematoma  about the right common femoral artery.     IMPRESSION:  No evidence of deep venous thrombosis in the right femoropopliteal system.      Assessment / Plan   Patient with pulsatile groin pain at left groin cath site. Cath was 3/10/23.  Ultrasound done on 3/13/23 revealed no signs of pseudo aneurysm. 3/25/13 patient noticed discharge at groin site.  Patient taken to vascular surgery outpatient office to be seen by Dr. Webb (which we greatly appreciate!)    Hoa YEPEZ PA-C, hasmukh scribing for and in the presence of Dr Rossi.             Hoa YEPEZ PA-C, am scribing for and in the presence of Dr Rossi

## 2023-04-03 NOTE — LETTER
April 3, 2023     Mahendra Begum,   [address]  [place], [address]  [facility] [address]    Patient: Alecia Cruz  YOB: 1960  Date of Visit: 4/3/2023      Dear Dr. Begum:    Thank you for referring Alecia Cruz to me for evaluation. Below are my notes for this consultation.    If you have questions, please do not hesitate to call me. I look forward to following your patient along with you.         Sincerely,        Hank Laurent DO        CC: DO Anastasiia Gresham Vincent M, DO  4/3/2023  2:49 PM  Sign when Signing Visit        4/3/2023    Alecia Cruz is a 62 y.o. female who presents for evaluation of potential pseudoaneurysm in her right groin.  The patient recently underwent a cardiac catheterization at the beginning of March and subsequently developed pain and swelling in her right groin.  She had an ultrasound done in this right groin in the middle of March which was negative for DVT or pseudoaneurysm.  Repeat ultrasound was done today which was also negative for pseudoaneurysm.  She reports continued discomfort in her right groin that occasionally goes down the anterior aspect of her right leg.  She does have a history of low back radiculopathy.    Past Medical History:   Past Medical History:   Diagnosis Date   • A-fib (CMS/HCC)    • Breast cancer (CMS/HCC) 2008    left partial mastectomy axillary dissection, whole breast radiation and chemotherapy in 2008 for a 2-1/2 cm invasive ductal carcinoma that was ER/MN negative and HER-2/alyson positive with 7-15 positive lymph nodes   • Colon polyp     couple on each colonoscopy per patient report   • COVID 8/23/2022   • Fibrocystic breast    • Fibroid    • History of partial hysterectomy 03/2000    c/o fibroids   • History of radiation therapy    • Hx antineoplastic chemo    • Hx of lipoma 2008    resected from shoulder   • Hypothyroidism    • Malignant neoplasm of upper-outer quadrant of left breast in female,  estrogen receptor negative (CMS/HCC) 9/10/2021    2008-2.5 cm cancer with 7 of 15+ nodes treated with partial mastectomy, axillary dissection, chemotherapy and whole breast radiation.   • PAF (paroxysmal atrial fibrillation) (CMS/HCC)    • PVC (premature ventricular contraction)    • Screening for breast cancer      Surgical History:   Past Surgical History:   Procedure Laterality Date   • BREAST BIOPSY Left 2008   • BREAST BIOPSY Right 2021    benign ([facility] specimen [id number])   • BREAST LUMPECTOMY Left 2008    [facility] specimen [id number]   • DENTAL SURGERY     • HYSTERECTOMY      partial   • LUMBAR PUNCTURE     • MANDIBLE FRACTURE SURGERY     • PARTIAL HYSTERECTOMY     • ROTATOR CUFF REPAIR Left    • SINUS SURGERY       Social History:   Social History     Social History Narrative    Pt is .       Social History     Tobacco Use   Smoking Status Never   Smokeless Tobacco Never   Vaping Use   Vaping Status Never Used     Allergies: Hydrocodone-acetaminophen, Trazodone-dietary supp no.8, Amoxicillin trihydrate, Cephalexin, Ciprofloxacin, Codeine, Potassium clavulanate, and Sulfa (sulfonamide antibiotics)  Current Medications: •  ascorbic acid  •  aspirin  •  atorvastatin  •  azelastine  •  betamethasone dipropionate  •  cetirizine  •  cholecalciferol (vitamin D3)  •  docusate sodium  •  flecainide  •  levothyroxine  •  lidocaine  •  metoprolol succinate XL  •  multivitamin  •  omega-3-dha-epa-dpa-fish oil  •  polyethylene glycol    Review of Systems   Systemic: No fever, no chills, and no recent weight change. No headache.  Cardiovascular: No chest pain or discomfort, no palpitations.  Respiratory: No wheezing.  Gastrointestinal: Appetite without significant change. No dysphagia, no active abdominal pain, and no melena. No bright red blood per rectum.  Hematologic: No easy bleeding and no tendency for easy bruising.   Integumentary: No obvious masses  Musculoskeletal: No new muscle tenderness.  Neurological: No new  motor disturbances, or sensory disturbances.   Psychological: Appropriate.  Skin: No pruritus. No skin lesions and no rash    Physicial Exam  Vitals:    04/03/23 1432   Pulse: 74   SpO2: 97%     Head: Normocephalic/atraumatic  Eyes: Anicteric sclera, pupils equal round and reactive to light, extraocular muscles are intact  Neck: Supple to palpation, no jugular venous distention is appreciated, no carotid bruits  Lymphatic: No head, neck, axillary or inguinal adenopathy  Chest: Clear to auscultation bilaterally with good respiratory effort  Heart: Regular rate, no palpable thrills, no audible bruit, no enlarged PMI  Abdomen: Soft, nontender, nondistended. Good bowel sounds. No abdominal masses. No bruits auscultated  Neuro: No focal changes appreciated, cranial nerves XII grossly intact  Extremities: Upper extremity palpable brachial and radial pulses no edema. Lower extremity palpable femoral, popliteal, and pedal pulses bilaterally. Good light touch sensation with motor function. No open wounds. Compartments soft palpation bilaterally. Feet warm with good coloration, hair and nail growth.  There is a small amount of enlarged soft tissue at the right common femoral artery access site.  The common femoral artery is not enlarged and there is no evidence of increased pulsatility or pseudoaneurysm.  There is no induration or tissue breakdown.    Problem List Items Addressed This Visit        Nervous    Groin pain, right - Primary    Current Assessment & Plan     I did an ultrasound of the patient in the office today.  There is definitive no evidence of a pseudoaneurysm or other vascular abnormality by her study.  My impression is that she may have had a pseudoaneurysm that subsequently clotted off and that was the reason why she initially developed a significant enlargement to her artery and then it decreased in size.  This may be associated with some post PSA neuralgia and I would recommend evaluation with OT and a  nonsteroidal anti-inflammatory and a warm compress to this area.  If not significant improvement would recommend evaluation with PMNR.            Hank Laurent DO  2:49 PM  4/3/2023      Thank you very much for allowing us to participate in the care of your patient. Please do not hesitate to call or email if there are any questions.     Sincerely,  Augustine Morales - [phone]  email - [email or web address]       This document was generated utilizing voice recognition technology. An attempt at proofreading has been made to minimize errors but topographical errors may be present.

## 2023-04-03 NOTE — ASSESSMENT & PLAN NOTE
- Spoke to cardiology and they will take her today for evaluation and decide if they want to do an ultrasound to evaluate vascular side better.  - I went over this with patient and she is agreeable.

## 2023-04-03 NOTE — TELEPHONE ENCOUNTER
Reviewed with Dr. Rossi, I left her a phone message for her to come over to our office after her PCP visit.  MYKE Gaffney is going to evaluate her groin.  Of note she was in the emergency department on 13 March, negative for pseudoaneurysm.

## 2023-04-03 NOTE — PROGRESS NOTES
Interventional Cardiology Consultation    Subjective     Reason for Consult: Left groin pain s/p cath on 3/10/23    HPI: Alecia Cruz is a 62 y.o. female who was recently discharged from the hospital.  She was admitted for severe chest pain with associated shortness of breath radiating down the left arm.  She had to rest for 3 to 5 minutes.  She was admitted for evaluation of cardiac source due to T wave inversions in the precordial leads.  She was incidentally known to be COVID-positive in the emergency department.  She underwent CTAngiography of the chest that was without evidence of pulmonary embolism.  Previously noted right lower lobe pulmonary nodule identified on CT scan of the abdomen dated 12/29/2022 was not evident on repeat CT scan to be enlarged.  A consistent left upper lobe circumscribed solid nodule is stable from 2/29/2010.  She underwent cardiac catheterization on 3/10/2023 that showed mild nonobstructive coronary artery disease.  She was started on atorvastatin 40 mg.     She subsequently developed right groin pain and noted yellow drainage around the femoral access site. She was instructed to go to the emergency department for evaluation.  She was seen there and had point-of-care ultrasound done that showed no evidence of pseudoaneurysm.  It was believed that she had a hematoma and was discharged.  3 days after discharge she was resting in bed and felt moisture at the groin site.  She had a small amount of discharge.  Patient still with pain radiating down her left leg.  Site is clean, dry and intact.  Pulsatile.       PCP: Dr. Mahendra Begum     Medical History:   Current Outpatient Medications   Medication Sig Dispense Refill   • ascorbic acid (VITAMIN C) 500 mg tablet Take 500 mg by mouth daily.     • aspirin 81 mg enteric coated tablet Take 81 mg by mouth daily.     • atorvastatin (LIPITOR) 40 mg tablet Take 1 tablet (40 mg total) by mouth daily. 30 tablet 1   • azelastine (ASTELIN) 137  mcg (0.1 %) nasal spray Administer 2 sprays into each nostril 2 (two) times a day as needed.     • betamethasone dipropionate (DIPROSONE) 0.05 % lotion Apply 1 each topically daily as needed.     • cetirizine (ZyrTEC) 10 mg tablet Take 10 mg by mouth as needed.     • cholecalciferol, vitamin D3, 1,000 unit (25 mcg) tablet Take 1,000 Units by mouth daily.     • docusate sodium (COLACE) 100 mg capsule Take 2 capsules (200 mg total) by mouth 2 (two) times a day as needed for constipation. 60 capsule 0   • flecainide (TAMBOCOR) 50 mg tablet Take 1 tablet (50 mg total) by mouth 2 (two) times a day. 180 tablet 3   • levothyroxine (SYNTHROID) 75 mcg tablet Take 1 tablet (75 mcg total) by mouth daily. 90 tablet 3   • lidocaine (ASPERCREME) 4 % adhesive patch,medicated topical patch Apply 1 patch topically daily. 30 patch 0   • metoprolol succinate XL (TOPROL-XL) 25 mg 24 hr tablet Take 12.5 mg by mouth daily as needed.     • multivitamin (THERAGRAN) tablet Take 1 tablet by mouth daily.     • omega-3-dha-epa-dpa-fish oil 1,050 mg(300 mg -675 mg-75 mg) capsule Take 1 capsule by mouth daily.     • polyethylene glycol (MIRALAX) 17 gram packet Take 17 g by mouth daily as needed (Constipation) for up to 3 days. 30 each 0     No current facility-administered medications for this visit.       Surgical History:   Past Surgical History:   Procedure Laterality Date   • BREAST BIOPSY Left 2008   • BREAST BIOPSY Right 2021    benign ([facility] specimen [id number])   • BREAST LUMPECTOMY Left 2008    [facility] specimen [id number]   • DENTAL SURGERY     • HYSTERECTOMY      partial   • LUMBAR PUNCTURE     • MANDIBLE FRACTURE SURGERY     • PARTIAL HYSTERECTOMY     • ROTATOR CUFF REPAIR Left    • SINUS SURGERY         Social History:   Social History     Socioeconomic History   • Marital status:      Spouse name: None   • Number of children: None   • Years of education: None   • Highest education level: None   Tobacco Use   • Smoking status: Never    • Smokeless tobacco: Never   Vaping Use   • Vaping status: Never Used   Substance and Sexual Activity   • Alcohol use: No   • Drug use: No   Social History Narrative    Pt is .      Social Determinants of Health     Food Insecurity: No Food Insecurity (3/13/2023)    Hunger Vital Sign    • Worried About Running Out of Food in the Last Year: Never true    • Ran Out of Food in the Last Year: Never true   Transportation Needs: No Transportation Needs (3/10/2023)    PRAPARE - Transportation    • Lack of Transportation (Medical): No    • Lack of Transportation (Non-Medical): No       Family History:   Family History   Problem Relation Age of Onset   • Alzheimer's disease Biological Mother    • Arrhythmia Biological Mother    • Diabetes Biological Father    • Hypertension Biological Sister    • Melanoma Biological Sister    • COPD Biological Brother    • Prostate cancer Biological Brother    • Stomach cancer Paternal Grandmother    • Diabetes Paternal Grandfather    • Prostate cancer Paternal Grandfather    • Sarcoidosis Biological Sister    • Diabetes Biological Sister    • Heart disease Biological Sister    • Hypertension Biological Brother    • Prostate cancer Biological Brother    • Lung cancer Father's Sister    • Prostate cancer Father's Brother    • Prostate cancer Father's Brother    • Prostate cancer Father's Brother    • Prostate cancer Father's Brother    • Lung cancer Father's Brother    • Lung cancer Father's Sister    • Ovarian cancer Paternal Cousin    • Lung cancer Paternal Cousin    • Thyroid cancer Paternal Cousin        Allergies: Hydrocodone-acetaminophen, Trazodone-dietary supp no.8, Amoxicillin trihydrate, Cephalexin, Ciprofloxacin, Codeine, Potassium clavulanate, and Sulfa (sulfonamide antibiotics)     Home Medications:   Current Outpatient Medications   Medication Sig Dispense Refill   • ascorbic acid (VITAMIN C) 500 mg tablet Take 500 mg by mouth daily.     • aspirin 81 mg enteric coated  "tablet Take 81 mg by mouth daily.     • atorvastatin (LIPITOR) 40 mg tablet Take 1 tablet (40 mg total) by mouth daily. 30 tablet 1   • azelastine (ASTELIN) 137 mcg (0.1 %) nasal spray Administer 2 sprays into each nostril 2 (two) times a day as needed.     • betamethasone dipropionate (DIPROSONE) 0.05 % lotion Apply 1 each topically daily as needed.     • cetirizine (ZyrTEC) 10 mg tablet Take 10 mg by mouth as needed.     • cholecalciferol, vitamin D3, 1,000 unit (25 mcg) tablet Take 1,000 Units by mouth daily.     • docusate sodium (COLACE) 100 mg capsule Take 2 capsules (200 mg total) by mouth 2 (two) times a day as needed for constipation. 60 capsule 0   • flecainide (TAMBOCOR) 50 mg tablet Take 1 tablet (50 mg total) by mouth 2 (two) times a day. 180 tablet 3   • levothyroxine (SYNTHROID) 75 mcg tablet Take 1 tablet (75 mcg total) by mouth daily. 90 tablet 3   • lidocaine (ASPERCREME) 4 % adhesive patch,medicated topical patch Apply 1 patch topically daily. 30 patch 0   • metoprolol succinate XL (TOPROL-XL) 25 mg 24 hr tablet Take 12.5 mg by mouth daily as needed.     • multivitamin (THERAGRAN) tablet Take 1 tablet by mouth daily.     • omega-3-dha-epa-dpa-fish oil 1,050 mg(300 mg -675 mg-75 mg) capsule Take 1 capsule by mouth daily.     • polyethylene glycol (MIRALAX) 17 gram packet Take 17 g by mouth daily as needed (Constipation) for up to 3 days. 30 each 0     No current facility-administered medications for this visit.       Review of Systems:  A 14-point review of system was performed and was negative, or as documented above. 10 systems examined.     Objective     Physical Exam:  Visit Vitals  /72 (BP Location: Left upper arm, Patient Position: Sitting)   Pulse 90   Resp 18   Ht 1.702 m (5' 7\")   Wt 63.5 kg (140 lb)   SpO2 98%   BMI 21.93 kg/m²        Physical Exam:  General: WDWN, No acute distress  HEENT: Normocephalic and atraumatic. EOMI.  PERRLA.    Neck: No jugular vein distention.  Carotids " are equal with no audible bruits.    Heart: Regular rate and rhythm.  Normal S1-S2.  No murmurs, no friction rub appreciated   Lungs: BS normal, clear to auscultation bilaterally.  No rales or wheezes.  Abdomen: Soft and nontender.  Bowel sounds are present.   Extremities: No cyanosis clubbing or edema.  Distal pulses are intact 2+ bilaterally. Doppler pulsatile Left fem   Skin: Warm, dry and well perfused.  Psych: Normal mood and judgment.  Neurologic: Alert and oriented ×3.      Labs:  Pertinent lab results reviewed.    Lab Results   Component Value Date    WBC 7.46 03/13/2023    HGB 13.4 03/13/2023    HCT 39.1 03/13/2023     03/13/2023    CHOL 180 03/08/2023    TRIG 39 03/08/2023    HDL 59 03/08/2023    ALT 61 (H) 03/13/2023    AST 41 03/13/2023     03/13/2023    K 4.1 03/13/2023     03/13/2023    CREATININE 0.7 03/13/2023    BUN 13 03/13/2023    CO2 23 03/13/2023    TSH 1.08 03/07/2023    INR 0.9 03/07/2023    HGBA1C 5.5 03/07/2023       Imaging:    3/13/23 Left LE ultrasound   CLINICAL HISTORY:   Right lower extremity pain and swelling, clinical concern  for deep venous thrombosis.     COMMENT:    Multiple real time images of the deep venous system of the right  lower extremity were performed.  Compression maneuvers as well as duplex and  color Doppler evaluations were also performed.  There is normal compressibility  of the right common femoral, femoral and  popliteal veins. Additionally  visualized portions of the upper calf veins are compressible.     Doppler evaluation is  grossly unremarkable.     Additionally, there is no discrete evidence of a pseudoaneurysm or hematoma  about the right common femoral artery.     IMPRESSION:  No evidence of deep venous thrombosis in the right femoropopliteal system.      Assessment / Plan   Patient with pulsatile groin pain at left groin cath site. Cath was 3/10/23.  Ultrasound done on 3/13/23 revealed no signs of pseudo aneurysm. 3/25/13 patient  noticed discharge at groin site.  Patient taken to vascular surgery outpatient office to be seen by Dr. Webb (which we greatly appreciate!)    Hoa YEPEZ PA-C, am scribing for and in the presence of Dr Rossi.             Hoa YEPEZ PA-C, am scribing for and in the presence of Dr Rossi

## 2023-04-03 NOTE — TELEPHONE ENCOUNTER
Pt of Dr Rossi s/p [facility] on 3/10/23    Pt is calling in to report to persistent concerns at her RT groin cath site since being seen/treated in the ER on 3/13/23    RT groin remains w/ a 'knot' and is very tender & warm to the touch. Denies any erythema although reports that due to her skin color this is hard to detect     Denies presence of fever, body aches, chills     Pain persists to RT groin w/ radiation down to her RT knee that can be as severe as 8/10 at times    Pain is worse w/ activity, voices to having to twist a certain way w/ ambulation/stair use in efforts to keep the pain at a minimum     Tylenol use has been ineffective    Pt reports to massaging the area on Saturday and she noticed that the area was 'moist'. Reports to a mixture of pus & blood drainage from the site at that time. She cleaned the area w/ hydrogen peroxide and alcohol    Pt has PCP appt at [facility] this date at 1040 AM    Pt is inquiring if she can also be seen by MD Rossi this date for evaluation of her sx    Pt was advised that if her PCP feels that her RT groin/RLE is of concern to request an MD to MD page to discuss how best to proceed    Please reach pt at [phone]

## 2023-04-03 NOTE — ASSESSMENT & PLAN NOTE
Has had extensive workup.   To work-up and is up-to-date on mammogram as well as colonoscopy.  Has been seen by gastroenterology and has also had a CT scan of the abdomen/pelvis.  Has had repeat imaging of the chest without evidence of nodularity there.  TSH was normal.  She has been eating better and she has gained 8 pounds since last time we saw her in the office.  Thought process has been that her weight loss is likely associated with family stress and anxiety.  Blood work is also not showing any consistent symptoms.  I have low concern for tuberculosis or other insidious bacterial disease.  I do want her to see gynecology though given that we do not know if she has a cervical cuff or not.  We will refer her as such.  I would like to see her back about 3 months or sooner if she needs for repeat evaluation of his night sweats.  It is very possible that these could be related to being postmenopausal. I have a lower suspicion of B symptoms given normal cbc and no reportable lymphadenopathy. We will keep a vigilant eye however.

## 2023-04-03 NOTE — PROGRESS NOTES
[facility] AT [facility]       Reason for visit: Hospital Discharge Follow-up    HPI:  Alecia Cruz is a 62 y.o. female presenting for follow-up after hospital discharge.    Patient readmitted in the last 30 days: No    Discharge Diagnosis: [place]; CP  Discharging Facility: [facility]  Date of Last Admission: 03/07/23  Date of Last Discharge: 03/11/23    Initial TCM Patient Outreach Date: 03/13/23    Summary of Hospital Course:63 y/o presents for follow-up after being seen in the hospital on 3/1/2023.  She was admitted for severe chest pain with associated shortness of breath radiating down the left arm.  She had to rest for 3 to 5 minutes.  She was admitted for evaluation of cardiac source due to T wave inversions in the precordial leads.  She was incidentally known to be COVID-positive in the emergency department.  She underwent CTAngiography of the chest that was without evidence of pulmonary embolism.  Previously noted right lower lobe pulmonary nodule identified on CT scan of the abdomen dated 12/29/2022 was not evident on repeat CT scan to be enlarged.  A consistent left upper lobe circumscribed solid nodule is stable from 2/29/2010.  She underwent cardiac catheterization on 3/10/2023 that showed mild nonobstructive coronary artery disease.  She was started on atorvastatin 40 mg.    She subsequently developed right groin pain and noted yellow drainage around the femoral access site.  She was instructed to go to the emergency department for evaluation.  She was seen there and had point-of-care ultrasound done that showed no evidence of pseudoaneurysm.  It was believed that she had a hematoma and was discharged.    Medications prescribed at discharge reconciled with current ambulatory medication list: Yes.    Inpatient testing (labs, imaging, cardiovascular) requiring follow-up: right groin.      History since hospital discharge: She continues to  have right groin pain and it is painful on palpation.  She notes that the enlargement is going down and it seems her pain is going down.  No further discharge noted from the site.  No fevers or chills.  She does note that she has been having what sounds like perhaps night sweats for the last 1 month.  She is wondering if this is menopausal in nature.  She denies further weight loss, cough, shortness of breath, diarrhea, blood in her urine, or other constitutional symptoms.  She does have headaches that she attributes this to looking at screens.  She has also been more fatigued which is attributed to working at night.  We recently seen her for telemedicine visit to give her reasonable accommodations for her job.    It was also noted that she has had a lot of work-up for right hip different issues.  Her biggest concern that she had by me is that there could be an occult malignancy somewhere especially in the setting of what happened with her sister and renal disease.      Advance Care Planning Documents     Document Type Status Effective Date Expiration Date Received On Description    Advance Directives and Living Will Not Received        Power of  Not Received              Patient Active Problem List   Diagnosis   • Anxiety state   • Hypothyroidism   • Low vitamin D level   • Atrial fibrillation (CMS/HCC)   • White matter disease, unspecified   • Lymphedema of left arm   • Cervical radiculopathy   • Chronic bilateral thoracic back pain   • History of left breast cancer   • Incidental lung nodule, greater than or equal to 8mm   • BRCA negative   • PVC (premature ventricular contraction)   • Occipital neuralgia of left side   • Trigeminal neuralgia of left side of face   • Lesion of parotid gland   • Renal cyst   • HLD (hyperlipidemia)   • Irritable bowel syndrome   • Small intestinal bacterial overgrowth (SIBO)   • Reactive depression   • Stressful life events affecting family and household   • Weight loss   •  Epigastric pain   • Right groin pain   • Coronary artery disease involving native coronary artery of native heart without angina pectoris   • Night sweats     Past Medical History:   Diagnosis Date   • A-fib (CMS/HCC)    • Breast cancer (CMS/HCC) 2008    left partial mastectomy axillary dissection, whole breast radiation and chemotherapy in 2008 for a 2-1/2 cm invasive ductal carcinoma that was ER/SC negative and HER-2/alyson positive with 7-15 positive lymph nodes   • Colon polyp     couple on each colonoscopy per patient report   • COVID 8/23/2022   • Fibrocystic breast    • Fibroid    • History of partial hysterectomy 03/2000    c/o fibroids   • History of radiation therapy    • Hx antineoplastic chemo    • Hx of lipoma 2008    resected from shoulder   • Hypothyroidism    • Malignant neoplasm of upper-outer quadrant of left breast in female, estrogen receptor negative (CMS/HCC) 9/10/2021    2008-2.5 cm cancer with 7 of 15+ nodes treated with partial mastectomy, axillary dissection, chemotherapy and whole breast radiation.   • PAF (paroxysmal atrial fibrillation) (CMS/HCC)    • PVC (premature ventricular contraction)    • Screening for breast cancer      Past Surgical History:   Procedure Laterality Date   • BREAST BIOPSY Left 2008   • BREAST BIOPSY Right 2021    benign ([facility] specimen [id number])   • BREAST LUMPECTOMY Left 2008    [facility] specimen [id number]   • DENTAL SURGERY     • HYSTERECTOMY      partial   • LUMBAR PUNCTURE     • MANDIBLE FRACTURE SURGERY     • PARTIAL HYSTERECTOMY     • ROTATOR CUFF REPAIR Left    • SINUS SURGERY       Social History     Socioeconomic History   • Marital status:      Spouse name: Not on file   • Number of children: Not on file   • Years of education: Not on file   • Highest education level: Not on file   Occupational History   • Not on file   Tobacco Use   • Smoking status: Never   • Smokeless tobacco: Never   Vaping Use   • Vaping status: Never Used   Substance and Sexual  Activity   • Alcohol use: No   • Drug use: No   • Sexual activity: Not on file   Other Topics Concern   • Not on file   Social History Narrative    Pt is .      Social Determinants of Health     Financial Resource Strain: Not on file   Food Insecurity: No Food Insecurity (3/13/2023)    Hunger Vital Sign    • Worried About Running Out of Food in the Last Year: Never true    • Ran Out of Food in the Last Year: Never true   Transportation Needs: No Transportation Needs (3/10/2023)    PRAPARE - Transportation    • Lack of Transportation (Medical): No    • Lack of Transportation (Non-Medical): No   Physical Activity: Not on file   Stress: Not on file   Social Connections: Not on file   Intimate Partner Violence: Not on file   Housing Stability: Not on file     Family History   Problem Relation Age of Onset   • Alzheimer's disease Biological Mother    • Arrhythmia Biological Mother    • Diabetes Biological Father    • Hypertension Biological Sister    • Melanoma Biological Sister    • COPD Biological Brother    • Prostate cancer Biological Brother    • Stomach cancer Paternal Grandmother    • Diabetes Paternal Grandfather    • Prostate cancer Paternal Grandfather    • Sarcoidosis Biological Sister    • Diabetes Biological Sister    • Heart disease Biological Sister    • Hypertension Biological Brother    • Prostate cancer Biological Brother    • Lung cancer Father's Sister    • Prostate cancer Father's Brother    • Prostate cancer Father's Brother    • Prostate cancer Father's Brother    • Prostate cancer Father's Brother    • Lung cancer Father's Brother    • Lung cancer Father's Sister    • Ovarian cancer Paternal Cousin    • Lung cancer Paternal Cousin    • Thyroid cancer Paternal Cousin      Hydrocodone-acetaminophen, Trazodone-dietary supp no.8, Amoxicillin trihydrate, Cephalexin, Ciprofloxacin, Codeine, Potassium clavulanate, and Sulfa (sulfonamide antibiotics)  Current Outpatient Medications   Medication  "Sig Dispense Refill   • ascorbic acid (VITAMIN C) 500 mg tablet Take 500 mg by mouth daily.     • aspirin 81 mg enteric coated tablet Take 81 mg by mouth daily.     • atorvastatin (LIPITOR) 40 mg tablet Take 1 tablet (40 mg total) by mouth daily. 30 tablet 1   • azelastine (ASTELIN) 137 mcg (0.1 %) nasal spray Administer 2 sprays into each nostril 2 (two) times a day as needed.     • betamethasone dipropionate (DIPROSONE) 0.05 % lotion Apply 1 each topically daily as needed.     • cetirizine (ZyrTEC) 10 mg tablet Take 10 mg by mouth as needed.     • cholecalciferol, vitamin D3, 1,000 unit (25 mcg) tablet Take 1,000 Units by mouth daily.     • docusate sodium (COLACE) 100 mg capsule Take 2 capsules (200 mg total) by mouth 2 (two) times a day as needed for constipation. 60 capsule 0   • flecainide (TAMBOCOR) 50 mg tablet Take 1 tablet (50 mg total) by mouth 2 (two) times a day. 180 tablet 3   • levothyroxine (SYNTHROID) 75 mcg tablet Take 1 tablet (75 mcg total) by mouth daily. 90 tablet 3   • lidocaine (ASPERCREME) 4 % adhesive patch,medicated topical patch Apply 1 patch topically daily. 30 patch 0   • metoprolol succinate XL (TOPROL-XL) 25 mg 24 hr tablet Take 12.5 mg by mouth daily as needed.     • multivitamin (THERAGRAN) tablet Take 1 tablet by mouth daily.     • omega-3-dha-epa-dpa-fish oil 1,050 mg(300 mg -675 mg-75 mg) capsule Take 1 capsule by mouth daily.     • polyethylene glycol (MIRALAX) 17 gram packet Take 17 g by mouth daily as needed (Constipation) for up to 3 days. 30 each 0     No current facility-administered medications for this visit.       ROS:  Review of Systems  A 10 point review systems were asked and negative as otherwise stated.  Vitals:    04/03/23 1041   BP: 130/70   BP Location: Left upper arm   Patient Position: Sitting   Pulse: 77   Resp: 18   Temp: 36.9 °C (98.5 °F)   SpO2: 98%   Weight: 62.6 kg (138 lb)   Height: 1.702 m (5' 7\")       EXAM:  Physical Exam  Vitals reviewed. Exam " conducted with a chaperone present.   Constitutional:       Appearance: Normal appearance. She is normal weight.   Eyes:      General: No scleral icterus.     Conjunctiva/sclera: Conjunctivae normal.   Cardiovascular:      Rate and Rhythm: Normal rate and regular rhythm.      Heart sounds: Normal heart sounds.   Pulmonary:      Effort: Pulmonary effort is normal.      Breath sounds: Normal breath sounds.   Abdominal:      General: Bowel sounds are normal.      Palpations: Abdomen is soft.          Comments: There is a point of tenderness or around the inguinal ligament and just inferior.  There is a slight nodularity around the vascular that there.  It is fairly exquisitely tender.  Not edematous.  No erythema.  No enlargement.  No palpable thrill or audible bruit over the site.  Pain seems to radiate down into the medial groin.   Musculoskeletal:      Cervical back: No tenderness.      Right lower leg: No edema.      Left lower leg: No edema.   Lymphadenopathy:      Cervical: No cervical adenopathy.   Skin:     General: Skin is warm.   Neurological:      Mental Status: She is alert.   Psychiatric:         Mood and Affect: Mood normal.         Behavior: Behavior normal.         Thought Content: Thought content normal.         Judgment: Judgment normal.         Procedures    Lab Results   Component Value Date    WBC 7.46 03/13/2023    HGB 13.4 03/13/2023    HCT 39.1 03/13/2023     03/13/2023    CHOL 180 03/08/2023    TRIG 39 03/08/2023    HDL 59 03/08/2023    ALT 61 (H) 03/13/2023    AST 41 03/13/2023     03/13/2023    K 4.1 03/13/2023     03/13/2023    CREATININE 0.7 03/13/2023    BUN 13 03/13/2023    CO2 23 03/13/2023    TSH 1.08 03/07/2023    INR 0.9 03/07/2023    HGBA1C 5.5 03/07/2023         ASSESSMENT/PLAN:  Diagnoses and all orders for this visit:    Right groin pain (Primary)  Assessment & Plan:  - Spoke to cardiology and they will take her today for evaluation and decide if they want to do  an ultrasound to evaluate vascular side better.  - I went over this with patient and she is agreeable.      Visit for pelvic exam  Comments:  It is noted that she does have ovaries but does not have a uterus.  Has not done if she still has a cervix or not.  Needs pelvic examination for further eval.   Orders:  -     Ambulatory referral to Gynecology; Future    Weight loss  Assessment & Plan:  Has had extensive workup.   To work-up and is up-to-date on mammogram as well as colonoscopy.  Has been seen by gastroenterology and has also had a CT scan of the abdomen/pelvis.  Has had repeat imaging of the chest without evidence of nodularity there.  TSH was normal.  She has been eating better and she has gained 8 pounds since last time we saw her in the office.  Thought process has been that her weight loss is likely associated with family stress and anxiety.  Blood work is also not showing any consistent symptoms.  I have low concern for tuberculosis or other insidious bacterial disease.  I do want her to see gynecology though given that we do not know if she has a cervical cuff or not.  We will refer her as such.  I would like to see her back about 3 months or sooner if she needs for repeat evaluation of his night sweats.  It is very possible that these could be related to being postmenopausal. I have a lower suspicion of B symptoms given normal cbc and no reportable lymphadenopathy. We will keep a vigilant eye however.       Hypothyroidism, unspecified type  Assessment & Plan:  TSH is stable.      Night sweats  Assessment & Plan:  -long differential but so far all workup negative  -needs pelvic examination  -return to care in 3 months pending eval by gyn.       This office note has been dictated using voice recognition software which may introduce inadvertent typographical or language errors.  Please contact me directly for any clarifications.      Mahendra Begum, DO  4/3/2023

## 2023-04-03 NOTE — PATIENT INSTRUCTIONS
Please go over to the [facility]. You are going to be seeing Hoa Ramsey. I believe [facility].

## 2023-04-03 NOTE — PROGRESS NOTES
4/3/2023    Alecia Cruz is a 62 y.o. female who presents for evaluation of potential pseudoaneurysm in her right groin.  The patient recently underwent a cardiac catheterization at the beginning of March and subsequently developed pain and swelling in her right groin.  She had an ultrasound done in this right groin in the middle of March which was negative for DVT or pseudoaneurysm.  Repeat ultrasound was done today which was also negative for pseudoaneurysm.  She reports continued discomfort in her right groin that occasionally goes down the anterior aspect of her right leg.  She does have a history of low back radiculopathy.    Past Medical History:   Past Medical History:   Diagnosis Date   • A-fib (CMS/HCC)    • Breast cancer (CMS/HCC) 2008    left partial mastectomy axillary dissection, whole breast radiation and chemotherapy in 2008 for a 2-1/2 cm invasive ductal carcinoma that was ER/SD negative and HER-2/alyson positive with 7-15 positive lymph nodes   • Colon polyp     couple on each colonoscopy per patient report   • COVID 8/23/2022   • Fibrocystic breast    • Fibroid    • History of partial hysterectomy 03/2000    c/o fibroids   • History of radiation therapy    • Hx antineoplastic chemo    • Hx of lipoma 2008    resected from shoulder   • Hypothyroidism    • Malignant neoplasm of upper-outer quadrant of left breast in female, estrogen receptor negative (CMS/HCC) 9/10/2021    2008-2.5 cm cancer with 7 of 15+ nodes treated with partial mastectomy, axillary dissection, chemotherapy and whole breast radiation.   • PAF (paroxysmal atrial fibrillation) (CMS/HCC)    • PVC (premature ventricular contraction)    • Screening for breast cancer      Surgical History:   Past Surgical History:   Procedure Laterality Date   • BREAST BIOPSY Left 2008   • BREAST BIOPSY Right 2021    benign ([facility] specimen [id number])   • BREAST LUMPECTOMY Left 2008    [facility] specimen [id number]   • DENTAL SURGERY     • HYSTERECTOMY       partial   • LUMBAR PUNCTURE     • MANDIBLE FRACTURE SURGERY     • PARTIAL HYSTERECTOMY     • ROTATOR CUFF REPAIR Left    • SINUS SURGERY       Social History:   Social History     Social History Narrative    Pt is .       Social History     Tobacco Use   Smoking Status Never   Smokeless Tobacco Never   Vaping Use   Vaping Status Never Used     Allergies: Hydrocodone-acetaminophen, Trazodone-dietary supp no.8, Amoxicillin trihydrate, Cephalexin, Ciprofloxacin, Codeine, Potassium clavulanate, and Sulfa (sulfonamide antibiotics)  Current Medications: •  ascorbic acid  •  aspirin  •  atorvastatin  •  azelastine  •  betamethasone dipropionate  •  cetirizine  •  cholecalciferol (vitamin D3)  •  docusate sodium  •  flecainide  •  levothyroxine  •  lidocaine  •  metoprolol succinate XL  •  multivitamin  •  omega-3-dha-epa-dpa-fish oil  •  polyethylene glycol    Review of Systems   Systemic: No fever, no chills, and no recent weight change. No headache.  Cardiovascular: No chest pain or discomfort, no palpitations.  Respiratory: No wheezing.  Gastrointestinal: Appetite without significant change. No dysphagia, no active abdominal pain, and no melena. No bright red blood per rectum.  Hematologic: No easy bleeding and no tendency for easy bruising.   Integumentary: No obvious masses  Musculoskeletal: No new muscle tenderness.  Neurological: No new motor disturbances, or sensory disturbances.   Psychological: Appropriate.  Skin: No pruritus. No skin lesions and no rash    Physicial Exam  Vitals:    04/03/23 1432   Pulse: 74   SpO2: 97%     Head: Normocephalic/atraumatic  Eyes: Anicteric sclera, pupils equal round and reactive to light, extraocular muscles are intact  Neck: Supple to palpation, no jugular venous distention is appreciated, no carotid bruits  Lymphatic: No head, neck, axillary or inguinal adenopathy  Chest: Clear to auscultation bilaterally with good respiratory effort  Heart: Regular rate, no palpable  thrills, no audible bruit, no enlarged PMI  Abdomen: Soft, nontender, nondistended. Good bowel sounds. No abdominal masses. No bruits auscultated  Neuro: No focal changes appreciated, cranial nerves XII grossly intact  Extremities: Upper extremity palpable brachial and radial pulses no edema. Lower extremity palpable femoral, popliteal, and pedal pulses bilaterally. Good light touch sensation with motor function. No open wounds. Compartments soft palpation bilaterally. Feet warm with good coloration, hair and nail growth.  There is a small amount of enlarged soft tissue at the right common femoral artery access site.  The common femoral artery is not enlarged and there is no evidence of increased pulsatility or pseudoaneurysm.  There is no induration or tissue breakdown.    Problem List Items Addressed This Visit        Nervous    Groin pain, right - Primary    Current Assessment & Plan     I did an ultrasound of the patient in the office today.  There is definitive no evidence of a pseudoaneurysm or other vascular abnormality by her study.  My impression is that she may have had a pseudoaneurysm that subsequently clotted off and that was the reason why she initially developed a significant enlargement to her artery and then it decreased in size.  This may be associated with some post PSA neuralgia and I would recommend evaluation with OT and a nonsteroidal anti-inflammatory and a warm compress to this area.  If not significant improvement would recommend evaluation with PMNR.            Hank Laurent,   2:49 PM  4/3/2023      Thank you very much for allowing us to participate in the care of your patient. Please do not hesitate to call or email if there are any questions.     Sincerely,  Augustine Morales - [phone]  email - [email or web address]       This document was generated utilizing voice recognition technology. An attempt at proofreading has been made to minimize errors but topographical errors may  be present.

## 2023-04-06 ENCOUNTER — PATIENT OUTREACH (OUTPATIENT)
Dept: CASE MANAGEMENT | Facility: CLINIC | Age: 63
End: 2023-04-06
Payer: COMMERCIAL

## 2023-04-06 ENCOUNTER — TELEPHONE (OUTPATIENT)
Dept: PRIMARY CARE | Facility: CLINIC | Age: 63
End: 2023-04-06
Payer: COMMERCIAL

## 2023-04-06 NOTE — TELEPHONE ENCOUNTER
Pt called the office to inform Dr. Begum that the Accommodations Coordinator did not receive the forms and is requesting the forms to be emailed to her and the Accommodations Coordinator Elzbieta at the following e-mail addresses     E-mail - [email or web address]    E-mail- [email or web address]    E-mail- [email or web address]    The form is due tomorrow 4/7/23

## 2023-04-06 NOTE — PROGRESS NOTES
04/06/2023 04:25 PM EDT by Ragini Platt, RN 04/06/2023 04:25 PM EDT by Ragini Platt, RN  Outgoing Alexi Cruz Alecia ([facility]) [phone] (Home)   Reached PatientCommunicated - DORIS F/U: Returned patient call to inform her that I received her message that Accommodations Coordinator did not receive forms. AUGIE had sent message to Dr Begum to reach out directly to patient.

## 2023-04-07 NOTE — TELEPHONE ENCOUNTER
Sent message to [employer] emails. Patient is going to message us by [employer] with yahoo email address to make sure it is correct and I filled out form again for MA to email back.

## 2023-04-13 ENCOUNTER — PATIENT OUTREACH (OUTPATIENT)
Dept: CASE MANAGEMENT | Facility: CLINIC | Age: 63
End: 2023-04-13
Payer: COMMERCIAL

## 2023-04-13 ENCOUNTER — TELEMEDICINE (OUTPATIENT)
Dept: PRIMARY CARE | Facility: CLINIC | Age: 63
End: 2023-04-13
Payer: COMMERCIAL

## 2023-04-13 DIAGNOSIS — Z02.89 ENCOUNTER FOR COMPLETION OF FORM WITH PATIENT: Primary | ICD-10-CM

## 2023-04-13 PROCEDURE — 200200 PR NO CHARGE: Mod: 95 | Performed by: STUDENT IN AN ORGANIZED HEALTH CARE EDUCATION/TRAINING PROGRAM

## 2023-04-13 NOTE — PROGRESS NOTES
04/13/2023 09:00 AM EDT by Ragini Platt, RN 04/13/2023 09:00 AM EDT by Ragini Platt, RN  Outgoing Alecia Nelson (Self) [phone] (Home)   Reached Patient: Returned patient call. She is requesting a telemed appt with PCP today to discuss accommodations form. Scheduled for noon.

## 2023-04-13 NOTE — PROGRESS NOTES
Patient came in today as she has been reviewing the information on the form to be completed for her reasonable accommodations.  She wanted to add some additional details today.  We have already submitted this to the accommodations manager.  I have not received any further request for information and she has not received rejection or any additional request for bridge of this point.  I recommended to her that we wait and see if she has any further input from her complications manager and we can revisit and add additional details if needed at that point.    Otherwise no new issues to address today.     Reviewed that she was seen by vascular surgery & cardiology who are addressing potential pseudoaneurysm.     Mahendra Begum D.O.  Office: [phone]  Pager: [phone]

## 2023-04-18 NOTE — ASSESSMENT & PLAN NOTE
Hepatitis C screen is negative. I did an ultrasound of the patient in the office today.  There is definitive no evidence of a pseudoaneurysm or other vascular abnormality by her study.  My impression is that she may have had a pseudoaneurysm that subsequently clotted off and that was the reason why she initially developed a significant enlargement to her artery and then it decreased in size.  This may be associated with some post PSA neuralgia and I would recommend evaluation with OT and a nonsteroidal anti-inflammatory and a warm compress to this area.  If not significant improvement would recommend evaluation with PMNR.

## 2023-04-26 ENCOUNTER — TELEPHONE (OUTPATIENT)
Dept: CARDIOLOGY | Facility: CLINIC | Age: 63
End: 2023-04-26

## 2023-04-26 NOTE — TELEPHONE ENCOUNTER
I have called the patient about the Zio Patch to see if the patient put it on or Lost the Monitor     Jennifer WAN

## 2023-05-01 ENCOUNTER — TELEPHONE (OUTPATIENT)
Dept: SCHEDULING | Facility: CLINIC | Age: 63
End: 2023-05-01
Payer: COMMERCIAL

## 2023-05-01 ENCOUNTER — PATIENT OUTREACH (OUTPATIENT)
Dept: CASE MANAGEMENT | Facility: CLINIC | Age: 63
End: 2023-05-01
Payer: COMMERCIAL

## 2023-05-01 NOTE — PROGRESS NOTES
05/01/2023 10:20 AM EDT by Ragini Platt RN 05/01/2023 10:20 AM EDT by Ragini Platt, RN  [facility] Alecia Nelson (Self) [phone] (Home)   Patient called in to ask to see Dr Begum as she is still having nerve issues in leg and back hurts and she may need to consider FMLA. She needed to cancel her GYN appt as her sister passed away. She understands she needs to see a neurologist but wants to see Dr Haq first and reschedule her GYN appt, which she will do today. Scheduled her with Dr Begum for tomorrow evening.

## 2023-05-01 NOTE — TELEPHONE ENCOUNTER
New Patient Appointment Request    Name of caller: Alecia     Reason for Visit: NPV/ establish care, check up     Insurance: [employer]     Recent Cardiac Test/Procedures: Cath 3/13/23, everything else pt states is in her chart     Referred by: Previous pt of Dr. Pulliam     Primary Care Physician: Dr. Begum     Previous Cardiologist name and phone number: Pt sees Dr. Alonso and Dr. Rossi     Best contact number: [phone]

## 2023-05-02 ENCOUNTER — OFFICE VISIT (OUTPATIENT)
Dept: PRIMARY CARE | Facility: CLINIC | Age: 63
End: 2023-05-02
Payer: COMMERCIAL

## 2023-05-02 VITALS
SYSTOLIC BLOOD PRESSURE: 150 MMHG | TEMPERATURE: 98.2 F | OXYGEN SATURATION: 98 % | HEIGHT: 67 IN | BODY MASS INDEX: 21.35 KG/M2 | DIASTOLIC BLOOD PRESSURE: 90 MMHG | RESPIRATION RATE: 18 BRPM | HEART RATE: 87 BPM | WEIGHT: 136 LBS

## 2023-05-02 DIAGNOSIS — Z63.79 STRESS DUE TO ILLNESS OF FAMILY MEMBER: ICD-10-CM

## 2023-05-02 DIAGNOSIS — G57.21 FEMORAL NEUROPATHY OF RIGHT LOWER EXTREMITY: Primary | ICD-10-CM

## 2023-05-02 PROCEDURE — 99214 OFFICE O/P EST MOD 30 MIN: CPT | Performed by: STUDENT IN AN ORGANIZED HEALTH CARE EDUCATION/TRAINING PROGRAM

## 2023-05-02 PROCEDURE — 3008F BODY MASS INDEX DOCD: CPT | Performed by: STUDENT IN AN ORGANIZED HEALTH CARE EDUCATION/TRAINING PROGRAM

## 2023-05-02 RX ORDER — GABAPENTIN 300 MG/1
300 CAPSULE ORAL 2 TIMES DAILY
Qty: 60 CAPSULE | Refills: 0 | Status: SHIPPED | OUTPATIENT
Start: 2023-05-02 | End: 2023-05-09

## 2023-05-02 NOTE — PATIENT INSTRUCTIONS
-Make appointment to do EMG for the right leg with [facility] rehab [phone]  -Get x-ray of the right hip.  -Start gabapentin at night time and then in 1 week add AM dose. Return to the office sooner if you are having more pain or oversedation from medication.   -Start physical therapy for the right leg.

## 2023-05-02 NOTE — PROGRESS NOTES
[facility]   Progress Note       ASSESSMENT AND PLAN     Problem List Items Addressed This Visit        Nervous    Femoral neuropathy of right lower extremity - Primary    Current Assessment & Plan     - Start physical therapy for the right leg.  - Start the EMG to complete evaluation of neuropathy.  Provided phone number for [facility].  - Check hip x-ray to confirm no hip osteoarthritis that could be confounding pathology.  - Return in 4 weeks for repeat evaluation.  May need MRI of the right hip and also referral to physical medicine and rehabilitation if not improving.         Relevant Orders    X-RAY HIP WITH OR WITHOUT PELVIS 4+ VW RIGHT    EMG    Ambulatory referral to Physical Therapy       Other    Stress due to illness of family member    Current Assessment & Plan     - Encouraged to continue to follow-up with therapy for ongoing care.  - I expect that this is a combination of grieving as well as adjustment disorder at present.            Health Maintenance Due   Topic Date Due   • Medicare Annual Wellness Visit  Never done   • Zoster Vaccine (1 of 2) Never done   • COVID-19 Vaccine (5 - Booster for Moderna series) 07/25/2022       Return in about 4 weeks (around 5/30/2023) for Next scheduled follow-up.  At next visit: Follow-up on hip pain.    This office note has been dictated using voice recognition software which may introduce inadvertent typographical or language errors.  Please contact me directly for any clarifications.    Mahendra Begum D.O.  Office: [phone]  Pager: [phone] SOLMVARNOY     Alecia Cruz is a 62 y.o. year-old female, primary patient of Dr. Begum who presents for hip pain.    Interval History:     #Right-sided hip pain: Previously thought to be hematoma versus pseudoaneurysm.  Seen by vascular surgery as well as cardiology.  Thought to be likely healed over the pseudoaneurysm possibly.  No active vascular etiology.  Has not yet been through  "PT or evaluation by physical medicine debilitation.  Having ongoing pain that shoots down the inside of the leg all the way down to the ankle.  He is to follow the L1/L2/L3 distribution.  Thought that maybe there is some femoral neuropathy at this site.  Has ongoing pain which is active issue.  No instability but has ambulatory gait dysfunction as result of pain.  Feels like tingling and shooting pains.  No other recent injury.    #Family stress: Sister recently  and this is causing significant distress for her.    #Weight loss: Weight is stable with improved diet.  Felt to be likely secondary to stress, anxiety, and depression.  Has not been on medication for this.  Has not seen psychology.  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    PHYSICAL EXAMINATION     Visit Vitals  BP (!) 150/90 (BP Location: Right upper arm, Patient Position: Sitting)   Pulse 87   Temp 36.8 °C (98.2 °F)   Resp 18   Ht 1.702 m (5' 7\")   Wt 61.7 kg (136 lb)   SpO2 98%   BMI 21.30 kg/m²       Physical Exam  Vitals reviewed.   Constitutional:       General: She is not in acute distress.     Appearance: Normal appearance. She is not ill-appearing, toxic-appearing or diaphoretic.   HENT:      Head: Normocephalic.   Eyes:      General: No scleral icterus.     Conjunctiva/sclera: Conjunctivae normal.   Cardiovascular:      Rate and Rhythm: Normal rate and regular rhythm.      Heart sounds: Normal heart sounds.   Pulmonary:      Effort: Pulmonary effort is normal.      Breath sounds: Normal breath sounds.   Musculoskeletal:      Right lower leg: No edema.      Left lower leg: No edema.   Skin:     General: Skin is warm.   Neurological:      Mental Status: She is alert and oriented to person, place, and time.      Motor: No weakness.      Gait: Gait abnormal (antalgic gait. ).      Comments: Positive right straight leg. Negative fabere. On the right.    Psychiatric:         Mood and Affect: Mood normal.         Behavior: " Behavior normal.         Thought Content: Thought content normal.         Judgment: Judgment normal.             LABS / IMAGING / STUDIES        Labs Reviewed:   CBC Results       03/13/23 03/11/23 03/08/23 2039 0539 0227    WBC 7.46 5.15 5.14    RBC 4.20 3.71 3.62    HGB 13.4 12.0 11.7    HCT 39.1 35.2 34.0    MCV 93.1 94.9 93.9    MCH 31.9 32.3 32.3    MCHC 34.3 34.1 34.4     192 189          CMP Results       03/13/23 03/11/23 03/08/23 2039 0539 0227     137 140    K 4.1 4.3 3.8    Cl 104 103 106    CO2 23 25 27    Glucose 101 94 97    BUN 13 13 14    Creatinine 0.7 0.9 0.9    Calcium 9.5 9.0 9.2    Anion Gap 10 9 7    AST 41 -- --    ALT 61 -- --    Albumin 3.7 -- --    EGFR >60.0 >60.0 >60.0         Comment for K at 0227 on 03/08/23: Results obtained on plasma. Plasma Potassium values may be up to 0.4 mEQ/L less than serum values. The differences may be greater for patients with high platelet or white cell counts.          Studies/Imaging Reviewed: none      MEDICATIONS      Current Outpatient Medications   Medication Instructions   • ascorbic acid (VITAMIN C) 500 mg, oral, Daily   • aspirin 81 mg, oral, Daily   • atorvastatin (LIPITOR) 40 mg, oral, Daily (6p)   • azelastine (ASTELIN) 137 mcg (0.1 %) nasal spray 2 sprays, Each Nostril, 2 times daily PRN   • betamethasone dipropionate (DIPROSONE) 0.05 % lotion 1 each, Topical, Daily PRN   • cetirizine (ZYRTEC) 10 mg, oral, As needed   • cholecalciferol (vitamin D3) 1,000 Units, oral, Daily   • docusate sodium (COLACE) 200 mg, oral, 2 times daily PRN   • flecainide (TAMBOCOR) 50 mg, oral, 2 times daily (6a, 6p)   • gabapentin (NEURONTIN) 300 mg, oral, 2 times daily   • levothyroxine (SYNTHROID) 75 mcg, oral, Daily (6:30a)   • lidocaine (ASPERCREME) 4 % adhesive patch,medicated topical patch 1 patch, Topical, Daily   • metoprolol succinate XL (TOPROL-XL) 12.5 mg, oral, Daily PRN   • multivitamin (THERAGRAN) tablet 1 tablet, oral, Daily   •  omega-3-dha-epa-dpa-fish oil 1,050 mg(300 mg -675 mg-75 mg) capsule 1 capsule, oral, Daily   • polyethylene glycol (MIRALAX) 17 g, oral, Daily PRN

## 2023-05-07 PROBLEM — G57.21 FEMORAL NEUROPATHY OF RIGHT LOWER EXTREMITY: Status: ACTIVE | Noted: 2023-05-07

## 2023-05-07 NOTE — ASSESSMENT & PLAN NOTE
- Encouraged to continue to follow-up with therapy for ongoing care.  - I expect that this is a combination of grieving as well as adjustment disorder at present.

## 2023-05-07 NOTE — ASSESSMENT & PLAN NOTE
- Start physical therapy for the right leg.  - Start the EMG to complete evaluation of neuropathy.  Provided phone number for [facility].  - Check hip x-ray to confirm no hip osteoarthritis that could be confounding pathology.  - Return in 4 weeks for repeat evaluation.  May need MRI of the right hip and also referral to physical medicine and rehabilitation if not improving.

## 2023-05-08 ENCOUNTER — TELEPHONE (OUTPATIENT)
Dept: PRIMARY CARE | Facility: CLINIC | Age: 63
End: 2023-05-08

## 2023-05-08 NOTE — PROGRESS NOTES
Subjective:   5/11/2023  Alecia Cruz is a 62 y.o.  female who presents for annual exam.     H/o hysterectomy 2/2 fibroid/HMB- vertical midline scar, I assume abdominal hysterectomy. Was about 4 month size of pregnancy uterus per pt, done in [place]. Unsure what exactly was taken out.  No cervix on exam.  Pelvic US given to look for ovaries which likely were left behind as does not describe any  symptoms after. Unremarkable PE    Has lost weight in past 2 months, went from size 6 to a size 2. Has been previously seen by Dr Begum for a workup which has since been negative.  134 today, started 2 months ago at 159. She is hungry and is eating.   Sister just passed from t cell lymphoma. And had another sister that passed from colorectal cancer.   Describes significant night sweats that wake her up from sleep. Started with onet  Of weight loss. Did not have night sweats/hot flashes previously.     Anal leakage-going on for the past 2 months. Notices fishy odor when wipes from behind. Does not think it is vaginally but not sure. Notices that she has stains on her underwear of fecal matter and does not feel that it comes out. Does have bleeding every now and then with bowel movements and wiping. Very constipated but has problem initiating bm as well. But leakage only started a few months ago and has been constipated for her whole life  Referral given to Dr Hook    Vaginal discahrge-past 2 months. White discharge. Does not need to wear a panty liner and wears black underwear so she notices it. No itching/burning/irritation. Had odor in past but none recently.   When wipes vaginally, does not smell any odor. No bleeding  Swab sent off     since 2001 from hysterectomy    The patient is not sexually active.   Problems with sexual activity denies      H/o fibroids/ovarian cysts: yes  Family history of uterine or ovarian cancer: yes - pat cousin's daughter, ovarian. still living s/p treatment, now 43  Family history  of breast cancer: yes - self, and maternal first cousin  Regular self breast exam: yes    Pap smear: h/o abnormal prior to hysterectomy, none since hysterectomy.  HPV vaccine status: no    Urinary complaints -denies    Menopause yes  Vasomotor symptoms no    Screening  Mammogram 10/15/2022 birads 1.   Colonoscopy- polyps found and did endoscopy as well. Constriction in esophagus and stretched it   DEXA scan given today      Alecia has a history of breast cancer- s/p left partial masectomy and axillary dissection. Had chemo and radiation. HER 2 +, er/pr neg  F/u treatment herecptin x1 year [id number]    Past Medical History:   Diagnosis Date   • A-fib (CMS/HCC)    • Breast cancer (CMS/HCC) 2008    left partial mastectomy axillary dissection, whole breast radiation and chemotherapy in 2008 for a 2-1/2 cm invasive ductal carcinoma that was ER/OK negative and HER-2/alyson positive with 7-15 positive lymph nodes   • Colon polyp     couple on each colonoscopy per patient report   • COVID 8/23/2022   • Fibrocystic breast    • Fibroid    • History of partial hysterectomy 03/2000    c/o fibroids   • History of radiation therapy    • Hx antineoplastic chemo    • Hx of lipoma 2008    resected from shoulder   • Hypothyroidism    • Malignant neoplasm of upper-outer quadrant of left breast in female, estrogen receptor negative (CMS/HCC) 9/10/2021    2008-2.5 cm cancer with 7 of 15+ nodes treated with partial mastectomy, axillary dissection, chemotherapy and whole breast radiation.   • PAF (paroxysmal atrial fibrillation) (CMS/HCC)    • PVC (premature ventricular contraction)    • Screening for breast cancer      Past Surgical History:   Procedure Laterality Date   • BREAST BIOPSY Left 2008   • BREAST BIOPSY Right 2021    benign ([facility] specimen [id number])   • BREAST LUMPECTOMY Left 2008    [facility] specimen [id number]   • CARDIAC CATHETERIZATION     • DENTAL SURGERY     • HYSTERECTOMY      partial   • LUMBAR PUNCTURE     • MANDIBLE FRACTURE  SURGERY     • PARTIAL HYSTERECTOMY     • ROTATOR CUFF REPAIR Left    • SINUS SURGERY         Current Outpatient Medications:   •  ascorbic acid (VITAMIN C) 500 mg tablet, Take 500 mg by mouth daily., Disp: , Rfl:   •  aspirin 81 mg enteric coated tablet, Take 81 mg by mouth daily., Disp: , Rfl:   •  azelastine (ASTELIN) 137 mcg (0.1 %) nasal spray, Administer 2 sprays into each nostril 2 (two) times a day as needed., Disp: , Rfl:   •  cetirizine (ZyrTEC) 10 mg tablet, Take 10 mg by mouth as needed., Disp: , Rfl:   •  cholecalciferol, vitamin D3, 1,000 unit (25 mcg) tablet, Take 1,000 Units by mouth daily., Disp: , Rfl:   •  flecainide (TAMBOCOR) 50 mg tablet, Take 1 tablet (50 mg total) by mouth 2 (two) times a day., Disp: 180 tablet, Rfl: 3  •  gabapentin (NEURONTIN) 100 mg capsule, Take 1 capsule (100 mg total) by mouth nightly for 15 days., Disp: 15 capsule, Rfl: 0  •  levothyroxine (SYNTHROID) 75 mcg tablet, Take 1 tablet (75 mcg total) by mouth daily., Disp: 90 tablet, Rfl: 3  •  metoprolol succinate XL (TOPROL-XL) 25 mg 24 hr tablet, Take 12.5 mg by mouth daily as needed., Disp: , Rfl:   •  omega-3-dha-epa-dpa-fish oil 1,050 mg(300 mg -675 mg-75 mg) capsule, Take 1 capsule by mouth daily., Disp: , Rfl:   •  atorvastatin (LIPITOR) 40 mg tablet, Take 1 tablet (40 mg total) by mouth daily., Disp: 30 tablet, Rfl: 1  •  betamethasone dipropionate (DIPROSONE) 0.05 % lotion, Apply 1 each topically daily as needed., Disp: , Rfl:   •  docusate sodium (COLACE) 100 mg capsule, Take 2 capsules (200 mg total) by mouth 2 (two) times a day as needed for constipation., Disp: 60 capsule, Rfl: 0  •  lidocaine (ASPERCREME) 4 % adhesive patch,medicated topical patch, Apply 1 patch topically daily., Disp: 30 patch, Rfl: 0  •  multivitamin (THERAGRAN) tablet, Take 1 tablet by mouth daily., Disp: , Rfl:   •  polyethylene glycol (MIRALAX) 17 gram packet, Take 17 g by mouth daily as needed (Constipation) for up to 3 days., Disp: 30  each, Rfl: 0  Allergies   Allergen Reactions   • Hydrocodone-Acetaminophen    • Trazodone-Dietary Supp No.8 Other (see comments)     Blurred vision, severe headache   • Amoxicillin Trihydrate Palpitations     AUGMENTIN   • Cephalexin Palpitations   • Ciprofloxacin Palpitations and Other (see comments)   • Codeine Palpitations and Other (see comments)     nausea   • Potassium Clavulanate Palpitations     AUGMENTIN   • Sulfa (Sulfonamide Antibiotics) Palpitations and Other (see comments)     Social History     Socioeconomic History   • Marital status:      Spouse name: None   • Number of children: None   • Years of education: None   • Highest education level: None   Tobacco Use   • Smoking status: Never   • Smokeless tobacco: Never   Vaping Use   • Vaping status: Never Used   Substance and Sexual Activity   • Alcohol use: No   • Drug use: No   Social History Narrative    Pt is .      Social Determinants of Health     Food Insecurity: No Food Insecurity (3/13/2023)    Hunger Vital Sign    • Worried About Running Out of Food in the Last Year: Never true    • Ran Out of Food in the Last Year: Never true   Transportation Needs: No Transportation Needs (3/10/2023)    PRAPARE - Transportation    • Lack of Transportation (Medical): No    • Lack of Transportation (Non-Medical): No      Family History   Problem Relation Age of Onset   • Alzheimer's disease Biological Mother    • Arrhythmia Biological Mother    • Diabetes Biological Father    • Hypertension Biological Sister    • Melanoma Biological Sister    • COPD Biological Brother    • Prostate cancer Biological Brother    • Stomach cancer Paternal Grandmother    • Diabetes Paternal Grandfather    • Prostate cancer Paternal Grandfather    • Sarcoidosis Biological Sister    • Diabetes Biological Sister    • Heart disease Biological Sister    • Hypertension Biological Brother    • Prostate cancer Biological Brother    • Lung cancer Father's Sister    • Prostate  "cancer Father's Brother    • Prostate cancer Father's Brother    • Prostate cancer Father's Brother    • Prostate cancer Father's Brother    • Lung cancer Father's Brother    • Lung cancer Father's Sister    • Ovarian cancer Paternal Cousin    • Lung cancer Paternal Cousin    • Thyroid cancer Paternal Cousin         Review of Systems   Constitutional: Positive for unexpected weight change.   HENT: Negative.    Respiratory: Negative.    Cardiovascular: Negative.    Gastrointestinal: Positive for anal bleeding, blood in stool, change in stool and constipation.   Endocrine: Negative.    Genitourinary: Negative.    Breast: Negative.   Musculoskeletal: Negative.    Skin: Negative.    Neurological: Negative.    Psychiatric/Behavioral: Negative.    All other systems reviewed and are negative.       Objective:  Visit Vitals  BP (!) 148/86 (BP Location: Left upper arm, Patient Position: Sitting)   Ht 1.702 m (5' 7\")   Wt 60.8 kg (134 lb)   BMI 20.99 kg/m²        Physical Exam:  Physical Exam  Constitutional:       General: She is not in acute distress.     Appearance: Normal appearance. She is normal weight.   HENT:      Head: Normocephalic and atraumatic.      Nose: Nose normal.     Eyes:      Extraocular Movements: Extraocular movements intact.      Conjunctiva/sclera: Conjunctivae normal.      Pupils: Pupils are equal, round, and reactive to light.   Genitourinary:    Urethra, bladder, vagina, urethral meatus, external female genitalia and adnexa normal.   Pelvic exam was performed with patient in lithotomy exam position.      Cervix is absent.     Uterus is absent.   Cardiovascular:      Rate and Rhythm: Normal rate and regular rhythm.   Pulmonary:      Effort: Pulmonary effort is normal.   Chest:   Breasts:     Right: Normal. No swelling, inverted nipple, mass, skin change or tenderness.      Left: Skin change present. No swelling, inverted nipple, mass or tenderness.          Comments: S/p left partial masectomy, " radiation scars  Abdominal:      General: Abdomen is flat.      Palpations: Abdomen is soft. There is no mass.      Tenderness: There is no abdominal tenderness. There is no guarding or rebound.   Musculoskeletal:         General: Normal range of motion.      Cervical back: Normal range of motion.   Lymphadenopathy:      Upper Body:      Right upper body: No axillary adenopathy.      Left upper body: No axillary adenopathy.   Neurological:      General: No focal deficit present.      Mental Status: She is alert and oriented to person, place, and time.   Skin:     General: Skin is warm and dry.   Psychiatric:         Mood and Affect: Mood normal.         Behavior: Behavior normal.         Thought Content: Thought content normal.         Judgment: Judgment normal.   Vitals reviewed.           Assessment and Plan:   Annual: Pap n/a, no cervix and no h/o surgery on cervix for abnormality  Self breast exam taught  Screening mammogram rx given  Contraceptive plan: n/a  STI screening: declines  Screening Colonoscopy at 43  Follows routine care with PCP   The following counseling was provided: Diet and Exercise: Smoking Cessation; Drug/Alcohol Abuse/ HIV and Safe Sex/ Domestic Violence/ Vitamin Supplementation    Diagnoses and all orders for this visit:    Well woman exam with routine gynecological exam    Encounter for screening mammogram for malignant neoplasm of breast  -     BI SCREENING MAMMOGRAM BILATERAL(TOMOSYNTHESIS); Future    History of breast cancer  -     US PELVIS TRANSABDOMINAL & TRANSVAGINAL; Future  -     BI SCREENING MAMMOGRAM BILATERAL(TOMOSYNTHESIS); Future    History of hysterectomy  -     US PELVIS TRANSABDOMINAL & TRANSVAGINAL; Future    Osteoporosis screening  -     DEXA BONE DENSITY; Future    Weight loss    Vaginal discharge  -     Bacterial Vaginosis Panel  -     Chlamydia/GC RNA:ThinPrep/Urine/Swab    Anal sphincter incontinence    .  All questions answered..    I spent an additional 22 minutes  outside of her well woman visit on this date of service performing the following activities: obtaining history, performing examination, entering orders, documenting, preparing for visit, obtaining / reviewing records, providing counseling and education, independently reviewing study/studies, communicating results and coordinating care.        Elizabeth Villa MD

## 2023-05-09 ENCOUNTER — PATIENT OUTREACH (OUTPATIENT)
Dept: CASE MANAGEMENT | Facility: CLINIC | Age: 63
End: 2023-05-09
Payer: COMMERCIAL

## 2023-05-09 ENCOUNTER — TELEMEDICINE (OUTPATIENT)
Dept: PRIMARY CARE | Facility: CLINIC | Age: 63
End: 2023-05-09
Payer: COMMERCIAL

## 2023-05-09 DIAGNOSIS — F41.1 ANXIETY STATE: Primary | ICD-10-CM

## 2023-05-09 DIAGNOSIS — G57.21 FEMORAL NEUROPATHY OF RIGHT LOWER EXTREMITY: ICD-10-CM

## 2023-05-09 DIAGNOSIS — Z63.79 STRESS DUE TO ILLNESS OF FAMILY MEMBER: ICD-10-CM

## 2023-05-09 PROCEDURE — 99214 OFFICE O/P EST MOD 30 MIN: CPT | Mod: 95 | Performed by: STUDENT IN AN ORGANIZED HEALTH CARE EDUCATION/TRAINING PROGRAM

## 2023-05-09 RX ORDER — GABAPENTIN 100 MG/1
100 CAPSULE ORAL NIGHTLY
Qty: 15 CAPSULE | Refills: 0 | Status: SHIPPED | OUTPATIENT
Start: 2023-05-09 | End: 2023-05-25

## 2023-05-09 NOTE — PROGRESS NOTES
05/09/2023 08:47 AM EDT by Ragini Platt RN 05/09/2023 08:47 AM EDT by Ragini Platt, RN  Outgoing Alecia Nelson (Self) [phone] (Home)   Left MessageCommunicated - LCM: CM returned patient call about scheduling appt with Dr Begum.      05/09/2023 09:47 AM EDT by Ragini Platt RN 05/09/2023 09:47 AM EDT by Ragini Platt, RN  Outgoing lAecia Nelson (Self) [phone] (Home)   Reached Patient: CM returned patient call. Scheduled her for a video My Chart appt with Dr Begum today at 12:20 to discuss FMLA and Gabapentin SE's.

## 2023-05-09 NOTE — ASSESSMENT & PLAN NOTE
- Work-up as noted previously to be done.  - Oversedation with gabapentin 300 mg recommended we decrease down to 100 mg nightly.  - Use do not take this on nights where she does not work fine days that she knows how she is going to feel with it.  - We will see her in follow-up in 2 weeks.  We may need to have her see physical medicine and rehabilitation as well.

## 2023-05-09 NOTE — LETTER
May 9, 2023     Patient: Alecia Cruz  YOB: 1960  Date of Visit: 5/9/2023    To Whom It May Concern:    It is my medical opinion that Alecia Cruz should take a medical leave of absence starting 5/9/2023 through 6/20/2023.     If you have any questions or concerns, please don't hesitate to call.         Sincerely,        Mahendra Begum, DO

## 2023-05-09 NOTE — ASSESSMENT & PLAN NOTE
- I strongly suspect that she is just overwhelmed and needs a couple days off.  - I recommend that she follow-up with [facility] as well as psychology.  Ongoing to see if Dr. Obregon would be willing to follow-up with her as well.  - Recommend that she take a 6-week leave of absence to help with her medical issues.  - She should also follow-up with me.  - I also went over with her that I worry about depression and her and that we contracted for safety around depressed thoughts and suicidal ideations.  She does not currently have any suicidal ideations and contracted for safety with me.

## 2023-05-09 NOTE — LETTER
May 9, 2023     Patient: Alecia Cruz  YOB: 1960  Date of Visit: 5/9/2023    To Whom it May Concern:    Alecia Cruz was seen in my clinic on 5/9/2023 at 12:20 pm. Please excuse Alecia for her absence from work on this day to make the appointment.    If you have any questions or concerns, please don't hesitate to call.         Sincerely,         Mahendra Begum, DO

## 2023-05-09 NOTE — PROGRESS NOTES
"Verification of Patient Location:  The patient affirms they are currently located in the following state: [place]    Request for Consent:    Audio and Video Encounter   Krish, my name is Mahendra Begum DO.  Before we proceed, can you please verify your identification by telling me your full name and date of birth?  Can you tell me who is in the room with you?    You and I are about to have a telemedicine check-in or visit because you have requested it.  This is a live video-conference.  I am a real person, speaking to you in real time.  There is no one else with me on the video-conference. I am not recording this conversation and I am asking you not to record it.  This telemedicine visit will be billed to your health insurance or you, if you are self-insured.  You understand you will be responsible for any copayments or coinsurances that apply to your telemedicine visit.  Communication platform used for this encounter:  oboxo Video Visit (Epic Video Client)       Before starting our telemedicine visit, I am required to get your consent for this virtual check-in or visit by telemedicine. Do you consent?      Patient Response to Request for Consent:  Yes      Visit Documentation:  Subjective     Patient ID: Alecia Cruz is a 62 y.o. female.  1960      HPI  61 y/o presents today for f/u. She has had some increased sedation related to this gabapentin. She has a huge amount of life stress. She has an appointment with [facility] later today to help with the stress. She had gotten lost to follow-up from Dr. Lopez who was helping with setting boundaries and helpful with life stress. Since then, she lost her sister and she is working with a new manager who apparently wrote her up as \"AWOL\" after she missed a day but had appropriate coverage due to over sedation from the gabapentin.     She really appears to be decompensating from a mental health standpoint.  She is extremely tearful yet again today.  She is " appears to be overwhelmed.  Additionally she has not been eating as very well and she is losing weight as result of that.  The mental strain seems to be really affecting her.  Pain is also quite severe.    She has followed appropriate steps and is following up with gynecology as I am not totally sure whether or not she has a cervix.  She is also going to be getting an EMG which she did not have the phone number to schedule and missed on the top of her AVS.  She is going to call and schedule that.  She is also going to do an x-ray for me.  Gabapentin does appear to have helped but did have a side effect of oversedation.  Reports that she is very sensitive to medications.  It did help with her pain now.  She is also going to to start doing physical therapy.    In light of all of this it seems that she is having a mental health decompensation and just needs some time so I am going to recommend that she take a medical leave of absence as well as we complete FMLA paperwork to cover her for that many days that she has been missing secondary to her leg pain.  I asked that she contact her  and let them know to send me the paperwork.  I provided her for with my fax number but also recommended that she email me a PDF of the documentation so that we can make sure that we actually receive it.      The following have been reviewed and updated as appropriate in this visit:   Allergies  Meds  Problems       Review of Systems      Assessment/Plan   Diagnoses and all orders for this visit:    Anxiety state (Primary)  Assessment & Plan:  - I strongly suspect that she is just overwhelmed and needs a couple days off.  - I recommend that she follow-up with [facility] as well as psychology.  Ongoing to see if Dr. Obregon would be willing to follow-up with her as well.  - Recommend that she take a 6-week leave of absence to help with her medical issues.  - She should also follow-up with me.  - I also went over with her that I worry  about depression and her and that we contracted for safety around depressed thoughts and suicidal ideations.  She does not currently have any suicidal ideations and contracted for safety with me.      Stress due to illness of family member    Femoral neuropathy of right lower extremity  Assessment & Plan:  - Work-up as noted previously to be done.  - Oversedation with gabapentin 300 mg recommended we decrease down to 100 mg nightly.  - Use do not take this on nights where she does not work fine days that she knows how she is going to feel with it.  - We will see her in follow-up in 2 weeks.  We may need to have her see physical medicine and rehabilitation as well.      Other orders  -     gabapentin (NEURONTIN) 100 mg capsule; Take 1 capsule (100 mg total) by mouth nightly for 15 days.      Time Spent:  I spent 30 minutes on this date of service performing the following activities: obtaining history, entering orders, documenting, preparing for visit, obtaining / reviewing records, providing counseling and education and coordinating care.

## 2023-05-10 ENCOUNTER — PATIENT OUTREACH (OUTPATIENT)
Dept: CASE MANAGEMENT | Facility: CLINIC | Age: 63
End: 2023-05-10
Payer: COMMERCIAL

## 2023-05-10 NOTE — PROGRESS NOTES
05/10/2023 10:31 AM EDT by Ragini Platt, RN 05/10/2023 10:31 AM EDT by Ragini Platt, RN  Outgoing Alecia Nelson (Self) [phone] (Home)   Reached Patient: AUGIE called patient to get update and she states she is working on getting Dr Begum what he needs and only what he needs to fill out for FMLA and she will get back when she knows.     Encouraged patient to re-establish care with [facility]- in PCP office and she states she will call office directly to schedule.

## 2023-05-11 ENCOUNTER — OFFICE VISIT (OUTPATIENT)
Dept: OBSTETRICS AND GYNECOLOGY | Facility: CLINIC | Age: 63
End: 2023-05-11
Payer: COMMERCIAL

## 2023-05-11 ENCOUNTER — HOSPITAL ENCOUNTER (OUTPATIENT)
Dept: RADIOLOGY | Facility: HOSPITAL | Age: 63
Discharge: HOME | End: 2023-05-11
Attending: STUDENT IN AN ORGANIZED HEALTH CARE EDUCATION/TRAINING PROGRAM
Payer: COMMERCIAL

## 2023-05-11 VITALS
HEIGHT: 67 IN | WEIGHT: 134 LBS | SYSTOLIC BLOOD PRESSURE: 148 MMHG | BODY MASS INDEX: 21.03 KG/M2 | DIASTOLIC BLOOD PRESSURE: 86 MMHG

## 2023-05-11 DIAGNOSIS — Z01.419 WELL WOMAN EXAM WITH ROUTINE GYNECOLOGICAL EXAM: Primary | ICD-10-CM

## 2023-05-11 DIAGNOSIS — R15.9 ANAL SPHINCTER INCONTINENCE: ICD-10-CM

## 2023-05-11 DIAGNOSIS — Z12.31 ENCOUNTER FOR SCREENING MAMMOGRAM FOR MALIGNANT NEOPLASM OF BREAST: ICD-10-CM

## 2023-05-11 DIAGNOSIS — Z85.3 HISTORY OF BREAST CANCER: ICD-10-CM

## 2023-05-11 DIAGNOSIS — Z13.820 OSTEOPOROSIS SCREENING: ICD-10-CM

## 2023-05-11 DIAGNOSIS — G57.21 FEMORAL NEUROPATHY OF RIGHT LOWER EXTREMITY: ICD-10-CM

## 2023-05-11 DIAGNOSIS — Z90.710 HISTORY OF HYSTERECTOMY: ICD-10-CM

## 2023-05-11 DIAGNOSIS — R63.4 WEIGHT LOSS: ICD-10-CM

## 2023-05-11 DIAGNOSIS — N89.8 VAGINAL DISCHARGE: ICD-10-CM

## 2023-05-11 PROCEDURE — 99203 OFFICE O/P NEW LOW 30 MIN: CPT | Mod: 25 | Performed by: OBSTETRICS & GYNECOLOGY

## 2023-05-11 PROCEDURE — 73503 X-RAY EXAM HIP UNI 4/> VIEWS: CPT | Mod: RT

## 2023-05-11 PROCEDURE — 3008F BODY MASS INDEX DOCD: CPT | Performed by: OBSTETRICS & GYNECOLOGY

## 2023-05-11 PROCEDURE — G0101 CA SCREEN;PELVIC/BREAST EXAM: HCPCS | Performed by: OBSTETRICS & GYNECOLOGY

## 2023-05-11 ASSESSMENT — ENCOUNTER SYMPTOMS
CARDIOVASCULAR NEGATIVE: 1
UNEXPECTED WEIGHT CHANGE: 1
MUSCULOSKELETAL NEGATIVE: 1
BLOOD IN STOOL: 1
ANAL BLEEDING: 1
RESPIRATORY NEGATIVE: 1
NEUROLOGICAL NEGATIVE: 1
PSYCHIATRIC NEGATIVE: 1
ENDOCRINE NEGATIVE: 1
CONSTIPATION: 1

## 2023-05-11 NOTE — LETTER
May 11, 2023     Patient: Alecia Cruz  YOB: 1960  Date of Visit: 5/11/2023    To Whom it May Concern:    Alecia Cruz was seen in my clinic on 5/11/2023 at 8:00 am. Please excuse Alecia for her absence from work on this day to make the appointment.    If you have any questions or concerns, please don't hesitate to call.         Sincerely,         Elizabeth Villa MD        CC: No Recipients

## 2023-05-11 NOTE — LETTER
May 11, 2023     Mahendra Begum, DO  [address]  [place]  [facility] [address]    Patient: Alecia Cruz  YOB: 1960  Date of Visit: 5/11/2023      Dear Dr. Begum:    Thank you for referring Alecia Cruz to me for evaluation. Below are my notes for this consultation.    If you have questions, please do not hesitate to call me. I look forward to following your patient along with you.         Sincerely,        Elizabeth Villa MD        CC: No Recipients    Elizabeth Villa MD  5/11/2023  9:37 AM  Signed  Subjective:   5/11/2023  Alecia Cruz is a 62 y.o.  female who presents for annual exam.     H/o hysterectomy 2/2 fibroid/HMB- vertical midline scar, I assume abdominal hysterectomy. Was about 4 month size of pregnancy uterus per pt, done in [place]. Unsure what exactly was taken out.  No cervix on exam.  Pelvic US given to look for ovaries which likely were left behind as does not describe any  symptoms after. Unremarkable PE    Has lost weight in past 2 months, went from size 6 to a size 2. Has been previously seen by Dr Begum for a workup which has since been negative.  134 today, started 2 months ago at 159. She is hungry and is eating.   Sister just passed from t cell lymphoma. And had another sister that passed from colorectal cancer.   Describes significant night sweats that wake her up from sleep. Started with onet  Of weight loss. Did not have night sweats/hot flashes previously.     Anal leakage-going on for the past 2 months. Notices fishy odor when wipes from behind. Does not think it is vaginally but not sure. Notices that she has stains on her underwear of fecal matter and does not feel that it comes out. Does have bleeding every now and then with bowel movements and wiping. Very constipated but has problem initiating bm as well. But leakage only started a few months ago and has been constipated for her whole life  Referral given to   [facility]    Vaginal discahrge-past 2 months. White discharge. Does not need to wear a panty liner and wears black underwear so she notices it. No itching/burning/irritation. Had odor in past but none recently.   When wipes vaginally, does not smell any odor. No bleeding  Swab sent off     since 2001 from hysterectomy    The patient is not sexually active.   Problems with sexual activity denies      H/o fibroids/ovarian cysts: yes  Family history of uterine or ovarian cancer: yes - pat cousin's daughter, ovarian. still living s/p treatment, now 43  Family history of breast cancer: yes - self, and maternal first cousin  Regular self breast exam: yes    Pap smear: h/o abnormal prior to hysterectomy, none since hysterectomy.  HPV vaccine status: no    Urinary complaints -denies    Menopause yes  Vasomotor symptoms no    Screening  Mammogram 10/15/2022 birads 1.   Colonoscopy- polyps found and did endoscopy as well. Constriction in esophagus and stretched it   DEXA scan given today      Alecia has a history of breast cancer- s/p left partial masectomy and axillary dissection. Had chemo and radiation. HER 2 +, er/pr neg  F/u treatment herecptin x1 year [id number]    Past Medical History:   Diagnosis Date   • A-fib (CMS/HCC)    • Breast cancer (CMS/HCC) 2008    left partial mastectomy axillary dissection, whole breast radiation and chemotherapy in 2008 for a 2-1/2 cm invasive ductal carcinoma that was ER/CO negative and HER-2/alyson positive with 7-15 positive lymph nodes   • Colon polyp     couple on each colonoscopy per patient report   • COVID 8/23/2022   • Fibrocystic breast    • Fibroid    • History of partial hysterectomy 03/2000    c/o fibroids   • History of radiation therapy    • Hx antineoplastic chemo    • Hx of lipoma 2008    resected from shoulder   • Hypothyroidism    • Malignant neoplasm of upper-outer quadrant of left breast in female, estrogen receptor negative (CMS/HCC) 9/10/2021    2008-2.5 cm cancer with 7  of 15+ nodes treated with partial mastectomy, axillary dissection, chemotherapy and whole breast radiation.   • PAF (paroxysmal atrial fibrillation) (CMS/HCC)    • PVC (premature ventricular contraction)    • Screening for breast cancer      Past Surgical History:   Procedure Laterality Date   • BREAST BIOPSY Left 2008   • BREAST BIOPSY Right 2021    benign ([facility] specimen [id number])   • BREAST LUMPECTOMY Left 2008    [facility] specimen [id number]   • CARDIAC CATHETERIZATION     • DENTAL SURGERY     • HYSTERECTOMY      partial   • LUMBAR PUNCTURE     • MANDIBLE FRACTURE SURGERY     • PARTIAL HYSTERECTOMY     • ROTATOR CUFF REPAIR Left    • SINUS SURGERY         Current Outpatient Medications:   •  ascorbic acid (VITAMIN C) 500 mg tablet, Take 500 mg by mouth daily., Disp: , Rfl:   •  aspirin 81 mg enteric coated tablet, Take 81 mg by mouth daily., Disp: , Rfl:   •  azelastine (ASTELIN) 137 mcg (0.1 %) nasal spray, Administer 2 sprays into each nostril 2 (two) times a day as needed., Disp: , Rfl:   •  cetirizine (ZyrTEC) 10 mg tablet, Take 10 mg by mouth as needed., Disp: , Rfl:   •  cholecalciferol, vitamin D3, 1,000 unit (25 mcg) tablet, Take 1,000 Units by mouth daily., Disp: , Rfl:   •  flecainide (TAMBOCOR) 50 mg tablet, Take 1 tablet (50 mg total) by mouth 2 (two) times a day., Disp: 180 tablet, Rfl: 3  •  gabapentin (NEURONTIN) 100 mg capsule, Take 1 capsule (100 mg total) by mouth nightly for 15 days., Disp: 15 capsule, Rfl: 0  •  levothyroxine (SYNTHROID) 75 mcg tablet, Take 1 tablet (75 mcg total) by mouth daily., Disp: 90 tablet, Rfl: 3  •  metoprolol succinate XL (TOPROL-XL) 25 mg 24 hr tablet, Take 12.5 mg by mouth daily as needed., Disp: , Rfl:   •  omega-3-dha-epa-dpa-fish oil 1,050 mg(300 mg -675 mg-75 mg) capsule, Take 1 capsule by mouth daily., Disp: , Rfl:   •  atorvastatin (LIPITOR) 40 mg tablet, Take 1 tablet (40 mg total) by mouth daily., Disp: 30 tablet, Rfl: 1  •  betamethasone dipropionate (DIPROSONE)  0.05 % lotion, Apply 1 each topically daily as needed., Disp: , Rfl:   •  docusate sodium (COLACE) 100 mg capsule, Take 2 capsules (200 mg total) by mouth 2 (two) times a day as needed for constipation., Disp: 60 capsule, Rfl: 0  •  lidocaine (ASPERCREME) 4 % adhesive patch,medicated topical patch, Apply 1 patch topically daily., Disp: 30 patch, Rfl: 0  •  multivitamin (THERAGRAN) tablet, Take 1 tablet by mouth daily., Disp: , Rfl:   •  polyethylene glycol (MIRALAX) 17 gram packet, Take 17 g by mouth daily as needed (Constipation) for up to 3 days., Disp: 30 each, Rfl: 0  Allergies   Allergen Reactions   • Hydrocodone-Acetaminophen    • Trazodone-Dietary Supp No.8 Other (see comments)     Blurred vision, severe headache   • Amoxicillin Trihydrate Palpitations     AUGMENTIN   • Cephalexin Palpitations   • Ciprofloxacin Palpitations and Other (see comments)   • Codeine Palpitations and Other (see comments)     nausea   • Potassium Clavulanate Palpitations     AUGMENTIN   • Sulfa (Sulfonamide Antibiotics) Palpitations and Other (see comments)     Social History     Socioeconomic History   • Marital status:      Spouse name: None   • Number of children: None   • Years of education: None   • Highest education level: None   Tobacco Use   • Smoking status: Never   • Smokeless tobacco: Never   Vaping Use   • Vaping status: Never Used   Substance and Sexual Activity   • Alcohol use: No   • Drug use: No   Social History Narrative    Pt is .      Social Determinants of Health     Food Insecurity: No Food Insecurity (3/13/2023)    Hunger Vital Sign    • Worried About Running Out of Food in the Last Year: Never true    • Ran Out of Food in the Last Year: Never true   Transportation Needs: No Transportation Needs (3/10/2023)    PRAPARE - Transportation    • Lack of Transportation (Medical): No    • Lack of Transportation (Non-Medical): No      Family History   Problem Relation Age of Onset   • Alzheimer's disease  "Biological Mother    • Arrhythmia Biological Mother    • Diabetes Biological Father    • Hypertension Biological Sister    • Melanoma Biological Sister    • COPD Biological Brother    • Prostate cancer Biological Brother    • Stomach cancer Paternal Grandmother    • Diabetes Paternal Grandfather    • Prostate cancer Paternal Grandfather    • Sarcoidosis Biological Sister    • Diabetes Biological Sister    • Heart disease Biological Sister    • Hypertension Biological Brother    • Prostate cancer Biological Brother    • Lung cancer Father's Sister    • Prostate cancer Father's Brother    • Prostate cancer Father's Brother    • Prostate cancer Father's Brother    • Prostate cancer Father's Brother    • Lung cancer Father's Brother    • Lung cancer Father's Sister    • Ovarian cancer Paternal Cousin    • Lung cancer Paternal Cousin    • Thyroid cancer Paternal Cousin         Review of Systems   Constitutional: Positive for unexpected weight change.   HENT: Negative.    Respiratory: Negative.    Cardiovascular: Negative.    Gastrointestinal: Positive for anal bleeding, blood in stool, change in stool and constipation.   Endocrine: Negative.    Genitourinary: Negative.    Breast: Negative.   Musculoskeletal: Negative.    Skin: Negative.    Neurological: Negative.    Psychiatric/Behavioral: Negative.    All other systems reviewed and are negative.       Objective:  Visit Vitals  BP (!) 148/86 (BP Location: Left upper arm, Patient Position: Sitting)   Ht 1.702 m (5' 7\")   Wt 60.8 kg (134 lb)   BMI 20.99 kg/m²        Physical Exam:  Physical Exam  Constitutional:       General: She is not in acute distress.     Appearance: Normal appearance. She is normal weight.   HENT:      Head: Normocephalic and atraumatic.      Nose: Nose normal.     Eyes:      Extraocular Movements: Extraocular movements intact.      Conjunctiva/sclera: Conjunctivae normal.      Pupils: Pupils are equal, round, and reactive to light.   Genitourinary:  "   Urethra, bladder, vagina, urethral meatus, external female genitalia and adnexa normal.   Pelvic exam was performed with patient in lithotomy exam position.      Cervix is absent.     Uterus is absent.   Cardiovascular:      Rate and Rhythm: Normal rate and regular rhythm.   Pulmonary:      Effort: Pulmonary effort is normal.   Chest:   Breasts:     Right: Normal. No swelling, inverted nipple, mass, skin change or tenderness.      Left: Skin change present. No swelling, inverted nipple, mass or tenderness.          Comments: S/p left partial masectomy, radiation scars  Abdominal:      General: Abdomen is flat.      Palpations: Abdomen is soft. There is no mass.      Tenderness: There is no abdominal tenderness. There is no guarding or rebound.   Musculoskeletal:         General: Normal range of motion.      Cervical back: Normal range of motion.   Lymphadenopathy:      Upper Body:      Right upper body: No axillary adenopathy.      Left upper body: No axillary adenopathy.   Neurological:      General: No focal deficit present.      Mental Status: She is alert and oriented to person, place, and time.   Skin:     General: Skin is warm and dry.   Psychiatric:         Mood and Affect: Mood normal.         Behavior: Behavior normal.         Thought Content: Thought content normal.         Judgment: Judgment normal.   Vitals reviewed.           Assessment and Plan:   Annual: Pap n/a, no cervix and no h/o surgery on cervix for abnormality  Self breast exam taught  Screening mammogram rx given  Contraceptive plan: n/a  STI screening: declines  Screening Colonoscopy at 43  Follows routine care with PCP   The following counseling was provided: Diet and Exercise: Smoking Cessation; Drug/Alcohol Abuse/ HIV and Safe Sex/ Domestic Violence/ Vitamin Supplementation    Diagnoses and all orders for this visit:    Well woman exam with routine gynecological exam    Encounter for screening mammogram for malignant neoplasm of  breast  -     BI SCREENING MAMMOGRAM BILATERAL(TOMOSYNTHESIS); Future    History of breast cancer  -     US PELVIS TRANSABDOMINAL & TRANSVAGINAL; Future  -     BI SCREENING MAMMOGRAM BILATERAL(TOMOSYNTHESIS); Future    History of hysterectomy  -     US PELVIS TRANSABDOMINAL & TRANSVAGINAL; Future    Osteoporosis screening  -     DEXA BONE DENSITY; Future    Weight loss    Vaginal discharge  -     Bacterial Vaginosis Panel  -     Chlamydia/GC RNA:ThinPrep/Urine/Swab    Anal sphincter incontinence    .  All questions answered..    I spent an additional 22 minutes outside of her well woman visit on this date of service performing the following activities: obtaining history, performing examination, entering orders, documenting, preparing for visit, obtaining / reviewing records, providing counseling and education, independently reviewing study/studies, communicating results and coordinating care.        Elizabeth Villa MD

## 2023-05-12 LAB
C TRACH RRNA SPEC QL NAA+PROBE: NOT DETECTED
CANDIDA RRNA VAG QL PROBE: NOT DETECTED
G VAGINALIS RRNA GENITAL QL PROBE: DETECTED
N GONORRHOEA RRNA SPEC QL NAA+PROBE: NOT DETECTED
QST (ALWAYS MESSAGE): NORMAL
T VAGINALIS RRNA GENITAL QL PROBE: NOT DETECTED

## 2023-05-15 ENCOUNTER — HOSPITAL ENCOUNTER (OUTPATIENT)
Dept: RADIOLOGY | Facility: HOSPITAL | Age: 63
Discharge: HOME | End: 2023-05-15
Attending: OBSTETRICS & GYNECOLOGY
Payer: COMMERCIAL

## 2023-05-15 DIAGNOSIS — Z90.710 HISTORY OF HYSTERECTOMY: ICD-10-CM

## 2023-05-15 DIAGNOSIS — Z85.3 HISTORY OF BREAST CANCER: ICD-10-CM

## 2023-05-15 PROCEDURE — 76856 US EXAM PELVIC COMPLETE: CPT | Mod: 59

## 2023-05-15 NOTE — RESULT ENCOUNTER NOTE
Holland Alston,    Your swab came back as bacterial vaginosis.  I am happy to send in antibiotics to treat this by a pill or vaginal suppository.  Let me know which you prefer.    Dr Villa

## 2023-05-17 ENCOUNTER — PATIENT OUTREACH (OUTPATIENT)
Dept: CASE MANAGEMENT | Facility: CLINIC | Age: 63
End: 2023-05-17
Payer: COMMERCIAL

## 2023-05-18 RX ORDER — METRONIDAZOLE 7.5 MG/G
GEL VAGINAL
Qty: 70 G | Refills: 0 | Status: SHIPPED | OUTPATIENT
Start: 2023-05-18 | End: 2023-05-19 | Stop reason: ALTCHOICE

## 2023-05-19 RX ORDER — METRONIDAZOLE 500 MG/1
500 TABLET ORAL 2 TIMES DAILY
Qty: 14 TABLET | Refills: 0 | Status: SHIPPED | OUTPATIENT
Start: 2023-05-19 | End: 2023-05-26

## 2023-05-25 RX ORDER — GABAPENTIN 100 MG/1
100 CAPSULE ORAL NIGHTLY
Qty: 90 CAPSULE | Refills: 0 | Status: SHIPPED | OUTPATIENT
Start: 2023-05-25 | End: 2024-12-10

## 2023-05-26 ENCOUNTER — OFFICE VISIT (OUTPATIENT)
Dept: PHYSICAL MEDICINE AND REHAB | Facility: HOSPITAL | Age: 63
End: 2023-05-26
Attending: STUDENT IN AN ORGANIZED HEALTH CARE EDUCATION/TRAINING PROGRAM
Payer: COMMERCIAL

## 2023-05-26 DIAGNOSIS — G57.21 LESION OF RIGHT FEMORAL NERVE: Primary | ICD-10-CM

## 2023-05-26 PROCEDURE — 95909 NRV CNDJ TST 5-6 STUDIES: CPT

## 2023-05-26 PROCEDURE — 95861 NEEDLE EMG 2 EXTREMITIES: CPT

## 2023-05-26 NOTE — PROGRESS NOTES
Electrodiagnostic Report  Name: Alecia Cruz  : 1960  Date of Study: 23    HPI:   Alecia Cruz is a very pleasant 62 y.o. year old female with past medical history significant for cervical radiculopathy, trigeminal neuralgia, atrial fibrillation, and CAD who was admitted to the hospital for chest pain on 2023.  She underwent cardiac catheterization via right groin access which was complicated by pseudoaneurysm.  Since then, she has had numbness and tingling in the approximate saphenous nerve distribution of the right saphenous nerve.  She has not noted significant weakness, but she does report that her walker is altered which is causing her to have some back pain.    On exam, she is a pleasant adult female who walks with an antalgic gait.  Head is normocephalic and atraumatic.  Eyes show conjugate gaze and anicteric sclera.  She exhibits normal respiratory effort on room air.  No lower extremity edema.  No suspicious lesions of the right lower extremity.  Palpable right femoral pulse.  Palpable right pedal pulse.  She has some numbness and tingling in the approximate saphenous nerve distribution.  No focal weakness.  Normal muscle bulk and tone throughout the right lower extremity.    Electrodiagnostic report:  NCS, sensory:  Right saphenous: Onset and peak latencies are very mildly prolonged compared to normal values, but not the ipsilateral sural nerve.  Amplitude is normal.  Conduction velocity is normal.  Right superficial peroneal: Onset and peak latencies are normal.  Amplitude is normal.  Conduction velocity is normal.  Right sural: Onset and peak latencies are normal.  Amplitude is normal.  Conduction velocity is normal.    NCS, motor:  Right femoral: Distal motor onset latency is normal.  Amplitude is normal.  No significant delay across the inguinal ligament.  Right peroneal: Distal motor onset latency is normal.  Amplitude is normal and there is no significant decrease  with proximal stimulation.  Conduction velocity is normal.  Right tibial: Distal motor onset latency is normal.  Amplitude is normal and there is no second decrease of proximal stimulation.    EMG:  Needle examination of the right vastus medialis, anterior tibialis, medial gastrocnemius, and anterior longus was unremarkable.  Of note, needle exam was poorly tolerated.    Assessment:  This is a very mildly abnormal study  There is no evidence for sensory polyneuropathy  There is no evidence of radiculopathy  There is possible right saphenous nerve neuropathy but there does not appear to be any significant femoral nerve neuropathy. Suspect that the patient is experiencing paresthesias secondary to femoral nerve irritation based on her history.  Fortunately, no evidence of any significant nerve damage on NCS or EMG.      Recommendations:  The results of the study were discussed the patient  Anticipate symptoms will improve with time and physical therapy.    Daysi Juarez MD  [facility] [place]  Physical Medicine and Rehabilitation  05/26/23

## 2023-05-30 ENCOUNTER — TELEMEDICINE (OUTPATIENT)
Dept: BEHAVIORAL HEALTH | Facility: CLINIC | Age: 63
End: 2023-05-30
Payer: COMMERCIAL

## 2023-05-30 DIAGNOSIS — F33.2 SEVERE EPISODE OF RECURRENT MAJOR DEPRESSIVE DISORDER, WITHOUT PSYCHOTIC FEATURES (CMS/HCC): Primary | ICD-10-CM

## 2023-05-30 NOTE — PROGRESS NOTES
I have discussed the management of this patient with the Psychology Resident/Post-Doctoral Fellow. I reviewed the Psychology Resident's/Post-Doctoral Fellow's note and agree with the documented findings and plan of care, except for my comments below or within the additional notes today.     Mike Jim Psy.D.   [facility]  Staff Psychologist, [facility]  [place] License #[id number]

## 2023-05-30 NOTE — PROGRESS NOTES
"Integrated Behavioral Health Follow-up Visit Note  Visit number: 4     Visit Type Performed: Video   Communication platform used for this encounter:  [employer] Video Visit (Epic Video Client)     Alecia Cruz was contacted today for a behavioral health visit.  Clinician confirmed identification of patient by name and birthdate, provider name, location of patient and clinician, and callback number in case disconnected.    Verification of Patient Location:  The patient affirms they are currently located in the following state: [place]  This visit was conducted via telehealth/telephone in lieu of an in-person visit due to precautions related to the COVID-19 pandemic.    Request for Consent:  You and I are about to have a tele-health check-in or visit. This is allowed because you are already a patient of our [facility] practice, and you have requested it.  This tele-health visit will be billed to your health insurance or you, if you are self-insured. You understand you will be responsible for any copayments or coinsurances that apply to your tele-health visit.  Before starting our telemedicine visit, I am required to get your consent for this virtual check-in or visit by tele-health . Do you consent?    Patient Response to Request for Consent: Yes    SUBJECTIVE     Symptoms  Depression symptoms: depressed mood, sadness, anhedonia, lack of motivation, fatigue/lack of energy, feelings of worthlessness/guilt, difficulty concentrating or making decisions and hopelessness  Anxiety symptoms: excessive anxiety/worry, difficulty controlling worry, restless/\"on edge\" and difficulty concentrating/going \"blank\"  Additional reported symptoms: N/A      OBJECTIVE     Mental Status Evaluation  Patient's mood and affect were consistent with the context, and consistent with their baseline: Yes   Comments:  very depressed and anxious, awake and alert; oriented to person, place, and time    Additional Assessments completed this " "visit  N/A    Suicidal Ideation/Homicidal Ideation Risk Assessment: assessed. If not assessed, reason:      Risk Factors: Presence of a Mood Disorder and Loss (relational, social, work, or financial)  Protective factors: Effective and accessible mental health care , Connectedness to individuals, family, community, and social institutions and Problem-solving and conflict resolution skills    Suicidal Ideation: Pt endorsed passive suicidal ideation, \"I wish the pain could just go away\"; \"I could just not wake up\". She described experiencing these thoughts frequently, especially in the mornings. She denied having a plan, intent, or attempt to harm herself.   Self Injurious Behavior:  Not Present  Homicidal Ideation: Not Present  Estimate of Current Risk: Minimal risk    Plan for Safety-   If pt develops passive SI or HI, they agreed to the following safety plan:    · Remember her reasons for living, including her grandchildren. Pt described her grandchildren as a strong protective factor.      Interventions  Cognitive Behavior Therapy and Empathic Listening and Validation  Checked-in on pt's mood since the last session in November. Discussed the outpatient referrals provided to the pt at the last session for traditional outpatient treatment. Pt described barriers to her connecting to outpatient therapy. Provided validation for pt's concerns and options for finding the appropriate level of care. Pt was agreeable to being contacted by social work for assistance with referrals. Provided psychoeducation on brief CBT and importance of establishing outpatient therapy to continue care when treatment completes.       Psychotropic medications: Pt is not currently prescribed psychotropic medications.   Current Outpatient Medications   Medication Sig Dispense Refill   • gabapentin (NEURONTIN) 100 mg capsule Take 1 capsule (100 mg total) by mouth nightly. 90 capsule 0   • ascorbic acid (VITAMIN C) 500 mg tablet Take 500 mg by " mouth daily.     • aspirin 81 mg enteric coated tablet Take 81 mg by mouth daily.     • atorvastatin (LIPITOR) 40 mg tablet Take 1 tablet (40 mg total) by mouth daily. 30 tablet 1   • azelastine (ASTELIN) 137 mcg (0.1 %) nasal spray Administer 2 sprays into each nostril 2 (two) times a day as needed.     • betamethasone dipropionate (DIPROSONE) 0.05 % lotion Apply 1 each topically daily as needed.     • cetirizine (ZyrTEC) 10 mg tablet Take 10 mg by mouth as needed.     • cholecalciferol, vitamin D3, 1,000 unit (25 mcg) tablet Take 1,000 Units by mouth daily.     • docusate sodium (COLACE) 100 mg capsule Take 2 capsules (200 mg total) by mouth 2 (two) times a day as needed for constipation. 60 capsule 0   • flecainide (TAMBOCOR) 50 mg tablet Take 1 tablet (50 mg total) by mouth 2 (two) times a day. 180 tablet 3   • levothyroxine (SYNTHROID) 75 mcg tablet Take 1 tablet (75 mcg total) by mouth daily. 90 tablet 3   • lidocaine (ASPERCREME) 4 % adhesive patch,medicated topical patch Apply 1 patch topically daily. 30 patch 0   • metoprolol succinate XL (TOPROL-XL) 25 mg 24 hr tablet Take 12.5 mg by mouth daily as needed.     • multivitamin (THERAGRAN) tablet Take 1 tablet by mouth daily.     • omega-3-dha-epa-dpa-fish oil 1,050 mg(300 mg -675 mg-75 mg) capsule Take 1 capsule by mouth daily.     • polyethylene glycol (MIRALAX) 17 gram packet Take 17 g by mouth daily as needed (Constipation) for up to 3 days. 30 each 0     No current facility-administered medications for this visit.       ASSESSMENT       Progress  Patient's progress toward their goals is generally too early to assess  Patient's symptomology is gradually worsening.        PLAN     Goals:  Connect to traditional outpatient therapy     Recommendations  Individual Therapy  30 minutes 2 times monthly and connect to traditional outpatient treatment. Contacted EMILIANA Patel for assistance with referrals.       Next visit plan:  Establish short-term  goals.    I spent 30 minutes on this date of service performing the following activities: providing counseling and education.

## 2023-05-31 ENCOUNTER — PATIENT OUTREACH (OUTPATIENT)
Dept: CASE MANAGEMENT | Facility: CLINIC | Age: 63
End: 2023-05-31
Payer: COMMERCIAL

## 2023-05-31 NOTE — PROGRESS NOTES
Social Work Note     received referral from  PCP.  ADOLFO spoke with pt today.    Patient is a 62 year old female who needs to establish care with outpatient therapy services. Pt has symptoms of depression and anxiety, which have significantly increased since November 2022. Pt is concerned about her insurance co-pays as she has financial stressors.  Pt's son lives with her on a temporarily basis.  Patient manages her finances.  Pt is on disability and receives approximately $1,900/month.  Pt is working and earns approximately $900/biweekly.   ADOLFO notified pt that [facility] [place] takes her insurance - [employer] - and they also accept [facility] Financial Assistance.   ADOLFO educated pt on the [facility] Financial Assistance program - application process, coverage, eligibility and required financial information.  ADOLFO sent application to pt, who will complete application and send it to ADOLFO.  ADOLFO will submit FA application to [facility].    SW to follow up.    THEO Woodard  [facility]   [phone]

## 2023-06-09 ENCOUNTER — PATIENT OUTREACH (OUTPATIENT)
Dept: CASE MANAGEMENT | Facility: CLINIC | Age: 63
End: 2023-06-09
Payer: COMMERCIAL

## 2023-06-13 ENCOUNTER — TELEMEDICINE (OUTPATIENT)
Dept: BEHAVIORAL HEALTH | Facility: CLINIC | Age: 63
End: 2023-06-13
Payer: COMMERCIAL

## 2023-06-13 DIAGNOSIS — F33.2 SEVERE EPISODE OF RECURRENT MAJOR DEPRESSIVE DISORDER, WITHOUT PSYCHOTIC FEATURES (CMS/HCC): Primary | ICD-10-CM

## 2023-06-13 NOTE — PROGRESS NOTES
"Integrated Behavioral Health Follow-up Visit Note  Visit number: 5     Visit Type Performed: Video   Communication platform used for this encounter:  [place] Video Visit (Epic Video Client)     Alecia Cruz was contacted today for a behavioral health visit.  Clinician confirmed identification of patient by name and birthdate, provider name, location of patient and clinician, and callback number in case disconnected.    Verification of Patient Location:  The patient affirms they are currently located in the following state: [place]  This visit was conducted via telehealth/telephone in lieu of an in-person visit due to precautions related to the COVID-19 pandemic.    Request for Consent:  You and I are about to have a tele-health check-in or visit. This is allowed because you are already a patient of our [facility] practice, and you have requested it.  This tele-health visit will be billed to your health insurance or you, if you are self-insured. You understand you will be responsible for any copayments or coinsurances that apply to your tele-health visit.  Before starting our telemedicine visit, I am required to get your consent for this virtual check-in or visit by tele-health . Do you consent?    Patient Response to Request for Consent: Yes    SUBJECTIVE     Symptoms  Depression symptoms: depressed mood, sadness, feelings of worthlessness/guilt and difficulty concentrating or making decisions  Anxiety symptoms: excessive anxiety/worry, difficulty controlling worry and restless/\"on edge\"  Additional reported symptoms: N/A      OBJECTIVE     Mental Status Evaluation  Patient's mood and affect were consistent with the context, and consistent with their baseline: Yes   Comments:  depressed, awake and alert; oriented to person, place, and time    Additional Assessments completed this visit  N/A    Suicidal Ideation/Homicidal Ideation Risk Assessment: not assessed. If not assessed, reason:  Previous evaluation " indicated minimal risk. Pt did not endorse any changes in risk or protective factors and none were observed.     Risk Factors: Presence of a Mood Disorder and Loss (relational, social, work, or financial)  Protective factors: Effective and accessible mental health care , Connectedness to individuals, family, community, and social institutions and Problem-solving and conflict resolution skills    Estimate of Current Risk: Minimal risk    Plan for Safety-   If pt develops passive SI or HI, they agreed to the following safety plan: Created 5/30/2023     • Remember her reasons for living, including her grandchildren. Pt described her grandchildren as a strong protective factor.      Interventions  Cognitive Behavior Therapy and Empathic Listening and Validation  We discussed the words and actions of others and how they negative impact the pt's thoughts about herself. Guided pt to challenge these negative statements from others that influence her negative thinking. Facilitated a discussion on thoughts on being facts. Introduced positive psychology through the use of affirmations. Additionally, discussed the pt's social supports and how she utilizes them.       Psychotropic medications: Pt is not currently prescribed psychotropic medications.   Current Outpatient Medications   Medication Sig Dispense Refill   • gabapentin (NEURONTIN) 100 mg capsule Take 1 capsule (100 mg total) by mouth nightly. 90 capsule 0   • ascorbic acid (VITAMIN C) 500 mg tablet Take 500 mg by mouth daily.     • aspirin 81 mg enteric coated tablet Take 81 mg by mouth daily.     • atorvastatin (LIPITOR) 40 mg tablet Take 1 tablet (40 mg total) by mouth daily. 30 tablet 1   • azelastine (ASTELIN) 137 mcg (0.1 %) nasal spray Administer 2 sprays into each nostril 2 (two) times a day as needed.     • betamethasone dipropionate (DIPROSONE) 0.05 % lotion Apply 1 each topically daily as needed.     • cetirizine (ZyrTEC) 10 mg tablet Take 10 mg by mouth as  needed.     • cholecalciferol, vitamin D3, 1,000 unit (25 mcg) tablet Take 1,000 Units by mouth daily.     • docusate sodium (COLACE) 100 mg capsule Take 2 capsules (200 mg total) by mouth 2 (two) times a day as needed for constipation. 60 capsule 0   • flecainide (TAMBOCOR) 50 mg tablet Take 1 tablet (50 mg total) by mouth 2 (two) times a day. 180 tablet 3   • levothyroxine (SYNTHROID) 75 mcg tablet Take 1 tablet (75 mcg total) by mouth daily. 90 tablet 3   • lidocaine (ASPERCREME) 4 % adhesive patch,medicated topical patch Apply 1 patch topically daily. 30 patch 0   • metoprolol succinate XL (TOPROL-XL) 25 mg 24 hr tablet Take 12.5 mg by mouth daily as needed.     • multivitamin (THERAGRAN) tablet Take 1 tablet by mouth daily.     • omega-3-dha-epa-dpa-fish oil 1,050 mg(300 mg -675 mg-75 mg) capsule Take 1 capsule by mouth daily.     • polyethylene glycol (MIRALAX) 17 gram packet Take 17 g by mouth daily as needed (Constipation) for up to 3 days. 30 each 0     No current facility-administered medications for this visit.       ASSESSMENT       Progress  Patient's progress toward their goals is generally improving (Pt is reaching out to outpatient clinics with the help of EMILIANA Woodard)   Patient's symptomology is unchanged.        PLAN     Goals:  · Connect to traditional outpatient therapy     Recommendations  Individual Therapy  30 minutes 2 times monthly and connect to traditional outpatient treatment.     Next visit plan:  Continue practicing cognitive restructuring     I spent 30 minutes on this date of service performing the following activities: providing counseling and education.

## 2023-06-13 NOTE — PROGRESS NOTES
I reviewed the Psychology Resident's/Post-Doctoral Fellow's note and agree with the documented findings and plan of care, except for my comments below or within the additional notes today.     Mike Jim Psy.D.   Integrated Behavioral Health  Staff Psychologist, [facility]  [place] License #[id number]

## 2023-06-16 ENCOUNTER — PATIENT OUTREACH (OUTPATIENT)
Dept: CASE MANAGEMENT | Facility: CLINIC | Age: 63
End: 2023-06-16
Payer: COMMERCIAL

## 2023-06-16 NOTE — PROGRESS NOTES
Social Work Follow up Note     placed call to pt to follow up on FA application.  SW notified pt she must complete the FA application page - Section C, so SW can submit application.  Pt expressed understanding and will submit application.    ADDENDUM  SW received FA application from pt.  SW submitted application to [facility] today.    SW will be out of the office next week and will follow up upon return to office on 6/26/23.     SW to follow up.    THEO Woodard  [facility]   [phone]

## 2023-06-20 ENCOUNTER — PATIENT OUTREACH (OUTPATIENT)
Dept: CASE MANAGEMENT | Facility: CLINIC | Age: 63
End: 2023-06-20
Payer: COMMERCIAL

## 2023-06-20 NOTE — PROGRESS NOTES
06/20/2023 11:55 AM EDT by Ragini Platt, RN 06/20/2023 11:55 AM EDT by Ragini Platt, RN  Incoming Alecia Nelson ([facility]) [phone] (Home)   Patient had left a message for Dr Begum to reach out to her about returning to work issues.  is sending message to Dr Begum.  Dr Begum replied he would call patient on his way home.

## 2023-06-23 ENCOUNTER — PATIENT OUTREACH (OUTPATIENT)
Dept: CASE MANAGEMENT | Facility: CLINIC | Age: 63
End: 2023-06-23
Payer: COMMERCIAL

## 2023-06-23 NOTE — PROGRESS NOTES
06/23/2023 11:59 AM EDT by Ragini Platt RN 06/23/2023 11:59 AM EDT by Ragini Platt, RN  Incoming Alecia Nelson (Self) [phone] (Home)   Patient called to say she missed Dr Begum call the other evening and would like another call as she needs another FMLA form filled out.   CM will inform Dr Begum.           06/23/2023 12:12 PM EDT by Ragini Platt RN 06/23/2023 12:12 PM EDT by Ragini Platt, RN  Outgoing Alecia Nelson (Self) [phone] (Home)   Reached Patient: CM informed patient that Dr Begum is off til Monday and she states understanding.

## 2023-06-26 ENCOUNTER — PATIENT OUTREACH (OUTPATIENT)
Dept: CASE MANAGEMENT | Facility: CLINIC | Age: 63
End: 2023-06-26
Payer: COMMERCIAL

## 2023-06-26 NOTE — PROGRESS NOTES
Social Work Follow up Note     received e-mail from [facility] re: pt's [employer] application. Pt shows as being  in the system, and needs to provide partner's financial information.    SW worker also received e-mail from pt.   Pt requested assistance with referral to mental health referral for appointment.  Pt has been experiencing increased anxiety and needs to see therapist as soon as possible,    SW placed call to pt to follow up on [employer] application and Counseling referral. No answer.  SW left VM message for pt with Sw number and requested a return call.  SW to refer pt to [facility] [phone] for outpatient services at [place].  Pt can initiate counseling sessions prior to [employer] application's approval, but she will be responsible for co-pay.    SW to follow up.    THEO Woodard  [facility]   [phone]

## 2023-06-27 ENCOUNTER — PATIENT OUTREACH (OUTPATIENT)
Dept: CASE MANAGEMENT | Facility: CLINIC | Age: 63
End: 2023-06-27
Payer: COMMERCIAL

## 2023-06-27 ENCOUNTER — TELEPHONE (OUTPATIENT)
Dept: ADMISSIONS | Facility: HOSPITAL | Age: 63
End: 2023-06-27
Payer: COMMERCIAL

## 2023-06-27 NOTE — TELEPHONE ENCOUNTER
Initial Intake    Alecia Cruz, a 62 y.o. female     General  [facility] Referral? : No  Intake is for which department?: MTC  Reason for seeking services (in client's own words)?: pt looking to see a therapist for anxiety and depression.  she is currently seeing a therapist through her pcp office.    Current Mental Health Symptoms  Current Symptoms: Anxiety; Depression  Anxiety: Panic (weight loss due to anxiety)  Depression: Decreased Sleep; Decreased concentration    Mental Health Treatment History  Prior Treatment Reported?: Yes  Type of Treatment: Inpatient; Outpatient    Inpatient  Details: 2015-in [place]    Outpatient  Details: jose e [facility]onz[facility] therapist through pcp office    Medical  Extensive History: -- (afib, hx of cancer, lymphodema)  Are you currently experiencing any medical problems that you feel may require emergency care?: No  Are you able to complete ADLs?: yes  Do you require the use of any assistive devices?: No  Medications: levothyroxine 75mcg daily, atorvastatin 40mg daily, baby aspirin daily, ambien prn    Suicide Thoughts/Plans  Have you had suicidal thoughts in the past 72 hours?: No  Have you ever had suicidal thoughts?: No  Have you ever harmed yourself?: No  Do you have easy access to firearms?: No    Violence/Trauma  Do you currently have, or have you ever had, thoughts of harming someone else?: No  Any history of an event that you would consider emotionally/psychologically/physically traumatic?: yes    Employment and Legal  Are you actively employed?: Yes  Last date of work (if applicable): on [employer] currently  Are you presently or have you ever been a ? (Career or Volunteer): No  Do you now or have you in the past had any legal involvement?: No    Pregnancy/Breastfeeding  Are you pregnant?: No    Substance Use Details  Substance Use Includes: None      Substance Use Treatment History  Prior treatment reported?: No    Eating Disorders  Do you have any problematic  food related behaviors?: No  Have you ever been preoccupied with your looks or body image?: No    Additional Information  [facility]

## 2023-06-27 NOTE — PROGRESS NOTES
Social Work Follow up Note     spoke with pt today.  Pt reported she is  and she has been  since 2010.  Information should be in tax return.    SW provided number to the [facility] services [phone]. Pt to follow prompts to outpatient services.    SW sent message to [facility] and notified them.  SW was informed they need copy of divorce decree.    SW placed call to pt to notify her.  No answer.  SW left VM message for pt and notified her.    SW to follow up.    THEO Woodard  [facility]   [phone]

## 2023-06-29 NOTE — PROGRESS NOTES
"Integrated Behavioral Health Follow-up Visit Note  Visit number: 6     Visit Type Performed: Video   Communication platform used for this encounter:  [employer] Video Visit (Epic Video Client)     Alecia Cruz was contacted today for a behavioral health visit.  Clinician confirmed identification of patient by name and birthdate, provider name, location of patient and clinician, and callback number in case disconnected.    Verification of Patient Location:  The patient affirms they are currently located in the following state: [place]  This visit was conducted via telehealth/telephone in lieu of an in-person visit due to precautions related to the COVID-19 pandemic.    Request for Consent:  You and I are about to have a tele-health check-in or visit. This is allowed because you are already a patient of our [facility] practice, and you have requested it.  This tele-health visit will be billed to your health insurance or you, if you are self-insured. You understand you will be responsible for any copayments or coinsurances that apply to your tele-health visit.  Before starting our telemedicine visit, I am required to get your consent for this virtual check-in or visit by tele-health . Do you consent?    Patient Response to Request for Consent: Yes    SUBJECTIVE     Symptoms  Depression symptoms: depressed mood, sadness, anhedonia, feelings of worthlessness/guilt, difficulty concentrating or making decisions and change in appetite  Anxiety symptoms: excessive anxiety/worry, difficulty controlling worry, restless/\"on edge\" and sleep disturbance  Additional reported symptoms: N/A      OBJECTIVE     Mental Status Evaluation  Patient's mood and affect were consistent with the context, and consistent with their baseline: Yes   Comments:  very depressed, awake and alert; oriented to person, place, and time    Additional Assessments completed this visit  N/A    Suicidal Ideation/Homicidal Ideation Risk Assessment: not " "assessed. If not assessed, reason: Previous evaluation indicated minimal risk. Pt did not endorse any changes in risk or protective factors and none were observed.      Risk Factors: Presence of a Mood Disorder and Loss (relational, social, work, or financial)  Protective factors: Effective and accessible mental health care , Connectedness to individuals, family, community, and social institutions and Problem-solving and conflict resolution skills     Estimate of Current Risk: Minimal risk     Plan for Safety-   If pt develops passive SI or HI, they agreed to the following safety plan: Created 5/30/2023     Remember her reasons for living, including her grandchildren. Pt described her grandchildren as a strong protective factor.      Interventions  Cognitive Behavior Therapy and Empathic Listening and Validation  We discussed the increase in depression and anxiety symptoms. Facilitated a discussion on the benefits of starting medication to assist in decreasing negative mood symptoms. Encouraged pt to discuss further with Dr. Begum. Introduced \"worry-time\" to help the pt manage worry while continuing to be present and in the moment. Additionally, addressed pt's concerns related to sleep and discussed sleep hygiene and journaling.      Psychotropic medications: Pt is not currently prescribed psychotropic medications.   Current Outpatient Medications   Medication Sig Dispense Refill   • gabapentin (NEURONTIN) 100 mg capsule Take 1 capsule (100 mg total) by mouth nightly. 90 capsule 0   • ascorbic acid (VITAMIN C) 500 mg tablet Take 500 mg by mouth daily.     • aspirin 81 mg enteric coated tablet Take 81 mg by mouth daily.     • atorvastatin (LIPITOR) 40 mg tablet Take 1 tablet (40 mg total) by mouth daily. 30 tablet 1   • azelastine (ASTELIN) 137 mcg (0.1 %) nasal spray Administer 2 sprays into each nostril 2 (two) times a day as needed.     • betamethasone dipropionate (DIPROSONE) 0.05 % lotion Apply 1 each topically " daily as needed.     • cetirizine (ZyrTEC) 10 mg tablet Take 10 mg by mouth as needed.     • cholecalciferol, vitamin D3, 1,000 unit (25 mcg) tablet Take 1,000 Units by mouth daily.     • docusate sodium (COLACE) 100 mg capsule Take 2 capsules (200 mg total) by mouth 2 (two) times a day as needed for constipation. 60 capsule 0   • flecainide (TAMBOCOR) 50 mg tablet Take 1 tablet (50 mg total) by mouth 2 (two) times a day. 180 tablet 3   • levothyroxine (SYNTHROID) 75 mcg tablet Take 1 tablet (75 mcg total) by mouth daily. 90 tablet 3   • lidocaine (ASPERCREME) 4 % adhesive patch,medicated topical patch Apply 1 patch topically daily. 30 patch 0   • metoprolol succinate XL (TOPROL-XL) 25 mg 24 hr tablet Take 12.5 mg by mouth daily as needed.     • multivitamin (THERAGRAN) tablet Take 1 tablet by mouth daily.     • omega-3-dha-epa-dpa-fish oil 1,050 mg(300 mg -675 mg-75 mg) capsule Take 1 capsule by mouth daily.     • polyethylene glycol (MIRALAX) 17 gram packet Take 17 g by mouth daily as needed (Constipation) for up to 3 days. 30 each 0     No current facility-administered medications for this visit.       ASSESSMENT       Progress  Patient's progress toward their goals is generally improving (Pt made an outpatient appt, however it is not until October)   Patient's symptomology is unchanged.        PLAN     Goals:  • Connect to traditional outpatient therapy     Recommendations  Individual Therapy  30 minutes 2 times monthly    Next visit plan:  Discuss transition to Dr. Karolyn Psy.D.   Review homework  Continue with managing worry/anxiety    I spent 30 minutes on this date of service performing the following activities: providing counseling and education.

## 2023-06-30 ENCOUNTER — PATIENT OUTREACH (OUTPATIENT)
Dept: CASE MANAGEMENT | Facility: CLINIC | Age: 63
End: 2023-06-30
Payer: COMMERCIAL

## 2023-06-30 ENCOUNTER — TELEMEDICINE (OUTPATIENT)
Dept: BEHAVIORAL HEALTH | Facility: CLINIC | Age: 63
End: 2023-06-30
Payer: COMMERCIAL

## 2023-06-30 DIAGNOSIS — F33.2 SEVERE EPISODE OF RECURRENT MAJOR DEPRESSIVE DISORDER, WITHOUT PSYCHOTIC FEATURES (CMS/HCC): Primary | ICD-10-CM

## 2023-06-30 NOTE — PROGRESS NOTES
06/30/2023 03:57 PM EDT by Ragini Platt, RN 06/30/2023 03:57 PM EDT by Ragini Platt, RN  Outgoing Alecia Nelson ([facility]) [phone] (Home)   Left Message: CM returned patient call. Let her know that AUGIE is sending a message to Dr Begum to look at her portal as she has sent him a note. Informed her that Dr Begum off til 7/3 and 1st available T/M appt is Wed.

## 2023-06-30 NOTE — PROGRESS NOTES
I reviewed the Psychology Resident's/Post-Doctoral Fellow's note and agree with the documented findings and plan of care, except for my comments below or within the additional notes today.    Mike Jim Psy.D.   Integrated Behavioral Health  Staff Psychologist, [facility]  [place] License #[id number]

## 2023-06-30 NOTE — PROGRESS NOTES
06/30/2023 05:00 PM EDT by Ragini Platt, RN 06/30/2023 05:00 PM EDT by Ragini Platt, RN  Outgoing Alecia Nelson ([facility]) [phone] (Home)   Reached PatientCommunicated - [facility]-  returned patient call and scheduled her for a T/M video appt on 7/5 with Dr Begum.

## 2023-07-05 ENCOUNTER — TELEMEDICINE (OUTPATIENT)
Dept: PRIMARY CARE | Facility: CLINIC | Age: 63
End: 2023-07-05
Payer: COMMERCIAL

## 2023-07-05 DIAGNOSIS — F32.9 REACTIVE DEPRESSION: Primary | ICD-10-CM

## 2023-07-05 DIAGNOSIS — F41.1 ANXIETY STATE: ICD-10-CM

## 2023-07-05 PROCEDURE — 99213 OFFICE O/P EST LOW 20 MIN: CPT | Mod: 95 | Performed by: STUDENT IN AN ORGANIZED HEALTH CARE EDUCATION/TRAINING PROGRAM

## 2023-07-05 RX ORDER — ESCITALOPRAM OXALATE 10 MG/1
10 TABLET ORAL NIGHTLY
Qty: 30 TABLET | Refills: 0 | Status: SHIPPED | OUTPATIENT
Start: 2023-07-05 | End: 2023-09-22

## 2023-07-05 RX ORDER — BUSPIRONE HYDROCHLORIDE 5 MG/1
5 TABLET ORAL 2 TIMES DAILY
Qty: 60 TABLET | Refills: 0 | Status: SHIPPED | OUTPATIENT
Start: 2023-07-05 | End: 2023-08-10 | Stop reason: ALTCHOICE

## 2023-07-05 NOTE — LETTER
July 5, 2023     Patient: Alecia Cruz  YOB: 1960  Date of Visit: 7/5/2023    To Whom it May Concern:    Alecia Cruz was seen in my clinic on 7/5/2023 at 11:20 am. We are writing to communicate that Alecia Cruz should remain on FMLA through September 1st. She continues to see me as well as her referred professionals regularly.    If you have any questions or concerns, please don't hesitate to call.      Sincerely,           Mahendra Begum, DO

## 2023-07-05 NOTE — PROGRESS NOTES
"Verification of Patient Location:  The patient affirms they are currently located in the following state: [place]    Request for Consent:    Audio and Video Encounter   Krish, my name is Mahendra Begum DO.  Before we proceed, can you please verify your identification by telling me your full name and date of birth?  Can you tell me who is in the room with you?    You and I are about to have a telemedicine check-in or visit because you have requested it.  This is a live video-conference.  I am a real person, speaking to you in real time.  There is no one else with me on the video-conference. I am not recording this conversation and I am asking you not to record it.  This telemedicine visit will be billed to your health insurance or you, if you are self-insured.  You understand you will be responsible for any copayments or coinsurances that apply to your telemedicine visit.  Communication platform used for this encounter:  [employer] Video Visit (Epic Video Client)       Before starting our telemedicine visit, I am required to get your consent for this virtual check-in or visit by telemedicine. Do you consent?      Patient Response to Request for Consent:  Yes    Visit Documentation:  Subjective     Patient ID: Alecia Cruz is a 62 y.o. female.  1960      HPI  61 y/o presents today for follow-up. She has ongoing issue with her work about FMLA time.     She is going to be going to physical therapy for her leg. She has some transportation issues. She has lilli having headaches again. She feels that her lymphedema is \"flaring up\" right now.     She is having a lot of ongoing stress associated with her job. Has been working with  regarding her employer. They are marking awol for time. Needs extension of fmla.     This stress and her acute illness starting this year has caused tremendous decompensation in her mental health. I was contacted by her psychologist that they think that she will need " medication therapy. She has been attending regular sessions and being referred to f/u with psychologist. Having palpitations, difficulty with concentration, insomnia, poor PO intake, headaches, and rumination.     Patient open to medication. Denies suicidal thoughts or feelings of self harm. She contracts for safety with me today.     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     The following have been reviewed and updated as appropriate in this visit:   Allergies  Meds  Problems       Review of Systems  As noted above.     Assessment/Plan   Diagnoses and all orders for this visit:    Reactive depression (Primary)  Assessment & Plan:  -continue with cbt.   -start ssri today and buspar for the anxiety   -Patient denies suicidal or homicidal ideations. Noted that SSRI/SNRI can have paradoxical reaction with worsening depression and suicidal ideations. They agree that they will stop medication and call 911 or go to the ED if they ever develop suicidal/homicidal ideations.   -I would like a message in 2 weeks to see how she is doing on medication and to touch base on side effects.   -return in 4 weeks by video visit or in person to review medication  -may need psychiatry referral if not improving.       Anxiety state  Assessment & Plan:  -start buspar 5mg bid for now  -if in 2 weeks not improving anxiety, can increase to 5mg tid.       Other orders  -     escitalopram (LEXAPRO) 10 mg tablet; Take 1 tablet (10 mg total) by mouth nightly.  -     busPIRone (BUSPAR) 5 mg tablet; Take 1 tablet (5 mg total) by mouth 2 (two) times a day.       Provided updated letter for patient.     Time Spent:  I spent 19 minutes on this date of service performing the following activities: obtaining history, entering orders, obtaining / reviewing records, providing counseling and education and coordinating care.

## 2023-07-07 ENCOUNTER — PATIENT OUTREACH (OUTPATIENT)
Dept: CASE MANAGEMENT | Facility: CLINIC | Age: 63
End: 2023-07-07
Payer: COMMERCIAL

## 2023-07-07 NOTE — PROGRESS NOTES
07/07/2023 09:38 AM EDT by Ragini Platt, RN 07/07/2023 09:38 AM EDT by Ragini Platt, RN  Outgoing Alecia Nelson (Self) [phone] (Home)   Reached PatientCommunicated - [facility] F/U:CM returned patient call. She states she did not look at Dr Begum latest response to her in [facility] and will look now. She did state she spoke to [facility] who told her the last form sent in by Dr Begum should be sufficient. CM informed Dr Begum.

## 2023-07-08 PROBLEM — Z02.89 ENCOUNTER FOR COMPLETION OF FORM WITH PATIENT: Status: RESOLVED | Noted: 2023-03-27 | Resolved: 2023-07-08

## 2023-07-08 NOTE — ASSESSMENT & PLAN NOTE
-continue with cbt.   -start ssri today and buspar for the anxiety   -Patient denies suicidal or homicidal ideations. Noted that SSRI/SNRI can have paradoxical reaction with worsening depression and suicidal ideations. They agree that they will stop medication and call 911 or go to the ED if they ever develop suicidal/homicidal ideations.   -I would like a message in 2 weeks to see how she is doing on medication and to touch base on side effects.   -return in 4 weeks by video visit or in person to review medication  -may need psychiatry referral if not improving.

## 2023-07-11 ENCOUNTER — DOCUMENTATION (OUTPATIENT)
Dept: OBSTETRICS AND GYNECOLOGY | Facility: HOSPITAL | Age: 63
End: 2023-07-11
Payer: COMMERCIAL

## 2023-07-12 ENCOUNTER — OFFICE VISIT (OUTPATIENT)
Dept: CARDIOLOGY | Facility: CLINIC | Age: 63
End: 2023-07-12
Payer: COMMERCIAL

## 2023-07-12 VITALS
SYSTOLIC BLOOD PRESSURE: 122 MMHG | OXYGEN SATURATION: 96 % | RESPIRATION RATE: 24 BRPM | HEIGHT: 67 IN | BODY MASS INDEX: 21.03 KG/M2 | HEART RATE: 72 BPM | WEIGHT: 134 LBS | DIASTOLIC BLOOD PRESSURE: 72 MMHG

## 2023-07-12 DIAGNOSIS — I48.0 PAROXYSMAL ATRIAL FIBRILLATION (CMS/HCC): ICD-10-CM

## 2023-07-12 DIAGNOSIS — I49.3 PVC (PREMATURE VENTRICULAR CONTRACTION): ICD-10-CM

## 2023-07-12 DIAGNOSIS — I25.119 CORONARY ARTERY DISEASE INVOLVING NATIVE CORONARY ARTERY OF NATIVE HEART WITH ANGINA PECTORIS (CMS/HCC): ICD-10-CM

## 2023-07-12 DIAGNOSIS — R00.2 PALPITATIONS: ICD-10-CM

## 2023-07-12 DIAGNOSIS — R94.31 ABNORMAL ECG: ICD-10-CM

## 2023-07-12 DIAGNOSIS — I25.10 CORONARY ARTERY DISEASE INVOLVING NATIVE CORONARY ARTERY OF NATIVE HEART WITHOUT ANGINA PECTORIS: Primary | ICD-10-CM

## 2023-07-12 DIAGNOSIS — E78.00 PURE HYPERCHOLESTEROLEMIA: ICD-10-CM

## 2023-07-12 PROCEDURE — 93000 ELECTROCARDIOGRAM COMPLETE: CPT | Mod: XU | Performed by: INTERNAL MEDICINE

## 2023-07-12 PROCEDURE — 3008F BODY MASS INDEX DOCD: CPT | Performed by: INTERNAL MEDICINE

## 2023-07-12 PROCEDURE — 99205 OFFICE O/P NEW HI 60 MIN: CPT | Performed by: INTERNAL MEDICINE

## 2023-07-12 RX ORDER — ASPIRIN 325 MG
50 TABLET, DELAYED RELEASE (ENTERIC COATED) ORAL DAILY
COMMUNITY

## 2023-07-12 RX ORDER — NITROGLYCERIN 0.4 MG/1
0.4 TABLET SUBLINGUAL EVERY 5 MIN PRN
Qty: 25 TABLET | Refills: 3 | Status: SHIPPED | OUTPATIENT
Start: 2023-07-12 | End: 2024-12-10

## 2023-07-12 RX ORDER — METOPROLOL SUCCINATE 25 MG/1
12.5 TABLET, EXTENDED RELEASE ORAL DAILY PRN
Qty: 10 TABLET | Refills: 6 | Status: SHIPPED | OUTPATIENT
Start: 2023-07-12

## 2023-07-12 ASSESSMENT — ENCOUNTER SYMPTOMS
DYSPNEA ON EXERTION: 1
COUGH: 0
NAUSEA: 1
SNORING: 0
PALPITATIONS: 1
DIZZINESS: 0
CLAUDICATION: 0
HEARTBURN: 0
DEPRESSION: 1
NERVOUS/ANXIOUS: 0
SHORTNESS OF BREATH: 0
WEIGHT GAIN: 0
DIAPHORESIS: 0
BACK PAIN: 0
INSOMNIA: 1

## 2023-07-12 NOTE — PROGRESS NOTES
"Cardiology Consult Note  Alecia Cruz is a 62 y.o. female who presents for cardiovascular evaluation.   She is bothered by palpitations-sensation of \"heart racing or kicking- occurs when under stress.\"   This may last 30 minutes or so. \"This is not my atrial fibrillation.  I can tell.\"    She will note chest distress into my left arm \"if I get physical- going up steps and/or walking up several blocks.\"  She will note shortness of breath with \"overexertion or anxiety attacks.\"  She will get some edema of both lower legs on occasion.  She has chronic left arm edema since her left axillary lymph node dissection for breast CA.        Pertinent lab results reviewed..       Past Medical History:   Diagnosis Date   • A-fib (CMS/HCC)    • Breast cancer (CMS/HCC) 2008    left partial mastectomy axillary dissection, whole breast radiation and chemotherapy in 2008 for a 2-1/2 cm invasive ductal carcinoma that was ER/WI negative and HER-2/alyson positive with 7-15 positive lymph nodes   • Colon polyp     couple on each colonoscopy per patient report   • COVID 8/23/2022   • Fibrocystic breast    • Fibroid    • History of partial hysterectomy 03/2000    c/o fibroids   • History of radiation therapy    • Hx antineoplastic chemo    • Hx of lipoma 2008    resected from shoulder   • Hypothyroidism    • Malignant neoplasm of upper-outer quadrant of left breast in female, estrogen receptor negative (CMS/HCC) 9/10/2021    2008-2.5 cm cancer with 7 of 15+ nodes treated with partial mastectomy, axillary dissection, chemotherapy and whole breast radiation.   • PAF (paroxysmal atrial fibrillation) (CMS/HCC)    • PVC (premature ventricular contraction)    • Screening for breast cancer           Past Surgical History:   Procedure Laterality Date   • BREAST BIOPSY Left 2008   • BREAST BIOPSY Right 2021    benign ([facility] specimen [id number])   • BREAST LUMPECTOMY Left 2008    [facility] specimen [id number]   • CARDIAC CATHETERIZATION     • DENTAL " SURGERY     • HYSTERECTOMY      partial   • LUMBAR PUNCTURE     • MANDIBLE FRACTURE SURGERY     • PARTIAL HYSTERECTOMY     • ROTATOR CUFF REPAIR Left    • SINUS SURGERY         Social History     Tobacco Use   • Smoking status: Never   • Smokeless tobacco: Never   Vaping Use   • Vaping Use: Never used   Substance Use Topics   • Alcohol use: No   • Drug use: No          Family History   Problem Relation Age of Onset   • Heart disease Biological Mother    • Alzheimer's disease Biological Mother    • Arrhythmia Biological Mother    • Heart disease Biological Father    • Diabetes Biological Father    • Hypertension Biological Sister    • Melanoma Biological Sister    • Sarcoidosis Biological Sister    • Diabetes Biological Sister    • Heart disease Biological Sister    • COPD Biological Brother    • Prostate cancer Biological Brother    • Heart disease Biological Brother    • Hypertension Biological Brother    • Prostate cancer Biological Brother    • Clotting disorder Mother's Brother    • Heart disease Father's Sister    • Lung cancer Father's Sister    • Lung cancer Father's Sister    • Prostate cancer Father's Brother    • Prostate cancer Father's Brother    • Prostate cancer Father's Brother    • Prostate cancer Father's Brother    • Lung cancer Father's Brother    • Stomach cancer Paternal Grandmother    • Diabetes Paternal Grandfather    • Prostate cancer Paternal Grandfather    • Ovarian cancer Paternal Cousin    • Lung cancer Paternal Cousin    • Thyroid cancer Paternal Cousin        Allergies     Hydrocodone-acetaminophen, Trazodone-dietary supp no.8, Cephalexin, Ciprofloxacin, Codeine, and Sulfa (sulfonamide antibiotics)    Current Outpatient Medications   Medication Sig Dispense Refill   • ascorbic acid (VITAMIN C) 500 mg tablet Take 500 mg by mouth daily.     • aspirin 81 mg enteric coated tablet Take 81 mg by mouth daily.     • azelastine (ASTELIN) 137 mcg (0.1 %) nasal spray Administer 2 sprays into each  nostril 2 (two) times a day as needed.     • cetirizine (ZyrTEC) 10 mg tablet Take 10 mg by mouth as needed.     • cholecalciferol, vitamin D3, 1,000 unit (25 mcg) tablet Take 1,000 Units by mouth daily.     • coenzyme Q10 (COQ10) 50 mg capsule Take 50 mg by mouth daily.     • escitalopram (LEXAPRO) 10 mg tablet Take 1 tablet (10 mg total) by mouth nightly. 30 tablet 0   • flecainide (TAMBOCOR) 50 mg tablet Take 1 tablet (50 mg total) by mouth 2 (two) times a day. 180 tablet 3   • gabapentin (NEURONTIN) 100 mg capsule Take 1 capsule (100 mg total) by mouth nightly. (Patient taking differently: Take 50 mg by mouth nightly.) 90 capsule 0   • levothyroxine (SYNTHROID) 75 mcg tablet Take 1 tablet (75 mcg total) by mouth daily. 90 tablet 3   • metoprolol succinate XL (TOPROL-XL) 25 mg 24 hr tablet Take 0.5 tablets (12.5 mg total) by mouth daily as needed (SBP over 135 or  palpitations). 10 tablet 6   • multivitamin (THERAGRAN) tablet Take 1 tablet by mouth daily.     • nitroglycerin (NITROSTAT) 0.4 mg SL tablet Place 1 tablet (0.4 mg total) under the tongue every 5 (five) minutes as needed for chest pain. 25 tablet 3   • omega-3-dha-epa-dpa-fish oil 1,050 mg(300 mg -675 mg-75 mg) capsule Take 1 capsule by mouth daily.     • atorvastatin (LIPITOR) 40 mg tablet Take 1 tablet (40 mg total) by mouth daily. 30 tablet 1   • betamethasone dipropionate (DIPROSONE) 0.05 % lotion Apply 1 each topically daily as needed.     • busPIRone (BUSPAR) 5 mg tablet Take 1 tablet (5 mg total) by mouth 2 (two) times a day. (Patient not taking: Reported on 7/12/2023) 60 tablet 0   • docusate sodium (COLACE) 100 mg capsule Take 2 capsules (200 mg total) by mouth 2 (two) times a day as needed for constipation. 60 capsule 0   • lidocaine (ASPERCREME) 4 % adhesive patch,medicated topical patch Apply 1 patch topically daily. 30 patch 0   • polyethylene glycol (MIRALAX) 17 gram packet Take 17 g by mouth daily as needed (Constipation) for up to 3  days. 30 each 0     No current facility-administered medications for this visit.         Review of Systems   Constitutional: Positive for malaise/fatigue. Negative for diaphoresis and weight gain.   Eyes: Negative for visual disturbance.   Cardiovascular: Positive for dyspnea on exertion and palpitations. Negative for chest pain, claudication and leg swelling.        Left arm edema since axillary lymph node dissection   Respiratory: Negative for cough, shortness of breath and snoring.    Skin: Negative for rash.   Musculoskeletal: Negative for arthritis and back pain.   Gastrointestinal: Positive for nausea. Negative for heartburn.   Genitourinary: Negative for nocturia.   Neurological: Negative for dizziness.   Psychiatric/Behavioral: Positive for depression. The patient has insomnia. The patient is not nervous/anxious.        Objective       Vitals:    07/12/23 1533   BP: 122/72   Pulse: 72   Resp: (!) 24   SpO2: 96%       Physical Exam  Constitutional:       Appearance: She is well-developed.   HENT:      Head: Normocephalic.      Comments: Bilateral cataracts   Eyes:      General:         Right eye: No discharge.         Left eye: No discharge.   Neck:      Vascular: No JVD.   Cardiovascular:      Rate and Rhythm: Normal rate and regular rhythm.      Heart sounds: Normal heart sounds.      Comments: Moderate left arm edema   Pulmonary:      Breath sounds: Normal breath sounds.   Abdominal:      General: A surgical scar is present. Bowel sounds are normal.      Palpations: Abdomen is soft.   Musculoskeletal:         General: No deformity.      Comments: Moderate Left arm edema  Right groin tender but no hematoma or bruit    Skin:     Findings: No rash.   Neurological:      Mental Status: She is alert and oriented to person, place, and time.   Psychiatric:         Behavior: Behavior normal.          Labs   Lab Results   Component Value Date    WBC 7.46 03/13/2023    HGB 13.4 03/13/2023    HCT 39.1 03/13/2023      03/13/2023    CHOL 180 03/08/2023    TRIG 39 03/08/2023    HDL 59 03/08/2023    ALT 61 (H) 03/13/2023    AST 41 03/13/2023     03/13/2023    K 4.1 03/13/2023     03/13/2023    CREATININE 0.7 03/13/2023    BUN 13 03/13/2023    CO2 23 03/13/2023    TSH 1.08 03/07/2023    INR 0.9 03/07/2023       Imaging  3/12/23 coronary arteriogram reviewed-non obstructive CAD     ECG   Sinus, inferior and anterolateral ST-T changes, QRS duration of 90 ms        Assessment:  This is a 62 y.o. female being consulted for chest distress and palpitations     Problem List Items Addressed This Visit        High    Palpitations     See PAF and PVC problems below  She could get a [employer] darion- we discussed  She could try prn metoprolol tartrate again             Medium    Paroxysmal atrial fibrillation (CMS/HCC)     Her [id number] score is 1 (female).        11/22 48 hr monitor with no episodes of AFIB.    Patient reported history of proximal A-fib which started in the 1990s and had been treated with verapamil, digoxin and Coumadin in the past.   She has since been started on flecainide 50 mg twice daily.  She only takes metoprolol as needed because it makes her feel fatigued.    8/20/21 echo in [place]  LVEF 50-55%, mild RV dysfunction, mild MR, RVs of 30-35 mm Hg     Her Eliquis to be taken as needed in place of aspirin for episodes of atrial fibrillation lasting greater than 24 hours per Dr. Alonso    I advised repeat echo  She has daily palpitations-   Dr. Alonso asked her to get a 14 day Zio patch in January but she never did this.  I put this on today.              Relevant Medications    nitroglycerin (NITROSTAT) 0.4 mg SL tablet    metoprolol succinate XL (TOPROL-XL) 25 mg 24 hr tablet    Other Relevant Orders    Zio patch - extended holter 8-14 days    HLD (hyperlipidemia)     , Trig 39, HDL 59 and LDL of 113 on 3/8 labs    She was started on Atorvastatin 40 mg at time of March hospitalization  For  fasting labs          Relevant Orders    CBC and Differential    CK, Total    Comprehensive metabolic panel    Lipid panel    TSH w reflex FT4    Coronary artery disease involving native coronary artery of native heart with angina pectoris (CMS/HCC) - Primary     Hospitalized 3/7 to 3/11/23 for complaints of chest pain  CXR-stable DEVEN nodule  ECG with non-specific T wave abnormalities  CT of chest negative for PE  3/10/23 heart catheterization showed mild LCX disease with other vessels patent.    She was started on asa and atorvastatin 40 mg daily  Continues with atypical chest pains  She could have microvascular angina  We could consider a regadenoson PET scan to look for myocardial blood flow at rest and post regadenoson  We could try SL nitro before activity that reproducibly causes chest pain and dyspnea but she is reluctant to try with her tendency to low BP.              Relevant Medications    nitroglycerin (NITROSTAT) 0.4 mg SL tablet    metoprolol succinate XL (TOPROL-XL) 25 mg 24 hr tablet       Low    PVC (premature ventricular contraction)     11/4/22 48 hr monitor showed sinus rhythm with average HR 84 and range 59 to 129 bpm  267 PVC's  18 ventricular bigeminy and 1 ventricular couplet         Relevant Medications    nitroglycerin (NITROSTAT) 0.4 mg SL tablet    metoprolol succinate XL (TOPROL-XL) 25 mg 24 hr tablet    Other Relevant Orders    Zio patch - extended holter 8-14 days    Abnormal ECG     Inferior and anterolateral ST-T changes suggestive of ischemia  I gave her a copy of her ECG to keep with on her cellphone.           She will return in several months. I spent 83 minutes on this date of service performing the following activities: obtaining history, performing examination, entering orders, documenting, preparing for visit, obtaining / reviewing records, providing counseling and education, independently reviewing study/studies, communicating results and coordinating care.    Ruiz BLEVINS  MD [place]  7/12/2023

## 2023-07-12 NOTE — ASSESSMENT & PLAN NOTE
Her [id number] score is 1 (female).        11/22 48 hr monitor with no episodes of AFIB.    Patient reported history of proximal A-fib which started in the 1990s and had been treated with verapamil, digoxin and Coumadin in the past.   She has since been started on flecainide 50 mg twice daily.  She only takes metoprolol as needed because it makes her feel fatigued.    8/20/21 echo in [place]  LVEF 50-55%, mild RV dysfunction, mild MR, RVs of 30-35 mm Hg     Her Eliquis to be taken as needed in place of aspirin for episodes of atrial fibrillation lasting greater than 24 hours per Dr. Alonso    I advised repeat echo  She has daily palpitations-   Dr. Alonso asked her to get a 14 day Zio patch in January but she never did this.  I put this on today.

## 2023-07-12 NOTE — ASSESSMENT & PLAN NOTE
Inferior and anterolateral ST-T changes suggestive of ischemia  I gave her a copy of her ECG to keep with on her cellphone.

## 2023-07-12 NOTE — ASSESSMENT & PLAN NOTE
See PAF and PVC problems below  She could get a [employer] darion- we discussed  She could try prn metoprolol tartrate again

## 2023-07-12 NOTE — LETTER
"July 12, 2023     Mahendra Begum, DO  [address]  [place], [address]  [facility] [address]    Patient: Alecia Cruz  YOB: 1960  Date of Visit: 7/12/2023      Dear Dr. Begum:    Thank you for referring Alecia Cruz to me for evaluation. Below are my notes for this consultation.    If you have questions, please do not hesitate to call me. I look forward to following your patient along with you.         Sincerely,        Ruiz Pulliam MD        CC: MD Heraclio Barajas James F, MD  7/12/2023  5:02 PM  Signed  Cardiology Consult Note  Alecia Cruz is a 62 y.o. female who presents for cardiovascular evaluation.   She is bothered by palpitations-sensation of \"heart racing or kicking- occurs when under stress.\"   This may last 30 minutes or so. \"This is not my atrial fibrillation.  I can tell.\"    She will note chest distress into my left arm \"if I get physical- going up steps and/or walking up several blocks.\"  She will note shortness of breath with \"overexertion or anxiety attacks.\"  She will get some edema of both lower legs on occasion.  She has chronic left arm edema since her left axillary lymph node dissection for breast CA.        Pertinent lab results reviewed..       Past Medical History:   Diagnosis Date   • A-fib (CMS/HCC)    • Breast cancer (CMS/HCC) 2008    left partial mastectomy axillary dissection, whole breast radiation and chemotherapy in 2008 for a 2-1/2 cm invasive ductal carcinoma that was ER/MI negative and HER-2/alyson positive with 7-15 positive lymph nodes   • Colon polyp     couple on each colonoscopy per patient report   • COVID 8/23/2022   • Fibrocystic breast    • Fibroid    • History of partial hysterectomy 03/2000    c/o fibroids   • History of radiation therapy    • Hx antineoplastic chemo    • Hx of lipoma 2008    resected from shoulder   • Hypothyroidism    • Malignant neoplasm of upper-outer quadrant of left breast in female, estrogen receptor " negative (CMS/HCC) 9/10/2021    2008-2.5 cm cancer with 7 of 15+ nodes treated with partial mastectomy, axillary dissection, chemotherapy and whole breast radiation.   • PAF (paroxysmal atrial fibrillation) (CMS/HCC)    • PVC (premature ventricular contraction)    • Screening for breast cancer           Past Surgical History:   Procedure Laterality Date   • BREAST BIOPSY Left 2008   • BREAST BIOPSY Right 2021    benign ([facility] specimen [id number])   • BREAST LUMPECTOMY Left 2008    [facility] specimen [id number]   • CARDIAC CATHETERIZATION     • DENTAL SURGERY     • HYSTERECTOMY      partial   • LUMBAR PUNCTURE     • MANDIBLE FRACTURE SURGERY     • PARTIAL HYSTERECTOMY     • ROTATOR CUFF REPAIR Left    • SINUS SURGERY         Social History     Tobacco Use   • Smoking status: Never   • Smokeless tobacco: Never   Vaping Use   • Vaping Use: Never used   Substance Use Topics   • Alcohol use: No   • Drug use: No          Family History   Problem Relation Age of Onset   • Heart disease Biological Mother    • Alzheimer's disease Biological Mother    • Arrhythmia Biological Mother    • Heart disease Biological Father    • Diabetes Biological Father    • Hypertension Biological Sister    • Melanoma Biological Sister    • Sarcoidosis Biological Sister    • Diabetes Biological Sister    • Heart disease Biological Sister    • COPD Biological Brother    • Prostate cancer Biological Brother    • Heart disease Biological Brother    • Hypertension Biological Brother    • Prostate cancer Biological Brother    • Clotting disorder Mother's Brother    • Heart disease Father's Sister    • Lung cancer Father's Sister    • Lung cancer Father's Sister    • Prostate cancer Father's Brother    • Prostate cancer Father's Brother    • Prostate cancer Father's Brother    • Prostate cancer Father's Brother    • Lung cancer Father's Brother    • Stomach cancer Paternal Grandmother    • Diabetes Paternal Grandfather    • Prostate cancer Paternal Grandfather     • Ovarian cancer Paternal Cousin    • Lung cancer Paternal Cousin    • Thyroid cancer Paternal Cousin        Allergies     Hydrocodone-acetaminophen, Trazodone-dietary supp no.8, Cephalexin, Ciprofloxacin, Codeine, and Sulfa (sulfonamide antibiotics)    Current Outpatient Medications   Medication Sig Dispense Refill   • ascorbic acid (VITAMIN C) 500 mg tablet Take 500 mg by mouth daily.     • aspirin 81 mg enteric coated tablet Take 81 mg by mouth daily.     • azelastine (ASTELIN) 137 mcg (0.1 %) nasal spray Administer 2 sprays into each nostril 2 (two) times a day as needed.     • cetirizine (ZyrTEC) 10 mg tablet Take 10 mg by mouth as needed.     • cholecalciferol, vitamin D3, 1,000 unit (25 mcg) tablet Take 1,000 Units by mouth daily.     • coenzyme Q10 (COQ10) 50 mg capsule Take 50 mg by mouth daily.     • escitalopram (LEXAPRO) 10 mg tablet Take 1 tablet (10 mg total) by mouth nightly. 30 tablet 0   • flecainide (TAMBOCOR) 50 mg tablet Take 1 tablet (50 mg total) by mouth 2 (two) times a day. 180 tablet 3   • gabapentin (NEURONTIN) 100 mg capsule Take 1 capsule (100 mg total) by mouth nightly. (Patient taking differently: Take 50 mg by mouth nightly.) 90 capsule 0   • levothyroxine (SYNTHROID) 75 mcg tablet Take 1 tablet (75 mcg total) by mouth daily. 90 tablet 3   • metoprolol succinate XL (TOPROL-XL) 25 mg 24 hr tablet Take 0.5 tablets (12.5 mg total) by mouth daily as needed (SBP over 135 or  palpitations). 10 tablet 6   • multivitamin (THERAGRAN) tablet Take 1 tablet by mouth daily.     • nitroglycerin (NITROSTAT) 0.4 mg SL tablet Place 1 tablet (0.4 mg total) under the tongue every 5 (five) minutes as needed for chest pain. 25 tablet 3   • omega-3-dha-epa-dpa-fish oil 1,050 mg(300 mg -675 mg-75 mg) capsule Take 1 capsule by mouth daily.     • atorvastatin (LIPITOR) 40 mg tablet Take 1 tablet (40 mg total) by mouth daily. 30 tablet 1   • betamethasone dipropionate (DIPROSONE) 0.05 % lotion Apply 1 each  topically daily as needed.     • busPIRone (BUSPAR) 5 mg tablet Take 1 tablet (5 mg total) by mouth 2 (two) times a day. (Patient not taking: Reported on 7/12/2023) 60 tablet 0   • docusate sodium (COLACE) 100 mg capsule Take 2 capsules (200 mg total) by mouth 2 (two) times a day as needed for constipation. 60 capsule 0   • lidocaine (ASPERCREME) 4 % adhesive patch,medicated topical patch Apply 1 patch topically daily. 30 patch 0   • polyethylene glycol (MIRALAX) 17 gram packet Take 17 g by mouth daily as needed (Constipation) for up to 3 days. 30 each 0     No current facility-administered medications for this visit.         Review of Systems   Constitutional: Positive for malaise/fatigue. Negative for diaphoresis and weight gain.   Eyes: Negative for visual disturbance.   Cardiovascular: Positive for dyspnea on exertion and palpitations. Negative for chest pain, claudication and leg swelling.        Left arm edema since axillary lymph node dissection   Respiratory: Negative for cough, shortness of breath and snoring.    Skin: Negative for rash.   Musculoskeletal: Negative for arthritis and back pain.   Gastrointestinal: Positive for nausea. Negative for heartburn.   Genitourinary: Negative for nocturia.   Neurological: Negative for dizziness.   Psychiatric/Behavioral: Positive for depression. The patient has insomnia. The patient is not nervous/anxious.        Objective       Vitals:    07/12/23 1533   BP: 122/72   Pulse: 72   Resp: (!) 24   SpO2: 96%       Physical Exam  Constitutional:       Appearance: She is well-developed.   HENT:      Head: Normocephalic.      Comments: Bilateral cataracts   Eyes:      General:         Right eye: No discharge.         Left eye: No discharge.   Neck:      Vascular: No JVD.   Cardiovascular:      Rate and Rhythm: Normal rate and regular rhythm.      Heart sounds: Normal heart sounds.      Comments: Moderate left arm edema   Pulmonary:      Breath sounds: Normal breath sounds.    Abdominal:      General: A surgical scar is present. Bowel sounds are normal.      Palpations: Abdomen is soft.   Musculoskeletal:         General: No deformity.      Comments: Moderate Left arm edema  Right groin tender but no hematoma or bruit    Skin:     Findings: No rash.   Neurological:      Mental Status: She is alert and oriented to person, place, and time.   Psychiatric:         Behavior: Behavior normal.          Labs   Lab Results   Component Value Date    WBC 7.46 03/13/2023    HGB 13.4 03/13/2023    HCT 39.1 03/13/2023     03/13/2023    CHOL 180 03/08/2023    TRIG 39 03/08/2023    HDL 59 03/08/2023    ALT 61 (H) 03/13/2023    AST 41 03/13/2023     03/13/2023    K 4.1 03/13/2023     03/13/2023    CREATININE 0.7 03/13/2023    BUN 13 03/13/2023    CO2 23 03/13/2023    TSH 1.08 03/07/2023    INR 0.9 03/07/2023       Imaging  3/12/23 coronary arteriogram reviewed-non obstructive CAD     ECG   Sinus, inferior and anterolateral ST-T changes, QRS duration of 90 ms        Assessment:  This is a 62 y.o. female being consulted for chest distress and palpitations     Problem List Items Addressed This Visit        High    Palpitations     See PAF and PVC problems below  She could get a [employer] darion- we discussed  She could try prn metoprolol tartrate again             Medium    Paroxysmal atrial fibrillation (CMS/HCC)     Her QEU4LV2-JCCx score is 1 (female).        11/22 48 hr monitor with no episodes of AFIB.    Patient reported history of proximal A-fib which started in the 1990s and had been treated with verapamil, digoxin and Coumadin in the past.   She has since been started on flecainide 50 mg twice daily.  She only takes metoprolol as needed because it makes her feel fatigued.    8/20/21 echo in [place]  LVEF 50-55%, mild RV dysfunction, mild MR, RVs of 30-35 mm Hg     Her Eliquis to be taken as needed in place of aspirin for episodes of atrial fibrillation lasting greater than 24  hours per Dr. Alonso    I advised repeat echo  She has daily palpitations-   Dr. Alonso asked her to get a 14 day Zio patch in January but she never did this.  I put this on today.              Relevant Medications    nitroglycerin (NITROSTAT) 0.4 mg SL tablet    metoprolol succinate XL (TOPROL-XL) 25 mg 24 hr tablet    Other Relevant Orders    Zio patch - extended holter 8-14 days    HLD (hyperlipidemia)     , Trig 39, HDL 59 and LDL of 113 on 3/8 labs    She was started on Atorvastatin 40 mg at time of March hospitalization  For fasting labs          Relevant Orders    CBC and Differential    CK, Total    Comprehensive metabolic panel    Lipid panel    TSH w reflex FT4    Coronary artery disease involving native coronary artery of native heart with angina pectoris (CMS/HCC) - Primary     Hospitalized 3/7 to 3/11/23 for complaints of chest pain  CXR-stable DEVEN nodule  ECG with non-specific T wave abnormalities  CT of chest negative for PE  3/10/23 heart catheterization showed mild LCX disease with other vessels patent.    She was started on asa and atorvastatin 40 mg daily  Continues with atypical chest pains  She could have microvascular angina  We could consider a regadenoson PET scan to look for myocardial blood flow at rest and post regadenoson  We could try SL nitro before activity that reproducibly causes chest pain and dyspnea but she is reluctant to try with her tendency to low BP.              Relevant Medications    nitroglycerin (NITROSTAT) 0.4 mg SL tablet    metoprolol succinate XL (TOPROL-XL) 25 mg 24 hr tablet       Low    PVC (premature ventricular contraction)     11/4/22 48 hr monitor showed sinus rhythm with average HR 84 and range 59 to 129 bpm  267 PVC's  18 ventricular bigeminy and 1 ventricular couplet         Relevant Medications    nitroglycerin (NITROSTAT) 0.4 mg SL tablet    metoprolol succinate XL (TOPROL-XL) 25 mg 24 hr tablet    Other Relevant Orders    Zio patch - extended  holter 8-14 days    Abnormal ECG     Inferior and anterolateral ST-T changes suggestive of ischemia  I gave her a copy of her ECG to keep with on her cellphone.           She will return in several months. I spent 83 minutes on this date of service performing the following activities: obtaining history, performing examination, entering orders, documenting, preparing for visit, obtaining / reviewing records, providing counseling and education, independently reviewing study/studies, communicating results and coordinating care.    Ruiz Pulliam MD  7/12/2023

## 2023-07-12 NOTE — LETTER
July 12, 2023     Patient: Alecia Cruz  YOB: 1960  Date of Visit: 7/12/2023    To Whom it May Concern:    Alecia Cruz was seen in my clinic on 7/12/2023 at 3:00 pm. Please excuse Alecia for her absence from work on this day to make the appointment.    If you have any questions or concerns, please don't hesitate to call.         Sincerely,         Ruiz Pulliam MD        CC:   No Recipients

## 2023-07-12 NOTE — ASSESSMENT & PLAN NOTE
11/4/22 48 hr monitor showed sinus rhythm with average HR 84 and range 59 to 129 bpm  267 PVC's  18 ventricular bigeminy and 1 ventricular couplet

## 2023-07-12 NOTE — ASSESSMENT & PLAN NOTE
, Trig 39, HDL 59 and LDL of 113 on 3/8 labs    She was started on Atorvastatin 40 mg at time of March hospitalization  For fasting labs

## 2023-07-12 NOTE — ASSESSMENT & PLAN NOTE
Hospitalized 3/7 to 3/11/23 for complaints of chest pain  CXR-stable DEVEN nodule  ECG with non-specific T wave abnormalities  CT of chest negative for PE  3/10/23 heart catheterization showed mild LCX disease with other vessels patent.    She was started on asa and atorvastatin 40 mg daily  Continues with atypical chest pains  She could have microvascular angina  We could consider a regadenoson PET scan to look for myocardial blood flow at rest and post regadenoson  We could try SL nitro before activity that reproducibly causes chest pain and dyspnea but she is reluctant to try with her tendency to low BP.

## 2023-07-18 ENCOUNTER — TELEMEDICINE (OUTPATIENT)
Dept: BEHAVIORAL HEALTH | Facility: CLINIC | Age: 63
End: 2023-07-18
Payer: COMMERCIAL

## 2023-07-18 DIAGNOSIS — F33.2 SEVERE EPISODE OF RECURRENT MAJOR DEPRESSIVE DISORDER, WITHOUT PSYCHOTIC FEATURES (CMS/HCC): Primary | ICD-10-CM

## 2023-07-18 NOTE — PROGRESS NOTES
"Integrated Behavioral Health Follow-up Visit Note  Visit number: 7     Visit Type Performed: Video   Communication platform used for this encounter:  [email or web address] Video Visit (Epic Video Client)     Alecia Cruz was contacted today for a behavioral health visit.  Clinician confirmed identification of patient by name and birthdate, provider name, location of patient and clinician, and callback number in case disconnected.    Verification of Patient Location:  The patient affirms they are currently located in the following state: [place]  This visit was conducted via telehealth/telephone in lieu of an in-person visit due to precautions related to the COVID-19 pandemic.    Request for Consent:  You and I are about to have a tele-health check-in or visit. This is allowed because you are already a patient of our [facility] practice, and you have requested it.  This tele-health visit will be billed to your health insurance or you, if you are self-insured. You understand you will be responsible for any copayments or coinsurances that apply to your tele-health visit.  Before starting our telemedicine visit, I am required to get your consent for this virtual check-in or visit by tele-health . Do you consent?    Patient Response to Request for Consent: Yes    SUBJECTIVE     Symptoms  Depression symptoms: depressed mood, sadness, anhedonia, lack of motivation, insomnia, fatigue/lack of energy, feelings of worthlessness/guilt and difficulty concentrating or making decisions  Anxiety symptoms: excessive anxiety/worry, difficulty controlling worry, restless/\"on edge\" and sleep disturbance  Additional reported symptoms: N/A      OBJECTIVE     Mental Status Evaluation  Patient's mood and affect were consistent with the context, and consistent with their baseline: Yes   Comments:  depressed, awake and alert; oriented to person, place, and time    Additional Assessments completed this visit  N/A    Suicidal Ideation/Homicidal " Ideation Risk Assessment: not assessed. If not assessed, reason:  Previous evaluation indicated minimal risk. Pt did not endorse any changes in risk or protective factors and none were observed.     Risk Factors: Presence of a Mood Disorder and Loss (relational, social, work, or financial)  Protective factors: Effective and accessible mental health care , Connectedness to individuals, family, community, and social institutions and Problem-solving and conflict resolution skills    Estimate of Current Risk: Minimal risk    Plan for Safety-   If pt develops passive SI or HI, they agreed to the following safety plan: Created 5/30/2023     Remember her reasons for living, including her grandchildren. Pt described her grandchildren as a strong protective factor.      Interventions  Cognitive Behavior Therapy and Empathic Listening and Validation  Explored stressor related to pt's job and FMLA signed by Dr. Begum. Empathetic listening and validation were provided. Elicited from the pt her strengths and resources to highlight her potential for finding another job more suitable for her. Facilitated a discussion on behavioral activation to encourage the pt to do more things outside of her bedroom (I.e. changing environments, going for a walk, sitting outside). Discussed transition to Dr. Parr and scheduled pt's appt.       Psychotropic medications: no known adherence challenges, too early to assess effectiveness   Current Outpatient Medications   Medication Sig Dispense Refill   • ascorbic acid (VITAMIN C) 500 mg tablet Take 500 mg by mouth daily.     • aspirin 81 mg enteric coated tablet Take 81 mg by mouth daily.     • atorvastatin (LIPITOR) 40 mg tablet Take 1 tablet (40 mg total) by mouth daily. 30 tablet 1   • azelastine (ASTELIN) 137 mcg (0.1 %) nasal spray Administer 2 sprays into each nostril 2 (two) times a day as needed.     • betamethasone dipropionate (DIPROSONE) 0.05 % lotion Apply 1 each topically daily as  needed.     • busPIRone (BUSPAR) 5 mg tablet Take 1 tablet (5 mg total) by mouth 2 (two) times a day. (Patient not taking: Reported on 7/12/2023) 60 tablet 0   • cetirizine (ZyrTEC) 10 mg tablet Take 10 mg by mouth as needed.     • cholecalciferol, vitamin D3, 1,000 unit (25 mcg) tablet Take 1,000 Units by mouth daily.     • coenzyme Q10 (COQ10) 50 mg capsule Take 50 mg by mouth daily.     • docusate sodium (COLACE) 100 mg capsule Take 2 capsules (200 mg total) by mouth 2 (two) times a day as needed for constipation. 60 capsule 0   • escitalopram (LEXAPRO) 10 mg tablet Take 1 tablet (10 mg total) by mouth nightly. 30 tablet 0   • flecainide (TAMBOCOR) 50 mg tablet Take 1 tablet (50 mg total) by mouth 2 (two) times a day. 180 tablet 3   • gabapentin (NEURONTIN) 100 mg capsule Take 1 capsule (100 mg total) by mouth nightly. (Patient taking differently: Take 50 mg by mouth nightly.) 90 capsule 0   • levothyroxine (SYNTHROID) 75 mcg tablet Take 1 tablet (75 mcg total) by mouth daily. 90 tablet 3   • lidocaine (ASPERCREME) 4 % adhesive patch,medicated topical patch Apply 1 patch topically daily. 30 patch 0   • metoprolol succinate XL (TOPROL-XL) 25 mg 24 hr tablet Take 0.5 tablets (12.5 mg total) by mouth daily as needed (SBP over 135 or  palpitations). 10 tablet 6   • multivitamin (THERAGRAN) tablet Take 1 tablet by mouth daily.     • nitroglycerin (NITROSTAT) 0.4 mg SL tablet Place 1 tablet (0.4 mg total) under the tongue every 5 (five) minutes as needed for chest pain. 25 tablet 3   • omega-3-dha-epa-dpa-fish oil 1,050 mg(300 mg -675 mg-75 mg) capsule Take 1 capsule by mouth daily.     • polyethylene glycol (MIRALAX) 17 gram packet Take 17 g by mouth daily as needed (Constipation) for up to 3 days. 30 each 0     No current facility-administered medications for this visit.       ASSESSMENT       Progress  Patient's progress toward their goals is generally improving (Pt has an appt with outpatient therapy on October  2nd)   Patient's symptomology is unchanged.        PLAN     Goals:  • Connect to traditional outpatient therapy     Recommendations  Individual Therapy  30 minutes 2 times monthly w/ Dr. Parr until connected with outpatient appt.    Next visit plan:  Introduction to Dr. Parr     I spent 30 minutes on this date of service performing the following activities: providing counseling and education.

## 2023-07-20 ENCOUNTER — PATIENT OUTREACH (OUTPATIENT)
Dept: CASE MANAGEMENT | Facility: CLINIC | Age: 63
End: 2023-07-20
Payer: COMMERCIAL

## 2023-07-20 NOTE — PROGRESS NOTES
I reviewed the Psychology Resident's/Post-Doctoral Fellow's note and agree with the documented findings and plan of care, except for my comments below or within the additional notes today.    Mike Jim Psy.D.   Integrated Behavioral Health  Staff Psychologist, [facility]  [place] License #[id number]

## 2023-07-20 NOTE — PROGRESS NOTES
Social Work Follow up Note     placed call to pt to follow up on [employer] application.  Divorce decree is still needed to finalize [employer] application.    Pt reported she ordered divorce decree and she needs to get it.  Her car broke down and she has a friend who will drive her to get the divorce decree.      Pt will follow up with SW as soon as she has decree.  SW to follow up.    THEO Woodard  [facility]   [phone]

## 2023-08-01 ENCOUNTER — PATIENT OUTREACH (OUTPATIENT)
Dept: CASE MANAGEMENT | Facility: CLINIC | Age: 63
End: 2023-08-01
Payer: COMMERCIAL

## 2023-08-01 NOTE — PROGRESS NOTES
08/01/2023 03:23 PM EDT by Ragini Platt RN 08/01/2023 03:23 PM EDT by Ragini Platt, RN  [facility] Alecia Nelson ([facility]) [phone] (Home)   Patient called in requesting a video appt with Dr Begum as she is still having itchiness and symptoms of rash. She states Benadryl put her BP up so she stopped that. Video T/M scheduled for tomorrow am.

## 2023-08-02 ENCOUNTER — TELEMEDICINE (OUTPATIENT)
Dept: PRIMARY CARE | Facility: CLINIC | Age: 63
End: 2023-08-02
Payer: COMMERCIAL

## 2023-08-02 DIAGNOSIS — F41.1 ANXIETY STATE: ICD-10-CM

## 2023-08-02 DIAGNOSIS — R21 RASH: ICD-10-CM

## 2023-08-02 PROCEDURE — 99213 OFFICE O/P EST LOW 20 MIN: CPT | Mod: 95 | Performed by: STUDENT IN AN ORGANIZED HEALTH CARE EDUCATION/TRAINING PROGRAM

## 2023-08-02 RX ORDER — BUPROPION HYDROCHLORIDE 100 MG/1
100 TABLET, EXTENDED RELEASE ORAL 2 TIMES DAILY
Qty: 60 TABLET | Refills: 0 | Status: SHIPPED | OUTPATIENT
Start: 2023-08-02 | End: 2023-09-22

## 2023-08-02 NOTE — PROGRESS NOTES
Verification of Patient Location:  The patient affirms they are currently located in the following state: [place]    Request for Consent:    Audio and Video Encounter   Krish, my name is Mahendra DO Kel.  Before we proceed, can you please verify your identification by telling me your full name and date of birth?  Can you tell me who is in the room with you?    You and I are about to have a telemedicine check-in or visit because you have requested it.  This is a live video-conference.  I am a real person, speaking to you in real time.  There is no one else with me on the video-conference. I am not recording this conversation and I am asking you not to record it.  This telemedicine visit will be billed to your health insurance or you, if you are self-insured.  You understand you will be responsible for any copayments or coinsurances that apply to your telemedicine visit.  Communication platform used for this encounter:  CL3VER Video Visit (Epic Video Client)       Before starting our telemedicine visit, I am required to get your consent for this virtual check-in or visit by telemedicine. Do you consent?      Patient Response to Request for Consent:  Yes      Visit Documentation:  Subjective     Patient ID: Alecia Cruz is a 62 y.o. female.  1960      HPI  Coming in today after starting buspar and the escitalopram. Wrote to us by elizabeth reporting headache and blurred vision. Broke out in a rash around July 22 and this started on the face, bumpy and itching. She had some redness/itching. She put peroxide/alcohol on the rash. There was a lot of burning and then itching afterwards. She reports that the rash formed a black top to the bumps.     Additonally,she had a simulatenous red rash on her arms. Its not clear if this was related to the medicaton or if it was something else. Nothing involving in the mouth or the eyes. She applied cortisone cream. She also used benadryl which helped. No new topical  agents, hair products, weaves, or other products onto the head. She is concerned that the benadryl may be contributing to her palpitations. I reviewed the note from cardiology.     She had spoke to the on-call doctor who asked her to stop both the buspar and the escitalopram. She hasn't taken either medication since the time around 7/22.     She is thinking that she is going to leave [place] [employer] due to their treatment of her and that she is having near panic attacks when she thinks about that location.     She is going to continue to follow w/ psychology.     She is open to trying new medicaiton for the depression/anxiety. Denies suicidal ideations. She is wondering if she should see a dermatologist.       The following have been reviewed and updated as appropriate in this visit:   Allergies  Meds  Problems       Review of Systems  As noted above.     Assessment/Plan   Diagnoses and all orders for this visit:    Rash  Assessment & Plan:  -not clear if from the medication or something else that caused this rash.   -doesn't appear to the typical drug rash  -i'm not able to inspect the drug rash over the video call to see it fully  -I would recommend that she see dermatology and investigate further with them.  -for now will hold buspar and the escitalopram and trial buproprion to see if this helps better but uses an entirely different mechanism.   -we will revisit with her in 4 weeks to check in regarding the rash and input from dermatology.       Anxiety state  Assessment & Plan:  -wellbutrin may be too activating for her  -it will help with the depressed mood and we will need to check on side effects.   -I do think that she may need to have a psychiatrist in her corner potentially but worry about the time needed to get to a psychiatrist.  -check in 4 weeks from now.   -no SI/HI noted.       Other orders  -     buPROPion SR (WELLBUTRIN SR) 100 mg 12 hr tablet; Take 1 tablet (100 mg total) by mouth 2 (two) times a  day.    Current working conclusion is this may have been side effect to one of these medications    Time Spent:  I spent 25 minutes on this date of service performing the following activities: obtaining history, performing examination, entering orders, documenting, preparing for visit, providing counseling and education and coordinating care.

## 2023-08-03 NOTE — ASSESSMENT & PLAN NOTE
-wellbutrin may be too activating for her  -it will help with the depressed mood and we will need to check on side effects.   -I do think that she may need to have a psychiatrist in her corner potentially but worry about the time needed to get to a psychiatrist.  -check in 4 weeks from now.   -no SI/HI noted.

## 2023-08-03 NOTE — ASSESSMENT & PLAN NOTE
-not clear if from the medication or something else that caused this rash.   -doesn't appear to the typical drug rash  -i'm not able to inspect the drug rash over the video call to see it fully  -I would recommend that she see dermatology and investigate further with them.  -for now will hold buspar and the escitalopram and trial buproprion to see if this helps better but uses an entirely different mechanism.   -we will revisit with her in 4 weeks to check in regarding the rash and input from dermatology.

## 2023-08-04 ENCOUNTER — TELEMEDICINE (OUTPATIENT)
Dept: BEHAVIORAL HEALTH | Facility: CLINIC | Age: 63
End: 2023-08-04
Payer: COMMERCIAL

## 2023-08-04 DIAGNOSIS — F33.2 SEVERE EPISODE OF RECURRENT MAJOR DEPRESSIVE DISORDER, WITHOUT PSYCHOTIC FEATURES (CMS/HCC): Primary | ICD-10-CM

## 2023-08-04 NOTE — PROGRESS NOTES
"Integrated Behavioral Health Follow-up Visit Note  Visit number: 8     Visit Type Performed: Video   Communication platform used for this encounter:  [employer]cluest Video Visit (Epic Video Client)     Alecia Cruz was contacted today for a behavioral health visit.  Clinician confirmed identification of patient by name and birthdate, provider name, location of patient and clinician, and callback number in case disconnected.    Verification of Patient Location:  The patient affirms they are currently located in the following state: [place]  This visit was conducted via telehealth/telephone in lieu of an in-person visit due to precautions related to the COVID-19 pandemic.    Request for Consent:  You and I are about to have a tele-health check-in or visit. This is allowed because you are already a patient of our [facility] practice, and you have requested it.  This tele-health visit will be billed to your health insurance or you, if you are self-insured. You understand you will be responsible for any copayments or coinsurances that apply to your tele-health visit.  Before starting our telemedicine visit, I am required to get your consent for this virtual check-in or visit by tele-health . Do you consent?    Patient Response to Request for Consent: Yes    SUBJECTIVE     Symptoms  Depression symptoms: depressed mood, sadness, anhedonia, lack of motivation, insomnia, fatigue/lack of energy, feelings of worthlessness/guilt and difficulty concentrating or making decisions    Anxiety symptoms: excessive anxiety/worry, difficulty controlling worry, restless/\"on edge\" and sleep disturbance    OBJECTIVE     Mental Status Evaluation  Patient's mood and affect were consistent with the context, and consistent with their baseline: Yes      Comments:  depressed, awake and alert; oriented to person, place, and time    Suicidal Ideation/Homicidal Ideation Risk Assessment: not assessed. If not assessed, reason:  Previous evaluation " "indicated minimal risk. Pt did not endorse any changes in risk or protective factors and none were observed.      Risk Factors: Presence of a Mood Disorder and Loss (relational, social, work, or financial)  Protective factors: Effective and accessible mental health care , Connectedness to individuals, family, community, and social institutions and Problem-solving and conflict resolution skills     Estimate of Current Risk: Minimal risk     Plan for Safety-   If pt develops passive SI or HI, they agreed to the following safety plan: Created 5/30/2023; confirmed 08/14/2023     Remember her reasons for living, including her grandchildren. Pt described her grandchildren as a strong protective factor.    Interventions  Cognitive Behavior Therapy and Empathic Listening and Validation    Reviewed confidentiality and informed consent; facilitated transition to a new clinician from Dr. Cecille Lopez (present for portion of session). Elicited updates and changes in symptoms since last session; Alecia has engaged in processing and \"coming to terms\" with leaving her job and ending relationships that she describes as \"toxic\". Empathetic listening and validation were provided. Explored Alecia's idea of \"accepting\" and moving forward in her career, rather than trying to \"correct\" her job stressors. Acknowledged Alecia's ability to recognize what she can vs. Can't control. Encouraged Alecia to engage in greater self-compassion and reminders to maintain boundaries with others.     Psychotropic medications: no known adherence challenges, effective   Current Outpatient Medications   Medication Sig Dispense Refill   • ascorbic acid (VITAMIN C) 500 mg tablet Take 500 mg by mouth daily.     • aspirin 81 mg enteric coated tablet Take 81 mg by mouth daily.     • atorvastatin (LIPITOR) 40 mg tablet Take 1 tablet (40 mg total) by mouth daily. 30 tablet 1   • azelastine (ASTELIN) 137 mcg (0.1 %) nasal spray Administer 2 sprays into each nostril 2 " (two) times a day as needed.     • betamethasone dipropionate (DIPROSONE) 0.05 % lotion Apply 1 each topically daily as needed.     • buPROPion SR (WELLBUTRIN SR) 100 mg 12 hr tablet Take 1 tablet (100 mg total) by mouth 2 (two) times a day. 60 tablet 0   • busPIRone (BUSPAR) 5 mg tablet Take 1 tablet (5 mg total) by mouth 2 (two) times a day. (Patient not taking: Reported on 7/12/2023) 60 tablet 0   • cetirizine (ZyrTEC) 10 mg tablet Take 10 mg by mouth as needed.     • cholecalciferol, vitamin D3, 1,000 unit (25 mcg) tablet Take 1,000 Units by mouth daily.     • coenzyme Q10 (COQ10) 50 mg capsule Take 50 mg by mouth daily.     • docusate sodium (COLACE) 100 mg capsule Take 2 capsules (200 mg total) by mouth 2 (two) times a day as needed for constipation. 60 capsule 0   • escitalopram (LEXAPRO) 10 mg tablet Take 1 tablet (10 mg total) by mouth nightly. 30 tablet 0   • flecainide (TAMBOCOR) 50 mg tablet Take 1 tablet (50 mg total) by mouth 2 (two) times a day. 180 tablet 3   • gabapentin (NEURONTIN) 100 mg capsule Take 1 capsule (100 mg total) by mouth nightly. (Patient taking differently: Take 50 mg by mouth nightly.) 90 capsule 0   • levothyroxine (SYNTHROID) 75 mcg tablet Take 1 tablet (75 mcg total) by mouth daily. 90 tablet 3   • lidocaine (ASPERCREME) 4 % adhesive patch,medicated topical patch Apply 1 patch topically daily. 30 patch 0   • metoprolol succinate XL (TOPROL-XL) 25 mg 24 hr tablet Take 0.5 tablets (12.5 mg total) by mouth daily as needed (SBP over 135 or  palpitations). 10 tablet 6   • multivitamin (THERAGRAN) tablet Take 1 tablet by mouth daily.     • nitroglycerin (NITROSTAT) 0.4 mg SL tablet Place 1 tablet (0.4 mg total) under the tongue every 5 (five) minutes as needed for chest pain. 25 tablet 3   • omega-3-dha-epa-dpa-fish oil 1,050 mg(300 mg -675 mg-75 mg) capsule Take 1 capsule by mouth daily.     • polyethylene glycol (MIRALAX) 17 gram packet Take 17 g by mouth daily as needed  (Constipation) for up to 3 days. 30 each 0     No current facility-administered medications for this visit.     ASSESSMENT       Progress  Patient's progress toward their goals is generally improving.    Patient's symptomology is unchanged.     PLAN     Goals:  - Connect to traditional outpatient therapy    Recommendations  Individual therapy; 30 minutes 2 times monthly until connected with OP therapy.     Next visit plan:  - Continue to discuss boundary setting    - Continue to discuss an increase self-compassion through positive self-talk    I spent 40 minutes on this date of service performing the following activities: obtaining history and providing counseling and education.    Nikki Parr Psy.D.  Integrated Behavioral Health  Post Doctoral Fellow

## 2023-08-10 ENCOUNTER — TELEPHONE (OUTPATIENT)
Dept: CARDIOLOGY | Facility: CLINIC | Age: 63
End: 2023-08-10
Payer: COMMERCIAL

## 2023-08-10 ENCOUNTER — TELEPHONE (OUTPATIENT)
Dept: SCHEDULING | Facility: CLINIC | Age: 63
End: 2023-08-10
Payer: COMMERCIAL

## 2023-08-10 DIAGNOSIS — R00.2 PALPITATIONS: ICD-10-CM

## 2023-08-10 NOTE — TELEPHONE ENCOUNTER
Echo @ [facility]:  [employer] PPO: NPR per plan  [employer] Medicare: NPR per plan

## 2023-08-10 NOTE — TELEPHONE ENCOUNTER
August 2023 Zio Patch  Sinus- average rate of 80 - range of 51 to 132  2 runs of SVT  5 beats at rate of 182  12 beats at rate of 120  No Afib  Vent ectopy very low burden with short bursts of ventricular bigeminy and trigeminy  I reviewed with patient.

## 2023-08-11 NOTE — PROGRESS NOTES
I have discussed the management of this patient with the Psychology Resident/Post-Doctoral Fellow. I reviewed the Psychology Resident's/Post-Doctoral Fellow's note and agree with the documented findings and plan of care, except for my comments below or within the additional notes today.     Mike Jim Psy.D.   Integrated Behavioral Health  Staff Psychologist, [facility]  [place] License #[id number]

## 2023-08-16 ENCOUNTER — TELEPHONE (OUTPATIENT)
Dept: PRIMARY CARE | Facility: CLINIC | Age: 63
End: 2023-08-16
Payer: COMMERCIAL

## 2023-08-16 ENCOUNTER — TELEMEDICINE (OUTPATIENT)
Dept: BEHAVIORAL HEALTH | Facility: CLINIC | Age: 63
End: 2023-08-16
Payer: COMMERCIAL

## 2023-08-16 DIAGNOSIS — Z71.89 GRIEF COUNSELING: ICD-10-CM

## 2023-08-16 DIAGNOSIS — F33.2 SEVERE EPISODE OF RECURRENT MAJOR DEPRESSIVE DISORDER, WITHOUT PSYCHOTIC FEATURES (CMS/HCC): Primary | ICD-10-CM

## 2023-08-16 NOTE — PROGRESS NOTES
"Integrated Behavioral Health Follow-up Visit Note  Visit number: 9    Visit Type Performed: Video     Communication platform used for this encounter:  Snocaphart Video Visit (Epic Video Client)     Alecia Cruz was contacted today for a behavioral health visit.    Clinician confirmed identification of patient by name and birthdate, provider name, location of patient and clinician, and callback number in case disconnected.    Verification of Patient Location:  The patient affirms they are currently located in the following state: [place]    This visit was conducted via telehealth/telephone in lieu of an in-person visit due to precautions related to the COVID-19 pandemic.    Request for Consent:  You and I are about to have a tele-health check-in or visit. This is allowed because you are already a patient of our [facility] practice, and you have requested it.  This tele-health visit will be billed to your health insurance or you, if you are self-insured. You understand you will be responsible for any copayments or coinsurances that apply to your tele-health visit.  Before starting our telemedicine visit, I am required to get your consent for this virtual check-in or visit by tele-health . Do you consent?      Patient Response to Request for Consent: Yes    SUBJECTIVE     Symptoms  Depression symptoms: depressed mood, sadness, anhedonia, lack of motivation, insomnia, fatigue/lack of energy, feelings of worthlessness/guilt and difficulty concentrating or making decisions    Anxiety symptoms: excessive anxiety/worry, difficulty controlling worry, restless/\"on edge\" and sleep disturbance    OBJECTIVE     Mental Status Evaluation  Patient's mood and affect were consistent with the context, and consistent with their baseline: Yes      Comments:  depressed, awake and alert; oriented to person, place, and time    Suicidal Ideation/Homicidal Ideation Risk Assessment: not assessed. If not assessed, reason:  Previous " evaluation indicated minimal risk. Pt did not endorse any changes in risk or protective factors and none were observed.      Risk Factors: Presence of a Mood Disorder and Loss (relational, social, work, or financial)  Protective factors: Effective and accessible mental health care , Connectedness to individuals, family, community, and social institutions and Problem-solving and conflict resolution skills     Estimate of Current Risk: Minimal risk     Plan for Safety-   If pt develops passive SI or HI, they agreed to the following safety plan: Created 5/30/2023; confirmed 08/14/2023     Remember her reasons for living, including her grandchildren. Pt described her grandchildren as a strong protective factor.    Interventions  Cognitive Behavior Therapy and Empathic Listening and Validation    Elicited update from Alecia; she indicated that her youngest brother passed away on Sunday. Provided empathetic listening and validation. Encouraged Alecia to share memories about her brother during session; engaged in shared enjoyment regarding shared memories. Introduced psycho-education regarding grief and loss, encouraged Alecia to experience grief emotions rather than avoiding emotions.     Psychotropic medications: no known adherence challenges, effective   Current Outpatient Medications   Medication Sig Dispense Refill   • ascorbic acid (VITAMIN C) 500 mg tablet Take 500 mg by mouth daily.     • aspirin 81 mg enteric coated tablet Take 81 mg by mouth daily.     • atorvastatin (LIPITOR) 40 mg tablet Take 1 tablet (40 mg total) by mouth daily. 30 tablet 1   • azelastine (ASTELIN) 137 mcg (0.1 %) nasal spray Administer 2 sprays into each nostril 2 (two) times a day as needed.     • betamethasone dipropionate (DIPROSONE) 0.05 % lotion Apply 1 each topically daily as needed.     • buPROPion SR (WELLBUTRIN SR) 100 mg 12 hr tablet Take 1 tablet (100 mg total) by mouth 2 (two) times a day. 60 tablet 0   • busPIRone (BUSPAR) 5 mg tablet  Take 1 tablet (5 mg total) by mouth 2 (two) times a day. (Patient not taking: Reported on 7/12/2023) 60 tablet 0   • cetirizine (ZyrTEC) 10 mg tablet Take 10 mg by mouth as needed.     • cholecalciferol, vitamin D3, 1,000 unit (25 mcg) tablet Take 1,000 Units by mouth daily.     • coenzyme Q10 (COQ10) 50 mg capsule Take 50 mg by mouth daily.     • docusate sodium (COLACE) 100 mg capsule Take 2 capsules (200 mg total) by mouth 2 (two) times a day as needed for constipation. 60 capsule 0   • escitalopram (LEXAPRO) 10 mg tablet Take 1 tablet (10 mg total) by mouth nightly. 30 tablet 0   • flecainide (TAMBOCOR) 50 mg tablet Take 1 tablet (50 mg total) by mouth 2 (two) times a day. 180 tablet 3   • gabapentin (NEURONTIN) 100 mg capsule Take 1 capsule (100 mg total) by mouth nightly. (Patient taking differently: Take 50 mg by mouth nightly.) 90 capsule 0   • levothyroxine (SYNTHROID) 75 mcg tablet Take 1 tablet (75 mcg total) by mouth daily. 90 tablet 3   • lidocaine (ASPERCREME) 4 % adhesive patch,medicated topical patch Apply 1 patch topically daily. 30 patch 0   • metoprolol succinate XL (TOPROL-XL) 25 mg 24 hr tablet Take 0.5 tablets (12.5 mg total) by mouth daily as needed (SBP over 135 or  palpitations). 10 tablet 6   • multivitamin (THERAGRAN) tablet Take 1 tablet by mouth daily.     • nitroglycerin (NITROSTAT) 0.4 mg SL tablet Place 1 tablet (0.4 mg total) under the tongue every 5 (five) minutes as needed for chest pain. 25 tablet 3   • omega-3-dha-epa-dpa-fish oil 1,050 mg(300 mg -675 mg-75 mg) capsule Take 1 capsule by mouth daily.     • polyethylene glycol (MIRALAX) 17 gram packet Take 17 g by mouth daily as needed (Constipation) for up to 3 days. 30 each 0     No current facility-administered medications for this visit.     ASSESSMENT     Progress  Patient's progress toward their goals is generally improving.     Patient's symptomology is unchanged, which may be attributable to recent loss.     PLAN      Goals:  - Connect to traditional outpatient therapy    - Process grief and loss as appropriate    Recommendations  Individual therapy; 30 minutes 2 times monthly until connected with OP therapy.    Next visit plan:  - Continue to discuss boundary setting     - Continue to discuss an increase self-compassion through positive self-talk    - Continue to process grief as appropriate    I spent 30 minutes on this date of service performing the following activities: providing counseling and education.    Nikki Parr Psy.D.  Integrated Behavioral Health  Post Doctoral Fellow

## 2023-08-16 NOTE — TELEPHONE ENCOUNTER
"Spoke with Alecia she has been experiencing extreme anxiety stress caused by the traumatizing witnessing of the passing of her \"baby brother\" concerns since past Sunday intermittent (although states have been getting more frequent) of \"sharp/pounding \"left-sided chest pain.  Denies radiating anywhere, diaphoresis.  Informing her blood pressure usually runs 120s /70s although when this sharp pounding chest pain palpitations happens her blood pressure \"shoots up to 154/90 get a tightness in the back of her head with blurry vision\".  She has a history of A-fib    She does have nitro sublingual although has not used it.  Is known to Dr. Pulliam just finished up with recent Holter monitor.  Recommended go to emergency room for evaluation  "

## 2023-08-16 NOTE — TELEPHONE ENCOUNTER
----- Message from Elizabeth Stevens MA sent at 2023 10:50 AM EDT -----  Regarding: FW: Heart Palpitation Irregularity More Pronounced  Contact: [phone]    ----- Message -----  From: Brenda Nelsony  Sent: 2023   9:28 AM EDT  To: [facility] Clinical Support P  Subject: Heart Palpitation Irregularity More Pronounc#    For some strange reason, I subjected myself to witnessing the passing of my baby brother, who  this past .  It seems, being caught up in the emotions of the moment and thinking our role was to help him rally through, we were at a loss of what was really going on.  It was traumatizing.  He was talking, walking, laughing on Friday.      Well, I have been experiencing a different type of palpitation/arrhythmia since his passing.  The triple/double beats result in a pain, as if something is in my chest kicking me.  Then shortly after it passes I develop an aching pain in my head near the left temple and eye area.  When it all occurs, it stops me in my tracks. and I am left a bit drained upon passing.      It does seem that being upset (crying or stressed) brings it on.  It has certainly been more pronounced since my brother's passing.  So, I am assuming it is another symptom of the anxiety, but given the physical experience, it scares me.  I don't want to play doctor and be remiss of acting properly and timely.  Your thoughts?

## 2023-08-21 NOTE — PROGRESS NOTES
I reviewed the Psychology Resident's/Post-Doctoral Fellow's note and agree with the documented findings and plan of care, except for my comments below or within the additional notes today.    Mike Jim Psy.D.   Integrated Behavioral Health  Staff Psychologist, [facility]  [place] License #[id number]

## 2023-08-29 NOTE — TELEPHONE ENCOUNTER
Pt called stating she went to the wrong area of the hospital, thinking ECHO was at 1:30 and missed her ECHO appt.    Pt had an appt to go to but will call back to R/S, just wanted to inform the team.     Pt can be reached at [phone]

## 2023-08-30 ENCOUNTER — TELEMEDICINE (OUTPATIENT)
Dept: BEHAVIORAL HEALTH | Facility: CLINIC | Age: 63
End: 2023-08-30
Payer: COMMERCIAL

## 2023-08-30 DIAGNOSIS — F33.2 SEVERE EPISODE OF RECURRENT MAJOR DEPRESSIVE DISORDER, WITHOUT PSYCHOTIC FEATURES (CMS/HCC): Primary | ICD-10-CM

## 2023-08-30 DIAGNOSIS — Z71.89 GRIEF COUNSELING: ICD-10-CM

## 2023-08-30 NOTE — PROGRESS NOTES
"Integrated Behavioral Health Follow-up Visit Note  Visit number: 10     Visit Type Performed: Video     Communication platform used for this encounter:  [employer] Video Visit (Epic Video Client)     Alecia Cruz was contacted today for a behavioral health visit.    Clinician confirmed identification of patient by name and birthdate, provider name, location of patient and clinician, and callback number in case disconnected.    Verification of Patient Location:  The patient affirms they are currently located in the following state: [place]    This visit was conducted via telehealth/telephone in lieu of an in-person visit due to precautions related to the COVID-19 pandemic.    Request for Consent:  You and I are about to have a tele-health check-in or visit. This is allowed because you are already a patient of our [facility] practice, and you have requested it.  This tele-health visit will be billed to your health insurance or you, if you are self-insured. You understand you will be responsible for any copayments or coinsurances that apply to your tele-health visit.  Before starting our telemedicine visit, I am required to get your consent for this virtual check-in or visit by tele-health . Do you consent?      Patient Response to Request for Consent: Yes    SUBJECTIVE     Symptoms  Depression symptoms: depressed mood, sadness, anhedonia, lack of motivation, insomnia, fatigue/lack of energy, feelings of worthlessness/guilt and difficulty concentrating or making decisions    Anxiety symptoms: excessive anxiety/worry, difficulty controlling worry, restless/\"on edge\" and sleep disturbance    OBJECTIVE     Mental Status Evaluation  Patient's mood and affect were consistent with the context, and consistent with their baseline: Yes      Comments:  depressed, awake and alert; oriented to person, place, and time    Suicidal Ideation/Homicidal Ideation Risk Assessment: assessed.      Risk Factors: Presence of a Mood Disorder " "and Loss (relational, social, work, or financial)    Protective factors: Effective and accessible mental health care, Connectedness to individuals, family, community, and social institutions and Problem-solving and conflict resolution skills     Estimate of Current Risk: Minimal risk     Plan for Safety-   If pt develops passive SI or HI, they agreed to the following safety plan: Created 5/30/2023; confirmed 08/14/2023 and 08/30/2023     Remember her reasons for living, including her grandchildren. Pt described her grandchildren as a strong protective factor.    Interventions  Cognitive Behavior Therapy and Empathic Listening and Validation    Reviewed updates since previous session, including work-stress and familial stress. Attributed increase in work-related stress to her approaching return to work (September 1st) and paperwork that she needs to complete prior to this. Her return to work has resulted in anticipatory stress which may be exacerbated by negative self-talk (I.e., \"I am not the strong, invincible person people think I am\"). Utilized cognitive restructuring strategies to identify unhelpful thoughts and generate alternative, strengths-based thoughts.     Regarding her family, she noted that several family members owe her money and are not respecting her. Alecia acknowledged that it feels as if others are taking advantage of her during her time of need. Identified strategies to resolve tension within the family, including communication strategies (I.e., assertive vs. Aggressive communication) and boundary setting.      Psychotropic medications: no known adherence challenges, effective   Current Outpatient Medications   Medication Sig Dispense Refill   • ascorbic acid (VITAMIN C) 500 mg tablet Take 500 mg by mouth daily.     • aspirin 81 mg enteric coated tablet Take 81 mg by mouth daily.     • atorvastatin (LIPITOR) 40 mg tablet Take 1 tablet (40 mg total) by mouth daily. 30 tablet 1   • azelastine " (ASTELIN) 137 mcg (0.1 %) nasal spray Administer 2 sprays into each nostril 2 (two) times a day as needed.     • betamethasone dipropionate (DIPROSONE) 0.05 % lotion Apply 1 each topically daily as needed.     • buPROPion SR (WELLBUTRIN SR) 100 mg 12 hr tablet Take 1 tablet (100 mg total) by mouth 2 (two) times a day. 60 tablet 0   • busPIRone (BUSPAR) 5 mg tablet Take 1 tablet (5 mg total) by mouth 2 (two) times a day. (Patient not taking: Reported on 7/12/2023) 60 tablet 0   • cetirizine (ZyrTEC) 10 mg tablet Take 10 mg by mouth as needed.     • cholecalciferol, vitamin D3, 1,000 unit (25 mcg) tablet Take 1,000 Units by mouth daily.     • coenzyme Q10 (COQ10) 50 mg capsule Take 50 mg by mouth daily.     • docusate sodium (COLACE) 100 mg capsule Take 2 capsules (200 mg total) by mouth 2 (two) times a day as needed for constipation. 60 capsule 0   • escitalopram (LEXAPRO) 10 mg tablet Take 1 tablet (10 mg total) by mouth nightly. 30 tablet 0   • flecainide (TAMBOCOR) 50 mg tablet Take 1 tablet (50 mg total) by mouth 2 (two) times a day. 180 tablet 3   • gabapentin (NEURONTIN) 100 mg capsule Take 1 capsule (100 mg total) by mouth nightly. (Patient taking differently: Take 50 mg by mouth nightly.) 90 capsule 0   • levothyroxine (SYNTHROID) 75 mcg tablet Take 1 tablet (75 mcg total) by mouth daily. 90 tablet 3   • lidocaine (ASPERCREME) 4 % adhesive patch,medicated topical patch Apply 1 patch topically daily. 30 patch 0   • metoprolol succinate XL (TOPROL-XL) 25 mg 24 hr tablet Take 0.5 tablets (12.5 mg total) by mouth daily as needed (SBP over 135 or  palpitations). 10 tablet 6   • multivitamin (THERAGRAN) tablet Take 1 tablet by mouth daily.     • nitroglycerin (NITROSTAT) 0.4 mg SL tablet Place 1 tablet (0.4 mg total) under the tongue every 5 (five) minutes as needed for chest pain. 25 tablet 3   • omega-3-dha-epa-dpa-fish oil 1,050 mg(300 mg -675 mg-75 mg) capsule Take 1 capsule by mouth daily.     • polyethylene  glycol (MIRALAX) 17 gram packet Take 17 g by mouth daily as needed (Constipation) for up to 3 days. 30 each 0     No current facility-administered medications for this visit.     ASSESSMENT     Progress  Patient's progress toward their goals is generally improving.     Patient's symptomology is waxing and waning. Alecia endorsed an increase in anxiety/panic, which she attributed to her approaching return to work. She noted a decrease in symptoms of depression/tearfulness.      PLAN     Goals:  - Connect to traditional outpatient therapy    - Process grief and loss as appropriate    Recommendations  Individual therapy; 30 minutes 2 times monthly until connected with OP therapy.    Next visit plan:  - Continue to discuss boundary setting     - Continue to discuss an increase self-compassion through positive self-talk    - Continue to process grief as appropriate    I spent 25 minutes on this date of service performing the following activities: providing counseling and education.    Nikki Parr Psy.D.  Integrated Behavioral Health  Post Doctoral Fellow

## 2023-09-01 NOTE — PROGRESS NOTES
I reviewed the Psychology Resident's/Post-Doctoral Fellow's note and agree with the documented findings and plan of care, except for my comments below or within the additional notes today.    Mike Jim Psy.D.   Integrated Behavioral Health  Staff Psychologist, [facility]  [place] License #[id number]

## 2023-09-08 ENCOUNTER — PATIENT OUTREACH (OUTPATIENT)
Dept: CASE MANAGEMENT | Facility: CLINIC | Age: 63
End: 2023-09-08
Payer: COMMERCIAL

## 2023-09-08 NOTE — PROGRESS NOTES
09/08/2023 10:27 AM EDT by Ragini Platt RN 09/08/2023 10:27 AM EDT by Ragini Platt, RN  Patient called in but bad connection.  Incoming Alecia Nelson (Self) [phone] (Home)             09/08/2023 10:28 AM EDT by Ragini Platt RN 09/08/2023 10:28 AM EDT by Ragini Platt, RN  Bad connection            Incoming Alecia Nelson (Self) [phone] (Home)     09/08/2023 10:28 AM EDT by Ragini Platt RN 09/08/2023 10:28 AM EDT by Ragini Platt, RN  Bad Connection  Incoming Alexi Cruz Alecia (Self) [phone] (Home)             09/08/2023 10:28 AM EDT by Ragini Platt RN 09/08/2023 10:28 AM EDT by Ragini Platt, RN  Outgoing Alexi Anthony Alecia (Self) [phone] (Home)   Left Message: Tried patient back re: bad connection and  Left message.          09/08/2023 10:29 AM EDT by Ragini Platt RN 09/08/2023 10:29 AM EDT by Ragini Pltat, RN  Patient called and connection better. She states FMLA papers sent to her employer were not obtained. She is requesting now that the papers filled out previously by Dr Begum be sent to her. Message sent to Dr Mckeon, covering for Princess Malagon and MA's in PCP practice. CM asked MA to reach out to patient for questions.  Incoming Alexi Cruz Alecia (Self) [phone] (Home)

## 2023-09-21 ENCOUNTER — TELEPHONE (OUTPATIENT)
Dept: PRIMARY CARE | Facility: CLINIC | Age: 63
End: 2023-09-21

## 2023-09-21 ENCOUNTER — PATIENT OUTREACH (OUTPATIENT)
Dept: CASE MANAGEMENT | Facility: CLINIC | Age: 63
End: 2023-09-21
Payer: COMMERCIAL

## 2023-09-21 NOTE — PROGRESS NOTES
09/21/2023 12:21 PM EDT by Ragini Platt, RN 09/21/2023 12:21 PM EDT by Ragini Platt, RN  Outgoing Alecia Nelson (Self) [phone] (Home)   Reached PatientCommunicated - LCM-Covid +: CM called patient back. She states she has Covid and is out of work and needs proof that she spoke with doctor about this so needs a note for work. Scheduled her for video appt today with Dr Begum at 3: 20 and CM is checking with PCP to make sure this is OK a it was not slotted as a telemed appt.

## 2023-09-21 NOTE — PROGRESS NOTES
09/21/2023 04:07 PM EDT by Ragini Platt, RN 09/21/2023 04:07 PM EDT by Ragini Platt, RN  Outgoing Alecia Nelson (Self) [phone] (Home)   No Answer/Busy: MB full

## 2023-09-21 NOTE — TELEPHONE ENCOUNTER
Pt called stating that she was exposed to COVID 19 over the weekend with a family member.  She has underlying conditions and would like to speak with someone to get advice on what to do.  Advised pt to get tested however pt would like to call back before getting tested

## 2023-09-22 ENCOUNTER — PATIENT OUTREACH (OUTPATIENT)
Dept: CASE MANAGEMENT | Facility: CLINIC | Age: 63
End: 2023-09-22
Payer: COMMERCIAL

## 2023-09-22 ENCOUNTER — TELEMEDICINE (OUTPATIENT)
Dept: PRIMARY CARE | Facility: CLINIC | Age: 63
End: 2023-09-22
Payer: COMMERCIAL

## 2023-09-22 DIAGNOSIS — U07.1 COVID-19: ICD-10-CM

## 2023-09-22 PROCEDURE — 99213 OFFICE O/P EST LOW 20 MIN: CPT | Mod: 95 | Performed by: STUDENT IN AN ORGANIZED HEALTH CARE EDUCATION/TRAINING PROGRAM

## 2023-09-22 NOTE — PROGRESS NOTES
Verification of Patient Location:  The patient affirms they are currently located in the following state: [place]    Request for Consent:    Audio and Video Encounter   Krish, my name is Mahendra DO Kel.  Before we proceed, can you please verify your identification by telling me your full name and date of birth?  Can you tell me who is in the room with you?    You and I are about to have a telemedicine check-in or visit because you have requested it.  This is a live video-conference.  I am a real person, speaking to you in real time.  There is no one else with me on the video-conference. I am not recording this conversation and I am asking you not to record it.  This telemedicine visit will be billed to your health insurance or you, if you are self-insured.  You understand you will be responsible for any copayments or coinsurances that apply to your telemedicine visit.  Communication platform used for this encounter:  [employer] Video Visit (Epic Video Client)       Before starting our telemedicine visit, I am required to get your consent for this virtual check-in or visit by telemedicine. Do you consent?      Patient Response to Request for Consent:  Yes      Visit Documentation:  Subjective     Patient ID: Alecia Cruz is a 62 y.o. female.  1960      HPI  Weekend before last had her grand niece stayed with her who is 5 and tested positive for covid. Tested w/ otc covid yesterday and was positive. . She started having palpitaitons on Monday but that may be more related to her job. Started w/ sinus sxs Tuesday which is the most definite day of symptoms she thinks.  Had liow grade temp so took acetaminophen. Has myalgia today. She has a dry cough that is non-productive. No sob. Palpitations are improved. Last covid vaccination was last year. Overall sxs seem to be stable. She is talking in full sentences on video call w/o distress. No chest pain, sob, or red flags.     Needs letter for work.  Unfortunately she is currently locked out of system in person and she cannot work from home. Continues to have issues with employer regarding illness and time away from work.   The following have been reviewed and updated as appropriate in this visit:        Review of Systems  As noted above in hpi.     Assessment/Plan   Diagnoses and all orders for this visit:    COVID-19  Assessment & Plan:  -no distress on video fortunately.   -continue isolation. Reviewed guidelines with her and timeline of isolation and return to work.   -provided letter for employer.   -asked her to upload picture of positive test for our records.   -no need for paxlovid  -reviewed vaccination for covid in 3 months.   -will let us know if changing symptoms.         Time Spent:  I spent 20 minutes on this date of service performing the following activities: obtaining history, obtaining / reviewing records, providing counseling and education and coordinating care   .

## 2023-09-22 NOTE — PROGRESS NOTES
09/22/2023 10:18 AM EDT by Ragini Platt, RN 09/22/2023 10:18 AM EDT by Ragini Platt, RN  Outgoing Alexi Cruz Alecia (Self) [phone] (Home)   Reached Patient: AUGIE returned patient call. She scheduled video appt today for 11:40 am as she needs a note for out of work re: Covid. Voice sounds raspy today.

## 2023-09-22 NOTE — LETTER
September 22, 2023     Patient: Alecia Cruz  YOB: 1960  Date of Visit: 9/22/2023    To Whom it May Concern:    Alecia Cruz was seen in my clinic on 9/22/2023 at 11:40 am. She tested positive for covid-19. Based on current CDC guidance, we recommend isolation for 5 days from symptom onset to minimize spread of disease. She may return to work once she is off of antipyretic medications (advil, tylenol, etc) and afebrile with improving symptoms. We expect that this will be Tuesday, September 25th.     If you have any questions or concerns, please don't hesitate to call.         Sincerely,       Mahendra Begum, DO        CC: No Recipients

## 2023-09-24 NOTE — ASSESSMENT & PLAN NOTE
-no distress on video fortunately.   -continue isolation. Reviewed guidelines with her and timeline of isolation and return to work.   -provided letter for employer.   -asked her to upload picture of positive test for our records.   -no need for paxlovid  -reviewed vaccination for covid in 3 months.   -will let us know if changing symptoms.

## 2023-09-26 ENCOUNTER — HOSPITAL ENCOUNTER (EMERGENCY)
Facility: HOSPITAL | Age: 63
Discharge: HOME | End: 2023-09-26
Attending: EMERGENCY MEDICINE
Payer: COMMERCIAL

## 2023-09-26 ENCOUNTER — PATIENT OUTREACH (OUTPATIENT)
Dept: CASE MANAGEMENT | Facility: CLINIC | Age: 63
End: 2023-09-26
Payer: COMMERCIAL

## 2023-09-26 ENCOUNTER — TELEPHONE (OUTPATIENT)
Dept: PRIMARY CARE | Facility: CLINIC | Age: 63
End: 2023-09-26
Payer: COMMERCIAL

## 2023-09-26 ENCOUNTER — APPOINTMENT (EMERGENCY)
Dept: RADIOLOGY | Facility: HOSPITAL | Age: 63
End: 2023-09-26
Payer: COMMERCIAL

## 2023-09-26 VITALS
DIASTOLIC BLOOD PRESSURE: 74 MMHG | BODY MASS INDEX: 21.19 KG/M2 | RESPIRATION RATE: 16 BRPM | HEART RATE: 78 BPM | TEMPERATURE: 97.5 F | HEIGHT: 67 IN | WEIGHT: 135 LBS | OXYGEN SATURATION: 98 % | SYSTOLIC BLOOD PRESSURE: 137 MMHG

## 2023-09-26 DIAGNOSIS — U07.1 COVID-19: Primary | ICD-10-CM

## 2023-09-26 DIAGNOSIS — R04.2 HEMOPTYSIS: ICD-10-CM

## 2023-09-26 DIAGNOSIS — R07.9 CHEST PAIN, UNSPECIFIED TYPE: ICD-10-CM

## 2023-09-26 LAB
ALBUMIN SERPL-MCNC: 3.8 G/DL (ref 3.5–5.7)
ALP SERPL-CCNC: 50 IU/L (ref 34–125)
ALT SERPL-CCNC: 24 IU/L (ref 7–52)
ANION GAP SERPL CALC-SCNC: 4 MEQ/L (ref 3–15)
APTT PPP: 24 SEC (ref 23–35)
AST SERPL-CCNC: 32 IU/L (ref 13–39)
ATRIAL RATE: 71
BASOPHILS # BLD: 0.03 K/UL (ref 0.01–0.1)
BASOPHILS NFR BLD: 0.5 %
BILIRUB SERPL-MCNC: 0.3 MG/DL (ref 0.3–1.2)
BUN SERPL-MCNC: 11 MG/DL (ref 7–25)
CALCIUM SERPL-MCNC: 8.8 MG/DL (ref 8.6–10.3)
CHLORIDE SERPL-SCNC: 109 MEQ/L (ref 98–107)
CK SERPL-CCNC: 649 U/L (ref 30–223)
CO2 SERPL-SCNC: 31 MEQ/L (ref 21–31)
CREAT SERPL-MCNC: 0.8 MG/DL (ref 0.6–1.2)
D DIMER PPP IA.FEU-MCNC: <0.27 UG/ML FEU (ref 0–0.5)
DIFFERENTIAL METHOD BLD: ABNORMAL
EOSINOPHIL # BLD: 0.15 K/UL (ref 0.04–0.36)
EOSINOPHIL NFR BLD: 2.6 %
ERYTHROCYTE [DISTWIDTH] IN BLOOD BY AUTOMATED COUNT: 12.6 % (ref 11.7–14.4)
GFR SERPL CREATININE-BSD FRML MDRD: >60 ML/MIN/1.73M*2
GLUCOSE SERPL-MCNC: 118 MG/DL (ref 70–99)
HCT VFR BLDCO AUTO: 34.9 % (ref 35–45)
HGB BLD-MCNC: 11.7 G/DL (ref 11.8–15.7)
IMM GRANULOCYTES # BLD AUTO: 0.01 K/UL (ref 0–0.08)
IMM GRANULOCYTES NFR BLD AUTO: 0.2 %
INR PPP: 0.9
LACTATE SERPL-SCNC: 2 MMOL/L (ref 0.4–2)
LYMPHOCYTES # BLD: 1.56 K/UL (ref 1.2–3.5)
LYMPHOCYTES NFR BLD: 26.6 %
MCH RBC QN AUTO: 32.3 PG (ref 28–33.2)
MCHC RBC AUTO-ENTMCNC: 33.5 G/DL (ref 32.2–35.5)
MCV RBC AUTO: 96.4 FL (ref 83–98)
MONOCYTES # BLD: 0.31 K/UL (ref 0.28–0.8)
MONOCYTES NFR BLD: 5.3 %
NEUTROPHILS # BLD: 3.8 K/UL (ref 1.7–7)
NEUTS SEG NFR BLD: 64.8 %
NRBC BLD-RTO: 0 %
P AXIS: 70
PDW BLD AUTO: 9.9 FL (ref 9.4–12.3)
PLATELET # BLD AUTO: 189 K/UL (ref 150–369)
POTASSIUM SERPL-SCNC: 3.6 MEQ/L (ref 3.5–5.1)
PR INTERVAL: 144
PROT SERPL-MCNC: 6.3 G/DL (ref 6–8.2)
PROTHROMBIN TIME: 11.6 SEC (ref 12.2–14.5)
QRS DURATION: 90
QT INTERVAL: 384
QTC CALCULATION(BAZETT): 417
R AXIS: 18
RBC # BLD AUTO: 3.62 M/UL (ref 3.93–5.22)
SODIUM SERPL-SCNC: 144 MEQ/L (ref 136–145)
T WAVE AXIS: -63
TROPONIN I SERPL HS-MCNC: 3.2 PG/ML
VENTRICULAR RATE: 71
WBC # BLD AUTO: 5.86 K/UL (ref 3.8–10.5)

## 2023-09-26 PROCEDURE — 71045 X-RAY EXAM CHEST 1 VIEW: CPT

## 2023-09-26 PROCEDURE — 85730 THROMBOPLASTIN TIME PARTIAL: CPT | Performed by: EMERGENCY MEDICINE

## 2023-09-26 PROCEDURE — 25800000 HC PHARMACY IV SOLUTIONS: Performed by: EMERGENCY MEDICINE

## 2023-09-26 PROCEDURE — 96360 HYDRATION IV INFUSION INIT: CPT

## 2023-09-26 PROCEDURE — 93005 ELECTROCARDIOGRAM TRACING: CPT

## 2023-09-26 PROCEDURE — 80053 COMPREHEN METABOLIC PANEL: CPT | Performed by: EMERGENCY MEDICINE

## 2023-09-26 PROCEDURE — 93005 ELECTROCARDIOGRAM TRACING: CPT | Performed by: EMERGENCY MEDICINE

## 2023-09-26 PROCEDURE — 82550 ASSAY OF CK (CPK): CPT | Performed by: EMERGENCY MEDICINE

## 2023-09-26 PROCEDURE — 3E0337Z INTRODUCTION OF ELECTROLYTIC AND WATER BALANCE SUBSTANCE INTO PERIPHERAL VEIN, PERCUTANEOUS APPROACH: ICD-10-PCS | Performed by: EMERGENCY MEDICINE

## 2023-09-26 PROCEDURE — 83605 ASSAY OF LACTIC ACID: CPT | Performed by: EMERGENCY MEDICINE

## 2023-09-26 PROCEDURE — 84484 ASSAY OF TROPONIN QUANT: CPT | Performed by: EMERGENCY MEDICINE

## 2023-09-26 PROCEDURE — 36415 COLL VENOUS BLD VENIPUNCTURE: CPT | Performed by: EMERGENCY MEDICINE

## 2023-09-26 PROCEDURE — 85025 COMPLETE CBC W/AUTO DIFF WBC: CPT | Performed by: EMERGENCY MEDICINE

## 2023-09-26 PROCEDURE — 85610 PROTHROMBIN TIME: CPT | Performed by: EMERGENCY MEDICINE

## 2023-09-26 PROCEDURE — 93010 ELECTROCARDIOGRAM REPORT: CPT | Performed by: INTERNAL MEDICINE

## 2023-09-26 PROCEDURE — 99284 EMERGENCY DEPT VISIT MOD MDM: CPT | Mod: 25

## 2023-09-26 PROCEDURE — 85379 FIBRIN DEGRADATION QUANT: CPT | Performed by: EMERGENCY MEDICINE

## 2023-09-26 RX ADMIN — SODIUM CHLORIDE 1000 ML: 9 INJECTION, SOLUTION INTRAVENOUS at 13:12

## 2023-09-26 NOTE — PROGRESS NOTES
09/26/2023 01:16 PM EDT by Ragini Platt RN 09/26/2023 01:16 PM EDT by Ragini Platt, RN  [facility] Alecia Nelson ([facility]) [phone] (Home)   Patient called to report she is in ER with Covid. Labs done. IVF being given. She will ask ER MD for note for work but if unable to give, she will call CM back for a note from PCP. She is supposed to be sent home after IVF. She was supposed to go back to work today but ended up in ER with all over body pain and weakness.

## 2023-09-26 NOTE — ED PROVIDER NOTES
Emergency Medicine Note  HPI   HISTORY OF PRESENT ILLNESS     The patient states that she was diagnosed with COVID 7 days ago.  Over the past 2 days, she has developed diffuse myalgias.  She denies any fevers or chills.  She states her cough had been nonproductive but now she has coughed up blood a couple of times.  She has intermittent left-sided chest discomfort that is not unusual for her.  However, she does now have pain throughout her chest when she takes a deep breath or coughs.            Patient History   PAST HISTORY     Reviewed from Nursing Triage:       Past Medical History:   Diagnosis Date    A-fib (CMS/HCC)     Breast cancer (CMS/HCC) 2008    left partial mastectomy axillary dissection, whole breast radiation and chemotherapy in 2008 for a 2-1/2 cm invasive ductal carcinoma that was ER/MA negative and HER-2/alyson positive with 7-15 positive lymph nodes    Colon polyp     couple on each colonoscopy per patient report    COVID 8/23/2022    Fibrocystic breast     Fibroid     History of partial hysterectomy 03/2000    c/o fibroids    History of radiation therapy     Hx antineoplastic chemo     Hx of lipoma 2008    resected from shoulder    Hypothyroidism     Malignant neoplasm of upper-outer quadrant of left breast in female, estrogen receptor negative (CMS/HCC) 9/10/2021    2008-2.5 cm cancer with 7 of 15+ nodes treated with partial mastectomy, axillary dissection, chemotherapy and whole breast radiation.    PAF (paroxysmal atrial fibrillation) (CMS/HCC)     PVC (premature ventricular contraction)     Screening for breast cancer        Past Surgical History:   Procedure Laterality Date    BREAST BIOPSY Left 2008    BREAST BIOPSY Right 2021    benign ([facility] specimen [id number])    BREAST LUMPECTOMY Left 2008    [facility] specimen [id number]    CARDIAC CATHETERIZATION      DENTAL SURGERY      HYSTERECTOMY      partial    LUMBAR PUNCTURE      MANDIBLE FRACTURE SURGERY      PARTIAL HYSTERECTOMY       ROTATOR CUFF REPAIR Left     SINUS SURGERY         Family History   Problem Relation Age of Onset    Heart disease Biological Mother     Alzheimer's disease Biological Mother     Arrhythmia Biological Mother     Heart disease Biological Father     Diabetes Biological Father     Hypertension Biological Sister     Melanoma Biological Sister     Sarcoidosis Biological Sister     Diabetes Biological Sister     Heart disease Biological Sister     COPD Biological Brother     Prostate cancer Biological Brother     Heart disease Biological Brother     Hypertension Biological Brother     Prostate cancer Biological Brother     Clotting disorder Mother's Brother     Heart disease Father's Sister     Lung cancer Father's Sister     Lung cancer Father's Sister     Prostate cancer Father's Brother     Prostate cancer Father's Brother     Prostate cancer Father's Brother     Prostate cancer Father's Brother     Lung cancer Father's Brother     Stomach cancer Paternal Grandmother     Diabetes Paternal Grandfather     Prostate cancer Paternal Grandfather     Ovarian cancer Paternal Cousin     Lung cancer Paternal Cousin     Thyroid cancer Paternal Cousin        Social History     Tobacco Use    Smoking status: Never    Smokeless tobacco: Never   Vaping Use    Vaping Use: Never used   Substance Use Topics    Alcohol use: No    Drug use: No         Review of Systems   REVIEW OF SYSTEMS     Review of Systems      VITALS     ED Vitals    Date/Time Temp Pulse Resp BP SpO2 [facility]   09/26/23 1136 -- 78 16 137/74 98 % BT   09/26/23 0837 36.4 °C (97.5 °F) 77 18 141/70 97 % [place]        Pulse Ox %: 98 % (09/26/23 1252)  Pulse Ox Interpretation: Normal (09/26/23 1252)           Physical Exam   PHYSICAL EXAM     Physical Exam  Vitals and nursing note reviewed.   Constitutional:       Appearance: She is well-developed.   HENT:      Head: Normocephalic and atraumatic.   Eyes:      Conjunctiva/sclera: Conjunctivae  normal.   Neck:      Vascular: No JVD.   Cardiovascular:      Rate and Rhythm: Normal rate and regular rhythm.   Pulmonary:      Effort: Pulmonary effort is normal. No respiratory distress.      Breath sounds: Normal breath sounds.   Abdominal:      General: Bowel sounds are normal.      Palpations: Abdomen is soft.      Tenderness: There is no abdominal tenderness.   Musculoskeletal:         General: No deformity.      Cervical back: Neck supple.      Right lower leg: No edema.      Left lower leg: No edema.   Skin:     General: Skin is warm and dry.   Neurological:      Mental Status: She is alert.      Cranial Nerves: No cranial nerve deficit.   Psychiatric:         Thought Content: Thought content normal.         Judgment: Judgment normal.           PROCEDURES     Procedures     DATA     Results     Procedure Component Value Units Date/Time    HS Troponin I (with 2 hour reflex) [[id number]]  (Normal) Collected: 09/26/23 1058    Specimen: Blood, Venous Updated: 09/26/23 1151     High Sens Troponin I 3.2 pg/mL     D-dimer, quantitative [[id number]]  (Normal) Collected: 09/26/23 1058    Specimen: Blood, Venous Updated: 09/26/23 1151     D-Dimer, Quant <0.27 ug/mL FEU      Comment: Suggested Age Related Reference Range: 0.00 - 0.64  The D-Dimer assay can be used as an aid in the diagnosis of DVT or PE. The test can not be used by itself to exclude DVT or PE. When used as a diagnostic aid, the cutoff value is the same as the reference range: <0.5 ug/ml FEU.       Comprehensive metabolic panel [[id number]]  (Abnormal) Collected: 09/26/23 1058    Specimen: Blood, Venous Updated: 09/26/23 1142     Sodium 144 mEQ/L      Potassium 3.6 mEQ/L      Comment: Results obtained on plasma. Plasma Potassium values may be up to 0.4 mEQ/L less than serum values. The differences may be greater for patients with high platelet or white cell counts.        Chloride 109 mEQ/L      CO2 31 mEQ/L      BUN 11 mg/dL      Creatinine 0.8 mg/dL       Glucose 118 mg/dL      Calcium 8.8 mg/dL      AST (SGOT) 32 IU/L      ALT (SGPT) 24 IU/L      Alkaline Phosphatase 50 IU/L      Total Protein 6.3 g/dL      Comment: Test performed on plasma which typically contains approximately 0.4 g/dL more protein than serum.        Albumin 3.8 g/dL      Bilirubin, Total 0.3 mg/dL      eGFR >60.0 mL/min/1.73m*2      Anion Gap 4 mEQ/L     CK, Total [[id number]]  (Abnormal) Collected: 09/26/23 1058    Specimen: Blood, Venous Updated: 09/26/23 1142     Total  U/L     Protime-INR [[id number]]  (Abnormal) Collected: 09/26/23 1058    Specimen: Blood, Venous Updated: 09/26/23 1138     PT 11.6 sec      INR 0.9    APTT [[id number]]  (Normal) Collected: 09/26/23 1058    Specimen: Blood, Venous Updated: 09/26/23 1138     PTT 24 sec     CBC and differential [[id number]]  (Abnormal) Collected: 09/26/23 1058    Specimen: Blood, Venous Updated: 09/26/23 1116     WBC 5.86 K/uL      RBC 3.62 M/uL      Hemoglobin 11.7 g/dL      Hematocrit 34.9 %      MCV 96.4 fL      MCH 32.3 pg      MCHC 33.5 g/dL      RDW 12.6 %      Platelets 189 K/uL      MPV 9.9 fL      Differential Type Auto     nRBC 0.0 %      Immature Granulocytes 0.2 %      Neutrophils 64.8 %      Lymphocytes 26.6 %      Monocytes 5.3 %      Eosinophils 2.6 %      Basophils 0.5 %      Immature Granulocytes, Absolute 0.01 K/uL      Neutrophils, Absolute 3.80 K/uL      Lymphocytes, Absolute 1.56 K/uL      Monocytes, Absolute 0.31 K/uL      Eosinophils, Absolute 0.15 K/uL      Basophils, Absolute 0.03 K/uL     Lactate, w/ reflex repeat if > 2.0 [[id number]]  (Normal) Collected: 09/26/23 1058    Specimen: Blood, Venous Updated: 09/26/23 1114     Lactate 2.0 mmol/L           Imaging Results          X-RAY CHEST 1 VIEW (Final result)  Result time 09/26/23 11:47:27    Final result                 Impression:    IMPRESSION: No acute disease             Narrative:    CLINICAL HISTORY: Cough. Hemoptysis. Covid 19 positive    COMMENT: A single  portable frontal view of the chest is obtained.    Redemonstration of a benign-appearing left upper lobe nodule, as noted on CT  scan from March 2023. No new lung consolidation or pleural effusion. No  cavitation or pneumothorax. Cardiomediastinal and hilar contours show no acute  abnormality. No acute thoracic bony abnormality seen.                    Preliminary Interpretation    No acute disease                              ECG 12 lead          Scoring tools                                  ED Course & MDM   MDM / ED COURSE / CLINICAL IMPRESSION / DISPO     Medical Decision Making  Chest pain, unspecified type: acute illness or injury that poses a threat to life or bodily functions  COVID-19: acute illness or injury that poses a threat to life or bodily functions  Hemoptysis: acute illness or injury that poses a threat to life or bodily functions  Amount and/or Complexity of Data Reviewed  Labs: ordered.  Radiology: ordered and independent interpretation performed.  ECG/medicine tests: ordered and independent interpretation performed.      Risk  Decision regarding hospitalization.          ED Course as of 09/26/23 1253   Tue Sep 26, 2023   1248 I spoke to the patient about her results and plan of care.  Given her negative workup here, I suspect that her symptoms are due to COVID which she already tested positive for.  I ordered a L of IVNS for her to get prior to discharge. [HS]   1251 EKG shows T wave inversions in the inferior and lateral precordial leads.  It is unchanged compared to an EKG from March 7, 2023.  She also had a cardiac catheterization at that time that showed only mild luminal irregularities. [HS]      ED Course User Index  [HS] Del Nuñez (Ed) MD [place]     Clinical Impression      COVID-19   Chest pain, unspecified type   Hemoptysis     _________________     ED Disposition   Discharge                   Del Nuñez (Ed) MD [place]  09/26/23 1253

## 2023-09-26 NOTE — TELEPHONE ENCOUNTER
Pt is not feeling sxs have worsen - body aches, heart palpitations, phylum with some blood and mucus  Pt is on her way to the ED   Rifampin Counseling: I discussed with the patient the risks of rifampin including but not limited to liver damage, kidney damage, red-orange body fluids, nausea/vomiting and severe allergy.

## 2023-09-26 NOTE — LETTER
September 26, 2023    Patient: Alecia Cruz   YOB: 1960   Date of Visit: 9/26/2023       To Whom It May Concern:    Alecia Cruz was seen and treated in our emergency department on 9/26/2023. She may return to work on 9/29/2023    If you have any questions or concerns, please don't hesitate to call.         Concha Long RN

## 2023-10-02 ENCOUNTER — TELEPHONE (OUTPATIENT)
Dept: PRIMARY CARE | Facility: CLINIC | Age: 63
End: 2023-10-02

## 2023-10-02 ENCOUNTER — PATIENT OUTREACH (OUTPATIENT)
Dept: CASE MANAGEMENT | Facility: CLINIC | Age: 63
End: 2023-10-02
Payer: COMMERCIAL

## 2023-10-02 NOTE — PROGRESS NOTES
10/02/2023 03:46 PM EDT by Ragini Platt RN 10/02/2023 03:46 PM EDT by Ragini Platt, RN  [facility] Alecia Nelson ([facility]) [phone] ([place])   Patient called and scheduled a video appt with Dr Begum tomorrow at noon to relay some info as she missed a call from Dr Begum today.

## 2023-10-02 NOTE — TELEPHONE ENCOUNTER
Pt calling to speak with Dr. Begum she said she forgot to discuss something with him during their conversation today.

## 2023-10-02 NOTE — TELEPHONE ENCOUNTER
Attempted to call back. Received request for clarification from [facility]. Spoke to [facility]. They are looking for clarification on intermittent leave going back to march 2023. Will provide to them tomorrow.

## 2023-10-09 ENCOUNTER — TELEPHONE (OUTPATIENT)
Dept: SCHEDULING | Facility: CLINIC | Age: 63
End: 2023-10-09
Payer: COMMERCIAL

## 2023-10-13 ENCOUNTER — OFFICE VISIT (OUTPATIENT)
Dept: UROGYNECOLOGY | Facility: CLINIC | Age: 63
End: 2023-10-13
Payer: COMMERCIAL

## 2023-10-13 VITALS
WEIGHT: 138 LBS | BODY MASS INDEX: 21.66 KG/M2 | SYSTOLIC BLOOD PRESSURE: 130 MMHG | HEIGHT: 67 IN | DIASTOLIC BLOOD PRESSURE: 80 MMHG

## 2023-10-13 DIAGNOSIS — R15.1 FECAL SMEARING: ICD-10-CM

## 2023-10-13 DIAGNOSIS — K59.01 CONSTIPATION DUE TO SLOW TRANSIT: Primary | ICD-10-CM

## 2023-10-13 PROBLEM — R63.4 WEIGHT LOSS: Status: RESOLVED | Noted: 2022-10-09 | Resolved: 2023-10-13

## 2023-10-13 PROBLEM — R94.31 ABNORMAL ECG: Status: RESOLVED | Noted: 2023-07-12 | Resolved: 2023-10-13

## 2023-10-13 PROCEDURE — 99213 OFFICE O/P EST LOW 20 MIN: CPT | Performed by: OBSTETRICS & GYNECOLOGY

## 2023-10-13 PROCEDURE — 3008F BODY MASS INDEX DOCD: CPT | Performed by: OBSTETRICS & GYNECOLOGY

## 2023-10-13 ASSESSMENT — ENCOUNTER SYMPTOMS
POLYPHAGIA: 0
ABDOMINAL PAIN: 0
FEVER: 0
NERVOUS/ANXIOUS: 0
DYSURIA: 0
FREQUENCY: 0
COUGH: 0
FLANK PAIN: 0
BRUISES/BLEEDS EASILY: 0
SHORTNESS OF BREATH: 0
UNEXPECTED WEIGHT CHANGE: 0
ABDOMINAL DISTENTION: 0
ADENOPATHY: 0
DYSPHORIC MOOD: 0
NUMBNESS: 0
CHILLS: 0
PALPITATIONS: 0
CONSTIPATION: 1

## 2023-10-13 NOTE — LETTER
"October 13, 2023     Mahendra Begum, DO  [address], [address]  [facility] [address]    Patient: Alecia Cruz  YOB: 1960  Date of Visit: 10/13/2023      Dear Dr. Begum:    Thank you for referring Alecia Cruz to me for evaluation. Below are my notes for this consultation.    If you have questions, please do not hesitate to call me. I look forward to following your patient along with you.         Sincerely,        Glenn Hook MD        CC: No Recipients    Glenn Hook MD  10/13/2023 11:02 AM  Signed  Visit Date: 10/13/2023   Subjective   Alecia Cruz is 62 y.o. female who is referred by Dr. Villa for evaluation of Fecal Incontinence    She has been having fecal incontinence for the past year. She reports fecal smearing. She has a history of constipation. She has had a colonoscopy recently with polyps, will return in 2 years. She wakes in the morning with fecal smearing. She had a hysterectomy at [age] for fibroids.     She has passive smearing of stool with wiping. No fecal urgency. No flatal incontinence. No incontinence of solid stool. No rectal polyp. No rectal bulge symptom.    Objective   Visit Vitals  /80   Ht 1.702 m (5' 7\")   Wt 62.6 kg (138 lb)   BMI 21.61 kg/m²     Medications:   Current Outpatient Medications:     ascorbic acid (VITAMIN C) 500 mg tablet, Take 500 mg by mouth daily., Disp: , Rfl:     aspirin 81 mg enteric coated tablet, Take 81 mg by mouth daily., Disp: , Rfl:     atorvastatin (LIPITOR) 40 mg tablet, Take 1 tablet (40 mg total) by mouth daily., Disp: 30 tablet, Rfl: 1    azelastine (ASTELIN) 137 mcg (0.1 %) nasal spray, Administer 2 sprays into each nostril 2 (two) times a day as needed., Disp: , Rfl:     betamethasone dipropionate (DIPROSONE) 0.05 % lotion, Apply 1 each topically daily as needed., Disp: , Rfl:     cetirizine (ZyrTEC) 10 mg tablet, Take 10 mg by mouth as needed., Disp: , Rfl:     cholecalciferol, vitamin D3, " 1,000 unit (25 mcg) tablet, Take 1,000 Units by mouth daily., Disp: , Rfl:     coenzyme Q10 (COQ10) 50 mg capsule, Take 50 mg by mouth daily., Disp: , Rfl:     docusate sodium (COLACE) 100 mg capsule, Take 2 capsules (200 mg total) by mouth 2 (two) times a day as needed for constipation., Disp: 60 capsule, Rfl: 0    flecainide (TAMBOCOR) 50 mg tablet, Take 1 tablet (50 mg total) by mouth 2 (two) times a day., Disp: 180 tablet, Rfl: 3    gabapentin (NEURONTIN) 100 mg capsule, Take 1 capsule (100 mg total) by mouth nightly. (Patient taking differently: Take 50 mg by mouth nightly.), Disp: 90 capsule, Rfl: 0    levothyroxine (SYNTHROID) 75 mcg tablet, Take 1 tablet (75 mcg total) by mouth daily., Disp: 90 tablet, Rfl: 3    lidocaine (ASPERCREME) 4 % adhesive patch,medicated topical patch, Apply 1 patch topically daily., Disp: 30 patch, Rfl: 0    metoprolol succinate XL (TOPROL-XL) 25 mg 24 hr tablet, Take 0.5 tablets (12.5 mg total) by mouth daily as needed (SBP over 135 or  palpitations)., Disp: 10 tablet, Rfl: 6    multivitamin (THERAGRAN) tablet, Take 1 tablet by mouth daily., Disp: , Rfl:     nitroglycerin (NITROSTAT) 0.4 mg SL tablet, Place 1 tablet (0.4 mg total) under the tongue every 5 (five) minutes as needed for chest pain., Disp: 25 tablet, Rfl: 3    omega-3-dha-epa-dpa-fish oil 1,050 mg(300 mg -675 mg-75 mg) capsule, Take 1 capsule by mouth daily., Disp: , Rfl:     polyethylene glycol (MIRALAX) 17 gram packet, Take 17 g by mouth daily as needed (Constipation) for up to 3 days., Disp: 30 each, Rfl: 0   Allergies: is allergic to hydrocodone-acetaminophen, trazodone-dietary supp no.8, cephalexin, ciprofloxacin, codeine, and sulfa (sulfonamide antibiotics).     OB History        4    Para        Term                AB   1    Living   2       SAB   1    IAB        Ectopic        Multiple        Live Births   2               Past Medical History:  has a past medical history of A-fib  (CMS/HCC), Breast cancer (CMS/HCC) (2008), Colon polyp, COVID (8/23/2022), Fibrocystic breast, Fibroid, History of partial hysterectomy (03/2000), History of radiation therapy, antineoplastic chemo, lipoma (2008), Hypothyroidism, Malignant neoplasm of upper-outer quadrant of left breast in female, estrogen receptor negative (CMS/HCC) (9/10/2021), PAF (paroxysmal atrial fibrillation) (CMS/[facility]), PVC (premature ventricular contraction), and Screening for breast cancer.    She has no past medical history of Hormone replacement therapy or Melanoma (CMS/[facility]).  Past Surgical History:  has a past surgical history that includes Partial hysterectomy; Rotator cuff repair (Left); Mandible fracture surgery; Hysterectomy; Breast lumpectomy (Left, 2008); Breast biopsy (Left, 2008); Breast biopsy (Right, 2021); Sinus surgery; Lumbar puncture; Dental surgery; and Cardiac catheterization.  Family History: family history includes Alzheimer's disease in her biological mother; Arrhythmia in her biological mother; COPD in her biological brother; Clotting disorder in her mother's brother; Diabetes in her biological father, biological sister, and paternal grandfather; Heart disease in her biological brother, biological father, biological mother, biological sister, and father's sister; Hypertension in her biological brother and biological sister; Lung cancer in her father's brother, father's sister, father's sister, and paternal cousin; Melanoma in her biological sister; Ovarian cancer in her paternal cousin; Prostate cancer in her biological brother, biological brother, father's brother, father's brother, father's brother, father's brother, and paternal grandfather; Sarcoidosis in her biological sister; Stomach cancer in her paternal grandmother; Thyroid cancer in her paternal cousin.  Social History:   Social History     Tobacco Use    Smoking status: Never    Smokeless tobacco: Never   Vaping Use    Vaping Use: Never used   Substance  Use Topics    Alcohol use: No    Drug use: No       Review of Systems   Constitutional: Negative for chills, fever and unexpected weight change.   Respiratory: Negative for cough and shortness of breath.    Cardiovascular: Negative for chest pain, palpitations and leg swelling.   Gastrointestinal: Positive for constipation. Negative for abdominal distention and abdominal pain.        Fecal smearing   Endocrine: Negative for polyphagia.   Genitourinary: Negative for dysuria, enuresis, flank pain, frequency, pelvic pain and vaginal discharge.        Negative for incontinence   Skin: Negative for rash.   Neurological: Negative for numbness.   Hematological: Negative for adenopathy. Does not bruise/bleed easily.   Psychiatric/Behavioral: Negative for dysphoric mood. The patient is not nervous/anxious.      Physical Exam  Constitutional:       Appearance: Normal appearance. She is well-developed.     Genitourinary:    Urethra, bladder, rectum, urethral meatus, external female genitalia and adnexa normal.   Pelvic exam was performed with patient in lithotomy exam position.   There is no urethral urethrocele. No stress urinary incontinence.There is no vaginal vault prolapse, cystocele or rectocele present. Perineum intact.    Cervix is absent.     Uterus is absent. Rectovaginal exam normal.   Rectal exam shows normal sphincter tone, no rectal prolapse, no external hemorrhoid, no internal hemorrhoid, no mass and no rectal bleeding.   Neck:      Thyroid: No thyromegaly.      Vascular: No JVD.   Cardiovascular:      Pulses: Normal pulses.      Comments: No  JVD  No peripheral edema  Peripheral vascular normal  Pulmonary:      Effort: Pulmonary effort is normal. No tachypnea or respiratory distress.   Abdominal:      General: Abdomen is flat. There is no distension.      Palpations: There is no hepatomegaly.      Tenderness: There is no abdominal tenderness. There is no right CVA tenderness, left CVA tenderness, guarding or  rebound.      Hernia: No hernia is present.   Neurological:      Mental Status: She is alert and oriented to person, place, and time.      Sensory: No sensory deficit.   Skin:     Findings: No bruising or rash.   Psychiatric:         Attention and Perception: She is attentive.         Mood and Affect: Affect is not inappropriate.         Behavior: Behavior normal.         Assessment/Plan   PLAN  Diagnoses and all orders for this visit:    Constipation due to slow transit (Primary)  Assessment & Plan:  Her symptoms suggest slow transit constipation  Add metamucil each morning  Suggest senakot at bedtime      Fecal smearing  Assessment & Plan:  She does not have randall incontinence of stool  I suspect that this is related to her constipation  She has no rectal prolapse, and normal sphincter tone. Has excellent levator tone  Treat constipation, continue to use baby wipes  She can follow up with GI if needed        No follow-ups on file.

## 2023-10-13 NOTE — LETTER
"October 13, 2023     Elizabeth Villa MD  [address]  [place], [address]  [facility] [address]    Patient: Alecia Cruz  YOB: 1960  Date of Visit: 10/13/2023      Dear Dr. Villa:    Thank you for referring Alecia Cruz to me for evaluation. Below are my notes for this consultation.    If you have questions, please do not hesitate to call me. I look forward to following your patient along with you.         Sincerely,        Glenn Hook MD        CC: No Recipients    Glenn Hook MD  10/13/2023 11:02 AM  Signed  Visit Date: 10/13/2023   Subjective   Alecia Cruz is 62 y.o. female who is referred by Dr. Villa for evaluation of Fecal Incontinence    She has been having fecal incontinence for the past year. She reports fecal smearing. She has a history of constipation. She has had a colonoscopy recently with polyps, will return in 2 years. She wakes in the morning with fecal smearing. She had a hysterectomy at [age] for fibroids.     She has passive smearing of stool with wiping. No fecal urgency. No flatal incontinence. No incontinence of solid stool. No rectal polyp. No rectal bulge symptom.    Objective   Visit Vitals  /80   Ht 1.702 m (5' 7\")   Wt 62.6 kg (138 lb)   BMI 21.61 kg/m²     Medications:   Current Outpatient Medications:     ascorbic acid (VITAMIN C) 500 mg tablet, Take 500 mg by mouth daily., Disp: , Rfl:     aspirin 81 mg enteric coated tablet, Take 81 mg by mouth daily., Disp: , Rfl:     atorvastatin (LIPITOR) 40 mg tablet, Take 1 tablet (40 mg total) by mouth daily., Disp: 30 tablet, Rfl: 1    azelastine (ASTELIN) 137 mcg (0.1 %) nasal spray, Administer 2 sprays into each nostril 2 (two) times a day as needed., Disp: , Rfl:     betamethasone dipropionate (DIPROSONE) 0.05 % lotion, Apply 1 each topically daily as needed., Disp: , Rfl:     cetirizine (ZyrTEC) 10 mg tablet, Take 10 mg by mouth as needed., Disp: , Rfl:     cholecalciferol, " vitamin D3, 1,000 unit (25 mcg) tablet, Take 1,000 Units by mouth daily., Disp: , Rfl:     coenzyme Q10 (COQ10) 50 mg capsule, Take 50 mg by mouth daily., Disp: , Rfl:     docusate sodium (COLACE) 100 mg capsule, Take 2 capsules (200 mg total) by mouth 2 (two) times a day as needed for constipation., Disp: 60 capsule, Rfl: 0    flecainide (TAMBOCOR) 50 mg tablet, Take 1 tablet (50 mg total) by mouth 2 (two) times a day., Disp: 180 tablet, Rfl: 3    gabapentin (NEURONTIN) 100 mg capsule, Take 1 capsule (100 mg total) by mouth nightly. (Patient taking differently: Take 50 mg by mouth nightly.), Disp: 90 capsule, Rfl: 0    levothyroxine (SYNTHROID) 75 mcg tablet, Take 1 tablet (75 mcg total) by mouth daily., Disp: 90 tablet, Rfl: 3    lidocaine (ASPERCREME) 4 % adhesive patch,medicated topical patch, Apply 1 patch topically daily., Disp: 30 patch, Rfl: 0    metoprolol succinate XL (TOPROL-XL) 25 mg 24 hr tablet, Take 0.5 tablets (12.5 mg total) by mouth daily as needed (SBP over 135 or  palpitations)., Disp: 10 tablet, Rfl: 6    multivitamin (THERAGRAN) tablet, Take 1 tablet by mouth daily., Disp: , Rfl:     nitroglycerin (NITROSTAT) 0.4 mg SL tablet, Place 1 tablet (0.4 mg total) under the tongue every 5 (five) minutes as needed for chest pain., Disp: 25 tablet, Rfl: 3    omega-3-dha-epa-dpa-fish oil 1,050 mg(300 mg -675 mg-75 mg) capsule, Take 1 capsule by mouth daily., Disp: , Rfl:     polyethylene glycol (MIRALAX) 17 gram packet, Take 17 g by mouth daily as needed (Constipation) for up to 3 days., Disp: 30 each, Rfl: 0   Allergies: is allergic to hydrocodone-acetaminophen, trazodone-dietary supp no.8, cephalexin, ciprofloxacin, codeine, and sulfa (sulfonamide antibiotics).     OB History        4    Para        Term                AB   1    Living   2       SAB   1    IAB        Ectopic        Multiple        Live Births   2               Past Medical History:  has a past medical history of  A-fib (CMS/HCC), Breast cancer (CMS/HCC) (2008), Colon polyp, COVID (8/23/2022), Fibrocystic breast, Fibroid, History of partial hysterectomy (03/2000), History of radiation therapy, antineoplastic chemo, lipoma (2008), Hypothyroidism, Malignant neoplasm of upper-outer quadrant of left breast in female, estrogen receptor negative (CMS/HCC) (9/10/2021), PAF (paroxysmal atrial fibrillation) (CMS/[facility]), PVC (premature ventricular contraction), and Screening for breast cancer.    She has no past medical history of Hormone replacement therapy or Melanoma (CMS/[facility]).  Past Surgical History:  has a past surgical history that includes Partial hysterectomy; Rotator cuff repair (Left); Mandible fracture surgery; Hysterectomy; Breast lumpectomy (Left, 2008); Breast biopsy (Left, 2008); Breast biopsy (Right, 2021); Sinus surgery; Lumbar puncture; Dental surgery; and Cardiac catheterization.  Family History: family history includes Alzheimer's disease in her biological mother; Arrhythmia in her biological mother; COPD in her biological brother; Clotting disorder in her mother's brother; Diabetes in her biological father, biological sister, and paternal grandfather; Heart disease in her biological brother, biological father, biological mother, biological sister, and father's sister; Hypertension in her biological brother and biological sister; Lung cancer in her father's brother, father's sister, father's sister, and paternal cousin; Melanoma in her biological sister; Ovarian cancer in her paternal cousin; Prostate cancer in her biological brother, biological brother, father's brother, father's brother, father's brother, father's brother, and paternal grandfather; Sarcoidosis in her biological sister; Stomach cancer in her paternal grandmother; Thyroid cancer in her paternal cousin.  Social History:   Social History     Tobacco Use    Smoking status: Never    Smokeless tobacco: Never   Vaping Use    Vaping Use: Never used    Substance Use Topics    Alcohol use: No    Drug use: No       Review of Systems   Constitutional: Negative for chills, fever and unexpected weight change.   Respiratory: Negative for cough and shortness of breath.    Cardiovascular: Negative for chest pain, palpitations and leg swelling.   Gastrointestinal: Positive for constipation. Negative for abdominal distention and abdominal pain.        Fecal smearing   Endocrine: Negative for polyphagia.   Genitourinary: Negative for dysuria, enuresis, flank pain, frequency, pelvic pain and vaginal discharge.        Negative for incontinence   Skin: Negative for rash.   Neurological: Negative for numbness.   Hematological: Negative for adenopathy. Does not bruise/bleed easily.   Psychiatric/Behavioral: Negative for dysphoric mood. The patient is not nervous/anxious.      Physical Exam  Constitutional:       Appearance: Normal appearance. She is well-developed.     Genitourinary:    Urethra, bladder, rectum, urethral meatus, external female genitalia and adnexa normal.   Pelvic exam was performed with patient in lithotomy exam position.   There is no urethral urethrocele. No stress urinary incontinence.There is no vaginal vault prolapse, cystocele or rectocele present. Perineum intact.    Cervix is absent.     Uterus is absent. Rectovaginal exam normal.   Rectal exam shows normal sphincter tone, no rectal prolapse, no external hemorrhoid, no internal hemorrhoid, no mass and no rectal bleeding.   Neck:      Thyroid: No thyromegaly.      Vascular: No JVD.   Cardiovascular:      Pulses: Normal pulses.      Comments: No  JVD  No peripheral edema  Peripheral vascular normal  Pulmonary:      Effort: Pulmonary effort is normal. No tachypnea or respiratory distress.   Abdominal:      General: Abdomen is flat. There is no distension.      Palpations: There is no hepatomegaly.      Tenderness: There is no abdominal tenderness. There is no right CVA tenderness, left CVA tenderness,  guarding or rebound.      Hernia: No hernia is present.   Neurological:      Mental Status: She is alert and oriented to person, place, and time.      Sensory: No sensory deficit.   Skin:     Findings: No bruising or rash.   Psychiatric:         Attention and Perception: She is attentive.         Mood and Affect: Affect is not inappropriate.         Behavior: Behavior normal.         Assessment/Plan   PLAN  Diagnoses and all orders for this visit:    Constipation due to slow transit (Primary)  Assessment & Plan:  Her symptoms suggest slow transit constipation  Add metamucil each morning  Suggest senakot at bedtime      Fecal smearing  Assessment & Plan:  She does not have randall incontinence of stool  I suspect that this is related to her constipation  She has no rectal prolapse, and normal sphincter tone. Has excellent levator tone  Treat constipation, continue to use baby wipes  She can follow up with GI if needed        No follow-ups on file.

## 2023-10-13 NOTE — PROGRESS NOTES
"Visit Date: 10/13/2023   Rafat Cruz is 62 y.o. female who is referred by Dr. Villa for evaluation of Fecal Incontinence    She has been having fecal incontinence for the past year. She reports fecal smearing. She has a history of constipation. She has had a colonoscopy recently with polyps, will return in 2 years. She wakes in the morning with fecal smearing. She had a hysterectomy at [age] for fibroids.     She has passive smearing of stool with wiping. No fecal urgency. No flatal incontinence. No incontinence of solid stool. No rectal polyp. No rectal bulge symptom.     Objective   Visit Vitals  /80   Ht 1.702 m (5' 7\")   Wt 62.6 kg (138 lb)   BMI 21.61 kg/m²     Medications:   Current Outpatient Medications:     ascorbic acid (VITAMIN C) 500 mg tablet, Take 500 mg by mouth daily., Disp: , Rfl:     aspirin 81 mg enteric coated tablet, Take 81 mg by mouth daily., Disp: , Rfl:     atorvastatin (LIPITOR) 40 mg tablet, Take 1 tablet (40 mg total) by mouth daily., Disp: 30 tablet, Rfl: 1    azelastine (ASTELIN) 137 mcg (0.1 %) nasal spray, Administer 2 sprays into each nostril 2 (two) times a day as needed., Disp: , Rfl:     betamethasone dipropionate (DIPROSONE) 0.05 % lotion, Apply 1 each topically daily as needed., Disp: , Rfl:     cetirizine (ZyrTEC) 10 mg tablet, Take 10 mg by mouth as needed., Disp: , Rfl:     cholecalciferol, vitamin D3, 1,000 unit (25 mcg) tablet, Take 1,000 Units by mouth daily., Disp: , Rfl:     coenzyme Q10 (COQ10) 50 mg capsule, Take 50 mg by mouth daily., Disp: , Rfl:     docusate sodium (COLACE) 100 mg capsule, Take 2 capsules (200 mg total) by mouth 2 (two) times a day as needed for constipation., Disp: 60 capsule, Rfl: 0    flecainide (TAMBOCOR) 50 mg tablet, Take 1 tablet (50 mg total) by mouth 2 (two) times a day., Disp: 180 tablet, Rfl: 3    gabapentin (NEURONTIN) 100 mg capsule, Take 1 capsule (100 mg total) by mouth nightly. (Patient taking " differently: Take 50 mg by mouth nightly.), Disp: 90 capsule, Rfl: 0    levothyroxine (SYNTHROID) 75 mcg tablet, Take 1 tablet (75 mcg total) by mouth daily., Disp: 90 tablet, Rfl: 3    lidocaine (ASPERCREME) 4 % adhesive patch,medicated topical patch, Apply 1 patch topically daily., Disp: 30 patch, Rfl: 0    metoprolol succinate XL (TOPROL-XL) 25 mg 24 hr tablet, Take 0.5 tablets (12.5 mg total) by mouth daily as needed (SBP over 135 or  palpitations)., Disp: 10 tablet, Rfl: 6    multivitamin (THERAGRAN) tablet, Take 1 tablet by mouth daily., Disp: , Rfl:     nitroglycerin (NITROSTAT) 0.4 mg SL tablet, Place 1 tablet (0.4 mg total) under the tongue every 5 (five) minutes as needed for chest pain., Disp: 25 tablet, Rfl: 3    omega-3-dha-epa-dpa-fish oil 1,050 mg(300 mg -675 mg-75 mg) capsule, Take 1 capsule by mouth daily., Disp: , Rfl:     polyethylene glycol (MIRALAX) 17 gram packet, Take 17 g by mouth daily as needed (Constipation) for up to 3 days., Disp: 30 each, Rfl: 0   Allergies: is allergic to hydrocodone-acetaminophen, trazodone-dietary supp no.8, cephalexin, ciprofloxacin, codeine, and sulfa (sulfonamide antibiotics).     OB History        4    Para        Term                AB   1    Living   2       SAB   1    IAB        Ectopic        Multiple        Live Births   2               Past Medical History:  has a past medical history of A-fib (CMS/HCC), Breast cancer (CMS/HCC) (), Colon polyp, COVID (2022), Fibrocystic breast, Fibroid, History of partial hysterectomy (2000), History of radiation therapy, antineoplastic chemo, lipoma (), Hypothyroidism, Malignant neoplasm of upper-outer quadrant of left breast in female, estrogen receptor negative (CMS/HCC) (9/10/2021), PAF (paroxysmal atrial fibrillation) (CMS/HCC), PVC (premature ventricular contraction), and Screening for breast cancer.    She has no past medical history of Hormone replacement therapy or Melanoma  (CMS/[facility]).  Past Surgical History:  has a past surgical history that includes Partial hysterectomy; Rotator cuff repair (Left); Mandible fracture surgery; Hysterectomy; Breast lumpectomy (Left, 2008); Breast biopsy (Left, 2008); Breast biopsy (Right, 2021); Sinus surgery; Lumbar puncture; Dental surgery; and Cardiac catheterization.  Family History: family history includes Alzheimer's disease in her biological mother; Arrhythmia in her biological mother; COPD in her biological brother; Clotting disorder in her mother's brother; Diabetes in her biological father, biological sister, and paternal grandfather; Heart disease in her biological brother, biological father, biological mother, biological sister, and father's sister; Hypertension in her biological brother and biological sister; Lung cancer in her father's brother, father's sister, father's sister, and paternal cousin; Melanoma in her biological sister; Ovarian cancer in her paternal cousin; Prostate cancer in her biological brother, biological brother, father's brother, father's brother, father's brother, father's brother, and paternal grandfather; Sarcoidosis in her biological sister; Stomach cancer in her paternal grandmother; Thyroid cancer in her paternal cousin.  Social History:   Social History     Tobacco Use    Smoking status: Never    Smokeless tobacco: Never   Vaping Use    Vaping Use: Never used   Substance Use Topics    Alcohol use: No    Drug use: No       Review of Systems   Constitutional: Negative for chills, fever and unexpected weight change.   Respiratory: Negative for cough and shortness of breath.    Cardiovascular: Negative for chest pain, palpitations and leg swelling.   Gastrointestinal: Positive for constipation. Negative for abdominal distention and abdominal pain.        Fecal smearing   Endocrine: Negative for polyphagia.   Genitourinary: Negative for dysuria, enuresis, flank pain, frequency, pelvic pain and vaginal discharge.         Negative for incontinence   Skin: Negative for rash.   Neurological: Negative for numbness.   Hematological: Negative for adenopathy. Does not bruise/bleed easily.   Psychiatric/Behavioral: Negative for dysphoric mood. The patient is not nervous/anxious.      Physical Exam  Constitutional:       Appearance: Normal appearance. She is well-developed.     Genitourinary:    Urethra, bladder, rectum, urethral meatus, external female genitalia and adnexa normal.   Pelvic exam was performed with patient in lithotomy exam position.   There is no urethral urethrocele. No stress urinary incontinence.There is no vaginal vault prolapse, cystocele or rectocele present. Perineum intact.    Cervix is absent.     Uterus is absent. Rectovaginal exam normal.   Rectal exam shows normal sphincter tone, no rectal prolapse, no external hemorrhoid, no internal hemorrhoid, no mass and no rectal bleeding.   Neck:      Thyroid: No thyromegaly.      Vascular: No JVD.   Cardiovascular:      Pulses: Normal pulses.      Comments: No  JVD  No peripheral edema  Peripheral vascular normal  Pulmonary:      Effort: Pulmonary effort is normal. No tachypnea or respiratory distress.   Abdominal:      General: Abdomen is flat. There is no distension.      Palpations: There is no hepatomegaly.      Tenderness: There is no abdominal tenderness. There is no right CVA tenderness, left CVA tenderness, guarding or rebound.      Hernia: No hernia is present.   Neurological:      Mental Status: She is alert and oriented to person, place, and time.      Sensory: No sensory deficit.   Skin:     Findings: No bruising or rash.   Psychiatric:         Attention and Perception: She is attentive.         Mood and Affect: Affect is not inappropriate.         Behavior: Behavior normal.          Assessment/Plan   PLAN  Diagnoses and all orders for this visit:    Constipation due to slow transit (Primary)  Assessment & Plan:  Her symptoms suggest slow transit  constipation  Add metamucil each morning  Suggest senakot at bedtime      Fecal smearing  Assessment & Plan:  She does not have randall incontinence of stool  I suspect that this is related to her constipation  She has no rectal prolapse, and normal sphincter tone. Has excellent levator tone  Treat constipation, continue to use baby wipes  She can follow up with GI if needed        No follow-ups on file.

## 2023-10-13 NOTE — ASSESSMENT & PLAN NOTE
She does not have randall incontinence of stool  I suspect that this is related to her constipation  She has no rectal prolapse, and normal sphincter tone. Has excellent levator tone  Treat constipation, continue to use baby wipes  She can follow up with GI if needed

## 2023-10-13 NOTE — ASSESSMENT & PLAN NOTE
Her symptoms suggest slow transit constipation  Add metamucil each morning  Suggest senakot at bedtime

## 2023-10-19 ENCOUNTER — OFFICE VISIT (OUTPATIENT)
Dept: BEHAVIORAL HEALTH | Facility: CLINIC | Age: 63
End: 2023-10-19
Payer: COMMERCIAL

## 2023-10-19 DIAGNOSIS — F43.9 TRAUMA AND STRESSOR-RELATED DISORDER: Primary | ICD-10-CM

## 2023-10-19 PROCEDURE — 90791 PSYCH DIAGNOSTIC EVALUATION: CPT

## 2023-10-19 ASSESSMENT — COGNITIVE AND FUNCTIONAL STATUS - GENERAL
AROUSAL LEVEL: ALERT
ATTENTION: WNL
APPEARANCE: WELL GROOMED
AFFECT: FULL RANGE
REMOTE MEMORY: WNL
CONCENTRATION: WNL
PERCEPTUAL FUNCTION: NORMAL
PSYCHOMOTOR FUNCTIONING: WNL
EYE_CONTACT: WNL
ORIENTATION: FULLY ORIENTED
THOUGHT_CONTENT: APPROPRIATE
IMPULSE CONTROL: INTACT
THOUGHT_PROCESS: WNL
SPEECH: REGULAR
INSIGHT: INTACT
MOOD: ANXIOUS;EUTHYMIC (NORMAL)
DELUSIONS: NONE OR AGE APPROPRIATE
RECENT MEMORY: WNL
SLEEP_WAKE_CYCLE: DECREASED

## 2023-10-19 NOTE — LETTER
October 20, 2023     Patient: Alecia Cruz  YOB: 1960  Date of Visit: 10/19/2023    To Whom it May Concern:    Alecia Cruz was seen in my clinic on 10/19/2023 at 11:45 am. Please excuse Alecia for her absence from work on this day to make the appointment.    If you have any questions or concerns, please don't hesitate to call.         Sincerely,         Kathy Briggs LCSW

## 2023-10-19 NOTE — LETTER
October 20, 2023     Patient: Alecia Cruz  YOB: 1960  Date of Visit: 10/19/2023    To Whom it May Concern:    Alecia Cruz was seen in my clinic on 10/19/2023 at 11:45 am. Please excuse Alecia for her absence from work on this day to make the appointment.    If you have any questions or concerns, please don't hesitate to call.         Sincerely,         Kathy Briggs LCSW        CC: No Recipients

## 2023-10-19 NOTE — LETTER
October 23, 2023     Patient: Alecia Cruz  YOB: 1960  Date of Visit: 10/19/2023    To Whom it May Concern:    Alecia Cruz was seen in my clinic on 10/19/2023 at 11:45 am. Please excuse Alecia for her absence from work on this day to make the appointment.    If you have any questions or concerns, please don't hesitate to call.         Sincerely,         Kathy Briggs LCSW        CC: No Recipients
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
## 2023-10-25 ENCOUNTER — TELEMEDICINE (OUTPATIENT)
Dept: BEHAVIORAL HEALTH | Facility: CLINIC | Age: 63
End: 2023-10-25
Payer: COMMERCIAL

## 2023-10-25 DIAGNOSIS — F43.9 TRAUMA AND STRESSOR-RELATED DISORDER: Primary | ICD-10-CM

## 2023-10-25 PROCEDURE — 90834 PSYTX W PT 45 MINUTES: CPT | Mod: 95

## 2023-10-25 NOTE — PROGRESS NOTES
"[facility] - Psychotherapy Follow-up Visit Note  Visit number: 2     Visit Type Performed: Video   Communication platform used for this encounter:  [employer] Video Visit (Epic Video Client)     Alecia Cruz was contacted today for a behavioral health visit.  Clinician confirmed identification of patient by name and birthdate, provider name, location of patient and clinician, and callback number in case disconnected.    Verification of Patient Location:  The patient affirms they are currently located in the following state: [place]  This visit was conducted via telehealth/telephone in lieu of an in-person visit due to precautions related to the COVID-19 pandemic.    Request for Consent:  You and I are about to have a tele-health check-in or visit. This is allowed because you are already a patient of our [facility] practice, and you have requested it.  This tele-health visit will be billed to your health insurance or you, if you are self-insured. You understand you will be responsible for any copayments or coinsurances that apply to your tele-health visit.  Before starting our telemedicine visit, I am required to get your consent for this virtual check-in or visit by tele-health . Do you consent?    Patient Response to Request for Consent: Yes        SUBJECTIVE     Symptoms  Depression symptoms: sadness, anhedonia, lack of motivation and fatigue/lack of energy  Anxiety symptoms: excessive anxiety/worry, restless/\"on edge\", muscle tension and panic attacks  Additional reported symptoms: NA      OBJECTIVE     Mental Status Evaluation  Patient's mood and affect were consistent with the context, and consistent with their baseline: Yes   Comments:  Slightly tearful, anxious, pleasant; awake and alert; oriented to person, place, and time    Suicidal Ideation/Homicidal Ideation Risk Assessment: not assessed. If not assessed, reason:  Pt did not endorse any changes in risk or protective factors and " none were observed.     Plan for Safety-   N/A:  Risk is assessed to be minimal; therefore, developing a safety plan is not indicated at this time. Patient identified protective factors including her grandchildren and her children.    Interventions  Anxiety Reduction Techniques  We further processed acts of harrassment and toxicity in her workplace including the efforts Patient and supportive staff members have taken to advocate for Patient's experience. We processed the impact on mood including panic attacks this experience has caused.     Psychotropic medications: no known adherence challenges, N/A   Current Outpatient Medications   Medication Sig Dispense Refill    ascorbic acid (VITAMIN C) 500 mg tablet Take 500 mg by mouth daily.      aspirin 81 mg enteric coated tablet Take 81 mg by mouth daily.      atorvastatin (LIPITOR) 40 mg tablet Take 1 tablet (40 mg total) by mouth daily. 30 tablet 1    azelastine (ASTELIN) 137 mcg (0.1 %) nasal spray Administer 2 sprays into each nostril 2 (two) times a day as needed.      betamethasone dipropionate (DIPROSONE) 0.05 % lotion Apply 1 each topically daily as needed.      cetirizine (ZyrTEC) 10 mg tablet Take 10 mg by mouth as needed.      cholecalciferol, vitamin D3, 1,000 unit (25 mcg) tablet Take 1,000 Units by mouth daily.      coenzyme Q10 (COQ10) 50 mg capsule Take 50 mg by mouth daily.      docusate sodium (COLACE) 100 mg capsule Take 2 capsules (200 mg total) by mouth 2 (two) times a day as needed for constipation. 60 capsule 0    flecainide (TAMBOCOR) 50 mg tablet Take 1 tablet (50 mg total) by mouth 2 (two) times a day. 180 tablet 3    gabapentin (NEURONTIN) 100 mg capsule Take 1 capsule (100 mg total) by mouth nightly. (Patient taking differently: Take 50 mg by mouth nightly.) 90 capsule 0    levothyroxine (SYNTHROID) 75 mcg tablet Take 1 tablet (75 mcg total) by mouth daily. 90 tablet 3    lidocaine (ASPERCREME) 4 % adhesive patch,medicated  "topical patch Apply 1 patch topically daily. 30 patch 0    metoprolol succinate XL (TOPROL-XL) 25 mg 24 hr tablet Take 0.5 tablets (12.5 mg total) by mouth daily as needed (SBP over 135 or  palpitations). 10 tablet 6    multivitamin (THERAGRAN) tablet Take 1 tablet by mouth daily.      nitroglycerin (NITROSTAT) 0.4 mg SL tablet Place 1 tablet (0.4 mg total) under the tongue every 5 (five) minutes as needed for chest pain. 25 tablet 3    omega-3-dha-epa-dpa-fish oil 1,050 mg(300 mg -675 mg-75 mg) capsule Take 1 capsule by mouth daily.      polyethylene glycol (MIRALAX) 17 gram packet Take 17 g by mouth daily as needed (Constipation) for up to 3 days. 30 each 0     No current facility-administered medications for this visit.       ASSESSMENT       Progress  Patient's progress toward their goals is generally improving as Patient processed how her current work environment has impacted her anxiety over the past year.   Patient's symptomology is unchanged as treatment has just begun.        PLAN     Goals:  -\"would like to get back to me\" and \"get back on track\"  -Develop skills for anxiety management including skills for coping with panic attacks  -Process past losses and trauma    Recommendations  Individual Therapy  45 minutes weekly    Next visit plan:  Identify previously used coping skills for anxiety      I spent 40 minutes on this date of service performing the following activities: providing counseling and education.  "

## 2023-10-25 NOTE — LETTER
October 25, 2023     Patient: Alecia Cruz  YOB: 1960  Date of Visit: 10/25/2023    To Whom it May Concern:    Alecia Cruz was seen in my clinic on 10/25/2023. Please excuse Alecia for her absence from work on this day to make the appointment.    If you have any questions or concerns, please don't hesitate to call.         Sincerely,           Kathy Briggs LCSW

## 2023-10-30 NOTE — PROGRESS NOTES
Social Work Follow up Note     reviewed chart and pt's financial assistance application was denied as pt did not submit additional requested information.    THEO Woodard  [facility]   [phone]

## 2023-11-01 ENCOUNTER — TELEMEDICINE (OUTPATIENT)
Dept: BEHAVIORAL HEALTH | Facility: CLINIC | Age: 63
End: 2023-11-01
Payer: COMMERCIAL

## 2023-11-01 DIAGNOSIS — F43.9 TRAUMA AND STRESSOR-RELATED DISORDER: Primary | ICD-10-CM

## 2023-11-01 PROCEDURE — 90834 PSYTX W PT 45 MINUTES: CPT | Mod: 95
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
## 2023-11-07 ENCOUNTER — TELEMEDICINE (OUTPATIENT)
Dept: BEHAVIORAL HEALTH | Facility: CLINIC | Age: 63
End: 2023-11-07
Payer: COMMERCIAL

## 2023-11-07 DIAGNOSIS — F43.9 TRAUMA AND STRESSOR-RELATED DISORDER: Primary | ICD-10-CM

## 2023-11-07 PROCEDURE — 90834 PSYTX W PT 45 MINUTES: CPT | Mod: 95
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
## 2023-11-13 ENCOUNTER — PATIENT OUTREACH (OUTPATIENT)
Dept: CASE MANAGEMENT | Facility: CLINIC | Age: 63
End: 2023-11-13
Payer: COMMERCIAL

## 2023-11-13 ENCOUNTER — TELEPHONE (OUTPATIENT)
Dept: PRIMARY CARE | Facility: CLINIC | Age: 63
End: 2023-11-13
Payer: COMMERCIAL

## 2023-11-13 NOTE — PROGRESS NOTES
11/13/2023 02:44 PM EST by Ragini Platt, RN 11/13/2023 02:44 PM EST by Ragini Platt, RN  Outgoing Alecia Nelson (Self) [phone] (Home)   Reached PatientCommunicated - M F/U-CM returned patient call-she is upset as she is saying her employer is saying she needs to pay back her FMLA etc. She is requesting Dr Begum call her. CM sent Dr Begum a secure chat message and he stated he will call her on his way home. CM let patient know this and she will watch out for his call.

## 2023-11-15 ENCOUNTER — TELEMEDICINE (OUTPATIENT)
Dept: BEHAVIORAL HEALTH | Facility: CLINIC | Age: 63
End: 2023-11-15
Payer: COMMERCIAL

## 2023-11-15 DIAGNOSIS — F43.9 TRAUMA AND STRESSOR-RELATED DISORDER: Primary | ICD-10-CM

## 2023-11-15 PROCEDURE — 90834 PSYTX W PT 45 MINUTES: CPT | Mod: 95
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
## 2023-11-17 ENCOUNTER — TELEPHONE (OUTPATIENT)
Dept: PRIMARY CARE | Facility: CLINIC | Age: 63
End: 2023-11-17
Payer: COMMERCIAL

## 2023-11-17 NOTE — TELEPHONE ENCOUNTER
Just spoke with pt ,  just re faxed federal occupational health form also scanned in faxed confirmation, also spoke with pt and let her know I will email her a copy of form and confirmation email given to me was [email or web address]

## 2023-11-18 NOTE — TELEPHONE ENCOUNTER
Spoke to patient x2. Reviewed that we did send the requested paperwork. I requested that office reprint paperwork scanned from patient and resend it. Will also send to patient to make sure that they receive. I had also spoken to occupational health at [facility] prior.

## 2023-11-24 ENCOUNTER — APPOINTMENT (EMERGENCY)
Dept: RADIOLOGY | Facility: HOSPITAL | Age: 63
End: 2023-11-24
Attending: EMERGENCY MEDICINE
Payer: COMMERCIAL

## 2023-11-24 ENCOUNTER — HOSPITAL ENCOUNTER (EMERGENCY)
Facility: HOSPITAL | Age: 63
Discharge: HOME | End: 2023-11-24
Attending: EMERGENCY MEDICINE
Payer: COMMERCIAL

## 2023-11-24 VITALS
WEIGHT: 135 LBS | DIASTOLIC BLOOD PRESSURE: 93 MMHG | HEIGHT: 67 IN | BODY MASS INDEX: 21.19 KG/M2 | RESPIRATION RATE: 20 BRPM | HEART RATE: 79 BPM | TEMPERATURE: 98.2 F | SYSTOLIC BLOOD PRESSURE: 170 MMHG | OXYGEN SATURATION: 98 %

## 2023-11-24 DIAGNOSIS — R07.9 ACUTE CHEST PAIN: Primary | ICD-10-CM

## 2023-11-24 DIAGNOSIS — M79.672 LEFT FOOT PAIN: ICD-10-CM

## 2023-11-24 LAB
ALBUMIN SERPL-MCNC: 3.8 G/DL (ref 3.5–5.7)
ALP SERPL-CCNC: 51 IU/L (ref 34–125)
ALT SERPL-CCNC: 24 IU/L (ref 7–52)
ANION GAP SERPL CALC-SCNC: 5 MEQ/L (ref 3–15)
AST SERPL-CCNC: 17 IU/L (ref 13–39)
ATRIAL RATE: 73
BASOPHILS # BLD: 0.04 K/UL (ref 0.01–0.1)
BASOPHILS NFR BLD: 0.6 %
BILIRUB SERPL-MCNC: 0.4 MG/DL (ref 0.3–1.2)
BUN SERPL-MCNC: 14 MG/DL (ref 7–25)
CALCIUM SERPL-MCNC: 9.1 MG/DL (ref 8.6–10.3)
CHLORIDE SERPL-SCNC: 108 MEQ/L (ref 98–107)
CO2 SERPL-SCNC: 28 MEQ/L (ref 21–31)
CREAT SERPL-MCNC: 0.8 MG/DL (ref 0.6–1.2)
DIFFERENTIAL METHOD BLD: ABNORMAL
EOSINOPHIL # BLD: 0.11 K/UL (ref 0.04–0.36)
EOSINOPHIL NFR BLD: 1.8 %
ERYTHROCYTE [DISTWIDTH] IN BLOOD BY AUTOMATED COUNT: 12.5 % (ref 11.7–14.4)
FLUAV RNA SPEC QL NAA+PROBE: NEGATIVE
FLUBV RNA SPEC QL NAA+PROBE: NEGATIVE
GFR SERPL CREATININE-BSD FRML MDRD: >60 ML/MIN/1.73M*2
GLUCOSE SERPL-MCNC: 78 MG/DL (ref 70–99)
HCT VFR BLDCO AUTO: 34.3 % (ref 35–45)
HGB BLD-MCNC: 11.7 G/DL (ref 11.8–15.7)
IMM GRANULOCYTES # BLD AUTO: 0.01 K/UL (ref 0–0.08)
IMM GRANULOCYTES NFR BLD AUTO: 0.2 %
LYMPHOCYTES # BLD: 1.77 K/UL (ref 1.2–3.5)
LYMPHOCYTES NFR BLD: 28.4 %
MCH RBC QN AUTO: 32.1 PG (ref 28–33.2)
MCHC RBC AUTO-ENTMCNC: 34.1 G/DL (ref 32.2–35.5)
MCV RBC AUTO: 94.2 FL (ref 83–98)
MONOCYTES # BLD: 0.41 K/UL (ref 0.28–0.8)
MONOCYTES NFR BLD: 6.6 %
NEUTROPHILS # BLD: 3.9 K/UL (ref 1.7–7)
NEUTS SEG NFR BLD: 62.4 %
NRBC BLD-RTO: 0 %
P AXIS: 70
PDW BLD AUTO: 10.2 FL (ref 9.4–12.3)
PLATELET # BLD AUTO: 204 K/UL (ref 150–369)
POTASSIUM SERPL-SCNC: 3.9 MEQ/L (ref 3.5–5.1)
PR INTERVAL: 144
PROT SERPL-MCNC: 6.7 G/DL (ref 6–8.2)
QRS DURATION: 88
QT INTERVAL: 354
QTC CALCULATION(BAZETT): 389
R AXIS: 3
RBC # BLD AUTO: 3.64 M/UL (ref 3.93–5.22)
RSV RNA SPEC QL NAA+PROBE: NEGATIVE
SARS-COV-2 RNA RESP QL NAA+PROBE: NEGATIVE
SODIUM SERPL-SCNC: 141 MEQ/L (ref 136–145)
T WAVE AXIS: -33
TROPONIN I SERPL HS-MCNC: 2.6 PG/ML
TROPONIN I SERPL HS-MCNC: 2.6 PG/ML
VENTRICULAR RATE: 73
WBC # BLD AUTO: 6.24 K/UL (ref 3.8–10.5)

## 2023-11-24 PROCEDURE — 87637 SARSCOV2&INF A&B&RSV AMP PRB: CPT | Performed by: EMERGENCY MEDICINE

## 2023-11-24 PROCEDURE — 71045 X-RAY EXAM CHEST 1 VIEW: CPT

## 2023-11-24 PROCEDURE — 99283 EMERGENCY DEPT VISIT LOW MDM: CPT | Mod: 25

## 2023-11-24 PROCEDURE — 85025 COMPLETE CBC W/AUTO DIFF WBC: CPT | Performed by: EMERGENCY MEDICINE

## 2023-11-24 PROCEDURE — 80053 COMPREHEN METABOLIC PANEL: CPT | Performed by: EMERGENCY MEDICINE

## 2023-11-24 PROCEDURE — 84484 ASSAY OF TROPONIN QUANT: CPT | Performed by: EMERGENCY MEDICINE

## 2023-11-24 PROCEDURE — 93005 ELECTROCARDIOGRAM TRACING: CPT | Performed by: EMERGENCY MEDICINE

## 2023-11-24 PROCEDURE — 84484 ASSAY OF TROPONIN QUANT: CPT | Mod: 91 | Performed by: EMERGENCY MEDICINE

## 2023-11-24 PROCEDURE — 93010 ELECTROCARDIOGRAM REPORT: CPT | Performed by: INTERNAL MEDICINE

## 2023-11-24 PROCEDURE — 36415 COLL VENOUS BLD VENIPUNCTURE: CPT | Performed by: EMERGENCY MEDICINE

## 2023-11-24 ASSESSMENT — ENCOUNTER SYMPTOMS
FEVER: 0
ABDOMINAL PAIN: 0
SHORTNESS OF BREATH: 0

## 2023-11-24 NOTE — ED PROVIDER NOTES
Emergency Medicine Note  HPI   HISTORY OF PRESENT ILLNESS     Pt is a 63 yo female with hx of afib, non obstructive coronary disease, thyroid disease, and breast cancer who presents with intermittent chest pain for the past several months.  She states she is a very stressful job and gets left-sided chest pain when thinking about or going into work.  She did have an episode when she walked up some steps.  She states it feels like a sharp tight feeling.  No shortness of breath or sweats.She has been prescribed NTG in the past and takes NTG 3x/week    Patient has a second complaint with left foot pain and states she has a bunion that occasionally causes discomfort and has had multiple steroid injections in the past.  No injury or redness.      History provided by:  Patient  Chest Pain  Pain location:  L chest  Pain radiates to:  R arm  Chronicity:  Recurrent  Associated symptoms: no abdominal pain, no fever and no shortness of breath          Patient History   PAST HISTORY     Reviewed from Nursing Triage:       Past Medical History:   Diagnosis Date    A-fib (CMS/[facility])     Breast cancer (CMS/[facility]) 2008    left partial mastectomy axillary dissection, whole breast radiation and chemotherapy in 2008 for a 2-1/2 cm invasive ductal carcinoma that was ER/FL negative and HER-2/alyson positive with 7-15 positive lymph nodes    Colon polyp     couple on each colonoscopy per patient report    COVID 8/23/2022    Fibrocystic breast     Fibroid     History of partial hysterectomy 03/2000    c/o fibroids    History of radiation therapy     Hx antineoplastic chemo     Hx of lipoma 2008    resected from shoulder    Hypothyroidism     Malignant neoplasm of upper-outer quadrant of left breast in female, estrogen receptor negative (CMS/[facility]) 9/10/2021    2008-2.5 cm cancer with 7 of 15+ nodes treated with partial mastectomy, axillary dissection, chemotherapy and whole breast radiation.    PAF (paroxysmal atrial fibrillation)  (CMS/HCC)     PVC (premature ventricular contraction)     Screening for breast cancer        Past Surgical History:   Procedure Laterality Date    BREAST BIOPSY Left 2008    BREAST BIOPSY Right 2021    benign ([facility] specimen [id number])    BREAST LUMPECTOMY Left 2008    [facility] specimen [id number]    CARDIAC CATHETERIZATION      DENTAL SURGERY      HYSTERECTOMY      partial    LUMBAR PUNCTURE      MANDIBLE FRACTURE SURGERY      PARTIAL HYSTERECTOMY      ROTATOR CUFF REPAIR Left     SINUS SURGERY         Family History   Problem Relation Age of Onset    Heart disease Biological Mother     Alzheimer's disease Biological Mother     Arrhythmia Biological Mother     Heart disease Biological Father     Diabetes Biological Father     Hypertension Biological Sister     Melanoma Biological Sister     Sarcoidosis Biological Sister     Diabetes Biological Sister     Heart disease Biological Sister     COPD Biological Brother     Prostate cancer Biological Brother     Heart disease Biological Brother     Hypertension Biological Brother     Prostate cancer Biological Brother     Clotting disorder Mother's Brother     Heart disease Father's Sister     Lung cancer Father's Sister     Lung cancer Father's Sister     Prostate cancer Father's Brother     Prostate cancer Father's Brother     Prostate cancer Father's Brother     Prostate cancer Father's Brother     Lung cancer Father's Brother     Stomach cancer Paternal Grandmother     Diabetes Paternal Grandfather     Prostate cancer Paternal Grandfather     Ovarian cancer Paternal Cousin     Lung cancer Paternal Cousin     Thyroid cancer Paternal Cousin        Social History     Tobacco Use    Smoking status: Never    Smokeless tobacco: Never   Vaping Use    Vaping Use: Never used   Substance Use Topics    Alcohol use: No    Drug use: No         Review of Systems   REVIEW OF SYSTEMS     Review of Systems   Constitutional: Negative for fever.    Respiratory: Negative for shortness of breath.    Cardiovascular: Positive for chest pain.   Gastrointestinal: Negative for abdominal pain.   Musculoskeletal:        R foot pain   Skin: Negative for rash.         VITALS     ED Vitals    Date/Time Temp Pulse Resp BP SpO2 [facility]   11/24/23 1132 -- 67 18 163/89 98 % [facility]   11/24/23 1040 36.8 °C (98.2 °F) 78 18 164/84 98 % [facility]                       Physical Exam   PHYSICAL EXAM     Physical Exam  Constitutional:       Appearance: Normal appearance.   HENT:      Head: Normocephalic.   Eyes:      General: No scleral icterus.     Conjunctiva/sclera: Conjunctivae normal.   Cardiovascular:      Rate and Rhythm: Normal rate.      Pulses: Normal pulses.   Pulmonary:      Effort: Pulmonary effort is normal.      Breath sounds: Normal breath sounds.   Abdominal:      Tenderness: There is no abdominal tenderness.   Musculoskeletal:      Cervical back: Normal range of motion.      Right lower leg: No edema.      Left lower leg: No edema.      Comments: Patient points to a bunion and the left great toe as area discomfort.  No erythema or pulse deficit   Skin:     General: Skin is warm and dry.   Neurological:      General: No focal deficit present.      Mental Status: She is alert and oriented to person, place, and time. Mental status is at baseline.   Psychiatric:         Mood and Affect: Mood normal.         Behavior: Behavior normal.         Thought Content: Thought content normal.           PROCEDURES     Procedures     DATA     Results     None          Imaging Results          X-RAY CHEST 1 VIEW (In process)                 ECG 12 lead          Scoring tools                                  ED Course & MDM   MDM / ED COURSE / CLINICAL IMPRESSION / DISPO     Medical Decision Making  Patient 62-year-old female with prior cath with nonobstructive disease, A-fib who presents with intermittent chest pain for months but worsened this week.  ECG unchanged and trop stable x 2  Do not  suspect PE or dissection    Also w/ foot pain requesting cortisone shot.  There is no signs of infection or ischemia with her foot.  Do not suspect fracture.  Discussed with patient that cortisone shots or not given in the ED and patient expressed understanding.    Acute chest pain: acute illness or injury  Left foot pain: acute illness or injury  Amount and/or Complexity of Data Reviewed  External Data Reviewed: ECG.     Details: Prior ECG  Labs: ordered. Decision-making details documented in ED Course.  Radiology: ordered and independent interpretation performed. Decision-making details documented in ED Course.  ECG/medicine tests: ordered and independent interpretation performed. Decision-making details documented in ED Course.  Discussion of management or test interpretation with external provider(s): Consideration for admission but ultimately discharged because: No current indication for admission based on work up and clinical course, need for placement in observation status, or need for immediate surgical/procedural intervention was found during the emergency department visit.          ED Course as of 11/24/23 1448   Fri Nov 24, 2023   1447 High Sens Troponin I: 2.6  No significant delta [DP]   1447 X-RAY CHEST 1 VIEW  NAD [DP]      ED Course User Index  [DP] Mike Mccoy MD     Clinical Impression      None               Mike Mccoy MD  11/24/23 1516

## 2023-11-27 ENCOUNTER — PATIENT OUTREACH (OUTPATIENT)
Dept: CASE MANAGEMENT | Facility: CLINIC | Age: 63
End: 2023-11-27
Payer: COMMERCIAL

## 2023-11-27 NOTE — PROGRESS NOTES
"11/27/2023 10:43 AM EST by Ragini Platt RN 11/27/2023 10:43 AM EST by Ragini Platt, RN  Incoming Alecia Nelson (Self) [phone] (Home)   Patient called asking which of her emails was used by Dr Begum/staff to send over forms. CM will reach out to Dr Begum to ask. She also is asking if she should look for [facility] email?      11/27/2023 11:34 AM EST by Ragini Platt RN 11/27/2023 11:34 AM EST by Ragini Platt, RN  Outgoing lAecia Nelson (Self) [phone] (Home)   Reached Patient: Elizabeth Stevens MA from PCP office messaged CM that she sent completed form for patient through [employer]. CM informed patient that is is under \"Federal Occupational Health\". Patient appreciative.      "

## 2023-11-28 ENCOUNTER — TELEMEDICINE (OUTPATIENT)
Dept: BEHAVIORAL HEALTH | Facility: CLINIC | Age: 63
End: 2023-11-28
Payer: COMMERCIAL

## 2023-11-28 ENCOUNTER — TELEMEDICINE (OUTPATIENT)
Dept: PRIMARY CARE | Facility: CLINIC | Age: 63
End: 2023-11-28
Payer: COMMERCIAL

## 2023-11-28 DIAGNOSIS — M21.612 BUNION, LEFT: Primary | ICD-10-CM

## 2023-11-28 DIAGNOSIS — B96.89 BACTERIAL SINUSITIS: ICD-10-CM

## 2023-11-28 DIAGNOSIS — F43.9 TRAUMA AND STRESSOR-RELATED DISORDER: Primary | ICD-10-CM

## 2023-11-28 DIAGNOSIS — J32.9 BACTERIAL SINUSITIS: ICD-10-CM

## 2023-11-28 PROCEDURE — 99442 PR PHYS/QHP TELEPHONE EVALUATION 11-20 MIN: CPT | Performed by: STUDENT IN AN ORGANIZED HEALTH CARE EDUCATION/TRAINING PROGRAM

## 2023-11-28 PROCEDURE — 90834 PSYTX W PT 45 MINUTES: CPT | Mod: 95

## 2023-11-28 RX ORDER — AMOXICILLIN AND CLAVULANATE POTASSIUM 875; 125 MG/1; MG/1
1 TABLET, FILM COATED ORAL 2 TIMES DAILY
Qty: 14 TABLET | Refills: 0 | Status: SHIPPED | OUTPATIENT
Start: 2023-11-28 | End: 2023-12-05

## 2023-11-28 NOTE — PROGRESS NOTES
Verification of Patient Location:  The patient affirms they are currently located in the following state: [place]    Request for Consent:    Audio Only Encounter   You and I are about to have a telemedicine check-in or visit. This is allowed because you have requested it. This telemedicine visit will be billed to your health insurance or you, if you are self-insured. You understand you will be responsible for any copayments or coinsurances that apply to your telemedicine visit. Before starting our telemedicine visit, I am required to get your consent for this virtual check-in or visit by telemedicine. Do you consent?    Patient Response to Request for Consent:  Yes    Patient not able to connect via video as we started our visit after scheduled time and she was not able to connect by [employer] so conducted visit over the phone.     Visit Documentation:  Subjective     Patient ID: Alecia Cruz is a 63 y.o. female.  1960      HPI  Patient presenting for sinus congestion, and ear congestion going on for at least 2 weeks. Having copious discharge from the sinuses and sounds audibly congested on the phone with me. No sob or visual changes. No headaches present. Started out as viral sounding condition but just not getting any better. No high temperatures noted.     Also noted to have pain of the left foot on the bottom of the toe. Has been walking a bit more recently. No change in shoes. Pain on articulation of the toes. Has pain when loading. Not more red/swollen. Seen in the ed recently and was seaking steroid injection but this is not done in the ed. Would like podiatry referral. .     The following have been reviewed and updated as appropriate in this visit:        Review of Systems  As noted above.     Assessment/Plan   Diagnoses and all orders for this visit:    Raman, left (Primary)  Assessment & Plan:  -cannot see so could be bunion vs. Osteoarthritis.   -will refer to podiatry.     Orders:  -      Ambulatory referral to Podiatry; Future    Bacterial sinusitis  Assessment & Plan:  -I'm going to treat her w/ augmentin for sinusitis as I would expect viral to having improved at this point.   -given degree of congestion reported I think that this is reasonable.   -went over side effects and avoidance of abx associated diarrhea.   -she is agreeable to this.   -rtc if not improving.   -noted that she has previously taken augmentin w/o issues as recently as this year.       Other orders  -     amoxicillin-pot clavulanate (AUGMENTIN) 875-125 mg per tablet; Take 1 tablet by mouth 2 (two) times a day for 7 days.      Time Spent:  I spent 15 minutes on this date of service performing the following activities: obtaining history, entering orders, preparing for visit and providing counseling and education.

## 2023-11-29 NOTE — PROGRESS NOTES
"[facility] - Psychotherapy Follow-up Visit Note  Visit number: 6    Visit Type Performed: Video   Communication platform used for this encounter:  [employer] Video Visit (Epic Video Client)     Alecia Cruz was contacted today for a behavioral health visit.  Clinician confirmed identification of patient by name and birthdate, provider name, location of patient and clinician, and callback number in case disconnected.     Verification of Patient Location:  The patient affirms they are currently located in the following state: [place]  This visit was conducted via telehealth/telephone in lieu of an in-person visit due to precautions related to the COVID-19 pandemic.    Request for Consent:  You and I are about to have a tele-health check-in or visit. This is allowed because you are already a patient of our [facility] practice, and you have requested it.  This tele-health visit will be billed to your health insurance or you, if you are self-insured. You understand you will be responsible for any copayments or coinsurances that apply to your tele-health visit.  Before starting our telemedicine visit, I am required to get your consent for this virtual check-in or visit by tele-health . Do you consent?    Patient Response to Request for Consent: Yes        SUBJECTIVE     Symptoms  Depression symptoms: sadness, anhedonia, lack of motivation and fatigue/lack of energy  Anxiety symptoms: moderate anxiety/worry, restless/\"on edge\", muscle tension, sleep disturbance and panic attacks  Additional reported symptoms: NA      OBJECTIVE     Mental Status Evaluation  Patient's mood and affect were consistent with the context, and consistent with their baseline: Yes   Comments:  Anxious, fatigued and pleasant; awake and alert; oriented to person, place, and time    Suicidal Ideation/Homicidal Ideation Risk Assessment: not assessed. If not assessed, reason:  Pt did not endorse any changes in risk or protective " "factors and none were observed.     Plan for Safety-   N/A:  Risk is assessed to be minimal; therefore, developing a safety plan is not indicated at this time. Patient identified protective factors including her grandchildren and her children.    Interventions  Anxiety Reduction Techniques  We processed worsening anxiety resulting in an ER visit this past week and its continued cause of further retaliation and harrassment in the workplace. We discussed Patient's options for addressing these behaviors of her . We processed Patient feeling \"tired of the fight\" and her experiences of hypervigilance and poor sleep due to being in a constant state of stress. We additionally discussed her lack of privacy in her son's home and how this could be contributing to Patient's inability to utilize certain coping mechanisms.     Psychotropic medications: no known adherence challenges, N/A   Current Outpatient Medications   Medication Sig Dispense Refill    amoxicillin-pot clavulanate (AUGMENTIN) 875-125 mg per tablet Take 1 tablet by mouth 2 (two) times a day for 7 days. 14 tablet 0    ascorbic acid (VITAMIN C) 500 mg tablet Take 500 mg by mouth daily.      aspirin 81 mg enteric coated tablet Take 81 mg by mouth daily.      atorvastatin (LIPITOR) 40 mg tablet Take 1 tablet (40 mg total) by mouth daily. 30 tablet 1    azelastine (ASTELIN) 137 mcg (0.1 %) nasal spray Administer 2 sprays into each nostril 2 (two) times a day as needed.      betamethasone dipropionate (DIPROSONE) 0.05 % lotion Apply 1 each topically daily as needed.      cetirizine (ZyrTEC) 10 mg tablet Take 10 mg by mouth as needed.      cholecalciferol, vitamin D3, 1,000 unit (25 mcg) tablet Take 1,000 Units by mouth daily.      coenzyme Q10 (COQ10) 50 mg capsule Take 50 mg by mouth daily.      docusate sodium (COLACE) 100 mg capsule Take 2 capsules (200 mg total) by mouth 2 (two) times a day as needed for constipation. 60 capsule 0    " "flecainide (TAMBOCOR) 50 mg tablet Take 1 tablet (50 mg total) by mouth 2 (two) times a day. 180 tablet 3    gabapentin (NEURONTIN) 100 mg capsule Take 1 capsule (100 mg total) by mouth nightly. (Patient taking differently: Take 50 mg by mouth nightly.) 90 capsule 0    levothyroxine (SYNTHROID) 75 mcg tablet Take 1 tablet (75 mcg total) by mouth daily. 90 tablet 3    lidocaine (ASPERCREME) 4 % adhesive patch,medicated topical patch Apply 1 patch topically daily. 30 patch 0    metoprolol succinate XL (TOPROL-XL) 25 mg 24 hr tablet Take 0.5 tablets (12.5 mg total) by mouth daily as needed (SBP over 135 or  palpitations). 10 tablet 6    multivitamin (THERAGRAN) tablet Take 1 tablet by mouth daily.      nitroglycerin (NITROSTAT) 0.4 mg SL tablet Place 1 tablet (0.4 mg total) under the tongue every 5 (five) minutes as needed for chest pain. 25 tablet 3    omega-3-dha-epa-dpa-fish oil 1,050 mg(300 mg -675 mg-75 mg) capsule Take 1 capsule by mouth daily.      polyethylene glycol (MIRALAX) 17 gram packet Take 17 g by mouth daily as needed (Constipation) for up to 3 days. 30 each 0     No current facility-administered medications for this visit.       ASSESSMENT       Progress  Patient's progress toward their goals is generally improving as Patient processed continued harrassment and acts of retaliation that resulted in another panic attack this week.   Patient's symptomology is unchanged. Patient identified sleep as a significant concern and continues to report frequent moments of panic. Patient's symptoms are closely tied to her current hostile work environment. Patient's heart condition complicates her ability to determine the difference between physical symptoms of anxiety and an episode of Afib resulting in emergent medical care.        PLAN     Goals:  -\"would like to get back to me\" and \"get back on track\"  -Develop skills for anxiety management including skills for coping with panic attacks  -Process past " losses and trauma    Recommendations  Individual Therapy  45 minutes weekly    Next visit plan:  -Identify additional skills for managing panic attacks including grounding techniques  -Identify history of attachment and awareness of boundaries in different relationships when able    I spent 40 minutes on this date of service performing the following activities: providing counseling and education.

## 2023-12-01 ENCOUNTER — PATIENT OUTREACH (OUTPATIENT)
Dept: CASE MANAGEMENT | Facility: CLINIC | Age: 63
End: 2023-12-01
Payer: COMMERCIAL

## 2023-12-01 NOTE — PROGRESS NOTES
12/01/2023 04:00 PM EST by Ragini Platt, RN 12/01/2023 04:00 PM EST by Ragini Platt, RN  Incoming Alecia Nelson (Self) [phone] (Home)   LCM: Patient called in to say pharmacy says she is allergic to Augmentin. She was not aware of this. CM sent message to Dr Begum that pharmacy is saying this. Dr Begum got back saying she took this in May.    12/01/2023 04:09 PM EST by Ragnii Platt, RN 12/01/2023 04:09 PM EST by Ragini Platt, RN  Outgoing Alecia Nelson (Self) [phone] (Home)   Reached Patient: CM called patient to ask if she had an issue in May with this ABX and she said NO and she does not know why [facility] is saying this so she will call them.         12/01/2023 04:13 PM EST by Ragini Platt RN 12/01/2023 04:13 PM EST by Ragini Platt, RN  Outgoing Alecia Nelson (Self) [phone] (Home)   Reached Patient          12/01/2023 04:24 PM EST by Ragini Platt, RN 12/01/2023 04:24 PM EST by Ragini Platt, RN  Incoming Alecia Nelson (Self) [phone] (Home)   Patient called and said she called pharmacy and informed she took in May without issue and they said they would fill it. So issue resolved. CM notified Dr Begum.

## 2023-12-03 PROBLEM — B96.89 BACTERIAL SINUSITIS: Status: ACTIVE | Noted: 2023-12-03

## 2023-12-03 PROBLEM — U07.1 COVID-19: Status: RESOLVED | Noted: 2022-08-23 | Resolved: 2023-12-03

## 2023-12-03 PROBLEM — M21.612 BUNION, LEFT: Status: ACTIVE | Noted: 2023-12-03

## 2023-12-03 PROBLEM — J32.9 BACTERIAL SINUSITIS: Status: ACTIVE | Noted: 2023-12-03

## 2023-12-03 NOTE — ASSESSMENT & PLAN NOTE
-I'm going to treat her w/ augmentin for sinusitis as I would expect viral to having improved at this point.   -given degree of congestion reported I think that this is reasonable.   -went over side effects and avoidance of abx associated diarrhea.   -she is agreeable to this.   -rtc if not improving.   -noted that she has previously taken augmentin w/o issues as recently as this year.

## 2023-12-06 ENCOUNTER — TELEMEDICINE (OUTPATIENT)
Dept: BEHAVIORAL HEALTH | Facility: CLINIC | Age: 63
End: 2023-12-06
Payer: COMMERCIAL

## 2023-12-06 DIAGNOSIS — F43.9 TRAUMA AND STRESSOR-RELATED DISORDER: Primary | ICD-10-CM

## 2023-12-06 PROCEDURE — 90834 PSYTX W PT 45 MINUTES: CPT | Mod: 95

## 2023-12-07 NOTE — PROGRESS NOTES
"[facility] - Psychotherapy Follow-up Visit Note  Visit number: 7    Visit Type Performed: Video   Communication platform used for this encounter:  [employer] Video Visit (Epic Video Client)     Alecia Cruz was contacted today for a behavioral health visit.  Clinician confirmed identification of patient by name and birthdate, provider name, location of patient and clinician, and callback number in case disconnected.     Verification of Patient Location:  The patient affirms they are currently located in the following state: [place]  This visit was conducted via telehealth/telephone in lieu of an in-person visit due to precautions related to the COVID-19 pandemic.    Request for Consent:  You and I are about to have a tele-health check-in or visit. This is allowed because you are already a patient of our [facility] practice, and you have requested it.  This tele-health visit will be billed to your health insurance or you, if you are self-insured. You understand you will be responsible for any copayments or coinsurances that apply to your tele-health visit.  Before starting our telemedicine visit, I am required to get your consent for this virtual check-in or visit by tele-health . Do you consent?    Patient Response to Request for Consent: Yes        SUBJECTIVE     Symptoms  Depression symptoms: sadness, anhedonia, lack of motivation and fatigue/lack of energy  Anxiety symptoms: mild anxiety/worry, restless/\"on edge\", muscle tension and sleep disturbance  Additional reported symptoms: NA      OBJECTIVE     Mental Status Evaluation  Patient's mood and affect were consistent with the context, and consistent with their baseline: Yes   Comments:  Calm and pleasant; awake and alert; oriented to person, place, and time    Suicidal Ideation/Homicidal Ideation Risk Assessment: not assessed. If not assessed, reason:  Pt did not endorse any changes in risk or protective factors and none were observed. "     Plan for Safety-   N/A:  Risk is assessed to be minimal; therefore, developing a safety plan is not indicated at this time. Patient identified protective factors including her grandchildren and her children.    Interventions  Anxiety Reduction Techniques and Mindfulness  We processed recent positive news in the workplace that is currently fostering hope for improvement. We processed Patient's awareness of what happens when feeling anxious and how her medications or medical conditions could be exacerbating symptoms. We discussed mindfulness techniques, such as a mood check in in the morning before work, that could be implemented in Patient's current environment to help her cope with her current internal anxious state. We discussed developing SMART goals to increase self-care and mindfulness into her routine.     Psychotropic medications: no known adherence challenges, N/A   Current Outpatient Medications   Medication Sig Dispense Refill   • ascorbic acid (VITAMIN C) 500 mg tablet Take 500 mg by mouth daily.     • aspirin 81 mg enteric coated tablet Take 81 mg by mouth daily.     • atorvastatin (LIPITOR) 40 mg tablet Take 1 tablet (40 mg total) by mouth daily. 30 tablet 1   • azelastine (ASTELIN) 137 mcg (0.1 %) nasal spray Administer 2 sprays into each nostril 2 (two) times a day as needed.     • betamethasone dipropionate (DIPROSONE) 0.05 % lotion Apply 1 each topically daily as needed.     • cetirizine (ZyrTEC) 10 mg tablet Take 10 mg by mouth as needed.     • cholecalciferol, vitamin D3, 1,000 unit (25 mcg) tablet Take 1,000 Units by mouth daily.     • coenzyme Q10 (COQ10) 50 mg capsule Take 50 mg by mouth daily.     • docusate sodium (COLACE) 100 mg capsule Take 2 capsules (200 mg total) by mouth 2 (two) times a day as needed for constipation. 60 capsule 0   • flecainide (TAMBOCOR) 50 mg tablet Take 1 tablet (50 mg total) by mouth 2 (two) times a day. 180 tablet 3   • gabapentin (NEURONTIN) 100 mg capsule  "Take 1 capsule (100 mg total) by mouth nightly. (Patient taking differently: Take 50 mg by mouth nightly.) 90 capsule 0   • levothyroxine (SYNTHROID) 75 mcg tablet Take 1 tablet (75 mcg total) by mouth daily. 90 tablet 3   • lidocaine (ASPERCREME) 4 % adhesive patch,medicated topical patch Apply 1 patch topically daily. 30 patch 0   • metoprolol succinate XL (TOPROL-XL) 25 mg 24 hr tablet Take 0.5 tablets (12.5 mg total) by mouth daily as needed (SBP over 135 or  palpitations). 10 tablet 6   • multivitamin (THERAGRAN) tablet Take 1 tablet by mouth daily.     • nitroglycerin (NITROSTAT) 0.4 mg SL tablet Place 1 tablet (0.4 mg total) under the tongue every 5 (five) minutes as needed for chest pain. 25 tablet 3   • omega-3-dha-epa-dpa-fish oil 1,050 mg(300 mg -675 mg-75 mg) capsule Take 1 capsule by mouth daily.     • polyethylene glycol (MIRALAX) 17 gram packet Take 17 g by mouth daily as needed (Constipation) for up to 3 days. 30 each 0     No current facility-administered medications for this visit.       ASSESSMENT       Progress  Patient's progress toward their goals is generally improving as Patient processed symptoms of a worsening panic attack and discussed tools for coping with prolonged stress.   Patient's symptomology is gradually improving. Patient continued to identify sleep as a concern, but noted hopeful changes in her work life which may be able to improve Patient's overall wellbeing.        PLAN     Goals:  -\"would like to get back to me\" and \"get back on track\"  -Develop skills for anxiety management including skills for coping with panic attacks  -Process past losses and trauma    Recommendations  Individual Therapy  45 minutes weekly    Next visit plan:  -Identify additional skills for managing panic attacks including grounding techniques  -Identify history of attachment and awareness of boundaries in different relationships when able    I spent 45 minutes on this date of service performing the " following activities: providing counseling and education.

## 2023-12-19 ENCOUNTER — TELEMEDICINE (OUTPATIENT)
Dept: BEHAVIORAL HEALTH | Facility: CLINIC | Age: 63
End: 2023-12-19
Payer: COMMERCIAL

## 2023-12-19 DIAGNOSIS — F43.9 TRAUMA AND STRESSOR-RELATED DISORDER: Primary | ICD-10-CM

## 2023-12-19 PROCEDURE — 90834 PSYTX W PT 45 MINUTES: CPT | Mod: 95

## 2023-12-19 NOTE — PROGRESS NOTES
"[facility] - Psychotherapy Follow-up Visit Note  Visit number: 8    Visit Type Performed: Video   Communication platform used for this encounter:  [employer] Video Visit (Epic Video Client)     Alecia Cruz was contacted today for a behavioral health visit. Patient was later contacted via phone at [phone] due to technical issues.  Clinician confirmed identification of patient by name and birthdate, provider name, location of patient and clinician, and callback number in case disconnected.     Verification of Patient Location:  The patient affirms they are currently located in the following state: [place]  This visit was conducted via telehealth/telephone in lieu of an in-person visit due to precautions related to the COVID-19 pandemic.    Request for Consent:  You and I are about to have a tele-health check-in or visit. This is allowed because you are already a patient of our [facility] practice, and you have requested it.  This tele-health visit will be billed to your health insurance or you, if you are self-insured. You understand you will be responsible for any copayments or coinsurances that apply to your tele-health visit.  Before starting our telemedicine visit, I am required to get your consent for this virtual check-in or visit by tele-health . Do you consent?    Patient Response to Request for Consent: Yes        SUBJECTIVE     Symptoms  Depression symptoms: sadness, anhedonia, lack of motivation and fatigue/lack of energy  Anxiety symptoms: moderate anxiety/worry, restless/\"on edge\", muscle tension and sleep disturbance  Additional reported symptoms: NA      OBJECTIVE     Mental Status Evaluation  Patient's mood and affect were consistent with the context, and consistent with their baseline: Yes   Comments:  Calm and pleasant; awake and alert; oriented to person, place, and time    Suicidal Ideation/Homicidal Ideation Risk Assessment: not assessed. If not assessed, reason:  Pt " did not endorse any changes in risk or protective factors and none were observed.     Plan for Safety-   N/A:  Risk is assessed to be minimal; therefore, developing a safety plan is not indicated at this time. Patient identified protective factors including her grandchildren and her children.    Interventions  Anxiety Reduction Techniques  We processed recent adjustment to frustrations and disappointments within work expectations as well as her employee discrimination case. We processed how Patient is heading into the holidays with uncertainty and discussed her efforts to reframe and cope with the things out of her control at this time. We additionally discussed her AFIB medication's likely impact on Patient's ability to regulate her anxiety.     Psychotropic medications: no known adherence challenges, N/A   Current Outpatient Medications   Medication Sig Dispense Refill   • ascorbic acid (VITAMIN C) 500 mg tablet Take 500 mg by mouth daily.     • aspirin 81 mg enteric coated tablet Take 81 mg by mouth daily.     • atorvastatin (LIPITOR) 40 mg tablet Take 1 tablet (40 mg total) by mouth daily. 30 tablet 1   • azelastine (ASTELIN) 137 mcg (0.1 %) nasal spray Administer 2 sprays into each nostril 2 (two) times a day as needed.     • betamethasone dipropionate (DIPROSONE) 0.05 % lotion Apply 1 each topically daily as needed.     • cetirizine (ZyrTEC) 10 mg tablet Take 10 mg by mouth as needed.     • cholecalciferol, vitamin D3, 1,000 unit (25 mcg) tablet Take 1,000 Units by mouth daily.     • coenzyme Q10 (COQ10) 50 mg capsule Take 50 mg by mouth daily.     • docusate sodium (COLACE) 100 mg capsule Take 2 capsules (200 mg total) by mouth 2 (two) times a day as needed for constipation. 60 capsule 0   • flecainide (TAMBOCOR) 50 mg tablet Take 1 tablet (50 mg total) by mouth 2 (two) times a day. 180 tablet 3   • gabapentin (NEURONTIN) 100 mg capsule Take 1 capsule (100 mg total) by mouth nightly. (Patient taking  "differently: Take 50 mg by mouth nightly.) 90 capsule 0   • levothyroxine (SYNTHROID) 75 mcg tablet Take 1 tablet (75 mcg total) by mouth daily. 90 tablet 3   • lidocaine (ASPERCREME) 4 % adhesive patch,medicated topical patch Apply 1 patch topically daily. 30 patch 0   • metoprolol succinate XL (TOPROL-XL) 25 mg 24 hr tablet Take 0.5 tablets (12.5 mg total) by mouth daily as needed (SBP over 135 or  palpitations). 10 tablet 6   • multivitamin (THERAGRAN) tablet Take 1 tablet by mouth daily.     • nitroglycerin (NITROSTAT) 0.4 mg SL tablet Place 1 tablet (0.4 mg total) under the tongue every 5 (five) minutes as needed for chest pain. 25 tablet 3   • omega-3-dha-epa-dpa-fish oil 1,050 mg(300 mg -675 mg-75 mg) capsule Take 1 capsule by mouth daily.     • polyethylene glycol (MIRALAX) 17 gram packet Take 17 g by mouth daily as needed (Constipation) for up to 3 days. 30 each 0     No current facility-administered medications for this visit.       ASSESSMENT       Progress  Patient's progress toward their goals is generally improving as Patient processed adjustment to coping with uncertainty and situations outside of Patient's control.   Patient's symptomology is gradually improving.        PLAN     Goals:  -\"would like to get back to me\" and \"get back on track\"  -Develop skills for anxiety management including skills for coping with panic attacks  -Process past losses and trauma    Recommendations  Individual Therapy  45 minutes weekly    Next visit plan:  -Identify history of attachment and awareness of boundaries in different relationships when able    I spent 40 minutes    on this date of service performing the following activities: providing counseling and education.  "

## 2023-12-22 ENCOUNTER — PATIENT OUTREACH (OUTPATIENT)
Dept: CASE MANAGEMENT | Facility: CLINIC | Age: 63
End: 2023-12-22
Payer: COMMERCIAL

## 2023-12-22 NOTE — PROGRESS NOTES
AUGIE rec'd VM from pt. Pt requesting sick visit. She has had a cold since last Sunday. Was seen in Urgent Care. Was given something to treat symptoms, but was not given an antibiotics. She c/o fever and ear fullness. She is taking Tylenol for fever and left ear pain. She also has sore throat. Feels left side lymph nodes are swollen.     Reviewed with pt that she should call the office for acute symptoms so they can assist with scheduling a sick visit. CM will send message to office to assist with appt.     Julieta Renee, ZAKN, RN  [phone]

## 2023-12-26 ENCOUNTER — OFFICE VISIT (OUTPATIENT)
Dept: PRIMARY CARE | Facility: CLINIC | Age: 63
End: 2023-12-26
Payer: COMMERCIAL

## 2023-12-26 VITALS
WEIGHT: 143 LBS | OXYGEN SATURATION: 99 % | DIASTOLIC BLOOD PRESSURE: 80 MMHG | TEMPERATURE: 98.4 F | RESPIRATION RATE: 16 BRPM | SYSTOLIC BLOOD PRESSURE: 130 MMHG | BODY MASS INDEX: 22.44 KG/M2 | HEIGHT: 67 IN | HEART RATE: 74 BPM

## 2023-12-26 DIAGNOSIS — B96.89 BACTERIAL SINUSITIS: Primary | ICD-10-CM

## 2023-12-26 DIAGNOSIS — J32.9 BACTERIAL SINUSITIS: Primary | ICD-10-CM

## 2023-12-26 PROCEDURE — 99213 OFFICE O/P EST LOW 20 MIN: CPT | Performed by: STUDENT IN AN ORGANIZED HEALTH CARE EDUCATION/TRAINING PROGRAM

## 2023-12-26 PROCEDURE — 3008F BODY MASS INDEX DOCD: CPT | Performed by: STUDENT IN AN ORGANIZED HEALTH CARE EDUCATION/TRAINING PROGRAM

## 2023-12-26 RX ORDER — AMOXICILLIN AND CLAVULANATE POTASSIUM 875; 125 MG/1; MG/1
1 TABLET, FILM COATED ORAL 2 TIMES DAILY
Qty: 14 TABLET | Refills: 0 | Status: SHIPPED | OUTPATIENT
Start: 2023-12-26 | End: 2023-12-26

## 2023-12-26 RX ORDER — AMOXICILLIN AND CLAVULANATE POTASSIUM 875; 125 MG/1; MG/1
1 TABLET, FILM COATED ORAL 2 TIMES DAILY
Qty: 14 TABLET | Refills: 0 | Status: SHIPPED | OUTPATIENT
Start: 2023-12-26 | End: 2024-01-02

## 2023-12-26 NOTE — PROGRESS NOTES
"     [facility]   Progress Note       ASSESSMENT AND PLAN     Problem List Items Addressed This Visit        Infectious/Inflammatory    Bacterial sinusitis - Primary    Current Assessment & Plan     - I am concerned her bacterial send today, I will start her on Augmentin again.  - Reviewed side effects of medication.  Went over abx diarrhea. Patient is agreeable.  -f/u if not improving on this current regimen.   -this was likely uri now w/ superimposed bacterial infection.             No follow-ups on file.    This office note has been dictated using voice recognition software which may introduce inadvertent typographical or language errors.  Please contact me directly for any clarifications.     SUBJECTIVE     Alecia Cruz is a 63 y.o. year-old female, primary patient of Dr. Begum who presents for concern of uri/sinusitis.    Interval History:   1. Coming in today for significant sinus congestion, sinus pressure, headache, sinus pain, and ear pain. Started out as uri symptoms over 2 weeks ago. Not improving at all. Recently treated w/ augmentin for bacterial uri after similar presentation. Sick contact here is likely grandchild. sxs have stalled out and don't appear to be improving.     PHYSICAL EXAMINATION     Visit Vitals  /80 (BP Location: Left upper arm, Patient Position: Sitting)   Pulse 74   Temp 36.9 °C (98.4 °F)   Resp 16   Ht 1.702 m (5' 7\")   Wt 64.9 kg (143 lb)   SpO2 99%   BMI 22.40 kg/m²       Physical Exam  Vitals reviewed.   Constitutional:       General: She is not in acute distress.     Appearance: Normal appearance. She is ill-appearing. She is not toxic-appearing or diaphoretic.   HENT:      Nose: Congestion present.      Comments: Notes bilateral sinus congestion/pain.      Mouth/Throat:      Pharynx: No oropharyngeal exudate or posterior oropharyngeal erythema.   Eyes:      General: No scleral icterus.     Conjunctiva/sclera: Conjunctivae normal.   Cardiovascular:      " Rate and Rhythm: Normal rate and regular rhythm.      Heart sounds: Normal heart sounds.   Pulmonary:      Effort: Pulmonary effort is normal.      Breath sounds: Normal breath sounds.   Musculoskeletal:      Cervical back: No tenderness.   Lymphadenopathy:      Cervical: No cervical adenopathy.   Neurological:      Mental Status: She is alert.   Psychiatric:         Mood and Affect: Mood normal.         Behavior: Behavior normal.         Thought Content: Thought content normal.         Judgment: Judgment normal.           LABS / IMAGING / STUDIES        Labs Reviewed:   CBC Results       11/24/23 09/26/23 03/13/23     1155 1058 2039    WBC 6.24 5.86 7.46    RBC 3.64 3.62 4.20    HGB 11.7 11.7 13.4    HCT 34.3 34.9 39.1    MCV 94.2 96.4 93.1    MCH 32.1 32.3 31.9    MCHC 34.1 33.5 34.3     189 212          CMP Results       11/24/23 09/26/23 03/13/23     1155 1058 2039     144 137    K 3.9 3.6 4.1    Cl 108 109 104    CO2 28 31 23    Glucose 78 118 101    BUN 14 11 13    Creatinine 0.8 0.8 0.7    Calcium 9.1 8.8 9.5    Anion Gap 5 4 10    AST 17 32 41    ALT 24 24 61    Albumin 3.8 3.8 3.7    EGFR >60.0 >60.0 >60.0         Comment for K at 1155 on 11/24/23: Results obtained on plasma. Plasma Potassium values may be up to 0.4 mEQ/L less than serum values. The differences may be greater for patients with high platelet or white cell counts.    Comment for K at 1058 on 09/26/23: Results obtained on plasma. Plasma Potassium values may be up to 0.4 mEQ/L less than serum values. The differences may be greater for patients with high platelet or white cell counts.          Studies/Imaging Reviewed: none      MEDICATIONS      Current Outpatient Medications   Medication Instructions   • ascorbic acid (VITAMIN C) 500 mg, oral, Daily   • aspirin 81 mg, oral, Daily   • atorvastatin (LIPITOR) 40 mg, oral, Daily (6p)   • azelastine (ASTELIN) 137 mcg (0.1 %) nasal spray 2 sprays, Each Nostril, 2 times daily PRN   •  betamethasone dipropionate (DIPROSONE) 0.05 % lotion 1 each, Topical, Daily PRN   • cetirizine (ZYRTEC) 10 mg, oral, As needed   • cholecalciferol (vitamin D3) 1,000 Units, oral, Daily   • coenzyme Q10 (COQ10) 50 mg, oral, Daily   • docusate sodium (COLACE) 200 mg, oral, 2 times daily PRN   • flecainide (TAMBOCOR) 50 mg, oral, 2 times daily (6a, 6p)   • gabapentin (NEURONTIN) 100 mg, oral, Nightly   • levothyroxine (SYNTHROID) 75 mcg, oral, Daily (6:30a)   • lidocaine (ASPERCREME) 4 % adhesive patch,medicated topical patch 1 patch, Topical, Daily   • metoprolol succinate XL (TOPROL-XL) 12.5 mg, oral, Daily PRN   • multivitamin (THERAGRAN) tablet 1 tablet, oral, Daily   • nitroglycerin (NITROSTAT) 0.4 mg, sublingual, Every 5 min PRN   • omega-3-dha-epa-dpa-fish oil 1,050 mg(300 mg -675 mg-75 mg) capsule 1 capsule, oral, Daily   • polyethylene glycol (MIRALAX) 17 g, oral, Daily PRN

## 2024-01-03 ENCOUNTER — PATIENT OUTREACH (OUTPATIENT)
Dept: CASE MANAGEMENT | Facility: CLINIC | Age: 64
End: 2024-01-03
Payer: COMMERCIAL

## 2024-01-03 NOTE — PROGRESS NOTES
2024 10:42 AM EST by Ragini Platt RN 2024 10:42 AM EST by Ragini Platt, RN  Incoming Alecia Nelson (Self) [phone] (Home)   Patient called in -she still sounds pretty congested with her sinus infection. She finished ABX yesterday am and mucus is thinner and she is able to cough up and is lighter in color. She is not sure if she needs a second ABX regimen, but right now, she wants to know if she can take Benzonatate (found a bottle from 2022). She has a  today she has to go to at 1 pm today-she is taking her 18 y o GS whose best friend committed suicide on TIM[place]. I told her to mask and keep distance from others as much as possible but she states she must go.    AUGIE sent secure chat message to Dr Begum.

## 2024-01-04 ENCOUNTER — TELEPHONE (OUTPATIENT)
Dept: SCHEDULING | Facility: CLINIC | Age: 64
End: 2024-01-04
Payer: COMMERCIAL

## 2024-01-04 ENCOUNTER — PATIENT OUTREACH (OUTPATIENT)
Dept: CASE MANAGEMENT | Facility: CLINIC | Age: 64
End: 2024-01-04
Payer: COMMERCIAL

## 2024-01-04 ENCOUNTER — TELEMEDICINE (OUTPATIENT)
Dept: PRIMARY CARE | Facility: CLINIC | Age: 64
End: 2024-01-04
Payer: COMMERCIAL

## 2024-01-04 DIAGNOSIS — R05.8 POST-VIRAL COUGH SYNDROME: Primary | ICD-10-CM

## 2024-01-04 DIAGNOSIS — I48.0 PAROXYSMAL ATRIAL FIBRILLATION (CMS/HCC): ICD-10-CM

## 2024-01-04 DIAGNOSIS — I25.119 CORONARY ARTERY DISEASE INVOLVING NATIVE CORONARY ARTERY OF NATIVE HEART WITH ANGINA PECTORIS (CMS/HCC): ICD-10-CM

## 2024-01-04 PROCEDURE — 99213 OFFICE O/P EST LOW 20 MIN: CPT | Mod: 95 | Performed by: STUDENT IN AN ORGANIZED HEALTH CARE EDUCATION/TRAINING PROGRAM

## 2024-01-04 NOTE — PROGRESS NOTES
"Verification of Patient Location:  The patient affirms they are currently located in the following state: [place]    Request for Consent:    Audio and Video Encounter   Krish, my name is Mahendra Begum DO.  Before we proceed, can you please verify your identification by telling me your full name and date of birth?  Can you tell me who is in the room with you?    You and I are about to have a telemedicine check-in or visit because you have requested it.  This is a live video-conference.  I am a real person, speaking to you in real time.  There is no one else with me on the video-conference. I am not recording this conversation and I am asking you not to record it.  This telemedicine visit will be billed to your health insurance or you, if you are self-insured.  You understand you will be responsible for any copayments or coinsurances that apply to your telemedicine visit.  Communication platform used for this encounter:  [employer] Video Visit (Epic Video Client)       Before starting our telemedicine visit, I am required to get your consent for this virtual check-in or visit by telemedicine. Do you consent?      Patient Response to Request for Consent:  Yes      Visit Documentation:  Subjective     Patient ID: Alecia Cruz is a 63 y.o. female.  1960      HPI  64 y/o presents today for f/u of sinusitis. We had seen her on 12/26. We had tx for bacterial rhinosinusitis w/ augmentin. Felt better on the abx. Taking mucinex. She started to develop paroxysm of coughing around Sunday vs. Monday. May have been taking mucinex-dm originally but not taking regular. No sob or hemoptysis. No chest pain/pressure. She doesn't hear wheezing but does hear \"rattling\" in the chest. Takes care of grandkids which we suspect is the source.     She discontinued her flecanide due to concern of side effects from medication. Has not spoken to EP/cardiology regarding this.       The following have been reviewed and updated as " appropriate in this visit   Meds       Review of Systems  As noted above in the hpi.     Assessment/Plan   Diagnoses and all orders for this visit:    Post-viral cough syndrome (Primary)  Assessment & Plan:  - I am okay with her taking Tessalon Perles for cough suppressant.  I want her to continue using Mucinex to help to expectorate mucus.  I cautioned about postobstructive pneumonia.  - Her symptoms are overall are improving and she does give me any symptoms including fevers, chills, hemoptysis, chest pains, or other concerns that we need to use another course of antibiotics.  I suspect that she is right that she was taking DM before and now the cough is there because that she to stop taking the DM version of Mucinex.  - I went over with symptoms that she should call me back.  - I am holding off on albuterol inhaler as I do not want to kick off her A-fib unless needed.  She feels comfortable with plan as above and we will think about albuterol if symptoms continue to be bad.  - I suspect that she had RSV underlying.      Paroxysmal atrial fibrillation (CMS/[facility])  Assessment & Plan:  - Recommended that she make an appointment immediately to see EP.  She is not currently on flecainide.  - Still is on anticoagulation.  - Discontinue flecainide due to side effects.  She needs to talk about other regimen with EP.  No concern that she is currently having A-fib.  But I did caution her that I do worry little bit with viral syndrome as well as if we give her albuterol they could trigger an episode of A-fib.      Coronary artery disease involving native coronary artery of native heart with angina pectoris (CMS/[facility])  Assessment & Plan:  - She is following with cardiology.  She has had her FMLA figured out now so she has the time to get testing done that was ordered by cardiology.        Time Spent:  I spent 20 minutes on this date of service performing the following activities: obtaining history, preparing for visit,  obtaining / reviewing records, providing counseling and education and coordinating care.

## 2024-01-04 NOTE — TELEPHONE ENCOUNTER
Pt was seen IN January of 2023. History of PAF and was on Flecainide at the time. She took herself of of the medication about 1 month ago. Recent diagnosis of RSV. PCP told her this can trigger AF. Do you agree not to restart unless she were to begin having episodes?

## 2024-01-04 NOTE — ASSESSMENT & PLAN NOTE
- Recommended that she make an appointment immediately to see EP.  She is not currently on flecainide.  - Still is on anticoagulation.  - Discontinue flecainide due to side effects.  She needs to talk about other regimen with EP.  No concern that she is currently having A-fib.  But I did caution her that I do worry little bit with viral syndrome as well as if we give her albuterol they could trigger an episode of A-fib.

## 2024-01-04 NOTE — TELEPHONE ENCOUNTER
Called and spoke to pt. I let her know she may resume Flecainide PRN if she goes in to AF. She will call us to make an appt in the near future with Dr. Alonso.

## 2024-01-04 NOTE — PROGRESS NOTES
01/04/2024 09:32 AM EST by Ragini Platt, RN 01/04/2024 09:32 AM EST by Ragini Platt, RN  Outgoing Alecia Nelson (Self) [phone] (Home)   Reached Patient: AUGIE returned patient call-she is congested with cough and feels sputum getting darker. AUGIE scheduled patient a video appt with PCP at 11:20 am today.

## 2024-01-04 NOTE — TELEPHONE ENCOUNTER
Pt called stating she slowed down on flecanide about 2 months ago.    Around 1 month ago she dc'd this medication.    Pt states she noticed since stopping that she can go up stairs without any chest pain.    Pt now has RSV and her pcp Dr Begum told her that this could trigger a fib and wanted her to talk to Dr Alonso re the medication.    Also, pt states Dr Pulliam ordered an echo for her ad she has not been able to schedule due to being sick and other issues. She will attempt to get all appointments scheduled as she feels better.    Please call her at [phone] to discuss. ty

## 2024-01-04 NOTE — ASSESSMENT & PLAN NOTE
- I am okay with her taking Tessalon Perles for cough suppressant.  I want her to continue using Mucinex to help to expectorate mucus.  I cautioned about postobstructive pneumonia.  - Her symptoms are overall are improving and she does give me any symptoms including fevers, chills, hemoptysis, chest pains, or other concerns that we need to use another course of antibiotics.  I suspect that she is right that she was taking DM before and now the cough is there because that she to stop taking the DM version of Mucinex.  - I went over with symptoms that she should call me back.  - I am holding off on albuterol inhaler as I do not want to kick off her A-fib unless needed.  She feels comfortable with plan as above and we will think about albuterol if symptoms continue to be bad.  - I suspect that she had RSV underlying.

## 2024-01-04 NOTE — ASSESSMENT & PLAN NOTE
- She is following with cardiology.  She has had her FMLA figured out now so she has the time to get testing done that was ordered by cardiology.

## 2024-01-07 NOTE — ASSESSMENT & PLAN NOTE
- I am concerned her bacterial send today, I will start her on Augmentin again.  - Reviewed side effects of medication.  Went over abx diarrhea. Patient is agreeable.  -f/u if not improving on this current regimen.   -this was likely uri now w/ superimposed bacterial infection.

## 2024-01-19 ENCOUNTER — PATIENT OUTREACH (OUTPATIENT)
Dept: CASE MANAGEMENT | Facility: CLINIC | Age: 64
End: 2024-01-19
Payer: COMMERCIAL

## 2024-01-19 ENCOUNTER — TELEPHONE (OUTPATIENT)
Dept: PRIMARY CARE | Facility: CLINIC | Age: 64
End: 2024-01-19

## 2024-01-19 ENCOUNTER — TELEMEDICINE (OUTPATIENT)
Dept: PRIMARY CARE | Facility: CLINIC | Age: 64
End: 2024-01-19
Payer: COMMERCIAL

## 2024-01-19 DIAGNOSIS — B96.89 BACTERIAL SINUSITIS: ICD-10-CM

## 2024-01-19 DIAGNOSIS — J32.9 BACTERIAL SINUSITIS: ICD-10-CM

## 2024-01-19 PROCEDURE — 99213 OFFICE O/P EST LOW 20 MIN: CPT | Mod: 95 | Performed by: STUDENT IN AN ORGANIZED HEALTH CARE EDUCATION/TRAINING PROGRAM

## 2024-01-19 NOTE — PROGRESS NOTES
Verification of Patient Location:  The patient affirms they are currently located in the following state: [place]    Request for Consent:    Audio and Video Encounter   Krish, my name is Mahendra DO Kel.  Before we proceed, can you please verify your identification by telling me your full name and date of birth?  Can you tell me who is in the room with you?    You and I are about to have a telemedicine check-in or visit because you have requested it.  This is a live video-conference.  I am a real person, speaking to you in real time.  There is no one else with me on the video-conference. I am not recording this conversation and I am asking you not to record it.  This telemedicine visit will be billed to your health insurance or you, if you are self-insured.  You understand you will be responsible for any copayments or coinsurances that apply to your telemedicine visit.  Communication platform used for this encounter:  [employer] Video Visit (Epic Video Client)       Before starting our telemedicine visit, I am required to get your consent for this virtual check-in or visit by telemedicine. Do you consent?    Patient Response to Request for Consent:  Yes    Visit Documentation:  Subjective     Patient ID: Alecia Cruz is a 63 y.o. female.  1960      HPI  62 y/o presents today for f/u of cough. We last saw her on 12/26/2023 and then on 1/4/2024. We mariah treated her w/ augmentin on 12/26/23. She was also treated w/ 11/28/2023 w/ augmentin. We had seen her on 1/4/2023 and thought she was dealing with post-viral cough syndrome.     In the interval period, she doesn't think that she has chest congestion. She is having post-nasal drip and sinus congestion as well as ear congestion. She wakes up in the middle of the night with coughing. She has been having a lot of soreness. Mucous is yellow. No rigors. Tuesday reports fever of 101F. No fever since then. Having arthralgias. Diarrhea x2. Headache all the time.  Suddenly worsened Tuesday vs. Wednesday of this week. Has been using increased fluids without success. No sob or chest pain. She does feel some back pain but associates this with cough.     The following have been reviewed and updated as appropriate in this visit:   Allergies  Meds  Problems       Review of Systems  A 10 point review of systems was asked and negative unless otherwise stated.       Assessment/Plan   Diagnoses and all orders for this visit:    Bacterial sinusitis  Assessment & Plan:  Likely upper respiratory tract infection secondary to viral illness.  I suspect that she got this again from her grandkids.  There seems to be a clear period where she had a bacterial infection followed by postviral cough syndrome and now has a new infection.  This could be the flu.  Based on timeline and no benefit of Tamiflu or testing her for this.  Recommended we continue with supportive care for right now.  I would expect that she start to turn the corner over the weekend into next Monday.  If not feeling better next Tuesday or Wednesday I want to see her in the office for physical examination as well as a chest x-ray.  Based on symptoms no red flags today.  I would not prescribe antibiotic as I am not convinced that this is bacterial at this point.  Does not have significant purulent cough or high temperatures.  Will treat her symptomatically with Robitussin as well as Mucinex and acetaminophen.  No chest pain or shortness of breath.  No issues with her A-fib right now.  Return to care next week if not improving.  Patient agreeable with plan.        Time Spent:  I spent 20 minutes on this date of service performing the following activities: obtaining history, entering orders, documenting, preparing for visit, providing counseling and education and coordinating care.

## 2024-01-19 NOTE — ASSESSMENT & PLAN NOTE
Likely upper respiratory tract infection secondary to viral illness.  I suspect that she got this again from her grandkids.  There seems to be a clear period where she had a bacterial infection followed by postviral cough syndrome and now has a new infection.  This could be the flu.  Based on timeline and no benefit of Tamiflu or testing her for this.  Recommended we continue with supportive care for right now.  I would expect that she start to turn the corner over the weekend into next Monday.  If not feeling better next Tuesday or Wednesday I want to see her in the office for physical examination as well as a chest x-ray.  Based on symptoms no red flags today.  I would not prescribe antibiotic as I am not convinced that this is bacterial at this point.  Does not have significant purulent cough or high temperatures.  Will treat her symptomatically with Robitussin as well as Mucinex and acetaminophen.  No chest pain or shortness of breath.  No issues with her A-fib right now.  Return to care next week if not improving.  Patient agreeable with plan.

## 2024-01-19 NOTE — PROGRESS NOTES
01/19/2024 09:03 AM EST by Ragini Platt, RN 01/19/2024 09:03 AM EST by Ragini Platt, RN  Outgoing Alecia Nelson (Self) [phone] (Home)   Reached Patient: CM returned patient call-she is requesting another round ABX for her cough and congestion to [facility].        01/19/2024 09:31 AM EST by Ragini Platt RN 01/19/2024 09:31 AM EST by Ragini Platt, RN  Outgoing Alecia Nelson (Self) [phone] (Home)   Reached Patient: Dr Begum is requesting a video telemed and CM called to schedule with patient-scheduled for 9:40 am and CM sent Dr Begum a message.

## 2024-01-19 NOTE — TELEPHONE ENCOUNTER
Pt calling to speak with Dr. Begum in regards to her having RSV. Pt would like an ABX prescribed to help with symptoms. Pt said  has been treating her for RSV and pneumonia

## 2024-01-22 ENCOUNTER — PATIENT OUTREACH (OUTPATIENT)
Dept: CASE MANAGEMENT | Facility: CLINIC | Age: 64
End: 2024-01-22
Payer: COMMERCIAL

## 2024-01-22 NOTE — PROGRESS NOTES
01/22/2024 10:12 AM EST by Ragini Platt, RN 01/22/2024 10:12 AM EST by Ragini Platt, RN  Patient called asking to speak with Dr Begum re: work issues re: medical issues.  Incoming Alecia Nelson (Self) [phone] (Home)   CM scheduled her to a video appt early this afternoon.

## 2024-01-22 NOTE — PROGRESS NOTES
01/22/2024 01:05 PM EST by Ragini Platt, RN 01/22/2024 01:05 PM EST by Ragini Platt, RN: Returned patient call-she states she was having issues getting on video appt today with PCP. She rescheduled for tomorrow at noon and she will be working from home and should not have any issues connecting. She also wants CM to let PCP know that she will send him a message on portal a little later today to let him know what she is looking for. CM sent message to PCP.  Outgoing Alecia Nelson (Self) [phone] (Home)

## 2024-01-23 ENCOUNTER — TELEMEDICINE (OUTPATIENT)
Dept: PRIMARY CARE | Facility: CLINIC | Age: 64
End: 2024-01-23
Payer: COMMERCIAL

## 2024-01-23 DIAGNOSIS — G57.21 FEMORAL NEUROPATHY OF RIGHT LOWER EXTREMITY: ICD-10-CM

## 2024-01-23 DIAGNOSIS — R05.8 POST-VIRAL COUGH SYNDROME: ICD-10-CM

## 2024-01-23 DIAGNOSIS — I48.0 PAROXYSMAL ATRIAL FIBRILLATION (CMS/HCC): ICD-10-CM

## 2024-01-23 PROBLEM — J06.9 VIRAL UPPER RESPIRATORY TRACT INFECTION: Status: RESOLVED | Noted: 2019-01-25 | Resolved: 2024-01-23

## 2024-01-23 PROBLEM — B96.89 BACTERIAL SINUSITIS: Status: RESOLVED | Noted: 2023-12-03 | Resolved: 2024-01-23

## 2024-01-23 PROBLEM — J32.9 BACTERIAL SINUSITIS: Status: RESOLVED | Noted: 2023-12-03 | Resolved: 2024-01-23

## 2024-01-23 PROCEDURE — 99213 OFFICE O/P EST LOW 20 MIN: CPT | Mod: 95 | Performed by: STUDENT IN AN ORGANIZED HEALTH CARE EDUCATION/TRAINING PROGRAM

## 2024-01-23 NOTE — ASSESSMENT & PLAN NOTE
-f/u with PT and if not improving will need to follow-up with pmr.   -no new evaluation, discussed the plan going forwards and what her expectations can be and the logistics.   -I would expect at least twice per week for physical therapy for probably 3 months. It may be longer depending on her response.   -unfortunately, I cannot be more precise which she is aware and okay with.   -pending response we can provide more workup if not responding.

## 2024-01-23 NOTE — PROGRESS NOTES
Verification of Patient Location:  The patient affirms they are currently located in the following state: [place]    Request for Consent:    Audio and Video Encounter   Krish, my name is Mahendra DO Kel.  Before we proceed, can you please verify your identification by telling me your full name and date of birth?  Can you tell me who is in the room with you?    You and I are about to have a telemedicine check-in or visit because you have requested it.  This is a live video-conference.  I am a real person, speaking to you in real time.  There is no one else with me on the video-conference. I am not recording this conversation and I am asking you not to record it.  This telemedicine visit will be billed to your health insurance or you, if you are self-insured.  You understand you will be responsible for any copayments or coinsurances that apply to your telemedicine visit.  Communication platform used for this encounter:  [employer] Video Visit (Epic Video Client)       Before starting our telemedicine visit, I am required to get your consent for this virtual check-in or visit by telemedicine. Do you consent?      Patient Response to Request for Consent:  Yes      Visit Documentation:  Subjective     Patient ID: Alecia Cruz is a 63 y.o. female.  1960      HPI  64 y/o presents today for discussion of expectation of alternative work schedule while she is rehabing from the right sided neuropathic pain. She is wondering how much time she can expect. Hasn't yet seen therapist to come up with therapy plan due to her not having fmla time and significant stress/anxiety related to dealing with the [employer].     She also needs to set up visit to see the electrophysiologist and general cardiology follow-up.     She is feeling better form her cough. She has occasional sore back related to coughing. She is feeling that things are improving. No hemoptysis but occasionally has hard mucous that comes up.   The following have  been reviewed and updated as appropriate in this visit:        Review of Systems  As noted in the HPI.     Assessment/Plan   Diagnoses and all orders for this visit:    Femoral neuropathy of right lower extremity  Assessment & Plan:  -f/u with PT and if not improving will need to follow-up with pmr.   -no new evaluation, discussed the plan going forwards and what her expectations can be and the logistics.   -I would expect at least twice per week for physical therapy for probably 3 months. It may be longer depending on her response.   -unfortunately, I cannot be more precise which she is aware and okay with.   -pending response we can provide more workup if not responding.       Post-viral cough syndrome  Assessment & Plan:  -improving at this point, no new concerns.       Paroxysmal atrial fibrillation (CMS/HCC)  Assessment & Plan:  -going to make appointment to see EP.         Time Spent:  I spent 20 minutes on this date of service performing the following activities: obtaining history, entering orders, obtaining / reviewing records and providing counseling and education.

## 2024-01-24 ENCOUNTER — PATIENT OUTREACH (OUTPATIENT)
Dept: CASE MANAGEMENT | Facility: CLINIC | Age: 64
End: 2024-01-24
Payer: COMMERCIAL

## 2024-01-24 NOTE — PROGRESS NOTES
01/24/2024 11:05 AM EST by Ragini Platt, RN 01/24/2024 11:05 AM EST by Ragini Platt, RN  Outgoing Alecia Nelson (Self) [phone] (Home)   Reached PatientCommunicated - LCM: CM returned patient call-she was requesting Dr Begum sign his note from yesterday telemed appt so she can go in to read it. She is comparing to a summary she had written to give to her work.     CM asked Dr Begum to sign and he went in to note and signed and CM informed patient.

## 2024-01-30 ENCOUNTER — PATIENT OUTREACH (OUTPATIENT)
Dept: CASE MANAGEMENT | Facility: CLINIC | Age: 64
End: 2024-01-30
Payer: COMMERCIAL

## 2024-01-30 ENCOUNTER — TELEMEDICINE (OUTPATIENT)
Dept: BEHAVIORAL HEALTH | Facility: CLINIC | Age: 64
End: 2024-01-30
Payer: COMMERCIAL

## 2024-01-30 DIAGNOSIS — F43.9 TRAUMA AND STRESSOR-RELATED DISORDER: Primary | ICD-10-CM

## 2024-01-30 PROCEDURE — 90834 PSYTX W PT 45 MINUTES: CPT | Mod: 95

## 2024-01-30 NOTE — PROGRESS NOTES
01/30/2024 10:28 AM EST by Ragini Platt, RN 01/30/2024 10:28 AM EST by Ragini Platt, RN  Outgoing Alecia Nelson ([facility]) [phone] (Home)   Reached PatientCommunicated - [facility]- returned patient call. She is sending  a 56720 form that needs to be filled out by Dr Begum for her reasonable accommodations needed for work.

## 2024-01-30 NOTE — PROGRESS NOTES
"[facility] - Psychotherapy Follow-up Visit Note  Visit number: 9    Visit Type Performed: Video   Communication platform used for this encounter:  [employer] Video Visit (Epic Video Client)     Alecia Cruz was contacted today for a behavioral health visit.   Clinician confirmed identification of patient by name and birthdate, provider name, location of patient and clinician, and callback number in case disconnected.     Verification of Patient Location:  The patient affirms they are currently located in the following state: [place]  This visit was conducted via telehealth/telephone in lieu of an in-person visit due to precautions related to the COVID-19 pandemic.    Request for Consent:  You and I are about to have a tele-health check-in or visit. This is allowed because you are already a patient of our [facility] practice, and you have requested it.  This tele-health visit will be billed to your health insurance or you, if you are self-insured. You understand you will be responsible for any copayments or coinsurances that apply to your tele-health visit.  Before starting our telemedicine visit, I am required to get your consent for this virtual check-in or visit by tele-health . Do you consent?    Patient Response to Request for Consent: Yes        SUBJECTIVE     Symptoms  Depression symptoms: sadness, anhedonia, lack of motivation and fatigue/lack of energy  Anxiety symptoms: excessive anxiety/worry, difficulty controlling worry, restless/\"on edge\", irritability, muscle tension, sleep disturbance and panic attacks  Additional reported symptoms: NA      OBJECTIVE     Mental Status Evaluation  Patient's mood and affect were consistent with the context, and consistent with their baseline: Yes   Comments:  Anxious, slightly tearful, tangential, engaged and pleasant; awake and alert; oriented to person, place, and time    Suicidal Ideation/Homicidal Ideation Risk Assessment: not assessed. If not " assessed, reason:  Pt did not endorse any changes in risk or protective factors and none were observed.     Plan for Safety-   N/A:  Risk is assessed to be minimal; therefore, developing a safety plan is not indicated at this time. Patient identified protective factors including her grandchildren and her children.    Interventions  Anxiety Reduction Techniques  We processed worsening incidents of harrassment impacting Patient's ability to perform at work which has resulted in increased panic attacks, feelings of inadequacy and a loss of confidence. We processed poor testing performance that has never occurred in Patient's past experiences with testing and can be linked to her hostile work environment. We processed Patient's inability to address or cope with emotions in her personal life, such as the recent death of a friend, due to the high levels of anxiety associated with Patient's work life. We discussed updates with her court case regarding the harrassment as well as steps to request reasonable accommodations and an alternative work schedule. Therapist has agreed to complete forms required for these requests as needed.    Psychotropic medications: no known adherence challenges, N/A   Current Outpatient Medications   Medication Sig Dispense Refill   • ascorbic acid (VITAMIN C) 500 mg tablet Take 500 mg by mouth daily.     • aspirin 81 mg enteric coated tablet Take 81 mg by mouth daily.     • atorvastatin (LIPITOR) 40 mg tablet Take 1 tablet (40 mg total) by mouth daily. 30 tablet 1   • azelastine (ASTELIN) 137 mcg (0.1 %) nasal spray Administer 2 sprays into each nostril 2 (two) times a day as needed.     • betamethasone dipropionate (DIPROSONE) 0.05 % lotion Apply 1 each topically daily as needed.     • cetirizine (ZyrTEC) 10 mg tablet Take 10 mg by mouth as needed.     • cholecalciferol, vitamin D3, 1,000 unit (25 mcg) tablet Take 1,000 Units by mouth daily.     • coenzyme Q10 (COQ10) 50 mg capsule Take 50 mg  "by mouth daily.     • docusate sodium (COLACE) 100 mg capsule Take 2 capsules (200 mg total) by mouth 2 (two) times a day as needed for constipation. 60 capsule 0   • flecainide (TAMBOCOR) 50 mg tablet Take 1 tablet (50 mg total) by mouth 2 (two) times a day. 180 tablet 3   • gabapentin (NEURONTIN) 100 mg capsule Take 1 capsule (100 mg total) by mouth nightly. (Patient taking differently: Take 50 mg by mouth nightly.) 90 capsule 0   • levothyroxine (SYNTHROID) 75 mcg tablet Take 1 tablet (75 mcg total) by mouth daily. 90 tablet 3   • lidocaine (ASPERCREME) 4 % adhesive patch,medicated topical patch Apply 1 patch topically daily. 30 patch 0   • metoprolol succinate XL (TOPROL-XL) 25 mg 24 hr tablet Take 0.5 tablets (12.5 mg total) by mouth daily as needed (SBP over 135 or  palpitations). 10 tablet 6   • multivitamin (THERAGRAN) tablet Take 1 tablet by mouth daily.     • nitroglycerin (NITROSTAT) 0.4 mg SL tablet Place 1 tablet (0.4 mg total) under the tongue every 5 (five) minutes as needed for chest pain. 25 tablet 3   • omega-3-dha-epa-dpa-fish oil 1,050 mg(300 mg -675 mg-75 mg) capsule Take 1 capsule by mouth daily.     • polyethylene glycol (MIRALAX) 17 gram packet Take 17 g by mouth daily as needed (Constipation) for up to 3 days. 30 each 0     No current facility-administered medications for this visit.       ASSESSMENT       Progress  Patient's progress toward their goals is generally improving as Patient processed worsening hostile behaviors in the workplace.   Patient's symptomology is worsening since 1 month(s) ago  Patient disclosed increased and difficult to control panic and emotional dysregulation.     PLAN     Goals:  -\"would like to get back to me\" and \"get back on track\"  -Develop skills for anxiety management including skills for coping with panic attacks  -Process past losses and trauma    Recommendations  Individual Therapy  45 minutes weekly    Next visit plan:  - Review skills for managing panic " in the workplace  -Identify history of attachment and awareness of boundaries in different relationships when able    I spent 45 minutes on this date of service performing the following activities: providing counseling and education.

## 2024-01-30 NOTE — PROGRESS NOTES
01/30/2024 02:46 PM EST by Ragini Platt RN 01/30/2024 02:46 PM EST by Ragini Platt, RN  [facility] Alecia Nelson ([facility]) [phone] ([place])   Patient called saying to ask dr Begum to hold off on filling out form until she speaks to him. Video appt scheduled for Friday at 3 pm and CM informed Dr Begum.

## 2024-01-30 NOTE — PROGRESS NOTES
01/30/2024 02:25 PM EST by Ragini Platt, RN 01/30/2024 02:25 PM EST by Ragini Platt, RN  Outgoing Alecia Nelson ([facility]) [phone] (Home)   Reached PatientCommunicated - LCM: CM reached out to patient to inform no email obtained from her. She says she sent it but will resend. CM received the email and sent to PCP to have form filled out.

## 2024-01-31 ENCOUNTER — PATIENT OUTREACH (OUTPATIENT)
Dept: CASE MANAGEMENT | Facility: CLINIC | Age: 64
End: 2024-01-31
Payer: COMMERCIAL

## 2024-01-31 ENCOUNTER — APPOINTMENT (EMERGENCY)
Dept: RADIOLOGY | Facility: HOSPITAL | Age: 64
End: 2024-01-31
Payer: COMMERCIAL

## 2024-01-31 ENCOUNTER — HOSPITAL ENCOUNTER (EMERGENCY)
Facility: HOSPITAL | Age: 64
Discharge: HOME | End: 2024-01-31
Attending: EMERGENCY MEDICINE
Payer: COMMERCIAL

## 2024-01-31 ENCOUNTER — TELEPHONE (OUTPATIENT)
Dept: SCHEDULING | Facility: CLINIC | Age: 64
End: 2024-01-31
Payer: COMMERCIAL

## 2024-01-31 VITALS
TEMPERATURE: 98.5 F | HEIGHT: 67 IN | WEIGHT: 138 LBS | DIASTOLIC BLOOD PRESSURE: 70 MMHG | BODY MASS INDEX: 21.66 KG/M2 | SYSTOLIC BLOOD PRESSURE: 120 MMHG | OXYGEN SATURATION: 98 % | HEART RATE: 79 BPM | RESPIRATION RATE: 18 BRPM

## 2024-01-31 DIAGNOSIS — R00.2 PALPITATIONS: ICD-10-CM

## 2024-01-31 DIAGNOSIS — R07.9 CHEST PAIN, UNSPECIFIED TYPE: Primary | ICD-10-CM

## 2024-01-31 LAB
ALBUMIN SERPL-MCNC: 4 G/DL (ref 3.5–5.7)
ALP SERPL-CCNC: 55 IU/L (ref 34–125)
ALT SERPL-CCNC: 15 IU/L (ref 7–52)
ANION GAP SERPL CALC-SCNC: 9 MEQ/L (ref 3–15)
AST SERPL-CCNC: 14 IU/L (ref 13–39)
BASOPHILS # BLD: 0.07 K/UL (ref 0.01–0.1)
BASOPHILS NFR BLD: 0.9 %
BILIRUB SERPL-MCNC: 0.4 MG/DL (ref 0.3–1.2)
BUN SERPL-MCNC: 11 MG/DL (ref 7–25)
CALCIUM SERPL-MCNC: 9.2 MG/DL (ref 8.6–10.3)
CHLORIDE SERPL-SCNC: 104 MEQ/L (ref 98–107)
CO2 SERPL-SCNC: 25 MEQ/L (ref 21–31)
CREAT SERPL-MCNC: 0.8 MG/DL (ref 0.6–1.2)
DIFFERENTIAL METHOD BLD: NORMAL
EGFRCR SERPLBLD CKD-EPI 2021: >60 ML/MIN/1.73M*2
EOSINOPHIL # BLD: 0.07 K/UL (ref 0.04–0.36)
EOSINOPHIL NFR BLD: 0.9 %
ERYTHROCYTE [DISTWIDTH] IN BLOOD BY AUTOMATED COUNT: 12.6 % (ref 11.7–14.4)
GLUCOSE SERPL-MCNC: 149 MG/DL (ref 70–99)
HCT VFR BLD AUTO: 39.2 % (ref 35–45)
HGB BLD-MCNC: 13 G/DL (ref 11.8–15.7)
IMM GRANULOCYTES # BLD AUTO: 0.02 K/UL (ref 0–0.08)
IMM GRANULOCYTES NFR BLD AUTO: 0.3 %
LYMPHOCYTES # BLD: 1.8 K/UL (ref 1.2–3.5)
LYMPHOCYTES NFR BLD: 24.3 %
MCH RBC QN AUTO: 31.1 PG (ref 28–33.2)
MCHC RBC AUTO-ENTMCNC: 33.2 G/DL (ref 32.2–35.5)
MCV RBC AUTO: 93.8 FL (ref 83–98)
MONOCYTES # BLD: 0.35 K/UL (ref 0.28–0.8)
MONOCYTES NFR BLD: 4.7 %
NEUTROPHILS # BLD: 5.1 K/UL (ref 1.7–7)
NEUTS SEG NFR BLD: 68.9 %
NRBC BLD-RTO: 0 %
PDW BLD AUTO: 10.1 FL (ref 9.4–12.3)
PLATELET # BLD AUTO: 233 K/UL (ref 150–369)
POTASSIUM SERPL-SCNC: 3.6 MEQ/L (ref 3.5–5.1)
PROT SERPL-MCNC: 7.2 G/DL (ref 6–8.2)
RBC # BLD AUTO: 4.18 M/UL (ref 3.93–5.22)
SODIUM SERPL-SCNC: 138 MEQ/L (ref 136–145)
TROPONIN I SERPL HS-MCNC: 2.9 PG/ML
TROPONIN I SERPL HS-MCNC: 3.2 PG/ML
WBC # BLD AUTO: 7.41 K/UL (ref 3.8–10.5)

## 2024-01-31 PROCEDURE — 85025 COMPLETE CBC W/AUTO DIFF WBC: CPT

## 2024-01-31 PROCEDURE — 80053 COMPREHEN METABOLIC PANEL: CPT

## 2024-01-31 PROCEDURE — 93005 ELECTROCARDIOGRAM TRACING: CPT | Performed by: EMERGENCY MEDICINE

## 2024-01-31 PROCEDURE — 71046 X-RAY EXAM CHEST 2 VIEWS: CPT

## 2024-01-31 PROCEDURE — 85025 COMPLETE CBC W/AUTO DIFF WBC: CPT | Performed by: EMERGENCY MEDICINE

## 2024-01-31 PROCEDURE — 99283 EMERGENCY DEPT VISIT LOW MDM: CPT | Mod: 25

## 2024-01-31 PROCEDURE — 84484 ASSAY OF TROPONIN QUANT: CPT | Mod: 91 | Performed by: EMERGENCY MEDICINE

## 2024-01-31 PROCEDURE — 84484 ASSAY OF TROPONIN QUANT: CPT

## 2024-01-31 PROCEDURE — 36415 COLL VENOUS BLD VENIPUNCTURE: CPT

## 2024-01-31 PROCEDURE — 84484 ASSAY OF TROPONIN QUANT: CPT | Mod: 91

## 2024-01-31 PROCEDURE — 93005 ELECTROCARDIOGRAM TRACING: CPT

## 2024-01-31 PROCEDURE — 80053 COMPREHEN METABOLIC PANEL: CPT | Performed by: EMERGENCY MEDICINE

## 2024-01-31 NOTE — TELEPHONE ENCOUNTER
Pt wanted Dr. Pulliam to know that she is currently in [facility] ED w/ chest pain.     Pt can be reached at [phone].    Ty.

## 2024-01-31 NOTE — PROGRESS NOTES
01/31/2024 09:04 AM EST by Ragini Platt, RN 01/31/2024 09:04 AM EST by Ragini Platt, RN  Outgoing Alecia Nelson (Self) [phone] (Home)   Reached Patient; CM returned patient call. She states she just received  An email that made her upset and now she is having CP. She can take her NTG every 5 mins x 3 and if no relief, to call 911. Patient states she will call for help right away and CM encouraged her to do so.

## 2024-01-31 NOTE — TELEPHONE ENCOUNTER
Dr. Pulliam notified.  Patient last seen in 7/23 and had canceled 2 consecutive appointments since that time    3/23 cardiac catheterization showed mild left circumflex disease with other coronary vessels patent.

## 2024-02-01 ENCOUNTER — PATIENT OUTREACH (OUTPATIENT)
Dept: CASE MANAGEMENT | Facility: CLINIC | Age: 64
End: 2024-02-01
Payer: COMMERCIAL

## 2024-02-01 NOTE — PROGRESS NOTES
02/01/2024 11:37 AM EST by Ragini Platt, RN 02/01/2024 11:37 AM EST by Ragini Platt, RN  Outgoing Alecia Nelson (Self) [phone] (Home)   Reached Patient: AUGIE returned call. Patient had gone to ER yesterday for CP and cardiac ruled out. She was d/c home. She still has some degree of chest discomfort. NTG helped last night. She is to see PCP and cardiologist. They feel a lot of this is triggered by anxiety. Video appt tomorrow changed to in office.

## 2024-02-02 ENCOUNTER — PATIENT OUTREACH (OUTPATIENT)
Dept: CASE MANAGEMENT | Facility: CLINIC | Age: 64
End: 2024-02-02
Payer: COMMERCIAL

## 2024-02-02 ENCOUNTER — OFFICE VISIT (OUTPATIENT)
Dept: PRIMARY CARE | Facility: CLINIC | Age: 64
End: 2024-02-02
Payer: COMMERCIAL

## 2024-02-02 VITALS
SYSTOLIC BLOOD PRESSURE: 120 MMHG | BODY MASS INDEX: 22.29 KG/M2 | OXYGEN SATURATION: 98 % | WEIGHT: 142 LBS | DIASTOLIC BLOOD PRESSURE: 60 MMHG | HEART RATE: 100 BPM | RESPIRATION RATE: 18 BRPM | HEIGHT: 67 IN | TEMPERATURE: 98.6 F

## 2024-02-02 DIAGNOSIS — F32.9 REACTIVE DEPRESSION: ICD-10-CM

## 2024-02-02 DIAGNOSIS — G57.21 FEMORAL NEUROPATHY OF RIGHT LOWER EXTREMITY: Primary | ICD-10-CM

## 2024-02-02 DIAGNOSIS — G47.9 SLEEP DISTURBANCE: ICD-10-CM

## 2024-02-02 DIAGNOSIS — F41.1 ANXIETY STATE: ICD-10-CM

## 2024-02-02 DIAGNOSIS — R00.2 PALPITATIONS: ICD-10-CM

## 2024-02-02 DIAGNOSIS — I48.0 PAROXYSMAL ATRIAL FIBRILLATION (CMS/HCC): ICD-10-CM

## 2024-02-02 PROCEDURE — 99214 OFFICE O/P EST MOD 30 MIN: CPT | Performed by: STUDENT IN AN ORGANIZED HEALTH CARE EDUCATION/TRAINING PROGRAM

## 2024-02-02 PROCEDURE — 3008F BODY MASS INDEX DOCD: CPT | Performed by: STUDENT IN AN ORGANIZED HEALTH CARE EDUCATION/TRAINING PROGRAM

## 2024-02-02 RX ORDER — MIRTAZAPINE 7.5 MG/1
7.5 TABLET, FILM COATED ORAL NIGHTLY
Qty: 60 TABLET | Refills: 0 | Status: SHIPPED | OUTPATIENT
Start: 2024-02-02 | End: 2024-08-09

## 2024-02-02 NOTE — LETTER
February 2, 2024     Patient: Alecia Cruz  YOB: 1960  Date of Visit: 2/2/2024    To Whom it May Concern:    Alecia Cruz was seen in my clinic on 2/2/2024 at 3:00 pm.     We followed up on her ongoing medical issues and behavioral health concerns. I agree with her that it is more conducive to her success that she is aware of assessments at least 48 hours prior. We also recommend that she not be interrupted during these assessments. This will enable her to have success.     We further have recommended that she go to physical therapy at twice per week. She has not been able to follow-up with her specialists due to ongoing delays related to her FMLA. I expect that she will need 1-2 days per month to follow-up with specialists and undergo any necessary testing. This may change as her assessment moves forward.    If you have any questions or concerns, please don't hesitate to call.         Sincerely,         Mahendra Begum, DO

## 2024-02-02 NOTE — PROGRESS NOTES
[facility]   Progress Note       ASSESSMENT AND PLAN     Problem List Items Addressed This Visit        Nervous    Sleep disturbance    Current Assessment & Plan     I am going to start her on mirtazapine at 7.5 mg at night.  I think this will be with her sleep.  We can review restarting SSRIs in the future.  She is being supported by her behavioral health counselor.  Went over side effects of medication with her.  Will start low and go slow.  Provided letter for her to have modifications to her working environment.         Femoral neuropathy of right lower extremity - Primary       Circulatory    Paroxysmal atrial fibrillation (CMS/HCC)    Overview     Her [id number] score is 1 (female).        11/22 48 hr monitor with no episodes of AFIB.    Patient reported history of proximal A-fib which started in the 1990s and had been treated with verapamil, digoxin and Coumadin in the past.   She has since been started on flecainide 50 mg twice daily.  She only takes metoprolol as needed because it makes her feel fatigued.    8/20/21 echo in [place]  LVEF 50-55%, mild RV dysfunction, mild MR, RVs of 30-35 mm Hg     Her Eliquis to be taken as needed in place of aspirin for episodes of atrial fibrillation lasting greater than 24 hours per Dr. Alonso         Palpitations       Mental Health    Anxiety state    Current Assessment & Plan     Given what she describes as going on with her manager I advised her that she ideally would switch jobs altogether but in the light of not be able to do that I agree with her workplace modifications to decrease her anxiety.  I worry about the stress on her.  No red flag symptoms today.  She contracts her to safety.  She is following regularly with behavioral health.         Reactive depression    Relevant Medications    mirtazapine (REMERON) 7.5 mg tablet     We will see her back in 1 month for check on medication.   This office note has been dictated using voice  "recognition software which may introduce inadvertent typographical or language errors.  Please contact me directly for any clarifications.     SUBJECTIVE     Alecia Cruz is a 63 y.o. year-old female, primary patient of Dr. Begum who presents for follow-up.    Interval History:   1. Needs letter to specify altered work schedule and reasonable accommodations.  Continues to have ongoing issues with her manager.  Reviewed emails that she got.  Provided letter to her today.  2. Appears that she is having panic attacks.  She is following with behavioral health.  Unfortunately she is having panic attacks related to her manager.  She has not been able to sleep recently.  We previously tried Lexapro.  Stopped due to side effects.  Open to other options.  Sleeping seems to be the main issue at the current time.  No suicidal or homicidal ideations at this time. Contracts for safety  3. Recently was in the ER secondary to chest pain secondary to her anxiety  4. Needs to follow-up with electrophysiology as well as cardiology.    PHYSICAL EXAMINATION     Visit Vitals  /60 (BP Location: Right upper arm, Patient Position: Sitting)   Pulse 100   Temp 37 °C (98.6 °F)   Resp 18   Ht 1.702 m (5' 7\")   Wt 64.4 kg (142 lb)   SpO2 98%   BMI 22.24 kg/m²       Physical Exam  Vitals reviewed.   Constitutional:       General: She is not in acute distress.     Appearance: Normal appearance. She is not ill-appearing, toxic-appearing or diaphoretic.   Eyes:      General: No scleral icterus.     Conjunctiva/sclera: Conjunctivae normal.   Cardiovascular:      Rate and Rhythm: Normal rate and regular rhythm.      Heart sounds: Normal heart sounds.   Pulmonary:      Effort: Pulmonary effort is normal.      Breath sounds: Normal breath sounds.   Neurological:      Mental Status: She is alert.   Psychiatric:         Mood and Affect: Mood normal.         Behavior: Behavior normal.         Thought Content: Thought content normal.         " Judgment: Judgment normal.           LABS / IMAGING / STUDIES        Labs Reviewed:   CBC Results       01/31/24 11/24/23 09/26/23     1232 1155 1058    WBC 7.41 6.24 5.86    RBC 4.18 3.64 3.62    HGB 13.0 11.7 11.7    HCT 39.2 34.3 34.9    MCV 93.8 94.2 96.4    MCH 31.1 32.1 32.3    MCHC 33.2 34.1 33.5     204 189          CMP Results       01/31/24 11/24/23 09/26/23     1232 1155 1058     141 144    K 3.6 3.9 3.6    Cl 104 108 109    CO2 25 28 31    Glucose 149 78 118    BUN 11 14 11    Creatinine 0.8 0.8 0.8    Calcium 9.2 9.1 8.8    Anion Gap 9 5 4    AST 14 17 32    ALT 15 24 24    Albumin 4.0 3.8 3.8    EGFR >60.0 >60.0 >60.0         Comment for K at 1232 on 01/31/24: Results obtained on plasma. Plasma Potassium values may be up to 0.4 mEQ/L less than serum values. The differences may be greater for patients with high platelet or white cell counts.    Comment for K at 1155 on 11/24/23: Results obtained on plasma. Plasma Potassium values may be up to 0.4 mEQ/L less than serum values. The differences may be greater for patients with high platelet or white cell counts.    Comment for K at 1058 on 09/26/23: Results obtained on plasma. Plasma Potassium values may be up to 0.4 mEQ/L less than serum values. The differences may be greater for patients with high platelet or white cell counts.    Comment for EGFR at 1232 on 01/31/24: Calculation based on the Chronic Kidney Disease Epidemiology Collaboration (CKD-EPI) equation refit without adjustment for race.          Studies/Imaging Reviewed: None      MEDICATIONS      Current Outpatient Medications   Medication Instructions   • ascorbic acid (VITAMIN C) 500 mg, oral, Daily   • aspirin 81 mg, oral, Daily   • atorvastatin (LIPITOR) 40 mg, oral, Daily (6p)   • azelastine (ASTELIN) 137 mcg (0.1 %) nasal spray 2 sprays, Each Nostril, 2 times daily PRN   • betamethasone dipropionate (DIPROSONE) 0.05 % lotion 1 each, Topical, Daily PRN   • cetirizine (ZYRTEC)  10 mg, oral, As needed   • cholecalciferol (vitamin D3) 1,000 Units, oral, Daily   • coenzyme Q10 (COQ10) 50 mg, oral, Daily   • docusate sodium (COLACE) 200 mg, oral, 2 times daily PRN   • flecainide (TAMBOCOR) 50 mg, oral, 2 times daily (6a, 6p)   • gabapentin (NEURONTIN) 100 mg, oral, Nightly   • levothyroxine (SYNTHROID) 75 mcg, oral, Daily (6:30a)   • lidocaine (ASPERCREME) 4 % adhesive patch,medicated topical patch 1 patch, Topical, Daily   • metoprolol succinate XL (TOPROL-XL) 12.5 mg, oral, Daily PRN   • mirtazapine (REMERON) 7.5 mg, oral, Nightly   • multivitamin (THERAGRAN) tablet 1 tablet, oral, Daily   • nitroglycerin (NITROSTAT) 0.4 mg, sublingual, Every 5 min PRN   • omega-3-dha-epa-dpa-fish oil 1,050 mg(300 mg -675 mg-75 mg) capsule 1 capsule, oral, Daily   • polyethylene glycol (MIRALAX) 17 g, oral, Daily PRN

## 2024-02-02 NOTE — PROGRESS NOTES
02/02/2024 03:51 PM EST by Ragini Platt, RN 02/02/2024 03:51 PM EST by Ragini Platt, RN  Outgoing Alecia Nelson (Self) [phone] (Home)   Reached Patient:  called patient to see if a form needs to be filled out by Dr Begum for reasonable accommodations. She states there are issues with opening this attachment which should contain the form. Dr Begum wrote a letter today and patent has it on her now and that may suffice. If additional form is needed, she will let us know.

## 2024-02-04 PROBLEM — R05.8 POST-VIRAL COUGH SYNDROME: Status: RESOLVED | Noted: 2024-01-04 | Resolved: 2024-02-04

## 2024-02-04 NOTE — ASSESSMENT & PLAN NOTE
I am going to start her on mirtazapine at 7.5 mg at night.  I think this will be with her sleep.  We can review restarting SSRIs in the future.  She is being supported by her behavioral health counselor.  Went over side effects of medication with her.  Will start low and go slow.  Provided letter for her to have modifications to her working environment.

## 2024-02-04 NOTE — ASSESSMENT & PLAN NOTE
Given what she describes as going on with her manager I advised her that she ideally would switch jobs altogether but in the light of not be able to do that I agree with her workplace modifications to decrease her anxiety.  I worry about the stress on her.  No red flag symptoms today.  She contracts her to safety.  She is following regularly with behavioral health.

## 2024-02-05 ENCOUNTER — PATIENT OUTREACH (OUTPATIENT)
Dept: CASE MANAGEMENT | Facility: CLINIC | Age: 64
End: 2024-02-05
Payer: COMMERCIAL

## 2024-02-05 NOTE — PROGRESS NOTES
02/05/2024 09:43 AM EST by Ragini Platt RN 02/05/2024 09:43 AM EST by Ragini Platt, RN  Incoming Alecia Nelson (Self) [phone] (Home)   Patient called in . She states she needs a note from Dr Begum saying please excuse patient and she was out of work from 1/31-2/5. Does not have to say why. She would like it emailed to her then she will pass on. Email is [email or web address].    She also just wants Dr Begum to know that the new medication which she started Saturday night is causing her to have a bad headache but today less than yesterday and she is drinking more water today to see if helps.     Message sent to Dr Begum.

## 2024-02-05 NOTE — PROGRESS NOTES
02/05/2024 10:20 AM EST by Ragini Platt, RN 02/05/2024 10:20 AM EST by Ragini Platt, RN  Outgoing Alecia Nelson (Self) [phone] (Home)   Reached Patient: Informed patient that Dr Begum put out of work sick note on My Chart and she was appreciative.

## 2024-02-07 ENCOUNTER — TELEMEDICINE (OUTPATIENT)
Dept: BEHAVIORAL HEALTH | Facility: CLINIC | Age: 64
End: 2024-02-07
Payer: COMMERCIAL

## 2024-02-07 ENCOUNTER — PATIENT OUTREACH (OUTPATIENT)
Dept: CASE MANAGEMENT | Facility: CLINIC | Age: 64
End: 2024-02-07
Payer: COMMERCIAL

## 2024-02-07 DIAGNOSIS — F43.9 TRAUMA AND STRESSOR-RELATED DISORDER: Primary | ICD-10-CM

## 2024-02-07 PROCEDURE — 90834 PSYTX W PT 45 MINUTES: CPT | Mod: 95

## 2024-02-07 NOTE — PROGRESS NOTES
02/07/2024 12:01 PM EST by Ragini Platt RN 02/07/2024 12:01 PM EST by Ragini Platt, RN  Outgoing Alecia Nelson (Self) [phone] (Home)   Reached PatientCommunicated - LCM: CM returned patient call. She is on other line and will call CM back.           02/07/2024 12:13 PM EST by Ragini Platt RN 02/07/2024 12:13 PM EST by Ragini Platt, RN  Patient called after speaking with supervisor and said more info will be needed. She will find out what is needed and type it up and scan into My Chart. CM asked her to let her know when done so she can alert Dr Begum. In basket sent to Dr Begum.   Incoming Alecia Nelson (Self) [phone] (Home)

## 2024-02-07 NOTE — PROGRESS NOTES
"[facility] - Psychotherapy Follow-up Visit Note  Visit number: 10    Visit Type Performed: Video   Communication platform used for this encounter:  iContacthart Video Visit (Epic Video Client)     Alecia Cruz was contacted today for a behavioral health visit.   Clinician confirmed identification of patient by name and birthdate, provider name, location of patient and clinician, and callback number in case disconnected.     Verification of Patient Location:  The patient affirms they are currently located in the following state: [place]  This visit was conducted via telehealth/telephone in lieu of an in-person visit due to precautions related to the COVID-19 pandemic.    Request for Consent:  You and I are about to have a tele-health check-in or visit. This is allowed because you are already a patient of our [facility] practice, and you have requested it.  This tele-health visit will be billed to your health insurance or you, if you are self-insured. You understand you will be responsible for any copayments or coinsurances that apply to your tele-health visit.  Before starting our telemedicine visit, I am required to get your consent for this virtual check-in or visit by tele-health . Do you consent?    Patient Response to Request for Consent: Yes        SUBJECTIVE     Symptoms  Depression symptoms: anhedonia, lack of motivation and fatigue/lack of energy  Anxiety symptoms: excessive anxiety/worry, difficulty controlling worry, restless/\"on edge\", irritability, muscle tension, sleep disturbance and panic attacks  Additional reported symptoms: NA      OBJECTIVE     Mental Status Evaluation  Patient's mood and affect were consistent with the context, and consistent with their baseline: Yes   Comments:  Anxious, tangential, engaged and pleasant; awake and alert; oriented to person, place, and time    Suicidal Ideation/Homicidal Ideation Risk Assessment: not assessed. If not assessed, reason:  Pt did " not endorse any changes in risk or protective factors and none were observed.     Plan for Safety-   N/A:  Risk is assessed to be minimal; therefore, developing a safety plan is not indicated at this time. Patient identified protective factors including her grandchildren and her children.    Interventions  Anxiety Reduction Techniques  We processed circumstances of further work harrassment that led to an emergent hospital visit last week. We processed recent accommodations for testing that have been enacted and different levels of support being offered within Patient's agency. We processed Patient's history of cardiac care and how she learned anxiety was a trigger for her cardiac episodes. 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     Psychotropic medications: no known adherence challenges, too early to assess effectiveness   Current Outpatient Medications   Medication Sig Dispense Refill   • ascorbic acid (VITAMIN C) 500 mg tablet Take 500 mg by mouth daily.     • aspirin 81 mg enteric coated tablet Take 81 mg by mouth daily.     • atorvastatin (LIPITOR) 40 mg tablet Take 1 tablet (40 mg total) by mouth daily. 30 tablet 1   • azelastine (ASTELIN) 137 mcg (0.1 %) nasal spray Administer 2 sprays into each nostril 2 (two) times a day as needed.     • betamethasone dipropionate (DIPROSONE) 0.05 % lotion Apply 1 each topically daily as needed.     • cetirizine (ZyrTEC) 10 mg tablet Take 10 mg by mouth as needed.     • cholecalciferol, vitamin D3, 1,000 unit (25 mcg) tablet Take 1,000 Units by mouth daily.     • coenzyme Q10 (COQ10) 50 mg capsule Take 50 mg by mouth daily.     • docusate sodium (COLACE) 100 mg capsule Take 2 capsules (200 mg total) by mouth 2 (two) times a day as needed for constipation. 60 capsule 0   • flecainide (TAMBOCOR) 50 mg tablet Take 1 tablet (50 mg total) by  "mouth 2 (two) times a day. 180 tablet 3   • gabapentin (NEURONTIN) 100 mg capsule Take 1 capsule (100 mg total) by mouth nightly. (Patient taking differently: Take 50 mg by mouth nightly.) 90 capsule 0   • levothyroxine (SYNTHROID) 75 mcg tablet Take 1 tablet (75 mcg total) by mouth daily. 90 tablet 3   • lidocaine (ASPERCREME) 4 % adhesive patch,medicated topical patch Apply 1 patch topically daily. 30 patch 0   • metoprolol succinate XL (TOPROL-XL) 25 mg 24 hr tablet Take 0.5 tablets (12.5 mg total) by mouth daily as needed (SBP over 135 or  palpitations). 10 tablet 6   • mirtazapine (REMERON) 7.5 mg tablet Take 1 tablet (7.5 mg total) by mouth nightly. 60 tablet 0   • multivitamin (THERAGRAN) tablet Take 1 tablet by mouth daily.     • nitroglycerin (NITROSTAT) 0.4 mg SL tablet Place 1 tablet (0.4 mg total) under the tongue every 5 (five) minutes as needed for chest pain. 25 tablet 3   • omega-3-dha-epa-dpa-fish oil 1,050 mg(300 mg -675 mg-75 mg) capsule Take 1 capsule by mouth daily.     • polyethylene glycol (MIRALAX) 17 gram packet Take 17 g by mouth daily as needed (Constipation) for up to 3 days. 30 each 0     No current facility-administered medications for this visit.       ASSESSMENT       Progress  Patient's progress toward their goals is generally improving as Patient further processed hostile behaviors in the workplace resulting in a recent emergency room visit.   Patient's symptomology is worsening since 1 month(s) ago  Patient disclosed increased and difficult to control panic and emotional dysregulation triggered by the workplace.     PLAN     Goals:  -\"would like to get back to me\" and \"get back on track\"  -Develop skills for anxiety management including skills for coping with panic attacks  -Process past losses and trauma    Recommendations  Individual Therapy  45 minutes weekly    Next visit plan:  - Review skills for managing panic in the workplace  -Identify history of attachment and awareness " of boundaries in different relationships when able    I spent 45 minutes on this date of service performing the following activities: providing counseling and education.

## 2024-02-08 NOTE — TELEPHONE ENCOUNTER
Medicine Refill Request    Last Office Visit: 2/2/2024   Last Consult Visit: Visit date not found  Last Telemedicine Visit: 1/23/2024 Mahendra Begum DO    Next Appointment: Visit date not found      Current Outpatient Medications:   •  ascorbic acid (VITAMIN C) 500 mg tablet, Take 500 mg by mouth daily., Disp: , Rfl:   •  aspirin 81 mg enteric coated tablet, Take 81 mg by mouth daily., Disp: , Rfl:   •  atorvastatin (LIPITOR) 40 mg tablet, Take 1 tablet (40 mg total) by mouth daily., Disp: 30 tablet, Rfl: 1  •  azelastine (ASTELIN) 137 mcg (0.1 %) nasal spray, Administer 2 sprays into each nostril 2 (two) times a day as needed., Disp: , Rfl:   •  betamethasone dipropionate (DIPROSONE) 0.05 % lotion, Apply 1 each topically daily as needed., Disp: , Rfl:   •  cetirizine (ZyrTEC) 10 mg tablet, Take 10 mg by mouth as needed., Disp: , Rfl:   •  cholecalciferol, vitamin D3, 1,000 unit (25 mcg) tablet, Take 1,000 Units by mouth daily., Disp: , Rfl:   •  coenzyme Q10 (COQ10) 50 mg capsule, Take 50 mg by mouth daily., Disp: , Rfl:   •  docusate sodium (COLACE) 100 mg capsule, Take 2 capsules (200 mg total) by mouth 2 (two) times a day as needed for constipation., Disp: 60 capsule, Rfl: 0  •  flecainide (TAMBOCOR) 50 mg tablet, Take 1 tablet (50 mg total) by mouth 2 (two) times a day., Disp: 180 tablet, Rfl: 3  •  gabapentin (NEURONTIN) 100 mg capsule, Take 1 capsule (100 mg total) by mouth nightly. (Patient taking differently: Take 50 mg by mouth nightly.), Disp: 90 capsule, Rfl: 0  •  levothyroxine (SYNTHROID) 75 mcg tablet, Take 1 tablet (75 mcg total) by mouth daily., Disp: 90 tablet, Rfl: 3  •  lidocaine (ASPERCREME) 4 % adhesive patch,medicated topical patch, Apply 1 patch topically daily., Disp: 30 patch, Rfl: 0  •  metoprolol succinate XL (TOPROL-XL) 25 mg 24 hr tablet, Take 0.5 tablets (12.5 mg total) by mouth daily as needed (SBP over 135 or  palpitations)., Disp: 10 tablet, Rfl: 6  •  mirtazapine (REMERON) 7.5 mg  tablet, Take 1 tablet (7.5 mg total) by mouth nightly., Disp: 60 tablet, Rfl: 0  •  multivitamin (THERAGRAN) tablet, Take 1 tablet by mouth daily., Disp: , Rfl:   •  nitroglycerin (NITROSTAT) 0.4 mg SL tablet, Place 1 tablet (0.4 mg total) under the tongue every 5 (five) minutes as needed for chest pain., Disp: 25 tablet, Rfl: 3  •  omega-3-dha-epa-dpa-fish oil 1,050 mg(300 mg -675 mg-75 mg) capsule, Take 1 capsule by mouth daily., Disp: , Rfl:   •  polyethylene glycol (MIRALAX) 17 gram packet, Take 17 g by mouth daily as needed (Constipation) for up to 3 days., Disp: 30 each, Rfl: 0      BP Readings from Last 3 Encounters:   02/02/24 120/60   01/31/24 120/70   12/26/23 130/80       Recent Lab results:  Lab Results   Component Value Date    CHOL 180 03/08/2023   ,   Lab Results   Component Value Date    HDL 59 03/08/2023   ,   Lab Results   Component Value Date    LDLCALC 113 (H) 03/08/2023   ,   Lab Results   Component Value Date    TRIG 39 03/08/2023        Lab Results   Component Value Date    GLUCOSE 149 (H) 01/31/2024   ,   Lab Results   Component Value Date    HGBA1C 5.5 03/07/2023         Lab Results   Component Value Date    CREATININE 0.8 01/31/2024       Lab Results   Component Value Date    TSH 1.08 03/07/2023           Lab Results   Component Value Date    HGBA1C 5.5 03/07/2023

## 2024-02-09 LAB
ATRIAL RATE: 89
P AXIS: 82
PR INTERVAL: 134
QRS DURATION: 82
QT INTERVAL: 322
QTC CALCULATION(BAZETT): 391
R AXIS: 11
T WAVE AXIS: -83
VENTRICULAR RATE: 89

## 2024-02-09 PROCEDURE — 93010 ELECTROCARDIOGRAM REPORT: CPT | Performed by: INTERNAL MEDICINE

## 2024-02-10 RX ORDER — AZELASTINE 1 MG/ML
2 SPRAY, METERED NASAL 2 TIMES DAILY PRN
Qty: 30 ML | Refills: 0 | Status: SHIPPED | OUTPATIENT
Start: 2024-02-10

## 2024-02-10 RX ORDER — LEVOTHYROXINE SODIUM 75 UG/1
75 TABLET ORAL
Qty: 90 TABLET | Refills: 1 | Status: SHIPPED | OUTPATIENT
Start: 2024-02-10 | End: 2024-08-23

## 2024-02-21 ENCOUNTER — TELEMEDICINE (OUTPATIENT)
Dept: BEHAVIORAL HEALTH | Facility: CLINIC | Age: 64
End: 2024-02-21
Payer: COMMERCIAL

## 2024-02-21 DIAGNOSIS — F43.9 TRAUMA AND STRESSOR-RELATED DISORDER: Primary | ICD-10-CM

## 2024-02-21 PROCEDURE — 90834 PSYTX W PT 45 MINUTES: CPT | Mod: 95

## 2024-02-22 NOTE — PROGRESS NOTES
"[facility] - Psychotherapy Follow-up Visit Note  Visit number: 11    Visit Type Performed: Video   Communication platform used for this encounter:  Tinitellhart Video Visit (Epic Video Client)     Alecia Cruz was contacted today for a behavioral health visit.   Clinician confirmed identification of patient by name and birthdate, provider name, location of patient and clinician, and callback number in case disconnected.     Verification of Patient Location:  The patient affirms they are currently located in the following state: [place]  This visit was conducted via telehealth/telephone in lieu of an in-person visit due to precautions related to the COVID-19 pandemic.    Request for Consent:  You and I are about to have a tele-health check-in or visit. This is allowed because you are already a patient of our [facility] practice, and you have requested it.  This tele-health visit will be billed to your health insurance or you, if you are self-insured. You understand you will be responsible for any copayments or coinsurances that apply to your tele-health visit.  Before starting our telemedicine visit, I am required to get your consent for this virtual check-in or visit by tele-health . Do you consent?    Patient Response to Request for Consent: Yes        SUBJECTIVE     Symptoms  Depression symptoms: anhedonia, lack of motivation and fatigue/lack of energy  Anxiety symptoms: moderate anxiety/worry, difficulty controlling worry, restless/\"on edge\", irritability, muscle tension and sleep disturbance  Additional reported symptoms: NA      OBJECTIVE     Mental Status Evaluation  Patient's mood and affect were consistent with the context, and consistent with their baseline: Yes   Comments:  calm, engaged and pleasant; awake and alert; oriented to person, place, and time    Suicidal Ideation/Homicidal Ideation Risk Assessment: not assessed. If not assessed, reason:  Pt did not endorse any changes in " risk or protective factors and none were observed.     Plan for Safety-   N/A:  Risk is assessed to be minimal; therefore, developing a safety plan is not indicated at this time. Patient identified protective factors including her grandchildren and her children.    Interventions  Anxiety Reduction Techniques and Empathic Listening and Validation  We discussed updates with her EOO case as a hearing has been set for this coming Monday regarding the harrassment in the workplace. We processed further efforts by her  to bully and harass Patient with past errors since passing her certification. We processed Patient's improvement with catching her anxiety as it starts to escalate before it leads to a panic attack or flair of her heart condition as well as Patient's efforts to challenge negative self talk that was intensified by the harrassment.     Psychotropic medications: no known adherence challenges, somewhat effective   Current Outpatient Medications   Medication Sig Dispense Refill   • ascorbic acid (VITAMIN C) 500 mg tablet Take 500 mg by mouth daily.     • aspirin 81 mg enteric coated tablet Take 81 mg by mouth daily.     • atorvastatin (LIPITOR) 40 mg tablet Take 1 tablet (40 mg total) by mouth daily. 30 tablet 1   • azelastine (ASTELIN) 137 mcg (0.1 %) nasal spray Administer 2 sprays into each nostril 2 (two) times a day as needed for allergies. 30 mL 0   • betamethasone dipropionate (DIPROSONE) 0.05 % lotion Apply 1 each topically daily as needed.     • cetirizine (ZyrTEC) 10 mg tablet Take 10 mg by mouth as needed.     • cholecalciferol, vitamin D3, 1,000 unit (25 mcg) tablet Take 1,000 Units by mouth daily.     • coenzyme Q10 (COQ10) 50 mg capsule Take 50 mg by mouth daily.     • docusate sodium (COLACE) 100 mg capsule Take 2 capsules (200 mg total) by mouth 2 (two) times a day as needed for constipation. 60 capsule 0   • flecainide (TAMBOCOR) 50 mg tablet Take 1 tablet (50 mg total) by mouth 2  "(two) times a day. 180 tablet 3   • gabapentin (NEURONTIN) 100 mg capsule Take 1 capsule (100 mg total) by mouth nightly. (Patient taking differently: Take 50 mg by mouth nightly.) 90 capsule 0   • levothyroxine (SYNTHROID) 75 mcg tablet Take 1 tablet (75 mcg total) by mouth daily. 90 tablet 1   • lidocaine (ASPERCREME) 4 % adhesive patch,medicated topical patch Apply 1 patch topically daily. 30 patch 0   • metoprolol succinate XL (TOPROL-XL) 25 mg 24 hr tablet Take 0.5 tablets (12.5 mg total) by mouth daily as needed (SBP over 135 or  palpitations). 10 tablet 6   • mirtazapine (REMERON) 7.5 mg tablet Take 1 tablet (7.5 mg total) by mouth nightly. 60 tablet 0   • multivitamin (THERAGRAN) tablet Take 1 tablet by mouth daily.     • nitroglycerin (NITROSTAT) 0.4 mg SL tablet Place 1 tablet (0.4 mg total) under the tongue every 5 (five) minutes as needed for chest pain. 25 tablet 3   • omega-3-dha-epa-dpa-fish oil 1,050 mg(300 mg -675 mg-75 mg) capsule Take 1 capsule by mouth daily.     • polyethylene glycol (MIRALAX) 17 gram packet Take 17 g by mouth daily as needed (Constipation) for up to 3 days. 30 each 0     No current facility-administered medications for this visit.       ASSESSMENT       Progress  Patient's progress toward their goals is generally improving as Patient further processed hostile behaviors in the workplace and updates with her legal case regarding the harrassment.   Patient's symptomology is gradually improving  Patient disclosed improvement with slowing her anxiety when it presents with workplace related concerns.     PLAN     Goals:  -\"would like to get back to me\" and \"get back on track\"  -Develop skills for anxiety management including skills for coping with panic attacks  -Process past losses and trauma    Recommendations  Individual Therapy  45 minutes weekly    Next visit plan:  -Discuss treatment progress and guidelines as today was the 11th session  -Identify history of attachment and " awareness of boundaries in different relationships when able    I spent 45 minutes on this date of service performing the following activities: providing counseling and education.

## 2024-03-05 ENCOUNTER — PATIENT OUTREACH (OUTPATIENT)
Dept: CASE MANAGEMENT | Facility: CLINIC | Age: 64
End: 2024-03-05
Payer: COMMERCIAL

## 2024-03-05 NOTE — PROGRESS NOTES
03/05/2024 04:36 PM EST by Ragini Platt, RN 03/05/2024 04:36 PM EST by Ragini Platt, RN  Outgoing Alexi Anthony Alecia ([facility]) [phone] (Home)   Reached Patient: CM returned patient call. 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 Message sent to Dr Begum who stated he will reach out to her.

## 2024-03-11 ENCOUNTER — PATIENT OUTREACH (OUTPATIENT)
Dept: CASE MANAGEMENT | Facility: CLINIC | Age: 64
End: 2024-03-11
Payer: COMMERCIAL

## 2024-03-11 ENCOUNTER — TELEPHONE (OUTPATIENT)
Dept: PRIMARY CARE | Facility: CLINIC | Age: 64
End: 2024-03-11

## 2024-03-11 DIAGNOSIS — J32.9 RECURRENT SINUSITIS: Primary | ICD-10-CM

## 2024-03-11 NOTE — PROGRESS NOTES
Received message from [place] that patient got called into meeting missed our ™ meeting. Called her to check in. Went straight to  so asked her to call back tomorrow after 10:30 when I’m in the office.

## 2024-03-11 NOTE — PROGRESS NOTES
03/11/2024 10:14 AM EDT by Ragini Platt, RN 03/11/2024 10:14 AM EDT by Ragini Platt, RN  Outgoing Alexi Cruz Alecia (Self) [phone] (Home)   Reached PatientCommunicated - LCM: CM returned call. Patient requested a video appt with Dr Begum as she suspects she has a sinus infection with greenish mucus. Her voice is also raspy. Scheduled for 12:20 pm today. She is in [place] for work.

## 2024-03-11 NOTE — PROGRESS NOTES
03/11/2024 12:44 PM EDT by Ragini Platt, RN 03/11/2024 12:44 PM EDT by Ragini Platt, RN  Outgoing Alecia Nelson (Self) [phone] (Home)   Left Message: CM returned patient call. Patient unable to make video appt as emergency mtg came up at work. CM messaged Dr Begum who agreed to reach out to patient directly after 2:30 pm this afternoon by phone. CM informed patient on a VM.

## 2024-03-11 NOTE — PROGRESS NOTES
03/11/2024 03:30 PM EDT by Ragini Platt, RN 03/11/2024 03:30 PM EDT by Ragini Platt, RN  Patient called CM to ask if PCP is calling her or if a video was scheduled. CM informed her that Dr Begum will reach out by phone.     Incoming Alecia Nelson (Self) [phone] (Home)          Case discussed Dr. Galeano, Pt with worsening hypernatremia Na 155 today, will discontinue normal saline and switch to D5W at 75cc/hr, repeat BMP later today, Pt failed swallow evaluation, extensive discussion had with Pts wife Mary and dtr via phone, will discuss further and come in later today with decision regarding NGT. Will continue to monitor closely, f/u repeat BMP.

## 2024-03-11 NOTE — PROGRESS NOTES
03/11/2024 01:10 PM EDT by Ragini Platt, RN 03/11/2024 01:10 PM EDT by Ragini Platt, RN  Outgoing Alecia Nelson (Self) [phone] (Home)   Reached Patient: CM returned patient call to inform her that Dr Begum will call her back today after 2:30 PM. Patient was appreciative.

## 2024-03-12 RX ORDER — AMOXICILLIN AND CLAVULANATE POTASSIUM 875; 125 MG/1; MG/1
1 TABLET, FILM COATED ORAL 2 TIMES DAILY
Qty: 14 TABLET | Refills: 0 | Status: SHIPPED | OUTPATIENT
Start: 2024-03-12 | End: 2024-03-19

## 2024-03-12 NOTE — TELEPHONE ENCOUNTER
Pt returned to dr carroll, pt would like a call back, pt stated that she is experiencing a sinus infection    Please contact and advise

## 2024-03-13 NOTE — TELEPHONE ENCOUNTER
Spoke to patient. Sounds very congested. Has had 3-4  Days of thick green nasal mucous, sinus pressure/pain on the right side, headache, and ear congestion. Increased fatigue and difficulty blowing nose. No sob/cp. No significant fevers/chills. Has now had multiple sinus infections. I would like her to see ent in f/u for eval and will order her for ct scan of the sinuses.

## 2024-03-19 ENCOUNTER — TELEPHONE (OUTPATIENT)
Dept: PRIMARY CARE | Facility: CLINIC | Age: 64
End: 2024-03-19
Payer: COMMERCIAL

## 2024-03-19 NOTE — TELEPHONE ENCOUNTER
Spoke to Elzbieta at the permission of Alecia. She needs more specific schedule for her JADA to build her a schedule. We are going to have Alecia go ahead and start setting up her initial visits with PT and other specialists and then supply that so that Elzbieta can build her a schedule.

## 2024-03-20 ENCOUNTER — TELEPHONE (OUTPATIENT)
Dept: PRIMARY CARE | Facility: CLINIC | Age: 64
End: 2024-03-20
Payer: COMMERCIAL

## 2024-03-20 NOTE — TELEPHONE ENCOUNTER
Called patient back & informed her that I can not change the facility & she will have to call her insurance companies to do that.  She will try to make arrangements to get CT done here at [facility].

## 2024-03-20 NOTE — TELEPHONE ENCOUNTER
Spoke to patient to give her auth for CT. Patient wants to have it done in [place] @[facility].  Informed patient I will have to do pre-cert over.   Patient placed me on hold & we were disconnected.

## 2024-03-22 ENCOUNTER — PATIENT OUTREACH (OUTPATIENT)
Dept: CASE MANAGEMENT | Facility: CLINIC | Age: 64
End: 2024-03-22
Payer: COMMERCIAL

## 2024-03-22 NOTE — PROGRESS NOTES
03/22/2024 02:28 AM EDT by Ragini Platt, RN 03/22/2024 02:28 AM EDT by Ragini Platt, RN  Patient called in to say she just finished ABX that Dr Begum gave her for a sinus infection. Several days ago, she developed sharp pains in left ear and swelling around parotid gland(hx parotid nodule) plus discomfort. That swelling has resolved but the left ear is still intermittently hurting and it stops her in her tracks. She states she is still in [place]. CM suggested she go to  to have left ear assessed and she states she will go to a [facility]. Thee earliest she could get a scheduled appt for CT sinuses is 4/6. CM will update Dr Begum.  Incoming Alecia Nelson (Self) [phone] (Home)

## 2024-04-02 ENCOUNTER — TELEPHONE (OUTPATIENT)
Dept: SCHEDULING | Facility: CLINIC | Age: 64
End: 2024-04-02
Payer: COMMERCIAL

## 2024-04-02 DIAGNOSIS — I48.0 PAF (PAROXYSMAL ATRIAL FIBRILLATION) (CMS/HCC): ICD-10-CM

## 2024-04-02 DIAGNOSIS — R94.31 ABNORMAL EKG: Primary | ICD-10-CM

## 2024-04-02 NOTE — TELEPHONE ENCOUNTER
I see an order for an echo but it was cancelled. Does pt need testing and if so can we place a new order

## 2024-04-02 NOTE — TELEPHONE ENCOUNTER
Pt called and say she needs to schedule an Echo as soon as possible     Pt says he has been trying schedule the Echo but [facility] schedule can not locate the Auth     Pt can be reached at [phone]

## 2024-04-06 ENCOUNTER — HOSPITAL ENCOUNTER (OUTPATIENT)
Dept: RADIOLOGY | Facility: HOSPITAL | Age: 64
Discharge: HOME | End: 2024-04-06
Attending: STUDENT IN AN ORGANIZED HEALTH CARE EDUCATION/TRAINING PROGRAM
Payer: COMMERCIAL

## 2024-04-06 DIAGNOSIS — J32.9 RECURRENT SINUSITIS: ICD-10-CM

## 2024-04-06 PROCEDURE — 70486 CT MAXILLOFACIAL W/O DYE: CPT

## 2024-04-11 ENCOUNTER — PATIENT OUTREACH (OUTPATIENT)
Dept: CASE MANAGEMENT | Facility: CLINIC | Age: 64
End: 2024-04-11
Payer: COMMERCIAL

## 2024-04-12 ENCOUNTER — PATIENT OUTREACH (OUTPATIENT)
Dept: CASE MANAGEMENT | Facility: CLINIC | Age: 64
End: 2024-04-12
Payer: COMMERCIAL

## 2024-04-12 NOTE — PROGRESS NOTES
04/12/2024 11:12 AM EDT by Ragini Platt, RN  Outgoing Alecia Nelson (Self) [phone] (Home)   Reached Patient - [facility] returned call. Patient states she saw cardiologist-Dr Marquez this week and because NTG has helped with her CP, he started her on low dose Imdur in hopes this will help with CP. She is hoping once on this for a bit, she may sleep better as well. She will see him again in October. ECHO moved up to 4/24.     Patient will work on getting PT set up for leg issue.  She will schedule a cortisone shot for toe issue. She will schedule dermatology.     Patient would like to speak with Dr Begum on Monday re: all above. Scheduled for 12:40 pm.

## 2024-04-15 ENCOUNTER — TELEMEDICINE (OUTPATIENT)
Dept: PRIMARY CARE | Facility: CLINIC | Age: 64
End: 2024-04-15
Payer: COMMERCIAL

## 2024-04-15 ENCOUNTER — PATIENT OUTREACH (OUTPATIENT)
Dept: CASE MANAGEMENT | Facility: CLINIC | Age: 64
End: 2024-04-15
Payer: COMMERCIAL

## 2024-04-15 DIAGNOSIS — G57.21 FEMORAL NEUROPATHY OF RIGHT LOWER EXTREMITY: Primary | ICD-10-CM

## 2024-04-15 DIAGNOSIS — I48.0 PAROXYSMAL ATRIAL FIBRILLATION (CMS/HCC): ICD-10-CM

## 2024-04-15 DIAGNOSIS — I25.119 CORONARY ARTERY DISEASE INVOLVING NATIVE CORONARY ARTERY OF NATIVE HEART WITH ANGINA PECTORIS (CMS/HCC): ICD-10-CM

## 2024-04-15 PROCEDURE — 99999 PR OFFICE/OUTPT VISIT,PROCEDURE ONLY: CPT | Performed by: STUDENT IN AN ORGANIZED HEALTH CARE EDUCATION/TRAINING PROGRAM

## 2024-04-15 RX ORDER — ATORVASTATIN CALCIUM 40 MG/1
40 TABLET, FILM COATED ORAL
Qty: 90 TABLET | Refills: 1 | Status: SHIPPED | OUTPATIENT
Start: 2024-04-15 | End: 2024-12-11

## 2024-04-15 NOTE — ASSESSMENT & PLAN NOTE
-she has been ordered for cardiac PET by cardiology to look at possibility of microvascular disease  -ordered for isosorbide, only side effect has been headache.   -feeling improved w/ nitro.

## 2024-04-15 NOTE — ASSESSMENT & PLAN NOTE
-agree with her planning to continue on with physical therapy  -okay to use prn nsaid  -she isn't currently taking gabapentin.

## 2024-04-15 NOTE — LETTER
April 15, 2024     Patient: Alecia Cruz  YOB: 1960  Date of Visit: 4/15/2024    To Whom it May Concern:    Alecia Cruz was seen in my clinic on 4/15/2024 at 12:40 pm. Please excuse Alecia for her absence from work on this day to make the appointment.    If you have any questions or concerns, please don't hesitate to call.         Sincerely,         Mahendra Begum, DO

## 2024-04-15 NOTE — PROGRESS NOTES
Verification of Patient Location:  The patient affirms they are currently located in the following state: [place]    Request for Consent:    Audio Only Encounter   You and I are about to have a telemedicine check-in or visit. This is allowed because you have requested it. This telemedicine visit will be billed to your health insurance or you, if you are self-insured. You understand you will be responsible for any copayments or coinsurances that apply to your telemedicine visit. Before starting our telemedicine visit, I am required to get your consent for this virtual check-in or visit by telemedicine. Do you consent?    Patient Response to Request for Consent:  Yes    Was not able to do video visit due to technical limitation from Epic and not able to switch visit type from phone to video.     Visit Documentation:  Subjective     Patient ID: Alecia Cruz is a 63 y.o. female.  1960      HPI  64 y/o presents for follow-up after being seen by Dr. Rosales in [place]. She is staying out in [place] right now so that cardiologist is closer. She was started on nitroglycerin and responded well to this. She previously underwent cardiac cath in 2023. She has been having chest pain and tightness going down the arm. She feels improvement with nitro so she was started with isosorbide mononitrate. She has been having a lot of these feelings when she is going [place] the stairs into [place]. She wasn't sure if there was some level of mental component. Cardiologist that she saw validated that she is having chest pain relieved by nitro. She has been ordered for PET scan.     She has been on flecanide in the past. She was recommended to stop flecanide altogether. She was ordered for a cardiac PET. This has been scheduled with [facility].     Dr. Rosales was able to connect with Dr. Heraclio looney.     She is due for DEXA and mammogram. She needs to schedule physical therapy. She continues to have right leg pain which  is responsive to aleve over ibuprofen. She has not been taking gabapentin recently.     The following have been reviewed and updated as appropriate in this visit:   Allergies  Meds  Problems       Review of Systems  As noted above    Assessment/Plan   Problem List Items Addressed This Visit       Paroxysmal atrial fibrillation (CMS/[facility])    Overview     Her [id number] score is 2.        11/22 48 hr monitor with no episodes of AFIB.    Patient reported history of proximal A-fib which started in the 1990s and had been treated with verapamil, digoxin and Coumadin in the past.   She has since been started on flecainide 50 mg twice daily.  She only takes metoprolol as needed because it makes her feel fatigued.    8/20/21 echo in [place]  LVEF 50-55%, mild RV dysfunction, mild MR, RVs of 30-35 mm Hg     Her Eliquis to be taken as needed in place of aspirin for episodes of atrial fibrillation lasting greater than 24 hours per Dr. Alonso         Current Assessment & Plan     -not on doac at present  -flecanide stopped pending PET scan results.          Relevant Medications    atorvastatin (LIPITOR) 40 mg tablet    Coronary artery disease involving native coronary artery of native heart with angina pectoris (CMS/[facility])    Overview     Hospitalized 3/7 to 3/11/23 for complaints of chest pain  CXR-stable DEVEN nodule  ECG with non-specific T wave abnormalities  CT of chest negative for PE  3/10/23 heart catheterization showed mild LCX disease with other vessels patent.         Current Assessment & Plan     -she has been ordered for cardiac PET by cardiology to look at possibility of microvascular disease  -ordered for isosorbide, only side effect has been headache.   -feeling improved w/ nitro.          Relevant Medications    atorvastatin (LIPITOR) 40 mg tablet    Femoral neuropathy of right lower extremity - Primary    Current Assessment & Plan     -agree with her planning to continue on with physical therapy  -okay to  use prn nsaid  -she isn't currently taking gabapentin.               Time Spent:  I spent 17 minutes on this date of service performing the following activities: obtaining history, entering orders, documenting, obtaining / reviewing records, and providing counseling and education.

## 2024-04-15 NOTE — PROGRESS NOTES
04/15/2024 12:38 PM EDT by Ragini Platt RN  Incoming Alecia Nelson ([facility]) [phone] (Home)   Patient called in saying she is home with a headache today and can do a video appt at 12:40 pm instead of a call. Informed Dr Begum.

## 2024-04-17 ENCOUNTER — DOCUMENTATION (OUTPATIENT)
Dept: BEHAVIORAL HEALTH | Facility: CLINIC | Age: 64
End: 2024-04-17
Payer: COMMERCIAL

## 2024-04-17 PROCEDURE — 99999 PR OFFICE/OUTPT VISIT,PROCEDURE ONLY: CPT

## 2024-04-17 NOTE — DISCHARGE SUMMARY
Discharge Summary         Patient Name: Alecia Cruz    : 1960    Treatment start date: 10/19/23    Date of last visit: 24           Discharge Reason: Incomplete Discharge  Patient has not returned contact to reschedule a missed appointment within 30 days and therefore, per clinic policy, is discharged from services.             Reason for admission and treatment: Patient sought care due to worsening anxiety including hypervigilance, irritability, trouble sleeping, and feeling afraid/paranoid secondary to harrassment in the workplace. Patient appeared to be struggling with panic and physical symptoms of anxiety consistent with anxiety attacks when overwhelmed.          Services offered and response to treatment: Patient and Therapist agreed to weekly sessions to  develop skills for anxiety management including skills for coping with panic attacks as well as to process past losses and trauma. Patient's consistency with sessions was often impacted by unexpected demands in the workplace or physical health concerns.          Client status - Condition upon discharge: Unknown, Patient not present at discharge.        Suicidal Ideation/Homicidal Ideation Risk Assessment not assessed. If not assessed, reason:  Patient not present at discharge. See last visit note.       Clinical concerns to be addressed in continued care: Patient would benefit from long term therapeutic support for management of anxiety symptoms and past trauma.          Aftercare appointments: NA         Special Instructions: Patient can return to treatment by contacting the [facility] Intake line at [phone] to inquire about access.             Mental Health Crisis Support:     [facility] Crisis Number: [phone]    [facility] Crisis Number: [phone]    [facility] Crisis Number: [phone]    [facility] Crisis Number: [phone]             Kathy Briggs LCSW @ 12:08 PM

## 2024-04-19 ENCOUNTER — PATIENT OUTREACH (OUTPATIENT)
Dept: CASE MANAGEMENT | Facility: CLINIC | Age: 64
End: 2024-04-19
Payer: COMMERCIAL

## 2024-04-19 NOTE — PROGRESS NOTES
04/19/2024 12:56 PM EDT by Ragini Platt RN  Outgoing Alecia Nelson ([facility]) [phone] (Home)   Left Message - LCM -returned patient call.

## 2024-04-19 NOTE — PROGRESS NOTES
04/19/2024 01:49 PM EDT by Ragini Platt, RN  Outgoing Alecia Nelson (Self) [phone] (Home)   Reached Patient : Patient called to ask Dr Begum to please fill out another set of FMLA forms as last one was for March 2023-March 2024. Patient thought it was May to May so behind. CM secure chatting Dr Begum to please fill out this FMLA form.     Patient states new med-Isosorbide, even at low dose is causing headaches. Improved a little with water intake. If it continues to happen into next week, she will notify PCP and cardiology.

## 2024-04-23 ENCOUNTER — PATIENT OUTREACH (OUTPATIENT)
Dept: CASE MANAGEMENT | Facility: CLINIC | Age: 64
End: 2024-04-23
Payer: COMMERCIAL

## 2024-04-23 NOTE — PROGRESS NOTES
04/23/2024 12:54 PM EDT by Ragini Platt, RN  Outgoing Alecia Nelson (Self) [phone] (Home)   Reached Patient - LCM : CM returned patient call. She just had an echo and nuclear stress test. She states she had a terrible headache all day Friday so Dr Rosales took her off the Isosorbide and these tests were moved up. Dr Rosales will look at results of these tests and take it from there.     Informed patient that her Atorvastatin was sent to [facility] in [place] and she states understanding.     Encouraged patient to call number on back of [employer] card to see if dietician covered and, if so, where to go.

## 2024-05-06 ENCOUNTER — PATIENT OUTREACH (OUTPATIENT)
Dept: CASE MANAGEMENT | Facility: CLINIC | Age: 64
End: 2024-05-06
Payer: COMMERCIAL

## 2024-05-06 NOTE — PROGRESS NOTES
05/06/2024 11:31 AM EDT by Ragini Platt, RN  Outgoing Alecia Nelson (Self) [phone] (Home)   Reached Patient - [facility]   Patient called and stated she was put on Amlodipine recently and is experiencing some palpitations and when walking across street is getting winded. She is calling her cardiologist right now but would like to talk to Dr Begum, tomorrow preferably, re: everything. Sent a message to Dr Begum to see if can do video appt with her tomorrow evening. Also, requested to have Luis or Van RN's in office, to put in a PT/OT consult outpatient for her-she was under impression it was already put in, but I am not seeing. She has back pain and rosie wrist pain and her left arm lymphedema is acting up and causing neck discomfort/stiffness. Updated MD on all above.

## 2024-05-07 ENCOUNTER — TELEMEDICINE (OUTPATIENT)
Dept: PRIMARY CARE | Facility: CLINIC | Age: 64
End: 2024-05-07
Payer: COMMERCIAL

## 2024-05-07 DIAGNOSIS — I20.9 ANGINA PECTORIS (CMS/HCC): ICD-10-CM

## 2024-05-07 DIAGNOSIS — Z02.89 ENCOUNTER FOR COMPLETION OF FORM WITH PATIENT: Primary | ICD-10-CM

## 2024-05-07 PROCEDURE — 200200 PR NO CHARGE: Mod: 95 | Performed by: STUDENT IN AN ORGANIZED HEALTH CARE EDUCATION/TRAINING PROGRAM

## 2024-05-10 ENCOUNTER — PATIENT OUTREACH (OUTPATIENT)
Dept: CASE MANAGEMENT | Facility: CLINIC | Age: 64
End: 2024-05-10
Payer: COMMERCIAL

## 2024-05-10 NOTE — PROGRESS NOTES
05/10/2024 09:40 AM EDT by Ragini Platt, RN  Incoming Alecia Nelson (Self) [phone] (Home)   Patient called saying palps causing headaches and difficulty breathing at times.Patient is emailing form to Dr Begum to fill out. Sending it to group ACM email and CM will pass onto Dr Begum. CM suggested getting checked in ER re: palps and symptoms and not being on A/C. Patient states she is calling someone now to take her to ER. Also a Halter monitor is being sent to her to wear x 1 month.

## 2024-05-20 ENCOUNTER — PATIENT OUTREACH (OUTPATIENT)
Dept: CASE MANAGEMENT | Facility: CLINIC | Age: 64
End: 2024-05-20
Payer: COMMERCIAL

## 2024-05-20 NOTE — PROGRESS NOTES
05/20/2024 04:22 PM EDT by Ragini Platt, RN  Outgoing Alecia Nelson (Self) [phone] (Home)   Reached Patient : CM called requesting FMLA paperwork, PT order for nerve and toe issue and diff walking and note for work when she was in ER. CM sent in basket message to Dr Begum and let him know vis secure chat of video appt scheduled tomorrow at 12:40 pm.

## 2024-05-21 ENCOUNTER — PATIENT OUTREACH (OUTPATIENT)
Dept: CASE MANAGEMENT | Facility: CLINIC | Age: 64
End: 2024-05-21
Payer: COMMERCIAL

## 2024-05-21 DIAGNOSIS — G57.21 FEMORAL NEUROPATHY OF RIGHT LOWER EXTREMITY: Primary | ICD-10-CM

## 2024-05-21 NOTE — PROGRESS NOTES
Social Work Note     received referral from Ragini Platt, EBEN for assistance with disability parking placard.     called patient, left vm.    Rosette Garcia MSW, LSW  [phone]

## 2024-05-21 NOTE — PROGRESS NOTES
05/21/2024 02:28 PM EDT by Ragini Platt, EBEN  Outgoing Alecia Nelson (Self) [phone] (Home)   Reached Patient - LCM : CM returned patient call. Patient apologized for being held up on a work situation and was not able to get on video with Dr Begum earlier today. She rescheduled for tomorrow at 9:40 am. CM will inform Dr Begum.       05/21/2024 02:37 PM EDT by Ragini Platt, EBEN  Outgoing Alecia Nelson (Self) [phone] (Home)   Reached Patient : Informed patient that Dr Begum is sending FMLA paperwork via textPlus. PT script is in system and she should get ER sick note from ER or cardiology. She states understanding and video appt cancelled for tomorrow.

## 2024-05-28 ENCOUNTER — PATIENT OUTREACH (OUTPATIENT)
Dept: CASE MANAGEMENT | Facility: CLINIC | Age: 64
End: 2024-05-28
Payer: COMMERCIAL

## 2024-05-28 NOTE — PROGRESS NOTES
05/28/2024 09:57 AM EDT by Ragini Platt, RN  Outgoing Alecia Nelson (Self) [phone] (Home)   CM returned patient call. She states she called to schedule PT and in discussion with PT, was told her script needs more information. In addition to neuropathy needs, it must say she is having rosie hip and low back pain re: adjusting for neuropathy. She is having issues with lymphedema left arm and lymphedema pump is not helping. Informed Dr Begum.

## 2024-05-31 DIAGNOSIS — M25.552 BILATERAL HIP PAIN: Primary | ICD-10-CM

## 2024-05-31 DIAGNOSIS — M54.50 LOW BACK PAIN, UNSPECIFIED BACK PAIN LATERALITY, UNSPECIFIED CHRONICITY, UNSPECIFIED WHETHER SCIATICA PRESENT: ICD-10-CM

## 2024-05-31 DIAGNOSIS — M25.551 BILATERAL HIP PAIN: Primary | ICD-10-CM

## 2024-06-03 NOTE — PROGRESS NOTES
Could not make contact with patient during appt on video call. Recached patient later by phone and we discussed that she needs to have fmla redone. We had thought that fmla was dated to May and it appears that it was attached to March so needs to be redone. Is also working with cardiology in [place] to get her palpitations under control and angina. Started on imdur by cardiologist. Has minimal side effects.     She will get the la paperwork over to us.

## 2024-06-25 ENCOUNTER — PATIENT OUTREACH (OUTPATIENT)
Dept: CASE MANAGEMENT | Facility: CLINIC | Age: 64
End: 2024-06-25
Payer: COMMERCIAL

## 2024-06-25 NOTE — PROGRESS NOTES
06/25/2024 09:34 AM EDT by Ragini Platt, RN  Outgoing Alecia Nelson (Self) [phone] (Home   Reached Patient : CM returned patient call. Patient would like Dr Begum to know how she is doing.     She feels the meds she is taking for arrhythmia/palps is causing her to have insomnia. If she does sleep, she wakes up in a brain fog. She is falling asleep at work. She is having a hard time performing at work.     She will not drive as she is fearful she will fall asleep.     She has a call into Dr Marquez and was told a nurse will call back. She will work with Dr Marquez. She is finishing up with the heart monitor.     Patient will keep CM updated.    Patient to download Handicap Placard info and fill out and send to Dr Begum for signature.

## 2024-07-02 ENCOUNTER — PATIENT OUTREACH (OUTPATIENT)
Dept: CASE MANAGEMENT | Facility: CLINIC | Age: 64
End: 2024-07-02
Payer: COMMERCIAL

## 2024-07-02 NOTE — PROGRESS NOTES
Received message from patient on Ragini Platt's voicemail stating that she was experiencing new cough symptoms. She had a harsh cough noted several times on voicemail and difficulty speaking. She is requesting medication and also an appt w/ Dr. Begum. ACM called patient back but did not reach her, left  requesting call back to ACM or office directly to provide additional details. Will contact office and PCP to inform of message details and request for appt.      Earl Thorne, ZAKN, RN  Ambulatory Care Manager  [phone]

## 2024-07-03 ENCOUNTER — APPOINTMENT (EMERGENCY)
Dept: RADIOLOGY | Facility: HOSPITAL | Age: 64
End: 2024-07-03
Payer: COMMERCIAL

## 2024-07-03 ENCOUNTER — HOSPITAL ENCOUNTER (EMERGENCY)
Facility: HOSPITAL | Age: 64
Discharge: HOME | End: 2024-07-04
Attending: EMERGENCY MEDICINE
Payer: COMMERCIAL

## 2024-07-03 ENCOUNTER — HOSPITAL ENCOUNTER (OUTPATIENT)
Dept: PHYSICAL THERAPY | Facility: HOSPITAL | Age: 64
Setting detail: THERAPIES SERIES
Discharge: HOME | End: 2024-07-03
Attending: STUDENT IN AN ORGANIZED HEALTH CARE EDUCATION/TRAINING PROGRAM
Payer: COMMERCIAL

## 2024-07-03 DIAGNOSIS — M25.552 BILATERAL HIP PAIN: ICD-10-CM

## 2024-07-03 DIAGNOSIS — M54.50 LOW BACK PAIN, UNSPECIFIED BACK PAIN LATERALITY, UNSPECIFIED CHRONICITY, UNSPECIFIED WHETHER SCIATICA PRESENT: ICD-10-CM

## 2024-07-03 DIAGNOSIS — M25.551 BILATERAL HIP PAIN: ICD-10-CM

## 2024-07-03 DIAGNOSIS — R05.1 ACUTE COUGH: Primary | ICD-10-CM

## 2024-07-03 LAB
ALBUMIN SERPL-MCNC: 4.1 G/DL (ref 3.5–5.7)
ALP SERPL-CCNC: 56 IU/L (ref 34–125)
ALT SERPL-CCNC: 15 IU/L (ref 7–52)
ANION GAP SERPL CALC-SCNC: 5 MEQ/L (ref 3–15)
AST SERPL-CCNC: 15 IU/L (ref 13–39)
BASOPHILS # BLD: 0.03 K/UL (ref 0.01–0.1)
BASOPHILS NFR BLD: 0.5 %
BILIRUB SERPL-MCNC: 0.6 MG/DL (ref 0.3–1.2)
BUN SERPL-MCNC: 10 MG/DL (ref 7–25)
CALCIUM SERPL-MCNC: 9.1 MG/DL (ref 8.6–10.3)
CHLORIDE SERPL-SCNC: 105 MEQ/L (ref 98–107)
CO2 SERPL-SCNC: 27 MEQ/L (ref 21–31)
CREAT SERPL-MCNC: 0.8 MG/DL (ref 0.6–1.2)
DIFFERENTIAL METHOD BLD: NORMAL
EGFRCR SERPLBLD CKD-EPI 2021: >60 ML/MIN/1.73M*2
EOSINOPHIL # BLD: 0.11 K/UL (ref 0.04–0.36)
EOSINOPHIL NFR BLD: 2 %
ERYTHROCYTE [DISTWIDTH] IN BLOOD BY AUTOMATED COUNT: 12.8 % (ref 11.7–14.4)
FLUAV RNA SPEC QL NAA+PROBE: NEGATIVE
FLUBV RNA SPEC QL NAA+PROBE: NEGATIVE
GLUCOSE SERPL-MCNC: 82 MG/DL (ref 70–99)
HCT VFR BLD AUTO: 37.9 % (ref 35–45)
HGB BLD-MCNC: 12.7 G/DL (ref 11.8–15.7)
IMM GRANULOCYTES # BLD AUTO: 0 K/UL (ref 0–0.08)
IMM GRANULOCYTES NFR BLD AUTO: 0 %
LYMPHOCYTES # BLD: 2.03 K/UL (ref 1.2–3.5)
LYMPHOCYTES NFR BLD: 36.4 %
MCH RBC QN AUTO: 31.8 PG (ref 28–33.2)
MCHC RBC AUTO-ENTMCNC: 33.5 G/DL (ref 32.2–35.5)
MCV RBC AUTO: 94.8 FL (ref 83–98)
MONOCYTES # BLD: 0.4 K/UL (ref 0.28–0.8)
MONOCYTES NFR BLD: 7.2 %
NEUTROPHILS # BLD: 3.01 K/UL (ref 1.7–7)
NEUTS SEG NFR BLD: 53.9 %
NRBC BLD-RTO: 0 %
PDW BLD AUTO: 9.8 FL (ref 9.4–12.3)
PLATELET # BLD AUTO: 220 K/UL (ref 150–369)
POTASSIUM SERPL-SCNC: 3.6 MEQ/L (ref 3.5–5.1)
PROT SERPL-MCNC: 7.2 G/DL (ref 6–8.2)
RBC # BLD AUTO: 4 M/UL (ref 3.93–5.22)
RSV RNA SPEC QL NAA+PROBE: NEGATIVE
SARS-COV-2 RNA RESP QL NAA+PROBE: NEGATIVE
SODIUM SERPL-SCNC: 137 MEQ/L (ref 136–145)
TROPONIN I SERPL HS-MCNC: 3.5 PG/ML
WBC # BLD AUTO: 5.58 K/UL (ref 3.8–10.5)

## 2024-07-03 PROCEDURE — 93005 ELECTROCARDIOGRAM TRACING: CPT

## 2024-07-03 PROCEDURE — 97530 THERAPEUTIC ACTIVITIES: CPT | Mod: GP

## 2024-07-03 PROCEDURE — 93005 ELECTROCARDIOGRAM TRACING: CPT | Performed by: EMERGENCY MEDICINE

## 2024-07-03 PROCEDURE — 85025 COMPLETE CBC W/AUTO DIFF WBC: CPT | Performed by: EMERGENCY MEDICINE

## 2024-07-03 PROCEDURE — 97162 PT EVAL MOD COMPLEX 30 MIN: CPT | Mod: GP

## 2024-07-03 PROCEDURE — 99283 EMERGENCY DEPT VISIT LOW MDM: CPT | Mod: 25

## 2024-07-03 PROCEDURE — 80053 COMPREHEN METABOLIC PANEL: CPT | Performed by: EMERGENCY MEDICINE

## 2024-07-03 PROCEDURE — 71046 X-RAY EXAM CHEST 2 VIEWS: CPT

## 2024-07-03 PROCEDURE — 87637 SARSCOV2&INF A&B&RSV AMP PRB: CPT | Performed by: EMERGENCY MEDICINE

## 2024-07-03 PROCEDURE — 87637 SARSCOV2&INF A&B&RSV AMP PRB: CPT

## 2024-07-03 PROCEDURE — 36415 COLL VENOUS BLD VENIPUNCTURE: CPT | Performed by: EMERGENCY MEDICINE

## 2024-07-03 PROCEDURE — 84484 ASSAY OF TROPONIN QUANT: CPT | Performed by: EMERGENCY MEDICINE

## 2024-07-03 NOTE — OP PT TREATMENT LOG
DOS 3/20/24   IE 7/3/24   30 Day 8/3/24   60 Day 9/3/24   90 Day    Precautions Falls, cardiac   Insurance Auth    Treatment  Current Session Time   Modalities Total Time for Session Not performed   Heat/Ice  CPT 72075    E. Stimulation Manual  CPT 95504    E. Stim Unattended  CPT 82774    Ultrasound  CPT 75090    Manual   CPT 74632 Total Time for Session Not performed   STM/MFR/TPR    Instrument Assisted STM     Mobilizations    ROM/Flexibility    Myofascial Decompression    Ther. Exercise  CPT 93049                   Group Total Time for Session Not performed     Sets Reps Load Comment    NuStep                Ball squeeze        TAC BKFO        Bridging        Hip abd     Supine, slide board   TAC march        Quad set                Side clamshells        Hip 3-way        HR        HS curl                Wedge (s)        Hip flexor (s)     stairs   FSU                                            Neuro Re-Ed  CPT 33283   Group Total Time for Session Not performed     Sets Reps Load Comment                                     Gait  CPT 23040 Group Total Time for Session Not performed                  Ther. Activity  CPT 93580 Group Total Time for Session 8-22 Minutes   Pt education  Educated on pertinent anatomy/physiology, feedback on performance, source of groin discomfort likely hip given testing, recommended pillow between knees sidelying for neutral hip position given sensitivity to IR,add; scheduling/POC             Group  CPT 45186 Total Time for Session Not performed

## 2024-07-03 NOTE — PROGRESS NOTES
Physical Therapy Evaluation    [facility] Fax: [phone]    PT EVALUATION FOR OUTPATIENT THERAPY    Patient: Alecia Cruz    MRN: [id number]  : 1960 63 y.o.     Referring Physician: Mahendra Begum DO  Date of Visit: 7/3/2024      Certification Dates:  24 through 24         Recommended Frequency & Duration:  1 time/week for up to 8 weeks     Diagnosis:   1. Bilateral hip pain    2. Low back pain, unspecified back pain laterality, unspecified chronicity, unspecified whether sciatica present        Chief Complaints:   Chief Complaint   Patient presents with    Dec ROM    Dec Strength    Pain    Decreased recreational/play activity    Abnormality Of Gait    Decreased Mobility       Precautions: cardiac, fall  Precautions additional comments: hx A-Fib    Past Medical History:   Past Medical History:   Diagnosis Date    A-fib (CMS/HCC)     Breast cancer (CMS/HCC) 2008    left partial mastectomy axillary dissection, whole breast radiation and chemotherapy in  for a 2-1/2 cm invasive ductal carcinoma that was ER/FL negative and HER-2/alyson positive with 7-15 positive lymph nodes    Colon polyp     couple on each colonoscopy per patient report    COVID 2022    Fibrocystic breast     Fibroid     History of partial hysterectomy 2000    c/o fibroids    History of radiation therapy     Hx antineoplastic chemo     Hx of lipoma 2008    resected from shoulder    Hypothyroidism     Malignant neoplasm of upper-outer quadrant of left breast in female, estrogen 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    PAF (paroxysmal atrial fibrillation) (CMS/HCC)     PVC (premature ventricular contraction)     Screening for breast cancer        Past Surgical History:   Past Surgical History:   Procedure Laterality Date    BREAST BIOPSY Left 2008    BREAST BIOPSY  Right 2021    benign ([facility] specimen [id number])    BREAST LUMPECTOMY Left 2008    [facility] specimen [id number]    CARDIAC CATHETERIZATION      DENTAL SURGERY      HYSTERECTOMY      partial    LUMBAR PUNCTURE      MANDIBLE FRACTURE SURGERY      PARTIAL HYSTERECTOMY      ROTATOR CUFF REPAIR Left     SINUS SURGERY           LEARNING ASSESSMENT    Assessment completed:  Yes    Learner name:  Alecia Cruz    Learner: Patient    Learning Barriers:  Learning barriers: No Barriers    Preferred Language: English     Needed: No    Education Provided:   Method: Discussion and Demonstration  Readiness: acceptance  Response: Demonstrated understanding      CO-LEARNER ASSESSMENT:    Completed: No      Welcome letter discussed: Yes Patient provided with Welcome Letter, which includes attendance policy. Provided education regarding cancellation and no-show policy. Education regarding the importance of participation and regular attendance to maximize goal attainment.       OBJECTIVE MEASUREMENTS/DATA:    Time In Session:  Start Time: 0804  Stop Time: 0845 (pt had to leave early)  Time Calculation (min): 41 min   Assessment and Plan - 07/03/24 0806          Assessment    Plan of Care reviewed and patient/family in agreement Yes     System Pathology/Pathophysiology Noted neuromuscular;musculoskeletal     Functional Limitations in Following Categories (PT Eval) self-care;community/leisure     Rehab Potential/Prognosis good, to achieve stated therapy goals     Clinical Assessment Pt presents with focal groin/hip pain that worsens with IR and compression of hip joint. TTP along anterior structures including hip flexor, rec fem, and hip add. Lumbar AROM did not contribute to R hip pain, but noted asymmetry and stiffness in lumbar mobility. R hip MMT influenced by pain. Antalgic steps observed rising to stand/walk initially after prolonged rest/positioning. Rated function low on LEFS.     Plan and Recommendations Initiate TE; add  to [facility]     Planned Services CPT 90933 Manual therapy;CPT 47747 Neuromuscular Reeducation;CPT 42947 Self-care/Home management training;CPT 40174 Therapeutic exercises;CPT 19111 Therapeutic activities;CPT 97603 Hot/Cold Packs therapy;CPT 97836 Therapeutic Massage                    General Information - 07/03/24 0806          Session Details    Document Type initial evaluation     Mode of Treatment individual therapy        General Information    Onset of Illness/Injury or Date of Surgery 03/20/24     Referring Physician Dr. Begum     History of present illness/functional impairment Pt is a 62 y/o F with new onset R hip pain after catheterization procedure that led to difficulty WB’ing. Pt reports she was wheeled out on chair after procedure in March ’24, and took about 1 week after procedure to bear weight comfortably on RLE. Noted incidence of buckling RLE, one time at top of stairs which led to fall downstairs. Pt poorly tolerates gabapentin and ibuprofen 2nd cardiac hx. Treats chronic back pain with salonpas patches, chronic LBP worsened from favoring R hip over past several months.     Patient/Family/Caregiver Comments/Observations Pt notes pain in hip persists and impacts daily activities     Existing Precautions/Restrictions cardiac;fall     Precautions comments hx A-Fib                    Pain/Vitals - 07/03/24 0806          Pain Assessment    Currently in pain Yes     Preferred Pain Scale number (Numeric Rating Pain Scale)     Pain Side/Orientation right     Pain: Body location Hip;Back     Pain Rating (0-10): Pre Activity 0     Pain Description intermittent     Pain Onset/Duration pressing on groin, up and down steps, reaching leg out toward side        Pain Intervention    Intervention  IE, education     Post Intervention Comments see assessment                    Falls/Food Screening - 07/03/24 0806          Initial Falls Assessment    One or more falls in the last year Yes     How many times 2 or more      Was the patient injured in any fall No     Fall prevention interventions recommended Harold and re-orient the patient to the environment;Schedule individual therapy sessions as appropriate     Recommended plan to address falls address pain        Food Insecurity    Within the past 12 months, you worried that your food would run out before you got the money to buy more. Often true     Within the past 12 months, the food you bought just didn't last and you didn't have money to get more. Often true                    PT - 07/03/24 0806          Physical Therapy    Physical Therapy Specialty Ortho [facility] Sports PT        PT Plan    Frequency of treatment 1 time/week     PT Duration 8 weeks     PT Cert From 07/03/24     PT Cert To 08/28/24     Date PT POC was sent to provider 07/03/24     Signed PT Plan of Care received?  No                       Living Environment    Living Environment - 07/03/24 0806          Living Environment    People in Home child(abhi), adult     Living Arrangements house     Living Environment Comment 2SH full FOS with railing; 5 MAXI with railing                   ROM    Range of Motion - 07/03/24 1200          RIGHT: Lower Extremity AROM Assessment    Hip Flexion   120 degrees     Hip Extension   0 degrees   pain    Hip Abduction   30 degrees        LEFT: Lower Extremity AROM Assessment    Hip Flexion   135 degrees                   MMT    Manual Muscle Tests - 07/03/24 0806          RIGHT: Lower Extremity Manual Muscle Test Assessment    Hip Flexion gross movement (3/5) fair   pain    Hip Internal Rotation gross movement (4-/5) good minus     Hip External Rotation gross movement (3+/5) fair plus     Knee Flexion strength (4-/5) good minus     Knee Extension strength (4-/5) good minus   pain                  Palpation    Palpation - 07/03/24 0806          Palpation    LE Palpation  (+) TTP distal adductor insertion RLE, hip flexor oirigin RLE, rec fem RLE                   Ortho Special Tests     Orthopedic Special Tests - 07/03/24 1200          Hip/Pelvis Special Tests    FADIR Right - Positive     Scour Right - Positive                   Gait and Mobility    Gait and Mobility - 07/03/24 0806          Gait Training    [place], Gait independent     Variable surfaces Flat surface     Comment Noted antalgic gait RLE for initial 2-3 steps after prolonged static positioning                   Outcome Measures    PT Outcome Measures - 07/03/24 0806          Subjective Outcome Measures    LEFS 23/80                      Goals         Mutually agreed upon pain goal       Mutually agreed upon pain goal: 0/10        PT LBP / R hip PS (pt-stated)       Pt states she wants to be more confident putting weight on R leg without it buckling    STGs (4 weeks):    Pt will be I with recommended HEP  Pt will incr LEFS >= 32/80 to demo improved use of R leg with ADLs and improve functional lift/squat  Pt will incr R hip flex >= 130 degrees to demo improved arthrokinematics for carryover to transfers/positioning  Pt will incr proximal hip strength 1/2 grade to demo improved stability for closed-chain activities    LTGs (8 weeks):    Pt will incr LEFS >= 50/80 to demo improved use of R leg with ADLs and improve functional lift/squat  Pt will incr R hip abd >= 40 degrees without pain to demo improved arthrokinematics for carryover to transfers/positioning  Pt will incr proximal hip strength 1 grade to demo improved stability for closed-chain activities                TREATMENT PLAN:    DOS 3/20/24   IE 7/3/24   30 Day 8/3/24   60 Day 9/3/24   90 Day    Precautions Falls, cardiac   Insurance Auth    Treatment  Current Session Time   Modalities Total Time for Session Not performed   Heat/Ice  CPT 29428    E. Stimulation Manual  CPT 67156    E. Stim Unattended  CPT 87083    Ultrasound  CPT 24816    Manual   CPT 88350 Total Time for Session Not performed   STM/MFR/TPR    Instrument Assisted STM     Mobilizations    ROM/Flexibility     Myofascial Decompression    Ther. Exercise  CPT 75267                   Group Total Time for Session Not performed     Sets Reps Load Comment    NuStep                Ball squeeze        TAC BKFO        Bridging        Hip abd     Supine, slide board   TAC march        Quad set                Side clamshells        Hip 3-way        HR        HS curl                Wedge (s)        Hip flexor (s)     stairs   FSU                                            Neuro Re-Ed  CPT 96016   Group Total Time for Session Not performed     Sets Reps Load Comment                                     Gait  CPT 85583 Group Total Time for Session Not performed                  Ther. Activity  CPT 45761 Group Total Time for Session 8-22 Minutes   Pt education  Educated on pertinent anatomy/physiology, feedback on performance, source of groin discomfort likely hip given testing, recommended pillow between knees sidelying for neutral hip position given sensitivity to IR,add; scheduling/POC             Group  CPT 50073 Total Time for Session Not performed        ASSESSMENT:    This 63 y.o. year old female presents to PT with above stated diagnosis. Physical Therapy evaluation reveals   resulting in self-care, community/leisure limitations. Alecia Cruz will benefit from skilled PT services to address limitation, work towards rehab and patient goals and maximize PLOF of chosen ADLs.     Planned Services: The patient's treatment will include CPT 03125 Manual therapy, CPT 75781 Neuromuscular Reeducation, CPT 18564 Self-care/Home management training, CPT 83730 Therapeutic exercises, CPT 74924 Therapeutic activities, CPT 79559 Hot/Cold Packs therapy, CPT 40726 Therapeutic Massage, .     Swapnil Garrett, PT

## 2024-07-04 VITALS
TEMPERATURE: 98.3 F | BODY MASS INDEX: 21.66 KG/M2 | HEART RATE: 81 BPM | RESPIRATION RATE: 17 BRPM | OXYGEN SATURATION: 96 % | WEIGHT: 138 LBS | HEIGHT: 67 IN | DIASTOLIC BLOOD PRESSURE: 91 MMHG | SYSTOLIC BLOOD PRESSURE: 135 MMHG

## 2024-07-04 LAB
ATRIAL RATE: 76
D DIMER PPP IA.FEU-MCNC: <0.27 UG/ML FEU (ref 0–0.5)
P AXIS: 68
PR INTERVAL: 98
QRS DURATION: 78
QT INTERVAL: 338
QTC CALCULATION(BAZETT): 380
R AXIS: 18
T WAVE AXIS: -85
TROPONIN I SERPL HS-MCNC: 3.8 PG/ML
VENTRICULAR RATE: 76

## 2024-07-04 PROCEDURE — 84484 ASSAY OF TROPONIN QUANT: CPT | Performed by: EMERGENCY MEDICINE

## 2024-07-04 PROCEDURE — 93010 ELECTROCARDIOGRAM REPORT: CPT | Performed by: INTERNAL MEDICINE

## 2024-07-04 PROCEDURE — 85379 FIBRIN DEGRADATION QUANT: CPT | Performed by: EMERGENCY MEDICINE

## 2024-07-04 PROCEDURE — 36415 COLL VENOUS BLD VENIPUNCTURE: CPT | Performed by: EMERGENCY MEDICINE

## 2024-07-04 RX ORDER — BENZONATATE 100 MG/1
100 CAPSULE ORAL 2 TIMES DAILY PRN
Qty: 14 CAPSULE | Refills: 0 | Status: SHIPPED | OUTPATIENT
Start: 2024-07-04 | End: 2024-07-11

## 2024-07-04 ASSESSMENT — ENCOUNTER SYMPTOMS: COUGH: 1

## 2024-07-04 NOTE — ED PROVIDER NOTES
Emergency Medicine Note  HPI   HISTORY OF PRESENT ILLNESS     63-year-old female past medical history A-fib, breast cancer, fibroids who presents with cough.  She reports over the past 5 to 6 days she has had a cough which is productive of some white sputum.  It started after she attended a  with some family members.  She reports over the past day she has had some beige sputum.  She denies any hemoptysis.  She has no associated fevers or chills.  She reports sometimes she coughs so hard she has some chest pain with it.  No leg swelling or pain, no syncope.  She does also note palpitations when she coughs and reports that she can feel when she is going in and out of atrial fibrillation.  She has a history of this and is on blood thinners.  She reports compliance with those blood thinners.  Today she went to an urgent care because of the cough and they told her her EKG was abnormal and they sent her here.  She reports she has an abnormal EKG at baseline and is aware of that but they did not have one to compare it to.      History provided by:  Patient  Cough        Patient History   PAST HISTORY     Reviewed from Nursing Triage:       Past Medical History:   Diagnosis Date    A-fib (CMS/HCC)     Breast cancer (CMS/[facility])     left partial mastectomy axillary dissection, whole breast radiation and chemotherapy in  for a 2-1/2 cm invasive ductal carcinoma that was ER/ID negative and HER-2/alyson positive with 7-15 positive lymph nodes    Colon polyp     couple on each colonoscopy per patient report    COVID 2022    Fibrocystic breast     Fibroid     History of partial hysterectomy 2000    c/o fibroids    History of radiation therapy     Hx antineoplastic chemo     Hx of lipoma     resected from shoulder    Hypothyroidism     Malignant neoplasm of upper-outer quadrant of left breast in female, estrogen receptor negative (CMS/HCC)  9/10/2021    2008-2.5 cm cancer with 7 of 15+ nodes treated with  partial mastectomy, axillary dissection, chemotherapy and whole breast radiation.    PAF (paroxysmal atrial fibrillation) (CMS/HCC)     PVC (premature ventricular contraction)     Screening for breast cancer        Past Surgical History:   Procedure Laterality Date    BREAST BIOPSY Left 2008    BREAST BIOPSY Right 2021    benign ([facility] specimen [id number])    BREAST LUMPECTOMY Left 2008    [facility] specimen [id number]    CARDIAC CATHETERIZATION      DENTAL SURGERY      HYSTERECTOMY      partial    LUMBAR PUNCTURE      MANDIBLE FRACTURE SURGERY      PARTIAL HYSTERECTOMY      ROTATOR CUFF REPAIR Left     SINUS SURGERY         Family History   Problem Relation Age of Onset    Heart disease Biological Mother     Alzheimer's disease Biological Mother     Arrhythmia Biological Mother     Heart disease Biological Father     Diabetes Biological Father     Hypertension Biological Sister     Melanoma Biological Sister     Sarcoidosis Biological Sister     Diabetes Biological Sister     Heart disease Biological Sister     COPD Biological Brother     Prostate cancer Biological Brother     Heart disease Biological Brother     Hypertension Biological Brother     Prostate cancer Biological Brother     Clotting disorder Mother's Brother     Heart disease Father's Sister     Lung cancer Father's Sister     Lung cancer Father's Sister     Prostate cancer Father's Brother     Prostate cancer Father's Brother     Prostate cancer Father's Brother     Prostate cancer Father's Brother     Lung cancer Father's Brother     Stomach cancer Paternal Grandmother     Diabetes Paternal Grandfather     Prostate cancer Paternal Grandfather     Ovarian cancer Paternal Cousin     Lung cancer Paternal Cousin     Thyroid cancer Paternal Cousin        Social History     Tobacco Use    Smoking status: Never    Smokeless tobacco: Never   Vaping Use    Vaping Use: Never used   Substance Use Topics    Alcohol use: No    Drug use: No         Review of Systems    REVIEW OF SYSTEMS     Review of Systems   Respiratory:  Positive for cough.          VITALS     ED Vitals      Date/Time Temp Pulse Resp BP SpO2 [facility]   07/04/24 0200 -- 81 17 135/91 96 % ANP   07/04/24 0100 -- 72 18 137/69 96 % ANP   07/04/24 0000 -- 65 16 132/73 98 % ANP   07/03/24 2300 -- 64 18 146/84 98 % ANP   07/03/24 2200 -- 57 17 144/81 98 % ANP   07/03/24 1859 36.8 °C (98.3 °F) 79 18 146/70 98 % JA          Pulse Ox %: 96 % (07/04/24 0202)  Pulse Ox Interpretation: Normal (07/04/24 0202)  Heart Rate: 72 (07/04/24 0202)  Rhythm Strip Interpretation: Normal Sinus Rhythm (07/04/24 0202)     Physical Exam   PHYSICAL EXAM     Physical Exam  Vitals reviewed.   Constitutional:       General: She is not in acute distress.     Appearance: Normal appearance.   HENT:      Head: Normocephalic.      Nose: Nose normal.      Mouth/Throat:      Mouth: Mucous membranes are moist.   Cardiovascular:      Rate and Rhythm: Normal rate and regular rhythm.      Pulses: Normal pulses.      Heart sounds: Normal heart sounds.   Pulmonary:      Effort: Pulmonary effort is normal.      Breath sounds: Normal breath sounds. No wheezing, rhonchi or rales.   Abdominal:      General: Abdomen is flat.      Tenderness: There is no abdominal tenderness.   Musculoskeletal:         General: Normal range of motion.      Cervical back: Normal range of motion.   Skin:     General: Skin is warm.      Capillary Refill: Capillary refill takes less than 2 seconds.   Neurological:      General: No focal deficit present.      Mental Status: She is alert and oriented to person, place, and time.      Cranial Nerves: No cranial nerve deficit.      Motor: No weakness.   Psychiatric:         Mood and Affect: Mood normal.           PROCEDURES     Procedures     DATA     Results       Procedure Component Value Units Date/Time    D-dimer, quantitative [[id number]]  (Normal) Collected: 07/04/24 0024    Specimen: Blood, Venous Updated: 07/04/24 0120     D-Dimer,  Quant <0.27 ug/mL FEU      Comment: Suggested Age Related Reference Range: 0.00 - 0.65  The D-Dimer assay can be used as an aid in the diagnosis of DVT or PE. The test can not be used by itself to exclude DVT or PE. When used as a diagnostic aid, the cutoff value is the same as the reference range: <0.5 ug/ml FEU.       HS Troponin I (with 2 hour reflex) [[id number]]  (Normal) Collected: 07/03/24 2159    Specimen: Blood, Venous Updated: 07/03/24 2239     High Sens Troponin I 3.5 pg/mL     Comprehensive metabolic panel [[id number]]  (Normal) Collected: 07/03/24 2159    Specimen: Blood, Venous Updated: 07/03/24 2232     Sodium 137 mEQ/L      Potassium 3.6 mEQ/L      Comment: Results obtained on plasma. Plasma Potassium values may be up to 0.4 mEQ/L less than serum values. The differences may be greater for patients with high platelet or white cell counts.        Chloride 105 mEQ/L      CO2 27 mEQ/L      BUN 10 mg/dL      Creatinine 0.8 mg/dL      Glucose 82 mg/dL      Calcium 9.1 mg/dL      AST (SGOT) 15 IU/L      ALT (SGPT) 15 IU/L      Alkaline Phosphatase 56 IU/L      Total Protein 7.2 g/dL      Comment: Test performed on plasma which typically contains approximately 0.4 g/dL more protein than serum.        Albumin 4.1 g/dL      Bilirubin, Total 0.6 mg/dL      eGFR >60.0 mL/min/1.73m*2      Comment: Calculation based on the Chronic Kidney Disease Epidemiology Collaboration (CKD-EPI) equation refit without adjustment for race.        Anion Gap 5 mEQ/L     CBC and differential [[id number]] Collected: 07/03/24 2159    Specimen: Blood, Venous Updated: 07/03/24 2208     WBC 5.58 K/uL      RBC 4.00 M/uL      Hemoglobin 12.7 g/dL      Hematocrit 37.9 %      MCV 94.8 fL      MCH 31.8 pg      MCHC 33.5 g/dL      RDW 12.8 %      Platelets 220 K/uL      MPV 9.8 fL      Differential Type Auto     nRBC 0.0 %      Immature Granulocytes 0.0 %      Neutrophils 53.9 %      Lymphocytes 36.4 %      Monocytes 7.2 %      Eosinophils 2.0 %       Basophils 0.5 %      Immature Granulocytes, Absolute 0.00 K/uL      Neutrophils, Absolute 3.01 K/uL      Lymphocytes, Absolute 2.03 K/uL      Monocytes, Absolute 0.40 K/uL      Eosinophils, Absolute 0.11 K/uL      Basophils, Absolute 0.03 K/uL     SARS-COV-2 (COVID-19)/ FLU A/B, AND RSV, PCR Nasopharynx [[id number]]  (Normal) Collected: 07/03/24 1904    Specimen: Nasopharyngeal Swab from Nasopharynx Updated: 07/03/24 2007     SARS-CoV-2 (COVID-19) Negative     Influenza A Negative     Influenza B Negative     Respiratory Syncytial Virus Negative    Narrative:      Testing performed using real-time PCR for detection of COVID-19. EUA approved validation studies performed on site.             Imaging Results              X-RAY CHEST 2 VIEWS (Final result)  Result time 07/03/24 19:32:56      Final result                   Impression:    IMPRESSION:  No active disease in the chest.               Narrative:    CLINICAL HISTORY:  Chest Pain/Arrhythmia    TECHNIQUE: Frontal and lateral views of the chest were obtained    COMPARISON: 1/31/2024, and other studies    COMMENT:  The lungs are clear without infiltrate, effusion or pneumothorax. The  cardiomediastinal silhouette is within normal limits.                                      ECG 12 lead   Independent Interpretation by ED Provider   NSR 76, nml QRS, nml axis, T wave inversions II, III, AVF, V4, V5, V6 appears simialr to prior EKG 1/31/24          Scoring tools                                  ED Course & MDM   MDM / ED COURSE / CLINICAL IMPRESSION / DISPO     Medical Decision Making  63-year-old female who presents with 5 days of cough differential includes viral illness, pneumonia, less likely PE or CHF.  Patient does have abnormal EKG however it appears similar to prior EKG from earlier this year.  Do not think this represents ACS    Problems Addressed:  Acute cough: acute illness or injury    Amount and/or Complexity of Data Reviewed  External Data Reviewed:  ECG.     Details: January 2024, patient had normal cardiac cath 3/10/2023  Labs: ordered.     Details: Reviewed results, negative D-dimer, negative troponin x 2   Radiology: ordered.  ECG/medicine tests: ordered and independent interpretation performed.    Risk  Prescription drug management.           Clinical Impression      Acute cough     _________________       ED Disposition   Discharge                       Ida Dallas MD  07/04/24 8586

## 2024-07-05 ENCOUNTER — TELEPHONE (OUTPATIENT)
Dept: PRIMARY CARE | Facility: CLINIC | Age: 64
End: 2024-07-05
Payer: COMMERCIAL

## 2024-07-05 NOTE — TELEPHONE ENCOUNTER
I called pt because she has been having a cough and was requesting  a appt w/ Dr. Begum. I LVM for her to call back to schedule

## 2024-07-08 ENCOUNTER — DOCUMENTATION (OUTPATIENT)
Dept: SOCIAL WORK | Facility: REHABILITATION | Age: 64
End: 2024-07-08
Payer: COMMERCIAL

## 2024-07-08 NOTE — PROGRESS NOTES
AUGIE has sent EPIC mail to Dr. Begum requesting script for LUE lymphedema. AUGIE spoke with patient today and she thinks Dr. Begum away at this time. Patient is going to call the office to see if she can assist in obtaining script.

## 2024-07-10 ENCOUNTER — DOCUMENTATION (OUTPATIENT)
Dept: SOCIAL WORK | Facility: REHABILITATION | Age: 64
End: 2024-07-10
Payer: COMMERCIAL

## 2024-07-10 NOTE — PROGRESS NOTES
CM called patient to discuss food insecurity. Patient does report that she often struggles with having enough money for food. Patient has applied for SNAP previously but was told she did not fill out the form correctly. Patient unable to receive food box delivery as they do not deliver to her zip code. CM told patient about [employer] website where can apply for multiple benefits. CM will provide patient with food pantry information and [employer] website.

## 2024-07-12 ENCOUNTER — PATIENT OUTREACH (OUTPATIENT)
Dept: CASE MANAGEMENT | Facility: CLINIC | Age: 64
End: 2024-07-12
Payer: COMMERCIAL

## 2024-07-12 DIAGNOSIS — I89.0 LYMPHEDEMA: Primary | ICD-10-CM

## 2024-07-12 NOTE — PROGRESS NOTES
07/12/2024 09:53 AM EDT by Ragini Platt, RN  Outgoing Alecia Nelson (Self) [phone] (Home)   Left Message - LCM : CM returned call to patient and left a message that ENA Johnson is working with Marisela Jalloh from therapy to get an order/referral for her PT eval and to treat LUE lymphedema with ICD codes and phone and fax numbers.

## 2024-07-17 ENCOUNTER — DOCUMENTATION (OUTPATIENT)
Dept: SOCIAL WORK | Facility: REHABILITATION | Age: 64
End: 2024-07-17
Payer: COMMERCIAL

## 2024-07-17 NOTE — PROGRESS NOTES
CM called patient and left voicemail informing patient the lymphedema script was sent and encouraging her to complete current therapy before she schedules lymphedema therapy. CM provided my contact information if any questions.

## 2024-07-28 ENCOUNTER — NURSE TRIAGE (OUTPATIENT)
Dept: PRIMARY CARE | Facility: CLINIC | Age: 64
End: 2024-07-28
Payer: COMMERCIAL

## 2024-07-28 NOTE — TELEPHONE ENCOUNTER
Synopsis:  Patient calling w/ c/o not feeling well today and recent exposure to family member who is stating they just came down with COVID. She feels that she has this as well, but has not tested. Advised for any treatment she will need to demonstrate a positive test. She will obtain a test kit and call back with the results.  Disposition:  Urgent Home Treatment With Follow-up Call  Care Advice:  Care Advice Given       No Care Advice given for this encounter.          Orders Placed This Encounter:  No orders of the defined types were placed in this encounter.

## 2024-07-29 ENCOUNTER — PATIENT OUTREACH (OUTPATIENT)
Dept: CASE MANAGEMENT | Facility: CLINIC | Age: 64
End: 2024-07-29
Payer: COMMERCIAL

## 2024-07-29 NOTE — PROGRESS NOTES
07/29/2024 01:59 PM EDT by Ragini Platt RN  Outgoing Alecia Nelson (Self) [phone] (Home)   No Answer/Busy - [facility] - full.

## 2024-07-29 NOTE — PROGRESS NOTES
07/29/2024 10:13 AM EDT by Ragini Platt, RN  Outgoing Alecia Nelson (Self) [phone] (Home)   No Answer/Busy - [facility] : [facility] full

## 2024-07-29 NOTE — Clinical Note
Holland Roamno-I scheduled a video appt for this patient with you this afternoon as she has Covid and started with symptoms overnight and is asking about Paxlovid. Dr Begum-she wants you to know she is finishing up her part of the FMLA forms by end of week then you will be notified if anything else needed on your end.  Thanks

## 2024-07-30 ENCOUNTER — TELEMEDICINE (OUTPATIENT)
Dept: PRIMARY CARE | Facility: CLINIC | Age: 64
End: 2024-07-30
Payer: COMMERCIAL

## 2024-07-30 DIAGNOSIS — U07.1 COVID-19: Primary | ICD-10-CM

## 2024-07-30 DIAGNOSIS — I48.0 PAROXYSMAL ATRIAL FIBRILLATION (CMS/HCC): ICD-10-CM

## 2024-07-30 PROCEDURE — 99212 OFFICE O/P EST SF 10 MIN: CPT | Mod: 95 | Performed by: NURSE PRACTITIONER

## 2024-07-30 NOTE — PROGRESS NOTES
Verification of Patient Location:  The patient affirms they are currently located in the following state:[place]     Request for Consent:  You and I are about to have a telemedicine check-in or visit. This is allowed because you are already my patient, and you have requested it.  This telemedicine visit will be billed to your health insurance or you, if you are self-insured.  You understand you will be responsible for any copayments or coinsurances that apply to your telemedicine visit.  Before starting our telemedicine visit, I am required to get your consent for this virtual check-in or visit by telemedicine. Do you consent?      Patient Response to Request for Consent: Yes    Communication platform used for this encounter: [employer] video visit (epic video client)          Visit Documentation:      Subjective      Patient ID: Alecia Cruz is a 63 y.o. female.  1960      HPI  Pt requests telemed visit to discuss positive COVID test and symptoms.   She reports she started feeling a sore throat on Saturday and then found out her niece who she spent the day with was COVID positive. She went to urgent care Sunday and tested for COVID, although she does not have the results yet. She took an at home COVID test last night which was positive. She is vaccinated and boosted. Symptoms are mild at this time including some congestion and rhinorrhea.     Past Medical History:   Diagnosis Date    A-fib (CMS/HCC)     Breast cancer (CMS/HCC) 2008    left partial mastectomy axillary dissection, whole breast radiation and chemotherapy in 2008 for a 2-1/2 cm invasive ductal carcinoma that was ER/CT negative and HER-2/alyson positive with 7-15 positive lymph nodes    Colon polyp     couple on each colonoscopy per patient report    COVID 8/23/2022    Fibrocystic breast     Fibroid     History of partial hysterectomy 03/2000    c/o fibroids    History of radiation therapy     Hx antineoplastic chemo     Hx of lipoma 2008     resected from shoulder    Hypothyroidism     Malignant neoplasm of upper-outer quadrant of left breast in female, estrogen 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    PAF (paroxysmal atrial fibrillation) (CMS/HCC)     PVC (premature ventricular contraction)     Screening for breast cancer        Past Surgical History:   Procedure Laterality Date    BREAST BIOPSY Left 2008    BREAST BIOPSY Right 2021    benign ([facility] specimen [id number])    BREAST LUMPECTOMY Left 2008    [facility] specimen [id number]    CARDIAC CATHETERIZATION      DENTAL SURGERY      HYSTERECTOMY      partial    LUMBAR PUNCTURE      MANDIBLE FRACTURE SURGERY      PARTIAL HYSTERECTOMY      ROTATOR CUFF REPAIR Left     SINUS SURGERY         Social History     Tobacco Use    Smoking status: Never    Smokeless tobacco: Never   Vaping Use    Vaping Use: Never used   Substance Use Topics    Alcohol use: No    Drug use: No      Family History   Problem Relation Age of Onset    Heart disease Biological Mother     Alzheimer's disease Biological Mother     Arrhythmia Biological Mother     Heart disease Biological Father     Diabetes Biological Father     Hypertension Biological Sister     Melanoma Biological Sister     Sarcoidosis Biological Sister     Diabetes Biological Sister     Heart disease Biological Sister     COPD Biological Brother     Prostate cancer Biological Brother     Heart disease Biological Brother     Hypertension Biological Brother     Prostate cancer Biological Brother     Clotting disorder Mother's Brother     Heart disease Father's Sister     Lung cancer Father's Sister     Lung cancer Father's Sister     Prostate cancer Father's Brother     Prostate cancer Father's Brother     Prostate cancer Father's Brother     Prostate cancer Father's Brother     Lung cancer Father's Brother     Stomach cancer Paternal  Grandmother     Diabetes Paternal Grandfather     Prostate cancer Paternal Grandfather     Ovarian cancer Paternal Cousin     Lung cancer Paternal Cousin     Thyroid cancer Paternal Cousin        Review of Systems   Constitutional: Negative.    HENT:  Positive for congestion, postnasal drip, rhinorrhea and sore throat.    Eyes: Negative.    Respiratory:  Positive for cough.    Cardiovascular: Negative.    Gastrointestinal: Negative.    Endocrine: Negative.    Genitourinary: Negative.    Musculoskeletal: Negative.    Skin: Negative.    Allergic/Immunologic: Negative.    Neurological:  Positive for headaches.   Hematological: Negative.    Psychiatric/Behavioral: Negative.                 Assessment/Plan   Diagnoses and all orders for this visit:    COVID-19 (Primary)    Paroxysmal atrial fibrillation (CMS/[facility])      - COVID-19   - Continue supportive care. No need for Paxlovid at this time. Discussed in detail that it does interact with her medications and that thankfully her symptoms are mild. Discussed red flag signs that pt should seek immediate medical attention for.     - Paroxysmal atrial fibrillation   - Following with cardiology.          Time Spent:  I spent 15 minutes on this date of service performing the following activities: obtaining history, documenting, preparing for visit, obtaining / reviewing records, providing counseling and education, and coordinating care.

## 2024-07-30 NOTE — PROGRESS NOTES
07/30/2024 09:14 AM EDT by Ragini Platt, RN  Outgoing Alecia Nelson (Self) [phone] (Home)   Reached Patient : [facility]: CM returned patient call. She had left a message in middle of night to say she tested + for Covid and is now having symptoms. She would like to get on a medication for it. AUGIE scheduled a video telemed with CRJESSICA for Dr Begum, who is off today. She is interested in Paxlovid.    Patient states she read that Paxlovid interferes with Flecainide. Pointed out that she should check with cardiology about this before going on Paxlovid. She states she will call cardiologist now to see if she can take Paxlovid (If CRNP orders it) and what to do about the Flecainide if she does go on it.     Patient wants Dr Begum to know that she will be finishing up her end of the FMLA forms by end of week then Dr Begum will be notified if anything needed on his end.

## 2024-07-31 ASSESSMENT — ENCOUNTER SYMPTOMS
GASTROINTESTINAL NEGATIVE: 1
CARDIOVASCULAR NEGATIVE: 1
COUGH: 1
SORE THROAT: 1
PSYCHIATRIC NEGATIVE: 1
ENDOCRINE NEGATIVE: 1
CONSTITUTIONAL NEGATIVE: 1
HEADACHES: 1
ALLERGIC/IMMUNOLOGIC NEGATIVE: 1
HEMATOLOGIC/LYMPHATIC NEGATIVE: 1
MUSCULOSKELETAL NEGATIVE: 1
RHINORRHEA: 1
EYES NEGATIVE: 1

## 2024-08-01 ENCOUNTER — TELEPHONE (OUTPATIENT)
Dept: CASE MANAGEMENT | Facility: CLINIC | Age: 64
End: 2024-08-01
Payer: COMMERCIAL

## 2024-08-01 ENCOUNTER — PATIENT OUTREACH (OUTPATIENT)
Dept: CASE MANAGEMENT | Facility: CLINIC | Age: 64
End: 2024-08-01
Payer: COMMERCIAL

## 2024-08-01 NOTE — TELEPHONE ENCOUNTER
Patient is Covid +.    She had a rough night x 2(Non-stop  but non productive, coughing, pain -left stabbing in ear and left side face, feels hot but no fever, then N/T feet/toes) but eases up during day.     Patient had a telemed with CRNP a couple days ago but was more stable then. Held off on ordering Paxlovid, for the time, as symptoms were mild then.     HX Covid in 2019 in which patient had good and bad days for a month or longer-long hauler.      Patient may consider the Paxlovid now and has Benzonatate in home and is asking if can take?      She spoke to cardiology yesterday who states, if she ends up on Paxlovid, to stop the Flecainide, during the treatment, and then resume 24 hours after last dose.

## 2024-08-01 NOTE — PROGRESS NOTES
08/01/2024 11:31 AM EDT by Ragini Platt, RN  Outgoing Alecia Nelson (Self) [phone] (Home)   Reached Patient - [facility] : CM returned call to patient. She had a rough night x 2(Non-stop  but non productive, coughing, pain -left stabbing in ear and left side face, feels hot but no fever, then N/T feet/toes) but eases up during day.    Patient had a telemed with CRNP a couple days ago but was more stable then. Held off on Paxlovid for the time as symptoms were mild then.    HX Covid in 2019 in which patient had good and bad days for a month or longer-long hauler.     Patient may consider the Paxlovid now and has Benzonatate in home and is asking if can take.     She spoke to cardiology yesterday who states, if she ends up on Paxlovid, to stop the Flecainide, during the treatment, and then resume 24 hours after last dose.     Will send this note to office of PCP/CRNP.    VISHAL Amezcua, RN  Ambulatory Care Manager  [phone]

## 2024-08-01 NOTE — TELEPHONE ENCOUNTER
Spoke with patient, symptoms started on Sunday 7/28 and have gotten worse especially at night. Requesting Paxlovid if appropriate. Educated pt to also not take lipitor if prescribed Paxlovid and to resume 5 days after completion of med. Pt verbalized understanding. Will reach out to Dr. Begum.

## 2024-08-02 ENCOUNTER — PATIENT OUTREACH (OUTPATIENT)
Dept: CASE MANAGEMENT | Facility: CLINIC | Age: 64
End: 2024-08-02
Payer: COMMERCIAL

## 2024-08-02 ENCOUNTER — TELEMEDICINE (OUTPATIENT)
Dept: PRIMARY CARE | Facility: CLINIC | Age: 64
End: 2024-08-02
Payer: COMMERCIAL

## 2024-08-02 ENCOUNTER — TELEPHONE (OUTPATIENT)
Dept: CASE MANAGEMENT | Facility: CLINIC | Age: 64
End: 2024-08-02
Payer: COMMERCIAL

## 2024-08-02 DIAGNOSIS — R05.1 ACUTE COUGH: ICD-10-CM

## 2024-08-02 DIAGNOSIS — U07.1 COVID-19: Primary | ICD-10-CM

## 2024-08-02 PROCEDURE — 99212 OFFICE O/P EST SF 10 MIN: CPT | Mod: 95 | Performed by: NURSE PRACTITIONER

## 2024-08-02 RX ORDER — BENZONATATE 100 MG/1
100 CAPSULE ORAL 3 TIMES DAILY PRN
Qty: 30 CAPSULE | Refills: 0 | Status: SHIPPED | OUTPATIENT
Start: 2024-08-02 | End: 2024-08-12

## 2024-08-02 ASSESSMENT — ENCOUNTER SYMPTOMS
EYES NEGATIVE: 1
ENDOCRINE NEGATIVE: 1
HEMATOLOGIC/LYMPHATIC NEGATIVE: 1
CARDIOVASCULAR NEGATIVE: 1
PSYCHIATRIC NEGATIVE: 1
COUGH: 1
GASTROINTESTINAL NEGATIVE: 1
NEUROLOGICAL NEGATIVE: 1
CONSTITUTIONAL NEGATIVE: 1
MUSCULOSKELETAL NEGATIVE: 1

## 2024-08-02 NOTE — TELEPHONE ENCOUNTER
Patient called stating she has not heard anything about the Paxlovid. We discussed that it was probably too far along since + test to start Paxlovid. But she really sounds terrible and is having coughing fits. I scheduled her for a telemed with Rosalina Quintana at 4:30 pm today so she can hear how terrible she sounds and possibly prescribe Benzonatate and whatever else she may need.     Thanks,    Ragini Platt, ZAKN, RN  Ambulatory Care Manager  [phone]    Clear

## 2024-08-02 NOTE — PROGRESS NOTES
Verification of Patient Location:  The patient affirms they are currently located in the following state:[place]     Request for Consent:  You and I are about to have a telemedicine check-in or visit. This is allowed because you are already my patient, and you have requested it.  This telemedicine visit will be billed to your health insurance or you, if you are self-insured.  You understand you will be responsible for any copayments or coinsurances that apply to your telemedicine visit.  Before starting our telemedicine visit, I am required to get your consent for this virtual check-in or visit by telemedicine. Do you consent?      Patient Response to Request for Consent: Yes    Communication platform used for this encounter: [employer] video visit (epic video client)     Visit Documentation:    Subjective      Patient ID: Alecia Cruz is a 63 y.o. female.  1960      HPI  Pt requests telemed visit to discuss COVID symptoms. She is coughing a lot which then causes her chest to be sore and throat to be raspy. No fever or chills. No SOB. Symptoms initially started Saturday. Requesting tessalon as that has helped her before.   Past Medical History:   Diagnosis Date    A-fib (CMS/HCC)     Breast cancer (CMS/HCC) 2008    left partial mastectomy axillary dissection, whole breast radiation and chemotherapy in 2008 for a 2-1/2 cm invasive ductal carcinoma that was ER/ID negative and HER-2/alyson positive with 7-15 positive lymph nodes    Colon polyp     couple on each colonoscopy per patient report    COVID 8/23/2022    Fibrocystic breast     Fibroid     History of partial hysterectomy 03/2000    c/o fibroids    History of radiation therapy     Hx antineoplastic chemo     Hx of lipoma 2008    resected from shoulder    Hypothyroidism     Malignant neoplasm of upper-outer quadrant of left breast in female, estrogen receptor negative (CMS/HCC)  9/10/2021    2008-2.5 cm cancer with 7 of 15+ nodes treated with  partial mastectomy, axillary dissection, chemotherapy and whole breast radiation.    PAF (paroxysmal atrial fibrillation) (CMS/HCC)     PVC (premature ventricular contraction)     Screening for breast cancer        Past Surgical History:   Procedure Laterality Date    BREAST BIOPSY Left 2008    BREAST BIOPSY Right 2021    benign ([facility] specimen [id number])    BREAST LUMPECTOMY Left 2008    [facility] specimen [id number]    CARDIAC CATHETERIZATION      DENTAL SURGERY      HYSTERECTOMY      partial    LUMBAR PUNCTURE      MANDIBLE FRACTURE SURGERY      PARTIAL HYSTERECTOMY      ROTATOR CUFF REPAIR Left     SINUS SURGERY       Social History     Tobacco Use    Smoking status: Never    Smokeless tobacco: Never   Vaping Use    Vaping Use: Never used   Substance Use Topics    Alcohol use: No    Drug use: No      Family History   Problem Relation Age of Onset    Heart disease Biological Mother     Alzheimer's disease Biological Mother     Arrhythmia Biological Mother     Heart disease Biological Father     Diabetes Biological Father     Hypertension Biological Sister     Melanoma Biological Sister     Sarcoidosis Biological Sister     Diabetes Biological Sister     Heart disease Biological Sister     COPD Biological Brother     Prostate cancer Biological Brother     Heart disease Biological Brother     Hypertension Biological Brother     Prostate cancer Biological Brother     Clotting disorder Mother's Brother     Heart disease Father's Sister     Lung cancer Father's Sister     Lung cancer Father's Sister     Prostate cancer Father's Brother     Prostate cancer Father's Brother     Prostate cancer Father's Brother     Prostate cancer Father's Brother     Lung cancer Father's Brother     Stomach cancer Paternal Grandmother     Diabetes Paternal Grandfather     Prostate cancer Paternal Grandfather     Ovarian cancer Paternal Cousin     Lung cancer Paternal Cousin     Thyroid cancer Paternal Cousin            Review of Systems    Constitutional: Negative.    HENT:  Positive for postnasal drip.    Eyes: Negative.    Respiratory:  Positive for cough.    Cardiovascular: Negative.    Gastrointestinal: Negative.    Endocrine: Negative.    Genitourinary: Negative.    Musculoskeletal: Negative.    Skin: Negative.    Neurological: Negative.    Hematological: Negative.    Psychiatric/Behavioral: Negative.             Assessment/Plan   Diagnoses and all orders for this visit:    COVID-19 (Primary)    Acute cough    Other orders  -     benzonatate (TESSALON PERLES) 100 mg capsule; Take 1 capsule (100 mg total) by mouth 3 (three) times a day as needed for cough for up to 10 days.    - COVID 19   - Cont supportive care. Will inform us of any changes.     - Acute cough   - Tessalon PRN.      Time Spent:  I spent 10 minutes on this date of service performing the following activities: obtaining history, entering orders, documenting, and coordinating care.

## 2024-08-02 NOTE — PROGRESS NOTES
08/02/2024 02:36 PM EDT by Ragini Platt, RN  Outgoing Alecia Nelson (Self) [phone] (Home)   Reached Patient : CM returned patient call. She hasn't heard about Paxlovid. Explained it was probably too far along since testing Covid + for Paxlovid. She is having terrible coughing fits and has a very raspy voice. AUGIE scheduled her for a video appt with WILMAN Quintana at 4:30 pm today. She is requesting Benzonatate and anything else that may help her get through this.      VISHAL Amezcua, RN  Ambulatory Care Manager  [phone]

## 2024-08-08 ENCOUNTER — PATIENT OUTREACH (OUTPATIENT)
Dept: CASE MANAGEMENT | Facility: CLINIC | Age: 64
End: 2024-08-08
Payer: COMMERCIAL

## 2024-08-08 NOTE — PROGRESS NOTES
8/22/2024 ADDENDUM     sent outreach letter to patient with resources for food insecurity.  LSW contact information included if additional assistance is requested.      8/13/2024 ADDENDUM     placed second outreach call, left vm.        Social Work Note     received [facility] referral for food insecurity.       called patient, left vm.      THEO Gannon, LSW  [phone]

## 2024-08-09 ENCOUNTER — TELEMEDICINE (OUTPATIENT)
Dept: PRIMARY CARE | Facility: CLINIC | Age: 64
End: 2024-08-09
Payer: COMMERCIAL

## 2024-08-09 DIAGNOSIS — R91.1 INCIDENTAL LUNG NODULE, GREATER THAN OR EQUAL TO 8MM: ICD-10-CM

## 2024-08-09 DIAGNOSIS — K59.01 CONSTIPATION DUE TO SLOW TRANSIT: ICD-10-CM

## 2024-08-09 DIAGNOSIS — G57.21 FEMORAL NEUROPATHY OF RIGHT LOWER EXTREMITY: ICD-10-CM

## 2024-08-09 DIAGNOSIS — Z85.3 HISTORY OF LEFT BREAST CANCER: Primary | ICD-10-CM

## 2024-08-09 DIAGNOSIS — I48.0 PAROXYSMAL ATRIAL FIBRILLATION (CMS/HCC): ICD-10-CM

## 2024-08-09 PROBLEM — R10.31 GROIN PAIN, RIGHT: Status: RESOLVED | Noted: 2023-04-03 | Resolved: 2024-08-09

## 2024-08-09 PROCEDURE — G22XX PR COMPLEXITY INHERENT TO E&M -ADD ON NON-BILLABLE: HCPCS | Mod: 95 | Performed by: STUDENT IN AN ORGANIZED HEALTH CARE EDUCATION/TRAINING PROGRAM

## 2024-08-09 PROCEDURE — 99214 OFFICE O/P EST MOD 30 MIN: CPT | Mod: 95 | Performed by: STUDENT IN AN ORGANIZED HEALTH CARE EDUCATION/TRAINING PROGRAM

## 2024-08-09 NOTE — PROGRESS NOTES
Verification of Patient Location:  The patient affirms they are currently located in the following state: [place]    Request for Consent:    Audio and Video Encounter   Krish, my name is Mahendra DO Kel.  Before we proceed, can you please verify your identification by telling me your full name and date of birth?  Can you tell me who is in the room with you?    You and I are about to have a telemedicine check-in or visit because you have requested it.  This is a live video-conference.  I am a real person, speaking to you in real time.  There is no one else with me on the video-conference. I am not recording this conversation and I am asking you not to record it.  This telemedicine visit will be billed to your health insurance or you, if you are self-insured.  You understand you will be responsible for any copayments or coinsurances that apply to your telemedicine visit.  Communication platform used for this encounter:  [employer] Video Visit (Epic Video Client)       Before starting our telemedicine visit, I am required to get your consent for this virtual check-in or visit by telemedicine. Do you consent?    Patient Response to Request for Consent:  Yes      Visit Documentation:  Subjective     Patient ID: Alecia Cruz is a 63 y.o. female.  1960      HPI  64 y/o presents for f/u. She is curently in [place].     Recently had covid19 which she seems to be recovering. She had left sided ear and facial ain and was having numbness in her toes. Seems to be recovering but has occasional numbness still. She also reports ongoing hoarseness. Had coughing and sore throat. She has been using supportive care which has been helpful. Was seen at  a well as by NP in my office.     She has been seen by cardiology and had a observation stay at [place]. She was started on flecanide and eliquis again. She hasn't been taking eliquis as she was having some gi distress related to it.     Reports that she has been  having constipation going on for the past several months. She has trialed miralax. This started at the time that she started the flecanide. She is able to have a bowel movement every few days but reports that it can take weeks to have one. She is having what she describes is a [place] stool chart 1.     She continues to have significant anxiety.    Physical therapy is staring this coming Monday. She reports that she has been having issues with pain int he right leg. She was seen by PT already. She is trying to avoid the gabapentin if possible.     She has submitted the affidavit w/ her insurance. She is dealing w/ her fmla next.     Lung nodule that was noted previously has been stable since 2010. Repeat imaging not needed it seems.     The following have been reviewed and updated as appropriate in this visit:   Allergies  Meds  Problems       Review of Systems  As noted above in the hpi.     Assessment/Plan   Diagnoses and all orders for this visit:    History of left breast cancer (Primary)  Assessment & Plan:  She is overdue for mammogram.  Reminded her to get this done.  Our focus today was constipation as well as making sure that mammogram was done.  They do not want to spread her out too thin.     Orders:  -     BI SCREENING MAMMOGRAM BILATERAL(TOMOSYNTHESIS); Future    Femoral neuropathy of right lower extremity  Assessment & Plan:  -going to start but no other new changes at this time. No further recommendations.   -can take gabapentin as needed.       Incidental lung nodule, greater than or equal to 8mm  Assessment & Plan:  He has been steams based on imaging reported nodule has been stable for many years and is considered to be benign.  No further imaging necessary.      Paroxysmal atrial fibrillation (CMS/HCC)  Assessment & Plan:  Started back on flecainide as well as blood thinner by cardiology in [place] she is not sure if this is contributing to her constipation which could be no issues with  bleeding.  Did not start back on Eliquis as she is not that it was to move medications to start once in we counseled to start full dose if she is going to start it as starting at half dose would not be effective nor appropriate.  She will continue to follow-up with cardiology      Constipation due to slow transit  Assessment & Plan:  She is up-to-date with regard to colonoscopy.  Recommend starting on MiraLAX twice per day for the next 48 hours along with 2 blocks.  If does not improve should take 7 capfuls of MiraLAX which is half bowel prep to get herself moving and then continue on with twice a day MiraLAX.  Should only reduce for having liquid bowel movements.  Having [place] stool scale 1 and she is straining.  I do not want her to develop a hemorrhoid on Eliquis which could cause significant bleeding.  Should follow-up with me sooner if not able start having normal bowel movements.  Her  agreed to secondary to medications versus lack of hydration versus lack of fiber.  She has had colon constipation in the past      Covid seems to be doing better so no further recommendation at this time.     I attest that this visit supports the complexity inherent to evaluation and management associated with medical care services that serve as the continuing focal point for all needed health care services and/or medical care services that are part of ongoing care related to this patient's single, serious condition or a complex condition.      Time Spent:  I spent 30 minutes on this date of service performing the following activities: obtaining history, entering orders, documenting, obtaining / reviewing records, providing counseling and education, communicating results, and coordinating care.

## 2024-08-10 NOTE — ASSESSMENT & PLAN NOTE
Started back on flecainide as well as blood thinner by cardiology in [place] she is not sure if this is contributing to her constipation which could be no issues with bleeding.  Did not start back on Eliquis as she is not that it was to move medications to start once in we counseled to start full dose if she is going to start it as starting at half dose would not be effective nor appropriate.  She will continue to follow-up with cardiology

## 2024-08-10 NOTE — ASSESSMENT & PLAN NOTE
-going to start but no other new changes at this time. No further recommendations.   -can take gabapentin as needed.

## 2024-08-10 NOTE — ASSESSMENT & PLAN NOTE
He has been steams based on imaging reported nodule has been stable for many years and is considered to be benign.  No further imaging necessary.

## 2024-08-10 NOTE — ASSESSMENT & PLAN NOTE
She is overdue for mammogram.  Reminded her to get this done.  Our focus today was constipation as well as making sure that mammogram was done.  They do not want to spread her out too thin.

## 2024-08-10 NOTE — ASSESSMENT & PLAN NOTE
She is up-to-date with regard to colonoscopy.  Recommend starting on MiraLAX twice per day for the next 48 hours along with 2 blocks.  If does not improve should take 7 capfuls of MiraLAX which is half bowel prep to get herself moving and then continue on with twice a day MiraLAX.  Should only reduce for having liquid bowel movements.  Having [place] stool scale 1 and she is straining.  I do not want her to develop a hemorrhoid on Eliquis which could cause significant bleeding.  Should follow-up with me sooner if not able start having normal bowel movements.  Her  agreed to secondary to medications versus lack of hydration versus lack of fiber.  She has had colon constipation in the past

## 2024-08-12 ENCOUNTER — TELEPHONE (OUTPATIENT)
Dept: CASE MANAGEMENT | Facility: CLINIC | Age: 64
End: 2024-08-12
Payer: COMMERCIAL

## 2024-08-12 ENCOUNTER — PATIENT OUTREACH (OUTPATIENT)
Dept: CASE MANAGEMENT | Facility: CLINIC | Age: 64
End: 2024-08-12
Payer: COMMERCIAL

## 2024-08-12 NOTE — TELEPHONE ENCOUNTER
Patient called and is having pain in right leg which worsened over last 2 weeks and pain meds not helping. It is also, now, in left leg. It is really affecting her walking and pain can be bad. Her sister just reminded her that this happened in the past with a statin. And she is on Atorvastatin now.     She has been having ear pain since Covid started. She is currently in [place], so CM recommended she go to  to have looked at and she will. Also, recommended making in office appt, with Dr Begum, once back in the area and she said she is hoping to by end of month.     Ragini Platt, ZAKN, RN  Ambulatory Care Manager  [phone]

## 2024-08-12 NOTE — PROGRESS NOTES
"08/12/2024 08:52 AM EDT by Ragini Platt, RN  Outgoing Alecia Nelson (Self) [phone] (Home)   Reached Patient : CM returned call and spoke to patient. She states her constipation is better after speaking with Dr Begum.     She is having pain in right leg which worsened over last 2 weeks and pain meds not helping. It is now in left leg. It is really affecting her walking and pain can be bad. Her sister just reminded her that this happened in the past with a statin.     She has been having ear pain since Covid started. She is currently in [place], so CM recommended she go to  to have looked at and she will. Also, recommended making in office appt, with Dr Begum, once back in the area and she said she is hoping to by end of month.     This message sent to \"Call Patient\" messaging to clinical support.     VISHAL Amezcua, RN  Ambulatory Care Manage  [phone]   "

## 2024-08-13 DIAGNOSIS — M25.551 BILATERAL HIP PAIN: Primary | ICD-10-CM

## 2024-08-13 DIAGNOSIS — M25.552 BILATERAL HIP PAIN: Primary | ICD-10-CM

## 2024-08-13 DIAGNOSIS — M54.50 LOW BACK PAIN, UNSPECIFIED BACK PAIN LATERALITY, UNSPECIFIED CHRONICITY, UNSPECIFIED WHETHER SCIATICA PRESENT: ICD-10-CM

## 2024-08-13 NOTE — PROGRESS NOTES
Physical Therapy Discharge      PT DISCHARGE NOTE FOR OUTPATIENT THERAPY    Patient: Alecia Cruz MRN: [id number]  : 1960 63 y.o.  Referring Physician: Mahendra Begum DO  Date of Visit: 2024      Certification Dates: 24 through 24    Total Visit Count: 1    Diagnosis:   1. Bilateral hip pain    2. Low back pain, unspecified back pain laterality, unspecified chronicity, unspecified whether sciatica present        Chief Complaints:  No chief complaint on file.          TODAY'S VISIT:           PT - 24 0938          Physical Therapy    Physical Therapy [facility] PT        PT Plan    Frequency of treatment 1 time/week     PT Duration 8 weeks     PT Cert From 24     PT Cert To 24     Date PT POC was sent to provider 24     Signed PT Plan of Care received?  No                       OBJECTIVE MEASUREMENTS/DATA:    None taken - **unable to assess; pt discharged from therapy as she has not returned since eval on 7/3/24.     ROM and MMT          7/3/2024   PT LE ROM Measurements   AROM: Right Hip Flexion 120 degrees   AROM: Left Hip Flexion 135 degrees   AROM: Right Hip Extension 0 degrees       pain   AROM: Right Hip ABD 30 degrees   PT Cervical/Lumbar/Other ROM Measurements   AROM: Lumbar Flexion 100   AROM: Lumar Extension 25       pain   AROM: Left Lumbar    AROM: Right Lumbar SB 50       pain   AROM: Lumbar Comments percentage achieved   PT LE MMT   Right Hip Flexion (3/5) fair       pain   Right Hip IR (4-/5) good minus   Right Hip ER (3+/5) fair plus   Right Knee Flexion (4-/5) good minus   Right Knee Extension (4-/5) good minus       pain     Outcome Measures          7/3/2024    08:06   PT SUBJECTIVE Outcome Measures   LEFS 23/80       Today's Treatment:    Education provided:  None/NA  **called pt to discuss case. Pt evaluated on 7/3/24 & has not returned for a follow up session. Per chart, pt had to no-shows & cancelled appt yesterday.  As pt has not attended in >1 month she is being discharged from PT. Pt currently recovering from covid; educated pt that when she is feeling better she can get a new rx from PCP & call central scheduling to schedule an appt.       Goals Addressed                      This Visit's Progress      Mutually agreed upon pain goal         Mutually agreed upon pain goal: 0/10    **unable to assess; pt discharged from therapy as she has not returned since eval on 7/3/24.         PT LBP / R hip PS (pt-stated)         **unable to assess; pt discharged from therapy as she has not returned since eval on 7/3/24.       Pt states she wants to be more confident putting weight on R leg without it buckling    STGs (4 weeks):    Pt will be I with recommended HEP  Pt will incr LEFS >= 32/80 to demo improved use of R leg with ADLs and improve functional lift/squat  Pt will incr R hip flex >= 130 degrees to demo improved arthrokinematics for carryover to transfers/positioning  Pt will incr proximal hip strength 1/2 grade to demo improved stability for closed-chain activities    LTGs (8 weeks):    Pt will incr LEFS >= 50/80 to demo improved use of R leg with ADLs and improve functional lift/squat  Pt will incr R hip abd >= 40 degrees without pain to demo improved arthrokinematics for carryover to transfers/positioning  Pt will incr proximal hip strength 1 grade to demo improved stability for closed-chain activities                  Chante Nunez, PT

## 2024-08-21 NOTE — TELEPHONE ENCOUNTER
Haley Menon is a 81 year old female      who presents for her  well woman exam.   Last seen 2021.  Was hospitalized last year for sepsis after cellulitis.  Had urinary incontinene during hospitalization.   Now occurs in AM when bladder full.  No nocturia.  No c/o    No LMP recorded. Patient is postmenopausal.    GYNE ROS  Vaginal symptoms: none.  Vulvar symptoms: none.    OB History    Para Term  AB Living   2 2 0 0 0 2   SAB IAB Ectopic Molar Multiple Live Births   0 0 0 0 0 2   Obstetric Comments   NVD x 2     Past Medical History:   Diagnosis Date    Cellulitis 2023    L arm; sepsis    PAC (premature atrial contraction)     Paroxysmal atrial fibrillation  (CMD)     Sick sinus syndrome  (CMD)       Past Surgical History:   Procedure Laterality Date    CYST REMOVAL      2023      Family History   Problem Relation Age of Onset    Myocardial Infarction Brother     Hypertension Mother     Coronary Artery Disease Neg Hx         Negative for premature CAD    Aneurysm Neg Hx         Negative for AAA      Social History     Tobacco Use    Smoking status: Former    Smokeless tobacco: Never    Tobacco comments:      Former tobacco use. used to smoke but quit less than 1 ppd.   Vaping Use    Vaping status: never used   Substance Use Topics    Alcohol use: Yes     Comment:  drinks socially.    Drug use: Never     ALLERGIES:  Chocolate flavor   (food or med), Egg white   (food or med), Erythromycin, Lactose intolerance   (food or med), and Peanut   (food or med)   Current Medications    ASPIRIN 81 MG CHEWABLE TABLET    Chew 81 mg by mouth.    ATORVASTATIN (LIPITOR) 10 MG TABLET    [None received]    CETIRIZINE (ZYRTEC) 10 MG TABLET    Take 1 tablet by mouth daily.    CYANOCOBALAMIN 1000 MCG/ML INJECTION    Inject 1,000 mcg into the muscle. Begin taking on 2024.    FEXOFENADINE (ALLEGRA) 180 MG TABLET    Take 180 mg by mouth daily.    GLUCOSAMINE-CHONDROITIN (OSTEO BI-FLEX  Medicine Refill Request    Last Office: Visit date not found   Last Consult Visit: Visit date not found  Last Telemedicine Visit: Visit date not found    Next Appointment: Visit date not found    flecainide (TAMBOCOR) 50 mg tablet    metoprolol succinate XL (TOPROL-XL) 25 mg 24 hr tablet      Current Outpatient Medications:     alpha lipoic acid, bulk, powder, Take by mouth., Disp: , Rfl:     ascorbic acid (VITAMIN C) 500 mg tablet, Take 500 mg by mouth daily., Disp: , Rfl:     aspirin 81 mg enteric coated tablet, Take 81 mg by mouth daily., Disp: , Rfl:     azelastine (ASTELIN) 137 mcg (0.1 %) nasal spray, USE 2 SPRAYS IN EACH NOSTRIL EVERY DAY AT BEDTIME, Disp: , Rfl:     azithromycin (ZITHROMAX) 250 mg tablet, TAKE 2 TABLETS BY MOUTH ON DAY 1, AND THEN TAKE 1 TABLET BY MOUTH ONCE A DAY ON DAY 2 THROUGH DAY 5, Disp: , Rfl:     betamethasone dipropionate (DIPROSONE) 0.05 % lotion, once as needed.  , Disp: , Rfl:     cetirizine (ZyrTEC) 10 mg tablet, Take 10 mg by mouth daily., Disp: , Rfl:     cholecalciferol, vitamin D3, 1,000 unit (25 mcg) tablet, Take 1,000 Units by mouth daily., Disp: , Rfl:     coenzyme Q10 (COQ10) 60 mg capsule, Take by mouth., Disp: , Rfl:     cyclobenzaprine (FLEXERIL) 5 mg tablet, Take 1 tablet (5 mg total) by mouth 3 (three) times a day as needed for muscle spasms for up to 14 days., Disp: 30 tablet, Rfl: 0    diphenhydrAMINE (SOMINEX) 25 mg tablet, daily., Disp: , Rfl:     flecainide (TAMBOCOR) 50 mg tablet, Take 1 tablet (50 mg total) by mouth 2 (two) times a day., Disp: 60 tablet, Rfl: 0    fluticasone propionate (FLONASE) 50 mcg/actuation nasal spray, Administer 2 sprays into each nostril daily as needed for rhinitis., Disp: 1 Bottle, Rfl: 0    levothyroxine (SYNTHROID) 88 mcg tablet, Take 1 tablet (88 mcg total) by mouth daily., Disp: 90 tablet, Rfl: 3    lidocaine 2 % solution, GARGLE AND SPIT 10-15ML BY MOUTH EVERY 4 HOURS AS NEEDED FOR SORE THROAT UP TO 3 DAYS. MAX  8X/DAY, Disp: , Rfl:     metoprolol succinate XL (TOPROL-XL) 25 mg 24 hr tablet, Take 1 tablet (25 mg total) by mouth nightly., Disp: 90 tablet, Rfl: 3    multivitamin (THERAGRAN) tablet, Take 1 tablet by mouth daily., Disp: , Rfl:     naproxen sodium 220 mg capsule, Take by mouth daily as needed., Disp: , Rfl:     omega-3-dha-epa-dpa-fish oil 1,050 mg(300 mg -675 mg-75 mg) capsule, , Disp: , Rfl:     vitamin E 100 unit capsule, , Disp: , Rfl:     XIFAXAN 550 mg tablet, , Disp: , Rfl:       BP Readings from Last 3 Encounters:   04/14/22 128/78   04/08/22 132/78   02/10/22 120/80       Recent Lab results:  Lab Results   Component Value Date    CHOL 208 (H) 02/24/2022   ,   Lab Results   Component Value Date    HDL 53 02/24/2022   ,   Lab Results   Component Value Date    LDLCALC 133 (H) 02/24/2022   ,   Lab Results   Component Value Date    TRIG 117 02/24/2022        Lab Results   Component Value Date    GLUCOSE 85 10/15/2022    GLUCOSE NEGATIVE 10/15/2022   , No results found for: HGBA1C      Lab Results   Component Value Date    CREATININE 0.75 10/15/2022       Lab Results   Component Value Date    TSH 3.13 02/24/2022            REGULAR STRENGTH) 250-200 MG TABLET    Take 1 tablet by mouth daily. 2 tabs daily     MELOXICAM (MOBIC) 15 MG TABLET    Take 1 tablet by mouth daily.    MELOXICAM (MOBIC) 7.5 MG TABLET    Take 7.5 mg by mouth daily. daily     MULTIPLE VITAMINS-MINERALS (CENTRUM SILVER) TABLET    1 tab daily    MULTIPLE VITAMINS-MINERALS (PRESERVISION AREDS) TABLET    2 tabs daily    OMEPRAZOLE (PRILOSEC) 20 MG CAPSULE    Take 1 capsule by mouth daily (before breakfast).    SOTALOL (BETAPACE) 160 MG TABLET    TAKE ONE TABLET BY MOUTH TWICE A DAY        Review of Systems   Constitutional: Negative.    Gastrointestinal: Negative.    Genitourinary: Negative.    Breasts:  neg    Physical exam  Visit Vitals  BP (!) 153/94 (BP Location: RUE - Right upper extremity, Patient Position: Sitting, Cuff Size: Regular)   Pulse (!) 60   Temp 97.9 °F (36.6 °C) (Oral)   Ht 4' 11\" (1.499 m)   Wt 64 kg (141 lb)   SpO2 97%   BMI 28.48 kg/m²     Rpt BP manual after exam:  134/90    Healthy appearing, normal affect, NAD  Neck  soft, supple, no thryomegaly, no mass, no adenopathy  Breasts  symmetric, no masses, no tenderness, no skin changes, no nipple inversion, no dimpling;  No axillary adenopathy.  No supraclavicular adnopathy.  Abdomen  Soft, nontender, no organomegaly, no masses  Ext Genitalia  no lesions, no rash, non tender, no cysts  Urethra  no discharge, masses, prolapse  Bladder  non tender  Vagina  no discharge; no lesions, no cystocele, no rectocele;  atrophic  Cervix  no lesions, no discharge, no cervical motion tenderness  Uterus  anteverted, mobile, nl size, non tender, mobile  Ovaries  no masses, non tender, no fullness  Parametrium  no nodularity  Rectum  no mass        ASSESSMENT/PLAN  -Pap with HPV not indicated.  No hx abnl pap.  , no new partners   -screening mammogram-pt declines  -colonoscopy 2017  -restart kegal exercises.   -RTC in 2 yrs for next annual exam     No problem-specific Assessment & Plan notes found for this  encounter.       Sera Drake MD

## 2024-08-23 RX ORDER — LEVOTHYROXINE SODIUM 75 UG/1
75 TABLET ORAL DAILY
Qty: 90 TABLET | Refills: 1 | Status: SHIPPED | OUTPATIENT
Start: 2024-08-23 | End: 2025-03-13

## 2024-08-23 NOTE — TELEPHONE ENCOUNTER
Medicine Refill Request    Last Office Visit: 2/2/2024   Last Consult Visit: Visit date not found  Last Telemedicine Visit: 8/9/2024 Mahendra Begum DO    Next Appointment: 10/8/2024      Current Outpatient Medications:     ascorbic acid (VITAMIN C) 500 mg tablet, Take 500 mg by mouth daily., Disp: , Rfl:     aspirin 81 mg enteric coated tablet, Take 81 mg by mouth daily., Disp: , Rfl:     atorvastatin (LIPITOR) 40 mg tablet, Take 1 tablet (40 mg total) by mouth daily., Disp: 90 tablet, Rfl: 1    azelastine (ASTELIN) 137 mcg (0.1 %) nasal spray, Administer 2 sprays into each nostril 2 (two) times a day as needed for allergies., Disp: 30 mL, Rfl: 0    betamethasone dipropionate (DIPROSONE) 0.05 % lotion, Apply 1 each topically daily as needed., Disp: , Rfl:     cetirizine (ZyrTEC) 10 mg tablet, Take 10 mg by mouth as needed., Disp: , Rfl:     cholecalciferol, vitamin D3, 1,000 unit (25 mcg) tablet, Take 1,000 Units by mouth daily., Disp: , Rfl:     coenzyme Q10 (COQ10) 50 mg capsule, Take 50 mg by mouth daily., Disp: , Rfl:     docusate sodium (COLACE) 100 mg capsule, Take 2 capsules (200 mg total) by mouth 2 (two) times a day as needed for constipation., Disp: 60 capsule, Rfl: 0    flecainide (TAMBOCOR) 50 mg tablet, Take 1 tablet (50 mg total) by mouth 2 (two) times a day., Disp: 180 tablet, Rfl: 3    gabapentin (NEURONTIN) 100 mg capsule, Take 1 capsule (100 mg total) by mouth nightly. (Patient taking differently: Take 50 mg by mouth nightly.), Disp: 90 capsule, Rfl: 0    levothyroxine (SYNTHROID) 75 mcg tablet, Take 1 tablet (75 mcg total) by mouth daily., Disp: 90 tablet, Rfl: 1    lidocaine (ASPERCREME) 4 % adhesive patch,medicated topical patch, Apply 1 patch topically daily., Disp: 30 patch, Rfl: 0    metoprolol succinate XL (TOPROL-XL) 25 mg 24 hr tablet, Take 0.5 tablets (12.5 mg total) by mouth daily as needed (SBP over 135 or  palpitations)., Disp: 10 tablet, Rfl: 6    multivitamin (THERAGRAN) tablet, Take  1 tablet by mouth daily., Disp: , Rfl:     nitroglycerin (NITROSTAT) 0.4 mg SL tablet, Place 1 tablet (0.4 mg total) under the tongue every 5 (five) minutes as needed for chest pain., Disp: 25 tablet, Rfl: 3    omega-3-dha-epa-dpa-fish oil 1,050 mg(300 mg -675 mg-75 mg) capsule, Take 1 capsule by mouth daily., Disp: , Rfl:     polyethylene glycol (MIRALAX) 17 gram packet, Take 17 g by mouth daily as needed (Constipation) for up to 3 days., Disp: 30 each, Rfl: 0      BP Readings from Last 3 Encounters:   07/04/24 (!) 135/91   02/02/24 120/60   01/31/24 120/70       Recent Lab results:  Lab Results   Component Value Date    CHOL 180 03/08/2023   ,   Lab Results   Component Value Date    HDL 59 03/08/2023   ,   Lab Results   Component Value Date    LDLCALC 113 (H) 03/08/2023   ,   Lab Results   Component Value Date    TRIG 39 03/08/2023        Lab Results   Component Value Date    GLUCOSE 82 07/03/2024   ,   Lab Results   Component Value Date    HGBA1C 5.5 03/07/2023         Lab Results   Component Value Date    CREATININE 0.8 07/03/2024       Lab Results   Component Value Date    TSH 1.08 03/07/2023           Lab Results   Component Value Date    HGBA1C 5.5 03/07/2023

## 2024-08-26 ENCOUNTER — PATIENT OUTREACH (OUTPATIENT)
Dept: CASE MANAGEMENT | Facility: CLINIC | Age: 64
End: 2024-08-26
Payer: COMMERCIAL

## 2024-08-26 NOTE — PROGRESS NOTES
08/26/2024 10:11 AM EDT by Ragini Platt, RN  Outgoing Alecia Nelson (Self) [phone] (Home)   Reached Patient : [employer]: Patient had left a message that she is in hospital in [place] since Saturday. CM returned call. She had gone in with CP and numbness and sweating. MI and stroke ruled out, but, still doing tests, as patient is not feeling well. She states she,cannot function or stay awake on the 2 meds restarted -Amlodipine and Metoprolol. She states doctors are aware. She just had an echo. CM informed Dr Begum who will try to reach out a little later today.     VISHAL Amezcua, RN  Ambulatory Care Manager  [phone] amm

## 2024-08-28 ENCOUNTER — TELEPHONE (OUTPATIENT)
Dept: CASE MANAGEMENT | Facility: CLINIC | Age: 64
End: 2024-08-28
Payer: COMMERCIAL

## 2024-08-28 ENCOUNTER — PATIENT OUTREACH (OUTPATIENT)
Dept: CASE MANAGEMENT | Facility: CLINIC | Age: 64
End: 2024-08-28
Payer: COMMERCIAL

## 2024-08-28 RX ORDER — LANOLIN ALCOHOL/MO/W.PET/CERES
400 CREAM (GRAM) TOPICAL DAILY
COMMUNITY

## 2024-08-28 ASSESSMENT — ENCOUNTER SYMPTOMS
SHORTNESS OF BREATH: 0
DIARRHEA: 0
NAUSEA: 0
FREQUENCY: 1
CONSTIPATION: 1
VOMITING: 0
LIGHT-HEADEDNESS: 0
DIZZINESS: 0

## 2024-08-28 NOTE — PROGRESS NOTES
NAME: Alecia Cruz    MRN: [id number]    YOB: 1960    Event Review:    Initial TCM Patient Outreach Date: 08/28/24    Assessment completed with: Patient  Patient stated reason for hospitalization: Patient was sitting at home speaking with family and developed nausea and sharp pain right shoulder and going down right arm and right chest. Palm of right hand turned grayish. She was unable to get words out. She was having numbness right side lip and nose. She tried to get up but fell onto furniture. She felt diaphoretic and faint. Grandsons brought to [facility]. She has a limp RLE. She had gotten up in hospital to walk to bathroom and fell into attendants arms. Twitch in right jaw though improving. Scans ruled out CVA but it was felt that patient had a TIA. It is noted in notes from hospital that patient admitted missing some doses of Eliquis and taking half doses while she was on treatment for Covid, recently. She was treated with Amlodipine again but along with toprol, could not tolerate and both were stopped. She is to stay on Flecainide and it was stressed that no Eliquis can be missed. Patient complaining of increased urination and severe constipation. Instructed hydration with water and getting back on Colace and Miralax. She still has numbness right toes and foot and goes up to knee but worst in toes. Reviewed hosp stay and  reviewed and updated meds. needs a prior auth for Eliquis but she states she failed xarelto and Warfarin in past.  Patient is currently in [place] and will not be able to get to PCP in near future so scheduled a video appt in 2 days with Dr Begum. Patient needs to schedule neuro and cardiology in [place]. Patient with stable pulm nodules and needs to f/u in pulm clinic in 6 months.    Discharge Diagnosis: TIA?    Patient readmitted in the last 30 days: No  Discharging Facility:  ([place])  Date of Last Admission: 08/24/24  Date of Last Discharge:  08/27/24       Patient's perception of their health status since discharge: Improving        Review of Systems   Respiratory:  Negative for shortness of breath.    Cardiovascular:  Negative for chest pain.   Gastrointestinal:  Positive for constipation. Negative for diarrhea, nausea and vomiting.   Genitourinary:  Positive for frequency.   Musculoskeletal:         Chronic      Neurological:  Negative for dizziness and light-headedness.       Medication Review:    Medication Review: Yes     Reported by: Patient  Any new medications prescribed at discharge?: Yes  Is the patient having any side effects they believe may be caused by any medication additions or changes?: No  New prescriptions filled?: Yes     Do you have enough of your regularly prescribed medications?: Yes       Medication adherence problem?: No  Was a medication discrepancy indentified?: No       Nursing Interventions: Nurse provided patient education  Reconciled the current and discharge medications: Yes  Reviewed AVS (Discharge Instructions)?: Yes         Acute Pain:    Acute pain: No                Chronic Pain:    Chronic pain: Yes (n/p)                Diet/Nutrition:    Type of Diet: Regular, Cardiac 2mg sodium               Post-Discharge Durable Medical Equipment::    Durable Medical Equipment:  (furniture holds)            Home Management:    Living Arrangement: Alone (while in [place])  Support System:: Child/Children  Type of Residence: [address] [place] [address]  Home Monitoring: Blood Pressure          Appointment Scheduling:    PCP appointment scheduled: Yes  TCM Appointment Type: DORIS 7 Day  Appointment Date: 08/30/24     Appointment Provider: Dr Begum        Follow-Ups:    Neuro: 1-2 weeks ANJEL    Cardiac: Dr Navarro: 1-2 weeks ANJEL    Pulm Nodule clinic: 6 months            Interventions/ Care Coordination:    Interventions/ Care Coordination: Encouraged patient to schedule with the PCP/Specialist, Addressed a knowledge deficit, Assisted patient in  the scheduling of an appointment     Disease Specific Education: Stroke  Initiated Care Plan: DORIS          Reviewed signs/symptoms of worsening condition or complication that necessitate a call to the Physician's office.  Educated patient on access to care.  RN phone number given for future care management.    ZAK AmezcuaN, RN  Ambulatory Care Manager  [phone]

## 2024-08-28 NOTE — PROGRESS NOTES
08/28/2024 08:38 AM EDT by Ragini Platt RN  Outgoing Alecia Nelson ([facility]) [phone] (Home)   Left Message : DORIS # 1

## 2024-08-28 NOTE — TELEPHONE ENCOUNTER
DORIS call done. Patient requires a prior auth, for her Eliquis, after a TIA.    Thanks    Ragini Platt, ZAKN, RN  Ambulatory Care Manager  [phone]

## 2024-08-30 ENCOUNTER — TELEMEDICINE (OUTPATIENT)
Dept: PRIMARY CARE | Facility: CLINIC | Age: 64
End: 2024-08-30
Payer: COMMERCIAL

## 2024-08-30 DIAGNOSIS — Z09 HOSPITAL DISCHARGE FOLLOW-UP: ICD-10-CM

## 2024-08-30 DIAGNOSIS — I48.0 PAROXYSMAL ATRIAL FIBRILLATION (CMS/HCC): ICD-10-CM

## 2024-08-30 DIAGNOSIS — G57.21 FEMORAL NEUROPATHY OF RIGHT LOWER EXTREMITY: Primary | ICD-10-CM

## 2024-08-30 DIAGNOSIS — R91.1 INCIDENTAL LUNG NODULE, GREATER THAN OR EQUAL TO 8MM: ICD-10-CM

## 2024-08-30 PROCEDURE — 99214 OFFICE O/P EST MOD 30 MIN: CPT | Mod: 95 | Performed by: STUDENT IN AN ORGANIZED HEALTH CARE EDUCATION/TRAINING PROGRAM

## 2024-08-30 PROCEDURE — 1111F DSCHRG MED/CURRENT MED MERGE: CPT | Performed by: STUDENT IN AN ORGANIZED HEALTH CARE EDUCATION/TRAINING PROGRAM

## 2024-08-30 NOTE — PROGRESS NOTES
[facility] AT [facility]       Verification of Patient Location:  The patient affirms they are currently located in the following state: [place]    Are you in your home or a private residence? Yes    Request for Consent:    Audio and Video Encounter   Krish, my name is Mahnedra BegumDO.  Before we proceed, can you please verify your identification by telling me your full name and date of birth?  Can you tell me who is in the room with you?    You and I are about to have a telemedicine check-in or visit because you have requested it.  This is a live video-conference.  I am a real person, speaking to you in real time.  There is no one else with me on the video-conference. I am not recording this conversation and I am asking you not to record it.  This telemedicine visit will be billed to your health insurance or you, if you are self-insured.  You understand you will be responsible for any copayments or coinsurances that apply to your telemedicine visit.  Communication platform used for this encounter:  [employer] Video Visit (Epic Video Client)       Before starting our telemedicine visit, I am required to get your consent for this virtual check-in or visit by telemedicine. Do you consent?    Patient Response to Request for Consent:  Yes    Reason for visit: No chief complaint on file.    HPI:  Alecia Cruz is a 63 y.o. female presenting for follow-up after hospital discharge.    Patient readmitted in the last 30 days: No    Discharge Diagnosis: TIA?  Discharging Facility: -- ([facility])  Date of Last Admission: 08/24/24  Date of Last Discharge: 08/27/24         Initial TCM Patient Outreach Date: 08/28/24    Summary of Hospital Course:64 y/o female presents today followup after admission to [facility]. Had right sided sharp upper back/chest pain and the right arm. Felt like she had water shooting down her right side.  There was also associated numbness of  the face and tingling of the face. They admitted for observation it looks like from the notes.     There was concern that she had a stroke/tia. She was seen by neurology while admitted there.      It seems that she continued with only half dose apixaban after she completed the paxlovid prior.     She was placed back onto apixaban and flecanide.      It is not clear from the documentation of the thought that there was a CVA or TIA.  Patient continues to report symptoms.  MRI was negative as was DWI.  She continues to report numbness of the face but sounds her right arm stabilized.    No other changes made        Medications prescribed at discharge reconciled with current ambulatory medication list: Yes.    Inpatient testing (labs, imaging, cardiovascular) requiring follow-up: None    History since hospital discharge: She reports that she is having great difficulty with her apixaban.  They were only able to give her enough for 7 days worth of medication which she bought out-of-pocket.  She has previously been on Eliquis this entire time.    She has follow-up scheduled with neurology as well as cardiology.     She has planned physical therapy but hasn't been able to go to sessions due to being in the hospital.     She is going to need repeat la paperwork done.       Advance Care Planning Documents       Document Type Status Effective Date Expiration Date Received On Description    Advance Directives and Living Will Not Received        Power of  Not Received                Patient Active Problem List   Diagnosis    Anxiety state    Hypothyroidism    Low vitamin D level    Paroxysmal atrial fibrillation (CMS/HCC)    White matter disease, unspecified    Sleep disturbance    Lymphedema of left arm    Cervical radiculopathy    Chronic bilateral thoracic back pain    History of left breast cancer    Incidental lung nodule, greater than or equal to 8mm    BRCA negative    PVC (premature ventricular contraction)     Occipital neuralgia of left side    Trigeminal neuralgia of left side of face    Lesion of parotid gland    Low back pain    Renal cyst    HLD (hyperlipidemia)    Irritable bowel syndrome    Small intestinal bacterial overgrowth (SIBO)    Reactive depression    Stress due to illness of family member    Epigastric pain    Coronary artery disease involving native coronary artery of native heart with angina pectoris (CMS/HCC)    Night sweats    Femoral neuropathy of right lower extremity    Palpitations    Rash    Fecal smearing    Constipation due to slow transit    Bunion, left    Bilateral hip pain    Hospital discharge follow-up     Social Determinants of Health     Financial Resource Strain: Not on file   Food Insecurity: No Food Insecurity (7/3/2024)    Hunger Vital Sign     Worried About Running Out of Food in the Last Year: Never true     Ran Out of Food in the Last Year: Never true   Recent Concern: Food Insecurity - Food Insecurity Present (7/3/2024)    Hunger Vital Sign     Worried About Running Out of Food in the Last Year: Often true     Ran Out of Food in the Last Year: Often true   Transportation Needs: No Transportation Needs (3/10/2023)    PRAPARE - Transportation     Lack of Transportation (Medical): No     Lack of Transportation (Non-Medical): No   Physical Activity: Not on file   Stress: Not on file   Social Connections: Not on file   Housing Stability: Not on file   Utilities: Not on file     Allergies  Hydrocodone-acetaminophen, Trazodone-dietary supp no.8, Cephalexin, Ciprofloxacin, Codeine, and Sulfa (sulfonamide antibiotics)    Current Outpatient Medications   Medication Sig Dispense Refill    apixaban (ELIQUIS) 5 mg tablet Take 1 tablet (5 mg total) by mouth 2 (two) times a day. 180 tablet 1    ascorbic acid (VITAMIN C) 500 mg tablet Take 500 mg by mouth daily.      aspirin 81 mg enteric coated tablet Take 81 mg by mouth daily.      atorvastatin (LIPITOR) 40 mg tablet Take 1 tablet (40 mg  total) by mouth daily. 90 tablet 1    azelastine (ASTELIN) 137 mcg (0.1 %) nasal spray Administer 2 sprays into each nostril 2 (two) times a day as needed for allergies. 30 mL 0    betamethasone dipropionate (DIPROSONE) 0.05 % lotion Apply 1 each topically daily as needed.      cetirizine (ZyrTEC) 10 mg tablet Take 10 mg by mouth as needed.      cholecalciferol, vitamin D3, 1,000 unit (25 mcg) tablet Take 1,000 Units by mouth daily.      coenzyme Q10 (COQ10) 50 mg capsule Take 50 mg by mouth daily.      docusate sodium (COLACE) 100 mg capsule Take 2 capsules (200 mg total) by mouth 2 (two) times a day as needed for constipation. 60 capsule 0    flecainide (TAMBOCOR) 50 mg tablet Take 1 tablet (50 mg total) by mouth 2 (two) times a day. 180 tablet 3    gabapentin (NEURONTIN) 100 mg capsule Take 1 capsule (100 mg total) by mouth nightly. (Patient taking differently: Take 50 mg by mouth nightly.) 90 capsule 0    levothyroxine (SYNTHROID) 75 mcg tablet TAKE 1 TABLET BY MOUTH EVERY DAY 90 tablet 1    lidocaine (ASPERCREME) 4 % adhesive patch,medicated topical patch Apply 1 patch topically daily. 30 patch 0    magnesium oxide (MAG-OX) 400 mg (241.3 mg magnesium) tablet Take 400 mg by mouth daily.      metoprolol succinate XL (TOPROL-XL) 25 mg 24 hr tablet Take 0.5 tablets (12.5 mg total) by mouth daily as needed (SBP over 135 or  palpitations). (Patient not taking: Reported on 8/28/2024) 10 tablet 6    multivitamin (THERAGRAN) tablet Take 1 tablet by mouth daily.      nitroglycerin (NITROSTAT) 0.4 mg SL tablet Place 1 tablet (0.4 mg total) under the tongue every 5 (five) minutes as needed for chest pain. 25 tablet 3    omega-3-dha-epa-dpa-fish oil 1,050 mg(300 mg -675 mg-75 mg) capsule Take 1 capsule by mouth daily.      polyethylene glycol (MIRALAX) 17 gram packet Take 17 g by mouth daily as needed (Constipation) for up to 3 days. 30 each 0     No current facility-administered medications for this visit.        ROS:  Review of Systems  As noted above in the HPI    There were no vitals filed for this visit.      Lab Results   Component Value Date    WBC 5.58 07/03/2024    HGB 12.7 07/03/2024    HCT 37.9 07/03/2024     07/03/2024    CHOL 180 03/08/2023    TRIG 39 03/08/2023    HDL 59 03/08/2023    ALT 15 07/03/2024    AST 15 07/03/2024     07/03/2024    K 3.6 07/03/2024     07/03/2024    CREATININE 0.8 07/03/2024    BUN 10 07/03/2024    CO2 27 07/03/2024    TSH 1.08 03/07/2023    INR 0.9 09/26/2023    HGBA1C 5.5 03/07/2023         ASSESSMENT/PLAN:  Diagnoses and all orders for this visit:    Femoral neuropathy of right lower extremity (Primary)  Assessment & Plan:  Encouraged to go to physical therapy for this.      Paroxysmal atrial fibrillation (CMS/[facility])  Assessment & Plan:  We are going to need to look into why she cannot get her Eliquis.  Based on patient assessment it looks like she should be able to get medication without issue.  Asked office to reach out to pharmacy to check on this.  It looks like this is probably an insurance mixup as there are 2 different insurances that the patient has on file.      Hospital discharge follow-up  Assessment & Plan:  I do not really understand if she did or did not have a CVA.  I suspect that she did not.  I suspect that this may actually have been more of an issue of the brachial plexus and upper neck muscles and more of a somatic issue perhaps also affecting the brachial plexus.  Regardless she has follow-up with neurology and she is now on full dose anticoagulation.  Symptoms appear to be manageable.  No new acute concerns appear to be present.      Incidental lung nodule, greater than or equal to 8mm  Assessment & Plan:  -will need ongoing surveillance.       Other orders  -     apixaban (ELIQUIS) 5 mg tablet; Take 1 tablet (5 mg total) by mouth 2 (two) times a day.        Time Spent:  I spent 25 minutes on this date of service performing the following  activities: obtaining history, entering orders, documenting, obtaining / reviewing records, providing counseling and education, and independently reviewing study/studies.        Mahendra Begum DO  8/30/2024

## 2024-08-31 NOTE — ASSESSMENT & PLAN NOTE
I do not really understand if she did or did not have a CVA.  I suspect that she did not.  I suspect that this may actually have been more of an issue of the brachial plexus and upper neck muscles and more of a somatic issue perhaps also affecting the brachial plexus.  Regardless she has follow-up with neurology and she is now on full dose anticoagulation.  Symptoms appear to be manageable.  No new acute concerns appear to be present.

## 2024-08-31 NOTE — ASSESSMENT & PLAN NOTE
We are going to need to look into why she cannot get her Eliquis.  Based on patient assessment it looks like she should be able to get medication without issue.  Asked office to reach out to pharmacy to check on this.  It looks like this is probably an insurance mixup as there are 2 different insurances that the patient has on file.

## 2024-09-03 ENCOUNTER — TELEPHONE (OUTPATIENT)
Dept: CASE MANAGEMENT | Facility: CLINIC | Age: 64
End: 2024-09-03
Payer: COMMERCIAL

## 2024-09-03 ENCOUNTER — PATIENT OUTREACH (OUTPATIENT)
Dept: CASE MANAGEMENT | Facility: CLINIC | Age: 64
End: 2024-09-03
Payer: COMMERCIAL

## 2024-09-03 NOTE — TELEPHONE ENCOUNTER
Patient supplied with fax number--to fax over FMLA forms. Please let her know when received please.    Thanks    ZAK AmezcuaN, RN  Ambulatory Care Manager  [phone]

## 2024-09-03 NOTE — PROGRESS NOTES
09/03/2024 04:33 PM EDT by Ragini Platt RN  Outgoing Alecia Nelson (Self) [phone] (Home)   Reached Patient : DORIS F/U: CM returned patient call for fax number so she can send FMLA papers over to PCP office-Fax given is  and CM will ask office to watch out for it.

## 2024-09-05 ENCOUNTER — PATIENT OUTREACH (OUTPATIENT)
Dept: CASE MANAGEMENT | Facility: CLINIC | Age: 64
End: 2024-09-05
Payer: COMMERCIAL

## 2024-09-05 NOTE — PROGRESS NOTES
09/05/2024 03:55 PM EDT by Ragini Platt, EBEN  Incoming Alecia Nelson (Self) [phone] (Home)   Patient called CM. She sounds exhausted. She states she is not sleeping and needs something that might her. She also is questioning whether appeal was made to [employer] by Dr Begum for Eliquis approval. She is in process of getting RA(reasonable Accommodations) for work and her goal is to not have to go into office on Mondays so she can get to MD appts and PT. She is seeing neuro this month and will be scheduling a mammo. Informed patient to ask cardiology for Eliquis samples, if it comes down to it.     Some of these care plan items discussed at initial DORIS call:      Care Plan: Transition of Care Template   Updates made by Ragini Platt, RN since 9/5/2024 12:00 AM        Problem: Patient Education Knowledge Deficit         Goal: Patient will verbalize understanding of how their diagnosis affects the body         Task: Educate patient on their diagnosis Completed 9/5/2024   Responsible User: Ragini Platt RN        Problem: Progression and Care Needs         Goal: Patient will follow up with medical specialists and PCP         Task: Educate patient on importance of recommended medical follow up to prevent hospital readmission Completed 9/5/2024   Responsible User: Ragini Platt RN        Task: Assist patient with scheduling of PCP appoinment within recommeneded time frame as needed Completed 9/5/2024   Responsible User: Ragini Platt RN        Goal: Patient will follow up with treatment and diagnostic services as medically indicated on discharge summary         Task: Provide assistance with coordination of services as needed Completed 9/5/2024   Responsible User: Ragini Platt RN        Problem: Medication Reconciliation and Adherence         Goal: Patient verbalizes use benefits and side effects of medications         Task: Educate patient on the details of prescribed medication including use, benefits and  side effects Completed 9/5/2024   Responsible User: Ragini Platt, RN        Goal: Patient verbilizes adherence to medication regime and supply         Task: Educate patient on the importance of medication compliance Completed 9/5/2024   Responsible User: Ragini Platt, RN        Problem: Home Safety Measures         Goal: Patient will verbalize understanding of how their diagnosis affects their body mechanics and ADL's         Task: Educate the patient on the effects of a new treatment plan related to their diagnosis Completed 9/5/2024   Responsible User: Ragini Platt, RN          VISHAL Amezcua, RN  Ambulatory Care Manager  [phone]

## 2024-09-26 ENCOUNTER — PATIENT OUTREACH (OUTPATIENT)
Dept: CASE MANAGEMENT | Facility: CLINIC | Age: 64
End: 2024-09-26
Payer: COMMERCIAL

## 2024-09-27 ENCOUNTER — TELEPHONE (OUTPATIENT)
Dept: CASE MANAGEMENT | Facility: CLINIC | Age: 64
End: 2024-09-27
Payer: COMMERCIAL

## 2024-09-27 NOTE — TELEPHONE ENCOUNTER
Patient called requesting a [facility] appointment with Dr Bella preferably Jjt visit if possible. Would you mind assisting the patient with this request?    Thank you

## 2024-10-08 ENCOUNTER — TELEMEDICINE (OUTPATIENT)
Dept: PRIMARY CARE | Facility: CLINIC | Age: 64
End: 2024-10-08
Payer: COMMERCIAL

## 2024-10-08 DIAGNOSIS — M54.12 CERVICAL RADICULOPATHY: Primary | ICD-10-CM

## 2024-10-08 DIAGNOSIS — R00.2 PALPITATIONS: ICD-10-CM

## 2024-10-08 DIAGNOSIS — F41.1 ANXIETY STATE: ICD-10-CM

## 2024-10-08 PROCEDURE — 99213 OFFICE O/P EST LOW 20 MIN: CPT | Mod: 95 | Performed by: STUDENT IN AN ORGANIZED HEALTH CARE EDUCATION/TRAINING PROGRAM

## 2024-10-08 NOTE — PROGRESS NOTES
Verification of Patient Location:  The patient affirms they are currently located in the following state: [place]    Are you in your home or a private residence? Yes    Request for Consent:    Audio and Video Encounter   Krish, my name is Mahendra BegumDO.  Before we proceed, can you please verify your identification by telling me your full name and date of birth?  Can you tell me who is in the room with you?    You and I are about to have a telemedicine check-in or visit because you have requested it.  This is a live video-conference.  I am a real person, speaking to you in real time.  There is no one else with me on the video-conference. I am not recording this conversation and I am asking you not to record it.  This telemedicine visit will be billed to your health insurance or you, if you are self-insured.  You understand you will be responsible for any copayments or coinsurances that apply to your telemedicine visit.  Communication platform used for this encounter:  [employer] Video Visit (Epic Video Client)       Before starting our telemedicine visit, I am required to get your consent for this virtual check-in or visit by telemedicine. Do you consent?    Patient Response to Request for Consent:  Yes      Visit Documentation:  Subjective     Patient ID: Alecia Cruz is a 63 y.o. female.  1960      HPI  63-year-old female presents today for follow-up.    She reports that she has been having frequent palpitations.  Does not currently have an appointment with cardiologist.  Has had A-fib in the past.  Has also had frequent palpitations in the past.    She has been using magnesium oxide to help her to go to sleep.  She reports that this is fairly effective.    She has also seen neurology.  Reviewed their note.  There was question of if we could reduce cholesterol medications as LDL is well at goal.    She also reports ongoing stress related to work.    She is currently living in the [place] area  with her family.    After her visit we also spoke by phone and she reports that she has been under significant stress related to work which is leading her to having insomnia.  We have previously treated her with Benadryl but subsequently discontinued.  She has also been under a ton of stress and previous we have tried SSRIs as well as Wellbutrin.  Wellbutrin seems to have helped the most she is also followed up with therapy.  Unfortunately she has moved out of our area and to our embedded behavioral health is not currently available in our office.  She has an appointment coming up with behavioral health this coming week closer to the current area where she is living.    She is also set up to go to physical therapy for her neck as well as the femoral neuralgia.    Frustratingly, she has not made much progress as things keep coming up that do not allow her to move her health care for forward and challenges related to her work.    The following have been reviewed and updated as appropriate in this visit:   Allergies  Meds  Problems       Review of Systems  As noted above in HPI  Assessment & Plan  Cervical radiculopathy  It seems likely that this is the cause of her neck pain and shoulder pain.  Neurology saw her for this as well.  Refer to physical therapy.         Palpitations  I recommend that she follow-up with cardiology in her local area which she will call and make an appointment.         Anxiety state  I agree with her making an appointment to see a therapist in her area.  In the interim to help her with her insomnia we will prescribe trazodone.  I discussed with her over the phone.  She thinks that she took this back when she had her cancer therapy and that it worked fairly well.  Other SSRIs seem to have caused with palpitations.  Noted that there is a history of intolerance to medication but this was reported we discussed so I think probably will be able to tolerate.  Will prescribe a low-dose of 25 mg  and can go up to 50 mg if she needs a bit more.  Advised to take it earlier in the night and reviewed other side effects.           Time Spent:  I spent 20 minutes on this date of service performing the following activities: obtaining history, entering orders, documenting, obtaining / reviewing records, and providing counseling and education.

## 2024-10-10 ENCOUNTER — PATIENT OUTREACH (OUTPATIENT)
Dept: CASE MANAGEMENT | Facility: CLINIC | Age: 64
End: 2024-10-10
Payer: COMMERCIAL

## 2024-10-11 ENCOUNTER — PATIENT OUTREACH (OUTPATIENT)
Dept: CASE MANAGEMENT | Facility: CLINIC | Age: 64
End: 2024-10-11
Payer: COMMERCIAL

## 2024-10-11 ENCOUNTER — TELEPHONE (OUTPATIENT)
Dept: CASE MANAGEMENT | Facility: CLINIC | Age: 64
End: 2024-10-11
Payer: COMMERCIAL

## 2024-10-11 NOTE — TELEPHONE ENCOUNTER
Patient called and is requesting to see if her reasonable accommodations form had been filled out. She states her work will be looking for it next week or so.     She is also requesting that Dr Begum could reach out, as she is having anxiety attacks/palps, at night ,affecting sleep. She does have appt with psych on 10/15 but needs something now to allow her to sleep. She has a call into cardiology as well.     Thanks,    Ragini Platt, ZAKN, RN  Ambulatory Care Manager  [phone]

## 2024-10-11 NOTE — PROGRESS NOTES
10/11/2024 03:35 PM EDT by Ragini Platt, RN  Outgoing Alecia Nelson (Self) [phone] (Home)   Reached Patient : JOSÉ MIGUEL F/U: CM returned patient call. She is requesting to see if her reasonable accommodations form had been filled out and also if Dr Begum could reach out as she is having anxiety attacks/palps at night affecting sleep. She does have appt with psych on 10/15 but needs something now to allow her to sleep. She has a call into cardiology as well. AUGIE sent a message to Dr Begum.     VISHAL Amezcua, RN  Ambulatory Care Manager  [phone]

## 2024-10-13 RX ORDER — TRAZODONE HYDROCHLORIDE 50 MG/1
25 TABLET ORAL NIGHTLY PRN
Qty: 30 TABLET | Refills: 0 | Status: SHIPPED | OUTPATIENT
Start: 2024-10-13 | End: 2024-11-19 | Stop reason: SDUPTHER

## 2024-10-13 NOTE — ASSESSMENT & PLAN NOTE
I recommend that she follow-up with cardiology in her local area which she will call and make an appointment.

## 2024-10-13 NOTE — ASSESSMENT & PLAN NOTE
I agree with her making an appointment to see a therapist in her area.  In the interim to help her with her insomnia we will prescribe trazodone.  I discussed with her over the phone.  She thinks that she took this back when she had her cancer therapy and that it worked fairly well.  Other SSRIs seem to have caused with palpitations.  Noted that there is a history of intolerance to medication but this was reported we discussed so I think probably will be able to tolerate.  Will prescribe a low-dose of 25 mg and can go up to 50 mg if she needs a bit more.  Advised to take it earlier in the night and reviewed other side effects.

## 2024-10-13 NOTE — ASSESSMENT & PLAN NOTE
It seems likely that this is the cause of her neck pain and shoulder pain.  Neurology saw her for this as well.  Refer to physical therapy.

## 2024-10-18 ENCOUNTER — TELEPHONE (OUTPATIENT)
Dept: CASE MANAGEMENT | Facility: CLINIC | Age: 64
End: 2024-10-18
Payer: COMMERCIAL

## 2024-10-18 NOTE — TELEPHONE ENCOUNTER
Patient called looking to see if this form(Reasonable Accommodations) is completed and if so, what fax number it is being sent to. Can someone reach out to her about this please?     Thanks    ZAK AmezcuaN, RN  Ambulatory Care Manager  [phone]

## 2024-10-23 ENCOUNTER — PATIENT OUTREACH (OUTPATIENT)
Dept: CASE MANAGEMENT | Facility: CLINIC | Age: 64
End: 2024-10-23
Payer: COMMERCIAL

## 2024-10-23 ENCOUNTER — TELEPHONE (OUTPATIENT)
Dept: CASE MANAGEMENT | Facility: CLINIC | Age: 64
End: 2024-10-23
Payer: COMMERCIAL

## 2024-10-23 NOTE — PROGRESS NOTES
10/23/2024 09:38 AM EDT by Ragini Platt, RN  Incoming Alecia Nelson (Self) [phone] (Home)   Patient called to say she was informed that only part of Reasonable Accommodations Form had been received and patient is requesting to speak with MA regarding this. Telephone Call Message sent to office MA's.    VISHAL Amezcua, RN  Ambulatory Care Manager  [phone]

## 2024-10-23 NOTE — TELEPHONE ENCOUNTER
Can MA reach out to patient about Reasonable Accommodations form-they have only received part of it and need the rest asap. I believe [facility] and [facility] have been involved with this.    Thanks    Ragini Platt, BSN, RN  Ambulatory Care Manager  [phone]

## 2024-10-31 ENCOUNTER — TELEPHONE (OUTPATIENT)
Dept: CASE MANAGEMENT | Facility: CLINIC | Age: 64
End: 2024-10-31
Payer: COMMERCIAL

## 2024-10-31 ENCOUNTER — PATIENT OUTREACH (OUTPATIENT)
Dept: CASE MANAGEMENT | Facility: CLINIC | Age: 64
End: 2024-10-31
Payer: COMMERCIAL

## 2024-10-31 NOTE — TELEPHONE ENCOUNTER
Patient called. She states she needs Dr Begum to use exact wording on the Reasonable Accommodations Forms and to specify patient may need to do telework from 4-12 months, effective immediately, and that she has potential to fall and is on blood thinners. Wording should be, so layman can understand, and not medical. They only gave a limited time for this to be sent in so quicker turn around, the better. Portal will put some info on portal in reference to this.     Ragini Platt, ZAKN, RN  Ambulatory Care Manager  [phone]

## 2024-10-31 NOTE — PROGRESS NOTES
10/31/2024 12:22 PM EDT by Ragini Platt, RN  Outgoing Alecia Nelson (Self) [phone] (Home)   Reached Patient : CM returned patient call. She states she needs Dr Begum to use exact wording on the Reasonable Accommodations Forms and to specify patient may need to do telework from 4-12 months, effective immediately and that she has potential to fall and is on blood thinners. Wording should be so layman can understand and not medical. They only gave a limited time for this to be sent in so quicker turn around, the better.     AUGIE sending above info to [place] and Dr Begum.     VISHAL Amezcua, RN  Ambulatory Care Manager  [phone]

## 2024-11-06 ENCOUNTER — PATIENT OUTREACH (OUTPATIENT)
Dept: CASE MANAGEMENT | Facility: CLINIC | Age: 64
End: 2024-11-06
Payer: COMMERCIAL

## 2024-11-06 ENCOUNTER — TELEPHONE (OUTPATIENT)
Dept: CASE MANAGEMENT | Facility: CLINIC | Age: 64
End: 2024-11-06
Payer: COMMERCIAL

## 2024-11-06 NOTE — PROGRESS NOTES
11/06/2024 04:38 PM EST by Ragini Platt, RN  Outgoing Alecia Nelson (Self) [phone] (Home)   Reached Patient : CM informed patient to email form for Reasonable Accommodations to Dr Begum and provided email address, per Dr Begum request.    VISHAL Amezcua, RN  Ambulatory Care Manager  [phone]

## 2024-11-06 NOTE — PROGRESS NOTES
11/06/2024 02:55 PM EST by Ragini Platt, RN  Outgoing Alecia Nelson (Self) [phone] (Home)   Left Message - LCM Left message that message sent to PCP office for MA to reach out to her for Reasonable Accommodations Form, as deadline approaching.     VISHAL Amezcua, RN  Ambulatory Care Manager  [phone]

## 2024-11-06 NOTE — TELEPHONE ENCOUNTER
Can an MA please reach out to patient about her Reasonable Accommodations Form? Deadline is approaching.     Thanks    ZAK AmezcuaN, RN  Ambulatory Care Manager  [phone]

## 2024-11-06 NOTE — TELEPHONE ENCOUNTER
Spoke with pt, she stated the form need to be more detailed and be more specific, please advise.    And this due by Friday 11/08 sent over to them

## 2024-11-08 ENCOUNTER — PATIENT OUTREACH (OUTPATIENT)
Dept: CASE MANAGEMENT | Facility: CLINIC | Age: 64
End: 2024-11-08
Payer: COMMERCIAL

## 2024-11-08 NOTE — PROGRESS NOTES
11/08/2024 11:41 AM EST by Ragini Platt, RN  Incoming Alecia Nelson (Self) [phone] (Home)   Patient called and CM informed her that form was completed by Dr Begum and was faxed to appropriate number and hernandez uploaded to her [employer]. She was very appreciative. She was weepy on phone call and support provided.     VISHAL Amezcua, RN  Ambulatory Care Manager  [phone]

## 2024-11-19 NOTE — TELEPHONE ENCOUNTER
Medicine Refill Request    Last Office Visit: 2/2/2024   Last Consult Visit: Visit date not found  Last Telemedicine Visit: 10/8/2024 Mahendra Begum DO    Next Appointment: Visit date not found      Current Outpatient Medications:     apixaban (ELIQUIS) 5 mg tablet, Take 1 tablet (5 mg total) by mouth 2 (two) times a day., Disp: 180 tablet, Rfl: 1    ascorbic acid (VITAMIN C) 500 mg tablet, Take 500 mg by mouth daily., Disp: , Rfl:     aspirin 81 mg enteric coated tablet, Take 81 mg by mouth daily., Disp: , Rfl:     atorvastatin (LIPITOR) 40 mg tablet, Take 1 tablet (40 mg total) by mouth daily., Disp: 90 tablet, Rfl: 1    azelastine (ASTELIN) 137 mcg (0.1 %) nasal spray, Administer 2 sprays into each nostril 2 (two) times a day as needed for allergies., Disp: 30 mL, Rfl: 0    betamethasone dipropionate (DIPROSONE) 0.05 % lotion, Apply 1 each topically daily as needed., Disp: , Rfl:     cetirizine (ZyrTEC) 10 mg tablet, Take 10 mg by mouth as needed., Disp: , Rfl:     cholecalciferol, vitamin D3, 1,000 unit (25 mcg) tablet, Take 1,000 Units by mouth daily., Disp: , Rfl:     coenzyme Q10 (COQ10) 50 mg capsule, Take 50 mg by mouth daily., Disp: , Rfl:     docusate sodium (COLACE) 100 mg capsule, Take 2 capsules (200 mg total) by mouth 2 (two) times a day as needed for constipation., Disp: 60 capsule, Rfl: 0    flecainide (TAMBOCOR) 50 mg tablet, Take 1 tablet (50 mg total) by mouth 2 (two) times a day., Disp: 180 tablet, Rfl: 3    gabapentin (NEURONTIN) 100 mg capsule, Take 1 capsule (100 mg total) by mouth nightly. (Patient taking differently: Take 50 mg by mouth nightly.), Disp: 90 capsule, Rfl: 0    levothyroxine (SYNTHROID) 75 mcg tablet, TAKE 1 TABLET BY MOUTH EVERY DAY, Disp: 90 tablet, Rfl: 1    lidocaine (ASPERCREME) 4 % adhesive patch,medicated topical patch, Apply 1 patch topically daily., Disp: 30 patch, Rfl: 0    magnesium oxide (MAG-OX) 400 mg (241.3 mg magnesium) tablet, Take 400 mg by mouth daily., Disp:  , Rfl:     metoprolol succinate XL (TOPROL-XL) 25 mg 24 hr tablet, Take 0.5 tablets (12.5 mg total) by mouth daily as needed (SBP over 135 or  palpitations). (Patient not taking: Reported on 8/28/2024), Disp: 10 tablet, Rfl: 6    multivitamin (THERAGRAN) tablet, Take 1 tablet by mouth daily., Disp: , Rfl:     nitroglycerin (NITROSTAT) 0.4 mg SL tablet, Place 1 tablet (0.4 mg total) under the tongue every 5 (five) minutes as needed for chest pain., Disp: 25 tablet, Rfl: 3    omega-3-dha-epa-dpa-fish oil 1,050 mg(300 mg -675 mg-75 mg) capsule, Take 1 capsule by mouth daily., Disp: , Rfl:     polyethylene glycol (MIRALAX) 17 gram packet, Take 17 g by mouth daily as needed (Constipation) for up to 3 days., Disp: 30 each, Rfl: 0    traZODone (DESYREL) 50 mg tablet, Take 0.5 tablets (25 mg total) by mouth nightly as needed for sleep., Disp: 30 tablet, Rfl: 0      BP Readings from Last 3 Encounters:   07/04/24 (!) 135/91   02/02/24 120/60   01/31/24 120/70       Recent Lab results:  Lab Results   Component Value Date    CHOL 180 03/08/2023   ,   Lab Results   Component Value Date    HDL 59 03/08/2023   ,   Lab Results   Component Value Date    LDLCALC 113 (H) 03/08/2023   ,   Lab Results   Component Value Date    TRIG 39 03/08/2023        Lab Results   Component Value Date    GLUCOSE 82 07/03/2024   ,   Lab Results   Component Value Date    HGBA1C 5.5 03/07/2023         Lab Results   Component Value Date    CREATININE 0.8 07/03/2024       Lab Results   Component Value Date    TSH 1.08 03/07/2023           Lab Results   Component Value Date    HGBA1C 5.5 03/07/2023

## 2024-11-20 RX ORDER — TRAZODONE HYDROCHLORIDE 50 MG/1
25 TABLET ORAL NIGHTLY PRN
Qty: 30 TABLET | Refills: 0 | Status: SHIPPED | OUTPATIENT
Start: 2024-11-20 | End: 2024-12-05 | Stop reason: SDUPTHER

## 2024-11-27 ENCOUNTER — PATIENT OUTREACH (OUTPATIENT)
Dept: CASE MANAGEMENT | Facility: CLINIC | Age: 64
End: 2024-11-27
Payer: COMMERCIAL

## 2024-11-27 NOTE — PROGRESS NOTES
11/27/2024 10:39 AM EST by Ragini Platt, RN  Outgoing Alexi Cruz Alecia ([facility]) [phone] (Home)   Left Message - LCM Returned patient call and left message.      Ragini Platt, ZAKN, RN  Ambulatory Care Manager  [phone]

## 2024-11-27 NOTE — PROGRESS NOTES
11/27/2024 11:41 AM EST by Ragini Platt, RN  Incoming Alecia Nelson (Self) [phone] (Home)   Patient returned call. She wants to see Dr Begum in person on 12/10 so scheduled. She will see dentist prior and will schedule Mammo while in town. Reviewed notes from telemed with Dr Begum from 10/8. She did not try the Trazodone ordered as pharmacy let her know she had a bad reaction years ago but she feels Mag working.     Patient planning on going part time in job then retiring and this has loosened the stress load patient has been feeling. Encouraged her to concentrate on health and she agrees.     She does have a therapist for limited amt time and feels it is beneficial.       Ragini Platt, BSN, RN  Ambulatory Care Manager  [phone]

## 2024-12-05 NOTE — TELEPHONE ENCOUNTER
Medicine Refill Request    Last Office Visit: 2/2/2024   Last Consult Visit: Visit date not found  Last Telemedicine Visit: 10/8/2024 Mahendra Begum DO    Next Appointment: 12/10/2024      Current Outpatient Medications:     apixaban (ELIQUIS) 5 mg tablet, Take 1 tablet (5 mg total) by mouth 2 (two) times a day., Disp: 180 tablet, Rfl: 1    ascorbic acid (VITAMIN C) 500 mg tablet, Take 500 mg by mouth daily., Disp: , Rfl:     aspirin 81 mg enteric coated tablet, Take 81 mg by mouth daily., Disp: , Rfl:     atorvastatin (LIPITOR) 40 mg tablet, Take 1 tablet (40 mg total) by mouth daily., Disp: 90 tablet, Rfl: 1    azelastine (ASTELIN) 137 mcg (0.1 %) nasal spray, Administer 2 sprays into each nostril 2 (two) times a day as needed for allergies., Disp: 30 mL, Rfl: 0    betamethasone dipropionate (DIPROSONE) 0.05 % lotion, Apply 1 each topically daily as needed., Disp: , Rfl:     cetirizine (ZyrTEC) 10 mg tablet, Take 10 mg by mouth as needed., Disp: , Rfl:     cholecalciferol, vitamin D3, 1,000 unit (25 mcg) tablet, Take 1,000 Units by mouth daily., Disp: , Rfl:     coenzyme Q10 (COQ10) 50 mg capsule, Take 50 mg by mouth daily., Disp: , Rfl:     docusate sodium (COLACE) 100 mg capsule, Take 2 capsules (200 mg total) by mouth 2 (two) times a day as needed for constipation., Disp: 60 capsule, Rfl: 0    flecainide (TAMBOCOR) 50 mg tablet, Take 1 tablet (50 mg total) by mouth 2 (two) times a day., Disp: 180 tablet, Rfl: 3    gabapentin (NEURONTIN) 100 mg capsule, Take 1 capsule (100 mg total) by mouth nightly. (Patient taking differently: Take 50 mg by mouth nightly.), Disp: 90 capsule, Rfl: 0    levothyroxine (SYNTHROID) 75 mcg tablet, TAKE 1 TABLET BY MOUTH EVERY DAY, Disp: 90 tablet, Rfl: 1    lidocaine (ASPERCREME) 4 % adhesive patch,medicated topical patch, Apply 1 patch topically daily., Disp: 30 patch, Rfl: 0    magnesium oxide (MAG-OX) 400 mg (241.3 mg magnesium) tablet, Take 400 mg by mouth daily., Disp: , Rfl:      metoprolol succinate XL (TOPROL-XL) 25 mg 24 hr tablet, Take 0.5 tablets (12.5 mg total) by mouth daily as needed (SBP over 135 or  palpitations). (Patient not taking: Reported on 8/28/2024), Disp: 10 tablet, Rfl: 6    multivitamin (THERAGRAN) tablet, Take 1 tablet by mouth daily., Disp: , Rfl:     nitroglycerin (NITROSTAT) 0.4 mg SL tablet, Place 1 tablet (0.4 mg total) under the tongue every 5 (five) minutes as needed for chest pain., Disp: 25 tablet, Rfl: 3    omega-3-dha-epa-dpa-fish oil 1,050 mg(300 mg -675 mg-75 mg) capsule, Take 1 capsule by mouth daily., Disp: , Rfl:     polyethylene glycol (MIRALAX) 17 gram packet, Take 17 g by mouth daily as needed (Constipation) for up to 3 days., Disp: 30 each, Rfl: 0    traZODone (DESYREL) 50 mg tablet, Take 0.5 tablets (25 mg total) by mouth nightly as needed for sleep., Disp: 30 tablet, Rfl: 0      BP Readings from Last 3 Encounters:   07/04/24 (!) 135/91   02/02/24 120/60   01/31/24 120/70       Recent Lab results:  Lab Results   Component Value Date    CHOL 180 03/08/2023   ,   Lab Results   Component Value Date    HDL 59 03/08/2023   ,   Lab Results   Component Value Date    LDLCALC 113 (H) 03/08/2023   ,   Lab Results   Component Value Date    TRIG 39 03/08/2023        Lab Results   Component Value Date    GLUCOSE 82 07/03/2024   ,   Lab Results   Component Value Date    HGBA1C 5.5 03/07/2023         Lab Results   Component Value Date    CREATININE 0.8 07/03/2024       Lab Results   Component Value Date    TSH 1.08 03/07/2023           Lab Results   Component Value Date    HGBA1C 5.5 03/07/2023       Statement Selected

## 2024-12-06 RX ORDER — TRAZODONE HYDROCHLORIDE 50 MG/1
25 TABLET ORAL NIGHTLY PRN
Qty: 30 TABLET | Refills: 0 | Status: SHIPPED | OUTPATIENT
Start: 2024-12-06 | End: 2024-12-10

## 2024-12-10 ENCOUNTER — HOSPITAL ENCOUNTER (OUTPATIENT)
Dept: RADIOLOGY | Age: 64
Discharge: HOME | End: 2024-12-10
Attending: STUDENT IN AN ORGANIZED HEALTH CARE EDUCATION/TRAINING PROGRAM
Payer: COMMERCIAL

## 2024-12-10 ENCOUNTER — PATIENT OUTREACH (OUTPATIENT)
Dept: CASE MANAGEMENT | Facility: CLINIC | Age: 64
End: 2024-12-10
Payer: COMMERCIAL

## 2024-12-10 ENCOUNTER — OFFICE VISIT (OUTPATIENT)
Dept: PRIMARY CARE | Facility: CLINIC | Age: 64
End: 2024-12-10
Payer: COMMERCIAL

## 2024-12-10 VITALS
WEIGHT: 142 LBS | BODY MASS INDEX: 22.29 KG/M2 | RESPIRATION RATE: 17 BRPM | HEIGHT: 67 IN | SYSTOLIC BLOOD PRESSURE: 146 MMHG | TEMPERATURE: 97.9 F | DIASTOLIC BLOOD PRESSURE: 76 MMHG | HEART RATE: 74 BPM | OXYGEN SATURATION: 96 %

## 2024-12-10 DIAGNOSIS — Z85.3 HISTORY OF LEFT BREAST CANCER: ICD-10-CM

## 2024-12-10 DIAGNOSIS — Z78.0 POST-MENOPAUSAL: Primary | ICD-10-CM

## 2024-12-10 DIAGNOSIS — E03.9 HYPOTHYROIDISM, UNSPECIFIED TYPE: ICD-10-CM

## 2024-12-10 DIAGNOSIS — N95.0 POST-MENOPAUSAL BLEEDING: ICD-10-CM

## 2024-12-10 DIAGNOSIS — Z56.6 STRESS AT WORK: ICD-10-CM

## 2024-12-10 DIAGNOSIS — E78.00 PURE HYPERCHOLESTEROLEMIA: ICD-10-CM

## 2024-12-10 DIAGNOSIS — G57.21 FEMORAL NEUROPATHY OF RIGHT LOWER EXTREMITY: ICD-10-CM

## 2024-12-10 DIAGNOSIS — R15.1 FECAL SMEARING: ICD-10-CM

## 2024-12-10 DIAGNOSIS — G47.9 SLEEP DISTURBANCE: ICD-10-CM

## 2024-12-10 PROBLEM — K63.8219 SMALL INTESTINAL BACTERIAL OVERGROWTH (SIBO): Status: RESOLVED | Noted: 2022-04-05 | Resolved: 2024-12-10

## 2024-12-10 PROCEDURE — 77067 SCR MAMMO BI INCL CAD: CPT

## 2024-12-10 PROCEDURE — 99214 OFFICE O/P EST MOD 30 MIN: CPT | Performed by: STUDENT IN AN ORGANIZED HEALTH CARE EDUCATION/TRAINING PROGRAM

## 2024-12-10 PROCEDURE — 3008F BODY MASS INDEX DOCD: CPT | Performed by: STUDENT IN AN ORGANIZED HEALTH CARE EDUCATION/TRAINING PROGRAM

## 2024-12-10 PROCEDURE — 77063 BREAST TOMOSYNTHESIS BI: CPT

## 2024-12-10 SDOH — HEALTH STABILITY - MENTAL HEALTH: OTHER PHYSICAL AND MENTAL STRAIN RELATED TO WORK: Z56.6

## 2024-12-10 NOTE — PROGRESS NOTES
12/10/2024 01:22 PM EST by Ragini Platt, RN  Outgoing Alexi Cruz Alecia (Self) [phone] (Home)   No Answer/Busy : Returned patient call-Unable to leave a message.      ZAK AmezcuaN, RN  Ambulatory Care Manager  [phone]

## 2024-12-10 NOTE — PROGRESS NOTES
Consent obtained from patient and all parties present in the room? yes    I have obtained the consent of everyone present in the room to make an audio recording of this visit to assist me in documenting the encounter in the EMR.     Subjective     Patient ID: Alecia Cruz, : 1960 is a 64 y.o. female who presents for Follow-up (Having a lot of gas and it smells/) and Gas    History of Present Illness  The patient is a 64-year-old female presenting for follow-up.    She reports excessive flatulence with an unpleasant odor and musty smell when wiping after urination. She is concerned due to her sister's history of colon cancer. Stools are typically brown-orange and occasionally loose. She sometimes feels the urge to defecate but passes little or nothing. She has experienced fecal incontinence during flatulence and suspects inadequate fiber intake. She is lactose intolerant but consumes dairy. Occasional abdominal pain associated with gas resolves after bowel movements. No black or tarry stools, bleeding, diarrhea, or abdominal pain. UTD on colonoscopy.    She has had sleep disturbances for the past month, with early morning awakenings around 2 AM, returning to sleep after an hour, and final awakening at 5 AM. She attributes this to work-related stress as she transitions to part-time employment. No anxiety or panic attacks upon awakening. Reports daytime fatigue, necessitating a nap after her work shift ends at 2:30 PM. Previously tried trazodone for sleep but discontinued due to adverse reactions. Goes to bed around 10 PM and occasionally wakes to urinate. Experienced severe sweating episodes 5-6 times. No heart arrhythmias, believes magnesium supplement is effective. Does not want medications for sleep issues.    She observed a very light pinkish discharge, attributing it to age-related vaginal wall changes. No hematuria but occasionally notices brown urine, resolving with increased water  "intake.    She has hemorrhoids.    She reports blood pressure fluctuations, attributing them to stress. Monitors blood pressure twice daily and takes medication as needed.    She has sinus trouble but has not taken medication consistently for 14 days.    She is on cholesterol medication and wants to know if she should continue the same dose as her neurologist suggested a possible reduction.    She has not started physical therapy.    FAMILY HISTORY  - Sister  of colon cancer    ALLERGIES  - Lactose intolerant    MEDICATIONS  - Atorvastatin  - Levothyroxine  - Eliquis  - Magnesium    The following have been reviewed and updated as appropriate in this visit:   Meds         Objective   Vitals:    12/10/24 1346   BP: (!) 146/76   BP Location: Right upper arm   Patient Position: Sitting   Pulse: 74   Resp: 17   Temp: 36.6 °C (97.9 °F)   TempSrc: Oral   SpO2: 96%   Weight: 64.4 kg (142 lb)   Height: 1.702 m (5' 7\")     Body mass index is 22.24 kg/m².    Physical Exam  General Appearance: Alert, well-appearing, in no acute distress.  Vital signs: Blood pressure is 130/68.  HEENT: NC.AT, conjunctival clear.  Respiratory: Lungs CTA b/l, no wheezes, ronchi, or rales.  Cardiovascular: RRR, normal s1/s2, no MRG.  Gastrointestinal: Abdomen is soft and nontender, no hepatosplenomegaly. No tenderness on examination.  Skin: Warm and dry, no rash.  Neurological: Normal.  Psychiatric: Mood and behavior are appropriate.    Results  Laboratory Studies  LDL cholesterol was 33.       Assessment & Plan  1. Hypothyroidism.  TSH levels are within the normal range with the current levothyroxine regimen. Continue the same levothyroxine regimen.    2. Hyperlipidemia.  Cholesterol levels have improved, with LDL at 33. Goal is to maintain LDL below 55. Reduce atorvastatin dosage from 40 mg to 20 mg.    3. Change in stools.  Up to date on colonoscopy. No abdominal pain, just more flatulence. Suspected increased lactose intake. Weight stable, " no black or bloody stools. Monitor condition. Would increase fiber intake. We may need to investigate in the future.     4. Insomnia.  Short-term change in sleeping habits, likely due to stress. Does not want medications. Previous medications for anxiety were discontinued due to side effects.    5. Chest pains.  Follows with cardiology.    6. Postmenopausal vaginal bleeding.  Reports pain and discharge, possibly blood. No other vaginal discharge or odor. Pelvic exam not available in office. Order ultrasound of the uterus to rule out underlying issues. May need to see gynecology.    7. Femoral neuropathy.  Past issue. Difficulty attending physical therapy due to schedule.     8. Stress.  Overwhelmed due to work and mental health stress. Agreed to cut back on hours. Hopefully this will bring her more time to allow her to prioritize her care more.     9. Sinus issues.  No changes.     10. Hemorrhoids.  No change in management.        I spent 30 minutes on this date of service performing the following activities: obtaining history, performing examination, entering orders, documenting, preparing for visit, obtaining / reviewing records, providing counseling and education, and independently reviewing study/studies.

## 2024-12-11 PROBLEM — K59.01 CONSTIPATION DUE TO SLOW TRANSIT: Status: RESOLVED | Noted: 2023-10-13 | Resolved: 2024-12-11

## 2024-12-11 PROBLEM — Z09 HOSPITAL DISCHARGE FOLLOW-UP: Status: RESOLVED | Noted: 2024-08-30 | Resolved: 2024-12-11

## 2024-12-11 RX ORDER — ATORVASTATIN CALCIUM 20 MG/1
20 TABLET, FILM COATED ORAL
Qty: 90 TABLET | Refills: 1 | Status: SHIPPED | OUTPATIENT
Start: 2024-12-11 | End: 2025-06-09

## 2024-12-18 ENCOUNTER — PATIENT OUTREACH (OUTPATIENT)
Dept: CASE MANAGEMENT | Facility: CLINIC | Age: 64
End: 2024-12-18
Payer: COMMERCIAL

## 2024-12-18 NOTE — PROGRESS NOTES
12/18/2024 04:29 PM EST by Ragini Platt, RN  Outgoing Alecia Nelson (Self) [phone] (Home)   Reached Patient - LCM : CM called patient back and informed her that Dr Begum recommended Dr Sanders at [facility] for her worsening Tinnitus symptoms.        VISHAL Amezcua, RN  Ambulatory Care Manager  [phone]

## 2024-12-31 ENCOUNTER — PATIENT OUTREACH (OUTPATIENT)
Dept: CASE MANAGEMENT | Facility: CLINIC | Age: 64
End: 2024-12-31
Payer: COMMERCIAL

## 2024-12-31 NOTE — PROGRESS NOTES
12/31/2024 08:30 AM EST by Ragini Platt, RN  Outgoing Alecia Nelson (Self) [phone] (Home)   No Answer/Busy - MB Full -CM returned patient call but no answer and MB full.       12/31/2024 11:16 AM EST by Ragini Platt, RN  Outgoing Alecia Nelson (Self) [phone] (Home)   Reached Patient : CM returned call. Patient just wanted to report she has a very large family and was with family over holidays and many of them came down down with a respiratory virus-some babies were hospitalized with RSV and some adults tested + for Covid or Flu. All agreed it was the sickest they ever felt. Patient was vaccinated and did get sick but is on the med. She had aches, congestion and fever Thursday and Friday and Saturday but Sunday started to feel somewhat better. Since then, improving somewhat daily. Does not feel need to make appt but wanted to make us aware. She will call if symptoms worsen.      Ragini Platt, BSN, RN  Ambulatory Care Manager  [phone]

## 2025-01-07 ENCOUNTER — PATIENT OUTREACH (OUTPATIENT)
Dept: CASE MANAGEMENT | Facility: CLINIC | Age: 65
End: 2025-01-07
Payer: COMMERCIAL

## 2025-01-07 NOTE — RESULT ENCOUNTER NOTE
Call made to patient to follow-up.  It appears that 2 letter was written to patient regarding mammogram.  Looking to follow-up with her further.  Left a phone number for her to call us back

## 2025-01-07 NOTE — PROGRESS NOTES
01/07/2025 01:04 PM EST by Ragini Platt, RN  Outgoing Alecia Nelson (Self) [phone] (Home)   Reached Patient : CM returned patient call. She states someone from PCP office called her but her VM was full. She has tried twice to call office back but no answer.  CM not seeing any notes or new orders so unsure who would have been calling but informed her that they should call back, if needed.     She is having trouble sleeping and having some IBS symptoms so scheduled a telemed video with PCP for 1/9/25.         VISHAL Amezcua, RN  Ambulatory Care Manager  [phone]

## 2025-01-09 ENCOUNTER — TELEMEDICINE (OUTPATIENT)
Dept: PRIMARY CARE | Facility: CLINIC | Age: 65
End: 2025-01-09
Payer: COMMERCIAL

## 2025-01-09 DIAGNOSIS — I48.0 PAROXYSMAL ATRIAL FIBRILLATION (CMS/HCC): ICD-10-CM

## 2025-01-09 PROCEDURE — 200200 PR NO CHARGE: Mod: 95 | Performed by: STUDENT IN AN ORGANIZED HEALTH CARE EDUCATION/TRAINING PROGRAM

## 2025-01-09 NOTE — Clinical Note
Please schedule patient for appointment with me. We were not able to make contact for her last telemedicine visit.

## 2025-01-12 NOTE — PROGRESS NOTES
I was not able to make contact with patient over [employer] T[employer].  The office was running behind due to the complexity of the day and when she signed on we were seeing another patient. I subsequently reached out to her by phone to discuss with her and get her set up for a visit.  Will ask the office to reach out to her to reschedule her.

## 2025-01-13 ENCOUNTER — PATIENT OUTREACH (OUTPATIENT)
Dept: CASE MANAGEMENT | Facility: CLINIC | Age: 65
End: 2025-01-13
Payer: COMMERCIAL

## 2025-01-13 ENCOUNTER — TELEPHONE (OUTPATIENT)
Dept: CASE MANAGEMENT | Facility: CLINIC | Age: 65
End: 2025-01-13
Payer: COMMERCIAL

## 2025-01-13 NOTE — PROGRESS NOTES
01/13/2025 09:54 AM EST by Ragini Platt, RN  Outgoing Alecia Nelson (Self) [phone] (Home)   Reached Patient - LCM : CM returned patient call-informed her to call office and ask for MA when it comes to forms and leave a portal message as well. She states understanding. Informed her that ACM will also no longer be scheduling and she can call office and ask for . She states understanding.     VISHAL Amezcua, RN  Ambulatory Care Manager  [phone]

## 2025-01-13 NOTE — TELEPHONE ENCOUNTER
Patient needs assist w Reasonable Accommodations Form-Can MA reach out please?    Thanks,    Ragini Platt, ZAKN, RN  Ambulatory Care Manager  [phone]

## 2025-01-14 ENCOUNTER — PATIENT OUTREACH (OUTPATIENT)
Dept: CASE MANAGEMENT | Facility: CLINIC | Age: 65
End: 2025-01-14
Payer: COMMERCIAL

## 2025-01-14 NOTE — PROGRESS NOTES
01/14/2025 03:53 PM EST by Ragini Platt, RN  Outgoing Alecia Nelson (Self) [phone] (Home)   Reached Patient : CM returned patient call. She states she needs form for her [employer] job to be updated asap for her Reasonable Accommodations and cannot wait the 7-14 day as informed by MA, yesterday. AUGIE sent secure chat to see if Dr Begum an reach out to her.       VISHAL Amezcua, RN  Ambulatory Care Manager  [phone]

## 2025-01-16 ENCOUNTER — PATIENT OUTREACH (OUTPATIENT)
Dept: CASE MANAGEMENT | Facility: CLINIC | Age: 65
End: 2025-01-16
Payer: COMMERCIAL

## 2025-01-16 ENCOUNTER — TELEMEDICINE (OUTPATIENT)
Dept: PRIMARY CARE | Facility: CLINIC | Age: 65
End: 2025-01-16
Payer: COMMERCIAL

## 2025-01-16 DIAGNOSIS — R92.8 ABNORMAL MAMMOGRAM: ICD-10-CM

## 2025-01-16 DIAGNOSIS — G57.21 FEMORAL NEUROPATHY OF RIGHT LOWER EXTREMITY: ICD-10-CM

## 2025-01-16 DIAGNOSIS — F41.1 ANXIETY STATE: Primary | ICD-10-CM

## 2025-01-16 DIAGNOSIS — G50.0 TRIGEMINAL NEURALGIA OF LEFT SIDE OF FACE: ICD-10-CM

## 2025-01-16 PROCEDURE — 99213 OFFICE O/P EST LOW 20 MIN: CPT | Mod: 95 | Performed by: STUDENT IN AN ORGANIZED HEALTH CARE EDUCATION/TRAINING PROGRAM

## 2025-01-16 SDOH — ECONOMIC STABILITY: FOOD INSECURITY: WITHIN THE PAST 12 MONTHS, YOU WORRIED THAT YOUR FOOD WOULD RUN OUT BEFORE YOU GOT MONEY TO BUY MORE.: NEVER TRUE

## 2025-01-16 SDOH — ECONOMIC STABILITY: FOOD INSECURITY: WITHIN THE PAST 12 MONTHS, THE FOOD YOU BOUGHT JUST DIDN'T LAST AND YOU DIDN'T HAVE MONEY TO GET MORE.: NEVER TRUE

## 2025-01-16 NOTE — PROGRESS NOTES
Verification of Patient Location:  The patient affirms they are currently located in the following state: [place]    Are you in your home or a private residence? Yes    Request for Consent:    Audio and Video Encounter   Krish, my name is Mahendra BegumDO.  Before we proceed, can you please verify your identification by telling me your full name and date of birth?  Can you tell me who is in the room with you?    You and I are about to have a telemedicine check-in or visit because you have requested it.  This is a live video-conference.  I am a real person, speaking to you in real time.  There is no one else with me on the video-conference. I am not recording this conversation and I am asking you not to record it.  This telemedicine visit will be billed to your health insurance or you, if you are self-insured.  You understand you will be responsible for any copayments or coinsurances that apply to your telemedicine visit.  Communication platform used for this encounter:  Wallit Video Visit (Epic Video Client)       Before starting our telemedicine visit, I am required to get your consent for this virtual check-in or visit by telemedicine. Do you consent?    Patient Response to Request for Consent:  Yes      Visit Documentation:  Subjective     Patient ID: Alecia Cruz is a 64 y.o. female.  1960      HPI  Patient presents today for multiple challenging issues.  Anxiety is significantly worsened right now.  Suspect decompensation secondary to work stress.  She is having issues with reasonable accommodation and the way that it was processed.  Does not have a current therapist and we have tried to previous other mental health medications but had side effects related to each.  Having facial pain around the parotid gland.  Has had sharp pains in the left side of the face nearby in the past.  This seems to come on when acutely stressed.  There does not appear to be a specific swelling.  Previously underwent  workup for lesion within the parotid gland.  Appears to perhaps have some level of trigeminal neuralgia.  Pain in the back and neck also seems to be worse during periods of stress.  Appears over video to be acutely very stressed out.  Denies SI or HI.  Briefly went over with her about box breathing as well as progressive muscle relaxation.  Reviewed with patient that she had an abnormal mammogram.  She is not living at the address that she has on file so did not receive a letter from the radiology department.  The following have been reviewed and updated as appropriate in this visit:        Review of Systems  As noted above  Assessment & Plan  Anxiety state  I largely think that most of her mental health is being driven by work stress and decompensation and as result he is having somatic complaints associated I suspect that she grinds her teeth when she is stressed out as well as she isn't getting quality rest.  Will do 1 thing by mental health medications again but she has had good response to 12 therapy in the past.  Will refer for talk therapy again as we do not currently have embedded behavioral health within our practice and my hope is that they can do telemedicine.  Orders:    Ambulatory Referal to [facility]    Abnormal mammogram  Made sure the patient is aware of abnormal mammogram..  She is going to get this additional imaging done.       Femoral neuropathy of right lower extremity  No change at this time.        Trigeminal neuralgia of left side of face  I think a lot of her pain is related to either trigeminal neuralgia and she should discuss w/ neuro about treatment for this vs. Her clenching her jaw which may indeed be contributing to her having increased symptoms of trigeminal neuralgia.          Time Spent:  I spent 25 minutes on this date of service performing the following activities: obtaining history, entering orders, documenting, obtaining / reviewing records, providing  counseling and education, and communicating results.

## 2025-01-16 NOTE — ASSESSMENT & PLAN NOTE
I largely think that most of her mental health is being driven by work stress and decompensation and as result he is having somatic complaints associated I suspect that she grinds her teeth when she is stressed out as well as she isn't getting quality rest.  Will do 1 thing by mental health medications again but she has had good response to 12 therapy in the past.  Will refer for talk therapy again as we do not currently have embedded behavioral health within our practice and my hope is that they can do telemedicine.  Orders:    Ambulatory Referal to [facility]

## 2025-01-17 ENCOUNTER — TELEPHONE (OUTPATIENT)
Dept: PRIMARY CARE | Facility: CLINIC | Age: 65
End: 2025-01-17

## 2025-01-17 NOTE — TELEPHONE ENCOUNTER
Pt calling to speak with  in regards to ear pain she has been experiencing. Pt said the pain has gotten worse over night and would like to discuss testing  mentioned during her telehealth appt. Yesterday.   For information on Fall & Injury Prevention, visit: [email or web address]-[email or web address]-[email or web address] OR  [email or web address]-[email or web address]-injury OR  https://www.cdc.gov/steadi/patient.html For information on Fall & Injury Prevention, visit: [email or web address]-mobility OR  [email or web address]-[email or web address]-to-avoid-injury OR  https://www.cdc.gov/steadi/patient.html

## 2025-01-22 ENCOUNTER — HOSPITAL ENCOUNTER (OUTPATIENT)
Dept: RADIOLOGY | Facility: HOSPITAL | Age: 65
Discharge: HOME | End: 2025-01-22
Attending: STUDENT IN AN ORGANIZED HEALTH CARE EDUCATION/TRAINING PROGRAM
Payer: COMMERCIAL

## 2025-01-22 ENCOUNTER — HOSPITAL ENCOUNTER (OUTPATIENT)
Dept: RADIOLOGY | Facility: HOSPITAL | Age: 65
Discharge: HOME | End: 2025-01-22
Attending: RADIOLOGY
Payer: COMMERCIAL

## 2025-01-22 DIAGNOSIS — R92.8 ABNORMAL MAMMOGRAM: ICD-10-CM

## 2025-01-22 DIAGNOSIS — Z78.0 POST-MENOPAUSAL: ICD-10-CM

## 2025-01-22 PROCEDURE — 76856 US EXAM PELVIC COMPLETE: CPT | Mod: 59

## 2025-01-22 PROCEDURE — 77065 DX MAMMO INCL CAD UNI: CPT | Mod: LT

## 2025-01-22 PROCEDURE — G0279 TOMOSYNTHESIS, MAMMO: HCPCS | Mod: LT

## 2025-01-22 PROCEDURE — 76830 TRANSVAGINAL US NON-OB: CPT

## 2025-01-22 PROCEDURE — 76642 ULTRASOUND BREAST LIMITED: CPT | Mod: LT

## 2025-01-25 NOTE — ASSESSMENT & PLAN NOTE
I think a lot of her pain is related to either trigeminal neuralgia and she should discuss w/ neuro about treatment for this vs. Her clenching her jaw which may indeed be contributing to her having increased symptoms of trigeminal neuralgia.

## 2025-02-24 NOTE — PROCEDURE: COUNSELING
"[facility] Note     Patient Name: Talha Abebe  Encounter Date: 02/24/2025     Have you been cared for by a [facility] Dermatologist in the last 3 years and, if so, which description applies to you?    Yes.  I have been here within the last 3 years, and my medical history has NOT changed since that time.  I am MALE/not capable of bearing children.    REVIEW OF SYSTEMS:  Have you recently had or currently have any of the following? No changes in my recent health.   PAST MEDICAL HISTORY:  Have you personally ever had or currently have any of the following?  If \"YES,\" then please provide more detail. No changes in my medical history.   HISTORY OF IMMUNOSUPPRESSION: Do you have a history of any of the following:  Systemic Immunosuppression such as Diabetes, Biologic or Immunotherapy, Chemotherapy, Organ Transplantation, Bone Marrow Transplantation or Prednisone?  No     Answering \"YES\" requires the addition of the dotphrase \"IMMUNOSUPPRESSED\" as the first diagnosis of the patient's visit.   FAMILY HISTORY:  Any \"first degree relatives\" (parent, brother, sister, or child) with the following?    No changes in my family's known health.   PATIENT EXPERIENCE:    Do you want the Dermatologist to perform a COMPLETE skin exam today including a clinical examination under the \"bra and underwear\" areas?  NO  If necessary, do we have your permission to call and leave a detailed message on your Preferred Phone number that includes your specific medical information?  Yes      Allergies   Allergen Reactions    Pollen Extract Nasal Congestion    Cat Dander Rash      Current Outpatient Medications:     albuterol (Ventolin HFA) 90 mcg/act inhaler, Inhale 2 puffs every 4 (four) hours as needed for wheezing or shortness of breath, Disp: 18 g, Rfl: 0    albuterol (Ventolin HFA) 90 mcg/act inhaler, Inhale 2 puffs every 4 (four) hours as needed for wheezing or shortness of breath (or cough) Use with spacer., Disp: 18 g, " Rfl: 0    Mometasone Furoate (Asmanex HFA) 100 MCG/ACT AERO, Inhale 1 puff (100 mcg total) 2 (two) times a day Use with spacer. Rinse mouth after use., Disp: 13 g, Rfl: 3    mupirocin (BACTROBAN) 2 % ointment, Apply topically 3 (three) times a day Apply to affected area on the left leg, Disp: 15 g, Rfl: 0    ondansetron (ZOFRAN) 4 MG/5ML solution, Take 3 mL (2.4 mg total) by mouth 2 (two) times a day as needed for nausea or vomiting, Disp: 20 mL, Rfl: 0    Spacer/Aero-Hold Chamber Mask MISC, Use daily With inhaler, Disp: 1 each, Rfl: 0    Spacer/Aero-Holding Chambers (AeroChamber MV) inhaler, Use as instructed, Disp: 1 each, Rfl: 0    fexofenadine (ALLEGRA) 30 MG/5ML suspension, Take 5 mL (30 mg total) by mouth daily, Disp: 300 mL, Rfl: 1    fluticasone (FLONASE) 50 mcg/act nasal spray, 1-2 sprays into each nostril daily, Disp: 11.1 mL, Rfl: 2    hydrOXYzine (ATARAX) 10 mg/5 mL syrup, Take 2.5 mL (5 mg total) by mouth daily at bedtime (Patient not taking: Reported on 9/30/2024), Disp: 150 mL, Rfl: 1    mupirocin (BACTROBAN) 2 % ointment, Apply topically 3 (three) times a day for 7 days, Disp: 30 g, Rfl: 0    olopatadine (PATANOL) 0.1 % ophthalmic solution, 1 drop 2 (two) times a day (Patient not taking: Reported on 9/30/2024), Disp: , Rfl:     tacrolimus (PROTOPIC) 0.03 % ointment, Apply topically 2 (two) times a day For 4-6 weeks, Disp: 60 g, Rfl: 2    triamcinolone (KENALOG) 0.1 % ointment, Apply topically 2 (two) times a day for 7 days, Disp: 80 g, Rfl: 0  No current facility-administered medications for this visit.    Facility-Administered Medications Ordered in Other Visits:     ondansetron (ZOFRAN) oral solution 1.68 mg, 0.1 mg/kg, Oral, Once, Alfredo Heath PA-C          Whom besides the patient is providing clinical information about today's encounter?   Parent/Guardian provided history (due to age/developmental stage of patient) Present with grandmother, mother on Facetime    Physical Exam and  "Assessment/Plan by Diagnosis:    ATOPIC DERMATITIS (\"childhood Eczema\")    Physical Exam:  Anatomic Location Affected:  axilla, groin  Morphological Description:  clear on exam  Body Surface Area Today:  0%  Overall Severity: mild  Pertinent Positives:  Pertinent Negatives:    Additional History of Present Condition:  patient last evaluated by Dr. Vidal on 4/8/22.  Present with grandmother, mother on Facetime.  He was previously prescribed Triamcinolone 0.1% ointment as needed for flares and Protopic 0.03% ointment. Mother states he will continue to have flares on armpits and groin/scrotum.  However, she is unsure which ointment she has been using.  She moisturizes with Aquaphor as needed.  She reports that flares will sometimes cause burning when showering.        Assessment and Plan:  Based on a thorough discussion of this condition and the management approach to it (including a comprehensive discussion of the known risks, side effects and potential benefits of treatment), the patient (family) agrees to implement the following specific plan:  Apply Tacrolimus 0.03% ointment Mon-Fri as maintenance.  May burn on initial application  Apply Triamcinolone 0.1% ointment only as needed for severe itching for a few days  Use moisturizer like Eucerin,Cerave, Vanicream or Aveeno Cream 3 times a day for the dry skin           Follow up in 6 months    KERATOSIS PILARIS    Physical Exam:  Anatomic Location Affected:  upper arms  Morphological Description:  1-2mm radha-follicular pinkish-red papules   Pertinent Positives:  Pertinent Negatives:    Additional History of Present Condition:  benign; reassured     Assessment and Plan:  Based on a thorough discussion of this condition and the management approach to it (including a comprehensive discussion of the known risks, side effects and potential benefits of treatment), the patient (family) agrees to implement the following specific plan:  Use moisturizer like Eucerin,Cerave, " Detail Level: Detailed Vanicream or Aveeno Cream 3 times a day for the dry skin        CAFE AU LAIT MACULE    Physical Exam:  Anatomic Location Affected:  lower sacrum, right posterior shoulder, neck, chest   Morphological Description:  light brown macules   Pertinent Positives:  Pertinent Negatives:    Additional History of Present Condition:  present on exam.  Mother denies any family history of neurofibromatosis.  Previously referred to ophthalmology which is still pending.  Mother reports new light brown spot on neck.      Assessment and Plan:  Based on a thorough discussion of this condition and the management approach to it (including a comprehensive discussion of the known risks, side effects and potential benefits of treatment), the patient (family) agrees to implement the following specific plan:  Reassured benign at this time as no other findings of NF-1; mom denies any family history   Advised to follow with ophthalmology to rule out Lisch nodules  Will monitor at this time, and reassess at next visit    Scribe Attestation      I,:  Serenity Manzano MA am acting as a scribe while in the presence of the attending physician.:       I,:  BLADE Victoria personally performed the services described in this documentation    as scribed in my presence.:

## 2025-03-05 ENCOUNTER — TELEPHONE (OUTPATIENT)
Dept: PRIMARY CARE | Facility: CLINIC | Age: 65
End: 2025-03-05
Payer: COMMERCIAL

## 2025-03-05 NOTE — TELEPHONE ENCOUNTER
Pt calling to speak with  in regards to her job saying they did not receive her form or letter for accomodation to work part time. Pt said her job is making her go into the office full time and pt informed me that she is not able to do it. Pt asking if the form can be re faxed and pt would like to speak with . While on the phone pt informed me that her anxiety was high and her b/p was brenda due to the situation.

## 2025-03-13 RX ORDER — LEVOTHYROXINE SODIUM 75 UG/1
75 TABLET ORAL DAILY
Qty: 90 TABLET | Refills: 1 | Status: SHIPPED | OUTPATIENT
Start: 2025-03-13

## 2025-03-13 NOTE — TELEPHONE ENCOUNTER
Medicine Refill Request    Last Office Visit: 12/10/2024   Last Consult Visit: Visit date not found  Last Telemedicine Visit: 1/16/2025 Mahendra Begum DO    Next Appointment: Visit date not found      Current Outpatient Medications:     apixaban (ELIQUIS) 5 mg tablet, Take 1 tablet (5 mg total) by mouth 2 (two) times a day., Disp: 180 tablet, Rfl: 1    ascorbic acid (VITAMIN C) 500 mg tablet, Take 500 mg by mouth daily., Disp: , Rfl:     aspirin 81 mg enteric coated tablet, Take 81 mg by mouth daily., Disp: , Rfl:     atorvastatin (LIPITOR) 20 mg tablet, Take 1 tablet (20 mg total) by mouth daily., Disp: 90 tablet, Rfl: 1    azelastine (ASTELIN) 137 mcg (0.1 %) nasal spray, Administer 2 sprays into each nostril 2 (two) times a day as needed for allergies., Disp: 30 mL, Rfl: 0    betamethasone dipropionate (DIPROSONE) 0.05 % lotion, Apply 1 each topically daily as needed., Disp: , Rfl:     cetirizine (ZyrTEC) 10 mg tablet, Take 10 mg by mouth as needed., Disp: , Rfl:     cholecalciferol, vitamin D3, 1,000 unit (25 mcg) tablet, Take 1,000 Units by mouth daily., Disp: , Rfl:     coenzyme Q10 (COQ10) 50 mg capsule, Take 50 mg by mouth daily., Disp: , Rfl:     docusate sodium (COLACE) 100 mg capsule, Take 2 capsules (200 mg total) by mouth 2 (two) times a day as needed for constipation., Disp: 60 capsule, Rfl: 0    flecainide (TAMBOCOR) 50 mg tablet, Take 1 tablet (50 mg total) by mouth 2 (two) times a day., Disp: 180 tablet, Rfl: 3    gabapentin (NEURONTIN) 100 mg capsule, Take 1 capsule (100 mg total) by mouth nightly. (Patient taking differently: Take 50 mg by mouth nightly.), Disp: 90 capsule, Rfl: 0    levothyroxine (SYNTHROID) 75 mcg tablet, TAKE 1 TABLET BY MOUTH EVERY DAY, Disp: 90 tablet, Rfl: 1    lidocaine (ASPERCREME) 4 % adhesive patch,medicated topical patch, Apply 1 patch topically daily., Disp: 30 patch, Rfl: 0    magnesium oxide (MAG-OX) 400 mg (241.3 mg magnesium) tablet, Take 400 mg by mouth daily.,  Disp: , Rfl:     metoprolol succinate XL (TOPROL-XL) 25 mg 24 hr tablet, Take 0.5 tablets (12.5 mg total) by mouth daily as needed (SBP over 135 or  palpitations). (Patient not taking: Reported on 8/28/2024), Disp: 10 tablet, Rfl: 6    multivitamin (THERAGRAN) tablet, Take 1 tablet by mouth daily., Disp: , Rfl:     nitroglycerin (NITROSTAT) 0.4 mg SL tablet, Place 1 tablet (0.4 mg total) under the tongue every 5 (five) minutes as needed for chest pain., Disp: 25 tablet, Rfl: 3    omega-3-dha-epa-dpa-fish oil 1,050 mg(300 mg -675 mg-75 mg) capsule, Take 1 capsule by mouth daily., Disp: , Rfl:     polyethylene glycol (MIRALAX) 17 gram packet, Take 17 g by mouth daily as needed (Constipation) for up to 3 days., Disp: 30 each, Rfl: 0    BP Readings from Last 3 Encounters:   12/10/24 (!) 146/76   07/04/24 (!) 135/91   02/02/24 120/60       Recent Lab results:  Lab Results   Component Value Date    CHOL 180 03/08/2023   ,   Lab Results   Component Value Date    HDL 59 03/08/2023   ,   Lab Results   Component Value Date    LDLCALC 113 (H) 03/08/2023   ,   Lab Results   Component Value Date    TRIG 39 03/08/2023        Lab Results   Component Value Date    GLUCOSE 82 07/03/2024   ,   Lab Results   Component Value Date    HGBA1C 5.5 03/07/2023         Lab Results   Component Value Date    CREATININE 0.8 07/03/2024       Lab Results   Component Value Date    TSH 1.08 03/07/2023           Lab Results   Component Value Date    HGBA1C 5.5 03/07/2023

## 2025-03-14 NOTE — TELEPHONE ENCOUNTER
Spoke to patient. Unclear if they didn't get paperwork and issue. Patient confirmed that we did send it. She has ongoing meetings with management there. She is going to schedule f/u meeting with me.

## 2025-03-25 ENCOUNTER — TELEPHONE (OUTPATIENT)
Dept: PRIMARY CARE | Facility: CLINIC | Age: 65
End: 2025-03-25
Payer: COMMERCIAL

## 2025-03-25 NOTE — TELEPHONE ENCOUNTER
"Pt calling to speak with Dr. Begum only re: falling yesterday at work. Pt now has R leg pain and low back pain. Pt states that the back pain \"shoots down both legs\". Pt advised that she can barley walk. Please call to discuss.  "

## 2025-04-02 ENCOUNTER — PATIENT OUTREACH (OUTPATIENT)
Dept: CASE MANAGEMENT | Facility: CLINIC | Age: 65
End: 2025-04-02
Payer: COMMERCIAL

## 2025-04-02 ENCOUNTER — TELEPHONE (OUTPATIENT)
Dept: PRIMARY CARE | Facility: CLINIC | Age: 65
End: 2025-04-02

## 2025-04-02 ENCOUNTER — TELEPHONE (OUTPATIENT)
Dept: CASE MANAGEMENT | Facility: CLINIC | Age: 65
End: 2025-04-02
Payer: COMMERCIAL

## 2025-04-02 NOTE — PROGRESS NOTES
04/02/2025 02:05 PM EDT by Ragini Platt, RN  Incoming Alecia Nelson (Self) [phone] (Home)   Patient called CM stating an issue getting through to PCP office. She states a form filled out by Dr Begum is due today at her job and her job depends on it but she cannot access it. CM send a secure chat message to [facility]'s in office for Dr Begum. CM asking MA to reach out to patient asap.     Ragini Platt, ZAKN, RN  Ambulatory Care Manager  [phone]

## 2025-04-02 NOTE — TELEPHONE ENCOUNTER
Please reach out to patient about a form that she needs today-she cannot access it and she is upset.    Thanks,    Ragini Platt, ZAKN, RN  Ambulatory Care Manager  [phone]

## 2025-04-03 ENCOUNTER — TELEMEDICINE (OUTPATIENT)
Dept: PRIMARY CARE | Facility: CLINIC | Age: 65
End: 2025-04-03
Payer: COMMERCIAL
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
## 2025-04-03 PROCEDURE — 99213 OFFICE O/P EST LOW 20 MIN: CPT | Mod: 95 | Performed by: NURSE PRACTITIONER

## 2025-04-03 ASSESSMENT — ENCOUNTER SYMPTOMS
UNEXPECTED WEIGHT CHANGE: 0
FEVER: 0
CHILLS: 0
NUMBNESS: 0
ARTHRALGIAS: 1
DIFFICULTY URINATING: 0
ABDOMINAL PAIN: 0
WEAKNESS: 0
DIZZINESS: 0
SHORTNESS OF BREATH: 0

## 2025-04-03 NOTE — PROGRESS NOTES
Verification of Patient Location:  The patient affirms they are currently located in the following state: [place]   Are you in your home or private residence? Yes    Request for Consent:  You and I are about to have a telemedicine check-in or visit. This is allowed because you are already my patient, and you have requested it.  This telemedicine visit will be billed to your health insurance or you, if you are self-insured.  You understand you will be responsible for any copayments or coinsurances that apply to your telemedicine visit.  Before starting our telemedicine visit, I am required to get your consent for this virtual check-in or visit by telemedicine. Do you consent?      Patient Response to Request for Consent: Yes    Communication platform used for this encounter: [employer] video visit      Subjective      Patient ID: Alecia Cruz is a 64 y.o. female.  1960      HPI    Pt is a 65yo female who presents for fall injury  Pt reports last Monday 3/24/25 pt had a slip and fall at work. She had severe pain in her lower back and right ankle from the fall. She was seen at [facility] the next day and had x-rays of lumbar spine and right ankle/foot which showed no fracture. Dx with lumbar strain. Pt was rx'd ibuprofen and muscle relaxer, which has been helping. Pt reports pain was bad that she needed assistance with dressing and had difficulty getting out of bed that she missed work last week. Needs a letter from doctor's office for missing work on 3/26/25.       The following have been reviewed and updated as appropriate in this visit:   Allergies  Meds  Problems       Review of Systems   Constitutional:  Negative for chills, fever and unexpected weight change.   Eyes:  Negative for visual disturbance.   Respiratory:  Negative for shortness of breath.    Cardiovascular:  Negative for chest pain.   Gastrointestinal:  Negative for abdominal pain.   Genitourinary:  Negative for difficulty urinating.    Musculoskeletal:  Positive for arthralgias.   Skin:  Negative for rash.   Neurological:  Negative for dizziness, weakness and numbness.       Objective     There were no vitals filed for this visit.  There is no height or weight on file to calculate BMI.    Physical Exam  Constitutional:       General: She is not in acute distress.     Appearance: She is not toxic-appearing.      Comments: Limited exam due to nature of visit         Assessment & Plan  Fall, initial encounter  Pt was seen at [facility] for evaluation last week. Pt reports improvement in her symptoms, but needs a letter for work stating that she was unable to work due to symptoms from her injury.                  Time Spent:  I spent 15 minutes on this date of service performing the following activities: obtaining history, documenting, and obtaining / reviewing records.

## 2025-04-03 NOTE — LETTER
April 3, 2025     Patient: Alecia Cruz  YOB: 1960  Date of Visit: 4/3/2025    To Whom it May Concern:    Alecia Cruz was seen in my clinic on 4/3/2025 at 12:00 pm. Please excuse Alecia for her absence from work on 3/26/25 due to her work injury.          Sincerely,         MEE Jerry        CC: No Recipients

## 2025-04-09 ENCOUNTER — OFFICE VISIT (OUTPATIENT)
Dept: PRIMARY CARE | Facility: CLINIC | Age: 65
End: 2025-04-09
Payer: COMMERCIAL

## 2025-04-09 VITALS
TEMPERATURE: 97.9 F | RESPIRATION RATE: 18 BRPM | HEART RATE: 67 BPM | WEIGHT: 140 LBS | DIASTOLIC BLOOD PRESSURE: 88 MMHG | HEIGHT: 67 IN | BODY MASS INDEX: 21.97 KG/M2 | SYSTOLIC BLOOD PRESSURE: 148 MMHG | OXYGEN SATURATION: 92 %

## 2025-04-09 DIAGNOSIS — R22.1 LUMP IN NECK: Primary | ICD-10-CM

## 2025-04-09 DIAGNOSIS — G50.0 TRIGEMINAL NEURALGIA OF LEFT SIDE OF FACE: ICD-10-CM

## 2025-04-09 PROCEDURE — 3008F BODY MASS INDEX DOCD: CPT | Performed by: NURSE PRACTITIONER

## 2025-04-09 PROCEDURE — 99214 OFFICE O/P EST MOD 30 MIN: CPT | Performed by: NURSE PRACTITIONER

## 2025-04-09 RX ORDER — TIZANIDINE 4 MG/1
4 TABLET ORAL EVERY 6 HOURS PRN
COMMUNITY
Start: 2025-03-25

## 2025-04-09 ASSESSMENT — ENCOUNTER SYMPTOMS
COUGH: 0
SINUS PRESSURE: 1
EYE PAIN: 0
VOMITING: 0
WEAKNESS: 0
HEMATURIA: 0
NAUSEA: 0
NUMBNESS: 1
PALPITATIONS: 0
ARTHRALGIAS: 0
DYSURIA: 0
ABDOMINAL PAIN: 0
DIARRHEA: 0
BLOOD IN STOOL: 0
HEADACHES: 0
NERVOUS/ANXIOUS: 1
TROUBLE SWALLOWING: 0
CHILLS: 0
VOICE CHANGE: 0
SORE THROAT: 0
FEVER: 0
MYALGIAS: 0
UNEXPECTED WEIGHT CHANGE: 0
FACIAL SWELLING: 0
DIZZINESS: 0
WHEEZING: 0
SHORTNESS OF BREATH: 0

## 2025-04-09 ASSESSMENT — PATIENT HEALTH QUESTIONNAIRE - PHQ9: SUM OF ALL RESPONSES TO PHQ9 QUESTIONS 1 & 2: 2

## 2025-04-09 NOTE — PROGRESS NOTES
"Aunt passed away on 4/3/25    Pt reports a nodule in parotid gland on imaging previously..    Left side of face feels numb, twitching, sharp pain  With pressure very painful  Started \"several years ago\"    Feels sinus congestion kal this time of the year  Worried about something parotid gland getting bigger    "

## 2025-04-09 NOTE — PROGRESS NOTES
Subjective      Patient ID: Alecia Cruz is a 64 y.o. female.  1960      MARTHA Alston is a 65yo female presenting with c/o pain in L side of neck.   Pt has hx of trigeminal neuralgia on left side of face. Pt reports over the last 3 weeks the L side of her face/neck has felt tight and that she has has an intermittent sharp pain in the area. She notes that it is even more painful to touch the area. She also reports numbness and twitching in L side of face. Pt thinks that there was a nodule found on imaging in the parotid gland previously and worried that it has grown in size due to the symptoms she's experiencing. She notes chronic sinus congestion, especially this time of year for which she treats with zyrtec and azelastine nasal spray. She admits to feeling anxious and stressed lately as her aunt passed away on 4/3/25 and she is planning on resigning from her job. She denies difficulty swallowing, voice change, HA other than baseline sinus congestion HA, visual disturbance, or weakness. Denies CP, SOB, n/v/d, abd pain, fever, chills.       The following have been reviewed and updated as appropriate in this visit:   Allergies  Meds  Problems       Review of Systems   Constitutional:  Negative for chills, fever and unexpected weight change.   HENT:  Positive for sinus pressure. Negative for congestion, drooling, ear pain, facial swelling, sore throat, trouble swallowing and voice change.    Eyes:  Negative for pain and visual disturbance.   Respiratory:  Negative for cough, shortness of breath and wheezing.    Cardiovascular:  Negative for chest pain, palpitations and leg swelling.   Gastrointestinal:  Negative for abdominal pain, blood in stool, diarrhea, nausea and vomiting.   Genitourinary:  Negative for dysuria and hematuria.   Musculoskeletal:  Negative for arthralgias and myalgias.   Skin:  Negative for rash.   Neurological:  Positive for numbness. Negative for dizziness, syncope, weakness and  "headaches.   Psychiatric/Behavioral:  Negative for suicidal ideas. The patient is nervous/anxious.        Objective     Vitals:    04/09/25 0838 04/09/25 0901   BP: (!) 142/82 (!) 148/88   BP Location: Right upper arm    Patient Position: Sitting    Pulse: 67    Resp: 18    Temp: 36.6 °C (97.9 °F)    TempSrc: Oral    SpO2: 92%    Weight: 63.5 kg (140 lb)    Height: 1.702 m (5' 7\")      Body mass index is 21.93 kg/m².    Physical Exam  Constitutional:       General: She is not in acute distress.     Appearance: She is not toxic-appearing.   HENT:      Head: Normocephalic and atraumatic.      Right Ear: Tympanic membrane, ear canal and external ear normal.      Left Ear: Tympanic membrane, ear canal and external ear normal.      Mouth/Throat:      Pharynx: No oropharyngeal exudate or posterior oropharyngeal erythema.   Eyes:      Extraocular Movements: Extraocular movements intact.      Conjunctiva/sclera: Conjunctivae normal.      Pupils: Pupils are equal, round, and reactive to light.   Neck:      Comments: Very mild fullness to L lateral neck but no palpable nodules or lymphadenopathy. No parotid gland swelling or tenderness.    Cardiovascular:      Rate and Rhythm: Normal rate and regular rhythm.      Heart sounds: Normal heart sounds. No murmur heard.     No friction rub. No gallop.   Pulmonary:      Effort: Pulmonary effort is normal. No respiratory distress.      Breath sounds: Normal breath sounds. No wheezing.   Musculoskeletal:      Cervical back: No rigidity.      Right lower leg: No edema.      Left lower leg: No edema.   Lymphadenopathy:      Cervical: No cervical adenopathy.   Skin:     General: Skin is warm.   Neurological:      General: No focal deficit present.      Mental Status: She is alert and oriented to person, place, and time.      Cranial Nerves: Cranial nerves 2-12 are intact. No cranial nerve deficit or facial asymmetry.      Motor: No weakness.         Assessment & Plan  Lump in neck  Pt is " well appearing. Appearance of very slight fullness in L lateral neck, no palpable mass, nodularity or lymphadenopathy. No dysphagia or dysphonia. Airway patent. No parotid gland swelling or tenderness on exam. Will evaluate with US to the area of pt concern. Reviewed red flags.   Orders:    ULTRASOUND SOFT TISSUE HEAD/FACE/NECK; Future    Trigeminal neuralgia of left side of face  Pt reporting L side facial paresthesia and pain. No focal neurologic deficits. Cranial nerves intact. No facial droop or speech alteration. Will refer to neurology for further management. Reviewed red flags.   Orders:    Ambulatory referral to Neurology; Future

## 2025-04-09 NOTE — ASSESSMENT & PLAN NOTE
Pt reporting L side facial paresthesia and pain. No focal neurologic deficits. Cranial nerves intact. No facial droop or speech alteration. Will refer to neurology for further management. Reviewed red flags.   Orders:    Ambulatory referral to Neurology; Future

## 2025-04-18 ENCOUNTER — TELEMEDICINE (OUTPATIENT)
Dept: PRIMARY CARE | Facility: CLINIC | Age: 65
End: 2025-04-18
Payer: COMMERCIAL

## 2025-04-18 DIAGNOSIS — R55 SYNCOPE, UNSPECIFIED SYNCOPE TYPE: Primary | ICD-10-CM

## 2025-04-18 PROCEDURE — 99214 OFFICE O/P EST MOD 30 MIN: CPT | Mod: 95 | Performed by: NURSE PRACTITIONER

## 2025-04-18 ASSESSMENT — ENCOUNTER SYMPTOMS
SHORTNESS OF BREATH: 0
COUGH: 0
TROUBLE SWALLOWING: 0
UNEXPECTED WEIGHT CHANGE: 0
DIFFICULTY URINATING: 0
DIZZINESS: 0
ABDOMINAL PAIN: 0
PALPITATIONS: 0
NAUSEA: 0
CHILLS: 0
FEVER: 0
SPEECH DIFFICULTY: 0
VOMITING: 0
WEAKNESS: 0

## 2025-04-18 NOTE — PROGRESS NOTES
" Verification of Patient Location:  The patient affirms they are currently located in the following state: [place]   Are you in your home or private residence? Yes    Request for Consent:  You and I are about to have a telemedicine check-in or visit. This is allowed because you are already my patient, and you have requested it.  This telemedicine visit will be billed to your health insurance or you, if you are self-insured.  You understand you will be responsible for any copayments or coinsurances that apply to your telemedicine visit.  Before starting our telemedicine visit, I am required to get your consent for this virtual check-in or visit by telemedicine. Do you consent?      Patient Response to Request for Consent: Yes    Communication platform used for this encounter: [employer] video visit    Subjective      Patient ID: Alecia Cruz is a 64 y.o. female.  1960      HPI    Pt is a 63yo female who presents for few concerns  Pt is requesting FMLA renewal for her chronic conditions. Pt is requesting FMLA as previously completed by Dr Begum, so that she is able to go to her doctor's appointments when she needs to. Will send over FMLA form to our office.    Pt reports she has been \"blacking out.\" Occurs randomly. Occurs typically when she's sitting down. Would be typing on her computer or talking to someone and lose consciousness. Lasts for few seconds or sometimes longer. Hasn't fallen to the ground, reports that she catches herself. No injury. No precursor symptoms. Reports something similar has happened in the past and was evaluated but unclear when/what workup.       The following have been reviewed and updated as appropriate in this visit:   Allergies  Meds  Problems       Review of Systems   Constitutional:  Negative for chills, fever and unexpected weight change.   HENT:  Negative for trouble swallowing.    Eyes:  Negative for visual disturbance.   Respiratory:  Negative for cough and " shortness of breath.    Cardiovascular:  Negative for chest pain and palpitations.   Gastrointestinal:  Negative for abdominal pain, nausea and vomiting.   Genitourinary:  Negative for difficulty urinating.   Neurological:  Negative for dizziness, speech difficulty and weakness.       Objective     There were no vitals filed for this visit.  There is no height or weight on file to calculate BMI.    Physical Exam  Constitutional:       General: She is not in acute distress.     Appearance: She is not toxic-appearing.      Comments: Limited exam due to nature of visit         Assessment & Plan  Syncope, unspecified syncope type  No acute symptoms at this time. Will check labs as below. Advised to call her cardiologist and schedule an appt for evaluation due to her hx of arrhythmias. Advised to also schedule neurology appt for evaluation. Had MRI brain in 8/2024. Advised on s/s that warrant ER evaluation.   Orders:    CBC and differential; Future    Comprehensive metabolic panel; Future    TSH w reflex FT4; Future                Time Spent:  I spent 20 minutes on this date of service performing the following activities: obtaining history, entering orders, and providing counseling and education.

## 2025-04-22 ENCOUNTER — HOSPITAL ENCOUNTER (OUTPATIENT)
Dept: RADIOLOGY | Facility: HOSPITAL | Age: 65
Discharge: HOME | End: 2025-04-22
Attending: NURSE PRACTITIONER
Payer: COMMERCIAL

## 2025-04-22 DIAGNOSIS — R22.1 LUMP IN NECK: ICD-10-CM

## 2025-04-22 PROCEDURE — 76536 US EXAM OF HEAD AND NECK: CPT

## 2025-04-29 ENCOUNTER — TELEPHONE (OUTPATIENT)
Dept: PRIMARY CARE | Facility: CLINIC | Age: 65
End: 2025-04-29

## 2025-04-29 NOTE — TELEPHONE ENCOUNTER
Tried calling pt to make her aware of the form fee for her [facility] paper work pts mailbox was full

## 2025-04-30 ENCOUNTER — RESULTS FOLLOW-UP (OUTPATIENT)
Dept: PRIMARY CARE | Facility: CLINIC | Age: 65
End: 2025-04-30

## 2025-04-30 DIAGNOSIS — K11.8 PAROTID NODULE: Primary | ICD-10-CM

## 2025-05-01 ENCOUNTER — HOSPITAL ENCOUNTER (OUTPATIENT)
Dept: RADIOLOGY | Facility: HOSPITAL | Age: 65
Discharge: HOME | End: 2025-05-01
Attending: NURSE PRACTITIONER
Payer: COMMERCIAL

## 2025-05-01 VITALS — WEIGHT: 140 LBS | BODY MASS INDEX: 21.93 KG/M2

## 2025-05-01 DIAGNOSIS — K11.8 PAROTID NODULE: ICD-10-CM

## 2025-05-01 PROCEDURE — A9579 GAD-BASE MR CONTRAST NOS,1ML: HCPCS

## 2025-05-01 PROCEDURE — A9573: HCPCS

## 2025-05-01 PROCEDURE — 70543 MRI ORBT/FAC/NCK W/O &W/DYE: CPT

## 2025-05-01 RX ADMIN — GADOPICLENOL 6.4 ML: 485.1 INJECTION INTRAVENOUS at 16:56

## 2025-05-02 ENCOUNTER — TELEPHONE (OUTPATIENT)
Dept: PRIMARY CARE | Facility: CLINIC | Age: 65
End: 2025-05-02
Payer: COMMERCIAL

## 2025-05-05 ENCOUNTER — RESULTS FOLLOW-UP (OUTPATIENT)
Dept: PRIMARY CARE | Facility: CLINIC | Age: 65
End: 2025-05-05

## 2025-05-05 ENCOUNTER — TELEPHONE (OUTPATIENT)
Dept: PRIMARY CARE | Facility: CLINIC | Age: 65
End: 2025-05-05
Payer: COMMERCIAL

## 2025-05-06 RX ORDER — AMLODIPINE BESYLATE 5 MG/1
5 TABLET ORAL DAILY
COMMUNITY

## 2025-05-06 RX ORDER — AMOXICILLIN 875 MG/1
875 TABLET, COATED ORAL 2 TIMES DAILY
Qty: 14 TABLET | Refills: 0 | Status: SHIPPED | OUTPATIENT
Start: 2025-05-06 | End: 2025-05-13

## 2025-05-08 ENCOUNTER — TELEPHONE (OUTPATIENT)
Dept: PRIMARY CARE | Facility: CLINIC | Age: 65
End: 2025-05-08
Payer: COMMERCIAL

## 2025-05-08 DIAGNOSIS — R55 SYNCOPE, UNSPECIFIED SYNCOPE TYPE: Primary | ICD-10-CM

## 2025-05-16 ENCOUNTER — TELEPHONE (OUTPATIENT)
Dept: PRIMARY CARE | Facility: CLINIC | Age: 65
End: 2025-05-16
Payer: COMMERCIAL

## 2025-05-30 ENCOUNTER — TELEPHONE (OUTPATIENT)
Dept: PRIMARY CARE | Facility: CLINIC | Age: 65
End: 2025-05-30
Payer: COMMERCIAL

## 2025-05-30 NOTE — TELEPHONE ENCOUNTER
Pt calling to speak with a nurse about multiple things. I asked pt for a specific issue and she did not provide at this time.

## 2025-06-04 ENCOUNTER — NURSE TRIAGE (OUTPATIENT)
Dept: PRIMARY CARE | Facility: CLINIC | Age: 65
End: 2025-06-04
Payer: COMMERCIAL

## 2025-06-04 NOTE — TELEPHONE ENCOUNTER
Regarding: weakness headache fatigue  ----- Message from Sarah WAN sent at 6/4/2025  3:34 PM EDT -----  weakness headache fatigue

## 2025-06-04 NOTE — TELEPHONE ENCOUNTER
"Patient transferred to the Primary Care Telephonic Nurse Triage Line with complaints of fatigue and headache.    HPI:  Patient reports she has been experiencing fatigue with intermittent headaches for past month. Patient states her legs feel heavy especially when going upstairs, denies swelling, redness.     Denies chest pain, SOB, palpitations fever, cough, vomiting, diarrhea, bleeding    Hx of Afib on Eliquis - no missed doses    No vitals available    Patient reports loss of loved ones in the past couple of months    Disposition:  See Today in Office    Patient requesting OV with PCP. Appointment scheduled with PCP for tomorrow 6/5/25 at 3:40 PM.     Care Advice:  Care Advice Given    Patient/Caregiver understands and will follow care advice?: Yes, with modifications      Given By Given At Modified    Joanna Christianson 6/4/2025  3:46 PM Yes           Disposition and First Aid    SEE IN OFFICE:  * You need to be examined  * Appointment scheduled for tomorrow 6/5/25 at 3:40 PM.     Joanna Christianson 6/4/2025  3:46 PM Yes           Weakness or Fatigue from Acute Minor Illness (e.g., colds, viral syndrome)    CALL BACK IF:  * Unable to stand or walk  * Breathing difficulty occurs   * You become worse               Initial Assessment:  1. DESCRIPTION: \"Describe how you are feeling.\"      Fatigue with intermittent headaches  2. SEVERITY: \"How bad is it?\"  \"Can you stand and walk?\"      Patient is able to walk and stand  3. ONSET: \"When did these symptoms begin?\" (e.g., hours, days, weeks, months)      Past month  4. CAUSE: \"What do you think is causing the weakness or fatigue?\" (e.g., not drinking enough fluids, medical problem, trouble sleeping)      Patient reports recent loss of several loved ones in the past couple of months  5. NEW MEDICINES:  \"Have you started on any new medicines recently?\" (e.g., opioid pain medicines, benzodiazepines, muscle relaxants, antidepressants, antihistamines, neuroleptics, beta blockers)      No " "new medications   6. OTHER SYMPTOMS: \"Do you have any other symptoms?\" (e.g., chest pain, fever, cough, SOB, vomiting, diarrhea, bleeding, other areas of pain)      Denies chest pain, fever, cough, SOB, vomiting, diarrhea, bleeding. Shoulder pain has dexa scan scheduled for next week   7. PREGNANCY: \"Is there any chance you are pregnant?\" \"When was your last menstrual period?\"      No    "

## 2025-06-05 ENCOUNTER — OFFICE VISIT (OUTPATIENT)
Dept: PRIMARY CARE | Facility: CLINIC | Age: 65
End: 2025-06-05
Payer: COMMERCIAL

## 2025-06-05 VITALS
HEIGHT: 67 IN | WEIGHT: 140 LBS | BODY MASS INDEX: 21.97 KG/M2 | RESPIRATION RATE: 18 BRPM | HEART RATE: 85 BPM | DIASTOLIC BLOOD PRESSURE: 80 MMHG | SYSTOLIC BLOOD PRESSURE: 144 MMHG | TEMPERATURE: 98.1 F | OXYGEN SATURATION: 98 %

## 2025-06-05 DIAGNOSIS — M62.838 MUSCLE SPASM: ICD-10-CM

## 2025-06-05 DIAGNOSIS — R41.3 MEMORY LOSS: Primary | ICD-10-CM

## 2025-06-05 DIAGNOSIS — G57.21 FEMORAL NEUROPATHY OF RIGHT LOWER EXTREMITY: ICD-10-CM

## 2025-06-05 DIAGNOSIS — F41.1 ANXIETY STATE: ICD-10-CM

## 2025-06-05 DIAGNOSIS — M76.32 ILIOTIBIAL BAND SYNDROME OF LEFT SIDE: ICD-10-CM

## 2025-06-05 PROCEDURE — 99214 OFFICE O/P EST MOD 30 MIN: CPT | Performed by: STUDENT IN AN ORGANIZED HEALTH CARE EDUCATION/TRAINING PROGRAM

## 2025-06-05 PROCEDURE — 3008F BODY MASS INDEX DOCD: CPT | Performed by: STUDENT IN AN ORGANIZED HEALTH CARE EDUCATION/TRAINING PROGRAM

## 2025-06-05 NOTE — PROGRESS NOTES
Consent obtained from patient and all parties present in the room? yes    I have obtained the consent of everyone present in the room to make an audio recording of this visit to assist me in documenting the encounter in the EMR.     Subjective     Patient ID: Alecia Cruz, : 1960 is a 64 y.o. female who presents for Fatigue    History of Present Illness  The patient is a 64-year-old female who presents for follow-up.    Bilateral leg heaviness  - Experienced for 2 weeks, described as walking [place] a beach  - Sudden leg weakness and pain while exiting a building yesterday, rendering her unable to walk. Symptoms have resolved.   - Noticed an oblong, tender lump in her leg causing soreness  - Lump is less pronounced today, but the area remains tender  - No associated rash    Fluctuating blood pressure  - Under the care of cardiologist.   - Medication was recently increased, leading to stabilization  - Blood pressure has been slightly elevated since the increase, attributed to recent stressors  - Does not feel the need for a further increase in dosage    Difficulty with concentration and focus  - Often losing track of conversations or tasks  - Morning headaches resolve upon movement  - Unsure if symptoms are related to sinus issues or allergies    Sharp, stabbing pain between shoulder blades  - Experienced for 3 weeks  - Occasionally radiates down her arm  - Pain limits arm movement and speech  - Has multiple trigger points noted in the middle.   - Scheduled for a DEXA scan next Tuesday    Issues with toes on both sides  - Injections at [facility] were effective for one foot but not the other  - Considering repeating the injection for the other foot    Supplemental information: Currently on cholesterol medication and baby aspirin. Requests renewal of cholesterol medication prescription.    PAST SURGICAL HISTORY:  - EMG on the right side  - Injection for toe pain at [facility]    FAMILY HISTORY  - Daughter   "from stage IV lung cancer at age 55  - Mother had dementia  - Two sisters have dementia    The following have been reviewed and updated as appropriate in this visit:          Objective   Vitals:    06/05/25 1535   BP: (!) 144/80   BP Location: Right upper arm   Patient Position: Sitting   Pulse: 85   Resp: 18   Temp: 36.7 °C (98.1 °F)   TempSrc: Oral   SpO2: 98%   Weight: 63.5 kg (140 lb)   Height: 1.702 m (5' 7\")     Body mass index is 21.93 kg/m².    Physical Exam  General Appearance: Alert, well-appearing, in no acute distress.  Vital signs: Within normal limits.  HEENT: NC.AT, conjunctival clear  Respiratory: Clear to auscultation, no wheezing, rales, or rhonchi.  Cardiovascular: Regular rate and rhythm, no murmurs, rubs, or gallops.  Gastrointestinal: Abdomen soft and NT, no organomegaly.  Back, Musculoskeletal: Trigger points in rhomboid muscles bilaterally, muscle spasm in IT band.  Extremities: Tenderness in legs, knot in IT band.  Skin: No rash.  Neurological: Awake, alert, gait is normal overall though slightly antalgic when initially getting up. Strength is 5/5 in bilateral finger flexors, biceps, triceps, hip flexor, knee flexion/extension, and plantar/dorsi flexion.   Psychiatric: Mood and behavior appropriate.    Results  CBC Results         07/03/24 01/31/24 11/24/23     2159 1232 1155    WBC 5.58 7.41 6.24    RBC 4.00 4.18 3.64    HGB 12.7 13.0 11.7    HCT 37.9 39.2 34.3    MCV 94.8 93.8 94.2    MCH 31.8 31.1 32.1    MCHC 33.5 33.2 34.1     233 204          CMP Results         07/03/24 01/31/24 11/24/23     2159 1232 1155     138 141    K 3.6 3.6 3.9    Cl 105 104 108    CO2 27 25 28    Glucose 82 149 78    BUN 10 11 14    Creatinine 0.8 0.8 0.8    Calcium 9.1 9.2 9.1    Anion Gap 5 9 5    AST 15 14 17    ALT 15 15 24    Albumin 4.1 4.0 3.8    EGFR >60.0 >60.0 >60.0           Comment for K at 2159 on 07/03/24: Results obtained on plasma. Plasma Potassium values may be up to 0.4 mEQ/L " less than serum values. The differences may be greater for patients with high platelet or white cell counts.    Comment for K at 1232 on 01/31/24: Results obtained on plasma. Plasma Potassium values may be up to 0.4 mEQ/L less than serum values. The differences may be greater for patients with high platelet or white cell counts.    Comment for K at 1155 on 11/24/23: Results obtained on plasma. Plasma Potassium values may be up to 0.4 mEQ/L less than serum values. The differences may be greater for patients with high platelet or white cell counts.    Comment for EGFR at 2159 on 07/03/24: Calculation based on the Chronic Kidney Disease Epidemiology Collaboration (CKD-EPI) equation refit without adjustment for race.    Comment for EGFR at 1232 on 01/31/24: Calculation based on the Chronic Kidney Disease Epidemiology Collaboration (CKD-EPI) equation refit without adjustment for race.          Lab Results   Component Value Date    HGBA1C 5.5 03/07/2023     Lab Results   Component Value Date    LDLCALC 113 (H) 03/08/2023    LDLCALC 133 (H) 02/24/2022    LDLCALC 145 (H) 12/04/2020    CREATININE 0.8 07/03/2024    CREATININE 0.8 01/31/2024    CREATININE 0.8 11/24/2023             Imaging   - MRI of the brain: Extensive chronic small vessel ischemic changes       Assessment & Plan  1. Bilateral leg pain.  - Pain in both legs, particularly when walking, and a sore lump.  - No rash in the area of concern.   -weakness resolved.   - Likely due to muscle spasm and knot in IT band.  - Apply heat and perform gentle massage. Referral to physical medicine for trigger point injections.    2. Blood pressure management.  - Fluctuating but stabilized with current medication.  - Continue monitoring and ensure timely medication.    3. Cognitive issues.  - Difficulty with concentration, focus, and memory lapses. Some of this is related to stress from work but noted on her mri of the brain prior that there has been some concern of ischemic  changes. On statin/aspirin.   - Family history of dementia and chronic small vessel ischemic changes on MRI.  - Referral to neuropsychologist for further assessment.    4. Shoulder blade pain.  - Stabbing pain between shoulder blades, limiting arm movement and speech. Started 3 weeks ago.  - Likely due to trigger points in connective tissue overlying muscles.  - Referral to physical medicine for trigger point injections.    5. Toe issues.  - Issues with toes on both sides. Injections at [facility] helped.  - Follow up with [facility] if needed.    6. Medication management.  - Requests renewal for cholesterol medication.  - Currently on cholesterol medication and baby aspirin.    I spent 30 minutes on this date of service performing the following activities: obtaining history, performing examination, entering orders, documenting, preparing for visit, obtaining / reviewing records, providing counseling and education, and independently reviewing study/studies.

## 2025-06-09 ENCOUNTER — TELEPHONE (OUTPATIENT)
Dept: PRIMARY CARE | Facility: CLINIC | Age: 65
End: 2025-06-09
Payer: COMMERCIAL

## 2025-06-09 DIAGNOSIS — R79.89 LOW VITAMIN D LEVEL: Primary | ICD-10-CM

## 2025-06-09 DIAGNOSIS — Z78.0 POSTMENOPAUSAL: ICD-10-CM

## 2025-06-10 ENCOUNTER — HOSPITAL ENCOUNTER (OUTPATIENT)
Dept: RADIOLOGY | Facility: HOSPITAL | Age: 65
Discharge: HOME | End: 2025-06-10
Attending: STUDENT IN AN ORGANIZED HEALTH CARE EDUCATION/TRAINING PROGRAM
Payer: COMMERCIAL

## 2025-06-10 DIAGNOSIS — Z78.0 POST-MENOPAUSAL: Primary | ICD-10-CM

## 2025-06-10 DIAGNOSIS — Z78.0 POSTMENOPAUSAL: ICD-10-CM

## 2025-06-10 DIAGNOSIS — R79.89 LOW VITAMIN D LEVEL: ICD-10-CM

## 2025-06-10 PROCEDURE — 77080 DXA BONE DENSITY AXIAL: CPT

## 2025-06-11 ENCOUNTER — TELEPHONE (OUTPATIENT)
Dept: PRIMARY CARE | Facility: CLINIC | Age: 65
End: 2025-06-11
Payer: COMMERCIAL

## 2025-06-11 NOTE — TELEPHONE ENCOUNTER
Called and left voicemail on patient's cell phone. I am looking for additional feedback on a recent interaction she had with the office. If she calls back, please have her contact me directly at [phone].    Thank you,  Lien Burgos  , Primary Care  [address] [place] [address], [address]  [place] [address]  [phone] (O)

## 2025-06-16 ENCOUNTER — OFFICE VISIT (OUTPATIENT)
Dept: PRIMARY CARE | Facility: CLINIC | Age: 65
End: 2025-06-16
Payer: COMMERCIAL

## 2025-06-16 VITALS
WEIGHT: 140 LBS | DIASTOLIC BLOOD PRESSURE: 76 MMHG | TEMPERATURE: 97.9 F | BODY MASS INDEX: 21.97 KG/M2 | RESPIRATION RATE: 18 BRPM | SYSTOLIC BLOOD PRESSURE: 132 MMHG | HEIGHT: 67 IN | OXYGEN SATURATION: 97 % | HEART RATE: 84 BPM

## 2025-06-16 DIAGNOSIS — M62.838 MUSCLE SPASM: Primary | ICD-10-CM

## 2025-06-16 DIAGNOSIS — M85.80 OSTEOPENIA, UNSPECIFIED LOCATION: ICD-10-CM

## 2025-06-16 PROCEDURE — 3008F BODY MASS INDEX DOCD: CPT | Performed by: NURSE PRACTITIONER

## 2025-06-16 PROCEDURE — 99213 OFFICE O/P EST LOW 20 MIN: CPT | Performed by: NURSE PRACTITIONER

## 2025-06-16 NOTE — PROGRESS NOTES
Subjective      Patient ID: Alecia Cruz is a 64 y.o. female.  1960      HPI  Pt presents for f/u.   She recently saw Dr. Begum and was referred to physical medicine rehab. She thought that was today's appt.   She did have DEXA scan which we reviewed.   Reports she has been having this sharp pain between her shoulder blades for the past few weeks.   Past Medical History:   Diagnosis Date    A-fib (CMS/HCC)     Breast cancer (CMS/HCC) 2008    left partial mastectomy axillary dissection, whole breast radiation and chemotherapy in 2008 for a 2-1/2 cm invasive ductal carcinoma that was ER/WA negative and HER-2/alyson positive with 7-15 positive lymph nodes    Colon polyp     couple on each colonoscopy per patient report    COVID 8/23/2022    Fibrocystic breast     Fibroid     History of partial hysterectomy 03/2000    c/o fibroids    History of radiation therapy     Hx antineoplastic chemo     Hx of lipoma 2008    resected from shoulder    Hypothyroidism     Malignant neoplasm of upper-outer quadrant of left breast in female, estrogen receptor negative (CMS/HCC) 9/10/2021    2008-2.5 cm cancer with 7 of 15+ nodes treated with partial mastectomy, axillary dissection, chemotherapy and whole breast radiation.    PAF (paroxysmal atrial fibrillation) (CMS/HCC)     PVC (premature ventricular contraction)     Screening for breast cancer        Past Surgical History   Procedure Laterality Date    Breast biopsy Left 2008    Breast biopsy Right 2021    benign ([facility] specimen [id number])    Breast lumpectomy Left 2008    [facility] specimen [id number]    Cardiac catheterization      Coronary angiography N/A 3/10/2023    [place] Rod[place] [facility] [place]    Dental surgery      Hysterectomy      partial    Lumbar puncture      Mandible fracture surgery      Partial hysterectomy      Rotator cuff repair Left     Sinus surgery         Social History     Tobacco Use    Smoking status: Never    Smokeless tobacco: Never  "  Vaping Use    Vaping status: Never Used   Substance Use Topics    Alcohol use: No    Drug use: No      Family History   Problem Relation Name Age of Onset    Heart disease Biological Mother      Alzheimer's disease Biological Mother      Arrhythmia Biological Mother      Heart disease Biological Father      Diabetes Biological Father      Hypertension Biological Sister      Melanoma Biological Sister      Sarcoidosis Biological Sister      Diabetes Biological Sister      Heart disease Biological Sister      COPD Biological Brother      Prostate cancer Biological Brother      Heart disease Biological Brother      Hypertension Biological Brother      Prostate cancer Biological Brother      Clotting disorder Mother's Brother      Heart disease Father's Sister      Lung cancer Father's Sister      Lung cancer Father's Sister      Prostate cancer Father's Brother      Prostate cancer Father's Brother      Prostate cancer Father's Brother      Prostate cancer Father's Brother      Lung cancer Father's Brother      Stomach cancer Paternal Grandmother      Diabetes Paternal Grandfather      Prostate cancer Paternal Grandfather      Ovarian cancer Paternal Cousin      Lung cancer Paternal Cousin      Thyroid cancer Paternal Cousin               Review of Systems   Musculoskeletal:         Pain between shoulder blades         Objective     Vitals:    06/16/25 1539   BP: 132/76   BP Location: Right upper arm   Patient Position: Sitting   Pulse: 84   Resp: 18   Temp: 36.6 °C (97.9 °F)   TempSrc: Oral   SpO2: 97%   Weight: 63.5 kg (140 lb)   Height: 1.702 m (5' 7\")     Body mass index is 21.93 kg/m².    Physical Exam  Vitals reviewed.   Cardiovascular:      Rate and Rhythm: Normal rate and regular rhythm.      Heart sounds: Normal heart sounds.   Pulmonary:      Effort: Pulmonary effort is normal.      Breath sounds: Normal breath sounds.   Neurological:      Mental Status: She is alert.         Assessment & Plan  Muscle " spasm  She will f/u with physical medicine rehab.        Osteopenia, unspecified location  Reviewed DEXA scan. Reports she is currently taking Vitamin D. She will incorporate more calcium into her diet.

## 2025-06-18 ENCOUNTER — TELEMEDICINE (OUTPATIENT)
Dept: PRIMARY CARE | Facility: CLINIC | Age: 65
End: 2025-06-18
Payer: COMMERCIAL

## 2025-06-18 DIAGNOSIS — M54.12 CERVICAL RADICULOPATHY: Primary | ICD-10-CM

## 2025-06-18 DIAGNOSIS — G57.21 FEMORAL NEUROPATHY OF RIGHT LOWER EXTREMITY: ICD-10-CM

## 2025-06-18 PROCEDURE — 200200 PR NO CHARGE: Mod: 95 | Performed by: STUDENT IN AN ORGANIZED HEALTH CARE EDUCATION/TRAINING PROGRAM

## 2025-06-18 NOTE — PROGRESS NOTES
Verification of Patient Location:  The patient affirms they are currently located in the following state: [place]    Are you in your home or a private residence? Yes    Request for Consent:    Audio Only Encounter   You and I are about to have a telemedicine check-in or visit. This is allowed because you have requested it. This telemedicine visit will be billed to your health insurance or you, if you are self-insured. You understand you will be responsible for any copayments or coinsurances that apply to your telemedicine visit. Before starting our telemedicine visit, I am required to get your consent for this virtual check-in or visit by telemedicine. Do you consent?    Patient Response to Request for Consent:  Yes      Visit Documentation:  Subjective     Patient ID: Alecia Cruz is a 64 y.o. female.  1960      HPI  63 y/o presents for follow-up. Notes that she feels like there is an electric shock that is going down her limbs. This is intermittent sensation. Lasts a few seconds. She will get this sensation in the right hand as well as the right right leg going from the groin down. Notably this is in the same side where we had previously had concern for femoral neuropathy based on 5/30/23 emg.    She was seen by Rosalina last week due scheduled visit accidentally made with the wrong office. She is scheduled now to see PMR in the next few weeks.     She also has had some BP issues where her BP was erratic.     Part of her concern to make today's visit was that a friend was telling her about odd symptoms of Parkinson's disease.       The following have been reviewed and updated as appropriate in this visit:        Review of Systems  As noted above.   Assessment & Plan  Cervical radiculopathy         Femoral neuropathy of right lower extremity         I suspect that patient has neuropathic symptoms that are intermittently occurring that last a few seconds with percussion of the nerves and rapid  movements. She is seeing PMR for re-evaluation of neuropathic symptoms to see best course of rehabilitation. It doesn't seem new more concerning symptom. I don't think this is representative of parkinson's.     Bp is being managed by cardiology.     We will continue to follow her case. 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    Time Spent:  I spent 15 minutes on this date of service performing the following activities: obtaining history, documenting, obtaining / reviewing records, providing counseling and education, and independently reviewing study/studies.

## 2025-06-18 NOTE — ASSESSMENT & PLAN NOTE
I suspect that patient has neuropathic symptoms that are intermittently occurring that last a few seconds with percussion of the nerves and rapid movements. She is seeing PMR for re-evaluation of neuropathic symptoms to see best course of rehabilitation. It doesn't seem new more concerning symptom. I don't think this is representative of parkinson's.     Bp is being managed by cardiology.     We will continue to follow her case. 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

## (undated) DEVICE — GUIDEWIRE DIAGNOSTIC J .035. 150 (ORDER IN 5'S)

## (undated) DEVICE — CATH 6FR FL4

## (undated) DEVICE — CATH F5 INF 3DRC 100CM

## (undated) DEVICE — KIT MICROACCESS PRECISION 6FR

## (undated) DEVICE — KIT ACIST MULTIUSE SYRINGE